# Patient Record
Sex: FEMALE | Race: WHITE | NOT HISPANIC OR LATINO | Employment: OTHER | URBAN - METROPOLITAN AREA
[De-identification: names, ages, dates, MRNs, and addresses within clinical notes are randomized per-mention and may not be internally consistent; named-entity substitution may affect disease eponyms.]

---

## 2017-01-03 ENCOUNTER — AMBULATORY - HEALTHEAST (OUTPATIENT)
Dept: FAMILY MEDICINE | Facility: CLINIC | Age: 57
End: 2017-01-03

## 2017-01-16 ENCOUNTER — OFFICE VISIT - HEALTHEAST (OUTPATIENT)
Dept: FAMILY MEDICINE | Facility: CLINIC | Age: 57
End: 2017-01-16

## 2017-01-16 DIAGNOSIS — J44.9 COPD (CHRONIC OBSTRUCTIVE PULMONARY DISEASE) (H): ICD-10-CM

## 2017-01-16 DIAGNOSIS — F17.200 TOBACCO USE DISORDER: ICD-10-CM

## 2017-01-16 DIAGNOSIS — E78.5 HYPERLIPIDEMIA: ICD-10-CM

## 2017-01-16 DIAGNOSIS — G89.4 CHRONIC PAIN SYNDROME: ICD-10-CM

## 2017-01-16 DIAGNOSIS — E03.9 HYPOTHYROIDISM: ICD-10-CM

## 2017-01-16 DIAGNOSIS — F31.89 OTHER BIPOLAR DISORDER (H): ICD-10-CM

## 2017-01-16 DIAGNOSIS — E55.9 VITAMIN D DEFICIENCY: ICD-10-CM

## 2017-01-16 DIAGNOSIS — G43.909 MIGRAINE: ICD-10-CM

## 2017-01-16 DIAGNOSIS — R13.10 DYSPHAGIA: ICD-10-CM

## 2017-01-16 DIAGNOSIS — K21.9 GERD (GASTROESOPHAGEAL REFLUX DISEASE): ICD-10-CM

## 2017-01-16 DIAGNOSIS — N18.30 CHRONIC KIDNEY DISEASE, STAGE III (MODERATE) (H): ICD-10-CM

## 2017-01-16 LAB
CHOLEST SERPL-MCNC: 248 MG/DL
FASTING STATUS PATIENT QL REPORTED: YES
HDLC SERPL-MCNC: 51 MG/DL
LDLC SERPL CALC-MCNC: 141 MG/DL
TRIGL SERPL-MCNC: 279 MG/DL

## 2017-01-17 ENCOUNTER — RECORDS - HEALTHEAST (OUTPATIENT)
Dept: ADMINISTRATIVE | Facility: OTHER | Age: 57
End: 2017-01-17

## 2017-02-07 ENCOUNTER — HOSPITAL ENCOUNTER (OUTPATIENT)
Dept: CT IMAGING | Facility: HOSPITAL | Age: 57
Discharge: HOME | End: 2017-02-07
Attending: INTERNAL MEDICINE

## 2017-02-07 DIAGNOSIS — R91.1 LUNG NODULE: ICD-10-CM

## 2017-02-08 ENCOUNTER — AMBULATORY - HEALTHEAST (OUTPATIENT)
Dept: INTENSIVE CARE | Facility: CLINIC | Age: 57
End: 2017-02-08

## 2017-02-08 ENCOUNTER — COMMUNICATION - HEALTHEAST (OUTPATIENT)
Dept: FAMILY MEDICINE | Facility: CLINIC | Age: 57
End: 2017-02-08

## 2017-02-08 ENCOUNTER — COMMUNICATION - HEALTHEAST (OUTPATIENT)
Dept: PULMONOLOGY | Facility: OTHER | Age: 57
End: 2017-02-08

## 2017-02-08 DIAGNOSIS — R91.1 LUNG NODULE: ICD-10-CM

## 2017-02-09 ENCOUNTER — RECORDS - HEALTHEAST (OUTPATIENT)
Dept: ADMINISTRATIVE | Facility: OTHER | Age: 57
End: 2017-02-09

## 2017-02-10 ENCOUNTER — COMMUNICATION - HEALTHEAST (OUTPATIENT)
Dept: FAMILY MEDICINE | Facility: CLINIC | Age: 57
End: 2017-02-10

## 2017-02-10 DIAGNOSIS — M54.2 NECK PAIN: ICD-10-CM

## 2017-02-12 ENCOUNTER — RECORDS - HEALTHEAST (OUTPATIENT)
Dept: ADMINISTRATIVE | Facility: OTHER | Age: 57
End: 2017-02-12

## 2017-02-13 ENCOUNTER — RECORDS - HEALTHEAST (OUTPATIENT)
Dept: ADMINISTRATIVE | Facility: OTHER | Age: 57
End: 2017-02-13

## 2017-02-13 ENCOUNTER — OFFICE VISIT - HEALTHEAST (OUTPATIENT)
Dept: FAMILY MEDICINE | Facility: CLINIC | Age: 57
End: 2017-02-13

## 2017-02-13 DIAGNOSIS — G43.909 MIGRAINE: ICD-10-CM

## 2017-02-13 DIAGNOSIS — Z00.00 PREVENTATIVE HEALTH CARE: ICD-10-CM

## 2017-02-17 ENCOUNTER — COMMUNICATION - HEALTHEAST (OUTPATIENT)
Dept: FAMILY MEDICINE | Facility: CLINIC | Age: 57
End: 2017-02-17

## 2017-02-17 DIAGNOSIS — G43.909 MIGRAINE: ICD-10-CM

## 2017-03-01 ENCOUNTER — OFFICE VISIT - HEALTHEAST (OUTPATIENT)
Dept: FAMILY MEDICINE | Facility: CLINIC | Age: 57
End: 2017-03-01

## 2017-03-01 DIAGNOSIS — F31.89 OTHER BIPOLAR DISORDER (H): ICD-10-CM

## 2017-03-01 DIAGNOSIS — G43.909 MIGRAINE: ICD-10-CM

## 2017-03-01 DIAGNOSIS — Z02.4 ENCOUNTER FOR COMMERCIAL DRIVER MEDICAL EXAMINATION (CDME): ICD-10-CM

## 2017-03-01 DIAGNOSIS — G89.4 CHRONIC PAIN SYNDROME: ICD-10-CM

## 2017-03-01 DIAGNOSIS — E78.5 HYPERLIPIDEMIA: ICD-10-CM

## 2017-03-01 DIAGNOSIS — E03.9 HYPOTHYROIDISM: ICD-10-CM

## 2017-03-01 DIAGNOSIS — F17.200 TOBACCO USE DISORDER: ICD-10-CM

## 2017-03-01 DIAGNOSIS — K21.9 GERD (GASTROESOPHAGEAL REFLUX DISEASE): ICD-10-CM

## 2017-03-01 DIAGNOSIS — J44.9 COPD (CHRONIC OBSTRUCTIVE PULMONARY DISEASE) (H): ICD-10-CM

## 2017-03-01 ASSESSMENT — MIFFLIN-ST. JEOR: SCORE: 1222.17

## 2017-03-07 ENCOUNTER — COMMUNICATION - HEALTHEAST (OUTPATIENT)
Dept: FAMILY MEDICINE | Facility: CLINIC | Age: 57
End: 2017-03-07

## 2017-03-07 DIAGNOSIS — E78.5 HYPERLIPIDEMIA: ICD-10-CM

## 2017-03-17 ENCOUNTER — RECORDS - HEALTHEAST (OUTPATIENT)
Dept: ADMINISTRATIVE | Facility: OTHER | Age: 57
End: 2017-03-17

## 2017-03-20 ENCOUNTER — RECORDS - HEALTHEAST (OUTPATIENT)
Dept: ADMINISTRATIVE | Facility: OTHER | Age: 57
End: 2017-03-20

## 2017-03-23 ENCOUNTER — RECORDS - HEALTHEAST (OUTPATIENT)
Dept: ADMINISTRATIVE | Facility: OTHER | Age: 57
End: 2017-03-23

## 2017-04-03 ENCOUNTER — HOSPITAL ENCOUNTER (OUTPATIENT)
Dept: RADIOLOGY | Facility: CLINIC | Age: 57
Discharge: HOME | End: 2017-04-03

## 2017-04-03 DIAGNOSIS — R13.10 DYSPHAGIA, UNSPECIFIED: ICD-10-CM

## 2017-04-05 ENCOUNTER — RECORDS - HEALTHEAST (OUTPATIENT)
Dept: ADMINISTRATIVE | Facility: OTHER | Age: 57
End: 2017-04-05

## 2017-04-10 ENCOUNTER — OFFICE VISIT - HEALTHEAST (OUTPATIENT)
Dept: FAMILY MEDICINE | Facility: CLINIC | Age: 57
End: 2017-04-10

## 2017-04-10 DIAGNOSIS — M25.519 SHOULDER PAIN: ICD-10-CM

## 2017-04-10 DIAGNOSIS — G43.909 MIGRAINE: ICD-10-CM

## 2017-04-26 ENCOUNTER — RECORDS - HEALTHEAST (OUTPATIENT)
Dept: ADMINISTRATIVE | Facility: OTHER | Age: 57
End: 2017-04-26

## 2017-05-01 ENCOUNTER — COMMUNICATION - HEALTHEAST (OUTPATIENT)
Dept: FAMILY MEDICINE | Facility: CLINIC | Age: 57
End: 2017-05-01

## 2017-05-10 ENCOUNTER — COMMUNICATION - HEALTHEAST (OUTPATIENT)
Dept: FAMILY MEDICINE | Facility: CLINIC | Age: 57
End: 2017-05-10

## 2017-05-10 DIAGNOSIS — J44.9 COPD (CHRONIC OBSTRUCTIVE PULMONARY DISEASE) (H): ICD-10-CM

## 2017-05-12 ENCOUNTER — COMMUNICATION - HEALTHEAST (OUTPATIENT)
Dept: FAMILY MEDICINE | Facility: CLINIC | Age: 57
End: 2017-05-12

## 2017-05-12 DIAGNOSIS — J44.9 COPD (CHRONIC OBSTRUCTIVE PULMONARY DISEASE) (H): ICD-10-CM

## 2017-05-19 ENCOUNTER — COMMUNICATION - HEALTHEAST (OUTPATIENT)
Dept: FAMILY MEDICINE | Facility: CLINIC | Age: 57
End: 2017-05-19

## 2017-06-30 ENCOUNTER — COMMUNICATION - HEALTHEAST (OUTPATIENT)
Dept: ADMINISTRATIVE | Facility: CLINIC | Age: 57
End: 2017-06-30

## 2017-07-05 ENCOUNTER — COMMUNICATION - HEALTHEAST (OUTPATIENT)
Dept: FAMILY MEDICINE | Facility: CLINIC | Age: 57
End: 2017-07-05

## 2017-07-07 ENCOUNTER — COMMUNICATION - HEALTHEAST (OUTPATIENT)
Dept: FAMILY MEDICINE | Facility: CLINIC | Age: 57
End: 2017-07-07

## 2017-07-10 ENCOUNTER — RECORDS - HEALTHEAST (OUTPATIENT)
Dept: ADMINISTRATIVE | Facility: OTHER | Age: 57
End: 2017-07-10

## 2017-07-11 ENCOUNTER — COMMUNICATION - HEALTHEAST (OUTPATIENT)
Dept: FAMILY MEDICINE | Facility: CLINIC | Age: 57
End: 2017-07-11

## 2017-07-24 ENCOUNTER — RECORDS - HEALTHEAST (OUTPATIENT)
Dept: GENERAL RADIOLOGY | Facility: CLINIC | Age: 57
End: 2017-07-24

## 2017-07-24 ENCOUNTER — OFFICE VISIT - HEALTHEAST (OUTPATIENT)
Dept: FAMILY MEDICINE | Facility: CLINIC | Age: 57
End: 2017-07-24

## 2017-07-24 DIAGNOSIS — S69.91XA UNSPECIFIED INJURY OF RIGHT WRIST, HAND AND FINGER(S), INITIAL ENCOUNTER: ICD-10-CM

## 2017-07-24 DIAGNOSIS — S69.91XA THUMB INJURY, RIGHT, INITIAL ENCOUNTER: ICD-10-CM

## 2017-07-24 ASSESSMENT — MIFFLIN-ST. JEOR: SCORE: 1230.4

## 2017-08-07 ENCOUNTER — RECORDS - HEALTHEAST (OUTPATIENT)
Dept: GENERAL RADIOLOGY | Facility: CLINIC | Age: 57
End: 2017-08-07

## 2017-08-07 ENCOUNTER — OFFICE VISIT - HEALTHEAST (OUTPATIENT)
Dept: FAMILY MEDICINE | Facility: CLINIC | Age: 57
End: 2017-08-07

## 2017-08-07 DIAGNOSIS — G43.909 MIGRAINE: ICD-10-CM

## 2017-08-07 DIAGNOSIS — S63.601D UNSPECIFIED SPRAIN OF RIGHT THUMB, SUBSEQUENT ENCOUNTER: ICD-10-CM

## 2017-08-07 DIAGNOSIS — S63.601D: ICD-10-CM

## 2017-08-10 ENCOUNTER — COMMUNICATION - HEALTHEAST (OUTPATIENT)
Dept: FAMILY MEDICINE | Facility: CLINIC | Age: 57
End: 2017-08-10

## 2017-08-10 DIAGNOSIS — G43.909 MIGRAINE: ICD-10-CM

## 2017-08-16 ENCOUNTER — COMMUNICATION - HEALTHEAST (OUTPATIENT)
Dept: FAMILY MEDICINE | Facility: CLINIC | Age: 57
End: 2017-08-16

## 2017-08-16 DIAGNOSIS — E78.5 HYPERLIPIDEMIA: ICD-10-CM

## 2017-08-22 ENCOUNTER — COMMUNICATION - HEALTHEAST (OUTPATIENT)
Dept: FAMILY MEDICINE | Facility: CLINIC | Age: 57
End: 2017-08-22

## 2017-08-22 ENCOUNTER — AMBULATORY - HEALTHEAST (OUTPATIENT)
Dept: FAMILY MEDICINE | Facility: CLINIC | Age: 57
End: 2017-08-22

## 2017-08-22 DIAGNOSIS — S69.90XA THUMB INJURY: ICD-10-CM

## 2017-10-01 ENCOUNTER — COMMUNICATION - HEALTHEAST (OUTPATIENT)
Dept: FAMILY MEDICINE | Facility: CLINIC | Age: 57
End: 2017-10-01

## 2017-10-01 DIAGNOSIS — E78.5 HYPERLIPIDEMIA: ICD-10-CM

## 2017-10-25 ENCOUNTER — COMMUNICATION - HEALTHEAST (OUTPATIENT)
Dept: FAMILY MEDICINE | Facility: CLINIC | Age: 57
End: 2017-10-25

## 2017-10-25 DIAGNOSIS — M26.609 TMJ (TEMPOROMANDIBULAR JOINT SYNDROME): ICD-10-CM

## 2017-11-12 ENCOUNTER — COMMUNICATION - HEALTHEAST (OUTPATIENT)
Dept: FAMILY MEDICINE | Facility: CLINIC | Age: 57
End: 2017-11-12

## 2017-11-21 ENCOUNTER — COMMUNICATION - HEALTHEAST (OUTPATIENT)
Dept: FAMILY MEDICINE | Facility: CLINIC | Age: 57
End: 2017-11-21

## 2017-12-19 ENCOUNTER — COMMUNICATION - HEALTHEAST (OUTPATIENT)
Dept: FAMILY MEDICINE | Facility: CLINIC | Age: 57
End: 2017-12-19

## 2018-01-19 ENCOUNTER — AMBULATORY - HEALTHEAST (OUTPATIENT)
Dept: FAMILY MEDICINE | Facility: CLINIC | Age: 58
End: 2018-01-19

## 2018-01-19 ENCOUNTER — COMMUNICATION - HEALTHEAST (OUTPATIENT)
Dept: FAMILY MEDICINE | Facility: CLINIC | Age: 58
End: 2018-01-19

## 2018-01-19 DIAGNOSIS — M26.609 TMJ (TEMPOROMANDIBULAR JOINT SYNDROME): ICD-10-CM

## 2018-02-06 ENCOUNTER — HOSPITAL ENCOUNTER (OUTPATIENT)
Dept: CT IMAGING | Facility: CLINIC | Age: 58
Discharge: HOME | End: 2018-02-06
Attending: INTERNAL MEDICINE

## 2018-02-06 DIAGNOSIS — R91.1 LUNG NODULE: ICD-10-CM

## 2018-02-17 ENCOUNTER — COMMUNICATION - HEALTHEAST (OUTPATIENT)
Dept: FAMILY MEDICINE | Facility: CLINIC | Age: 58
End: 2018-02-17

## 2018-02-20 ENCOUNTER — OFFICE VISIT - HEALTHEAST (OUTPATIENT)
Dept: FAMILY MEDICINE | Facility: CLINIC | Age: 58
End: 2018-02-20

## 2018-02-20 DIAGNOSIS — E03.9 HYPOTHYROIDISM, UNSPECIFIED TYPE: ICD-10-CM

## 2018-02-20 DIAGNOSIS — G43.909 MIGRAINE: ICD-10-CM

## 2018-02-20 DIAGNOSIS — R35.0 URINARY FREQUENCY: ICD-10-CM

## 2018-02-20 DIAGNOSIS — E78.5 HYPERLIPIDEMIA, UNSPECIFIED HYPERLIPIDEMIA TYPE: ICD-10-CM

## 2018-02-20 DIAGNOSIS — Z02.4 ENCOUNTER FOR DEPARTMENT OF TRANSPORTATION (DOT) EXAMINATION FOR DRIVING LICENSE RENEWAL: ICD-10-CM

## 2018-02-20 DIAGNOSIS — G89.4 CHRONIC PAIN SYNDROME: ICD-10-CM

## 2018-02-20 DIAGNOSIS — K21.9 GASTROESOPHAGEAL REFLUX DISEASE WITHOUT ESOPHAGITIS: ICD-10-CM

## 2018-02-20 DIAGNOSIS — J44.9 CHRONIC OBSTRUCTIVE PULMONARY DISEASE, UNSPECIFIED COPD TYPE (H): ICD-10-CM

## 2018-02-20 DIAGNOSIS — F31.89 OTHER BIPOLAR DISORDER (H): ICD-10-CM

## 2018-02-20 DIAGNOSIS — F17.200 TOBACCO USE DISORDER: ICD-10-CM

## 2018-02-20 DIAGNOSIS — Z12.31 VISIT FOR SCREENING MAMMOGRAM: ICD-10-CM

## 2018-02-20 LAB
ALBUMIN SERPL-MCNC: 4 G/DL (ref 3.5–5)
ALBUMIN UR-MCNC: ABNORMAL MG/DL
ALP SERPL-CCNC: 134 U/L (ref 45–120)
ALT SERPL W P-5'-P-CCNC: 21 U/L (ref 0–45)
ANION GAP SERPL CALCULATED.3IONS-SCNC: 10 MMOL/L (ref 5–18)
APPEARANCE UR: CLEAR
AST SERPL W P-5'-P-CCNC: 24 U/L (ref 0–40)
BILIRUB SERPL-MCNC: 0.6 MG/DL (ref 0–1)
BILIRUB UR QL STRIP: ABNORMAL
BUN SERPL-MCNC: 17 MG/DL (ref 8–22)
CALCIUM SERPL-MCNC: 9.5 MG/DL (ref 8.5–10.5)
CHLORIDE BLD-SCNC: 103 MMOL/L (ref 98–107)
CHOLEST SERPL-MCNC: 219 MG/DL
CO2 SERPL-SCNC: 27 MMOL/L (ref 22–31)
COLOR UR AUTO: YELLOW
CREAT SERPL-MCNC: 1.09 MG/DL (ref 0.6–1.1)
FASTING STATUS PATIENT QL REPORTED: YES
GFR SERPL CREATININE-BSD FRML MDRD: 52 ML/MIN/1.73M2
GLUCOSE BLD-MCNC: 107 MG/DL (ref 70–125)
GLUCOSE UR STRIP-MCNC: NEGATIVE MG/DL
HDLC SERPL-MCNC: 64 MG/DL
HGB BLD-MCNC: 15 G/DL (ref 12–16)
HGB UR QL STRIP: NEGATIVE
KETONES UR STRIP-MCNC: ABNORMAL MG/DL
LDLC SERPL CALC-MCNC: 119 MG/DL
LEUKOCYTE ESTERASE UR QL STRIP: NEGATIVE
NITRATE UR QL: NEGATIVE
PH UR STRIP: 6 [PH] (ref 5–8)
POTASSIUM BLD-SCNC: 4.4 MMOL/L (ref 3.5–5)
PROT SERPL-MCNC: 7.1 G/DL (ref 6–8)
SODIUM SERPL-SCNC: 140 MMOL/L (ref 136–145)
SP GR UR STRIP: 1.02 (ref 1–1.03)
TRIGL SERPL-MCNC: 181 MG/DL
TSH SERPL DL<=0.005 MIU/L-ACNC: 1.81 UIU/ML (ref 0.3–5)
UROBILINOGEN UR STRIP-ACNC: ABNORMAL

## 2018-02-20 ASSESSMENT — MIFFLIN-ST. JEOR: SCORE: 1233.23

## 2018-02-22 ENCOUNTER — RECORDS - HEALTHEAST (OUTPATIENT)
Dept: ADMINISTRATIVE | Facility: OTHER | Age: 58
End: 2018-02-22

## 2018-02-26 ENCOUNTER — OFFICE VISIT - HEALTHEAST (OUTPATIENT)
Dept: FAMILY MEDICINE | Facility: CLINIC | Age: 58
End: 2018-02-26

## 2018-02-26 ENCOUNTER — OFFICE VISIT - HEALTHEAST (OUTPATIENT)
Dept: PULMONOLOGY | Facility: OTHER | Age: 58
End: 2018-02-26

## 2018-02-26 ENCOUNTER — COMMUNICATION - HEALTHEAST (OUTPATIENT)
Dept: FAMILY MEDICINE | Facility: CLINIC | Age: 58
End: 2018-02-26

## 2018-02-26 DIAGNOSIS — J43.2 CENTRILOBULAR EMPHYSEMA (H): ICD-10-CM

## 2018-02-26 DIAGNOSIS — R11.0 NAUSEA: ICD-10-CM

## 2018-02-26 DIAGNOSIS — M79.7 FIBROMYALGIA: ICD-10-CM

## 2018-02-26 DIAGNOSIS — G43.909 MIGRAINE: ICD-10-CM

## 2018-02-26 DIAGNOSIS — E03.9 HYPOTHYROIDISM, UNSPECIFIED TYPE: ICD-10-CM

## 2018-02-26 DIAGNOSIS — E78.5 HYPERLIPIDEMIA, UNSPECIFIED HYPERLIPIDEMIA TYPE: ICD-10-CM

## 2018-02-26 DIAGNOSIS — J44.9 CHRONIC OBSTRUCTIVE PULMONARY DISEASE, UNSPECIFIED COPD TYPE (H): ICD-10-CM

## 2018-02-26 DIAGNOSIS — R91.1 LUNG NODULE: ICD-10-CM

## 2018-02-26 DIAGNOSIS — F31.89 OTHER BIPOLAR DISORDER (H): ICD-10-CM

## 2018-02-26 ASSESSMENT — MIFFLIN-ST. JEOR: SCORE: 1241.4

## 2018-03-12 ENCOUNTER — COMMUNICATION - HEALTHEAST (OUTPATIENT)
Dept: FAMILY MEDICINE | Facility: CLINIC | Age: 58
End: 2018-03-12

## 2018-03-12 DIAGNOSIS — E55.9 VITAMIN D DEFICIENCY: ICD-10-CM

## 2018-03-20 ENCOUNTER — OFFICE VISIT - HEALTHEAST (OUTPATIENT)
Dept: FAMILY MEDICINE | Facility: CLINIC | Age: 58
End: 2018-03-20

## 2018-03-20 ENCOUNTER — HOSPITAL ENCOUNTER (OUTPATIENT)
Dept: MAMMOGRAPHY | Facility: CLINIC | Age: 58
Discharge: HOME | End: 2018-03-20
Attending: FAMILY MEDICINE

## 2018-03-20 DIAGNOSIS — F41.0 PANIC ATTACK: ICD-10-CM

## 2018-03-20 DIAGNOSIS — I51.7 BIATRIAL ENLARGEMENT: ICD-10-CM

## 2018-03-20 DIAGNOSIS — Z12.31 VISIT FOR SCREENING MAMMOGRAM: ICD-10-CM

## 2018-03-20 DIAGNOSIS — G43.909 MIGRAINE: ICD-10-CM

## 2018-03-20 DIAGNOSIS — R11.0 NAUSEA: ICD-10-CM

## 2018-03-20 DIAGNOSIS — J43.2 CENTRILOBULAR EMPHYSEMA (H): ICD-10-CM

## 2018-03-20 LAB
ATRIAL RATE - MUSE: 77 BPM
DIASTOLIC BLOOD PRESSURE - MUSE: NORMAL MMHG
INTERPRETATION ECG - MUSE: NORMAL
P AXIS - MUSE: 64 DEGREES
PR INTERVAL - MUSE: 162 MS
QRS DURATION - MUSE: 80 MS
QT - MUSE: 396 MS
QTC - MUSE: 448 MS
R AXIS - MUSE: 74 DEGREES
SYSTOLIC BLOOD PRESSURE - MUSE: NORMAL MMHG
T AXIS - MUSE: 69 DEGREES
VENTRICULAR RATE- MUSE: 77 BPM

## 2018-03-21 ENCOUNTER — AMBULATORY - HEALTHEAST (OUTPATIENT)
Dept: NURSING | Facility: CLINIC | Age: 58
End: 2018-03-21

## 2018-03-21 ENCOUNTER — COMMUNICATION - HEALTHEAST (OUTPATIENT)
Dept: MAMMOGRAPHY | Facility: CLINIC | Age: 58
End: 2018-03-21

## 2018-03-29 ENCOUNTER — HOSPITAL ENCOUNTER (OUTPATIENT)
Dept: MAMMOGRAPHY | Facility: CLINIC | Age: 58
Discharge: HOME | End: 2018-03-29
Attending: FAMILY MEDICINE

## 2018-03-29 DIAGNOSIS — N64.89 BREAST ASYMMETRY: ICD-10-CM

## 2018-04-13 ENCOUNTER — COMMUNICATION - HEALTHEAST (OUTPATIENT)
Dept: FAMILY MEDICINE | Facility: CLINIC | Age: 58
End: 2018-04-13

## 2018-04-13 ENCOUNTER — OFFICE VISIT - HEALTHEAST (OUTPATIENT)
Dept: FAMILY MEDICINE | Facility: CLINIC | Age: 58
End: 2018-04-13

## 2018-04-13 DIAGNOSIS — J30.2 SEASONAL ALLERGIES: ICD-10-CM

## 2018-04-13 DIAGNOSIS — F41.9 ANXIETY: ICD-10-CM

## 2018-04-13 DIAGNOSIS — J44.9 COPD (CHRONIC OBSTRUCTIVE PULMONARY DISEASE) (H): ICD-10-CM

## 2018-04-13 DIAGNOSIS — G43.909 MIGRAINE: ICD-10-CM

## 2018-04-13 DIAGNOSIS — G89.4 CHRONIC PAIN SYNDROME: ICD-10-CM

## 2018-04-13 DIAGNOSIS — M79.7 FIBROMYALGIA: ICD-10-CM

## 2018-05-22 ENCOUNTER — COMMUNICATION - HEALTHEAST (OUTPATIENT)
Dept: FAMILY MEDICINE | Facility: CLINIC | Age: 58
End: 2018-05-22

## 2018-05-22 DIAGNOSIS — E03.9 HYPOTHYROIDISM, UNSPECIFIED TYPE: ICD-10-CM

## 2018-06-22 ENCOUNTER — COMMUNICATION - HEALTHEAST (OUTPATIENT)
Dept: FAMILY MEDICINE | Facility: CLINIC | Age: 58
End: 2018-06-22

## 2018-06-22 DIAGNOSIS — E78.5 HYPERLIPIDEMIA: ICD-10-CM

## 2018-07-20 ENCOUNTER — COMMUNICATION - HEALTHEAST (OUTPATIENT)
Dept: FAMILY MEDICINE | Facility: CLINIC | Age: 58
End: 2018-07-20

## 2018-07-20 DIAGNOSIS — F17.200 TOBACCO USE DISORDER: ICD-10-CM

## 2018-08-20 ENCOUNTER — COMMUNICATION - HEALTHEAST (OUTPATIENT)
Dept: FAMILY MEDICINE | Facility: CLINIC | Age: 58
End: 2018-08-20

## 2018-08-20 DIAGNOSIS — E03.9 HYPOTHYROIDISM, UNSPECIFIED TYPE: ICD-10-CM

## 2018-09-18 ENCOUNTER — OFFICE VISIT - HEALTHEAST (OUTPATIENT)
Dept: FAMILY MEDICINE | Facility: CLINIC | Age: 58
End: 2018-09-18

## 2018-09-18 DIAGNOSIS — F17.200 TOBACCO USE DISORDER: ICD-10-CM

## 2018-09-18 DIAGNOSIS — J43.2 CENTRILOBULAR EMPHYSEMA (H): ICD-10-CM

## 2018-09-18 DIAGNOSIS — G43.909 MIGRAINE: ICD-10-CM

## 2018-09-18 DIAGNOSIS — Z79.899 CONTROLLED SUBSTANCE AGREEMENT SIGNED: ICD-10-CM

## 2018-09-18 DIAGNOSIS — Z00.00 ANNUAL PHYSICAL EXAM: ICD-10-CM

## 2018-09-18 DIAGNOSIS — M79.7 FIBROMYALGIA: ICD-10-CM

## 2018-09-18 DIAGNOSIS — F31.89 OTHER BIPOLAR DISORDER (H): ICD-10-CM

## 2018-09-18 DIAGNOSIS — E03.9 HYPOTHYROIDISM, UNSPECIFIED TYPE: ICD-10-CM

## 2018-09-18 DIAGNOSIS — E78.5 HYPERLIPIDEMIA, UNSPECIFIED HYPERLIPIDEMIA TYPE: ICD-10-CM

## 2018-09-18 LAB
ANION GAP SERPL CALCULATED.3IONS-SCNC: 12 MMOL/L (ref 5–18)
BUN SERPL-MCNC: 18 MG/DL (ref 8–22)
CALCIUM SERPL-MCNC: 9.9 MG/DL (ref 8.5–10.5)
CHLORIDE BLD-SCNC: 105 MMOL/L (ref 98–107)
CHOLEST SERPL-MCNC: 180 MG/DL
CO2 SERPL-SCNC: 26 MMOL/L (ref 22–31)
CREAT SERPL-MCNC: 1.11 MG/DL (ref 0.6–1.1)
FASTING STATUS PATIENT QL REPORTED: YES
GFR SERPL CREATININE-BSD FRML MDRD: 50 ML/MIN/1.73M2
GLUCOSE BLD-MCNC: 104 MG/DL (ref 70–125)
HDLC SERPL-MCNC: 63 MG/DL
LDLC SERPL CALC-MCNC: 86 MG/DL
POTASSIUM BLD-SCNC: 4.2 MMOL/L (ref 3.5–5)
SODIUM SERPL-SCNC: 143 MMOL/L (ref 136–145)
TRIGL SERPL-MCNC: 155 MG/DL

## 2018-09-18 ASSESSMENT — MIFFLIN-ST. JEOR: SCORE: 1241.74

## 2018-09-19 LAB
25(OH)D3 SERPL-MCNC: 48.3 NG/ML (ref 30–80)
25(OH)D3 SERPL-MCNC: 48.3 NG/ML (ref 30–80)
HPV SOURCE: NORMAL
HUMAN PAPILLOMA VIRUS 16 DNA: NEGATIVE
HUMAN PAPILLOMA VIRUS 18 DNA: NEGATIVE
HUMAN PAPILLOMA VIRUS FINAL DIAGNOSIS: NORMAL
HUMAN PAPILLOMA VIRUS OTHER HR: NEGATIVE
SPECIMEN DESCRIPTION: NORMAL

## 2018-09-20 ENCOUNTER — COMMUNICATION - HEALTHEAST (OUTPATIENT)
Dept: FAMILY MEDICINE | Facility: CLINIC | Age: 58
End: 2018-09-20

## 2018-09-25 ENCOUNTER — COMMUNICATION - HEALTHEAST (OUTPATIENT)
Dept: FAMILY MEDICINE | Facility: CLINIC | Age: 58
End: 2018-09-25

## 2018-09-25 DIAGNOSIS — G43.909 MIGRAINE: ICD-10-CM

## 2018-09-27 LAB
BKR LAB AP ABNORMAL BLEEDING: NO
BKR LAB AP BIRTH CONTROL/HORMONES: NORMAL
BKR LAB AP CERVICAL APPEARANCE: NORMAL
BKR LAB AP GYN ADEQUACY: NORMAL
BKR LAB AP GYN INTERPRETATION: NORMAL
BKR LAB AP HPV REFLEX: NORMAL
BKR LAB AP LMP: NORMAL
BKR LAB AP PATIENT STATUS: NORMAL
BKR LAB AP PREVIOUS ABNORMAL: NO
BKR LAB AP PREVIOUS NORMAL: 2013
HIGH RISK?: NO
PATH REPORT.COMMENTS IMP SPEC: NORMAL
RESULT FLAG (HE HISTORICAL CONVERSION): NORMAL

## 2018-09-28 ENCOUNTER — COMMUNICATION - HEALTHEAST (OUTPATIENT)
Dept: FAMILY MEDICINE | Facility: CLINIC | Age: 58
End: 2018-09-28

## 2018-09-28 DIAGNOSIS — E78.5 HYPERLIPIDEMIA: ICD-10-CM

## 2018-10-15 ENCOUNTER — COMMUNICATION - HEALTHEAST (OUTPATIENT)
Dept: FAMILY MEDICINE | Facility: CLINIC | Age: 58
End: 2018-10-15

## 2018-10-15 DIAGNOSIS — F17.200 TOBACCO USE DISORDER: ICD-10-CM

## 2018-11-14 ENCOUNTER — COMMUNICATION - HEALTHEAST (OUTPATIENT)
Dept: FAMILY MEDICINE | Facility: CLINIC | Age: 58
End: 2018-11-14

## 2018-11-14 DIAGNOSIS — F17.200 TOBACCO USE DISORDER: ICD-10-CM

## 2018-12-02 ENCOUNTER — COMMUNICATION - HEALTHEAST (OUTPATIENT)
Dept: FAMILY MEDICINE | Facility: CLINIC | Age: 58
End: 2018-12-02

## 2018-12-02 DIAGNOSIS — M26.609 TMJ (TEMPOROMANDIBULAR JOINT SYNDROME): ICD-10-CM

## 2018-12-03 ENCOUNTER — COMMUNICATION - HEALTHEAST (OUTPATIENT)
Dept: FAMILY MEDICINE | Facility: CLINIC | Age: 58
End: 2018-12-03

## 2018-12-03 DIAGNOSIS — M26.609 TMJ (TEMPOROMANDIBULAR JOINT SYNDROME): ICD-10-CM

## 2018-12-21 ENCOUNTER — COMMUNICATION - HEALTHEAST (OUTPATIENT)
Dept: FAMILY MEDICINE | Facility: CLINIC | Age: 58
End: 2018-12-21

## 2018-12-21 DIAGNOSIS — E78.5 HYPERLIPIDEMIA: ICD-10-CM

## 2019-01-04 ENCOUNTER — COMMUNICATION - HEALTHEAST (OUTPATIENT)
Dept: FAMILY MEDICINE | Facility: CLINIC | Age: 59
End: 2019-01-04

## 2019-01-04 DIAGNOSIS — M26.609 TMJ (TEMPOROMANDIBULAR JOINT SYNDROME): ICD-10-CM

## 2019-02-04 ENCOUNTER — HOSPITAL ENCOUNTER (OUTPATIENT)
Dept: CT IMAGING | Facility: HOSPITAL | Age: 59
Discharge: HOME | End: 2019-02-04
Attending: INTERNAL MEDICINE

## 2019-02-04 DIAGNOSIS — J43.2 CENTRILOBULAR EMPHYSEMA (H): ICD-10-CM

## 2019-02-04 DIAGNOSIS — J44.9 CHRONIC OBSTRUCTIVE PULMONARY DISEASE, UNSPECIFIED COPD TYPE (H): ICD-10-CM

## 2019-02-05 ENCOUNTER — COMMUNICATION - HEALTHEAST (OUTPATIENT)
Dept: FAMILY MEDICINE | Facility: CLINIC | Age: 59
End: 2019-02-05

## 2019-02-05 DIAGNOSIS — E55.9 VITAMIN D DEFICIENCY: ICD-10-CM

## 2019-02-06 ENCOUNTER — OFFICE VISIT - HEALTHEAST (OUTPATIENT)
Dept: FAMILY MEDICINE | Facility: CLINIC | Age: 59
End: 2019-02-06

## 2019-02-06 DIAGNOSIS — G43.919 INTRACTABLE MIGRAINE WITHOUT STATUS MIGRAINOSUS, UNSPECIFIED MIGRAINE TYPE: ICD-10-CM

## 2019-02-06 ASSESSMENT — MIFFLIN-ST. JEOR: SCORE: 1264.42

## 2019-02-07 ENCOUNTER — COMMUNICATION - HEALTHEAST (OUTPATIENT)
Dept: FAMILY MEDICINE | Facility: CLINIC | Age: 59
End: 2019-02-07

## 2019-02-08 ENCOUNTER — COMMUNICATION - HEALTHEAST (OUTPATIENT)
Dept: FAMILY MEDICINE | Facility: CLINIC | Age: 59
End: 2019-02-08

## 2019-02-10 ENCOUNTER — COMMUNICATION - HEALTHEAST (OUTPATIENT)
Dept: FAMILY MEDICINE | Facility: CLINIC | Age: 59
End: 2019-02-10

## 2019-02-10 DIAGNOSIS — E03.9 HYPOTHYROIDISM, UNSPECIFIED TYPE: ICD-10-CM

## 2019-02-15 ENCOUNTER — COMMUNICATION - HEALTHEAST (OUTPATIENT)
Dept: FAMILY MEDICINE | Facility: CLINIC | Age: 59
End: 2019-02-15

## 2019-02-15 DIAGNOSIS — R11.0 NAUSEA: ICD-10-CM

## 2019-02-22 ENCOUNTER — COMMUNICATION - HEALTHEAST (OUTPATIENT)
Dept: FAMILY MEDICINE | Facility: CLINIC | Age: 59
End: 2019-02-22

## 2019-02-22 DIAGNOSIS — R11.0 NAUSEA: ICD-10-CM

## 2019-03-01 ENCOUNTER — OFFICE VISIT - HEALTHEAST (OUTPATIENT)
Dept: FAMILY MEDICINE | Facility: CLINIC | Age: 59
End: 2019-03-01

## 2019-03-01 DIAGNOSIS — G43.919 INTRACTABLE MIGRAINE WITHOUT STATUS MIGRAINOSUS, UNSPECIFIED MIGRAINE TYPE: ICD-10-CM

## 2019-03-01 ASSESSMENT — MIFFLIN-ST. JEOR: SCORE: 1259.88

## 2019-04-05 ENCOUNTER — COMMUNICATION - HEALTHEAST (OUTPATIENT)
Dept: FAMILY MEDICINE | Facility: CLINIC | Age: 59
End: 2019-04-05

## 2019-04-05 DIAGNOSIS — F17.200 TOBACCO USE DISORDER: ICD-10-CM

## 2019-04-12 ENCOUNTER — COMMUNICATION - HEALTHEAST (OUTPATIENT)
Dept: FAMILY MEDICINE | Facility: CLINIC | Age: 59
End: 2019-04-12

## 2019-04-12 DIAGNOSIS — M26.609 TMJ (TEMPOROMANDIBULAR JOINT SYNDROME): ICD-10-CM

## 2019-05-04 ENCOUNTER — COMMUNICATION - HEALTHEAST (OUTPATIENT)
Dept: FAMILY MEDICINE | Facility: CLINIC | Age: 59
End: 2019-05-04

## 2019-05-04 DIAGNOSIS — J30.2 SEASONAL ALLERGIES: ICD-10-CM

## 2019-05-06 ENCOUNTER — COMMUNICATION - HEALTHEAST (OUTPATIENT)
Dept: FAMILY MEDICINE | Facility: CLINIC | Age: 59
End: 2019-05-06

## 2019-05-06 DIAGNOSIS — E55.9 VITAMIN D DEFICIENCY: ICD-10-CM

## 2019-05-09 ENCOUNTER — COMMUNICATION - HEALTHEAST (OUTPATIENT)
Dept: FAMILY MEDICINE | Facility: CLINIC | Age: 59
End: 2019-05-09

## 2019-05-09 DIAGNOSIS — E03.9 HYPOTHYROIDISM, UNSPECIFIED TYPE: ICD-10-CM

## 2019-05-10 ENCOUNTER — COMMUNICATION - HEALTHEAST (OUTPATIENT)
Dept: FAMILY MEDICINE | Facility: CLINIC | Age: 59
End: 2019-05-10

## 2019-05-10 DIAGNOSIS — M26.609 TMJ (TEMPOROMANDIBULAR JOINT SYNDROME): ICD-10-CM

## 2019-05-13 ENCOUNTER — OFFICE VISIT - HEALTHEAST (OUTPATIENT)
Dept: FAMILY MEDICINE | Facility: CLINIC | Age: 59
End: 2019-05-13

## 2019-05-13 DIAGNOSIS — Z36.3 ENCOUNTER FOR ROUTINE SCREENING FOR MALFORMATION USING ULTRASONICS: ICD-10-CM

## 2019-05-13 DIAGNOSIS — Z98.82 HISTORY OF BILATERAL SALINE BREAST IMPLANTS: ICD-10-CM

## 2019-05-13 DIAGNOSIS — T85.43XA: ICD-10-CM

## 2019-05-15 ENCOUNTER — HOSPITAL ENCOUNTER (OUTPATIENT)
Dept: MAMMOGRAPHY | Facility: CLINIC | Age: 59
Discharge: HOME | End: 2019-05-15
Attending: FAMILY MEDICINE

## 2019-05-15 DIAGNOSIS — Z36.3 ENCOUNTER FOR ROUTINE SCREENING FOR MALFORMATION USING ULTRASONICS: ICD-10-CM

## 2019-05-15 DIAGNOSIS — T85.43XA: ICD-10-CM

## 2019-05-15 DIAGNOSIS — Z98.82 HISTORY OF BILATERAL SALINE BREAST IMPLANTS: ICD-10-CM

## 2019-05-20 ENCOUNTER — HOSPITAL ENCOUNTER (OUTPATIENT)
Dept: MAMMOGRAPHY | Facility: CLINIC | Age: 59
Discharge: HOME | End: 2019-05-20
Attending: FAMILY MEDICINE

## 2019-06-14 ENCOUNTER — OFFICE VISIT - HEALTHEAST (OUTPATIENT)
Dept: FAMILY MEDICINE | Facility: CLINIC | Age: 59
End: 2019-06-14

## 2019-06-14 DIAGNOSIS — E78.5 HYPERLIPIDEMIA, UNSPECIFIED HYPERLIPIDEMIA TYPE: ICD-10-CM

## 2019-06-14 DIAGNOSIS — J43.2 CENTRILOBULAR EMPHYSEMA (H): ICD-10-CM

## 2019-06-14 DIAGNOSIS — F31.89 OTHER BIPOLAR DISORDER (H): ICD-10-CM

## 2019-06-14 DIAGNOSIS — T85.43XS LEAKAGE OF BREAST IMPLANT, SEQUELA: ICD-10-CM

## 2019-06-14 DIAGNOSIS — F17.200 TOBACCO USE DISORDER: ICD-10-CM

## 2019-06-14 DIAGNOSIS — Z01.818 PREOP GENERAL PHYSICAL EXAM: ICD-10-CM

## 2019-06-14 DIAGNOSIS — R09.89 VASCULAR BRUIT: ICD-10-CM

## 2019-06-14 DIAGNOSIS — E03.9 HYPOTHYROIDISM, UNSPECIFIED TYPE: ICD-10-CM

## 2019-06-14 LAB
ALBUMIN SERPL-MCNC: 4 G/DL (ref 3.5–5)
ALP SERPL-CCNC: 100 U/L (ref 45–120)
ALT SERPL W P-5'-P-CCNC: 24 U/L (ref 0–45)
ANION GAP SERPL CALCULATED.3IONS-SCNC: 11 MMOL/L (ref 5–18)
AST SERPL W P-5'-P-CCNC: 26 U/L (ref 0–40)
ATRIAL RATE - MUSE: 74 BPM
BILIRUB SERPL-MCNC: 0.3 MG/DL (ref 0–1)
BUN SERPL-MCNC: 19 MG/DL (ref 8–22)
CALCIUM SERPL-MCNC: 9.6 MG/DL (ref 8.5–10.5)
CHLORIDE BLD-SCNC: 105 MMOL/L (ref 98–107)
CO2 SERPL-SCNC: 25 MMOL/L (ref 22–31)
CREAT SERPL-MCNC: 1.06 MG/DL (ref 0.6–1.1)
DIASTOLIC BLOOD PRESSURE - MUSE: NORMAL MMHG
ERYTHROCYTE [DISTWIDTH] IN BLOOD BY AUTOMATED COUNT: 13.1 % (ref 11–14.5)
GFR SERPL CREATININE-BSD FRML MDRD: 53 ML/MIN/1.73M2
GLUCOSE BLD-MCNC: 78 MG/DL (ref 70–125)
HCT VFR BLD AUTO: 41.5 % (ref 35–47)
HGB BLD-MCNC: 13.1 G/DL (ref 12–16)
INTERPRETATION ECG - MUSE: NORMAL
MCH RBC QN AUTO: 28.3 PG (ref 27–34)
MCHC RBC AUTO-ENTMCNC: 31.6 G/DL (ref 32–36)
MCV RBC AUTO: 90 FL (ref 80–100)
P AXIS - MUSE: 66 DEGREES
PLATELET # BLD AUTO: 296 THOU/UL (ref 140–440)
PMV BLD AUTO: 8.7 FL (ref 8.5–12.5)
POTASSIUM BLD-SCNC: 4 MMOL/L (ref 3.5–5)
PR INTERVAL - MUSE: 162 MS
PROT SERPL-MCNC: 6.9 G/DL (ref 6–8)
QRS DURATION - MUSE: 78 MS
QT - MUSE: 410 MS
QTC - MUSE: 455 MS
R AXIS - MUSE: 74 DEGREES
RBC # BLD AUTO: 4.63 MILL/UL (ref 3.8–5.4)
SODIUM SERPL-SCNC: 141 MMOL/L (ref 136–145)
SYSTOLIC BLOOD PRESSURE - MUSE: NORMAL MMHG
T AXIS - MUSE: 74 DEGREES
TSH SERPL DL<=0.005 MIU/L-ACNC: 1.61 UIU/ML (ref 0.3–5)
VENTRICULAR RATE- MUSE: 74 BPM
WBC: 6.5 THOU/UL (ref 4–11)

## 2019-06-14 ASSESSMENT — MIFFLIN-ST. JEOR: SCORE: 1262.61

## 2019-06-28 ENCOUNTER — HOSPITAL ENCOUNTER (OUTPATIENT)
Dept: ULTRASOUND IMAGING | Facility: CLINIC | Age: 59
Discharge: HOME | End: 2019-06-28
Attending: FAMILY MEDICINE

## 2019-06-28 DIAGNOSIS — R09.89 VASCULAR BRUIT: ICD-10-CM

## 2019-07-05 ENCOUNTER — COMMUNICATION - HEALTHEAST (OUTPATIENT)
Dept: FAMILY MEDICINE | Facility: CLINIC | Age: 59
End: 2019-07-05

## 2019-07-05 DIAGNOSIS — M26.609 TMJ (TEMPOROMANDIBULAR JOINT SYNDROME): ICD-10-CM

## 2019-08-01 ENCOUNTER — COMMUNICATION - HEALTHEAST (OUTPATIENT)
Dept: FAMILY MEDICINE | Facility: CLINIC | Age: 59
End: 2019-08-01

## 2019-08-01 DIAGNOSIS — M26.609 TMJ (TEMPOROMANDIBULAR JOINT SYNDROME): ICD-10-CM

## 2019-08-03 ENCOUNTER — COMMUNICATION - HEALTHEAST (OUTPATIENT)
Dept: FAMILY MEDICINE | Facility: CLINIC | Age: 59
End: 2019-08-03

## 2019-08-03 DIAGNOSIS — J30.2 SEASONAL ALLERGIES: ICD-10-CM

## 2019-08-11 ENCOUNTER — COMMUNICATION - HEALTHEAST (OUTPATIENT)
Dept: FAMILY MEDICINE | Facility: CLINIC | Age: 59
End: 2019-08-11

## 2019-08-11 DIAGNOSIS — F17.200 TOBACCO USE DISORDER: ICD-10-CM

## 2019-08-11 DIAGNOSIS — R11.0 NAUSEA: ICD-10-CM

## 2019-08-11 DIAGNOSIS — G43.909 MIGRAINE: ICD-10-CM

## 2019-08-12 ENCOUNTER — OFFICE VISIT - HEALTHEAST (OUTPATIENT)
Dept: PULMONOLOGY | Facility: OTHER | Age: 59
End: 2019-08-12

## 2019-08-12 DIAGNOSIS — J44.9 CHRONIC OBSTRUCTIVE PULMONARY DISEASE, UNSPECIFIED COPD TYPE (H): ICD-10-CM

## 2019-08-12 DIAGNOSIS — F17.200 TOBACCO USE DISORDER: ICD-10-CM

## 2019-08-12 DIAGNOSIS — R91.1 LUNG NODULE: ICD-10-CM

## 2019-09-03 ENCOUNTER — COMMUNICATION - HEALTHEAST (OUTPATIENT)
Dept: FAMILY MEDICINE | Facility: CLINIC | Age: 59
End: 2019-09-03

## 2019-09-03 DIAGNOSIS — M26.609 TMJ (TEMPOROMANDIBULAR JOINT SYNDROME): ICD-10-CM

## 2019-09-13 ENCOUNTER — RECORDS - HEALTHEAST (OUTPATIENT)
Dept: ADMINISTRATIVE | Facility: OTHER | Age: 59
End: 2019-09-13

## 2019-10-11 ENCOUNTER — COMMUNICATION - HEALTHEAST (OUTPATIENT)
Dept: FAMILY MEDICINE | Facility: CLINIC | Age: 59
End: 2019-10-11

## 2019-10-11 DIAGNOSIS — M26.609 TMJ (TEMPOROMANDIBULAR JOINT SYNDROME): ICD-10-CM

## 2019-11-04 ENCOUNTER — COMMUNICATION - HEALTHEAST (OUTPATIENT)
Dept: FAMILY MEDICINE | Facility: CLINIC | Age: 59
End: 2019-11-04

## 2019-11-04 DIAGNOSIS — E55.9 VITAMIN D DEFICIENCY: ICD-10-CM

## 2019-11-17 ENCOUNTER — COMMUNICATION - HEALTHEAST (OUTPATIENT)
Dept: FAMILY MEDICINE | Facility: CLINIC | Age: 59
End: 2019-11-17

## 2019-11-17 DIAGNOSIS — M26.609 TMJ (TEMPOROMANDIBULAR JOINT SYNDROME): ICD-10-CM

## 2019-11-25 ENCOUNTER — COMMUNICATION - HEALTHEAST (OUTPATIENT)
Dept: FAMILY MEDICINE | Facility: CLINIC | Age: 59
End: 2019-11-25

## 2019-11-25 DIAGNOSIS — G43.909 MIGRAINE: ICD-10-CM

## 2019-11-29 ENCOUNTER — RECORDS - HEALTHEAST (OUTPATIENT)
Dept: ADMINISTRATIVE | Facility: OTHER | Age: 59
End: 2019-11-29

## 2019-11-29 ENCOUNTER — AMBULATORY - HEALTHEAST (OUTPATIENT)
Dept: NURSING | Facility: CLINIC | Age: 59
End: 2019-11-29

## 2019-11-29 DIAGNOSIS — T88.7XXA MEDICATION SIDE EFFECTS: ICD-10-CM

## 2019-11-29 LAB
ATRIAL RATE - MUSE: 76 BPM
DIASTOLIC BLOOD PRESSURE - MUSE: NORMAL
INTERPRETATION ECG - MUSE: NORMAL
P AXIS - MUSE: 67 DEGREES
PR INTERVAL - MUSE: 150 MS
QRS DURATION - MUSE: 74 MS
QT - MUSE: 410 MS
QTC - MUSE: 461 MS
R AXIS - MUSE: 77 DEGREES
SYSTOLIC BLOOD PRESSURE - MUSE: NORMAL
T AXIS - MUSE: 77 DEGREES
VENTRICULAR RATE- MUSE: 76 BPM

## 2019-12-16 ENCOUNTER — RECORDS - HEALTHEAST (OUTPATIENT)
Dept: ADMINISTRATIVE | Facility: OTHER | Age: 59
End: 2019-12-16

## 2019-12-23 ENCOUNTER — COMMUNICATION - HEALTHEAST (OUTPATIENT)
Dept: FAMILY MEDICINE | Facility: CLINIC | Age: 59
End: 2019-12-23

## 2019-12-23 DIAGNOSIS — M26.609 TMJ (TEMPOROMANDIBULAR JOINT SYNDROME): ICD-10-CM

## 2019-12-26 ENCOUNTER — OFFICE VISIT - HEALTHEAST (OUTPATIENT)
Dept: FAMILY MEDICINE | Facility: CLINIC | Age: 59
End: 2019-12-26

## 2019-12-26 DIAGNOSIS — J44.9 CHRONIC OBSTRUCTIVE PULMONARY DISEASE, UNSPECIFIED COPD TYPE (H): ICD-10-CM

## 2019-12-26 DIAGNOSIS — B37.31 YEAST INFECTION OF THE VAGINA: ICD-10-CM

## 2019-12-26 DIAGNOSIS — R05.9 COUGH: ICD-10-CM

## 2019-12-26 ASSESSMENT — MIFFLIN-ST. JEOR: SCORE: 1255.35

## 2020-01-27 ENCOUNTER — COMMUNICATION - HEALTHEAST (OUTPATIENT)
Dept: SCHEDULING | Facility: CLINIC | Age: 60
End: 2020-01-27

## 2020-01-27 ENCOUNTER — OFFICE VISIT - HEALTHEAST (OUTPATIENT)
Dept: FAMILY MEDICINE | Facility: CLINIC | Age: 60
End: 2020-01-27

## 2020-01-27 DIAGNOSIS — R05.9 COUGH: ICD-10-CM

## 2020-01-27 DIAGNOSIS — E78.5 HYPERLIPIDEMIA, UNSPECIFIED HYPERLIPIDEMIA TYPE: ICD-10-CM

## 2020-01-27 DIAGNOSIS — G43.909 MIGRAINE: ICD-10-CM

## 2020-01-27 DIAGNOSIS — J44.9 CHRONIC OBSTRUCTIVE PULMONARY DISEASE, UNSPECIFIED COPD TYPE (H): ICD-10-CM

## 2020-01-27 DIAGNOSIS — F31.89 OTHER BIPOLAR DISORDER (H): ICD-10-CM

## 2020-01-27 DIAGNOSIS — G43.919 INTRACTABLE MIGRAINE WITHOUT STATUS MIGRAINOSUS, UNSPECIFIED MIGRAINE TYPE: ICD-10-CM

## 2020-01-27 LAB
ALBUMIN SERPL-MCNC: 4 G/DL (ref 3.5–5)
ALP SERPL-CCNC: 128 U/L (ref 45–120)
ALT SERPL W P-5'-P-CCNC: 26 U/L (ref 0–45)
ANION GAP SERPL CALCULATED.3IONS-SCNC: 10 MMOL/L (ref 5–18)
AST SERPL W P-5'-P-CCNC: 26 U/L (ref 0–40)
BILIRUB SERPL-MCNC: 0.3 MG/DL (ref 0–1)
BUN SERPL-MCNC: 20 MG/DL (ref 8–22)
CALCIUM SERPL-MCNC: 9.7 MG/DL (ref 8.5–10.5)
CHLORIDE BLD-SCNC: 103 MMOL/L (ref 98–107)
CHOLEST SERPL-MCNC: 373 MG/DL
CO2 SERPL-SCNC: 27 MMOL/L (ref 22–31)
CREAT SERPL-MCNC: 1.38 MG/DL (ref 0.6–1.1)
FASTING STATUS PATIENT QL REPORTED: NO
GFR SERPL CREATININE-BSD FRML MDRD: 39 ML/MIN/1.73M2
GLUCOSE BLD-MCNC: 90 MG/DL (ref 70–125)
HDLC SERPL-MCNC: 70 MG/DL
LDLC SERPL CALC-MCNC: 251 MG/DL
POTASSIUM BLD-SCNC: 4.2 MMOL/L (ref 3.5–5)
PROT SERPL-MCNC: 7.4 G/DL (ref 6–8)
SODIUM SERPL-SCNC: 140 MMOL/L (ref 136–145)
TRIGL SERPL-MCNC: 260 MG/DL

## 2020-01-27 ASSESSMENT — ANXIETY QUESTIONNAIRES
1. FEELING NERVOUS, ANXIOUS, OR ON EDGE: MORE THAN HALF THE DAYS
6. BECOMING EASILY ANNOYED OR IRRITABLE: MORE THAN HALF THE DAYS
2. NOT BEING ABLE TO STOP OR CONTROL WORRYING: SEVERAL DAYS
IF YOU CHECKED OFF ANY PROBLEMS ON THIS QUESTIONNAIRE, HOW DIFFICULT HAVE THESE PROBLEMS MADE IT FOR YOU TO DO YOUR WORK, TAKE CARE OF THINGS AT HOME, OR GET ALONG WITH OTHER PEOPLE: SOMEWHAT DIFFICULT
4. TROUBLE RELAXING: MORE THAN HALF THE DAYS
3. WORRYING TOO MUCH ABOUT DIFFERENT THINGS: MORE THAN HALF THE DAYS
7. FEELING AFRAID AS IF SOMETHING AWFUL MIGHT HAPPEN: SEVERAL DAYS
5. BEING SO RESTLESS THAT IT IS HARD TO SIT STILL: MORE THAN HALF THE DAYS
GAD7 TOTAL SCORE: 12

## 2020-01-27 ASSESSMENT — MIFFLIN-ST. JEOR: SCORE: 1250.81

## 2020-01-27 ASSESSMENT — PATIENT HEALTH QUESTIONNAIRE - PHQ9: SUM OF ALL RESPONSES TO PHQ QUESTIONS 1-9: 17

## 2020-01-28 ENCOUNTER — COMMUNICATION - HEALTHEAST (OUTPATIENT)
Dept: FAMILY MEDICINE | Facility: CLINIC | Age: 60
End: 2020-01-28

## 2020-01-28 DIAGNOSIS — E78.5 HYPERLIPIDEMIA: ICD-10-CM

## 2020-02-03 ENCOUNTER — COMMUNICATION - HEALTHEAST (OUTPATIENT)
Dept: FAMILY MEDICINE | Facility: CLINIC | Age: 60
End: 2020-02-03

## 2020-02-03 DIAGNOSIS — J30.89 ENVIRONMENTAL AND SEASONAL ALLERGIES: ICD-10-CM

## 2020-02-03 DIAGNOSIS — E55.9 VITAMIN D DEFICIENCY: ICD-10-CM

## 2020-03-04 ENCOUNTER — OFFICE VISIT - HEALTHEAST (OUTPATIENT)
Dept: PULMONOLOGY | Facility: OTHER | Age: 60
End: 2020-03-04

## 2020-03-04 DIAGNOSIS — J43.2 CENTRILOBULAR EMPHYSEMA (H): ICD-10-CM

## 2020-03-04 DIAGNOSIS — F17.210 CIGARETTE NICOTINE DEPENDENCE WITHOUT COMPLICATION: ICD-10-CM

## 2020-03-04 DIAGNOSIS — R91.1 LUNG NODULE: ICD-10-CM

## 2020-03-04 DIAGNOSIS — J44.9 CHRONIC OBSTRUCTIVE PULMONARY DISEASE, UNSPECIFIED COPD TYPE (H): ICD-10-CM

## 2020-03-09 ENCOUNTER — OFFICE VISIT - HEALTHEAST (OUTPATIENT)
Dept: FAMILY MEDICINE | Facility: CLINIC | Age: 60
End: 2020-03-09

## 2020-03-09 ENCOUNTER — HOSPITAL ENCOUNTER (OUTPATIENT)
Dept: CT IMAGING | Facility: HOSPITAL | Age: 60
Discharge: HOME | End: 2020-03-09
Attending: INTERNAL MEDICINE

## 2020-03-09 DIAGNOSIS — E78.5 HYPERLIPIDEMIA, UNSPECIFIED HYPERLIPIDEMIA TYPE: ICD-10-CM

## 2020-03-09 DIAGNOSIS — E03.9 HYPOTHYROIDISM, UNSPECIFIED TYPE: ICD-10-CM

## 2020-03-09 DIAGNOSIS — G43.919 INTRACTABLE MIGRAINE WITHOUT STATUS MIGRAINOSUS, UNSPECIFIED MIGRAINE TYPE: ICD-10-CM

## 2020-03-09 DIAGNOSIS — J43.2 CENTRILOBULAR EMPHYSEMA (H): ICD-10-CM

## 2020-03-09 DIAGNOSIS — Z12.11 SCREEN FOR COLON CANCER: ICD-10-CM

## 2020-03-09 DIAGNOSIS — F17.210 CIGARETTE NICOTINE DEPENDENCE WITHOUT COMPLICATION: ICD-10-CM

## 2020-03-09 DIAGNOSIS — R79.89 ELEVATED SERUM CREATININE: ICD-10-CM

## 2020-03-09 LAB
ANION GAP SERPL CALCULATED.3IONS-SCNC: 10 MMOL/L (ref 5–18)
BUN SERPL-MCNC: 14 MG/DL (ref 8–22)
CALCIUM SERPL-MCNC: 9.7 MG/DL (ref 8.5–10.5)
CHLORIDE BLD-SCNC: 104 MMOL/L (ref 98–107)
CHOLEST SERPL-MCNC: 216 MG/DL
CO2 SERPL-SCNC: 27 MMOL/L (ref 22–31)
CREAT SERPL-MCNC: 1.11 MG/DL (ref 0.6–1.1)
FASTING STATUS PATIENT QL REPORTED: YES
GFR SERPL CREATININE-BSD FRML MDRD: 50 ML/MIN/1.73M2
GLUCOSE BLD-MCNC: 97 MG/DL (ref 70–125)
HDLC SERPL-MCNC: 75 MG/DL
LDLC SERPL CALC-MCNC: 112 MG/DL
POTASSIUM BLD-SCNC: 4.1 MMOL/L (ref 3.5–5)
SODIUM SERPL-SCNC: 141 MMOL/L (ref 136–145)
TRIGL SERPL-MCNC: 147 MG/DL

## 2020-03-12 ENCOUNTER — COMMUNICATION - HEALTHEAST (OUTPATIENT)
Dept: SCHEDULING | Facility: CLINIC | Age: 60
End: 2020-03-12

## 2020-03-12 ENCOUNTER — COMMUNICATION - HEALTHEAST (OUTPATIENT)
Dept: FAMILY MEDICINE | Facility: CLINIC | Age: 60
End: 2020-03-12

## 2020-03-12 ENCOUNTER — OFFICE VISIT - HEALTHEAST (OUTPATIENT)
Dept: FAMILY MEDICINE | Facility: CLINIC | Age: 60
End: 2020-03-12

## 2020-03-12 DIAGNOSIS — G43.809 OTHER MIGRAINE WITHOUT STATUS MIGRAINOSUS, NOT INTRACTABLE: ICD-10-CM

## 2020-03-12 DIAGNOSIS — F17.210 CIGARETTE NICOTINE DEPENDENCE WITHOUT COMPLICATION: ICD-10-CM

## 2020-03-26 ENCOUNTER — RECORDS - HEALTHEAST (OUTPATIENT)
Dept: ADMINISTRATIVE | Facility: OTHER | Age: 60
End: 2020-03-26

## 2020-03-26 LAB — COLOGUARD-ABSTRACT: NEGATIVE

## 2020-03-30 ENCOUNTER — COMMUNICATION - HEALTHEAST (OUTPATIENT)
Dept: ADMINISTRATIVE | Facility: CLINIC | Age: 60
End: 2020-03-30

## 2020-04-01 ENCOUNTER — RECORDS - HEALTHEAST (OUTPATIENT)
Dept: HEALTH INFORMATION MANAGEMENT | Facility: CLINIC | Age: 60
End: 2020-04-01

## 2020-07-13 ENCOUNTER — AMBULATORY - HEALTHEAST (OUTPATIENT)
Dept: LAB | Facility: CLINIC | Age: 60
End: 2020-07-13

## 2020-07-13 DIAGNOSIS — F43.12 NIGHTMARES ASSOCIATED WITH CHRONIC POST-TRAUMATIC STRESS DISORDER: ICD-10-CM

## 2020-07-13 DIAGNOSIS — F31.89 OTHER BIPOLAR DISORDER (H): ICD-10-CM

## 2020-07-13 DIAGNOSIS — F51.5 NIGHTMARES ASSOCIATED WITH CHRONIC POST-TRAUMATIC STRESS DISORDER: ICD-10-CM

## 2020-07-13 DIAGNOSIS — Z79.899 ENCOUNTER FOR LONG-TERM (CURRENT) USE OF MEDICATIONS: ICD-10-CM

## 2020-07-21 ENCOUNTER — OFFICE VISIT - HEALTHEAST (OUTPATIENT)
Dept: FAMILY MEDICINE | Facility: CLINIC | Age: 60
End: 2020-07-21

## 2020-07-21 DIAGNOSIS — E03.9 HYPOTHYROIDISM, UNSPECIFIED TYPE: ICD-10-CM

## 2020-07-21 DIAGNOSIS — Z12.31 ENCOUNTER FOR SCREENING MAMMOGRAM FOR BREAST CANCER: ICD-10-CM

## 2020-07-21 DIAGNOSIS — E78.5 HYPERLIPIDEMIA, UNSPECIFIED HYPERLIPIDEMIA TYPE: ICD-10-CM

## 2020-07-21 DIAGNOSIS — M54.16 LEFT LUMBAR RADICULOPATHY: ICD-10-CM

## 2020-07-21 DIAGNOSIS — G43.909 MIGRAINE: ICD-10-CM

## 2020-07-21 DIAGNOSIS — F31.89 OTHER BIPOLAR DISORDER (H): ICD-10-CM

## 2020-07-21 DIAGNOSIS — Z78.0 POSTMENOPAUSAL STATUS: ICD-10-CM

## 2020-07-21 DIAGNOSIS — Z79.899 ENCOUNTER FOR LONG-TERM (CURRENT) USE OF MEDICATIONS: ICD-10-CM

## 2020-07-21 DIAGNOSIS — J43.2 CENTRILOBULAR EMPHYSEMA (H): ICD-10-CM

## 2020-07-21 DIAGNOSIS — R11.0 NAUSEA: ICD-10-CM

## 2020-07-21 DIAGNOSIS — F51.5 NIGHTMARES ASSOCIATED WITH CHRONIC POST-TRAUMATIC STRESS DISORDER: ICD-10-CM

## 2020-07-21 DIAGNOSIS — F17.210 CIGARETTE NICOTINE DEPENDENCE WITHOUT COMPLICATION: ICD-10-CM

## 2020-07-21 DIAGNOSIS — G89.4 CHRONIC PAIN SYNDROME: ICD-10-CM

## 2020-07-21 DIAGNOSIS — F43.12 NIGHTMARES ASSOCIATED WITH CHRONIC POST-TRAUMATIC STRESS DISORDER: ICD-10-CM

## 2020-07-21 LAB
ANION GAP SERPL CALCULATED.3IONS-SCNC: 12 MMOL/L (ref 5–18)
BUN SERPL-MCNC: 17 MG/DL (ref 8–22)
CALCIUM SERPL-MCNC: 9.7 MG/DL (ref 8.5–10.5)
CHLORIDE BLD-SCNC: 103 MMOL/L (ref 98–107)
CHOLEST SERPL-MCNC: 221 MG/DL
CO2 SERPL-SCNC: 25 MMOL/L (ref 22–31)
CREAT SERPL-MCNC: 1.2 MG/DL (ref 0.6–1.1)
FASTING STATUS PATIENT QL REPORTED: NO
GFR SERPL CREATININE-BSD FRML MDRD: 46 ML/MIN/1.73M2
GLUCOSE BLD-MCNC: 118 MG/DL (ref 70–125)
HDLC SERPL-MCNC: 56 MG/DL
LDLC SERPL CALC-MCNC: 116 MG/DL
POTASSIUM BLD-SCNC: 4 MMOL/L (ref 3.5–5)
SODIUM SERPL-SCNC: 140 MMOL/L (ref 136–145)
TRIGL SERPL-MCNC: 246 MG/DL
TSH SERPL DL<=0.005 MIU/L-ACNC: 2.62 UIU/ML (ref 0.3–5)

## 2020-07-21 ASSESSMENT — ANXIETY QUESTIONNAIRES
6. BECOMING EASILY ANNOYED OR IRRITABLE: MORE THAN HALF THE DAYS
3. WORRYING TOO MUCH ABOUT DIFFERENT THINGS: MORE THAN HALF THE DAYS
7. FEELING AFRAID AS IF SOMETHING AWFUL MIGHT HAPPEN: SEVERAL DAYS
IF YOU CHECKED OFF ANY PROBLEMS ON THIS QUESTIONNAIRE, HOW DIFFICULT HAVE THESE PROBLEMS MADE IT FOR YOU TO DO YOUR WORK, TAKE CARE OF THINGS AT HOME, OR GET ALONG WITH OTHER PEOPLE: SOMEWHAT DIFFICULT
4. TROUBLE RELAXING: MORE THAN HALF THE DAYS
5. BEING SO RESTLESS THAT IT IS HARD TO SIT STILL: MORE THAN HALF THE DAYS
2. NOT BEING ABLE TO STOP OR CONTROL WORRYING: SEVERAL DAYS
GAD7 TOTAL SCORE: 11
1. FEELING NERVOUS, ANXIOUS, OR ON EDGE: SEVERAL DAYS

## 2020-07-21 ASSESSMENT — PATIENT HEALTH QUESTIONNAIRE - PHQ9: SUM OF ALL RESPONSES TO PHQ QUESTIONS 1-9: 17

## 2020-07-21 ASSESSMENT — MIFFLIN-ST. JEOR: SCORE: 1291.64

## 2020-07-24 ENCOUNTER — COMMUNICATION - HEALTHEAST (OUTPATIENT)
Dept: FAMILY MEDICINE | Facility: CLINIC | Age: 60
End: 2020-07-24

## 2020-07-24 LAB
Lab: 33 NG/ML
Lab: 39 NG/ML
Lab: 72 NG/ML (ref 50–150)

## 2020-07-26 ENCOUNTER — COMMUNICATION - HEALTHEAST (OUTPATIENT)
Dept: FAMILY MEDICINE | Facility: CLINIC | Age: 60
End: 2020-07-26

## 2020-07-26 DIAGNOSIS — E78.5 HYPERLIPIDEMIA: ICD-10-CM

## 2020-07-28 ENCOUNTER — HOSPITAL ENCOUNTER (OUTPATIENT)
Dept: MRI IMAGING | Facility: CLINIC | Age: 60
Discharge: HOME | End: 2020-07-28
Attending: FAMILY MEDICINE

## 2020-07-28 DIAGNOSIS — M54.16 LEFT LUMBAR RADICULOPATHY: ICD-10-CM

## 2020-07-31 ENCOUNTER — HOSPITAL ENCOUNTER (OUTPATIENT)
Dept: MRI IMAGING | Facility: CLINIC | Age: 60
Discharge: HOME | End: 2020-07-31
Attending: FAMILY MEDICINE

## 2020-08-04 ENCOUNTER — COMMUNICATION - HEALTHEAST (OUTPATIENT)
Dept: FAMILY MEDICINE | Facility: CLINIC | Age: 60
End: 2020-08-04

## 2020-08-04 DIAGNOSIS — M54.16 LUMBAR RADICULOPATHY: ICD-10-CM

## 2020-08-26 ENCOUNTER — HOSPITAL ENCOUNTER (OUTPATIENT)
Dept: MAMMOGRAPHY | Facility: CLINIC | Age: 60
Discharge: HOME | End: 2020-08-26
Attending: FAMILY MEDICINE

## 2020-08-26 ENCOUNTER — RECORDS - HEALTHEAST (OUTPATIENT)
Dept: BONE DENSITY | Facility: CLINIC | Age: 60
End: 2020-08-26

## 2020-08-26 ENCOUNTER — RECORDS - HEALTHEAST (OUTPATIENT)
Dept: ADMINISTRATIVE | Facility: OTHER | Age: 60
End: 2020-08-26

## 2020-08-26 DIAGNOSIS — Z78.0 ASYMPTOMATIC MENOPAUSAL STATE: ICD-10-CM

## 2020-08-26 DIAGNOSIS — Z12.31 ENCOUNTER FOR SCREENING MAMMOGRAM FOR BREAST CANCER: ICD-10-CM

## 2020-09-09 ENCOUNTER — COMMUNICATION - HEALTHEAST (OUTPATIENT)
Dept: FAMILY MEDICINE | Facility: CLINIC | Age: 60
End: 2020-09-09

## 2020-09-09 ENCOUNTER — COMMUNICATION - HEALTHEAST (OUTPATIENT)
Dept: PULMONOLOGY | Facility: OTHER | Age: 60
End: 2020-09-09

## 2020-09-09 DIAGNOSIS — F17.200 TOBACCO USE DISORDER: ICD-10-CM

## 2020-09-09 DIAGNOSIS — J44.9 CHRONIC OBSTRUCTIVE PULMONARY DISEASE, UNSPECIFIED COPD TYPE (H): ICD-10-CM

## 2020-09-11 ENCOUNTER — COMMUNICATION - HEALTHEAST (OUTPATIENT)
Dept: FAMILY MEDICINE | Facility: CLINIC | Age: 60
End: 2020-09-11

## 2020-09-13 ENCOUNTER — COMMUNICATION - HEALTHEAST (OUTPATIENT)
Dept: FAMILY MEDICINE | Facility: CLINIC | Age: 60
End: 2020-09-13

## 2020-09-14 ENCOUNTER — COMMUNICATION - HEALTHEAST (OUTPATIENT)
Dept: FAMILY MEDICINE | Facility: CLINIC | Age: 60
End: 2020-09-14

## 2020-09-15 ENCOUNTER — RECORDS - HEALTHEAST (OUTPATIENT)
Dept: ADMINISTRATIVE | Facility: OTHER | Age: 60
End: 2020-09-15

## 2020-09-15 ENCOUNTER — COMMUNICATION - HEALTHEAST (OUTPATIENT)
Dept: FAMILY MEDICINE | Facility: CLINIC | Age: 60
End: 2020-09-15

## 2020-09-15 DIAGNOSIS — F41.9 ANXIETY: ICD-10-CM

## 2020-09-15 DIAGNOSIS — M54.2 NECK PAIN: ICD-10-CM

## 2020-09-18 ENCOUNTER — HOSPITAL ENCOUNTER (OUTPATIENT)
Dept: MRI IMAGING | Facility: CLINIC | Age: 60
Discharge: HOME | End: 2020-09-18
Attending: FAMILY MEDICINE

## 2020-09-18 DIAGNOSIS — M54.2 NECK PAIN: ICD-10-CM

## 2020-09-18 LAB
CREAT BLD-MCNC: 1.2 MG/DL (ref 0.6–1.1)
GFR SERPL CREATININE-BSD FRML MDRD: 46 ML/MIN/1.73M2

## 2020-09-22 ENCOUNTER — COMMUNICATION - HEALTHEAST (OUTPATIENT)
Dept: FAMILY MEDICINE | Facility: CLINIC | Age: 60
End: 2020-09-22

## 2020-09-22 DIAGNOSIS — M47.9 OSTEOARTHRITIS OF SPINE, UNSPECIFIED SPINAL OSTEOARTHRITIS COMPLICATION STATUS, UNSPECIFIED SPINAL REGION: ICD-10-CM

## 2020-09-25 ENCOUNTER — RECORDS - HEALTHEAST (OUTPATIENT)
Dept: ADMINISTRATIVE | Facility: OTHER | Age: 60
End: 2020-09-25

## 2020-10-09 ENCOUNTER — RECORDS - HEALTHEAST (OUTPATIENT)
Dept: ADMINISTRATIVE | Facility: OTHER | Age: 60
End: 2020-10-09

## 2020-10-16 ENCOUNTER — OFFICE VISIT - HEALTHEAST (OUTPATIENT)
Dept: FAMILY MEDICINE | Facility: CLINIC | Age: 60
End: 2020-10-16

## 2020-10-16 ENCOUNTER — RECORDS - HEALTHEAST (OUTPATIENT)
Dept: ADMINISTRATIVE | Facility: OTHER | Age: 60
End: 2020-10-16

## 2020-10-16 ENCOUNTER — COMMUNICATION - HEALTHEAST (OUTPATIENT)
Dept: FAMILY MEDICINE | Facility: CLINIC | Age: 60
End: 2020-10-16

## 2020-10-16 DIAGNOSIS — Z01.818 PREOPERATIVE EXAMINATION: ICD-10-CM

## 2020-10-16 DIAGNOSIS — S62.102D CLOSED FRACTURE OF LEFT WRIST WITH ROUTINE HEALING, SUBSEQUENT ENCOUNTER: ICD-10-CM

## 2020-10-16 LAB
ANION GAP SERPL CALCULATED.3IONS-SCNC: 11 MMOL/L (ref 5–18)
BUN SERPL-MCNC: 15 MG/DL (ref 8–22)
CALCIUM SERPL-MCNC: 9.4 MG/DL (ref 8.5–10.5)
CHLORIDE BLD-SCNC: 103 MMOL/L (ref 98–107)
CO2 SERPL-SCNC: 27 MMOL/L (ref 22–31)
CREAT SERPL-MCNC: 1.11 MG/DL (ref 0.6–1.1)
GFR SERPL CREATININE-BSD FRML MDRD: 50 ML/MIN/1.73M2
GLUCOSE BLD-MCNC: 117 MG/DL (ref 70–125)
HGB BLD-MCNC: 14.5 G/DL (ref 12–16)
POTASSIUM BLD-SCNC: 4.1 MMOL/L (ref 3.5–5)
SODIUM SERPL-SCNC: 141 MMOL/L (ref 136–145)

## 2020-10-16 ASSESSMENT — MIFFLIN-ST. JEOR: SCORE: 1262.15

## 2020-10-21 ENCOUNTER — RECORDS - HEALTHEAST (OUTPATIENT)
Dept: ADMINISTRATIVE | Facility: OTHER | Age: 60
End: 2020-10-21

## 2020-10-27 ENCOUNTER — RECORDS - HEALTHEAST (OUTPATIENT)
Dept: ADMINISTRATIVE | Facility: OTHER | Age: 60
End: 2020-10-27

## 2020-11-02 ENCOUNTER — COMMUNICATION - HEALTHEAST (OUTPATIENT)
Dept: FAMILY MEDICINE | Facility: CLINIC | Age: 60
End: 2020-11-02

## 2020-11-03 ENCOUNTER — RECORDS - HEALTHEAST (OUTPATIENT)
Dept: ADMINISTRATIVE | Facility: OTHER | Age: 60
End: 2020-11-03

## 2020-11-17 ENCOUNTER — COMMUNICATION - HEALTHEAST (OUTPATIENT)
Dept: FAMILY MEDICINE | Facility: CLINIC | Age: 60
End: 2020-11-17

## 2020-11-17 ENCOUNTER — RECORDS - HEALTHEAST (OUTPATIENT)
Dept: ADMINISTRATIVE | Facility: OTHER | Age: 60
End: 2020-11-17

## 2020-11-17 DIAGNOSIS — M26.609 TMJ (TEMPOROMANDIBULAR JOINT SYNDROME): ICD-10-CM

## 2020-11-17 DIAGNOSIS — E03.9 HYPOTHYROIDISM, UNSPECIFIED TYPE: ICD-10-CM

## 2020-12-02 ENCOUNTER — COMMUNICATION - HEALTHEAST (OUTPATIENT)
Dept: FAMILY MEDICINE | Facility: CLINIC | Age: 60
End: 2020-12-02

## 2020-12-10 ENCOUNTER — RECORDS - HEALTHEAST (OUTPATIENT)
Dept: ADMINISTRATIVE | Facility: OTHER | Age: 60
End: 2020-12-10

## 2020-12-15 ENCOUNTER — COMMUNICATION - HEALTHEAST (OUTPATIENT)
Dept: FAMILY MEDICINE | Facility: CLINIC | Age: 60
End: 2020-12-15

## 2020-12-15 DIAGNOSIS — M26.609 TMJ (TEMPOROMANDIBULAR JOINT SYNDROME): ICD-10-CM

## 2021-01-12 ENCOUNTER — RECORDS - HEALTHEAST (OUTPATIENT)
Dept: ADMINISTRATIVE | Facility: OTHER | Age: 61
End: 2021-01-12

## 2021-01-14 ENCOUNTER — OFFICE VISIT - HEALTHEAST (OUTPATIENT)
Dept: FAMILY MEDICINE | Facility: CLINIC | Age: 61
End: 2021-01-14

## 2021-01-14 ENCOUNTER — COMMUNICATION - HEALTHEAST (OUTPATIENT)
Dept: FAMILY MEDICINE | Facility: CLINIC | Age: 61
End: 2021-01-14

## 2021-01-14 DIAGNOSIS — E03.9 HYPOTHYROIDISM, UNSPECIFIED TYPE: ICD-10-CM

## 2021-01-14 DIAGNOSIS — M79.7 FIBROMYALGIA: ICD-10-CM

## 2021-01-14 DIAGNOSIS — G43.919 INTRACTABLE MIGRAINE WITHOUT STATUS MIGRAINOSUS, UNSPECIFIED MIGRAINE TYPE: ICD-10-CM

## 2021-01-14 DIAGNOSIS — M85.89 OSTEOPENIA OF MULTIPLE SITES: ICD-10-CM

## 2021-01-14 DIAGNOSIS — F17.200 TOBACCO USE DISORDER: ICD-10-CM

## 2021-01-14 DIAGNOSIS — J43.2 CENTRILOBULAR EMPHYSEMA (H): ICD-10-CM

## 2021-01-14 DIAGNOSIS — N18.31 STAGE 3A CHRONIC KIDNEY DISEASE (H): ICD-10-CM

## 2021-01-14 DIAGNOSIS — F31.89 OTHER BIPOLAR DISORDER (H): ICD-10-CM

## 2021-01-14 DIAGNOSIS — Z23 IMMUNIZATION DUE: ICD-10-CM

## 2021-01-14 DIAGNOSIS — E78.5 HYPERLIPIDEMIA, UNSPECIFIED HYPERLIPIDEMIA TYPE: ICD-10-CM

## 2021-01-14 LAB
ALBUMIN SERPL-MCNC: 4 G/DL (ref 3.5–5)
ALP SERPL-CCNC: 143 U/L (ref 45–120)
ALT SERPL W P-5'-P-CCNC: 17 U/L (ref 0–45)
ANION GAP SERPL CALCULATED.3IONS-SCNC: 13 MMOL/L (ref 5–18)
AST SERPL W P-5'-P-CCNC: 21 U/L (ref 0–40)
ATRIAL RATE - MUSE: 84 BPM
BILIRUB SERPL-MCNC: 0.5 MG/DL (ref 0–1)
BUN SERPL-MCNC: 18 MG/DL (ref 8–22)
CALCIUM SERPL-MCNC: 9.3 MG/DL (ref 8.5–10.5)
CHLORIDE BLD-SCNC: 102 MMOL/L (ref 98–107)
CHOLEST SERPL-MCNC: 209 MG/DL
CO2 SERPL-SCNC: 22 MMOL/L (ref 22–31)
CREAT SERPL-MCNC: 1.24 MG/DL (ref 0.6–1.1)
DIASTOLIC BLOOD PRESSURE - MUSE: NORMAL
FASTING STATUS PATIENT QL REPORTED: YES
GFR SERPL CREATININE-BSD FRML MDRD: 44 ML/MIN/1.73M2
GLUCOSE BLD-MCNC: 100 MG/DL (ref 70–125)
HDLC SERPL-MCNC: 65 MG/DL
INTERPRETATION ECG - MUSE: NORMAL
LDLC SERPL CALC-MCNC: 100 MG/DL
P AXIS - MUSE: 65 DEGREES
POTASSIUM BLD-SCNC: 4.3 MMOL/L (ref 3.5–5)
PR INTERVAL - MUSE: 156 MS
PROT SERPL-MCNC: 6.8 G/DL (ref 6–8)
QRS DURATION - MUSE: 74 MS
QT - MUSE: 384 MS
QTC - MUSE: 453 MS
R AXIS - MUSE: 75 DEGREES
SODIUM SERPL-SCNC: 137 MMOL/L (ref 136–145)
SYSTOLIC BLOOD PRESSURE - MUSE: NORMAL
T AXIS - MUSE: 70 DEGREES
TRIGL SERPL-MCNC: 222 MG/DL
VENTRICULAR RATE- MUSE: 84 BPM

## 2021-01-14 ASSESSMENT — ANXIETY QUESTIONNAIRES
3. WORRYING TOO MUCH ABOUT DIFFERENT THINGS: MORE THAN HALF THE DAYS
4. TROUBLE RELAXING: MORE THAN HALF THE DAYS
1. FEELING NERVOUS, ANXIOUS, OR ON EDGE: SEVERAL DAYS
2. NOT BEING ABLE TO STOP OR CONTROL WORRYING: MORE THAN HALF THE DAYS
IF YOU CHECKED OFF ANY PROBLEMS ON THIS QUESTIONNAIRE, HOW DIFFICULT HAVE THESE PROBLEMS MADE IT FOR YOU TO DO YOUR WORK, TAKE CARE OF THINGS AT HOME, OR GET ALONG WITH OTHER PEOPLE: SOMEWHAT DIFFICULT
5. BEING SO RESTLESS THAT IT IS HARD TO SIT STILL: MORE THAN HALF THE DAYS
GAD7 TOTAL SCORE: 12
6. BECOMING EASILY ANNOYED OR IRRITABLE: MORE THAN HALF THE DAYS
7. FEELING AFRAID AS IF SOMETHING AWFUL MIGHT HAPPEN: SEVERAL DAYS

## 2021-01-14 ASSESSMENT — PATIENT HEALTH QUESTIONNAIRE - PHQ9: SUM OF ALL RESPONSES TO PHQ QUESTIONS 1-9: 19

## 2021-01-15 LAB
25(OH)D3 SERPL-MCNC: 52.8 NG/ML (ref 30–80)
25(OH)D3 SERPL-MCNC: 52.8 NG/ML (ref 30–80)

## 2021-01-18 LAB
Lab: 38 NG/ML
Lab: 39 NG/ML
Lab: 77 NG/ML (ref 50–150)

## 2021-02-05 ENCOUNTER — RECORDS - HEALTHEAST (OUTPATIENT)
Dept: ADMINISTRATIVE | Facility: OTHER | Age: 61
End: 2021-02-05

## 2021-02-08 ENCOUNTER — RECORDS - HEALTHEAST (OUTPATIENT)
Dept: ADMINISTRATIVE | Facility: OTHER | Age: 61
End: 2021-02-08

## 2021-02-10 ENCOUNTER — OFFICE VISIT - HEALTHEAST (OUTPATIENT)
Dept: FAMILY MEDICINE | Facility: CLINIC | Age: 61
End: 2021-02-10

## 2021-02-10 ENCOUNTER — COMMUNICATION - HEALTHEAST (OUTPATIENT)
Dept: FAMILY MEDICINE | Facility: CLINIC | Age: 61
End: 2021-02-10

## 2021-02-10 DIAGNOSIS — J30.89 ENVIRONMENTAL AND SEASONAL ALLERGIES: ICD-10-CM

## 2021-02-10 DIAGNOSIS — B37.31 YEAST INFECTION OF THE VAGINA: ICD-10-CM

## 2021-02-10 DIAGNOSIS — L02.211 ABDOMINAL WALL ABSCESS: ICD-10-CM

## 2021-02-10 DIAGNOSIS — E78.5 HYPERLIPIDEMIA, UNSPECIFIED HYPERLIPIDEMIA TYPE: ICD-10-CM

## 2021-02-10 DIAGNOSIS — L02.224 BOIL OF GROIN: ICD-10-CM

## 2021-02-11 ENCOUNTER — COMMUNICATION - HEALTHEAST (OUTPATIENT)
Dept: FAMILY MEDICINE | Facility: CLINIC | Age: 61
End: 2021-02-11

## 2021-02-11 DIAGNOSIS — M26.609 TMJ (TEMPOROMANDIBULAR JOINT SYNDROME): ICD-10-CM

## 2021-02-13 DIAGNOSIS — G43.019 COMMON MIGRAINE WITH INTRACTABLE MIGRAINE: Primary | ICD-10-CM

## 2021-02-13 LAB — BACTERIA SPEC CULT: NO GROWTH

## 2021-02-15 ENCOUNTER — OFFICE VISIT - HEALTHEAST (OUTPATIENT)
Dept: FAMILY MEDICINE | Facility: CLINIC | Age: 61
End: 2021-02-15

## 2021-02-15 DIAGNOSIS — G43.911 INTRACTABLE MIGRAINE WITH STATUS MIGRAINOSUS, UNSPECIFIED MIGRAINE TYPE: ICD-10-CM

## 2021-02-15 PROBLEM — G43.019 COMMON MIGRAINE WITH INTRACTABLE MIGRAINE: Status: ACTIVE | Noted: 2021-02-15

## 2021-02-15 RX ORDER — ERENUMAB-AOOE 70 MG/ML
INJECTION SUBCUTANEOUS
Qty: 3 ML | Refills: 0 | Status: SHIPPED | OUTPATIENT
Start: 2021-02-15 | End: 2021-04-21

## 2021-02-15 ASSESSMENT — MIFFLIN-ST. JEOR: SCORE: 1305.81

## 2021-02-15 NOTE — TELEPHONE ENCOUNTER
Refill request for Aimovig.  Last seen 3/16/20.  Medication T'd for review and signature.  Mary White LPN on 2/15/2021 at 8:51 AM

## 2021-02-22 ENCOUNTER — OFFICE VISIT - HEALTHEAST (OUTPATIENT)
Dept: FAMILY MEDICINE | Facility: CLINIC | Age: 61
End: 2021-02-22

## 2021-02-22 DIAGNOSIS — E03.9 HYPOTHYROIDISM, UNSPECIFIED TYPE: ICD-10-CM

## 2021-02-22 DIAGNOSIS — J43.2 CENTRILOBULAR EMPHYSEMA (H): ICD-10-CM

## 2021-02-22 DIAGNOSIS — M54.16 LUMBAR RADICULOPATHY: ICD-10-CM

## 2021-02-22 DIAGNOSIS — F31.89 OTHER BIPOLAR DISORDER (H): ICD-10-CM

## 2021-02-22 DIAGNOSIS — E78.5 HYPERLIPIDEMIA, UNSPECIFIED HYPERLIPIDEMIA TYPE: ICD-10-CM

## 2021-02-22 DIAGNOSIS — M85.89 OSTEOPENIA OF MULTIPLE SITES: ICD-10-CM

## 2021-02-22 DIAGNOSIS — N18.31 STAGE 3A CHRONIC KIDNEY DISEASE (H): ICD-10-CM

## 2021-02-22 DIAGNOSIS — Z01.818 PREOP GENERAL PHYSICAL EXAM: ICD-10-CM

## 2021-02-22 ASSESSMENT — MIFFLIN-ST. JEOR: SCORE: 1316.87

## 2021-03-07 ENCOUNTER — COMMUNICATION - HEALTHEAST (OUTPATIENT)
Dept: FAMILY MEDICINE | Facility: CLINIC | Age: 61
End: 2021-03-07

## 2021-03-07 DIAGNOSIS — E55.9 VITAMIN D DEFICIENCY: ICD-10-CM

## 2021-03-11 ENCOUNTER — COMMUNICATION - HEALTHEAST (OUTPATIENT)
Dept: LAB | Facility: CLINIC | Age: 61
End: 2021-03-11

## 2021-03-11 DIAGNOSIS — Z01.818 PREOP GENERAL PHYSICAL EXAM: ICD-10-CM

## 2021-03-12 ENCOUNTER — AMBULATORY - HEALTHEAST (OUTPATIENT)
Dept: LAB | Facility: CLINIC | Age: 61
End: 2021-03-12

## 2021-03-12 DIAGNOSIS — Z01.818 PREOP GENERAL PHYSICAL EXAM: ICD-10-CM

## 2021-03-12 LAB
ANION GAP SERPL CALCULATED.3IONS-SCNC: 15 MMOL/L (ref 5–18)
BUN SERPL-MCNC: 19 MG/DL (ref 8–22)
CALCIUM SERPL-MCNC: 9 MG/DL (ref 8.5–10.5)
CHLORIDE BLD-SCNC: 105 MMOL/L (ref 98–107)
CO2 SERPL-SCNC: 19 MMOL/L (ref 22–31)
CREAT SERPL-MCNC: 1.15 MG/DL (ref 0.6–1.1)
GFR SERPL CREATININE-BSD FRML MDRD: 48 ML/MIN/1.73M2
GLUCOSE BLD-MCNC: 124 MG/DL (ref 70–125)
HGB BLD-MCNC: 14 G/DL (ref 12–16)
POTASSIUM BLD-SCNC: 4.8 MMOL/L (ref 3.5–5)
SODIUM SERPL-SCNC: 139 MMOL/L (ref 136–145)

## 2021-03-13 LAB
SARS-COV-2 PCR COMMENT: NORMAL
SARS-COV-2 RNA SPEC QL NAA+PROBE: NEGATIVE
SARS-COV-2 VIRUS SPECIMEN SOURCE: NORMAL

## 2021-03-14 ENCOUNTER — COMMUNICATION - HEALTHEAST (OUTPATIENT)
Dept: SCHEDULING | Facility: CLINIC | Age: 61
End: 2021-03-14

## 2021-03-21 ENCOUNTER — RECORDS - HEALTHEAST (OUTPATIENT)
Dept: ADMINISTRATIVE | Facility: OTHER | Age: 61
End: 2021-03-21

## 2021-03-22 ENCOUNTER — AMBULATORY - HEALTHEAST (OUTPATIENT)
Dept: GERIATRICS | Facility: CLINIC | Age: 61
End: 2021-03-22

## 2021-03-22 ENCOUNTER — RECORDS - HEALTHEAST (OUTPATIENT)
Dept: LAB | Facility: CLINIC | Age: 61
End: 2021-03-22

## 2021-03-22 ENCOUNTER — OFFICE VISIT - HEALTHEAST (OUTPATIENT)
Dept: GERIATRICS | Facility: CLINIC | Age: 61
End: 2021-03-22

## 2021-03-22 DIAGNOSIS — N18.31 STAGE 3A CHRONIC KIDNEY DISEASE (H): ICD-10-CM

## 2021-03-22 DIAGNOSIS — M51.379 DDD (DEGENERATIVE DISC DISEASE), LUMBOSACRAL: ICD-10-CM

## 2021-03-22 DIAGNOSIS — G89.4 CHRONIC PAIN SYNDROME: ICD-10-CM

## 2021-03-22 DIAGNOSIS — F31.81 BIPOLAR 2 DISORDER (H): ICD-10-CM

## 2021-03-22 DIAGNOSIS — J43.2 CENTRILOBULAR EMPHYSEMA (H): ICD-10-CM

## 2021-03-22 DIAGNOSIS — M48.062 SPINAL STENOSIS OF LUMBAR REGION WITH NEUROGENIC CLAUDICATION: ICD-10-CM

## 2021-03-22 ASSESSMENT — MIFFLIN-ST. JEOR: SCORE: 1333.02

## 2021-03-23 ENCOUNTER — COMMUNICATION - HEALTHEAST (OUTPATIENT)
Dept: GERIATRICS | Facility: CLINIC | Age: 61
End: 2021-03-23

## 2021-03-24 ENCOUNTER — OFFICE VISIT - HEALTHEAST (OUTPATIENT)
Dept: GERIATRICS | Facility: CLINIC | Age: 61
End: 2021-03-24

## 2021-03-24 DIAGNOSIS — G89.4 CHRONIC PAIN SYNDROME: ICD-10-CM

## 2021-03-24 DIAGNOSIS — N18.31 STAGE 3A CHRONIC KIDNEY DISEASE (H): ICD-10-CM

## 2021-03-24 DIAGNOSIS — F31.81 BIPOLAR 2 DISORDER (H): ICD-10-CM

## 2021-03-24 DIAGNOSIS — M51.379 DDD (DEGENERATIVE DISC DISEASE), LUMBOSACRAL: ICD-10-CM

## 2021-03-24 DIAGNOSIS — M48.062 SPINAL STENOSIS OF LUMBAR REGION WITH NEUROGENIC CLAUDICATION: ICD-10-CM

## 2021-03-24 ASSESSMENT — MIFFLIN-ST. JEOR: SCORE: 1368.29

## 2021-03-25 ENCOUNTER — RECORDS - HEALTHEAST (OUTPATIENT)
Dept: LAB | Facility: CLINIC | Age: 61
End: 2021-03-25

## 2021-03-25 ENCOUNTER — COMMUNICATION - HEALTHEAST (OUTPATIENT)
Dept: GERIATRICS | Facility: CLINIC | Age: 61
End: 2021-03-25

## 2021-03-29 ENCOUNTER — RECORDS - HEALTHEAST (OUTPATIENT)
Dept: LAB | Facility: CLINIC | Age: 61
End: 2021-03-29

## 2021-03-29 ENCOUNTER — OFFICE VISIT - HEALTHEAST (OUTPATIENT)
Dept: GERIATRICS | Facility: CLINIC | Age: 61
End: 2021-03-29

## 2021-03-29 DIAGNOSIS — N18.31 STAGE 3A CHRONIC KIDNEY DISEASE (H): ICD-10-CM

## 2021-03-29 DIAGNOSIS — F31.81 BIPOLAR 2 DISORDER (H): ICD-10-CM

## 2021-03-29 DIAGNOSIS — M48.062 SPINAL STENOSIS OF LUMBAR REGION WITH NEUROGENIC CLAUDICATION: ICD-10-CM

## 2021-03-29 DIAGNOSIS — G89.4 CHRONIC PAIN SYNDROME: ICD-10-CM

## 2021-03-29 DIAGNOSIS — J43.2 CENTRILOBULAR EMPHYSEMA (H): ICD-10-CM

## 2021-03-29 LAB
25(OH)D3 SERPL-MCNC: 50.4 NG/ML (ref 30–80)
ANION GAP SERPL CALCULATED.3IONS-SCNC: 10 MMOL/L (ref 5–18)
BUN SERPL-MCNC: 15 MG/DL (ref 8–22)
CALCIUM SERPL-MCNC: 8.4 MG/DL (ref 8.5–10.5)
CHLORIDE BLD-SCNC: 105 MMOL/L (ref 98–107)
CO2 SERPL-SCNC: 24 MMOL/L (ref 22–31)
CREAT SERPL-MCNC: 1.03 MG/DL (ref 0.6–1.1)
ERYTHROCYTE [DISTWIDTH] IN BLOOD BY AUTOMATED COUNT: 14.6 % (ref 11–14.5)
GFR SERPL CREATININE-BSD FRML MDRD: 54 ML/MIN/1.73M2
GLUCOSE BLD-MCNC: 92 MG/DL (ref 70–125)
HCT VFR BLD AUTO: 30 % (ref 35–47)
HGB BLD-MCNC: 9.3 G/DL (ref 12–16)
MAGNESIUM SERPL-MCNC: 2.1 MG/DL (ref 1.8–2.6)
MCH RBC QN AUTO: 28.2 PG (ref 27–34)
MCHC RBC AUTO-ENTMCNC: 31 G/DL (ref 32–36)
MCV RBC AUTO: 91 FL (ref 80–100)
PLATELET # BLD AUTO: 410 THOU/UL (ref 140–440)
PMV BLD AUTO: 8.4 FL (ref 8.5–12.5)
POTASSIUM BLD-SCNC: 3.8 MMOL/L (ref 3.5–5)
RBC # BLD AUTO: 3.3 MILL/UL (ref 3.8–5.4)
SODIUM SERPL-SCNC: 139 MMOL/L (ref 136–145)
WBC: 7.6 THOU/UL (ref 4–11)

## 2021-03-29 ASSESSMENT — MIFFLIN-ST. JEOR: SCORE: 1365.12

## 2021-03-30 ENCOUNTER — COMMUNICATION - HEALTHEAST (OUTPATIENT)
Dept: GERIATRICS | Facility: CLINIC | Age: 61
End: 2021-03-30

## 2021-03-30 DIAGNOSIS — G89.4 CHRONIC PAIN SYNDROME: ICD-10-CM

## 2021-03-31 ENCOUNTER — OFFICE VISIT - HEALTHEAST (OUTPATIENT)
Dept: GERIATRICS | Facility: CLINIC | Age: 61
End: 2021-03-31

## 2021-03-31 DIAGNOSIS — F31.81 BIPOLAR 2 DISORDER (H): ICD-10-CM

## 2021-03-31 DIAGNOSIS — G89.4 CHRONIC PAIN SYNDROME: ICD-10-CM

## 2021-03-31 DIAGNOSIS — M48.062 SPINAL STENOSIS OF LUMBAR REGION WITH NEUROGENIC CLAUDICATION: ICD-10-CM

## 2021-03-31 DIAGNOSIS — N18.31 STAGE 3A CHRONIC KIDNEY DISEASE (H): ICD-10-CM

## 2021-03-31 DIAGNOSIS — N30.00 ACUTE CYSTITIS WITHOUT HEMATURIA: ICD-10-CM

## 2021-03-31 LAB — BACTERIA SPEC CULT: ABNORMAL

## 2021-03-31 ASSESSMENT — MIFFLIN-ST. JEOR: SCORE: 1343.35

## 2021-04-05 ENCOUNTER — OFFICE VISIT - HEALTHEAST (OUTPATIENT)
Dept: GERIATRICS | Facility: CLINIC | Age: 61
End: 2021-04-05

## 2021-04-05 DIAGNOSIS — N18.31 STAGE 3A CHRONIC KIDNEY DISEASE (H): ICD-10-CM

## 2021-04-05 DIAGNOSIS — J43.2 CENTRILOBULAR EMPHYSEMA (H): ICD-10-CM

## 2021-04-05 DIAGNOSIS — G89.4 CHRONIC PAIN SYNDROME: ICD-10-CM

## 2021-04-05 DIAGNOSIS — F31.81 BIPOLAR 2 DISORDER (H): ICD-10-CM

## 2021-04-05 DIAGNOSIS — M48.062 SPINAL STENOSIS OF LUMBAR REGION WITH NEUROGENIC CLAUDICATION: ICD-10-CM

## 2021-04-06 ENCOUNTER — COMMUNICATION - HEALTHEAST (OUTPATIENT)
Dept: FAMILY MEDICINE | Facility: CLINIC | Age: 61
End: 2021-04-06

## 2021-04-06 DIAGNOSIS — G89.4 CHRONIC PAIN SYNDROME: ICD-10-CM

## 2021-04-07 ENCOUNTER — COMMUNICATION - HEALTHEAST (OUTPATIENT)
Dept: FAMILY MEDICINE | Facility: CLINIC | Age: 61
End: 2021-04-07

## 2021-04-07 ENCOUNTER — AMBULATORY - HEALTHEAST (OUTPATIENT)
Dept: GERIATRICS | Facility: CLINIC | Age: 61
End: 2021-04-07

## 2021-04-07 ENCOUNTER — COMMUNICATION - HEALTHEAST (OUTPATIENT)
Dept: GERIATRICS | Facility: CLINIC | Age: 61
End: 2021-04-07

## 2021-04-12 ENCOUNTER — COMMUNICATION - HEALTHEAST (OUTPATIENT)
Dept: FAMILY MEDICINE | Facility: CLINIC | Age: 61
End: 2021-04-12

## 2021-04-13 ENCOUNTER — OFFICE VISIT - HEALTHEAST (OUTPATIENT)
Dept: FAMILY MEDICINE | Facility: CLINIC | Age: 61
End: 2021-04-13

## 2021-04-13 ENCOUNTER — COMMUNICATION - HEALTHEAST (OUTPATIENT)
Dept: FAMILY MEDICINE | Facility: CLINIC | Age: 61
End: 2021-04-13

## 2021-04-13 DIAGNOSIS — Z92.89 HOSPITALIZATION WITHIN LAST 30 DAYS: ICD-10-CM

## 2021-04-13 DIAGNOSIS — K59.03 DRUG-INDUCED CONSTIPATION: ICD-10-CM

## 2021-04-13 DIAGNOSIS — R52 UNCONTROLLED PAIN: ICD-10-CM

## 2021-04-13 DIAGNOSIS — J43.2 CENTRILOBULAR EMPHYSEMA (H): ICD-10-CM

## 2021-04-13 DIAGNOSIS — D62 ANEMIA DUE TO BLOOD LOSS, ACUTE: ICD-10-CM

## 2021-04-13 DIAGNOSIS — N18.31 STAGE 3A CHRONIC KIDNEY DISEASE (H): ICD-10-CM

## 2021-04-13 DIAGNOSIS — M48.062 SPINAL STENOSIS, LUMBAR REGION, WITH NEUROGENIC CLAUDICATION: ICD-10-CM

## 2021-04-13 DIAGNOSIS — Z98.1 STATUS POST LUMBAR AND LUMBOSACRAL FUSION BY ANTERIOR TECHNIQUE: ICD-10-CM

## 2021-04-13 DIAGNOSIS — G89.4 CHRONIC PAIN SYNDROME: ICD-10-CM

## 2021-04-13 DIAGNOSIS — F31.81 BIPOLAR 2 DISORDER (H): ICD-10-CM

## 2021-04-13 DIAGNOSIS — R35.0 URINARY FREQUENCY: ICD-10-CM

## 2021-04-13 LAB
ALBUMIN UR-MCNC: ABNORMAL G/DL
ANION GAP SERPL CALCULATED.3IONS-SCNC: 14 MMOL/L (ref 5–18)
APPEARANCE UR: ABNORMAL
BILIRUB UR QL STRIP: ABNORMAL
BUN SERPL-MCNC: 19 MG/DL (ref 8–22)
CALCIUM SERPL-MCNC: 9.5 MG/DL (ref 8.5–10.5)
CHLORIDE BLD-SCNC: 105 MMOL/L (ref 98–107)
CO2 SERPL-SCNC: 20 MMOL/L (ref 22–31)
COLOR UR AUTO: ABNORMAL
CREAT SERPL-MCNC: 0.87 MG/DL (ref 0.6–1.1)
ERYTHROCYTE [DISTWIDTH] IN BLOOD BY AUTOMATED COUNT: 14 % (ref 11–14.5)
GFR SERPL CREATININE-BSD FRML MDRD: >60 ML/MIN/1.73M2
GLUCOSE BLD-MCNC: 109 MG/DL (ref 70–125)
GLUCOSE UR STRIP-MCNC: NEGATIVE MG/DL
HCT VFR BLD AUTO: 38.5 % (ref 35–47)
HGB BLD-MCNC: 12.3 G/DL (ref 12–16)
HGB UR QL STRIP: NEGATIVE
KETONES UR STRIP-MCNC: ABNORMAL MG/DL
LEUKOCYTE ESTERASE UR QL STRIP: NEGATIVE
MCH RBC QN AUTO: 28.1 PG (ref 27–34)
MCHC RBC AUTO-ENTMCNC: 31.9 G/DL (ref 32–36)
MCV RBC AUTO: 88 FL (ref 80–100)
NITRATE UR QL: NEGATIVE
PH UR STRIP: 5.5 [PH] (ref 5–8)
PLATELET # BLD AUTO: 438 THOU/UL (ref 140–440)
PMV BLD AUTO: 8 FL (ref 7–10)
POTASSIUM BLD-SCNC: 3.8 MMOL/L (ref 3.5–5)
RBC # BLD AUTO: 4.37 MILL/UL (ref 3.8–5.4)
SODIUM SERPL-SCNC: 139 MMOL/L (ref 136–145)
SP GR UR STRIP: >1.03 (ref 1–1.03)
UROBILINOGEN UR STRIP-ACNC: ABNORMAL
WBC: 6.2 THOU/UL (ref 4–11)

## 2021-04-14 ENCOUNTER — COMMUNICATION - HEALTHEAST (OUTPATIENT)
Dept: FAMILY MEDICINE | Facility: CLINIC | Age: 61
End: 2021-04-14

## 2021-04-16 ENCOUNTER — OFFICE VISIT - HEALTHEAST (OUTPATIENT)
Dept: PULMONOLOGY | Facility: OTHER | Age: 61
End: 2021-04-16

## 2021-04-16 DIAGNOSIS — F17.210 CIGARETTE NICOTINE DEPENDENCE WITHOUT COMPLICATION: ICD-10-CM

## 2021-04-16 DIAGNOSIS — J43.2 CENTRILOBULAR EMPHYSEMA (H): ICD-10-CM

## 2021-04-16 DIAGNOSIS — R91.1 LUNG NODULE: ICD-10-CM

## 2021-04-20 ENCOUNTER — AMBULATORY - HEALTHEAST (OUTPATIENT)
Dept: NURSING | Facility: CLINIC | Age: 61
End: 2021-04-20

## 2021-04-20 ENCOUNTER — COMMUNICATION - HEALTHEAST (OUTPATIENT)
Dept: FAMILY MEDICINE | Facility: CLINIC | Age: 61
End: 2021-04-20

## 2021-04-20 DIAGNOSIS — G43.019 COMMON MIGRAINE WITH INTRACTABLE MIGRAINE: ICD-10-CM

## 2021-04-20 DIAGNOSIS — M26.609 TMJ (TEMPOROMANDIBULAR JOINT SYNDROME): ICD-10-CM

## 2021-04-20 NOTE — LETTER
4/20/2021        RE: Dayana Samaniego  [address] [facility] [address]                Dear Dayana,      We recently provided you with medication refills.  Many medications require routine follow-up with your doctor.    Your prescription(s) have been refilled for 30 days so you may have time for the above noted follow-up. Please call to schedule soon so we can assure you have an appointment before your next refills are needed. If you have already made a follow up appointment, please disregard this letter.           Sincerely,        [facility] [place]     (Formerly known as [facility])  Dr. Schmitt Care Team

## 2021-04-21 ENCOUNTER — COMMUNICATION - HEALTHEAST (OUTPATIENT)
Dept: FAMILY MEDICINE | Facility: CLINIC | Age: 61
End: 2021-04-21

## 2021-04-21 RX ORDER — ERENUMAB-AOOE 70 MG/ML
INJECTION SUBCUTANEOUS
Qty: 1 ML | Refills: 1 | Status: SHIPPED | OUTPATIENT
Start: 2021-04-21 | End: 2021-07-13

## 2021-04-21 NOTE — TELEPHONE ENCOUNTER
Refill request for Aimovig.  Dr. Schmitt is out of the office until 4/26/21. Can you please refill in his absence?  Pt is due for follow up. No future appt scheduled.  Letter mailed to pt reminding to schedule an appt.  Medication T'd for review and signature      Mary White LPN on 4/21/2021 at 9:27 AM

## 2021-04-23 ENCOUNTER — COMMUNICATION - HEALTHEAST (OUTPATIENT)
Dept: FAMILY MEDICINE | Facility: CLINIC | Age: 61
End: 2021-04-23

## 2021-04-26 ENCOUNTER — RECORDS - HEALTHEAST (OUTPATIENT)
Dept: ADMINISTRATIVE | Facility: OTHER | Age: 61
End: 2021-04-26

## 2021-05-03 ENCOUNTER — RECORDS - HEALTHEAST (OUTPATIENT)
Dept: ADMINISTRATIVE | Facility: OTHER | Age: 61
End: 2021-05-03

## 2021-05-09 ENCOUNTER — COMMUNICATION - HEALTHEAST (OUTPATIENT)
Dept: FAMILY MEDICINE | Facility: CLINIC | Age: 61
End: 2021-05-09

## 2021-05-09 DIAGNOSIS — F17.200 TOBACCO USE DISORDER: ICD-10-CM

## 2021-05-11 ENCOUNTER — AMBULATORY - HEALTHEAST (OUTPATIENT)
Dept: NURSING | Facility: CLINIC | Age: 61
End: 2021-05-11

## 2021-05-18 ENCOUNTER — COMMUNICATION - HEALTHEAST (OUTPATIENT)
Dept: FAMILY MEDICINE | Facility: CLINIC | Age: 61
End: 2021-05-18

## 2021-05-18 ENCOUNTER — RECORDS - HEALTHEAST (OUTPATIENT)
Dept: ADMINISTRATIVE | Facility: OTHER | Age: 61
End: 2021-05-18

## 2021-05-18 DIAGNOSIS — E03.9 HYPOTHYROIDISM, UNSPECIFIED TYPE: ICD-10-CM

## 2021-05-19 ENCOUNTER — HOSPITAL ENCOUNTER (OUTPATIENT)
Dept: CT IMAGING | Facility: HOSPITAL | Age: 61
Discharge: HOME | End: 2021-05-19
Attending: INTERNAL MEDICINE
Payer: COMMERCIAL

## 2021-05-19 DIAGNOSIS — F17.210 CIGARETTE NICOTINE DEPENDENCE WITHOUT COMPLICATION: ICD-10-CM

## 2021-05-24 ENCOUNTER — RECORDS - HEALTHEAST (OUTPATIENT)
Dept: ADMINISTRATIVE | Facility: CLINIC | Age: 61
End: 2021-05-24

## 2021-05-25 ENCOUNTER — RECORDS - HEALTHEAST (OUTPATIENT)
Dept: ADMINISTRATIVE | Facility: CLINIC | Age: 61
End: 2021-05-25

## 2021-05-26 ENCOUNTER — RECORDS - HEALTHEAST (OUTPATIENT)
Dept: ADMINISTRATIVE | Facility: CLINIC | Age: 61
End: 2021-05-26

## 2021-05-26 ASSESSMENT — PATIENT HEALTH QUESTIONNAIRE - PHQ9: SUM OF ALL RESPONSES TO PHQ QUESTIONS 1-9: 17

## 2021-05-27 ENCOUNTER — RECORDS - HEALTHEAST (OUTPATIENT)
Dept: ADMINISTRATIVE | Facility: CLINIC | Age: 61
End: 2021-05-27

## 2021-05-27 ASSESSMENT — PATIENT HEALTH QUESTIONNAIRE - PHQ9
SUM OF ALL RESPONSES TO PHQ QUESTIONS 1-9: 17
SUM OF ALL RESPONSES TO PHQ QUESTIONS 1-9: 19

## 2021-05-27 NOTE — TELEPHONE ENCOUNTER
RN cannot approve Refill Request    RN can NOT refill this medication PCP messaged that patient is overdue for Labs.        Lauren Banuelos, [facility] Triage/Med Refill 4/5/2019    Requested Prescriptions   Pending Prescriptions Disp Refills     CHANTIX 1 mg tablet [Pharmacy Med Name: CHANTIX 1MG TABLETS] 60 tablet 0     Sig: TAKE 1 TABLET BY MOUTH TWICE DAILY AFTER EATING WITH A FULL GLASS OF WATER    Varenicline Refill Protocol Failed - 4/5/2019  3:23 AM       Failed - Normal Serum Creatinine in past 12 months     Creatinine   Date Value Ref Range Status   09/18/2018 1.11 (H) 0.60 - 1.10 mg/dL Final            Passed - PCP or prescribing provider visit in last 12 or next 3 months.    Last office visit with prescriber/PCP: 4/13/2018 Joanne Weiner MD OR same dept: 3/1/2019 William Hines CNP  Last physical: 9/18/2018       Next visit within 3 mo: Visit date not found  Next physical within 3 mo: Visit date not found  Prescriber OR PCP: Joanne(Reva) AVNI Weiner MD  Last diagnosis associated with med order: 1. Nicotine Dependence  - CHANTIX 1 mg tablet [Pharmacy Med Name: CHANTIX 1MG TABLETS]; TAKE 1 TABLET BY MOUTH TWICE DAILY AFTER EATING WITH A FULL GLASS OF WATER  Dispense: 60 tablet; Refill: 0     Requested Prescriptions     Pending Prescriptions Disp Refills     CHANTIX 1 mg tablet [Pharmacy Med Name: CHANTIX 1MG TABLETS] 60 tablet 0     Sig: TAKE 1 TABLET BY MOUTH TWICE DAILY AFTER EATING WITH A FULL GLASS OF WATER     May refill for 3 months if protocol passes.

## 2021-05-28 ENCOUNTER — RECORDS - HEALTHEAST (OUTPATIENT)
Dept: ADMINISTRATIVE | Facility: CLINIC | Age: 61
End: 2021-05-28

## 2021-05-28 ASSESSMENT — ANXIETY QUESTIONNAIRES
GAD7 TOTAL SCORE: 12
GAD7 TOTAL SCORE: 12
GAD7 TOTAL SCORE: 11

## 2021-05-28 NOTE — PATIENT INSTRUCTIONS - HE
Recommend mammogram for evaluation    Plastic surgery consultation. I will have Halima, our , help figure out who is in network

## 2021-05-28 NOTE — TELEPHONE ENCOUNTER
RN cannot approve Refill Request    RN can NOT refill this medication med is not covered by policy/route to provider. Last office visit: 4/13/2018 Joanne Weiner MD Last Physical: 9/18/2018 Last MTM visit: Visit date not found Last visit same specialty: 3/1/2019 William Hines CNP.  Next visit within 3 mo: Visit date not found  Next physical within 3 mo: Visit date not found      Danitza Archibald, [facility] Triage/Med Refill 5/6/2019    Requested Prescriptions   Pending Prescriptions Disp Refills     ergocalciferol (ERGOCALCIFEROL) 50,000 unit capsule [Pharmacy Med Name: VITAMIN D2 50,000IU (ERGO) CAP RX] 13 capsule 0     Sig: TAKE 1 CAPSULE BY MOUTH 1 TIME A WEEK       There is no refill protocol information for this order

## 2021-05-28 NOTE — TELEPHONE ENCOUNTER
RN cannot approve Refill Request    RN can NOT refill this medication med is not covered by policy/route to provider. Last office visit: 4/13/2018 Joanne Weiner MD Last Physical: 9/18/2018 Last MTM visit: Visit date not found Last visit same specialty: 3/1/2019 William Hines CNP.  Next visit within 3 mo: Visit date not found  Next physical within 3 mo: Visit date not found      Danitza Archibald, [facility] Triage/Med Refill 5/5/2019    Requested Prescriptions   Pending Prescriptions Disp Refills     montelukast (SINGULAIR) 10 mg tablet [Pharmacy Med Name: MONTELUKAST 10MG TABLETS] 90 tablet 0     Sig: TAKE 1 TABLET(10 MG) BY MOUTH DAILY       There is no refill protocol information for this order

## 2021-05-28 NOTE — TELEPHONE ENCOUNTER
RN cannot approve Refill Request    RN can NOT refill this medication med is not covered by policy/route to provider     . Last office visit: 4/13/2018 Joanne Weiner MD Last Physical: 9/18/2018 Last MTM visit: Visit date not found Last visit same specialty: 3/1/2019 William Hines CNP.  Next visit within 3 mo: Visit date not found  Next physical within 3 mo: Visit date not found      Lauren Banuelos, [facility] Triage/Med Refill 5/10/2019    Requested Prescriptions   Pending Prescriptions Disp Refills     tiZANidine (ZANAFLEX) 4 MG tablet [Pharmacy Med Name: TIZANIDINE 4MG TABLETS] 120 tablet 0     Sig: TAKE 1 TO 2 TABLETS BY MOUTH DURING THE DAY AND 2 TABLETS AT BEDTIME AS NEEDED       There is no refill protocol information for this order

## 2021-05-28 NOTE — PROGRESS NOTES
Assessment:          1. History of bilateral saline breast implants  Ambulatory referral to Plastic Surgery    Mammo Diagnostic Bilateral   2. Deflation of saline-filled breast implant, initial encounter  Ambulatory referral to Plastic Surgery    Mammo Diagnostic Bilateral   3. Encounter for routine screening for malformation using ultrasonics  Mammo Diagnostic Bilateral          Plan:          We reviewed the likely/potential etiology(ies) for her breast symptoms and we will proceed with a diagnostic mammo and u/s. I will also send a referral for Plastic Surgery. She will call or return to clinic with any ongoing or worsening symptoms.           Subjective:           Dayana Samaniego is an 58 y.o. female who presents for evaluation of a breast deformity. She has saline breast implants in place for the last 15 years and has noted a change in contour and decrease in size several days ago, and has been stable since first identified. Patient does not routinely do self breast exams. There is no tenderness.  Patient denies nipple discharge or skin changes. Some chronic pain, hx fibromyalgia    The following portions of the patient's history were reviewed and updated as appropriate: allergies, current medications, past family history, past medical history, past social history, past surgical history and problem list.    Review of Systems  A comprehensive review of systems was negative.      Objective:         Vitals:    05/13/19 1345   BP: 130/70   Patient Position: Sitting   Cuff Size: Adult Regular   Pulse: 88   SpO2: 96%   Weight: 153 lb 6 oz (69.6 kg)   GEN: Alert and oriented, NAD, well nourished  SKIN:  Normal skin turgor, no lesions/rashes   HEENT: NC/AT, moist mucous membranes.    NECK: Normal.    CV: Regular rate and rhythm.   LUNGS: Clear. Normal respirations.   BREASTS: there is a difference in contour and size of the right upper aspect of the breast with a scooped appearance on lying flat.   EXTREMITY: No  edema, cyanosis  NEURO: Grossly normal.

## 2021-05-29 ENCOUNTER — RECORDS - HEALTHEAST (OUTPATIENT)
Dept: ADMINISTRATIVE | Facility: CLINIC | Age: 61
End: 2021-05-29

## 2021-05-29 NOTE — PROGRESS NOTES
"    SUBJECTIVE: Dayana Samaniego is a 58 y.o. White or  female who presents today with a form for a preop physical exam to be done for clearance as she is having surgery done on 6/21/2019 with Dr. Anish Major.  She has bilateral breast implants and right breast implant started.  She needs them replaced.  They are many years old.  She has a history of emphysema and nicotine dependence.  She has been able to wean down her cigarette smoking to 2 or 3 cigarettes a day since her daughter and grandbaby have come to them.  She has hypothyroidism, hyperlipidemia, and bipolar disorder and is seen regularly by Dr. Zeng in the [facility]..  She has chronic pain of fibromyalgia.  She was given a PHQ 9 and scored 17.  She scored 13 on a DAWSON 7.    OBJECTIVE: /50   Pulse 83   Temp 98.1  F (36.7  C)   Resp 20   Ht 5' 5\" (1.651 m)   Wt 153 lb 9.6 oz (69.7 kg)   SpO2 97%   BMI 25.56 kg/m    General: Relatively healthy appearing middle-aged female normal weight in no acute distress  Please refer to the scanned in preop physical form for details of this visit and for the physical exam noting that I heard a right-sided bruit    EKG showed normal sinus rhythm with some changes that are no current since at least August 2016.    ASSESSMENT & PLAN:    1. Preop general physical exam  Electrocardiogram Perform - Clinic    HM2(CBC w/o Differential)   2. Leakage of breast implant, sequela     3. Centrilobular emphysema (H)     4. Nicotine Dependence     5. Hypothyroidism, unspecified type  Thyroid Cascade   6. Hyperlipidemia, unspecified hyperlipidemia type  Comprehensive Metabolic Panel   7. Bipolar II Disorder     8. Vascular bruit  US Carotid Bilateral     I will check the above-mentioned lab work and get back to her on those results by my chart and only call with grossly abnormal values.  I have ordered an ultrasound of the carotid arteries.  I will call her with these results and make a plan accordingly.  She " can have this done after the surgery.  This should not get in the way of her surgery and I am clearing her for this replacement breast implant surgery.  I will fax the preop form, EKG result, and medication list to the [facility].  She can see me back in 6 months time or sooner on an as-needed basis.    Patient Active Problem List   Diagnosis     Hypothyroidism     Chronic Pain Syndrome     Hyperlipidemia     Walk Is Wobbly Or Unsteady (Ataxia)     Menopause Has Occurred     Bipolar II Disorder     Migraine Headache     Nicotine Dependence     Fibromyalgia     Menopausal Disorder     COPD (chronic obstructive pulmonary disease) (H)     GERD (gastroesophageal reflux disease)     Hallux abductovalgus with bunions, unspecified laterality     Lung nodule     Hiatal hernia     Centrilobular emphysema (H)     Controlled substance agreement signed       Current Outpatient Medications on File Prior to Visit   Medication Sig Dispense Refill     albuterol (PROAIR HFA) 90 mcg/actuation inhaler Inhale 2 puffs every 4 (four) hours as needed for shortness of breath. every 4-6 hours as needed. 8.5 g 2     atorvastatin (LIPITOR) 40 MG tablet TAKE 1 TABLET(40 MG) BY MOUTH AT BEDTIME 90 tablet 3     CHANTIX 1 mg tablet TAKE 1 TABLET BY MOUTH TWICE DAILY AFTER EATING WITH A FULL GLASS OF WATER 60 tablet 0     diazePAM (VALIUM) 2 MG tablet Take 1 tablet (2 mg total) by mouth as needed for anxiety (PRIOR TO FLYING). 10 tablet 0     doxepin (SINEQUAN) 25 MG capsule Take 3 capsules (75 mg total) by mouth at bedtime. 270 capsule 1     eletriptan (RELPAX) 40 MG tablet Take 1 tab at earliest onset of migraine, may repeat in 2 hours if necessary 12 tablet 2     ergocalciferol (ERGOCALCIFEROL) 50,000 unit capsule TAKE 1 CAPSULE BY MOUTH 1 TIME A WEEK 13 capsule 1     esomeprazole (NEXIUM) 20 MG capsule Take 40 mg by mouth bedtime. Uses OTC strength       FLUoxetine (PROZAC) 20 MG capsule Take 3 capsules (60 mg total) by  mouth daily. (Patient taking differently: Take 80 mg by mouth daily.       ) 270 capsule 1     fluticasone-vilanterol (BREO ELLIPTA) 100-25 mcg/dose DsDv inhaler Inhale 1 puff daily. 1 each 11     folic acid (FOLVITE) 800 MCG tablet Take by mouth.       HYDROcodone-acetaminophen 5-325 mg per tablet Take 1 tablet by mouth every 6 (six) hours as needed for pain. 30 tablet 0     hydrOXYzine pamoate (VISTARIL) 25 MG capsule Take 1 capsule (25 mg total) by mouth 3 (three) times a day as needed for itching. Take 1-2 capsules every 6 hours as needed for migraine 90 capsule 1     lamoTRIgine (LAMICTAL) 200 MG tablet Take 200 mg by mouth bedtime.       levothyroxine (SYNTHROID, LEVOTHROID) 75 MCG tablet TAKE 1 TABLET(75 MCG) BY MOUTH DAILY 90 tablet 2     melatonin 5 mg Tab Take 5 mg by mouth bedtime.       montelukast (SINGULAIR) 10 mg tablet TAKE 1 TABLET(10 MG) BY MOUTH DAILY 90 tablet 0     prazosin (MINIPRESS) 1 MG capsule Take 4 mg by mouth at bedtime.              prochlorperazine (COMPAZINE) 10 MG tablet TAKE 1 TABLET(10 MG) BY MOUTH EVERY 8 HOURS AS NEEDED 30 tablet 0     senna-docusate (SENNOSIDES-DOCUSATE SODIUM) 8.6-50 mg tablet Take 3 tablets by mouth bedtime.       SOY ISOFLA/BLK COHOSH/MAG BARK (ESTROVEN ORAL) Take by mouth.       tiZANidine (ZANAFLEX) 4 MG tablet TAKE 1 TO 2 TABLETS BY MOUTH DURING THE DAY AND 2 TABLETS AT BEDTIME AS NEEDED 120 tablet 1     ARIPiprazole (ABILIFY) 2 MG tablet TK ONE T PO ONCE D  1     ergocalciferol (ERGOCALCIFEROL) 50,000 unit capsule TAKE 1 CAPSULE BY MOUTH 1 TIME A WEEK 13 capsule 0     No current facility-administered medications on file prior to visit.

## 2021-05-30 ENCOUNTER — RECORDS - HEALTHEAST (OUTPATIENT)
Dept: ADMINISTRATIVE | Facility: CLINIC | Age: 61
End: 2021-05-30

## 2021-05-30 VITALS — HEIGHT: 64 IN | WEIGHT: 148.19 LBS | BODY MASS INDEX: 25.3 KG/M2

## 2021-05-30 VITALS — BODY MASS INDEX: 24.81 KG/M2 | WEIGHT: 149.1 LBS

## 2021-05-30 VITALS — BODY MASS INDEX: 24.35 KG/M2 | WEIGHT: 146.3 LBS

## 2021-05-30 VITALS — BODY MASS INDEX: 26.13 KG/M2 | WEIGHT: 152.2 LBS

## 2021-05-30 NOTE — TELEPHONE ENCOUNTER
RN cannot approve Refill Request    RN can NOT refill this medication med is not covered by policy/route to provider. Last office visit: 4/13/2018 Joanne Weiner MD Last Physical: 6/14/2019 Last MTM visit: Visit date not found Last visit same specialty: 5/13/2019 Eva Escalante MD.  Next visit within 3 mo: Visit date not found  Next physical within 3 mo: Visit date not found      Lily Kimble, [facility] Triage/Med Refill 7/5/2019    Requested Prescriptions   Pending Prescriptions Disp Refills     tiZANidine (ZANAFLEX) 4 MG tablet [Pharmacy Med Name: TIZANIDINE 4MG TABLETS] 120 tablet 0     Sig: TAKE 1 TO 2 TABLETS BY MOUTH DURING THE DAY AND 2 TABLETS AT BEDTIME AS NEEDED       There is no refill protocol information for this order

## 2021-05-31 ENCOUNTER — RECORDS - HEALTHEAST (OUTPATIENT)
Dept: ADMINISTRATIVE | Facility: CLINIC | Age: 61
End: 2021-05-31

## 2021-05-31 VITALS — BODY MASS INDEX: 25.75 KG/M2 | HEIGHT: 64 IN

## 2021-05-31 VITALS — WEIGHT: 150 LBS | BODY MASS INDEX: 25.61 KG/M2 | HEIGHT: 64 IN

## 2021-05-31 NOTE — TELEPHONE ENCOUNTER
RN cannot approve Refill Request    RN can NOT refill this medication med is not covered by policy/route to provider. Last office visit: 4/13/2018 Joanne Weiner MD Last Physical: 6/14/2019 Last MTM visit: Visit date not found Last visit same specialty: 5/13/2019 Eva Escalante MD.  Next visit within 3 mo: Visit date not found  Next physical within 3 mo: Visit date not found      Katie Blanco, [facility] Triage/Med Refill 9/3/2019    Requested Prescriptions   Pending Prescriptions Disp Refills     tiZANidine (ZANAFLEX) 4 MG tablet [Pharmacy Med Name: TIZANIDINE 4MG TABLETS] 120 tablet 0     Sig: TAKE 1-2 TABLETS BY MOUTH DURING THE DAY AND 2 TABLETS EVERY NIGHT AT BEDTIME       There is no refill protocol information for this order           
Is This A New Presentation, Or A Follow-Up?: Skin Lesions
How Severe Is Your Skin Lesion?: moderate
Have Your Skin Lesions Been Treated?: not been treated

## 2021-05-31 NOTE — TELEPHONE ENCOUNTER
RN cannot approve Refill Request    RN can NOT refill this medication med is not covered by policy/route to provider. Last office visit: 4/13/2018 Joanne Weiner MD Last Physical: 6/14/2019 Last MTM visit: Visit date not found Last visit same specialty: 5/13/2019 Eva Escalante MD.  Next visit within 3 mo: Visit date not found  Next physical within 3 mo: Visit date not found      Beni Rodríguez, [facility] Triage/Med Refill 8/3/2019    Requested Prescriptions   Pending Prescriptions Disp Refills     montelukast (SINGULAIR) 10 mg tablet [Pharmacy Med Name: MONTELUKAST 10MG TABLETS] 90 tablet 0     Sig: TAKE 1 TABLET(10 MG) BY MOUTH DAILY       There is no refill protocol information for this order

## 2021-05-31 NOTE — TELEPHONE ENCOUNTER
RN cannot approve Refill Request    RN can NOT refill this medication med is not covered by policy/route to provider. Last office visit: 3/1/2019 William Hines CNP Last Physical: Visit date not found Last MTM visit: Visit date not found Last visit same specialty: 5/13/2019 Eva Escalante MD.  Next visit within 3 mo: Visit date not found  Next physical within 3 mo: Visit date not found      Emani Stewart, [facility] Triage/Med Refill 8/1/2019    Requested Prescriptions   Pending Prescriptions Disp Refills     tiZANidine (ZANAFLEX) 4 MG tablet [Pharmacy Med Name: TIZANIDINE 4MG TABLETS] 120 tablet 0     Sig: TAKE 1-2 TABLETS BY MOUTH DURING THE DAY, AND 2 TABLETS EVERY NIGHT AT BEDTIME AS NEEDED       There is no refill protocol information for this order

## 2021-05-31 NOTE — TELEPHONE ENCOUNTER
RN cannot approve Refill Request    Controlled Substance Refill Request  Medication:   Requested Prescriptions     Pending Prescriptions Disp Refills     diazePAM (VALIUM) 2 MG tablet 10 tablet 0     Sig: Take 1 tablet (2 mg total) by mouth as needed for anxiety (PRIOR TO FLYING).     varenicline (CHANTIX) 1 mg tablet 60 tablet 0     Date Last Fill: 7/7/17  Pharmacy: [place] [id number]   Submit electronically to pharmacy  Controlled Substance Agreement on File:   Encounter-Level CSA Scan Date:    There are no encounter-level csa scan date.       Last office visit: 5/13/2019 Eva Escalante MD      RN can NOT refill this medication PCP to review Sig for chantix. Last office visit: 5/13/2019 Eva Escalante MD Last Physical: Visit date not found Last MTM visit: Visit date not found Last visit same specialty: 5/13/2019 Eva Escalante MD.  Next visit within 3 mo: Visit date not found  Next physical within 3 mo: Visit date not found      Emani Stewart, [facility] Triage/Med Refill 8/11/2019    Requested Prescriptions   Pending Prescriptions Disp Refills     diazePAM (VALIUM) 2 MG tablet 10 tablet 0     Sig: Take 1 tablet (2 mg total) by mouth as needed for anxiety (PRIOR TO FLYING).       Controlled Substances Refill Protocol Failed - 8/11/2019 10:42 AM        Failed - Route all Controlled Substance Requests to Provider        Failed - Patient has controlled substance agreement in past 12 months     Encounter-Level CSA Scan Date:    There are no encounter-level csa scan date.               Passed - Visit with PCP or prescribing provider visit in past 12 months      Last office visit with prescriber/PCP: 5/13/2019 Eva Escalante MD OR same dept: 5/13/2019 Eva Escalante MD OR same specialty: 5/13/2019 Eva Escalante MD Last physical: Visit date not found Last MTM visit: Visit date not found    Next visit within 3 mo: Visit date not found  Next physical within 3 mo:  Visit date not found  Prescriber OR PCP: Eva Escalante MD  Last diagnosis associated with med order: 1. Nicotine Dependence  - varenicline (CHANTIX) 1 mg tablet  Dispense: 60 tablet; Refill: 0               varenicline (CHANTIX) 1 mg tablet 60 tablet 0       Varenicline Refill Protocol Passed - 8/11/2019 10:42 AM        Passed - Normal Serum Creatinine in past 12 months      Creatinine   Date Value Ref Range Status   06/14/2019 1.06 0.60 - 1.10 mg/dL Final             Passed - PCP or prescribing provider visit in last 12 or next 3 months.     Last office visit with prescriber/PCP: 5/13/2019 Eva Escalante MD OR same dept: 5/13/2019 Eva Escalante MD  Last physical: Visit date not found       Next visit within 3 mo: Visit date not found  Next physical within 3 mo: Visit date not found  Prescriber OR PCP: Eva Escalante MD  Last diagnosis associated with med order: 1. Nicotine Dependence  - varenicline (CHANTIX) 1 mg tablet  Dispense: 60 tablet; Refill: 0     Requested Prescriptions     Pending Prescriptions Disp Refills     diazePAM (VALIUM) 2 MG tablet 10 tablet 0     Sig: Take 1 tablet (2 mg total) by mouth as needed for anxiety (PRIOR TO FLYING).     varenicline (CHANTIX) 1 mg tablet 60 tablet 0     May refill for 3 months if protocol passes.

## 2021-05-31 NOTE — TELEPHONE ENCOUNTER
RN cannot approve Refill Request    Controlled Substance Refill Request  Medication:   Requested Prescriptions     Pending Prescriptions Disp Refills     HYDROcodone-acetaminophen 5-325 mg per tablet 30 tablet 0     Sig: Take 1 tablet by mouth every 6 (six) hours as needed for pain.     prochlorperazine (COMPAZINE) 10 MG tablet 30 tablet 0     Date Last Fill: 9/25/18  Pharmacy: [place] [id number]   Submit electronically to pharmacy  Controlled Substance Agreement on File:   Encounter-Level CSA Scan Date:    There are no encounter-level csa scan date.       Last office visit: 4/13/2018 Joanne Weiner MD      RN can NOT refill this medication PCP to review sig for Compazine. Last office visit: 4/13/2018 Joanne Weiner MD Last Physical: 6/14/2019 Last MTM visit: Visit date not found Last visit same specialty: 5/13/2019 Eva Escalante MD.  Next visit within 3 mo: Visit date not found  Next physical within 3 mo: Visit date not found      Emani Stewart, [facility] Triage/Med Refill 8/11/2019    Requested Prescriptions   Pending Prescriptions Disp Refills     HYDROcodone-acetaminophen 5-325 mg per tablet 30 tablet 0     Sig: Take 1 tablet by mouth every 6 (six) hours as needed for pain.       Controlled Substances Refill Protocol Failed - 8/11/2019 10:42 AM        Failed - Route all Controlled Substance Requests to Provider        Failed - Patient has controlled substance agreement in past 12 months     Encounter-Level CSA Scan Date:    There are no encounter-level csa scan date.               Passed - Visit with PCP or prescribing provider visit in past 12 months      Last office visit with prescriber/PCP: 4/13/2018 Joanne Weiner MD OR same dept: 5/13/2019 Eva Escalante MD OR same specialty: 5/13/2019 Eva Escalante MD Last physical: 6/14/2019 Last MTM visit: Visit date not found    Next visit within 3 mo: Visit date not found  Next physical within 3 mo: Visit date  not found  Prescriber OR PCP: Joanne Weiner MD ([place])  Last diagnosis associated with med order: 1. Migraine  - HYDROcodone-acetaminophen 5-325 mg per tablet; Take 1 tablet by mouth every 6 (six) hours as needed for pain.  Dispense: 30 tablet; Refill: 0    2. Nausea  - prochlorperazine (COMPAZINE) 10 MG tablet  Dispense: 30 tablet; Refill: 0               prochlorperazine (COMPAZINE) 10 MG tablet 30 tablet 0       There is no refill protocol information for this order

## 2021-05-31 NOTE — PROGRESS NOTES
"Pulmonary Clinic Follow Up    Cc: follow up nodule, dyspnea    HPI: 55yoF with tobacco abuse presented after nodule discovered on CT scan of the chest. She had complaints of ongoing shortness of breath with stairs or walking too quickly.    Current regimen: albuterol PRN. She has been prescribed both symbicort and breo but doesn't like \"taking medicines I don't have to take.\"    Cut back to 2 cigarettes per day, cough has improved as a result of this. Wheezing remains intermittent, she is short of breath with exertion. She did feel that the breo helped but didn't like how her mouth felt after she took it.     CAT: previously 25: 2+3+2+4+3+0+5+3=22    ROS: 12-point ROS reviewed with patient. Notable for snoring, cough, shortness of breath.    Current Outpatient Medications   Medication Sig Note     albuterol (PROAIR HFA) 90 mcg/actuation inhaler Inhale 2 puffs every 4 (four) hours as needed for shortness of breath. every 4-6 hours as needed. 3/1/2019: prn     ARIPiprazole (ABILIFY) 2 MG tablet TK ONE T PO ONCE D      atorvastatin (LIPITOR) 40 MG tablet TAKE 1 TABLET(40 MG) BY MOUTH AT BEDTIME      CHANTIX 1 mg tablet TAKE 1 TABLET BY MOUTH TWICE DAILY AFTER EATING WITH A FULL GLASS OF WATER      diazePAM (VALIUM) 2 MG tablet Take 1 tablet (2 mg total) by mouth as needed for anxiety (PRIOR TO FLYING).      doxepin (SINEQUAN) 25 MG capsule Take 3 capsules (75 mg total) by mouth at bedtime.      eletriptan (RELPAX) 40 MG tablet Take 1 tab at earliest onset of migraine, may repeat in 2 hours if necessary      ergocalciferol (ERGOCALCIFEROL) 50,000 unit capsule TAKE 1 CAPSULE BY MOUTH 1 TIME A WEEK      esomeprazole (NEXIUM) 20 MG capsule Take 40 mg by mouth bedtime. Uses OTC strength      FLUoxetine (PROZAC) 20 MG capsule Take 3 capsules (60 mg total) by mouth daily. (Patient taking differently: Take 80 mg by mouth daily.       )      folic acid (FOLVITE) 800 MCG tablet Take by mouth. 2/20/2018: Received from: " [facility] Received Sig: take 1 tablet by oral route  every day     HYDROcodone-acetaminophen 5-325 mg per tablet Take 1 tablet by mouth every 6 (six) hours as needed for pain. 3/1/2019: prn     hydrOXYzine pamoate (VISTARIL) 25 MG capsule Take 1 capsule (25 mg total) by mouth 3 (three) times a day as needed for itching. Take 1-2 capsules every 6 hours as needed for migraine      lamoTRIgine (LAMICTAL) 200 MG tablet Take 200 mg by mouth bedtime.      levothyroxine (SYNTHROID, LEVOTHROID) 75 MCG tablet TAKE 1 TABLET(75 MCG) BY MOUTH DAILY      melatonin 5 mg Tab Take 5 mg by mouth bedtime.      montelukast (SINGULAIR) 10 mg tablet TAKE 1 TABLET(10 MG) BY MOUTH DAILY      prazosin (MINIPRESS) 1 MG capsule Take 4 mg by mouth at bedtime.        10/2/2015: Received from: External Pharmacy     prochlorperazine (COMPAZINE) 10 MG tablet TAKE 1 TABLET(10 MG) BY MOUTH EVERY 8 HOURS AS NEEDED 3/1/2019: prn     senna-docusate (SENNOSIDES-DOCUSATE SODIUM) 8.6-50 mg tablet Take 3 tablets by mouth bedtime.      SOY ISOFLA/BLK COHOSH/MAG BARK (ESTROVEN ORAL) Take by mouth.      tiZANidine (ZANAFLEX) 4 MG tablet TAKE 1-2 TABLETS BY MOUTH DURING THE DAY, AND 2 TABLETS EVERY NIGHT AT BEDTIME AS NEEDED      Allergies   Allergen Reactions     Codeine Anaphylaxis     Cetirizine Nausea And Vomiting     Nefazodone Nausea And Vomiting     Oxycodone-Acetaminophen      hallucinations     Trazodone Nausea And Vomiting     Amoxicillin-Pot Clavulanate Rash     Cephalexin Rash     Cyclobenzaprine Rash     Eszopiclone Rash     Methocarbamol Rash     Penicillins Rash     Mild rash       Vitals:    08/12/19 1021   BP: 108/58   Pulse: 72   Resp: 16   SpO2: 99%   RA  Gen: NAD  HEENT: No oropharyngeal lesions  Neck: no  lymphadenopathy  C/V: RRR, S1 and S2.  Resp: clear bilaterally, no wheezing or rales.  Ext: no edema  Skin: no lesions  Neuro: Grossly normal    PFTs compared 8/12/19 to 2016  FEV1/FVC is 0.79 and is at the lower limit of  normal.  FEV1 is 66-->76% predicted   FVC is 72%-->75% predicted    ASSESSMENT/PLAN:    1)Tobacco abuse  -Down to 2 cigarettes per day. Wants more credit for cutting down so much, no where near ready to quit.  -Repeat screening CT scan due 2/2020    2) Asthma  -She has declined use of ICS  -Action plan in place  -Singulair stopped by patient  -Repeat spirometry showed improvement, potentially linked to decrease in smoking.     3) Emphysema  -encouraged smoking cessation      Angelia Bradford MD  Electronically signed on 8/12/2019 12:33 PM  >50% of this 30 minute visit spent in direct counseling

## 2021-06-01 ENCOUNTER — RECORDS - HEALTHEAST (OUTPATIENT)
Dept: ADMINISTRATIVE | Facility: CLINIC | Age: 61
End: 2021-06-01

## 2021-06-01 VITALS — WEIGHT: 148 LBS | HEIGHT: 65 IN | BODY MASS INDEX: 24.66 KG/M2

## 2021-06-01 VITALS — BODY MASS INDEX: 25.49 KG/M2 | WEIGHT: 152 LBS

## 2021-06-01 VITALS — BODY MASS INDEX: 24.96 KG/M2 | HEIGHT: 65 IN | WEIGHT: 149.8 LBS

## 2021-06-01 VITALS — WEIGHT: 151.4 LBS | BODY MASS INDEX: 25.39 KG/M2

## 2021-06-01 VITALS — WEIGHT: 150.5 LBS | BODY MASS INDEX: 25.24 KG/M2

## 2021-06-02 ENCOUNTER — RECORDS - HEALTHEAST (OUTPATIENT)
Dept: ADMINISTRATIVE | Facility: CLINIC | Age: 61
End: 2021-06-02

## 2021-06-02 VITALS — BODY MASS INDEX: 25.66 KG/M2 | WEIGHT: 154 LBS | HEIGHT: 65 IN

## 2021-06-02 VITALS — HEIGHT: 65 IN | BODY MASS INDEX: 25.49 KG/M2 | WEIGHT: 153 LBS

## 2021-06-02 VITALS — WEIGHT: 149 LBS | BODY MASS INDEX: 24.83 KG/M2 | HEIGHT: 65 IN

## 2021-06-02 NOTE — TELEPHONE ENCOUNTER
RN cannot approve Refill Request    RN can NOT refill this medication med is not covered by policy/route to provider. Last office visit: 4/13/2018 Joanne Weiner MD Last Physical: 6/14/2019 Last MTM visit: Visit date not found Last visit same specialty: 5/13/2019 Eva Escalante MD.  Next visit within 3 mo: Visit date not found  Next physical within 3 mo: Visit date not found      Suyapa Adler, [facility] Triage/Med Refill 10/11/2019    Requested Prescriptions   Pending Prescriptions Disp Refills     tiZANidine (ZANAFLEX) 4 MG tablet [Pharmacy Med Name: TIZANIDINE 4MG TABLETS] 120 tablet 0     Sig: TAKE 1-2 TABLETS BY MOUTH DURING THE DAY AND 2 TABLETS EVERY NIGHT AT BEDTIME       There is no refill protocol information for this order

## 2021-06-03 ENCOUNTER — RECORDS - HEALTHEAST (OUTPATIENT)
Dept: ADMINISTRATIVE | Facility: CLINIC | Age: 61
End: 2021-06-03

## 2021-06-03 ENCOUNTER — RECORDS - HEALTHEAST (OUTPATIENT)
Dept: ADMINISTRATIVE | Facility: OTHER | Age: 61
End: 2021-06-03

## 2021-06-03 VITALS — BODY MASS INDEX: 25.52 KG/M2 | WEIGHT: 153.38 LBS

## 2021-06-03 VITALS — WEIGHT: 153.6 LBS | BODY MASS INDEX: 25.59 KG/M2 | HEIGHT: 65 IN

## 2021-06-03 VITALS — WEIGHT: 153.3 LBS | BODY MASS INDEX: 25.51 KG/M2

## 2021-06-03 NOTE — TELEPHONE ENCOUNTER
Pastoral Care Progress Note    1/10/2020  Patient: Genia Cortez : 1944  Admission Date & Time: 2020 1657  MRN: [id number] CSN: [id number]                     Chaplaincy Interventions Utilized:               Relationship Building: Cultivated a relationship of care and support    Ritual: Provided prayer            Chaplaincy Outcomes Achieved:  Expressed gratitude          Spiritual Coping Strategies Utilized:   Spiritual comfort       01/10/20 1200   Clinical Encounter Type   Visited With Patient and family together   Continue Visiting No   Surgical Visit Pre-op   Yazidism Encounters   Yazidism Needs Prayer   Family Spiritual Encounters   Family Coping Accepting RN cannot approve Refill Request    RN can NOT refill this medication med is not covered by policy/route to provider. Last office visit: 4/13/2018 Joanne Weiner MD Last Physical: 6/14/2019 Last MTM visit: Visit date not found Last visit same specialty: 5/13/2019 Eva Escalante MD.  Next visit within 3 mo: Visit date not found  Next physical within 3 mo: Visit date not found      Emani Stewart, [facility] Triage/Med Refill 11/4/2019    Requested Prescriptions   Pending Prescriptions Disp Refills     ergocalciferol (ERGOCALCIFEROL) 50,000 unit capsule [Pharmacy Med Name: VITAMIN D2 50,000IU (ERGO) CAP RX] 13 capsule 0     Sig: TAKE 1 CAPSULE BY MOUTH 1 TIME A WEEK       There is no refill protocol information for this order

## 2021-06-03 NOTE — TELEPHONE ENCOUNTER
RN cannot approve Refill Request    RN can NOT refill this medication med is not covered by policy/route to provider     . Last office visit: 4/13/2018 Joanne Weiner MD Last Physical: 6/14/2019 Last MTM visit: Visit date not found Last visit same specialty: 5/13/2019 Eva Escalante MD.  Next visit within 3 mo: Visit date not found  Next physical within 3 mo: Visit date not found      Lauren Banuelos, [facility] Connection Triage/Med Refill 11/17/2019    Requested Prescriptions   Pending Prescriptions Disp Refills     tiZANidine (ZANAFLEX) 4 MG tablet [Pharmacy Med Name: TIZANIDINE 4MG TABLETS] 120 tablet 0     Sig: TAKE 1-2 TABLETS BY MOUTH DURING THE DAY AND 2 TABLETS EVERY NIGHT AT BEDTIME       There is no refill protocol information for this order

## 2021-06-03 NOTE — TELEPHONE ENCOUNTER
Orders being requested: EKG  Reason service is needed/diagnosis: Patient takes Doxepin and needs an annual EKG per her psychiatrist.  When are orders needed by: As provider can do  Where to send Orders: Epic  Okay to leave detailed message?  Yes

## 2021-06-03 NOTE — TELEPHONE ENCOUNTER
Left message to call back for: patient  Information to relay to patient:  She can bring her outside order in with her. She needs to schedule an appt

## 2021-06-03 NOTE — TELEPHONE ENCOUNTER
Patient has six tablets left.  She is leaving [facility] 12/3/19 and won't get her Botox injection (for her migraines) until she returns.  She is already starting to get migraines and wants to be sure she has enough medication with her while she is in [place].  She will be gone 12/3-13/19.           Controlled Substance Refill Request  Medication Name:   Requested Prescriptions     Pending Prescriptions Disp Refills     HYDROcodone-acetaminophen 5-325 mg per tablet 30 tablet 0     Sig: Take 1 tablet by mouth every 6 (six) hours as needed for pain.     Date Last Fill: 8/3/19  Pharmacy: [id number]      Submit electronically to pharmacy  Controlled Substance Agreement Date Scanned:   Encounter-Level CSA Scan Date:    There are no encounter-level csa scan date.       Last office visit with prescriber/PCP: 4/13/2018 Joanne Weiner MD OR same dept: 5/13/2019 Eva Escalante MD OR same specialty: 5/13/2019 Eva Escalante MD  Last physical: 6/14/2019 Last MTM visit: Visit date not found

## 2021-06-04 VITALS
DIASTOLIC BLOOD PRESSURE: 68 MMHG | WEIGHT: 160.3 LBS | RESPIRATION RATE: 24 BRPM | SYSTOLIC BLOOD PRESSURE: 128 MMHG | OXYGEN SATURATION: 96 % | BODY MASS INDEX: 26.68 KG/M2 | HEART RATE: 84 BPM

## 2021-06-04 VITALS
WEIGHT: 152 LBS | HEIGHT: 65 IN | DIASTOLIC BLOOD PRESSURE: 66 MMHG | BODY MASS INDEX: 25.33 KG/M2 | HEART RATE: 91 BPM | TEMPERATURE: 98.4 F | SYSTOLIC BLOOD PRESSURE: 130 MMHG | OXYGEN SATURATION: 94 %

## 2021-06-04 VITALS
BODY MASS INDEX: 26.48 KG/M2 | SYSTOLIC BLOOD PRESSURE: 110 MMHG | HEART RATE: 80 BPM | DIASTOLIC BLOOD PRESSURE: 68 MMHG | WEIGHT: 159.13 LBS

## 2021-06-04 VITALS
HEART RATE: 100 BPM | WEIGHT: 160 LBS | HEIGHT: 65 IN | DIASTOLIC BLOOD PRESSURE: 72 MMHG | BODY MASS INDEX: 26.66 KG/M2 | OXYGEN SATURATION: 95 % | SYSTOLIC BLOOD PRESSURE: 118 MMHG

## 2021-06-04 VITALS
DIASTOLIC BLOOD PRESSURE: 58 MMHG | WEIGHT: 157.56 LBS | SYSTOLIC BLOOD PRESSURE: 100 MMHG | HEART RATE: 97 BPM | OXYGEN SATURATION: 97 % | RESPIRATION RATE: 16 BRPM | BODY MASS INDEX: 26.22 KG/M2

## 2021-06-04 VITALS
OXYGEN SATURATION: 96 % | BODY MASS INDEX: 25.16 KG/M2 | HEIGHT: 65 IN | SYSTOLIC BLOOD PRESSURE: 120 MMHG | WEIGHT: 151 LBS | HEART RATE: 90 BPM | DIASTOLIC BLOOD PRESSURE: 70 MMHG

## 2021-06-04 NOTE — PATIENT INSTRUCTIONS - HE
Chest x-ray to make sure we're treating the right thing.    Either I or my nurse will call you this afternoon with your results and the plan.    I will make sure to send in the yeast infection medication as well.    Try to rest as much as possible and do not forget to hydrate!    Thank you for coming in today!    If you receive a survey from [facility] about your experience today, it would be very helpful if you could fill it out to let us know what went well and what we can improve!    General Information:    Today you had your appointment with William Hines NP    My hours are:    Monday : Out of clinic  Tuesday : 8:00AM - 5:00 PM  Wednesday: 8:00AM - 5:00 PM  Thursday: 8:00AM - 5:00 PM  Friday: 8:00AM - 5:00 PM    I am not in the office Mondays. Therefore non-urgent calls and medical messages received on Monday will be addressed when I am back in the office on Tuesday. Urgent matters will be reviewed and addressed by one of my partners in the office as needed.    If lab work was done today as part of your evaluation you will generally be contacted via [employer], mail, or phone with the results within 1-5 days. If there is an alarming result we will contact you by phone. Lab results come back at varying times, I generally wait until all lab results are available before making comments on the results.     If you need refills please contact your pharmacy. They will send a refill request to me to review. Please allow 3-5 business days for us to process all refill requests.     My Clinical Assistant is Annika. Please call us at [phone] or send a medical message with any questions or concerns.

## 2021-06-04 NOTE — TELEPHONE ENCOUNTER
RN cannot approve Refill Request    RN can NOT refill this medication med is not covered by policy/route to provider. Last office visit: 4/13/2018 Joanne Weiner MD Last Physical: 6/14/2019 Last MTM visit: Visit date not found Last visit same specialty: 5/13/2019 Eva Escalante MD.  Next visit within 3 mo: Visit date not found  Next physical within 3 mo: Visit date not found      Nichole Magana, [facility] Connection Triage/Med Refill 12/25/2019    Requested Prescriptions   Pending Prescriptions Disp Refills     tiZANidine (ZANAFLEX) 4 MG tablet [Pharmacy Med Name: TIZANIDINE 4MG TABLETS] 120 tablet 0     Sig: TAKE 1-2 TABLETS BY MOUTH DURING THE DAY AND 2 TABLETS EVERY NIGHT AT BEDTIME       There is no refill protocol information for this order

## 2021-06-04 NOTE — PROGRESS NOTES
Chief Complaint   Patient presents with     Cough     Productive cough. Green phlegm. Keeping her up at night. Symptoms started on 12/14/19. Not getting better. Also makes head hurt.      Shortness of Breath     Especially when laying down.      Generalized Body Aches     Test Results     Would like EKG results.       HPI: Patient with a past medical history significant for COPD and current tobacco use presents with 2 weeks of respiratory symptoms.  Everybody in the house is sick including children with similar symptoms.  The patient mostly complains about increased shortness of breath, mild chest pain along the right and left upper lobes with deep inspiration, and a productive cough getting up thick green mucus.  She does note some wheezing issues have started as well.  Sinuses have become plugged and when she blows her nose she is getting out thick green mucus.  She still continues to smoke about 1/4 pack/day.  There are some all over body aches above and beyond her usual fibromyalgia.  Afebrile without chills.    No chest palpitations, ear pain, respiratory distress, radiation of chest pain, nausea, diaphoresis.    ROS:Review of Systems - negative except for what's listed in the HPI    SH: The Patient's  reports that she has been smoking cigarettes. She has a 45.00 pack-year smoking history. She uses smokeless tobacco. She reports current alcohol use. She reports that she does not use drugs.      FH: The Patient's family history includes Alcohol abuse in her father; Asthma in her maternal aunt; Crohn's disease in her sister; Diabetes in her paternal grandmother and sister; Heart attack in her paternal grandmother and paternal uncle; Heart disease in her maternal grandfather and paternal grandfather; Hypertension in her sister; Lung cancer in her mother.     Meds:    Current Outpatient Medications on File Prior to Visit   Medication Sig Dispense Refill     albuterol (PROAIR HFA) 90 mcg/actuation inhaler every 4-6  hours as needed. 8.5 g 11     ARIPiprazole (ABILIFY) 2 MG tablet TK ONE T PO ONCE D  1     atorvastatin (LIPITOR) 40 MG tablet TAKE 1 TABLET(40 MG) BY MOUTH AT BEDTIME 90 tablet 3     diazePAM (VALIUM) 2 MG tablet Take 1 tablet (2 mg total) by mouth as needed for anxiety (PRIOR TO FLYING). 10 tablet 0     doxepin (SINEQUAN) 25 MG capsule Take 3 capsules (75 mg total) by mouth at bedtime. 270 capsule 1     ergocalciferol (ERGOCALCIFEROL) 50,000 unit capsule TAKE 1 CAPSULE BY MOUTH 1 TIME A WEEK 13 capsule 1     esomeprazole (NEXIUM) 20 MG capsule Take 40 mg by mouth bedtime. Uses OTC strength       FLUoxetine (PROZAC) 20 MG capsule Take 3 capsules (60 mg total) by mouth daily. (Patient taking differently: Take 80 mg by mouth daily.       ) 270 capsule 1     folic acid (FOLVITE) 800 MCG tablet Take by mouth.       HYDROcodone-acetaminophen 5-325 mg per tablet Take 1 tablet by mouth every 6 (six) hours as needed for pain. 30 tablet 0     hydrOXYzine pamoate (VISTARIL) 25 MG capsule Take 1 capsule (25 mg total) by mouth 3 (three) times a day as needed for itching. Take 1-2 capsules every 6 hours as needed for migraine 90 capsule 1     lamoTRIgine (LAMICTAL) 200 MG tablet Take 200 mg by mouth bedtime.       levothyroxine (SYNTHROID, LEVOTHROID) 75 MCG tablet TAKE 1 TABLET(75 MCG) BY MOUTH DAILY 90 tablet 2     melatonin 5 mg Tab Take 5 mg by mouth bedtime.       prazosin (MINIPRESS) 1 MG capsule Take 4 mg by mouth at bedtime.              senna-docusate (SENNOSIDES-DOCUSATE SODIUM) 8.6-50 mg tablet Take 3 tablets by mouth bedtime.       tiZANidine (ZANAFLEX) 4 MG tablet TAKE 1-2 TABLETS BY MOUTH DURING THE DAY AND 2 TABLETS EVERY NIGHT AT BEDTIME 120 tablet 0     varenicline (CHANTIX) 1 mg tablet TAKE 1 TABLET BY MOUTH TWICE DAILY AFTER EATING WITH A FULL GLASS OF WATER 60 tablet 3     eletriptan (RELPAX) 40 MG tablet Take 1 tab at earliest onset of migraine, may repeat in 2 hours if necessary 12 tablet 2      "ergocalciferol (ERGOCALCIFEROL) 50,000 unit capsule TAKE 1 CAPSULE BY MOUTH 1 TIME A WEEK 13 capsule 0     montelukast (SINGULAIR) 10 mg tablet   1     prochlorperazine (COMPAZINE) 5 MG tablet Take 1 tablet (5 mg total) by mouth every 8 (eight) hours as needed for nausea. 30 tablet 0     SOY ISOFLA/BLK COHOSH/MAG BARK (ESTROVEN ORAL) Take by mouth.       No current facility-administered medications on file prior to visit.        O:  /66   Pulse 91   Temp 98.4  F (36.9  C) (Oral)   Ht 5' 5\" (1.651 m)   Wt 152 lb (68.9 kg)   SpO2 94%   BMI 25.29 kg/m      Physical Examination:   General appearance - alert, well appearing, and in no distress  Mental status - alert, oriented to person, place, and time  Eyes -PERRLA, conjunctiva pink  Ears - bilateral TM's and external ear canals normal  Nose - Swollen nasal mucous. Scant green drainage.  Mouth - mucous membranes moist, pharynx normal  Neck - no significant adenopathy  Lymphatics - no palpable lymphadenopathy, no hepatosplenomegaly  Chest - No Crackles. Diminished breath sounds in lower lobes  Heart - normal rate and regular rhythm, S1 and S2 normal, no murmurs noted      A/P:     Problem List Items Addressed This Visit        ENT/CARD/PULM/ENDO Problems    COPD (chronic obstructive pulmonary disease) (H)    Relevant Medications    montelukast (SINGULAIR) 10 mg tablet      Other Visit Diagnoses     Cough    -  Primary    Relevant Orders    XR Chest 2 Views (Completed)        Given compromised respiratory status with COPD history and current smoking, chest x-ray to rule out pneumonia.  X-ray shows hyperinflation consistent with COPD changes, but no focal areas of consolidation.  Prescription for azithromycin sent to the pharmacy.  Utilize albuterol inhaler more frequently as needed.  Lungs sound clear enough where she should not need steroids for now, but should her respiratory status worsen, she was encouraged to seek reevaluation.    Patient notes yeast " infections often occurring with antibiotics.  Fluconazole sent to the pharmacy as well   If this happens.  Hold atorvastatin while taking fluconazole.    1. Cough  - XR Chest 2 Views    2. Chronic obstructive pulmonary disease, unspecified COPD type (H)        William Hines, CNP

## 2021-06-05 VITALS
WEIGHT: 161 LBS | BODY MASS INDEX: 26.79 KG/M2 | OXYGEN SATURATION: 94 % | TEMPERATURE: 98.7 F | SYSTOLIC BLOOD PRESSURE: 120 MMHG | HEART RATE: 90 BPM | DIASTOLIC BLOOD PRESSURE: 80 MMHG | RESPIRATION RATE: 24 BRPM

## 2021-06-05 VITALS
HEIGHT: 65 IN | SYSTOLIC BLOOD PRESSURE: 156 MMHG | TEMPERATURE: 97.7 F | HEART RATE: 83 BPM | WEIGHT: 176.2 LBS | DIASTOLIC BLOOD PRESSURE: 68 MMHG | RESPIRATION RATE: 16 BRPM | BODY MASS INDEX: 29.36 KG/M2 | OXYGEN SATURATION: 95 %

## 2021-06-05 VITALS
HEIGHT: 65 IN | HEART RATE: 86 BPM | SYSTOLIC BLOOD PRESSURE: 157 MMHG | DIASTOLIC BLOOD PRESSURE: 73 MMHG | TEMPERATURE: 98.4 F | RESPIRATION RATE: 16 BRPM | BODY MASS INDEX: 28.52 KG/M2 | OXYGEN SATURATION: 93 %

## 2021-06-05 VITALS
WEIGHT: 176.9 LBS | RESPIRATION RATE: 16 BRPM | BODY MASS INDEX: 29.47 KG/M2 | SYSTOLIC BLOOD PRESSURE: 116 MMHG | TEMPERATURE: 99.3 F | HEIGHT: 65 IN | DIASTOLIC BLOOD PRESSURE: 66 MMHG | OXYGEN SATURATION: 92 % | HEART RATE: 98 BPM

## 2021-06-05 VITALS
SYSTOLIC BLOOD PRESSURE: 130 MMHG | DIASTOLIC BLOOD PRESSURE: 70 MMHG | OXYGEN SATURATION: 97 % | BODY MASS INDEX: 27.5 KG/M2 | HEART RATE: 80 BPM | WEIGHT: 164 LBS

## 2021-06-05 VITALS
RESPIRATION RATE: 16 BRPM | DIASTOLIC BLOOD PRESSURE: 71 MMHG | TEMPERATURE: 98.6 F | SYSTOLIC BLOOD PRESSURE: 132 MMHG | HEART RATE: 87 BPM | OXYGEN SATURATION: 94 % | HEIGHT: 65 IN | WEIGHT: 170 LBS | BODY MASS INDEX: 28.32 KG/M2

## 2021-06-05 VITALS
SYSTOLIC BLOOD PRESSURE: 132 MMHG | HEIGHT: 65 IN | WEIGHT: 164 LBS | BODY MASS INDEX: 27.32 KG/M2 | HEART RATE: 76 BPM | RESPIRATION RATE: 18 BRPM | DIASTOLIC BLOOD PRESSURE: 70 MMHG | OXYGEN SATURATION: 96 %

## 2021-06-05 VITALS
RESPIRATION RATE: 17 BRPM | TEMPERATURE: 98.4 F | HEIGHT: 65 IN | WEIGHT: 171.4 LBS | DIASTOLIC BLOOD PRESSURE: 69 MMHG | SYSTOLIC BLOOD PRESSURE: 159 MMHG | OXYGEN SATURATION: 94 % | BODY MASS INDEX: 28.56 KG/M2 | HEART RATE: 87 BPM

## 2021-06-05 VITALS
HEIGHT: 65 IN | OXYGEN SATURATION: 97 % | SYSTOLIC BLOOD PRESSURE: 120 MMHG | BODY MASS INDEX: 27.73 KG/M2 | HEART RATE: 90 BPM | WEIGHT: 166.44 LBS | DIASTOLIC BLOOD PRESSURE: 72 MMHG

## 2021-06-05 VITALS
HEART RATE: 93 BPM | WEIGHT: 162.6 LBS | DIASTOLIC BLOOD PRESSURE: 62 MMHG | OXYGEN SATURATION: 96 % | SYSTOLIC BLOOD PRESSURE: 108 MMHG | BODY MASS INDEX: 27.27 KG/M2

## 2021-06-05 VITALS
BODY MASS INDEX: 25.72 KG/M2 | HEART RATE: 77 BPM | OXYGEN SATURATION: 97 % | DIASTOLIC BLOOD PRESSURE: 64 MMHG | WEIGHT: 154.38 LBS | HEIGHT: 65 IN | RESPIRATION RATE: 16 BRPM | SYSTOLIC BLOOD PRESSURE: 120 MMHG

## 2021-06-05 NOTE — TELEPHONE ENCOUNTER
Left message to call back for: Dayana  Information to relay to patient:  See message from Dr. Pizarro.  Flaquita ELLINGTON CMA/ANGELLAO

## 2021-06-05 NOTE — TELEPHONE ENCOUNTER
Patient Returning Call    Reason for call:    Left message to call back    Information relayed to patient:   patient informed of results per PCP and states she thought she was taking her statin, however in review of her medication she states she has not.    Patient has additional questions:  Yes  If YES, what are your questions/concerns:   1. Requesting script for statin be call in to pharmacy.  2. Question treatment for creatinine?    Okay to leave a detailed message?:   Yes     Please send script and advise patient on plan of care for creatinine.

## 2021-06-05 NOTE — TELEPHONE ENCOUNTER
Migraine x 2 weeks.  Meds only take it away for few hours.  Has botox often for migraines.    Asking for clinic appointment to get toradol shot like she has had done before.    No vomiting.  No dizziness or difficult breathing.    Has tried all of her prescribed medications and OTC meds without relief.    Transferred to scheduling for an appointment for today.    Suyapa Adler RN  Triage Nurse Advisor

## 2021-06-05 NOTE — PROGRESS NOTES
"  SUBJECTIVE: Dayana Samaniego is a 59 y.o. White or  female who presents today with a complaint of migraine headache that has been going on for couple weeks.  She is unsure why this cannot be treated successfully with her usual Relpax.  When she has this happen she comes in for Toradol IM and Benadryl IM.  It could be it started with some upper respiratory infection because she also talks about a cough at night that keeps her awake.  We discussed possible treatments.  She did like to try Tessalon Perles.  She also asks me for refill of her Vicodin.  She will use this at times and it will help her headaches but it did not do anything for this headache.  She gets 30 tablets and she last refilled it in November.  She is due for a med check and is willing to do some lab work today.  6 months ago she had her thyroid level checked and it was good.  She needs a recheck of her electrolytes and kidney function and liver function as well.  She also has longstanding depression and anxiety related to bipolar disorder.  She sees both a psychiatrist and a therapist on a regular basis.  She was given a PHQ 9 and scored 17.  She scored 12 on her DAWSON 7.  This is not uncommon for her.    OBJECTIVE: /70   Pulse 90   Ht 5' 5\" (1.651 m)   Wt 151 lb (68.5 kg)   SpO2 96%   Breastfeeding No   BMI 25.13 kg/m    General: Relatively healthy appearing middle-aged female looking bedraggled due to headache.  HEENT: Eyes squinting in the light, no rhinorrhea  Heart: Regular rate and rhythm without murmur  Lungs: Clear and somewhat decreased breath sounds, I do not weakness cough  Abdomen: Soft  Extremities: Warm, dry and without edema  Psych: Mood is flat, but patient is with headache    ASSESSMENT & PLAN:     1. Intractable migraine without status migrainosus, unspecified migraine type  She has done well on this regimen and so we will give her 30 mg of Toradol and 15 mg of Benadryl IM.  - ketorolac injection 30 mg " (TORADOL)  - diphenhydrAMINE injection 50 mg (BENADRYL)    2. Migraine  Refill of Vicodin was done and patient is to use it as sparingly as possible.  - HYDROcodone-acetaminophen 5-325 mg per tablet; Take 1 tablet by mouth every 6 (six) hours as needed for pain.  Dispense: 30 tablet; Refill: 0    3. Hyperlipidemia, unspecified hyperlipidemia type  Usually pretty well controlled, will monitor with lab.  - Lipid [place] RANDOM  - Comprehensive Metabolic Panel    4. Cough  I will give her a new prescription for benzonatate to see if that is helpful with the cough she is experiencing at bedtime which is keeping her awake.  - benzonatate (TESSALON PERLES) 100 MG capsule; Take 1 capsule (100 mg total) by mouth every 6 (six) hours as needed for cough.  Dispense: 30 capsule; Refill: 0    5. Chronic obstructive pulmonary disease, unspecified COPD type (H)  Cough might be related to COPD as she has been a longtime smoker.      6. Other bipolar disorder (H)  Depression and anxiety is not well controlled as usual.  Sees psychiatrist and therapist for this.    I will get back to her on her labs by my chart and only call with grossly abnormal values.  She can see me back in 6 months time or sooner on an as-needed basis.    Patient Active Problem List   Diagnosis     Hypothyroidism     Chronic Pain Syndrome     Hyperlipidemia     Walk Is Wobbly Or Unsteady (Ataxia)     Menopause Has Occurred     Bipolar II Disorder     Migraine Headache     Nicotine Dependence     Fibromyalgia     Menopausal Disorder     COPD (chronic obstructive pulmonary disease) (H)     GERD (gastroesophageal reflux disease)     Hallux abductovalgus with bunions, unspecified laterality     Lung nodule     Hiatal hernia     Centrilobular emphysema (H)     Controlled substance agreement signed       Current Outpatient Medications on File Prior to Visit   Medication Sig Dispense Refill     albuterol (PROAIR HFA) 90 mcg/actuation inhaler every 4-6 hours as needed.  8.5 g 11     ARIPiprazole (ABILIFY) 2 MG tablet TK ONE T PO ONCE D  1     atorvastatin (LIPITOR) 40 MG tablet TAKE 1 TABLET(40 MG) BY MOUTH AT BEDTIME 90 tablet 3     doxepin (SINEQUAN) 25 MG capsule Take 3 capsules (75 mg total) by mouth at bedtime. 270 capsule 1     eletriptan (RELPAX) 40 MG tablet Take 1 tab at earliest onset of migraine, may repeat in 2 hours if necessary 12 tablet 2     ergocalciferol (ERGOCALCIFEROL) 50,000 unit capsule TAKE 1 CAPSULE BY MOUTH 1 TIME A WEEK 13 capsule 1     esomeprazole (NEXIUM) 20 MG capsule Take 40 mg by mouth bedtime. Uses OTC strength       FLUoxetine (PROZAC) 20 MG capsule Take 3 capsules (60 mg total) by mouth daily. (Patient taking differently: Take 80 mg by mouth daily.       ) 270 capsule 1     folic acid (FOLVITE) 800 MCG tablet Take by mouth.       hydrOXYzine pamoate (VISTARIL) 25 MG capsule Take 1 capsule (25 mg total) by mouth 3 (three) times a day as needed for itching. Take 1-2 capsules every 6 hours as needed for migraine 90 capsule 1     lamoTRIgine (LAMICTAL) 200 MG tablet Take 200 mg by mouth bedtime.       levothyroxine (SYNTHROID, LEVOTHROID) 75 MCG tablet TAKE 1 TABLET(75 MCG) BY MOUTH DAILY 90 tablet 2     melatonin 5 mg Tab Take 5 mg by mouth bedtime.       prazosin (MINIPRESS) 1 MG capsule Take 4 mg by mouth at bedtime.              prochlorperazine (COMPAZINE) 5 MG tablet Take 1 tablet (5 mg total) by mouth every 8 (eight) hours as needed for nausea. 30 tablet 0     varenicline (CHANTIX) 1 mg tablet TAKE 1 TABLET BY MOUTH TWICE DAILY AFTER EATING WITH A FULL GLASS OF WATER 60 tablet 3     diazePAM (VALIUM) 2 MG tablet Take 1 tablet (2 mg total) by mouth as needed for anxiety (PRIOR TO FLYING). 10 tablet 0     montelukast (SINGULAIR) 10 mg tablet   1     senna-docusate (SENNOSIDES-DOCUSATE SODIUM) 8.6-50 mg tablet Take 3 tablets by mouth bedtime.       tiZANidine (ZANAFLEX) 4 MG tablet TAKE 1-2 TABLETS BY MOUTH DURING THE DAY AND 2 TABLETS EVERY NIGHT  AT BEDTIME 120 tablet 0     [DISCONTINUED] HYDROcodone-acetaminophen 5-325 mg per tablet Take 1 tablet by mouth every 6 (six) hours as needed for pain. 30 tablet 0     No current facility-administered medications on file prior to visit.

## 2021-06-05 NOTE — TELEPHONE ENCOUNTER
I have reordered the statin.  I would like her to recheck that in 6 months.  We can recheck her kidney function at that time as well.  The only thing to do for the kidney function is to stay well-hydrated, stay away from NSAIDs, and refrain from herbal/over-the-counter treatments as these can have effect on the kidneys.  Most likely her kidney function will correct.  If she would like to be seen sooner than 6 months, she can certainly come in at 3 months.

## 2021-06-05 NOTE — TELEPHONE ENCOUNTER
----- Message from Joanne Weiner MD sent at 1/28/2020  6:46 AM CST -----  Patient's LDL went from 86 to 251.  She is obviously not taking her medication.  Why?  Also it is noted that her kidney function is down quite a bit as well.  Her creatinine went from a normal 1.0 up to 1.4 which sounds like a small amount but is quite significant.  Common causes for this are overuse of NSAIDs such as ibuprofen/Motrin/Aleve/naproxen other causes are herbal medicines/alternative treatments etc. such as vaping and other over-the-counter treatments.  I would like to see her back in 6 weeks.  If she is willing to restart her cholesterol med, let me know.

## 2021-06-05 NOTE — TELEPHONE ENCOUNTER
RN cannot approve Refill Request    RN can NOT refill this medication med is not covered by policy/route to provider     . Last office visit: 1/27/2020 Joanne Weiner MD Last Physical: 6/14/2019 Last MTM visit: Visit date not found Last visit same specialty: 1/27/2020 Joanne Weiner MD.  Next visit within 3 mo: Visit date not found  Next physical within 3 mo: Visit date not found      Lauren Banuelos, [facility] Triage/Med Refill 2/3/2020    Requested Prescriptions   Pending Prescriptions Disp Refills     montelukast (SINGULAIR) 10 mg tablet [Pharmacy Med Name: MONTELUKAST 10MG TABLETS] 90 tablet 3     Sig: TAKE 1 TABLET(10 MG) BY MOUTH DAILY       There is no refill protocol information for this order        ergocalciferol (ERGOCALCIFEROL) 1,250 mcg (50,000 unit) capsule [Pharmacy Med Name: VITAMIN D2 50,000IU (ERGO) CAP RX] 13 capsule 3     Sig: TAKE 1 CAPSULE BY MOUTH 1 TIME A WEEK       There is no refill protocol information for this order

## 2021-06-05 NOTE — TELEPHONE ENCOUNTER
Reason for Disposition    SEVERE headache, states 'worst headache' of life    Protocols used: HEADACHE-A-[place]

## 2021-06-06 NOTE — PROGRESS NOTES
SUBJECTIVE: Dayana Samaniego is a 59 y.o. White or  female who presents today for follow-up of her visit from 1/27 at which time she had lab work done.  She was found to have cholesterol numbers that were untreated.  She had previously been put on a atorvastatin 40 mg.  She stopped taking this medication.  Her total cholesterol was 373, triglycerides 260, HDL 70 and .  She has since restarted the medication and is here to recheck these labs.  Also at that time she was found to have a creatinine elevated at 1.38.  This is quite high for her.  She struggles with pain issues including migraine and she had been using NSAIDs.  She is seeing Dr. Wang for injection of Botox.  She also saw Dr. Bradford, pulmonologist, on 3/4.  At that time she had had a CT done to screen for lung cancer as she is a longtime smoker.  Dr. Bradford showed her the emphysema that was found.  She suggested that she continue to screen yearly.  In February she saw her psychiatrist Dr. Zeng who manages her psych meds.    OBJECTIVE: /68 (Patient Site: Left Arm, Patient Position: Sitting, Cuff Size: Adult Regular)   Pulse 80   Wt 159 lb 2 oz (72.2 kg)   Breastfeeding No   BMI 26.48 kg/m    General: Well-appearing older female in no acute distress, normal weight  Heart: Regular rate and rhythm without murmur  Lungs: Clear bilaterally with mildly decreased breath sounds bilaterally  Abdomen: Soft, nontender  Extremities: Warm, dry and without edema      ASSESSMENT & PLAN:     1. Hyperlipidemia, unspecified hyperlipidemia type  Previously uncontrolled due to noncompliance of medication use.  I will recheck her lab work now as she says she has been faithfully taking atorvastatin 40 mg nightly.  - Lipid [place]    2. Elevated serum creatinine  Patient had creatinine of 1.38.  I will need to recheck this to make sure this is not a trend.  It is likely due to the use of NSAIDs, particularly Toradol, for migraine.  - Basic Metabolic  Panel    3. Intractable migraine without status migrainosus, unspecified migraine type  Sees neurologist Dr. Wang for Botox injections and sometimes uses Vicodin, and if cannot control it will come in for Toradol injections.    4. Centrilobular emphysema (H)  Sees Dr. Bradford.  Patient continues to smoke.  Patient is trying to cut down currently.    5. Hypothyroidism, unspecified type  Controlled per labs done in late January.    6. Screen for colon cancer  Willing to try doing Cologuard.  - Cologuard    I will get back to her on her new lab results by my chart and only call with grossly abnormal values.  She continues to decline the flu vaccine today.  I will refill meds as needed.  She should see me back in no longer than 6 months time for her next med check.    Patient Active Problem List   Diagnosis     Hypothyroidism     Chronic Pain Syndrome     Hyperlipidemia     Walk Is Wobbly Or Unsteady (Ataxia)     Menopause Has Occurred     Bipolar II Disorder     Migraine Headache     Nicotine Dependence     Fibromyalgia     Menopausal Disorder     GERD (gastroesophageal reflux disease)     Hallux abductovalgus with bunions, unspecified laterality     Lung nodule     Hiatal hernia     Centrilobular emphysema (H)     Controlled substance agreement signed     Cigarette nicotine dependence without complication       Current Outpatient Medications on File Prior to Visit   Medication Sig Dispense Refill     albuterol (PROAIR HFA) 90 mcg/actuation inhaler every 4-6 hours as needed. 8.5 g 11     ARIPiprazole (ABILIFY) 2 MG tablet TK ONE T PO ONCE D  1     atorvastatin (LIPITOR) 40 MG tablet TAKE 1 TABLET(40 MG) BY MOUTH AT BEDTIME 90 tablet 1     diazePAM (VALIUM) 2 MG tablet Take 1 tablet (2 mg total) by mouth as needed for anxiety (PRIOR TO FLYING). 10 tablet 0     doxepin (SINEQUAN) 25 MG capsule Take 3 capsules (75 mg total) by mouth at bedtime. 270 capsule 1     eletriptan (RELPAX) 40 MG tablet Take 1 tab at earliest onset  of migraine, may repeat in 2 hours if necessary 12 tablet 2     ergocalciferol (ERGOCALCIFEROL) 1,250 mcg (50,000 unit) capsule TAKE 1 CAPSULE BY MOUTH 1 TIME A WEEK 13 capsule 3     esomeprazole (NEXIUM) 20 MG capsule Take 40 mg by mouth bedtime. Uses OTC strength       FLUoxetine (PROZAC) 20 MG capsule Take 3 capsules (60 mg total) by mouth daily. (Patient taking differently: Take 80 mg by mouth daily.       ) 270 capsule 1     folic acid (FOLVITE) 800 MCG tablet Take by mouth.       hydrOXYzine pamoate (VISTARIL) 25 MG capsule Take 1 capsule (25 mg total) by mouth 3 (three) times a day as needed for itching. Take 1-2 capsules every 6 hours as needed for migraine 90 capsule 1     lamoTRIgine (LAMICTAL) 200 MG tablet Take 200 mg by mouth bedtime.       levothyroxine (SYNTHROID, LEVOTHROID) 75 MCG tablet TAKE 1 TABLET(75 MCG) BY MOUTH DAILY 90 tablet 2     melatonin 5 mg Tab Take 5 mg by mouth bedtime.       montelukast (SINGULAIR) 10 mg tablet TAKE 1 TABLET(10 MG) BY MOUTH DAILY 90 tablet 3     prazosin (MINIPRESS) 1 MG capsule Take 4 mg by mouth at bedtime.              prochlorperazine (COMPAZINE) 5 MG tablet Take 1 tablet (5 mg total) by mouth every 8 (eight) hours as needed for nausea. 30 tablet 0     senna-docusate (SENNOSIDES-DOCUSATE SODIUM) 8.6-50 mg tablet Take 3 tablets by mouth bedtime.       tiZANidine (ZANAFLEX) 4 MG tablet TAKE 1-2 TABLETS BY MOUTH DURING THE DAY AND 2 TABLETS EVERY NIGHT AT BEDTIME 120 tablet 0     varenicline (CHANTIX) 1 mg tablet TAKE 1 TABLET BY MOUTH TWICE DAILY AFTER EATING WITH A FULL GLASS OF WATER 60 tablet 3     [DISCONTINUED] benzonatate (TESSALON PERLES) 100 MG capsule Take 1 capsule (100 mg total) by mouth every 6 (six) hours as needed for cough. 30 capsule 0     No current facility-administered medications on file prior to visit.

## 2021-06-06 NOTE — PROGRESS NOTES
Chief Complaint   Patient presents with     Migraine     Pt c/o migraine since this morning. Pt took vicodin and hydroxizine but got no relief from the migraine       HPI: Complex 59-year-old female with a past medical history of bipolar disorder, chronic pain syndrome, fibromyalgia and smoking, presents today with new onset migraine headache that started this morning.  It is manifested by a headache in the frontal parietal portion of the left cranium.  In addition, she has photophobia.  She is been taking Vicodin, ice and Compazine with no help.    ROS: No syncopal episodes.  No scotoma.  No vomiting.    SH: Reviewed-see Snapshot for review.     FH: Reviewed-see Snapshot for review.    Meds:  Dayana has a current medication list which includes the following prescription(s): albuterol, aripiprazole, atorvastatin, benzonatate, diazepam, doxepin, eletriptan, ergocalciferol, esomeprazole, fluoxetine, folic acid, hydroxyzine pamoate, lamotrigine, levothyroxine, melatonin, montelukast, prazosin, prochlorperazine, senna-docusate, tizanidine, and varenicline, and the following Facility-Administered Medications: ketorolac.    O:  /58   Pulse 97   Resp 16   Wt 157 lb 9 oz (71.5 kg)   SpO2 97%   BMI 26.22 kg/m    Constitutional:    --Vitals as above  --No acute distress and wearing sunglasses  Eyes-  --Sclera noninjected  --Lids and conjunctiva normal  Neuro-  -- She moves all 4 extremities pupils equal and her  strength is normal in the upper extremities bilaterally he is able to ambulate without difficulty.  Skin-  --Pink and dry    A/P:   1. Other migraine without status migrainosus, not intractable  The patient has a migraine which she has had in the past.  Will treat with ketorolac, rest at home and no driving.  - ketorolac injection 30 mg (TORADOL)    2. Cigarette nicotine dependence without complication  Strongly encourage smoke cessation which would certainly help her migraines.

## 2021-06-06 NOTE — TELEPHONE ENCOUNTER
Has migraine and wants to come to clinic for shots.    Can pateint come in for migraine toradol shot.    Transferred to scheduling for an appointment.    Suyapa Adler RN [facility] Triage Nurse Advisor      Reason for Disposition    New headache and weak immune system (e.g., HIV positive, cancer chemotherapy, chronic steroid treatment)    Protocols used: HEADACHE-A-[place]

## 2021-06-06 NOTE — PROGRESS NOTES
"Pulmonary Clinic Follow Up    Cc: follow up nodule, dyspnea    HPI: 55yoF with tobacco abuse presented after nodule discovered on CT scan of the chest. She had complaints of ongoing shortness of breath with stairs or walking too quickly.    Current regimen: albuterol PRN. She has been prescribed both symbicort and breo but doesn't like \"taking medicines I don't have to take.\"    ROS: 12-point ROS reviewed with patient. Notable for snoring, cough, shortness of breath.    Current Outpatient Medications   Medication Sig Note     albuterol (PROAIR HFA) 90 mcg/actuation inhaler every 4-6 hours as needed.      ARIPiprazole (ABILIFY) 2 MG tablet TK ONE T PO ONCE D      atorvastatin (LIPITOR) 40 MG tablet TAKE 1 TABLET(40 MG) BY MOUTH AT BEDTIME      benzonatate (TESSALON PERLES) 100 MG capsule Take 1 capsule (100 mg total) by mouth every 6 (six) hours as needed for cough.      diazePAM (VALIUM) 2 MG tablet Take 1 tablet (2 mg total) by mouth as needed for anxiety (PRIOR TO FLYING). 12/26/2019: Prn flying.      doxepin (SINEQUAN) 25 MG capsule Take 3 capsules (75 mg total) by mouth at bedtime.      eletriptan (RELPAX) 40 MG tablet Take 1 tab at earliest onset of migraine, may repeat in 2 hours if necessary 12/26/2019: Prn migraines.      ergocalciferol (ERGOCALCIFEROL) 1,250 mcg (50,000 unit) capsule TAKE 1 CAPSULE BY MOUTH 1 TIME A WEEK      esomeprazole (NEXIUM) 20 MG capsule Take 40 mg by mouth bedtime. Uses OTC strength      FLUoxetine (PROZAC) 20 MG capsule Take 3 capsules (60 mg total) by mouth daily. (Patient taking differently: Take 80 mg by mouth daily.       )      folic acid (FOLVITE) 800 MCG tablet Take by mouth. 2/20/2018: Received from: [facility] Received Sig: take 1 tablet by oral route  every day     hydrOXYzine pamoate (VISTARIL) 25 MG capsule Take 1 capsule (25 mg total) by mouth 3 (three) times a day as needed for itching. Take 1-2 capsules every 6 hours as needed for migraine      lamoTRIgine " (LAMICTAL) 200 MG tablet Take 200 mg by mouth bedtime.      levothyroxine (SYNTHROID, LEVOTHROID) 75 MCG tablet TAKE 1 TABLET(75 MCG) BY MOUTH DAILY      melatonin 5 mg Tab Take 5 mg by mouth bedtime.      montelukast (SINGULAIR) 10 mg tablet TAKE 1 TABLET(10 MG) BY MOUTH DAILY      prazosin (MINIPRESS) 1 MG capsule Take 4 mg by mouth at bedtime.        10/2/2015: Received from: External Pharmacy     prochlorperazine (COMPAZINE) 5 MG tablet Take 1 tablet (5 mg total) by mouth every 8 (eight) hours as needed for nausea. 12/26/2019: Prn     senna-docusate (SENNOSIDES-DOCUSATE SODIUM) 8.6-50 mg tablet Take 3 tablets by mouth bedtime.      tiZANidine (ZANAFLEX) 4 MG tablet TAKE 1-2 TABLETS BY MOUTH DURING THE DAY AND 2 TABLETS EVERY NIGHT AT BEDTIME      varenicline (CHANTIX) 1 mg tablet TAKE 1 TABLET BY MOUTH TWICE DAILY AFTER EATING WITH A FULL GLASS OF WATER      Allergies   Allergen Reactions     Codeine Anaphylaxis     Cetirizine Nausea And Vomiting     Nefazodone Nausea And Vomiting     Oxycodone-Acetaminophen      hallucinations     Trazodone Nausea And Vomiting     Amoxicillin-Pot Clavulanate Rash     Cephalexin Rash     Cyclobenzaprine Rash     Eszopiclone Rash     Methocarbamol Rash     Penicillins Rash     Mild rash       Vitals:    03/04/20 1404   BP: 128/68   Pulse: 84   Resp: 24   SpO2: 96%   RA  Gen: NAD  HEENT: No oropharyngeal lesions  Neck: no  lymphadenopathy  C/V: RRR, S1 and S2.  Resp: clear bilaterally, no wheezing or rales.  Ext: no edema  Skin: no lesions  Neuro: Grossly normal    PFTs compared 8/12/19 to 2016  FEV1/FVC is 0.79  FEV1 is 66-->76% predicted   FVC is 72%-->75% predicted    ASSESSMENT/PLAN:    1)Tobacco abuse  -Smoking Cessation  -Repeat screening CT scan due now, will order and hopefully follow up in 1 year    2) Asthma  -She has declined use of ICS  -Action plan in place  -Singulair stopped by patient    3) Emphysema  -encouraged smoking cessation, showed her emphysema areas on CT  scan, explained smoking will continue to create this      Angelia Bradford MD  Electronically signed on 3/4/2020

## 2021-06-08 ENCOUNTER — COMMUNICATION - HEALTHEAST (OUTPATIENT)
Dept: FAMILY MEDICINE | Facility: CLINIC | Age: 61
End: 2021-06-08

## 2021-06-08 DIAGNOSIS — R11.0 NAUSEA: ICD-10-CM

## 2021-06-08 NOTE — PROGRESS NOTES
SUBJECTIVE: Dayana Samaniego is a 56 y.o. caucasion female who presents today with complaints of a headache. Feels similar to previous migraines she has had. There is associated photophobia.  She has taken relpax 40mg and compazine with only very brief relief in symptoms.  Symptoms have been present for the past week and have not abated.  She was trying to avoid a visit to the ED.    OBJECTIVE:   Visit Vitals     /70     Pulse 96     Wt 146 lb 4.8 oz (66.4 kg)     BMI 24.35 kg/m2     General: Adult female wearing sunglasses.  Speaking softly  HEENT: wearing sunglasses to block out light.  Normocephalic. CHERYL  Heart: S1, S2. No murmur  Lungs: clear to auscultation bilaterally  Abdomen: soft. Non-tender  Extremities: no lower extremity edema.    ASSESSMENT & PLAN:    1. Migraine  ketorolac injection 60 mg (TORADOL)    diphenhydrAMINE tablet 50 mg (BENADRYL)   2. Preventative health care  Influenza, Seasonal Quad, Preservative Free 36+ Months     Toradol 60mg IM and diphenhydramine 50mg oral given in clinic without any significant adverse effects. She will go home and rest. She has family who can help her.  Flu shot given.  Follow up if symptoms fail to improve or worsen.     Patient Active Problem List   Diagnosis     Hypothyroidism     Chronic Pain Syndrome     Hyperlipidemia     Walk Is Wobbly Or Unsteady (Ataxia)     Menopause Has Occurred     Bipolar II Disorder     Migraine Headache     Nicotine Dependence     Fibromyalgia     Menopausal Disorder     COPD (chronic obstructive pulmonary disease)     GERD (gastroesophageal reflux disease)     Hallux abductovalgus with bunions, unspecified laterality     Lung nodule       Current Outpatient Prescriptions on File Prior to Visit   Medication Sig Dispense Refill     albuterol (PROAIR HFA) 90 mcg/actuation inhaler Inhale 2 puffs every 4 (four) hours as needed for shortness of breath. every 4-6 hours as needed. 8.5 g 2     atorvastatin (LIPITOR) 40 MG tablet Take  1 tablet (40 mg total) by mouth bedtime. 90 tablet 1     budesonide-formoterol (SYMBICORT) 160-4.5 mcg/actuation inhaler Inhale 2 puffs 2 (two) times a day. 3 Inhaler 3     diazePAM (VALIUM) 2 MG tablet Take 1 tablet (2 mg total) by mouth as needed for anxiety. prior to flying. 10 tablet 0     doxepin (SINEQUAN) 25 MG capsule Take 75 mg by mouth bedtime.        ergocalciferol (ERGOCALCIFEROL) 50,000 unit capsule Take 1 capsule (50,000 Units total) by mouth once a week. 13 capsule 3     esomeprazole (NEXIUM) 20 MG capsule Take 40 mg by mouth bedtime. Uses OTC strength       FLUoxetine (PROZAC) 20 MG capsule Take 60 mg by mouth daily.        HYDROcodone-acetaminophen 5-325 mg per tablet Take 1 tablet by mouth every 6 (six) hours as needed for pain. 30 tablet 0     hydrOXYzine (VISTARIL) 25 MG capsule Take 25 mg by mouth. Take 1-2 capsules every 6 hours as needed for migraine       ibuprofen (ADVIL,MOTRIN) 800 MG tablet Take 1 tablet (800 mg total) by mouth every 6 (six) hours as needed for pain. 50 tablet 0     ipratropium-albuterol (DUO-NEB) 0.5-2.5 mg/3 mL nebulizer Take 3 mL by nebulization every 4 (four) hours. 25 vial 2     lamoTRIgine (LAMICTAL) 200 MG tablet Take 200 mg by mouth bedtime.       levothyroxine (SYNTHROID, LEVOTHROID) 75 MCG tablet TK 1 T PO ONCE D  3     melatonin 5 mg Tab Take 5 mg by mouth bedtime.       montelukast (SINGULAIR) 10 mg tablet TAKE 1 TABLET BY MOUTH EVERY DAY 90 tablet 1     nicotine (NICODERM CQ) 14 mg/24 hr Place 1 patch on the skin daily. 30 patch 0     prazosin (MINIPRESS) 1 MG capsule Take 2 mg by mouth bedtime.        predniSONE (DELTASONE) 20 MG tablet Take 2 tabs by mouth daily for 5 days 10 tablet 0     prochlorperazine (COMPAZINE) 10 MG tablet Take 1 tablet (10 mg total) by mouth every 8 (eight) hours as needed. 30 tablet 1     senna-docusate (SENNOSIDES-DOCUSATE SODIUM) 8.6-50 mg tablet Take 3 tablets by mouth bedtime.       tiZANidine (ZANAFLEX) 4 MG tablet TAKE 2  TABLETS BY MOUTH AT BEDTIME AS NEEDED 60 tablet 0     tiZANidine (ZANAFLEX) 4 MG tablet TAKE 2 TABLETS BY MOUTH AT BEDTIME AS NEEDED 180 tablet 0     triamcinolone (KENALOG) 0.1 % cream APPLY TOPICALLY BID TO HANDS AND FEET FOR 2 TO 3 WEEKS  3     [DISCONTINUED] azithromycin (ZITHROMAX Z-GABBY) 250 MG tablet Take 2 tablets on first day then 1 tablet qd x 4 days 6 tablet 0     [DISCONTINUED] azithromycin (ZITHROMAX) 250 MG tablet Take 2 tablets by mouth on day 1, then take 1 tablet by mouth days 2-5. 6 tablet 0     [DISCONTINUED] predniSONE (DELTASONE) 10 MG tablet Take 4 tabs qd x3 days, then 3 tabs qd x3d, then 2 tabs qd x3d, then 1 tab qd x3d 30 tablet 0     No current facility-administered medications on file prior to visit.          William Hines, Nurse Practitioner Student
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
## 2021-06-09 NOTE — TELEPHONE ENCOUNTER
OV note not done yet so I dont have documentation to support. Will have to wait for PCP.    William Hines, CNP

## 2021-06-09 NOTE — PROGRESS NOTES
ASSESSMENT/PLAN:  1. Encounter for  medical examination (CDME)  Cleared for medical examination certificate for 's license for 1 year due to her current intake of opioids for chronic pain and fibromyalgia  - Vision Screening  - Color Vision Examination  - Hearing Screening  - Urinalysis-UC if Indicated  - Culture, Urine    2. Hypothyroidism  Stable on levothyroxine 75  g  Lab Results   Component Value Date    TSH 0.32 01/16/2017     Management per PCP    3. Chronic pain syndrome, fibromyalgia, constipation  Takes Vicodin one half tablet once or twice weekly.  Review side effects of medication.  Review safety profile.  Management is per her PCP.    4. Hyperlipidemia  Lipitor 40 mg.  Management is per her PCP.    5. Bipolar II Disorder, pression, anxiety, insomnia  Stable  Doxepin 25 mg, fluoxetine 20 mg, Lamictal 200 mg, prazosin 1 mg, melatonin and Benadryl  He also takes diazepam for fear of flying.  Review side effects of this medication as well.    6. Migraine  As needed hydroxyzine and as needed Relpax    7. Nicotine Dependence  Cessation counseled.  Currently one pack per day    8. COPD (chronic obstructive pulmonary disease)  Emphasize smoking cessation.  She does Symbicort 2 puffs twice daily.  She also does albuterol as needed.    9. GERD (gastroesophageal reflux disease)  Nexium 40 mg.  She complains of choking and still continued dysphagia despite normal endoscopy per patient report.  She has an upcoming appointment on March 20, 2017 sounds like pH monitoring test    10.  Chronic kidney disease GFR 55    Orders Placed This Encounter   Procedures     Vision Screening     Color Vision Examination     Hearing Screening     Culture, Urine     Urinalysis-UC if Indicated           CHIEF COMPLAINT:  Chief Complaint   Patient presents with     's License Exam     DOT       HISTORY OF PRESENT ILLNESS:  Dayana is a 56 y.o. female presenting to the clinic today  for her 's license examination. This is a renewal. 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 She has had her certificate renewed for one year the past two renewals because of as needed Vicodin use for severe migraines. She does have COPD, and does 2 puff twice a day of Symbicort. She is still a smoker. She reports mild kidney disease diagnosed at [facility] in the fall of 2016. Her creatinine 1.23 with a GFR of 45 checked 1/17/2017. She does wear glasses for reading. Her hearing and vision are stable, and she does not use hearing aides. Blood pressure is stable today at 130/62.    Chronic: She has daily chronic neck, back and shoulder pain. She takes half a tablet of Vicodin about 1-2 times per week. She does have constipation with this, and she takes a stool softener. She does not get too drowsy on the Vicodin.     Anxiety/Depression: She takes Prozac, doxepin, Lamictal, and hydroxyzine to manage her mood disorder. She does have associated insomnia, and can take a significant amount of medications for sleep, and will still only sleep for a couple of hours.     Reflux: She has to take Nexium every day. She has tried decreasing her dose, but has an increase in acid reflux. She has had an endoscopy that was normal. She is going to have a pH study in March because she chokes a lot. This happens with eating and drinking, and there is no specific triggers. She says that it is worse with liquids.     REVIEW OF SYSTEMS:   Denies problems with hearing. Bladder function is stable.   All other systems are negative.    PFSH:  Still a smoker, and smokes 1 pack/day.   Surgical history includes hysterectomy.     TOBACCO USE:  History   Smoking Status     Current Every Day Smoker     Packs/day: 1.00     Years: 45.00     Types: Cigarettes   Smokeless Tobacco     Current User     Comment: 0.5 packs to a full pack per day.        VITALS:  Vitals:     "03/01/17 1423   BP: 130/62   Patient Site: Left Arm   Patient Position: Sitting   Pulse: 96   Weight: 148 lb 3 oz (67.2 kg)   Height: 5' 4\" (1.626 m)     Wt Readings from Last 3 Encounters:   03/01/17 148 lb 3 oz (67.2 kg)   02/13/17 146 lb 4.8 oz (66.4 kg)   01/16/17 149 lb 1.6 oz (67.6 kg)       PHYSICAL EXAM:  Constitutional: Patient is oriented to person, place, and time. Patient appears well-developed and well-nourished. No distress.   Head: Normocephalic and atraumatic.   Right Ear: External ear normal.   Left Ear: External ear normal.   Nose: Nose normal.   Mouth/Throat: Oropharynx is clear and moist. No oropharyngeal exudate.   Eyes: Conjunctivae and EOM are normal. Pupils are equal, round, and reactive to light. Right eye exhibits no discharge. Left eye exhibits no discharge. No scleral icterus.   Neck: Neck supple. No JVD present. No tracheal deviation present. No thyromegaly present.   Cardiovascular: Normal rate, regular rhythm, normal heart sounds and intact distal pulses. No murmur heard.   Pulmonary/Chest: Effort normal and breath sounds normal. No stridor. No respiratory distress. Patient has no wheezes, no rales, exhibits no tenderness.   Abdominal: Soft. Bowel sounds are normal. Patient exhibits no distension and no mass. There is no tenderness. There is no rebound and no guarding.   Lymphadenopathy:  Patient has no cervical adenopathy.   Neurological: Patient is alert and oriented to person, place, and time. Patient has normal reflexes. No cranial nerve deficit. Coordination normal.   Skin: Skin is warm and dry. No rash noted. Patient is not diaphoretic. No erythema. No pallor.    Results for orders placed or performed in visit on 03/01/17   Urinalysis-UC if Indicated   Result Value Ref Range    Color, UA Yellow Colorless, Yellow, Straw, Light Yellow    Clarity, UA Clear Clear    Glucose, UA Negative Negative    Bilirubin, UA Negative Negative    Ketones, UA Negative Negative    Specific Gravity, " UA >=1.030 1.005 - 1.030    Blood, UA Trace (!) Negative    pH, UA 5.5 5.0 - 8.0    Protein, UA Negative Negative mg/dL    Urobilinogen, UA 0.2 E.U./dL 0.2 E.U./dL, 1.0 E.U./dL    Nitrite, UA Negative Negative    Leukocytes, UA Negative Negative    Bacteria, UA Moderate (!) None Seen hpf    RBC, UA 0-2 None Seen, 0-2 hpf    WBC, UA 0-5 None Seen, 0-5 hpf    Squam Epithel, UA 10-25 (!) None Seen, 0-5 lpf    Mucus, UA Many (!) None Seen lpf       QUALITY MEASURES:  I have counseled the patient for tobacco cessation and the follow up will occur  at the next visit.    ADDITIONAL HISTORY SUMMARIZED (2): None.  DECISION TO OBTAIN EXTRA INFORMATION (1): None.   RADIOLOGY TESTS (1): None.  LABS (1): Ordered and reviewed labs today.  MEDICINE TESTS (1): None.  INDEPENDENT REVIEW (2 each): None.     The visit lasted a total of 25 minutes face to face with the patient. Over 50% of the time was spent counseling and educating the patient about her 's exam renewal.    I, Joanne De Leon, am scribing for and in the presence of, Dr. Cabral.    I, Dr. Cabral, personally performed the services described in this documentation, as scribed by Joanne De Leon in my presence, and it is both accurate and complete.    MEDICATIONS:  Current Outpatient Prescriptions   Medication Sig Dispense Refill     albuterol (PROAIR HFA) 90 mcg/actuation inhaler Inhale 2 puffs every 4 (four) hours as needed for shortness of breath. every 4-6 hours as needed. 8.5 g 2     atorvastatin (LIPITOR) 40 MG tablet Take 1 tablet (40 mg total) by mouth bedtime. 90 tablet 1     budesonide-formoterol (SYMBICORT) 160-4.5 mcg/actuation inhaler Inhale 2 puffs 2 (two) times a day. 3 Inhaler 3     diazePAM (VALIUM) 2 MG tablet Take 1 tablet (2 mg total) by mouth as needed for anxiety. prior to flying. 10 tablet 0     doxepin (SINEQUAN) 25 MG capsule Take 75 mg by mouth bedtime.        eletriptan (RELPAX) 40 MG tablet Take 1 tab at earliest  onset of migraine, may repeat in 2 hours if necessary 12 tablet 0     ergocalciferol (ERGOCALCIFEROL) 50,000 unit capsule Take 1 capsule (50,000 Units total) by mouth once a week. 13 capsule 3     esomeprazole (NEXIUM) 20 MG capsule Take 40 mg by mouth bedtime. Uses OTC strength       FLUoxetine (PROZAC) 20 MG capsule Take 60 mg by mouth daily.        HYDROcodone-acetaminophen 5-325 mg per tablet Take 1 tablet by mouth every 6 (six) hours as needed for pain. 30 tablet 0     hydrOXYzine (VISTARIL) 25 MG capsule Take 25 mg by mouth. Take 1-2 capsules every 6 hours as needed for migraine       ibuprofen (ADVIL,MOTRIN) 800 MG tablet Take 1 tablet (800 mg total) by mouth every 6 (six) hours as needed for pain. 50 tablet 0     ipratropium-albuterol (DUO-NEB) 0.5-2.5 mg/3 mL nebulizer Take 3 mL by nebulization every 4 (four) hours. 25 vial 2     lamoTRIgine (LAMICTAL) 200 MG tablet Take 200 mg by mouth bedtime.       levothyroxine (SYNTHROID, LEVOTHROID) 75 MCG tablet TK 1 T PO ONCE D  3     melatonin 5 mg Tab Take 5 mg by mouth bedtime.       montelukast (SINGULAIR) 10 mg tablet TAKE 1 TABLET BY MOUTH EVERY DAY 90 tablet 1     nicotine (NICODERM CQ) 14 mg/24 hr Place 1 patch on the skin daily. 30 patch 0     prazosin (MINIPRESS) 1 MG capsule Take 2 mg by mouth bedtime.        predniSONE (DELTASONE) 20 MG tablet Take 2 tabs by mouth daily for 5 days 10 tablet 0     prochlorperazine (COMPAZINE) 10 MG tablet Take 1 tablet (10 mg total) by mouth every 8 (eight) hours as needed. 30 tablet 1     senna-docusate (SENNOSIDES-DOCUSATE SODIUM) 8.6-50 mg tablet Take 3 tablets by mouth bedtime.       tiZANidine (ZANAFLEX) 4 MG tablet TAKE 2 TABLETS BY MOUTH AT BEDTIME AS NEEDED 60 tablet 0     tiZANidine (ZANAFLEX) 4 MG tablet TAKE 2 TABLETS BY MOUTH AT BEDTIME AS NEEDED 180 tablet 0     triamcinolone (KENALOG) 0.1 % cream APPLY TOPICALLY BID TO HANDS AND FEET FOR 2 TO 3 WEEKS  3     No current facility-administered medications for  this visit.        Total data points: 1

## 2021-06-09 NOTE — PROGRESS NOTES
Speech Language/Pathology  Videofluoroscopic Swallow Study       Problem:  Patient Active Problem List   Diagnosis     Hypothyroidism     Chronic Pain Syndrome     Hyperlipidemia     Walk Is Wobbly Or Unsteady (Ataxia)     Menopause Has Occurred     Bipolar II Disorder     Migraine Headache     Nicotine Dependence     Fibromyalgia     Menopausal Disorder     COPD (chronic obstructive pulmonary disease)     GERD (gastroesophageal reflux disease)     Hallux abductovalgus with bunions, unspecified laterality     Lung nodule     Hiatal hernia       Onset date: March 2017  Reason for evaluation: rule out oral pharyngeal dysphagia. The esophagram was performed with the radiologist following this swallow study.   Pertinent History: Pt complains of choking and describes more of a gag/regurgitation event and not food or liquid entering her airway. She reports multiple cervical surgeries, hardware is visible and intact on the fluoroscopic image.  Current Diet: regular  Baseline Diet: regular    Patient presents as alert and cooperative during this evaluation.   An  was not applicable    Patient was given puree, honey, nectar and thin.    Oral Phase:    Dentition/Oral hygiene: WFL    Bolus prep and oral control was not impaired. Mastication was not impaired and the patient used rotary chewing.    Anterior-Posterior transit was not impaired.    Premature spillage did not occur with any texture.    Tongue base retraction was not impaired.    Oral stasis did not occur with any texture.    Pharyngeal Phase:    Aspiration did not occur with any texture.     Laryngeal penetration did not occur with any texture.     Swallow response was timely with any texture. Pourover did not occur with any texture.     Epiglottic movement was complete and occasionally in a posterior-inferior patternconsistently across texture trials.    Pharyngeal stasis did not occur with any texture.     Pharyngeal constriction was not  impaired.    Hyolaryngeal elevation was not impaired. Hyolaryngeal excursion was not impaired.    Cricopharyngeal function was minimally prominent.    Assessment:    Patient demonstrated no oral and no pharyngeal dysphagia.    Patient is at minimal aspiration risk with all intake.      Recommendations:    Plan: Regular    See radiologists esophagram report.    Speech therapy is not recommended at this time    Referrals: N/A    25 dysphagia minutes    Ghulam Monzon MA, CCC-SLP

## 2021-06-09 NOTE — TELEPHONE ENCOUNTER
Medication Request  Medication name: Hydrocodone and compazine  Requested Pharmacy: [facility]  Reason for request: The patient states she had an appointment with Joanne Weiner MD which she was going to prescribe the patient 2 medications, Compazine and Hydrocodone. The patient states the pharmacy does not have the prescriptions.   When did you use medication last?:  N/A  Patient offered appointment:  N/A - electronic request  Okay to leave a detailed message: yes

## 2021-06-10 NOTE — TELEPHONE ENCOUNTER
No answer. Voicemail full.   Please read MRI results if patient is requesting. Dr Weiner wanted to know if she is ready to speak with a neurosurgeon. If yes, does she have a preference?

## 2021-06-10 NOTE — TELEPHONE ENCOUNTER
Refill Approved    Rx renewed per Medication Renewal Policy. Medication was last renewed on 1/28/20.    Lauren Banuelos, [facility] Triage/Med Refill 7/28/2020     Requested Prescriptions   Pending Prescriptions Disp Refills     atorvastatin (LIPITOR) 40 MG tablet [Pharmacy Med Name: ATORVASTATIN 40MG TABLETS] 90 tablet 1     Sig: TAKE 1 TABLET(40 MG) BY MOUTH AT BEDTIME       Statins Refill Protocol (Hmg CoA Reductase Inhibitors) Passed - 7/26/2020  3:04 PM        Passed - PCP or prescribing provider visit in past 12 months      Last office visit with prescriber/PCP: 7/21/2020 Joanne Weiner MD OR same dept: 7/21/2020 Joanne Weiner MD OR same specialty: 7/21/2020 Joanne Weiner MD  Last physical: 6/14/2019 Last MTM visit: Visit date not found   Next visit within 3 mo: Visit date not found  Next physical within 3 mo: Visit date not found  Prescriber OR PCP: Elroy) AVNI Weiner MD  Last diagnosis associated with med order: 1. Hyperlipidemia  - atorvastatin (LIPITOR) 40 MG tablet [Pharmacy Med Name: ATORVASTATIN 40MG TABLETS]; TAKE 1 TABLET(40 MG) BY MOUTH AT BEDTIME  Dispense: 90 tablet; Refill: 1    If protocol passes may refill for 12 months if within 3 months of last provider visit (or a total of 15 months).

## 2021-06-10 NOTE — TELEPHONE ENCOUNTER
Test Results  Who is calling?:  Patient   Who ordered the test:  Joanne Weiner MD  Type of test: Imaging  Date of test:  07/31/20  Where was the test performed:  [facility]   What are your questions/concerns?:  Patient is requesting foe results .  Okay to leave a detailed message?:  No

## 2021-06-10 NOTE — PROGRESS NOTES
SUBJECTIVE: Dayana Samaniego is a 56 y.o.  female who presents today  With a severe migraine headache. She has tried many medications and remedies for this. Recently she has been being seen at mail for Botox injections. She is trying to get a place to have it done closer to home. When she gets an uncontrolled migraine she comes in for the combo of Toradol IM, Benadryl and Compazine oral. She actually has the last two medications at home. Lately she's been having a lot of problems with her shoulder and is planning to see Dr. Barakat from [facility].    OBJECTIVE: /50  Pulse 72  Wt 152 lb 3.2 oz (69 kg)  BMI 26.13 kg/m2  General:  Normal weight middle-aged female wearing sunglasses, talking low  Heart:  Regular rate and rhythm without murmur  Lungs:  Clear bilaterally  Abdomen:  soft   neuro: no focal deficits noted    ASSESSMENT & PLAN:    1. Migraine  ketorolac injection 30 mg (TORADOL)   2. Shoulder pain        She was given 30 mg of Toradol IM today. She is to go home, take her Benadryl and Compazine, and go to bed. She will call if she does not improve.    Patient Active Problem List   Diagnosis     Hypothyroidism     Chronic Pain Syndrome     Hyperlipidemia     Walk Is Wobbly Or Unsteady (Ataxia)     Menopause Has Occurred     Bipolar II Disorder     Migraine Headache     Nicotine Dependence     Fibromyalgia     Menopausal Disorder     COPD (chronic obstructive pulmonary disease)     GERD (gastroesophageal reflux disease)     Hallux abductovalgus with bunions, unspecified laterality     Lung nodule     Hiatal hernia       Current Outpatient Prescriptions on File Prior to Visit   Medication Sig Dispense Refill     atorvastatin (LIPITOR) 20 MG tablet TAKE 1 TABLET BY MOUTH EVERY NIGHT AT BEDTIME 90 tablet 0     atorvastatin (LIPITOR) 40 MG tablet Take 1 tablet (40 mg total) by mouth bedtime. 90 tablet 1     budesonide-formoterol (SYMBICORT) 160-4.5 mcg/actuation inhaler Inhale 2 puffs 2  (two) times a day. 3 Inhaler 3     diazePAM (VALIUM) 2 MG tablet Take 1 tablet (2 mg total) by mouth as needed for anxiety. prior to flying. 10 tablet 0     doxepin (SINEQUAN) 25 MG capsule Take 75 mg by mouth bedtime.        eletriptan (RELPAX) 40 MG tablet Take 1 tab at earliest onset of migraine, may repeat in 2 hours if necessary 12 tablet 0     ergocalciferol (ERGOCALCIFEROL) 50,000 unit capsule Take 1 capsule (50,000 Units total) by mouth once a week. 13 capsule 3     esomeprazole (NEXIUM) 20 MG capsule Take 40 mg by mouth bedtime. Uses OTC strength       FLUoxetine (PROZAC) 20 MG capsule Take 60 mg by mouth daily.        HYDROcodone-acetaminophen 5-325 mg per tablet Take 1 tablet by mouth every 6 (six) hours as needed for pain. 30 tablet 0     hydrOXYzine (VISTARIL) 25 MG capsule Take 25 mg by mouth. Take 1-2 capsules every 6 hours as needed for migraine       ibuprofen (ADVIL,MOTRIN) 800 MG tablet Take 1 tablet (800 mg total) by mouth every 6 (six) hours as needed for pain. 50 tablet 0     ipratropium-albuterol (DUO-NEB) 0.5-2.5 mg/3 mL nebulizer Take 3 mL by nebulization every 4 (four) hours. 25 vial 2     lamoTRIgine (LAMICTAL) 200 MG tablet Take 200 mg by mouth bedtime.       levothyroxine (SYNTHROID, LEVOTHROID) 75 MCG tablet TK 1 T PO ONCE D  3     melatonin 5 mg Tab Take 5 mg by mouth bedtime.       montelukast (SINGULAIR) 10 mg tablet TAKE 1 TABLET BY MOUTH EVERY DAY 90 tablet 1     nicotine (NICODERM CQ) 14 mg/24 hr Place 1 patch on the skin daily. 30 patch 0     prazosin (MINIPRESS) 1 MG capsule Take 2 mg by mouth bedtime.        predniSONE (DELTASONE) 20 MG tablet Take 2 tabs by mouth daily for 5 days 10 tablet 0     prochlorperazine (COMPAZINE) 10 MG tablet Take 1 tablet (10 mg total) by mouth every 8 (eight) hours as needed. 30 tablet 1     senna-docusate (SENNOSIDES-DOCUSATE SODIUM) 8.6-50 mg tablet Take 3 tablets by mouth bedtime.       tiZANidine (ZANAFLEX) 4 MG tablet TAKE 2 TABLETS BY MOUTH  AT BEDTIME AS NEEDED 60 tablet 0     tiZANidine (ZANAFLEX) 4 MG tablet TAKE 2 TABLETS BY MOUTH AT BEDTIME AS NEEDED 180 tablet 0     triamcinolone (KENALOG) 0.1 % cream APPLY TOPICALLY BID TO HANDS AND FEET FOR 2 TO 3 WEEKS  3     albuterol (PROAIR HFA) 90 mcg/actuation inhaler Inhale 2 puffs every 4 (four) hours as needed for shortness of breath. every 4-6 hours as needed. 8.5 g 2     No current facility-administered medications on file prior to visit.

## 2021-06-10 NOTE — TELEPHONE ENCOUNTER
I have resulted this on [employer].  Asking if she wants referral to the neurosurgeon to discuss the findings. If pt can't see the result then print for P/U or mail.

## 2021-06-10 NOTE — PROGRESS NOTES
SUBJECTIVE: Dayana Samaniego is a 59 y.o. White or  female who presents today with complaint of severe back pain with sciatica on the left side from the buttock down into the thigh all the way to the heel.  She has had this before but this is the first time she has had it lately.  She is unsure what brought this on.  We discussed it might be the fact that she is picking up her granddaughter on a regular basis and her granddaughter is starting to get big.  She has been seen for this before but has not had anything done recently including an MRI.  She would like me to order 1.  She describes this pain as shooting.  This also has brought on migraine headache for her.  She has a history of chronic pain disorder and at one time we had a controlled substance agreement, but it has not been renewed since September 2018 because she had not been refilling it very often.  It is for only 30 Vicodin at 5 mg per 30 days.  In fact her last refill was January 27 of this year.  I am somewhat weary of her wanting to use this for headache and so I have told her she really needs to limit the use of this medication.  I do not want her to use it as much as would require a controlled substance agreement.  She has a history of bipolar disorder and has been in the care of Dr. Zeng through [facility] and a therapist as well whom she sees regularly.  In fact there is lab work that needs to be done for them because she is on doxepin.  Her PHQ 9 score today is 17.  Her DAWSON 7 score is 11.  She is also on Abilify 60 mg of fluoxetine Vistaril Lamictal and diazepam but only for flying.  She has been a longtime smoker and sees Dr. Bradford for her emphysema.  She had her last low-dose CT lung cancer screening in March of this year.  She has hyperlipidemia which she had not been taking her statin for and recently had an LDL as high as 250.  Her last LDL was done more recently and was 112.  She has also had a bump in her creatinine when I last  "saw her.  This needs to be followed up on.  She needs a thyroid check so that meds can be refilled.  She is also in need of a mammogram and a DEXA scan.    OBJECTIVE: /72   Pulse 100   Ht 5' 5\" (1.651 m)   Wt 160 lb (72.6 kg)   SpO2 95%   Breastfeeding No   BMI 26.63 kg/m    General: Relatively healthy-appearing normal weight older female in no acute distress  Heart: Regular rate and rhythm without murmur  Lungs: Clear with decreased breath sounds bilaterally  Abdomen: Soft, nontender  Extremities: Warm, dry and without edema  Psych: Mood seems completely normal to me    ASSESSMENT & PLAN:     1. Left lumbar radiculopathy  Patient is having sciatica type symptoms and would like to have an MRI of the lumbar spine.  It has not been done for quite some time.  - MR Lumbar Spine Without Contrast; Future    2. Migraine  Patient would like a refill of her 30 tabs of low-dose Vicodin.  - HYDROcodone-acetaminophen 5-325 mg per tablet; Take 1 tablet by mouth every 6 (six) hours as needed for pain.  Dispense: 30 tablet; Refill: 0    3. Chronic pain syndrome  I am trying to stay away from having the need to have a controlled substance agreement again.    4. Nausea  Patient requests a refill of her Compazine which she needs when she uses a morphine derivative.  - prochlorperazine (COMPAZINE) 5 MG tablet; Take 1 tablet (5 mg total) by mouth every 8 (eight) hours as needed for nausea.  Dispense: 30 tablet; Refill: 0    5. Hypothyroidism, unspecified type  Needs monitoring for refill.  - Thyroid Cascade    6. Hyperlipidemia, unspecified hyperlipidemia type  Needs monitoring as last LDL was mildly elevated at 112.  - Lipid Cascade RANDOM    7. Bipolar II Disorder  Sees outside specialist and needs labs done.  - Basic Metabolic Panel  - Doxepin(Sinequan )and Nordoxepin    8. Nightmares associated with chronic post-traumatic stress disorder  - Doxepin(Sinequan )and Nordoxepin    9. Encounter for long-term (current) use of " medications  - Doxepin(Sinequan )and Nordoxepin    10. Centrilobular emphysema (H)  Sees Dr. Bradford and had recent negative screening low-dose CT for lung cancer.    11. Cigarette nicotine dependence without complication  Continues to smoke and is pre-contemplative at this point.    12. Encounter for screening mammogram for breast cancer  - Mammo Screening Bilateral; Future    13. Postmenopausal status  - DXA Bone Density Scan; Future    Patient can have her mammogram and DEXA scan scheduled together.  I will do get back to her on her lab results by my chart and only call with grossly abnormal values.  Her Vicodin and Compazine were refilled.  I will get back to her on her MRI result when done.  She can follow-up in 6 months time.    40 minutes spent together with the patient today, more than 50% spent in counseling, discussing the above topics.        Patient Active Problem List   Diagnosis     Hypothyroidism     Chronic Pain Syndrome     Hyperlipidemia     Walk Is Wobbly Or Unsteady (Ataxia)     Menopause Has Occurred     Bipolar II Disorder     Migraine Headache     Nicotine Dependence     Fibromyalgia     Menopausal Disorder     GERD (gastroesophageal reflux disease)     Hallux abductovalgus with bunions, unspecified laterality     Lung nodule     Hiatal hernia     Centrilobular emphysema (H)     Cigarette nicotine dependence without complication       Current Outpatient Medications on File Prior to Visit   Medication Sig Dispense Refill     ARIPiprazole (ABILIFY) 2 MG tablet TK ONE T PO ONCE D  1     atorvastatin (LIPITOR) 40 MG tablet TAKE 1 TABLET(40 MG) BY MOUTH AT BEDTIME 90 tablet 1     doxepin (SINEQUAN) 25 MG capsule Take 3 capsules (75 mg total) by mouth at bedtime. 270 capsule 1     ergocalciferol (ERGOCALCIFEROL) 1,250 mcg (50,000 unit) capsule TAKE 1 CAPSULE BY MOUTH 1 TIME A WEEK 13 capsule 3     esomeprazole (NEXIUM) 20 MG capsule Take 40 mg by mouth bedtime. Uses OTC strength       FLUoxetine  (PROZAC) 20 MG capsule Take 3 capsules (60 mg total) by mouth daily. (Patient taking differently: Take 80 mg by mouth daily.       ) 270 capsule 1     folic acid (FOLVITE) 800 MCG tablet Take by mouth.       hydrOXYzine pamoate (VISTARIL) 25 MG capsule Take 1 capsule (25 mg total) by mouth 3 (three) times a day as needed for itching. Take 1-2 capsules every 6 hours as needed for migraine 90 capsule 1     lamoTRIgine (LAMICTAL) 200 MG tablet Take 200 mg by mouth bedtime.       levothyroxine (SYNTHROID, LEVOTHROID) 75 MCG tablet TAKE 1 TABLET(75 MCG) BY MOUTH DAILY 90 tablet 2     prazosin (MINIPRESS) 1 MG capsule Take 4 mg by mouth at bedtime.              senna-docusate (SENNOSIDES-DOCUSATE SODIUM) 8.6-50 mg tablet Take 3 tablets by mouth bedtime.       tiZANidine (ZANAFLEX) 4 MG tablet TAKE 1-2 TABLETS BY MOUTH DURING THE DAY AND 2 TABLETS EVERY NIGHT AT BEDTIME 120 tablet 0     varenicline (CHANTIX) 1 mg tablet TAKE 1 TABLET BY MOUTH TWICE DAILY AFTER EATING WITH A FULL GLASS OF WATER 60 tablet 3     AIMOVIG AUTOINJECTOR 70 mg/mL atIn INJECT ONE DOSE ONCE A MONTH       albuterol (PROAIR HFA) 90 mcg/actuation inhaler every 4-6 hours as needed. 8.5 g 11     diazePAM (VALIUM) 2 MG tablet Take 1 tablet (2 mg total) by mouth as needed for anxiety (PRIOR TO FLYING). 10 tablet 0     eletriptan (RELPAX) 40 MG tablet Take 1 tab at earliest onset of migraine, may repeat in 2 hours if necessary 12 tablet 2     melatonin 5 mg Tab Take 5 mg by mouth bedtime.       montelukast (SINGULAIR) 10 mg tablet TAKE 1 TABLET(10 MG) BY MOUTH DAILY 90 tablet 3     No current facility-administered medications on file prior to visit.

## 2021-06-11 NOTE — TELEPHONE ENCOUNTER
RN cannot approve Refill Request    RN can NOT refill this medication med is not covered by policy/route to provider. Last office visit: 7/21/2020 Joanne Weiner MD Last Physical: 6/14/2019 Last MTM visit: Visit date not found Last visit same specialty: 7/21/2020 Joanne Weiner MD.  Next visit within 3 mo: Visit date not found  Next physical within 3 mo: Visit date not found      Kendy Shah, [facility] Triage/Med Refill 9/10/2020    Requested Prescriptions   Pending Prescriptions Disp Refills     varenicline (CHANTIX) 1 mg tablet [Pharmacy Med Name: CHANTIX 1MG TABLETS] 60 tablet 3     Sig: TAKE 1 TABLET BY MOUTH TWICE DAILY AFTER EATING WITH A FULL GLASS OF WATER       Varenicline Refill Protocol Failed - 9/9/2020  3:25 AM        Failed - Normal Serum Creatinine in past 12 months      Creatinine   Date Value Ref Range Status   07/21/2020 1.20 (H) 0.60 - 1.10 mg/dL Final             Passed - PCP or prescribing provider visit in last 12 or next 3 months.     Last office visit with prescriber/PCP: 7/21/2020 Joanne Weiner MD OR same dept: 7/21/2020 Joanne Weiner MD  Last physical: 6/14/2019       Next visit within 3 mo: Visit date not found  Next physical within 3 mo: Visit date not found  Prescriber OR PCP: Joanne Weiner MD ([facility])  Last diagnosis associated with med order: 1. Nicotine Dependence  - CHANTIX 1 mg tablet [Pharmacy Med Name: CHANTIX 1MG TABLETS]; TAKE 1 TABLET BY MOUTH TWICE DAILY AFTER EATING WITH A FULL GLASS OF WATER  Dispense: 60 tablet; Refill: 3     Requested Prescriptions     Pending Prescriptions Disp Refills     varenicline (CHANTIX) 1 mg tablet [Pharmacy Med Name: CHANTIX 1MG TABLETS] 60 tablet 3     Sig: TAKE 1 TABLET BY MOUTH TWICE DAILY AFTER EATING WITH A FULL GLASS OF WATER     May refill for 3 months if protocol passes.

## 2021-06-11 NOTE — TELEPHONE ENCOUNTER
Orders being requested: MRI  Reason service is needed/diagnosis: Pt states provider knows what she is referring to. Pt states Spinal provider wants primary to order. Please advise.  When are orders needed by: ASAP  Where to send Orders: Epic  Okay to leave detailed message?  No

## 2021-06-11 NOTE — TELEPHONE ENCOUNTER
Patient Returning Call  Reason for call:  Returning phone call.  Information relayed to patient:  Below message relayed to patient. Patient stated he is being treated by Dr. Sanon at [facility].  Patient has additional questions:  No  If YES, what are your questions/concerns:  No additional questions at this time.  Okay to leave a detailed message?: No call back needed

## 2021-06-11 NOTE — TELEPHONE ENCOUNTER
Who is calling:  Patient  Reason for Call:    Patient was in ED on 9/10/2020.  Patient wanted Provider to know and review ED notes.  Patient was prescribed 20 tablets of Vicodin.  If Provider has and questions, please call patient at: [phone]  Date of last appointment with primary care: 7/21/2020  Okay to leave a detailed message: Yes

## 2021-06-12 NOTE — PROGRESS NOTES
[facility]  [address]  [place] [facility] [address]  Dept: [phone]  Dept Fax: [phone]  Primary Provider: Joanne Weiner MD  Pre-op Performing Provider: FOUZIA HUTSON    PREOPERATIVE EVALUATION:  Today's date: 10/16/2020    Dayana Samaniego is a 59 y.o. female who presents for a preoperative evaluation.    Surgical Information:  Surgery/Procedure: Tendon Repair L wrist  [facility] [phone]  Surgeon: Dr. Zazueta  Surgery Date: 10/21/2020  Time of Surgery: 945 am  Where patient plans to recover: At home with family  Fax number for surgical facility: [phone]    Type of Anesthesia Anticipated: to be determined    Subjective     HPI related to upcoming procedure: Presents today for preop physical examination.  On September 10, she was at her sister's place standing in the stable where the horses are housed.  One of the horses charged her, ran into her and she flew about 12 feet ultimately resulting in a left distal radial fracture of the risk and tendon rupture.  She has been wearing a cast for immobilization since September 15.  She continues to have pain in the left wrist and she is had significant decrease in range of motion with her wrist and left thumb.  She describes it as a constant dull ache that is aggravated anytime she tries to grab something or do any sort of activity.  Remaining immobile has been the best for pain relief along with her as needed hydrocodone that she gets from her PCP for migraine and back issues.  She rating her pain today a 4 out of 10.  She uses medication sparingly.  Review of [place]  completed. Declined flu vaccine.   Preop Questions 10/16/2020   Have you ever had a heart attack or stroke? No   Have you ever had surgery on your heart or blood vessels, such as a stent placement, a coronary artery bypass, or surgery on an artery in your head, neck,  heart, or legs? No   Do you have chest pain with activity? No   Do you have a history of  heart failure? No   Do you currently have a cold, bronchitis or symptoms of other infection? No   Do you have a cough, shortness of breath, or wheezing? No   Do you or anyone in your family have previous history of blood clots? No   Do you or does anyone in your family have a serious bleeding problem such as prolonged bleeding following surgeries or cuts? No   Have you ever had problems with anemia or been told to take iron pills? No   Have you had any abnormal blood loss such as black, tarry or bloody stools, or abnormal vaginal bleeding? No   Have you ever had a blood transfusion? No   Are you willing to have a blood transfusion if it is medically needed before, during, or after your surgery? Yes   Have you or any of your relatives ever had problems with anesthesia? YES -    Do you have sleep apnea, excessive snoring or daytime drowsiness? No   Do you have any artifical heart valves or other implanted medical devices like a pacemaker, defibrillator, or continuous glucose monitor? No   Do you have artificial joints? No   Are you allergic to latex? No   Is there any chance that you may be pregnant? No     Health Care Directive:  Patient does not have a Health Care Directive or Living Will: Discussed advance care planning with patient; information given to patient to review.    Preoperative Review of :    reviewed - controlled substances reflected in medication list.    HYPERLIPIDEMIA - Patient has a long history of significant Hyperlipidemia requiring medication for treatment with recent good control. Patient reports no problems or side effects with the medication.     HYPOTHYROIDISM - Patient has a longstanding history of chronic Hypothyroidism. Patient has been doing well, noting no tremor, insomnia, hair loss or changes in skin texture. Continues to take medications as directed, without adverse reactions or side  effects. Last TSH   Lab Results   Component Value Date    TSH 2.62 07/21/2020   .        Review of Systems  CONSTITUTIONAL: NEGATIVE for fever, chills, change in weight  INTEGUMENTARY/SKIN: NEGATIVE for worrisome rashes, moles or lesions  EYES: NEGATIVE for vision changes or irritation  ENT/MOUTH: NEGATIVE for ear, mouth and throat problems  RESP: NEGATIVE for significant cough or SOB  BREAST: NEGATIVE for masses, tenderness or discharge  CV: NEGATIVE for chest pain, palpitations or peripheral edema  GI: NEGATIVE for nausea, abdominal pain, heartburn, or change in bowel habits  : NEGATIVE for frequency, dysuria, or hematuria  MUSCULOSKELETAL: NEGATIVE for significant arthralgias or myalgia  NEURO: NEGATIVE for weakness, dizziness or paresthesias  ENDOCRINE: NEGATIVE for temperature intolerance, skin/hair changes  HEME: NEGATIVE for bleeding problems  PSYCHIATRIC: NEGATIVE for changes in mood or affect    Patient Active Problem List    Diagnosis Date Noted     Osteopenia of multiple sites 09/01/2020     Cigarette nicotine dependence without complication 03/04/2020     Centrilobular emphysema (H) 02/26/2018     Hiatal hernia 02/16/2017     Lung nodule 08/04/2016     Hallux abductovalgus with bunions, unspecified laterality 12/14/2015     GERD (gastroesophageal reflux disease) 02/16/2015     Menopausal Disorder      Hypothyroidism      Chronic Pain Syndrome      Hyperlipidemia      Walk Is Wobbly Or Unsteady (Ataxia)      Menopause Has Occurred      Bipolar II Disorder      Migraine Headache      Nicotine Dependence      Fibromyalgia      Past Medical History:   Diagnosis Date     Anxiety      Asthma      Bipolar 2 disorder (H)      Cervical spinal stenosis     s/p cervical fusion     Chronic kidney disease      Chronic pain syndrome      COPD (chronic obstructive pulmonary disease) (H)      Depression      Fibrocystic breast      Fibromyalgia      GERD (gastroesophageal reflux disease)      Herpes Zoster (Shingles)      Created by Conversion  Replacement Utility updated for latest IMO load     Hiatal hernia      History of electroconvulsive therapy      Hypothyroidism     hypothyroidism     IBS (irritable bowel syndrome)      Migraines      Osteoarthritis      PONV (postoperative nausea and vomiting)      PTSD (post-traumatic stress disorder)      Shingles 2014    on back     Past Surgical History:   Procedure Laterality Date     AUGMENTATION MAMMAPLASTY Bilateral      approx 2006?     BREAST BIOPSY Left     Approx 5 bx     BUNIONECTOMY Right 12/15/2015    Procedure: MODIFIED LAI BUNIONECTOMY RIGHT FOOT;  Surgeon: Jerome Calvin DPM;  Location: [facility];  Service:      CERVICAL FUSION       CERVICAL FUSION Bilateral 2/16/2015    Procedure: ANTERIOR CERVICAL DECOMPRESSION/FUSION C3-5 BILATERAL, ANTERIOR HARDWARE REMOVAL C5-7 BILATERAL ;  Surgeon: Sawyer Roland MD;  Location: [facility];  Service:      HYSTERECTOMY       LUMBAR DISCECTOMY      X2     PELVIC LAPAROSCOPY      multiple     [place] NIPPLE EXPLORATION      Description: Breast Nipple Explor W/ Excision Solitary Lactiferous Duct;  Recorded: 04/10/2008;     AZ TOTAL ABDOM HYSTERECTOMY      Description: Total Abdominal Hysterectomy;  Recorded: 06/10/2013;     SHOULDER OPEN ROTATOR CUFF REPAIR Left     X2     Current Outpatient Medications   Medication Sig Dispense Refill     AIMOVIG AUTOINJECTOR 70 mg/mL atIn INJECT ONE DOSE ONCE A MONTH       albuterol (PROAIR HFA) 90 mcg/actuation inhaler INHALE 1 PUFF BY MOUTH EVERY 4-6 HOURS AS NEEDED. 8.5 g 11     ARIPiprazole (ABILIFY) 2 MG tablet TK ONE T PO ONCE D  1     atorvastatin (LIPITOR) 40 MG tablet TAKE 1 TABLET(40 MG) BY MOUTH AT BEDTIME 90 tablet 3     diazePAM (VALIUM) 2 MG tablet Take 1 tablet (2 mg total) by mouth as needed for anxiety (PRIOR TO FLYING). 10 tablet 0     diazePAM (VALIUM) 5 MG tablet Take 1-2 tablets (5-10 mg total) by mouth once in imaging for anxiety. Fill at preferred  pharmacy and bring to MRI appointment. Do not take prior to arriving. You will need a  to bring you home after your appointment. 2 tablet 0     doxepin (SINEQUAN) 25 MG capsule Take 3 capsules (75 mg total) by mouth at bedtime. 270 capsule 1     eletriptan (RELPAX) 40 MG tablet Take 1 tab at earliest onset of migraine, may repeat in 2 hours if necessary 12 tablet 2     ergocalciferol (ERGOCALCIFEROL) 1,250 mcg (50,000 unit) capsule TAKE 1 CAPSULE BY MOUTH 1 TIME A WEEK 13 capsule 3     esomeprazole (NEXIUM) 20 MG capsule Take 40 mg by mouth bedtime. Uses OTC strength       FLUoxetine (PROZAC) 20 MG capsule Take 3 capsules (60 mg total) by mouth daily. (Patient taking differently: Take 80 mg by mouth daily.       ) 270 capsule 1     folic acid (FOLVITE) 800 MCG tablet Take by mouth.       HYDROcodone-acetaminophen 5-325 mg per tablet Take 1 tablet by mouth every 4 (four) hours as needed for pain. 20 tablet 0     hydrOXYzine pamoate (VISTARIL) 25 MG capsule Take 1 capsule (25 mg total) by mouth 3 (three) times a day as needed for itching. Take 1-2 capsules every 6 hours as needed for migraine 90 capsule 1     lamoTRIgine (LAMICTAL) 200 MG tablet Take 200 mg by mouth bedtime.       levothyroxine (SYNTHROID, LEVOTHROID) 75 MCG tablet TAKE 1 TABLET(75 MCG) BY MOUTH DAILY 90 tablet 2     melatonin 5 mg Tab Take 5 mg by mouth bedtime.       montelukast (SINGULAIR) 10 mg tablet TAKE 1 TABLET(10 MG) BY MOUTH DAILY 90 tablet 3     prazosin (MINIPRESS) 1 MG capsule Take 4 mg by mouth at bedtime.              prochlorperazine (COMPAZINE) 5 MG tablet Take 1 tablet (5 mg total) by mouth every 8 (eight) hours as needed for nausea. 30 tablet 0     senna-docusate (SENNOSIDES-DOCUSATE SODIUM) 8.6-50 mg tablet Take 3 tablets by mouth bedtime.       tiZANidine (ZANAFLEX) 4 MG tablet TAKE 1-2 TABLETS BY MOUTH DURING THE DAY AND 2 TABLETS EVERY NIGHT AT BEDTIME 120 tablet 0     varenicline (CHANTIX) 1 mg tablet TAKE 1 TABLET BY  "MOUTH TWICE DAILY AFTER EATING WITH A FULL GLASS OF WATER 60 tablet 3     No current facility-administered medications for this visit.        Allergies   Allergen Reactions     Codeine Anaphylaxis     Cetirizine Nausea And Vomiting     Nefazodone Nausea And Vomiting     Oxycodone-Acetaminophen      hallucinations     Trazodone Nausea And Vomiting     Amoxicillin-Pot Clavulanate Rash     Cephalexin Rash     Cyclobenzaprine Rash     Eszopiclone Rash     Methocarbamol Rash     Penicillins Rash     Mild rash       Social History     Tobacco Use     Smoking status: Light Tobacco Smoker     Packs/day: 1.00     Years: 45.00     Pack years: 45.00     Types: Cigarettes     Smokeless tobacco: Former User     Tobacco comment: 4 CIG DAILY    Substance Use Topics     Alcohol use: Yes     Comment: rare--1-2 times in year      Family History   Problem Relation Age of Onset     Lung cancer Mother          at age 52     Alcohol abuse Father      Heart disease Maternal Grandfather      Diabetes Paternal Grandmother      Heart attack Paternal Grandmother      Heart disease Paternal Grandfather      Diabetes Sister      Hypertension Sister      Crohn's disease Sister      Heart attack Paternal Uncle      Asthma Maternal Aunt      Social History     Substance and Sexual Activity   Drug Use No        Objective     /64   Pulse 77   Resp 16   Ht 5' 4.75\" (1.645 m)   Wt 154 lb 6 oz (70 kg)   SpO2 97%   BMI 25.89 kg/m    Physical Exam    GENERAL APPEARANCE: healthy, alert and no distress     EYES: EOMI, PERRL     HENT: ear canals and TM's normal and nose and mouth without ulcers or lesions     NECK: no adenopathy, no asymmetry, masses, or scars and thyroid normal to palpation     RESP: lungs clear to auscultation - no rales, rhonchi or wheezes     CV: regular rates and rhythm, normal S1 S2, no S3 or S4 and no murmur, click or rub, no LE swelling     ABDOMEN:  soft, nontender, no HSM or masses and bowel sounds normal     MS: " Left wrist cast in place, limited ROM noted with left thumb     SKIN: no suspicious lesions or rashes, warm and dry     NEURO: Normal strength and tone, sensory exam grossly normal, mentation intact and speech normal     PSYCH: mentation appears normal. and affect normal/bright     LYMPHATICS: No cervical adenopathy    Recent Labs   Lab Test 07/21/20  1412 03/09/20  1025 06/14/19  1511 06/14/19  1511   HGB  --   --   --  13.1   PLT  --   --   --  296    141   < > 141   K 4.0 4.1   < > 4.0   CREATININE 1.20* 1.11*   < > 1.06    < > = values in this interval not displayed.        PRE-OP Diagnostics:   Recent Results (from the past 168 hour(s))   Basic Metabolic Panel    Collection Time: 10/16/20  1:25 PM   Result Value Ref Range    Sodium 141 136 - 145 mmol/L    Potassium 4.1 3.5 - 5.0 mmol/L    Chloride 103 98 - 107 mmol/L    CO2 27 22 - 31 mmol/L    Anion Gap, Calculation 11 5 - 18 mmol/L    Glucose 117 70 - 125 mg/dL    Calcium 9.4 8.5 - 10.5 mg/dL    BUN 15 8 - 22 mg/dL    Creatinine 1.11 (H) 0.60 - 1.10 mg/dL    GFR MDRD Af Amer >60 >60 mL/min/1.73m2    GFR MDRD Non Af Amer 50 (L) >60 mL/min/1.73m2   Hemoglobin    Collection Time: 10/16/20  1:25 PM   Result Value Ref Range    Hemoglobin 14.5 12.0 - 16.0 g/dL     No EKG required, no history of coronary heart disease, significant arrhythmia, peripheral arterial disease or other structural heart disease.    REVISED CARDIAC RISK INDEX (RCRI)   The patient has the following serious cardiovascular risks for perioperative complications:   - No serious cardiac risks = 0 points    RCRI INTERPRETATION: 0 points: Class I (very low risk - 0.4% complication rate)         Assessment & Plan      The proposed surgical procedure is considered INTERMEDIATE risk.    1. Preoperative examination    - Basic Metabolic Panel  - Hemoglobin    2. Closed fracture of left wrist with routine healing, subsequent encounter    - Basic Metabolic Panel  - Hemoglobin  -  HYDROcodone-acetaminophen 5-325 mg per tablet; Take 1 tablet by mouth every 4 (four) hours as needed for pain.  Dispense: 20 tablet; Refill: 0  [place]  reviewed, last fill from PCP was in July, she was given 20 tablets from there ER on 9/10/2020 which was the day of injury.  Patient instructed to rest, ice the affected area several times per day for 10 minutes at a time, may use compression wrap to the area if pain and swelling occur and elevated the affected area whenever patient is able. May take Tylenol 1000 mg four times per day or Ibuprofen 800 mg three times daily for pain and inflammation.          Risks and Recommendations:  The patient has the following additional risks and recommendations for perioperative complications:   - No identified additional risk factors other than previously addressed        RECOMMENDATION:  APPROVAL GIVEN to proceed with proposed procedure, without further diagnostic evaluation.    Signed Electronically by: Marah Rodriguez NP    Copy of this evaluation report is provided to requesting physician.    [facility] Pre[facility] Guidelines    Revised Cardiac Risk Index

## 2021-06-12 NOTE — PROGRESS NOTES
Assessment:   1. Thumb injury, right, initial encounter  Likely secondary to thumb sprain.  I applied Ace wrap to affected thumb and instruction was given to patient how to care for sprains.  - XR Finger Right 2 or More VWS; Future     Plan:   Natural history and expected course discussed. Questions answered.  Rest, ice, compression, and elevation (RICE) therapy.  Reduction in offending activity discussed.  Dorsal finger splinting.  Work restriction.  Plain film x-rays per orders.  NSAIDs per medication orders.  Follow up in 2 weeks.     Subjective:   Dayana Samaniego is a 56 y.o. female who presents for evaluation of right thumb injury and pain. Onset was yesterday, she reported that she was carrying a plastic container into the garage, suddenly she tripped and she fell landing on her right thumb.. The pain is moderate, worsens with movement, and is relieved by nothing. There is associated redness and swelling in thumb. Evaluation to date: none. Treatment to date: nothing specific.    The following portions of the patient's history were reviewed and updated as appropriate:   She  has a past medical history of Anxiety; Asthma; Bipolar 2 disorder; Cervical spinal stenosis; Chronic kidney disease; Chronic pain syndrome; COPD (chronic obstructive pulmonary disease); Depression; Fibrocystic breast; Fibromyalgia; GERD (gastroesophageal reflux disease); Herpes Zoster (Shingles); Hiatal hernia; History of electroconvulsive therapy; Hypothyroidism; IBS (irritable bowel syndrome); Migraines; Osteoarthritis; PONV (postoperative nausea and vomiting); PTSD (post-traumatic stress disorder); and Shingles (2014).  She  does not have any pertinent problems on file.  She  has a past surgical history that includes nipple exploration; total abdom hysterectomy; Lumbar discectomy; Cervical fusion; Shoulder open rotator cuff repair (Left); Augmentation mammaplasty; Breast biopsy (Left); Hysterectomy; Pelvic laparoscopy; Cervical fusion  (Bilateral, 2/16/2015); and Bunionectomy (Right, 12/15/2015).  Her family history includes Alcohol abuse in her father; Asthma in her maternal aunt; Crohn's disease in her sister; Diabetes in her paternal grandmother and sister; Heart attack in her paternal grandmother and paternal uncle; Heart disease in her maternal grandfather and paternal grandfather; Hypertension in her sister; Lung cancer in her mother.  She  reports that she has been smoking Cigarettes.  She has a 45.00 pack-year smoking history. She uses smokeless tobacco. She reports that she drinks alcohol. She reports that she does not use illicit drugs.  Current Outpatient Prescriptions   Medication Sig Dispense Refill     albuterol (PROAIR HFA) 90 mcg/actuation inhaler Inhale 2 puffs every 4 (four) hours as needed for shortness of breath. every 4-6 hours as needed. 8.5 g 2     atorvastatin (LIPITOR) 40 MG tablet Take 1 tablet (40 mg total) by mouth bedtime. 90 tablet 1     budesonide-formoterol (SYMBICORT) 160-4.5 mcg/actuation inhaler Inhale 2 puffs 2 (two) times a day. 3 Inhaler 3     doxepin (SINEQUAN) 25 MG capsule Take 75 mg by mouth bedtime.        eletriptan (RELPAX) 40 MG tablet Take 1 tab at earliest onset of migraine, may repeat in 2 hours if necessary 12 tablet 0     ergocalciferol (ERGOCALCIFEROL) 50,000 unit capsule Take 1 capsule (50,000 Units total) by mouth once a week. 13 capsule 3     esomeprazole (NEXIUM) 20 MG capsule Take 40 mg by mouth bedtime. Uses OTC strength       FLUoxetine (PROZAC) 20 MG capsule Take 60 mg by mouth daily.        HYDROcodone-acetaminophen 5-325 mg per tablet Take 1 tablet by mouth every 6 (six) hours as needed for pain. 30 tablet 0     hydrOXYzine (VISTARIL) 25 MG capsule Take 25 mg by mouth. Take 1-2 capsules every 6 hours as needed for migraine       ipratropium-albuterol (DUO-NEB) 0.5-2.5 mg/3 mL nebulizer Take 3 mL by nebulization every 4 (four) hours. 25 vial 2     lamoTRIgine (LAMICTAL) 200 MG tablet  Take 200 mg by mouth bedtime.       levothyroxine (SYNTHROID, LEVOTHROID) 75 MCG tablet TK 1 T PO ONCE D  3     melatonin 5 mg Tab Take 5 mg by mouth bedtime.       montelukast (SINGULAIR) 10 mg tablet TAKE 1 TABLET BY MOUTH EVERY DAY 90 tablet 3     prazosin (MINIPRESS) 1 MG capsule Take 2 mg by mouth bedtime.        prochlorperazine (COMPAZINE) 10 MG tablet Take 1 tablet (10 mg total) by mouth every 8 (eight) hours as needed. 30 tablet 1     senna-docusate (SENNOSIDES-DOCUSATE SODIUM) 8.6-50 mg tablet Take 3 tablets by mouth bedtime.       triamcinolone (KENALOG) 0.1 % cream APPLY TOPICALLY BID TO HANDS AND FEET FOR 2 TO 3 WEEKS  3     atorvastatin (LIPITOR) 20 MG tablet TAKE 1 TABLET BY MOUTH EVERY NIGHT AT BEDTIME 90 tablet 0     diazePAM (VALIUM) 2 MG tablet Take 1 tablet (2 mg total) by mouth as needed for anxiety (PRIOR TO FLYING). 10 tablet 0     ibuprofen (ADVIL,MOTRIN) 800 MG tablet Take 1 tablet (800 mg total) by mouth every 6 (six) hours as needed for pain. 50 tablet 0     nicotine (NICODERM CQ) 14 mg/24 hr Place 1 patch on the skin daily. 30 patch 0     predniSONE (DELTASONE) 20 MG tablet Take 2 tabs by mouth daily for 5 days 10 tablet 0     tiZANidine (ZANAFLEX) 4 MG tablet TAKE 2 TABLETS BY MOUTH AT BEDTIME AS NEEDED 60 tablet 0     tiZANidine (ZANAFLEX) 4 MG tablet TAKE 2 TABLETS BY MOUTH AT BEDTIME AS NEEDED 180 tablet 0     tiZANidine (ZANAFLEX) 4 MG tablet TAKE 2 TABLETS BY MOUTH AT BEDTIME AS NEEDED 180 tablet 0     No current facility-administered medications for this visit.      Current Outpatient Prescriptions on File Prior to Visit   Medication Sig     albuterol (PROAIR HFA) 90 mcg/actuation inhaler Inhale 2 puffs every 4 (four) hours as needed for shortness of breath. every 4-6 hours as needed.     atorvastatin (LIPITOR) 40 MG tablet Take 1 tablet (40 mg total) by mouth bedtime.     budesonide-formoterol (SYMBICORT) 160-4.5 mcg/actuation inhaler Inhale 2 puffs 2 (two) times a day.      doxepin (SINEQUAN) 25 MG capsule Take 75 mg by mouth bedtime.      eletriptan (RELPAX) 40 MG tablet Take 1 tab at earliest onset of migraine, may repeat in 2 hours if necessary     ergocalciferol (ERGOCALCIFEROL) 50,000 unit capsule Take 1 capsule (50,000 Units total) by mouth once a week.     esomeprazole (NEXIUM) 20 MG capsule Take 40 mg by mouth bedtime. Uses OTC strength     FLUoxetine (PROZAC) 20 MG capsule Take 60 mg by mouth daily.      HYDROcodone-acetaminophen 5-325 mg per tablet Take 1 tablet by mouth every 6 (six) hours as needed for pain.     hydrOXYzine (VISTARIL) 25 MG capsule Take 25 mg by mouth. Take 1-2 capsules every 6 hours as needed for migraine     ipratropium-albuterol (DUO-NEB) 0.5-2.5 mg/3 mL nebulizer Take 3 mL by nebulization every 4 (four) hours.     lamoTRIgine (LAMICTAL) 200 MG tablet Take 200 mg by mouth bedtime.     levothyroxine (SYNTHROID, LEVOTHROID) 75 MCG tablet TK 1 T PO ONCE D     melatonin 5 mg Tab Take 5 mg by mouth bedtime.     montelukast (SINGULAIR) 10 mg tablet TAKE 1 TABLET BY MOUTH EVERY DAY     prazosin (MINIPRESS) 1 MG capsule Take 2 mg by mouth bedtime.      prochlorperazine (COMPAZINE) 10 MG tablet Take 1 tablet (10 mg total) by mouth every 8 (eight) hours as needed.     senna-docusate (SENNOSIDES-DOCUSATE SODIUM) 8.6-50 mg tablet Take 3 tablets by mouth bedtime.     triamcinolone (KENALOG) 0.1 % cream APPLY TOPICALLY BID TO HANDS AND FEET FOR 2 TO 3 WEEKS     atorvastatin (LIPITOR) 20 MG tablet TAKE 1 TABLET BY MOUTH EVERY NIGHT AT BEDTIME     diazePAM (VALIUM) 2 MG tablet Take 1 tablet (2 mg total) by mouth as needed for anxiety (PRIOR TO FLYING).     ibuprofen (ADVIL,MOTRIN) 800 MG tablet Take 1 tablet (800 mg total) by mouth every 6 (six) hours as needed for pain.     nicotine (NICODERM CQ) 14 mg/24 hr Place 1 patch on the skin daily.     predniSONE (DELTASONE) 20 MG tablet Take 2 tabs by mouth daily for 5 days     tiZANidine (ZANAFLEX) 4 MG tablet TAKE 2 TABLETS  "BY MOUTH AT BEDTIME AS NEEDED     tiZANidine (ZANAFLEX) 4 MG tablet TAKE 2 TABLETS BY MOUTH AT BEDTIME AS NEEDED     tiZANidine (ZANAFLEX) 4 MG tablet TAKE 2 TABLETS BY MOUTH AT BEDTIME AS NEEDED     No current facility-administered medications on file prior to visit.      She is allergic to codeine; cetirizine; nefazodone; oxycodone-acetaminophen; trazodone; amoxicillin-pot clavulanate; cephalexin; cyclobenzaprine; eszopiclone; methocarbamol; and penicillins..    Review of Systems  A 12 point comprehensive review of systems was negative except as noted.      Objective:      /64  Pulse 70  Ht 5' 4\" (1.626 m)  Wt 150 lb (68 kg)  SpO2 97%  BMI 25.75 kg/m2  Right hand:  soft tissue tenderness and swelling at the right thumb   Left hand:  normal exam, no swelling, tenderness, instability; ligaments intact, full ROM both hands, wrists, and finger joints     Imaging:  X-ray right hand: no fracture, dislocation, swelling or degenerative changes noted      "

## 2021-06-12 NOTE — TELEPHONE ENCOUNTER
LETTER FROM [facility] from pt psychiatrist Dr. Lauren Zeng needing yearly EKG done and sent to her. Pt had EKG on 10/16/20 at a pre-op. Will fax EKG done on 10/16.

## 2021-06-12 NOTE — TELEPHONE ENCOUNTER
New Appointment Needed  What is the reason for the visit:    Pre-Op Appt Request  When is the surgery? :  10/21/20  Where is the surgery?:   [facility]  Who is the surgeon? :  Dr. Thompson  What type of surgery is being done?: wrist  Provider Preference: PCP only  How soon do you need to be seen?: today  Waitlist offered?: No  Okay to leave a detailed message:  Yes

## 2021-06-12 NOTE — PROGRESS NOTES
Assessment:   1. Thumb sprain, right, subsequent encounter  Plan to get another x-ray to re-evaluate right thumb.  X-ray was negative for fracture and dislocation when compared to previous x-rays there was no changes.  - XR Finger Right 2 or More VWS; Future  XR FINGER RIGHT 2 OR MORE VWS  8/7/2017 1:15 PM     INDICATION: Re-evaluate, painful to move.  COMPARISON: 07/24/2017     FINDINGS: Negative fingers. No fracture or dislocation.     This report was electronically interpreted by: Dr. L. John Fahrner, MD ON 08/07/2017 at 13:24       Plan:   Rest, ice, compression, and elevation (RICE) therapy.  Dorsal finger splinting.  Plain film x-rays per orders.  NSAIDs per medication orders.  Follow up in 2 weeks.     Subjective:   Dayana Samaniego is a 56 y.o. female who presents for follow up of her right thumb injury.  She reported that the pain and swelling has improved but she has not been able to do much movement with it.  She feels that there might have been some fractured or dislocated.  She denied any associated numbness or tingling.  She reported that she has continued to use her splint.     The following portions of the patient's history were reviewed and updated as appropriate:   She  has a past medical history of Anxiety; Asthma; Bipolar 2 disorder; Cervical spinal stenosis; Chronic kidney disease; Chronic pain syndrome; COPD (chronic obstructive pulmonary disease); Depression; Fibrocystic breast; Fibromyalgia; GERD (gastroesophageal reflux disease); Herpes Zoster (Shingles); Hiatal hernia; History of electroconvulsive therapy; Hypothyroidism; IBS (irritable bowel syndrome); Migraines; Osteoarthritis; PONV (postoperative nausea and vomiting); PTSD (post-traumatic stress disorder); and Shingles (2014).  She  does not have any pertinent problems on file.  She  has a past surgical history that includes nipple exploration; total abdom hysterectomy; Lumbar discectomy; Cervical fusion; Shoulder open rotator cuff  repair (Left); Augmentation mammaplasty; Breast biopsy (Left); Hysterectomy; Pelvic laparoscopy; Cervical fusion (Bilateral, 2/16/2015); and Bunionectomy (Right, 12/15/2015).  Her family history includes Alcohol abuse in her father; Asthma in her maternal aunt; Crohn's disease in her sister; Diabetes in her paternal grandmother and sister; Heart attack in her paternal grandmother and paternal uncle; Heart disease in her maternal grandfather and paternal grandfather; Hypertension in her sister; Lung cancer in her mother.  She  reports that she has been smoking Cigarettes.  She has a 45.00 pack-year smoking history. She uses smokeless tobacco. She reports that she drinks alcohol. She reports that she does not use illicit drugs.  Current Outpatient Prescriptions   Medication Sig Dispense Refill     albuterol (PROAIR HFA) 90 mcg/actuation inhaler Inhale 2 puffs every 4 (four) hours as needed for shortness of breath. every 4-6 hours as needed. 8.5 g 2     atorvastatin (LIPITOR) 20 MG tablet TAKE 1 TABLET BY MOUTH EVERY NIGHT AT BEDTIME 90 tablet 0     atorvastatin (LIPITOR) 40 MG tablet Take 1 tablet (40 mg total) by mouth bedtime. 90 tablet 1     budesonide-formoterol (SYMBICORT) 160-4.5 mcg/actuation inhaler Inhale 2 puffs 2 (two) times a day. 3 Inhaler 3     diazePAM (VALIUM) 2 MG tablet Take 1 tablet (2 mg total) by mouth as needed for anxiety (PRIOR TO FLYING). 10 tablet 0     doxepin (SINEQUAN) 25 MG capsule Take 75 mg by mouth bedtime.        eletriptan (RELPAX) 40 MG tablet Take 1 tab at earliest onset of migraine, may repeat in 2 hours if necessary 12 tablet 0     ergocalciferol (ERGOCALCIFEROL) 50,000 unit capsule Take 1 capsule (50,000 Units total) by mouth once a week. 13 capsule 3     esomeprazole (NEXIUM) 20 MG capsule Take 40 mg by mouth bedtime. Uses OTC strength       FLUoxetine (PROZAC) 20 MG capsule Take 60 mg by mouth daily.        HYDROcodone-acetaminophen 5-325 mg per tablet Take 1 tablet by mouth  every 6 (six) hours as needed for pain. 30 tablet 0     hydrOXYzine (VISTARIL) 25 MG capsule Take 25 mg by mouth. Take 1-2 capsules every 6 hours as needed for migraine       ibuprofen (ADVIL,MOTRIN) 800 MG tablet Take 1 tablet (800 mg total) by mouth every 6 (six) hours as needed for pain. 50 tablet 0     ipratropium-albuterol (DUO-NEB) 0.5-2.5 mg/3 mL nebulizer Take 3 mL by nebulization every 4 (four) hours. 25 vial 2     lamoTRIgine (LAMICTAL) 200 MG tablet Take 200 mg by mouth bedtime.       levothyroxine (SYNTHROID, LEVOTHROID) 75 MCG tablet TK 1 T PO ONCE D  3     melatonin 5 mg Tab Take 5 mg by mouth bedtime.       montelukast (SINGULAIR) 10 mg tablet TAKE 1 TABLET BY MOUTH EVERY DAY 90 tablet 3     prazosin (MINIPRESS) 1 MG capsule Take 2 mg by mouth bedtime.        predniSONE (DELTASONE) 20 MG tablet Take 2 tabs by mouth daily for 5 days 10 tablet 0     prochlorperazine (COMPAZINE) 10 MG tablet Take 1 tablet (10 mg total) by mouth every 8 (eight) hours as needed. 30 tablet 1     senna-docusate (SENNOSIDES-DOCUSATE SODIUM) 8.6-50 mg tablet Take 3 tablets by mouth bedtime.       tiZANidine (ZANAFLEX) 4 MG tablet TAKE 2 TABLETS BY MOUTH AT BEDTIME AS NEEDED 60 tablet 0     tiZANidine (ZANAFLEX) 4 MG tablet TAKE 2 TABLETS BY MOUTH AT BEDTIME AS NEEDED 180 tablet 0     tiZANidine (ZANAFLEX) 4 MG tablet TAKE 2 TABLETS BY MOUTH AT BEDTIME AS NEEDED 180 tablet 0     triamcinolone (KENALOG) 0.1 % cream APPLY TOPICALLY BID TO HANDS AND FEET FOR 2 TO 3 WEEKS  3     No current facility-administered medications for this visit.      Current Outpatient Prescriptions on File Prior to Visit   Medication Sig     albuterol (PROAIR HFA) 90 mcg/actuation inhaler Inhale 2 puffs every 4 (four) hours as needed for shortness of breath. every 4-6 hours as needed.     atorvastatin (LIPITOR) 20 MG tablet TAKE 1 TABLET BY MOUTH EVERY NIGHT AT BEDTIME     atorvastatin (LIPITOR) 40 MG tablet Take 1 tablet (40 mg total) by mouth bedtime.      budesonide-formoterol (SYMBICORT) 160-4.5 mcg/actuation inhaler Inhale 2 puffs 2 (two) times a day.     diazePAM (VALIUM) 2 MG tablet Take 1 tablet (2 mg total) by mouth as needed for anxiety (PRIOR TO FLYING).     doxepin (SINEQUAN) 25 MG capsule Take 75 mg by mouth bedtime.      eletriptan (RELPAX) 40 MG tablet Take 1 tab at earliest onset of migraine, may repeat in 2 hours if necessary     ergocalciferol (ERGOCALCIFEROL) 50,000 unit capsule Take 1 capsule (50,000 Units total) by mouth once a week.     esomeprazole (NEXIUM) 20 MG capsule Take 40 mg by mouth bedtime. Uses OTC strength     FLUoxetine (PROZAC) 20 MG capsule Take 60 mg by mouth daily.      HYDROcodone-acetaminophen 5-325 mg per tablet Take 1 tablet by mouth every 6 (six) hours as needed for pain.     hydrOXYzine (VISTARIL) 25 MG capsule Take 25 mg by mouth. Take 1-2 capsules every 6 hours as needed for migraine     ibuprofen (ADVIL,MOTRIN) 800 MG tablet Take 1 tablet (800 mg total) by mouth every 6 (six) hours as needed for pain.     ipratropium-albuterol (DUO-NEB) 0.5-2.5 mg/3 mL nebulizer Take 3 mL by nebulization every 4 (four) hours.     lamoTRIgine (LAMICTAL) 200 MG tablet Take 200 mg by mouth bedtime.     levothyroxine (SYNTHROID, LEVOTHROID) 75 MCG tablet TK 1 T PO ONCE D     melatonin 5 mg Tab Take 5 mg by mouth bedtime.     montelukast (SINGULAIR) 10 mg tablet TAKE 1 TABLET BY MOUTH EVERY DAY     prazosin (MINIPRESS) 1 MG capsule Take 2 mg by mouth bedtime.      predniSONE (DELTASONE) 20 MG tablet Take 2 tabs by mouth daily for 5 days     prochlorperazine (COMPAZINE) 10 MG tablet Take 1 tablet (10 mg total) by mouth every 8 (eight) hours as needed.     senna-docusate (SENNOSIDES-DOCUSATE SODIUM) 8.6-50 mg tablet Take 3 tablets by mouth bedtime.     tiZANidine (ZANAFLEX) 4 MG tablet TAKE 2 TABLETS BY MOUTH AT BEDTIME AS NEEDED     tiZANidine (ZANAFLEX) 4 MG tablet TAKE 2 TABLETS BY MOUTH AT BEDTIME AS NEEDED     tiZANidine (ZANAFLEX) 4 MG  "tablet TAKE 2 TABLETS BY MOUTH AT BEDTIME AS NEEDED     triamcinolone (KENALOG) 0.1 % cream APPLY TOPICALLY BID TO HANDS AND FEET FOR 2 TO 3 WEEKS     No current facility-administered medications on file prior to visit.      She is allergic to codeine; cetirizine; nefazodone; oxycodone-acetaminophen; trazodone; amoxicillin-pot clavulanate; cephalexin; cyclobenzaprine; eszopiclone; methocarbamol; and penicillins..    Review of Systems  A 12 point comprehensive review of systems was negative except as noted.      Objective:      /78  Ht 5' 4\" (1.626 m)  SpO2 98%  Right hand:  soft tissue tenderness and swelling at the Metatarsal joint of the right thumb   Left hand:  normal exam, no swelling, tenderness, instability; ligaments intact, full ROM both hands, wrists, and finger joints     Imaging:  X-ray right hand: no fracture, dislocation, swelling or degenerative changes noted      "

## 2021-06-13 NOTE — TELEPHONE ENCOUNTER
RN cannot approve Refill Request    RN can NOT refill this medication med is not covered by policy/route to provider. Last office visit: 7/21/2020 Joanne Weiner MD Last Physical: 6/14/2019 Last MTM visit: Visit date not found Last visit same specialty: 7/21/2020 Joanne Weiner MD.  Next visit within 3 mo: Visit date not found  Next physical within 3 mo: Visit date not found      Lauren Banuelos, [facility] Triage/Med Refill 11/19/2020    Requested Prescriptions   Pending Prescriptions Disp Refills     tiZANidine (ZANAFLEX) 4 MG tablet 120 tablet 0     Sig: TAKE 1-2 TABLETS BY MOUTH DURING THE DAY AND 2 TABLETS EVERY NIGHT AT BEDTIME       There is no refill protocol information for this order      Signed Prescriptions Disp Refills    levothyroxine (SYNTHROID, LEVOTHROID) 75 MCG tablet 90 tablet 1     Sig: TAKE 1 TABLET(75 MCG) BY MOUTH DAILY       Thyroid Hormones Protocol Passed - 11/17/2020  1:05 PM        Passed - Provider visit in past 12 months or next 3 months     Last office visit with prescriber/PCP: 7/21/2020 Joanne Weiner MD OR same dept: 7/21/2020 Joanne Weiner MD OR same specialty: 7/21/2020 Joanne Weiner MD  Last physical: 6/14/2019 Last MTM visit: Visit date not found   Next visit within 3 mo: Visit date not found  Next physical within 3 mo: Visit date not found  Prescriber OR PCP: Joanne Weiner MD ([facility])  Last diagnosis associated with med order: 1. TMJ (temporomandibular joint syndrome)  - tiZANidine (ZANAFLEX) 4 MG tablet  Dispense: 120 tablet; Refill: 0    2. Hypothyroidism, unspecified type  - levothyroxine (SYNTHROID, LEVOTHROID) 75 MCG tablet; TAKE 1 TABLET(75 MCG) BY MOUTH DAILY  Dispense: 90 tablet; Refill: 1    If protocol passes may refill for 12 months if within 3 months of last provider visit (or a total of 15 months).             Passed - TSH on file in past 12 months for patient age 12 & older     TSH   Date Value Ref Range Status    07/21/2020 2.62 0.30 - 5.00 uIU/mL Final

## 2021-06-13 NOTE — TELEPHONE ENCOUNTER
RN cannot approve Refill Request    RN can NOT refill this medication med is not covered by policy/route to provider. Last office visit: 7/21/2020 Joanne Weiner MD Last Physical: 6/14/2019 Last MTM visit: Visit date not found Last visit same specialty: 7/21/2020 Joanne Weiner MD.  Next visit within 3 mo: Visit date not found  Next physical within 3 mo: Visit date not found      Lauren Banuelos, [facility] Triage/Med Refill 12/16/2020    Requested Prescriptions   Pending Prescriptions Disp Refills     tiZANidine (ZANAFLEX) 4 MG tablet [Pharmacy Med Name: TIZANIDINE 4MG TABLETS] 120 tablet 0     Sig: TAKE 1-2 TABLETS BY MOUTH DURING THE DAY AND 2 TABLETS EVERY EVENING AT BEDTIME       There is no refill protocol information for this order

## 2021-06-13 NOTE — TELEPHONE ENCOUNTER
Orders being requested: EKG and some labs  Reason service is needed/diagnosis: Patient is currently taking tricyclic antidepressant doxepin and is needing to have an EKG done annually per letter sent to Dr. Weiner 10/1/2020 from Dr. Zeng. Dayana also stated that she needed to have some labs done unsure what those are for  When are orders needed by: ASAP  Where to send Orders: In her chart to schedule off of  Okay to leave detailed message?  Yes, please call Dayana to find out what labs and to get her scheduled.

## 2021-06-13 NOTE — TELEPHONE ENCOUNTER
Refill Approved    Rx renewed per Medication Renewal Policy. Medication was last renewed on 2/12/19.    Lauren Banuelos, [facility] Triage/Med Refill 11/19/2020     Requested Prescriptions   Pending Prescriptions Disp Refills     tiZANidine (ZANAFLEX) 4 MG tablet 120 tablet 0       There is no refill protocol information for this order        levothyroxine (SYNTHROID, LEVOTHROID) 75 MCG tablet 90 tablet 2       Thyroid Hormones Protocol Passed - 11/17/2020  1:05 PM        Passed - Provider visit in past 12 months or next 3 months     Last office visit with prescriber/PCP: 7/21/2020 Joanne Weiner MD OR same dept: 7/21/2020 Joanne Weiner MD OR same specialty: 7/21/2020 Joanne Weiner MD  Last physical: 6/14/2019 Last MTM visit: Visit date not found   Next visit within 3 mo: Visit date not found  Next physical within 3 mo: Visit date not found  Prescriber OR PCP: Elroy) AVNI Weiner MD  Last diagnosis associated with med order: 1. TMJ (temporomandibular joint syndrome)  - tiZANidine (ZANAFLEX) 4 MG tablet  Dispense: 120 tablet; Refill: 0    2. Hypothyroidism, unspecified type  - levothyroxine (SYNTHROID, LEVOTHROID) 75 MCG tablet  Dispense: 90 tablet; Refill: 2    If protocol passes may refill for 12 months if within 3 months of last provider visit (or a total of 15 months).             Passed - TSH on file in past 12 months for patient age 12 & older     TSH   Date Value Ref Range Status   07/21/2020 2.62 0.30 - 5.00 uIU/mL Final

## 2021-06-13 NOTE — TELEPHONE ENCOUNTER
Patient's medications are on auto refill and she does not need this prescription refilled at this time.

## 2021-06-14 ENCOUNTER — COMMUNICATION - HEALTHEAST (OUTPATIENT)
Dept: FAMILY MEDICINE | Facility: CLINIC | Age: 61
End: 2021-06-14

## 2021-06-14 DIAGNOSIS — M26.609 TMJ (TEMPOROMANDIBULAR JOINT SYNDROME): ICD-10-CM

## 2021-06-14 NOTE — TELEPHONE ENCOUNTER
RN cannot approve Refill Request    RN can NOT refill this medication Protocol failed and NO refill given. Last office visit: 7/21/2020 Joanne Weiner MD Last Physical: 6/14/2019 Last MTM visit: Visit date not found Last visit same specialty: 7/21/2020 Joanne Weiner MD.  Next visit within 3 mo: 1/14/2021 Joanne Weiner MD  Next physical within 3 mo: Visit date not found      Lauren Banuelos, [facility] Triage/Med Refill 1/14/2021    Requested Prescriptions   Pending Prescriptions Disp Refills     varenicline (CHANTIX) 1 mg tablet [Pharmacy Med Name: CHANTIX 1MG TABLETS] 60 tablet 3     Sig: TAKE 1 TABLET BY MOUTH TWICE DAILY AFTER EATING WITH A FULL GLASS OF WATER       Varenicline Refill Protocol Failed - 1/14/2021 11:47 AM        Failed - Normal Serum Creatinine in past 12 months      Creatinine   Date Value Ref Range Status   10/16/2020 1.11 (H) 0.60 - 1.10 mg/dL Final             Passed - PCP or prescribing provider visit in last 12 or next 3 months.     Last office visit with prescriber/PCP: 7/21/2020 Joanne Weiner MD OR same dept: 7/21/2020 Joanne Weiner MD  Last physical: 6/14/2019       Next visit within 3 mo: 1/14/2021 Joanne Weiner MD  Next physical within 3 mo: Visit date not found  Prescriber OR PCP: Joanne Weiner MD ([place])  Last diagnosis associated with med order: 1. Nicotine Dependence  - CHANTIX 1 mg tablet [Pharmacy Med Name: CHANTIX 1MG TABLETS]; TAKE 1 TABLET BY MOUTH TWICE DAILY AFTER EATING WITH A FULL GLASS OF WATER  Dispense: 60 tablet; Refill: 3     Requested Prescriptions     Pending Prescriptions Disp Refills     varenicline (CHANTIX) 1 mg tablet [Pharmacy Med Name: CHANTIX 1MG TABLETS] 60 tablet 3     Sig: TAKE 1 TABLET BY MOUTH TWICE DAILY AFTER EATING WITH A FULL GLASS OF WATER     May refill for 3 months if protocol passes.

## 2021-06-14 NOTE — PROGRESS NOTES
1. Bipolar II Disorder  Sees Dr. Lauren Zeng who requests that the patient have an EKG done.  Patient thinks it is due to her doxepin use.  - Electrocardiogram Perform and Read    2. Intractable migraine without status migrainosus, unspecified migraine type  Patient has had a migraine that she cannot get rid of and has had for the last couple of days.  She asks that I give her a Toradol injection which has worked well for her in the past.  - ketorolac injection 30 mg (TORADOL)    3. Fibromyalgia  Symptoms have improved since she has used doxepin and recommends I use it with other people if possible.  - Doxepin(Sinequan )and Nordoxepin    4. Centrilobular emphysema (H)  Patient with history of lung nodule sees Dr. Bradford for this and usually gets a screening CT of the lung ordered from her.  She has not had problems with her emphysema recently.    5. Hyperlipidemia, unspecified hyperlipidemia type  Relatively well controlled, will monitor lab work  - Lipid [place] FASTING    6. Stage 3a chronic kidney disease  Mild chronic kidney disease that I would like to monitor.  Possibly medication related?  Will do lower dose of Toradol.  - Comprehensive Metabolic Panel    7. Osteopenia of multiple sites  Recent DEXA scan showed she is stable.  Will monitor lab.  - Vitamin D, Total (25-Hydroxy)    8. Hypothyroidism, unspecified type  Lab work 6 months ago was good.    9. Immunization due  Needs her influenza vaccine and is willing to do that today.  - Influenza, Recombinant, Inj, Quadrivalent, PF, 18+YRS    Patient is to follow-up for her preop as she expects to have back surgery in the next month or 2.  If I do not see her at that time then I will need to see her back in 6 months time.    40 minutes spent together with the patient today, more than 50% spent in counseling, discussing the above topics.        Subjective     Dayana Samaniego is 60 y.o. and presents to clinic today for the following health issues   HPI    Patient is a medically and mental health wise complicated patient.  She sees a psychiatrist for her bipolar disorder and also has chronic pain with fibromyalgia.  She is on a number of psych meds.  Her psychiatrist has asked that we do a yearly EKG.   The patient asks me to also do a doxepin level as she knows that that needs to be done but she is uncertain how often.  She has chronic pain which includes migraine, spinal issues mostly and her fibromyalgia issues.  She had just seen her back surgeon Dr. Sanon from [facility] last Tuesday and she tells me that he is doing prior authorization to get some surgery done.  He will then set up surgery for her which is usually a week to 10 days out from approval.  As for her migraine headache, she has been having migraine for the last few days and thinks it is due to the weather.  She asks that I give her a Toradol injection as these have helped her in the past.  She does have some mild chronic kidney disease and so I will give her a lower dose of 30 mg although she asks for the 60 mg.  Patient has a history of a lung nodule and COPD/emphysema.  She sees Dr. Angelia Bradford.  Dr. Bradford usually orders the screening of the lung.  Her breathing has not been problematic.  Patient has had a DEXA scan recently which shows her osteopia is stable.  She has hypothyroidism which was stable when she had labs 6 months ago.  She is up-to-date on her preventative measures but has not had her flu shot yet.        Review of Systems  Positive for migraine headache, fibromyalgia, poor sleep, chronic pain, weight gain due to medication      Objective    /62   Pulse 93   Wt 162 lb 9.6 oz (73.8 kg)   SpO2 96%   BMI 27.27 kg/m    Body mass index is 27.27 kg/m .  Physical Exam  General: Healthy appearing overweight older female in no acute distress  Heart: Regular rate and rhythm without murmur  Lungs: Clear to auscultation bilaterally  Abdomen: Soft, nontender  Extremities: Warm, dry and without  edema  Neuro: No focal deficits, cranial nerves appear intact  Psych: Patient's mood seems stable and relatively good considering her migraine

## 2021-06-15 NOTE — TELEPHONE ENCOUNTER
RN cannot approve Refill Request    RN can NOT refill this medication med is not covered by policy/route to provider. Last office visit: 1/14/2021 Joanne Weiner MD Last Physical: 6/14/2019 Last MTM visit: Visit date not found Last visit same specialty: 2/10/2021 William Hines CNP.  Next visit within 3 mo: Visit date not found  Next physical within 3 mo: Visit date not found      Katie Blanco, [facility] Triage/Med Refill 2/10/2021    Requested Prescriptions   Pending Prescriptions Disp Refills     montelukast (SINGULAIR) 10 mg tablet [Pharmacy Med Name: MONTELUKAST 10MG TABLETS] 90 tablet 3     Sig: TAKE 1 TABLET(10 MG) BY MOUTH DAILY       There is no refill protocol information for this order

## 2021-06-15 NOTE — PROGRESS NOTES
[facility]  [address]  [place] [facility] [address]  Dept: [phone]  Dept Fax: [phone]  Primary Provider: Joanne Weiner MD  Pre-op Performing Provider: JOANNE WEINER    PREOPERATIVE EVALUATION:  Today's date: 2/22/2021    Dayana Samaniego is a 60 y.o. female who presents for a preoperative evaluation.    Surgical Information:  Surgery/Procedure: Spine fusion of L4-S1  Surgery Location: [facility]   Surgeon: Dr. Patrice Sanon   Surgery Date: 3/17/2021  Time of Surgery: 10 am   Where patient plans to recover: At home with family  Fax number for surgical facility:  [phone]    Type of Anesthesia Anticipated: General    1. Preop general physical exam  Patient is cleared for surgery without further need for evaluation.  Patient has had recent labs and a recent EKG(1/24/2021) which can be seen in Epic.  Patient has written orders from Dr. Sanon and will schedule a future Covid test the Friday before her surgery at our clinic.    2. Lumbar radiculopathy  Patient has been evaluated with MRI.  Surgery as needed.    3. Bipolar II Disorder  Stable on current medications.    4. Centrilobular emphysema (H)  Mild but seen on CT.  Continues to smoke a small amount.    5. Stage 3a chronic kidney disease  Last creatinine was 1.24 on January 14, 2021.    6. Hyperlipidemia, unspecified hyperlipidemia type  Last LDL was 100 on 1/14/21.    7. Hypothyroidism, unspecified type  TSH done last July was normal.    8. Osteopenia of multiple sites  Last DEXA scan was done 8 of 2020.  Vitamin D was done January 24 of 2021.    40+ minutes spent together with the patient today, more than 50% spent in counseling, discussing the above topics.        Subjective     HPI related to upcoming procedure: Patient has a long history of osteoarthritic changes in her spine causing problems.  She has a history of fusion surgery in her neck.  Now she needs to  have surgery in her lumbar spine.  She has chronic pain particularly in the neck and the low back.  She also has fibromyalgia.  She uses Vicodin from time to time.  She is given 20 or 30 pills at a time which last her at least a month or more.    Preop Questions 2/22/2021   Have you ever had a heart attack or stroke? No   Have you ever had surgery on your heart or blood vessels, such as a stent placement, a coronary artery bypass, or surgery on an artery in your head, neck, heart, or legs? No   Do you have chest pain with activity? No   Do you have a history of  heart failure? No   Do you currently have a cold, bronchitis or symptoms of other infection? No   Do you have a cough, shortness of breath, or wheezing? No   Do you or anyone in your family have previous history of blood clots? No   Do you or does anyone in your family have a serious bleeding problem such as prolonged bleeding following surgeries or cuts? No   Have you ever had problems with anemia or been told to take iron pills? No   Have you had any abnormal blood loss such as black, tarry or bloody stools, or abnormal vaginal bleeding? No   Have you ever had a blood transfusion? No   Are you willing to have a blood transfusion if it is medically needed before, during, or after your surgery? Yes   Have you or any of your relatives ever had problems with anesthesia? No   Do you have sleep apnea, excessive snoring or daytime drowsiness? No   Do you have any artifical heart valves or other implanted medical devices like a pacemaker, defibrillator, or continuous glucose monitor? No   Do you have artificial joints? No   Are you allergic to latex? No   Is there any chance that you may be pregnant? -     Health Care Directive:  Patient does not have a Health Care Directive or Living Will: Discussed advance care planning with patient; however, patient declined at this time.    Preoperative Review of :    reviewed - controlled substances reflected in  medication list.    ASTHMA - Patient has a longstanding history of moderate Asthma . Patient has been doing well overall noting NO SYMPTOMS and continues on medication regimen consisting of singulair without adverse reactions or side effects.     COPD - Patient has a longstanding history of moderate-severe COPD . Patient has been doing well overall noting BARRON and continues on medication regimen consisting of albuterol without adverse reactions or side effects.    HYPERLIPIDEMIA - Patient has a long history of significant Hyperlipidemia requiring medication for treatment with recent good control. Patient reports no problems or side effects with the medication.     HYPOTHYROIDISM - Patient has a longstanding history of chronic Hypothyroidism. Patient has been doing well, noting no tremor, insomnia, hair loss or changes in skin texture. Continues to take medications as directed, without adverse reactions or side effects. Last TSH   Lab Results   Component Value Date    TSH 2.62 07/21/2020   .      RENAL INSUFFICIENCY - Patient has a longstanding history of moderate-severe chronic renal insufficiency. Last Cr No components found for: CRCL       Review of Systems  CONSTITUTIONAL: NEGATIVE for fever, chills, change in weight  INTEGUMENTARY/SKIN: NEGATIVE for worrisome rashes, moles or lesions  EYES: NEGATIVE for vision changes or irritation  ENT/MOUTH: NEGATIVE for ear, mouth and throat problems  RESP: NEGATIVE for significant cough or SOB  BREAST: NEGATIVE for masses, tenderness or discharge  CV: NEGATIVE for chest pain, palpitations or peripheral edema  GI: NEGATIVE for nausea, abdominal pain, heartburn, or change in bowel habits  : NEGATIVE for frequency, dysuria, or hematuria  MUSCULOSKELETAL: back pain, muscle spasm, neck pain and radicular pain   NEURO: NEGATIVE for weakness, dizziness or paresthesias  ENDOCRINE: NEGATIVE for temperature intolerance, skin/hair changes  HEME: NEGATIVE for bleeding  problems  PSYCHIATRIC: NEGATIVE for changes in mood or affect    Patient Active Problem List    Diagnosis Date Noted     Chronic kidney disease, stage 3 01/14/2021     Osteopenia of multiple sites 09/01/2020     Cigarette nicotine dependence without complication 03/04/2020     Centrilobular emphysema (H) 02/26/2018     Hiatal hernia 02/16/2017     Lung nodule 08/04/2016     Hallux abductovalgus with bunions, unspecified laterality 12/14/2015     GERD (gastroesophageal reflux disease) 02/16/2015     Menopausal Disorder      Hypothyroidism      Chronic Pain Syndrome      Hyperlipidemia      Walk Is Wobbly Or Unsteady (Ataxia)      Menopause Has Occurred      Bipolar II Disorder      Migraine Headache      Nicotine Dependence      Fibromyalgia      Past Medical History:   Diagnosis Date     Anxiety      Asthma      Bipolar 2 disorder (H)      Cervical spinal stenosis     s/p cervical fusion     Chronic kidney disease      Chronic pain syndrome      COPD (chronic obstructive pulmonary disease) (H)      Depression      Fibrocystic breast      Fibromyalgia      GERD (gastroesophageal reflux disease)      Herpes Zoster (Shingles)     Created by Conversion  Replacement Utility updated for latest IMO load     Hiatal hernia      History of electroconvulsive therapy      Hypothyroidism     hypothyroidism     IBS (irritable bowel syndrome)      Migraines      Osteoarthritis      PONV (postoperative nausea and vomiting)      PTSD (post-traumatic stress disorder)      Shingles 2014    on back     Past Surgical History:   Procedure Laterality Date     AUGMENTATION MAMMAPLASTY Bilateral      approx 2006?     BREAST BIOPSY Left     Approx 5 bx     BUNIONECTOMY Right 12/15/2015    Procedure: MODIFIED LAI BUNIONECTOMY RIGHT FOOT;  Surgeon: Jerome Calvin DPM;  Location: [facility];  Service:      CERVICAL FUSION       CERVICAL FUSION Bilateral 2/16/2015    Procedure: ANTERIOR CERVICAL DECOMPRESSION/FUSION C3-5 BILATERAL,  ANTERIOR HARDWARE REMOVAL C5-7 BILATERAL ;  Surgeon: Sawyer Roland MD;  Location: [facility] OR;  Service:      HYSTERECTOMY       LUMBAR DISCECTOMY      X2     PELVIC LAPAROSCOPY      multiple     FL NIPPLE EXPLORATION      Description: Breast Nipple Explor W/ Excision Solitary Lactiferous Duct;  Recorded: 04/10/2008;     FL TOTAL ABDOM HYSTERECTOMY      Description: Total Abdominal Hysterectomy;  Recorded: 06/10/2013;     SHOULDER OPEN ROTATOR CUFF REPAIR Left     X2     Current Outpatient Medications   Medication Sig Dispense Refill     AIMOVIG AUTOINJECTOR 70 mg/mL atIn INJECT ONE DOSE ONCE A MONTH       albuterol (PROAIR HFA) 90 mcg/actuation inhaler INHALE 1 PUFF BY MOUTH EVERY 4-6 HOURS AS NEEDED. 8.5 g 11     ARIPiprazole (ABILIFY) 2 MG tablet TK ONE T PO ONCE D  1     atorvastatin (LIPITOR) 40 MG tablet TAKE 1 TABLET(40 MG) BY MOUTH AT BEDTIME 90 tablet 3     diazePAM (VALIUM) 2 MG tablet Take 1 tablet (2 mg total) by mouth as needed for anxiety (PRIOR TO FLYING). 10 tablet 0     doxepin (SINEQUAN) 25 MG capsule Take 3 capsules (75 mg total) by mouth at bedtime. 270 capsule 1     ergocalciferol (ERGOCALCIFEROL) 1,250 mcg (50,000 unit) capsule TAKE 1 CAPSULE BY MOUTH 1 TIME A WEEK 13 capsule 3     esomeprazole (NEXIUM) 20 MG capsule Take 40 mg by mouth bedtime. Uses OTC strength       FLUoxetine (PROZAC) 20 MG capsule Take 3 capsules (60 mg total) by mouth daily. (Patient taking differently: Take 80 mg by mouth daily.       ) 270 capsule 1     folic acid (FOLVITE) 800 MCG tablet Take by mouth.       HYDROcodone-acetaminophen 5-325 mg per tablet Take 1 tablet by mouth every 4 (four) hours as needed for pain. 20 tablet 0     hydrOXYzine pamoate (VISTARIL) 25 MG capsule Take 1 capsule (25 mg total) by mouth 3 (three) times a day as needed for itching. Take 1-2 capsules every 6 hours as needed for migraine 90 capsule 1     lamoTRIgine (LAMICTAL) 200 MG tablet Take 200 mg by mouth bedtime.        levothyroxine (SYNTHROID, LEVOTHROID) 75 MCG tablet TAKE 1 TABLET(75 MCG) BY MOUTH DAILY 90 tablet 1     montelukast (SINGULAIR) 10 mg tablet TAKE 1 TABLET(10 MG) BY MOUTH DAILY 90 tablet 1     prazosin (MINIPRESS) 1 MG capsule Take 4 mg by mouth at bedtime.              prochlorperazine (COMPAZINE) 5 MG tablet Take 1 tablet (5 mg total) by mouth every 8 (eight) hours as needed for nausea. 30 tablet 0     senna-docusate (SENNOSIDES-DOCUSATE SODIUM) 8.6-50 mg tablet Take 3 tablets by mouth bedtime.       tiZANidine (ZANAFLEX) 4 MG tablet TAKE 1-2 TABLETS BY MOUTH DURING THE DAY AND 2 TABLETS EVERY NIGHT AT BEDTIME 120 tablet 1     varenicline (CHANTIX) 1 mg tablet TAKE 1 TABLET BY MOUTH TWICE DAILY AFTER EATING WITH A FULL GLASS OF WATER 60 tablet 3     eletriptan (RELPAX) 40 MG tablet Take 1 tab at earliest onset of migraine, may repeat in 2 hours if necessary 12 tablet 2     No current facility-administered medications for this visit.        Allergies   Allergen Reactions     Codeine Anaphylaxis     Cetirizine Nausea And Vomiting     Nefazodone Nausea And Vomiting     Oxycodone-Acetaminophen      hallucinations     Trazodone Nausea And Vomiting     Amoxicillin-Pot Clavulanate Rash     Cephalexin Rash     Cyclobenzaprine Rash     Eszopiclone Rash     Methocarbamol Rash     Penicillins Rash     Mild rash       Social History     Tobacco Use     Smoking status: Light Tobacco Smoker     Packs/day: 1.00     Years: 45.00     Pack years: 45.00     Types: Cigarettes     Smokeless tobacco: Former User     Tobacco comment: 4 CIG DAILY    Substance Use Topics     Alcohol use: Yes     Comment: rare--1-2 times in year      Family History   Problem Relation Age of Onset     Lung cancer Mother          at age 52     Alcohol abuse Father      Heart disease Maternal Grandfather      Diabetes Paternal Grandmother      Heart attack Paternal Grandmother      Heart disease Paternal Grandfather      Diabetes Sister      Hypertension  "Sister      Crohn's disease Sister      Heart attack Paternal Uncle      Asthma Maternal Aunt      Social History     Substance and Sexual Activity   Drug Use No        Objective     /72 (Patient Position: Sitting, Cuff Size: Adult Large)   Pulse 90   Ht 5' 4.75\" (1.645 m)   Wt 166 lb 7 oz (75.5 kg)   SpO2 97%   BMI 27.91 kg/m    Physical Exam  General appearance - alert, well appearing, and in no distress and overweight  Mental status - normal mood, behavior, speech, dress, motor activity, and thought processes  Eyes - pupils equal and reactive, extraocular eye movements intact  Ears - bilateral TM's and external ear canals normal  Nose - not examined and covered by mask  Mouth - not examined and Covered by mask  Neck - supple, no significant adenopathy, carotids upstroke normal bilaterally, no bruits, thyroid exam: thyroid is normal in size without nodules or tenderness  Lymphatics - no palpable lymphadenopathy, no hepatosplenomegaly  Chest - clear to auscultation, no wheezes, rales or rhonchi, symmetric air entry  Heart - normal rate and regular rhythm, S1 and S2 normal, no murmurs noted  Abdomen - soft, nontender, nondistended, no masses or organomegaly  Breasts - not examined  Pelvic - examination not indicated  Back exam - limited range of motion, pain with motion noted during exam  Neurological - alert, oriented, normal speech, no focal findings or movement disorder noted, cranial nerves II through XII intact, DTR's normal and symmetric  Musculoskeletal - no joint tenderness, deformity or swelling  Extremities - peripheral pulses normal, no pedal edema, no clubbing or cyanosis  Skin - normal coloration and turgor, no rashes, no suspicious skin lesions noted        Recent Labs   Lab Test 01/14/21  1048 10/16/20  1325 06/14/19  1511 06/14/19  1511   HGB  --  14.5  --  13.1   PLT  --   --   --  296    141   < > 141   K 4.3 4.1   < > 4.0   CREATININE 1.24* 1.11*   < > 1.06    < > = values in this " interval not displayed.        PRE-OP Diagnostics:   No labs were ordered during this visit.  No EKG this visit, completed in the last 90 days.    REVISED CARDIAC RISK INDEX (RCRI)   The patient has the following serious cardiovascular risks for perioperative complications:   - High risk surgery (>5% cardiac complication risk) = 1 point    RCRI INTERPRETATION: 1 point: Class II (low risk - 0.9% complication rate)         Signed Electronically by: Joanne Weiner MD (Betsy)    Copy of this evaluation report is provided to requesting physician.

## 2021-06-15 NOTE — TELEPHONE ENCOUNTER
RN cannot approve Refill Request    RN can NOT refill this medication med is not covered by policy/route to provider. Last office visit: 1/14/2021 Joanne Weiner MD Last Physical: 2/22/2021 Last MTM visit: Visit date not found Last visit same specialty: 2/15/2021 Eva Escalante MD.  Next visit within 3 mo: Visit date not found  Next physical within 3 mo: Visit date not found      Marei Murillo, [facility] Triage/Med Refill 3/7/2021    Requested Prescriptions   Pending Prescriptions Disp Refills     ergocalciferol (ERGOCALCIFEROL) 1,250 mcg (50,000 unit) capsule [Pharmacy Med Name: VITAMIN D2 50,000IU (ERGO) CAP RX] 13 capsule 3     Sig: TAKE 1 CAPSULE BY MOUTH 1 TIME A WEEK       There is no refill protocol information for this order

## 2021-06-15 NOTE — PATIENT INSTRUCTIONS - HE
Culture will take a few days to get back    Keep hot packing and warm soaks! This is the best treatment.    Let's start the bactrim while we await culture and see how this goes.  If the lump persists or gets bigger we can always do a formal incision and drainage with lidocaine.

## 2021-06-15 NOTE — PROGRESS NOTES
Chief Complaint   Patient presents with     Wound Infection     Boil/bumps that started 2 weeks ago, 4 total, puss and blood coming out of them, painful.       HPI: Patient presents today with complaints of boils that started about 2 weeks ago.  She noticed warm along her incision site from her hysterectomy and another 1 year there.  She has noticed a couple more in her groin area.  This has happened before sometime ago.  Has been able to drain them successfully and is getting out bloody/pustulant drainage.  Afebrile without chills.  No spreading drainage.  There is a discomfort along them.      ROS:Review of Systems - negative except for what's listed in the HPI    SH: The Patient's  reports that she has been smoking cigarettes. She has a 45.00 pack-year smoking history. She has quit using smokeless tobacco. She reports current alcohol use. She reports that she does not use drugs.      FH: The Patient's family history includes Alcohol abuse in her father; Asthma in her maternal aunt; Crohn's disease in her sister; Diabetes in her paternal grandmother and sister; Heart attack in her paternal grandmother and paternal uncle; Heart disease in her maternal grandfather and paternal grandfather; Hypertension in her sister; Lung cancer in her mother.     Meds:    Current Outpatient Medications on File Prior to Visit   Medication Sig Dispense Refill     AIMOVIG AUTOINJECTOR 70 mg/mL atIn INJECT ONE DOSE ONCE A MONTH       ARIPiprazole (ABILIFY) 2 MG tablet TK ONE T PO ONCE D  1     atorvastatin (LIPITOR) 40 MG tablet TAKE 1 TABLET(40 MG) BY MOUTH AT BEDTIME 90 tablet 3     doxepin (SINEQUAN) 25 MG capsule Take 3 capsules (75 mg total) by mouth at bedtime. 270 capsule 1     ergocalciferol (ERGOCALCIFEROL) 1,250 mcg (50,000 unit) capsule TAKE 1 CAPSULE BY MOUTH 1 TIME A WEEK 13 capsule 3     esomeprazole (NEXIUM) 20 MG capsule Take 40 mg by mouth bedtime. Uses OTC strength       FLUoxetine (PROZAC) 20 MG capsule Take 3  capsules (60 mg total) by mouth daily. (Patient taking differently: Take 80 mg by mouth daily.       ) 270 capsule 1     folic acid (FOLVITE) 800 MCG tablet Take by mouth.       HYDROcodone-acetaminophen 5-325 mg per tablet Take 1 tablet by mouth every 4 (four) hours as needed for pain. 20 tablet 0     hydrOXYzine pamoate (VISTARIL) 25 MG capsule Take 1 capsule (25 mg total) by mouth 3 (three) times a day as needed for itching. Take 1-2 capsules every 6 hours as needed for migraine 90 capsule 1     lamoTRIgine (LAMICTAL) 200 MG tablet Take 200 mg by mouth bedtime.       levothyroxine (SYNTHROID, LEVOTHROID) 75 MCG tablet TAKE 1 TABLET(75 MCG) BY MOUTH DAILY 90 tablet 1     prazosin (MINIPRESS) 1 MG capsule Take 4 mg by mouth at bedtime.              prochlorperazine (COMPAZINE) 5 MG tablet Take 1 tablet (5 mg total) by mouth every 8 (eight) hours as needed for nausea. 30 tablet 0     senna-docusate (SENNOSIDES-DOCUSATE SODIUM) 8.6-50 mg tablet Take 3 tablets by mouth bedtime.       varenicline (CHANTIX) 1 mg tablet TAKE 1 TABLET BY MOUTH TWICE DAILY AFTER EATING WITH A FULL GLASS OF WATER 60 tablet 3     albuterol (PROAIR HFA) 90 mcg/actuation inhaler INHALE 1 PUFF BY MOUTH EVERY 4-6 HOURS AS NEEDED. 8.5 g 11     diazePAM (VALIUM) 2 MG tablet Take 1 tablet (2 mg total) by mouth as needed for anxiety (PRIOR TO FLYING). 10 tablet 0     eletriptan (RELPAX) 40 MG tablet Take 1 tab at earliest onset of migraine, may repeat in 2 hours if necessary 12 tablet 2     No current facility-administered medications on file prior to visit.        O:  /70   Pulse 80   Wt 164 lb (74.4 kg)   SpO2 97%   BMI 27.50 kg/m      Physical Examination:   General appearance - alert, well appearing, and in no distress  Mental status - alert, oriented to person, place, and time  Lymphatics - no palpable lymphadenopathy  Chest - clear to auscultation, no wheezes, rales or rhonchi, symmetric air entry  Heart - normal rate and regular  rhythm, S1 and S2 normal, no murmurs noted  Abdomen - soft, nontender, nondistended, no masses or organomegaly  Skin -along the pubic area there is a slightly raised area.  No significant fluctuance.  With pressure there is a small amount of serosanguineous fluid that is expressed.  Along the left groin there are 2 palpable lumps.  Firm.  Fluctuance felt.      A/P:     Problem List Items Addressed This Visit        [facility] Cardiac Problems    Hyperlipidemia (Chronic)      Other Visit Diagnoses     Abdominal wall abscess    -  Primary    Relevant Medications    sulfamethoxazole-trimethoprim (BACTRIM DS) 800-160 mg per tablet    Boil of groin        Relevant Orders    Culture, Wound (Completed)    Yeast infection of the vagina            After the area was cleansed, a 25-gauge needle was used to rupture the largest lump in the left groin.  Culture taken.  Start Bactrim.   encouraged warm moist heat.  Patient notes that she will often get yeast infections with antibiotic therapy so fluconazole sent to hold onto.  Should she notice the symptoms, go ahead and start.  Hold statin therapy while on fluconazole.  Should this heal over and persist, formal incision and drainage can be attempted as well.    1. Abdominal wall abscess  - sulfamethoxazole-trimethoprim (BACTRIM DS) 800-160 mg per tablet; Take 1 tablet by mouth 2 (two) times a day for 10 days.  Dispense: 20 tablet; Refill: 0    2. Boil of groin  - Culture, Wound    3. Yeast infection of the vagina  - fluconazole (DIFLUCAN) 150 MG tablet; Take 1 tablet (150 mg total) by mouth once for 1 dose. Repeat in 3 days if needed  Dispense: 2 tablet; Refill: 0    4. Hyperlipidemia, unspecified hyperlipidemia type    Total time spent was at least 30 minutes including reviewing records prior to arrival, consultation, placing orders, education, and reviewing the plan of care on the date of service.      William Hines, CNP      This note has been dictated using voice  recognition software. Any grammatical or context distortions are unintentional and inherent to the software.

## 2021-06-15 NOTE — PROGRESS NOTES
"  Assessment:     1. Intractable migraine with status migrainosus, unspecified migraine type  ketorolac injection 30 mg (TORADOL)          Plan:           We reviewed the treatment options for her migraine symptoms and we will give an injection or toradol 30 mg IM today. She will go home to rest and we reviewed indications for urgent evaluation. We reviewed use of OTC analgesics as well as increased fluids and rest, and she will call or return to clinic with any ongoing or worsening symptoms. She will otherwise f/u in  3-4 mos for routine med check/evaluation with Dr Weiner.            Subjective:         Headaches      Dayana Samaniego is a 60 y.o. female who presents for evaluation of a persistent migraine headache. It began 3-4 days ago and has been waxing and waning since. It seemed better on Saturday, but then got bad again yesterday afternoon. She usually gets relief with excedrin migraine or hydrocodone 1/2 tab prn, but those have not changed things this time around. She has done well with toradol IM in this situation in the past, and is able to get some rest and break the cycle. She does use aimovig and has an injection due later this week. She has seen a pattern where headaches get worse as the shot wears off.         She denies recent chest pain, dyspnea, lower extremity edema or palpitations.       The following portions of the patient's history were reviewed and updated as appropriate: allergies, current medications, past family history, past medical history, past social history, past surgical history and problem list.    Review of Systems  A 12 point comprehensive review of systems was negative except as noted.      Objective:        /70   Pulse 76   Resp 18   Ht 5' 4.75\" (1.645 m)   Wt 164 lb (74.4 kg)   SpO2 96%   BMI 27.50 kg/m    Physical Exam:  GEN: Alert and oriented, NAD,  well nourished  SKIN:  Normal skin turgor, no lesions/rashes   HEENT: moist mucous membranes, no rhinorrhea.  "   NECK: Normal.  No adenopathy or thyromegaly.  CV: Regular rate and rhythm, no murmurs.   LUNGS: Clear to auscultation bilaterally.    ABDOMEN: Soft, non-tender, non-distended, no masses   EXTREMITY: No edema, cyanosis  NEURO: Grossly normal.

## 2021-06-15 NOTE — TELEPHONE ENCOUNTER
RN cannot approve Refill Request    RN can NOT refill this medication med is not covered by policy/route to provider. Last office visit: 1/14/2021 Joanne Weiner MD Last Physical: 6/14/2019 Last MTM visit: Visit date not found Last visit same specialty: 2/10/2021 William Hines CNP.  Next visit within 3 mo: Visit date not found  Next physical within 3 mo: Visit date not found      Tammie F Severson, [facility] Triage/Med Refill 2/11/2021    Requested Prescriptions   Pending Prescriptions Disp Refills     tiZANidine (ZANAFLEX) 4 MG tablet 120 tablet 0     Sig: TAKE 1-2 TABLETS BY MOUTH DURING THE DAY AND 2 TABLETS EVERY NIGHT AT BEDTIME       There is no refill protocol information for this order

## 2021-06-16 ENCOUNTER — RECORDS - HEALTHEAST (OUTPATIENT)
Dept: ADMINISTRATIVE | Facility: OTHER | Age: 61
End: 2021-06-16

## 2021-06-16 NOTE — TELEPHONE ENCOUNTER
She is on very high dosing of hydrocodone right now.  Refill sent to the pharmacy, but I would encourage her to start cut down dosing to every 6 hours as needed and then every 8 hours as needed.  Keep planned follow-up with Dr. Weiner.    William Hines, CNP

## 2021-06-16 NOTE — TELEPHONE ENCOUNTER
Reason for Call: Request for an order or referral:    Order or referral being requested: asking for home p.t.2 x week for 3 weeks and then 1 x week for 3 weeks    Date needed: as soon as possible    Has the patient been seen by the PCP for this problem? YES    Additional comments:     Phone number [facility] can be reached at:  Other phone number:  [phone]    Best Time:      Can we leave a detailed message on this number?  Yes    Call taken on 4/12/2021 at 3:56 PM by Halima Torres

## 2021-06-16 NOTE — PROGRESS NOTES
1. Encounter for Department of Transportation (DOT) examination for driving license renewal  Urinalysis Macroscopic   2. Chronic obstructive pulmonary disease, unspecified COPD type     3. Nicotine Dependence     4. Chronic pain syndrome  Comprehensive Metabolic Panel   5. Bipolar II Disorder     6. Hypothyroidism, unspecified type  Thyroid Stimulating Hormone (TSH)   7. Hyperlipidemia, unspecified hyperlipidemia type  Lipid [place] FASTING   8. Migraine     9. Gastroesophageal reflux disease without esophagitis  Hemoglobin   10. Visit for screening mammogram  Mammo Screening Bilateral   11. Urinary frequency       Urinalysis negative for white blood cells or nitrates today. No infection detected, no need for antibiotic therapy.    The 10-year ASCVD risk score (Madonnadeena MUELLER Jr, et al., 2013) is: 6.3%    Values used to calculate the score:      Age: 57 years      Sex: Female      Is Non- : No      Diabetic: No      Tobacco smoker: Yes      Systolic Blood Pressure: 100 mmHg      Is BP treated: Yes      HDL Cholesterol: 51 mg/dL      Total Cholesterol: 248 mg/dL        Plan: Patient cannot be certified for DOT license due to use of narcotic medication for her chronic back pain (Vicodin), use of Lamictal medication for bipolar and depression, no recent pulmonary consult with PFT testing on file for her COPD, last FEV 66%, last completed 2016 and due to her chronic, severe migraines with vision changes requiring Botox injections, Toradol IV and oral Benadryl therapy.     Vision  Right:20/25  Left: 20/40  Both: 20/32  Horizontal FOV: Not completed, physical deferred   Whisper test: Not completed, physical deferred    Subjective  Dayana Samaniego is a 57 y.o. female who presents for DOT physical.  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  She does have significant comorbidities and  multiple medications which restrict her from obtaining DOT licensure today.  We reviewed her diagnoses and medication list today.  Reviewed [facility] guidelines with patient regarding her multiple diagnoses that limit her from being licensed today.  In regards to her COPD, she does not have any current PFTs on file, these are required yearly per the federal guidelines based on smoking history and diagnosis.  Last PFTs performed in 2016.  Patient also suffers from chronic pain syndrome, she does use Vicodin on a regular basis along with muscle relaxant therapy.  Diagnosed with severe migraines, these do require Botox injections, IV Toradol and Benadryl.  She does admit to vision changes with her migraine therapy which also restricts her from driving.  Patient taking Lamictal for her bipolar and depression symptoms, use of any seizure medication is not allowed.  Explained and showed patient all of the federal guidelines/regularion and she is understanding of her restrictions based on her diagnoses.  She does have an appointment set up to see her pulmonologist in the next week or 2, I encouraged her to keep that appointment and asked for updated pulmonary function testing at that time.  She was borderline on her FEV1 2 years ago, federal guidelines states she must be above 65, 2016 level was 66.  She is requesting a prescription of Chantix today, she currently smokes 10 cigarettes daily.  She would like to quit smoking, she feels that this last bit of information was the push that she needed in order to stop smoking.  She does report ongoing urinary frequency that started 3 weeks ago.  She states that her urine smells rather foul.  Her symptoms have been constant and she describes her discomfort as a throbbing sensation towards the end of her stream.  She cannot identify any triggers today, she has not any new soaps, take any baths, she continues to wipe front to back and she has not engaged in any recent sexual  "intercourse.  Relieving factors: Increasing her water intake.  She currently denies pain during the visit today.  Urinalysis performed along with fasting lab work since she is overdue. Vital signs are stable.    Review of Medical History:     Diabetes: No  Neuropathy: No  COPD: yes, continues to smoke 10 cigarettes daily, Symbicort inhaler, Combivent, Singulair  Asthma: No  Hernia: No  ADHD: No  Bipolar: yes, Doxepin, Lamictal  Depression: Yes, ECT therapy, Fluoxetine, Relpax  Anxiety: with flying, uses Valium for this  Insomnia: No  Allergies: No  Sleep apnea: No  ETOH: minimal  Drug Abuse: no  Smoker: yes, current everyday, last PFT's on file 2016  Coumadin: No  Heart Disease: No  Atrial Fib: No  Hypertension: No  Pacemaker: No  ICD: No  Hearing aides: No  Seizures: No  Migraines: Botox injections ,Toradol IV and Benadryl, Vistaril  Vertigo/Menieres: No  Narcolepsy: No        Objective   /70  Pulse 78  Resp 18  Ht 5' 4.75\" (1.645 m)  Wt 148 lb (67.1 kg)  BMI 24.82 kg/m2  Current Outpatient Prescriptions   Medication Sig Dispense Refill     albuterol (PROAIR HFA) 90 mcg/actuation inhaler Inhale 2 puffs every 4 (four) hours as needed for shortness of breath. every 4-6 hours as needed. 8.5 g 2     atorvastatin (LIPITOR) 40 MG tablet TAKE 1 TABLET(40 MG) BY MOUTH AT BEDTIME 90 tablet 3     budesonide-formoterol (SYMBICORT) 160-4.5 mcg/actuation inhaler Inhale 2 puffs 2 (two) times a day. 3 Inhaler 3     diphenhydrAMINE (BENADRYL) 25 mg capsule Take by mouth.       doxepin (SINEQUAN) 25 MG capsule Take 75 mg by mouth bedtime.        eletriptan (RELPAX) 40 MG tablet Take 1 tab at earliest onset of migraine, may repeat in 2 hours if necessary 12 tablet 0     esomeprazole (NEXIUM) 20 MG capsule Take 40 mg by mouth bedtime. Uses OTC strength       FLUoxetine (PROZAC) 20 MG capsule Take 60 mg by mouth daily.        folic acid (FOLVITE) 800 MCG tablet Take by mouth.       HYDROcodone-acetaminophen 5-325 mg per " tablet Take 1 tablet by mouth every 6 (six) hours as needed for pain. 30 tablet 0     hydrOXYzine (VISTARIL) 25 MG capsule Take 25 mg by mouth. Take 1-2 capsules every 6 hours as needed for migraine       ipratropium-albuterol (COMBIVENT RESPIMAT)  mcg/actuation Mist inhaler Inhale.       lamoTRIgine (LAMICTAL) 200 MG tablet Take 200 mg by mouth bedtime.       levothyroxine (SYNTHROID, LEVOTHROID) 75 MCG tablet TAKE 1 TABLET BY MOUTH ONCE DAILY 90 tablet 0     melatonin 5 mg Tab Take 5 mg by mouth bedtime.       prazosin (MINIPRESS) 1 MG capsule Take 2 mg by mouth bedtime.        prochlorperazine (COMPAZINE) 10 MG tablet Take 1 tablet (10 mg total) by mouth every 8 (eight) hours as needed. 30 tablet 1     SOY ISOFLA/BLK COHOSH/MAG BARK (ESTROVEN ORAL) Take by mouth.       tiZANidine (ZANAFLEX) 4 MG tablet TAKE 1-2 TABLETS BY MOUTH DURING THE DAY AND 2 TABLETS AT BEDTIME AS NEEDED 360 tablet 0     diazePAM (VALIUM) 2 MG tablet Take 1 tablet (2 mg total) by mouth as needed for anxiety (PRIOR TO FLYING). 10 tablet 0     ergocalciferol (ERGOCALCIFEROL) 50,000 unit capsule Take 1 capsule (50,000 Units total) by mouth once a week. 13 capsule 3     ibuprofen (ADVIL,MOTRIN) 800 MG tablet Take 1 tablet (800 mg total) by mouth every 6 (six) hours as needed for pain. 50 tablet 0     montelukast (SINGULAIR) 10 mg tablet TAKE 1 TABLET BY MOUTH EVERY DAY 90 tablet 3     predniSONE (DELTASONE) 20 MG tablet Take 2 tabs by mouth daily for 5 days 10 tablet 0     senna-docusate (SENNOSIDES-DOCUSATE SODIUM) 8.6-50 mg tablet Take 3 tablets by mouth bedtime.       triamcinolone (KENALOG) 0.1 % cream APPLY TOPICALLY BID TO HANDS AND FEET FOR 2 TO 3 WEEKS  3     No current facility-administered medications for this visit.        Gen: alert, no acute distress   Neck: supple, no lymphadenopathy, thyroid normal  CV: RRR, no murmur, no carotid bruits  Resp: Faint inspiratory and expiratory wheezing, no increased work of breathing  Abd:  normal bowel sounds, soft, tender with palpation in right upper quadrant, non-distended, no masses, no CVA tenderness  Ext: warm, dry, no edema  Neuro:  Reflexes normal and symmetric in bilateral upper and lower extremities, equal  strength  Skin: Warm and dry, no rashes or lesions

## 2021-06-16 NOTE — TELEPHONE ENCOUNTER
[facility] Nurse Triage Telephone Note      Provider: Ana Victor MD  Facility: [facility] Type: TCU    Caller:  Fax from [email or web address] re: refilling Aimovig    Allergies: Codeine, Cetirizine, Gabapentin, Nefazodone, Oxycodone-acetaminophen, Trazodone, Amoxicillin-pot clavulanate, Cephalexin, Cyclobenzaprine, Eszopiclone, Methocarbamol, and Penicillins    Reason for call:  refilling Aimovig 70mg/ml subcutaneous monthly.    Verbal Order/Direction given by Provider: Hold Aimovig subcutaneous monthly while in TCU. (Unless patient indicates she needs it, then ok to bring in med from home )    Provider giving order: Ana Victor MD    Verbal order given to: Tatyana Sweeney RN

## 2021-06-16 NOTE — PROGRESS NOTES
[facility] note      Patient: Dayana Samaniego  MRN: [id number]      [facility] [[id number]]  Reason for Visit     Chief Complaint   Patient presents with     Discharge Summary   Follow-up on back pain/right lower extremity weakness    Code Status     Full code    Assessment     Acute fracture involving the anterior superior endplate S1  Pain management with patient reporting 10/10 pain  New onset UTI  Status post anterior spine fusion L4-S1, posterior spine fusion with instrumentation  L4-S1 decompression  L4-L5 bilateral bone marrow aspiration fusion anterior spine level 01 on 3/ 17/21  Underlying history of severe spinal stenosis with neurogenic claudication  New onset right lower extremity weakness postoperatively  Pain management  COPD  Bipolar disorder  Chronic kidney disease stage III  Nicotine addiction  Anemia secondary to blood loss discharge hemoglobin 9.9  Generalized weakness      History     Patient is a very pleasant 60 y.o. female who is admitted to TCU  Patient has a known history of neurogenic claudication with underlying history of lumbar spinal stenosis.  She was electively admitted to the hospital and underwent above-mentioned procedure.  Postoperatively she did well.  Postoperatively she developed new onset right lower extremity weakness due to nerve irritation  Spine surgery recommended steroids and pain management.  She has been discharged to the TCU  She had a scheduled follow-up with orthopedics due to persistent right lower extremity weakness.  She has been given a knee brace by them  She was evaluated and had an imaging which shows that she has a superior endplate fracture of the S1.  Spine clinic was contacted.  She continues to have pain concern  To her clinic for evaluation of her incisions which are both healing well.  No secondary concern for any infection or dehiscence noted.  Unfortunately pain remains her primary concern and she is still using  narcotics every 4 hours.  Patient was requesting them to be scheduled in the TCU.  At discharge she is quite anxious about her pain medications and is does not want to taper  We will be discharging her with her leftover pain pills with advised to follow-up with the spine clinic or her primary care physician closely to discuss her pain management.  She is reporting improvement in pain but does not want to make any changes in her medications.  She has an underlying history of bipolar disorder and will continue on her Lamictal Vistaril and Prozac.  She reports her mood is stable however she does have situational stressors as per her related to her pain  Overall is pleasant and cooperative with cares  She has chronic kidney disease stage III which was stable.  She also has COPD with no wheezing and exacerbation and felt to be stable  She also has a UTI with cultures growing E. coli.  She is reporting dysuria with frequency  However since she was treated with antibiotics this has resolved.  We were also checking PVRs due to incontinence concern but now she is reporting improvement  No retention issues were noted.  She continues to have gait instability and will be ambulating using a walker    Past Medical History     Active Ambulatory (Non-Hospital) Problems    Diagnosis     DDD (degenerative disc disease), lumbosacral     Spinal stenosis of lumbar region with neurogenic claudication     Anemia     Hypotension     Hypothyroidism     Hyperlipidemia     Fibromyalgia     Common migraine with intractable migraine     Chronic kidney disease, stage 3     Osteopenia of multiple sites     Cigarette nicotine dependence without complication     Centrilobular emphysema (H)     Hiatal hernia     Lung nodule     Hallux abductovalgus with bunions, unspecified laterality     Bipolar 2 disorder (H)     Menopausal Disorder     Chronic Pain Syndrome     Walk Is Wobbly Or Unsteady (Ataxia)     Menopause Has Occurred     Migraine Headache      Nicotine Dependence     GERD (gastroesophageal reflux disease)     Past Medical History:   Diagnosis Date     Anemia 3/18/2021     Anxiety      Asthma      Bipolar 2 disorder (H)      Centrilobular emphysema (H) 2/26/2018     Cervical spinal stenosis      Chronic kidney disease      Chronic kidney disease, stage 3 1/14/2021     Chronic pain syndrome      Cigarette nicotine dependence without complication 3/4/2020     Common migraine with intractable migraine 2/15/2021     COPD (chronic obstructive pulmonary disease) (H)      DDD (degenerative disc disease), lumbosacral 3/22/2021     Depression      Fibrocystic breast      Fibromyalgia      GERD (gastroesophageal reflux disease)      Hallux abductovalgus with bunions, unspecified laterality 12/14/2015     Herpes Zoster (Shingles)      Hiatal hernia      History of electroconvulsive therapy      Hyperlipidemia 3/17/2021     Hypotension 3/18/2021     Hypothyroidism      IBS (irritable bowel syndrome)      Lung nodule 8/4/2016     Menopausal Disorder      Menopause Has Occurred      Migraine Headache      Migraines      Nicotine Dependence      Osteoarthritis      Osteopenia of multiple sites 9/1/2020     PONV (postoperative nausea and vomiting)      PTSD (post-traumatic stress disorder)      Shingles 2014     Spinal stenosis of lumbar region with neurogenic claudication 3/22/2021     Walk Is Wobbly Or Unsteady (Ataxia)        Past Social History     Reviewed, and she  reports that she has been smoking cigarettes. She has a 45.00 pack-year smoking history. She has quit using smokeless tobacco. She reports current alcohol use. She reports that she does not use drugs.    Family History     Reviewed, and family history includes Alcohol abuse in her father; Asthma in her maternal aunt; Crohn's disease in her sister; Diabetes in her paternal grandmother and sister; Heart attack in her paternal grandmother and paternal uncle; Heart disease in her maternal grandfather and  paternal grandfather; Hypertension in her sister; Lung cancer in her mother.    Medication List   Post Discharge Medication Reconciliation Status: discharge medications reconciled and changed, per note/orders   Albuterol MDI as needed every 4 hours  Lipitor 40 mg daily  Colace twice daily  Doxepin 75 mg at bedtime  Relpax 40 mg 2 times as needed for migraine  Aimovig autoinjector 70 mg/ML once a month  Vitamin D 50,000 units once a week  Nexium 44 mg daily  Prozac 80 mg daily  Folic acid 400 MCG's daily  Norco 10/325 1-2 tabs every 4-6 hours as needed-changed to 1-2 tabs as per scale every 4 hours as needed  Lamictal 200 twice daily  Synthroid 75 MCG's daily  Singulair 10 mg 1 tablet daily  Prazosin 2 capsules at bedtime 2 mg strength  Compazine 5 mg every 8 hours as needed  Senna 2 tabs 2 times daily  Zanaflex 4 mg at bedtime  Chantix 1 mg 1 tablet twice daily    Allergies     Allergies   Allergen Reactions     Codeine Anaphylaxis     Cetirizine Nausea And Vomiting     Gabapentin      Nefazodone Nausea And Vomiting     Oxycodone-Acetaminophen      hallucinations     Trazodone Nausea And Vomiting     Amoxicillin-Pot Clavulanate Rash     Cephalexin Rash     Cyclobenzaprine Rash     Eszopiclone Rash     Methocarbamol Rash     Penicillins Rash     Mild rash       Review of Systems   A comprehensive review of 14 systems was done. Pertinent findings noted here and in history of present illness. All the rest negative.  Constitutional: Negative.  Negative for fever, chills, she has profound activity change, appetite change and fatigue.   HENT: Negative for congestion and facial swelling.    Eyes: Negative for photophobia, redness and visual disturbance.   Respiratory: Negative for cough and chest tightness.    Cardiovascular: Negative for chest pain, palpitations and leg swelling.   Gastrointestinal: Negative for nausea, diarrhea, constipation, blood in stool and abdominal distention.   Genitourinary: Reporting that she  "has urinary frequency and has had several accidents with patient voiding on herself with no warning  Musculoskeletal: Reporting severe pain 10 out of 10 in her back.  No improvement in her leg strength  Had an acute fall in the TCU  Skin: Negative.    Neurological: Negative for dizziness, tremors, syncope, weakness, light-headedness and headaches.   Hematological: Does not bruise/bleed easily.   Psychiatric/Behavioral: Negative.  Very anxious due to pain      Physical Exam     Recent Vitals 4/4/2021   Height 5' 5\"   Weight -   BSA (m2) 1.89 m2   /73   Pulse 86   Temp 98.4   Temp src -   SpO2 93   Some recent data might be hidden       Constitutional: Oriented to person, place, and time and appears well-developed.   HEENT:  Normocephalic and atraumatic.  Eyes: Conjunctivae and EOM are normal. Pupils are equal, round, and reactive to light. No discharge.  No scleral icterus. Nose normal. Mouth/Throat: Oropharynx is clear and moist. No oropharyngeal exudate.    NECK: Normal range of motion. Neck supple. No JVD present. No tracheal deviation present. No thyromegaly present.   CARDIOVASCULAR: Normal rate, regular rhythm and intact distal pulses.  Exam reveals no gallop and no friction rub.  Systolic murmur present.  PULMONARY: Effort normal and breath sounds normal. No respiratory distress.No Wheezing or rales.  ABDOMEN: Soft. Bowel sounds are normal. No distension and no mass.  There is no tenderness. There is no rebound and no guarding. No HSM.  Midline anterior abdominal wall incision is intact no dehiscence or drainage noted  MUSCULOSKELETAL: Normal range of motion. No edema and no tenderness. Mild kyphosis, no tenderness.  Her midline surgical incision is intact with staples noted  LYMPH NODES: Has no cervical, supraclavicular, axillary and groin adenopathy.   NEUROLOGICAL: Alert and oriented to person, place, and time. No cranial nerve deficit.  Normal muscle tone. Coordination normal.   Right lower extremity " weakness; ankle strength is minimal  GENITOURINARY: Deferred exam.  SKIN: Skin is warm and dry. No rash noted. No erythema. No pallor.   EXTREMITIES: No cyanosis, no clubbing, no edema. No Deformity.  PSYCHIATRIC: Normal mood, affect and behavior.      Lab Results     Recent Results (from the past 240 hour(s))   Basic Metabolic Panel    Collection Time: 03/29/21  7:48 AM   Result Value Ref Range    Sodium 139 136 - 145 mmol/L    Potassium 3.8 3.5 - 5.0 mmol/L    Chloride 105 98 - 107 mmol/L    CO2 24 22 - 31 mmol/L    Anion Gap, Calculation 10 5 - 18 mmol/L    Glucose 92 70 - 125 mg/dL    Calcium 8.4 (L) 8.5 - 10.5 mg/dL    BUN 15 8 - 22 mg/dL    Creatinine 1.03 0.60 - 1.10 mg/dL    GFR MDRD Af Amer >60 >60 mL/min/1.73m2    GFR MDRD Non Af Amer 54 (L) >60 mL/min/1.73m2   Magnesium    Collection Time: 03/29/21  7:48 AM   Result Value Ref Range    Magnesium 2.1 1.8 - 2.6 mg/dL   Vitamin D, Total (25-Hydroxy)    Collection Time: 03/29/21  7:48 AM   Result Value Ref Range    Vitamin D, Total (25-Hydroxy) 50.4 30.0 - 80.0 ng/mL   HM2(CBC w/o Differential)    Collection Time: 03/29/21  7:48 AM   Result Value Ref Range    WBC 7.6 4.0 - 11.0 thou/uL    RBC 3.30 (L) 3.80 - 5.40 mill/uL    Hemoglobin 9.3 (L) 12.0 - 16.0 g/dL    Hematocrit 30.0 (L) 35.0 - 47.0 %    MCV 91 80 - 100 fL    MCH 28.2 27.0 - 34.0 pg    MCHC 31.0 (L) 32.0 - 36.0 g/dL    RDW 14.6 (H) 11.0 - 14.5 %    Platelets 410 140 - 440 thou/uL    MPV 8.4 (L) 8.5 - 12.5 fL   Urinalysis    Collection Time: 03/29/21  1:20 PM   Result Value Ref Range    Color, UA Light Yellow Light Yellow, Yellow    Clarity, UA Clear Clear    Glucose, UA Negative Negative    Bilirubin, UA Negative Negative    Ketones, UA Negative Negative    Specific Gravity, UA 1.024 1.001 - 1.030    Blood, UA Negative Negative    pH, UA 6.0 5.0 - 8.0    Protein, UA Negative Negative    Urobilinogen, UA <2.0 mg/dL <2.0 mg/dL    Nitrite, UA Negative Negative    Leukocytes, UA 25 Freda/uL (!) Negative     Bacteria, UA Many (!) None Seen    Amorphous, UA Few (!) None Seen    Mucus, UA Present (!) None Seen lpf    RBC, UA 7 (H) <=2 hpf    WBC UA 15 (H) <=5 hpf    Squam Epithel, UA 1 (H) <=5 /HPF   Culture, Urine    Collection Time: 03/29/21  1:20 PM    Specimen: Urine   Result Value Ref Range    Culture >100,000 col/ml Escherichia coli (!)        Susceptibility    Escherichia coli - JOANNE     Ampicillin <=4 Sensitive      Cefazolin 2 Sensitive      Cefepime <=1 Sensitive      Ceftriaxone <=1 Sensitive      Ciprofloxacin <=0.25 Sensitive      Gentamicin <=2 Sensitive      Levofloxacin <=0.5 Sensitive      Meropenem <=0.5 Sensitive      Nitrofurantoin <=16 Sensitive      Tetracycline <=2 Sensitive      Tobramycin <=2 Sensitive      Trimethoprim + Sulfamethoxazole <=0.5 Sensitive         MEDICAL EQUIPMENT NEEDS:  walker     DISCHARGE PLAN/FACE TO FACE:  I certify that services are/were furnished while this patient was under the care of a physician and that a physician or an allowed non-physician practitioner (NPP), had a face-to-face encounter that meets the physician face-to-face encounter requirements. The encounter was in whole, or in part, related to the primary reason for home health. The patient is confined to his/her home and needs intermittent skilled nursing, physical therapy, speech-language pathology, or the continued need for occupational therapy. A plan of care has been established by a physician and is periodically reviewed by a physician.  Date of Face-to-Face Encounter:/ 4/5/21     I certify that, based on my findings, the following services are medically necessary home health services: [place] [facility]/RN and PT/OT to evaluate and treat    My clinical findings support the need for the above skilled services because: patient will be discharging to home. Patient will need assistance with medication management and performing IADLs and ADLs effectively and safely.     Patient to re-establish plan of care with their  PCP within 7 days after leaving TCU.   Total time spent in this visit is 35 minutes spent talking to this patient reviewing discharge plan also with staff.  Patient remains a falls risk because of right lower extremity weakness and has been given a brace.  She also remains on high doses of narcotics.  Advise close follow-up both with the spine clinic as well as with her primary care physician.      Electronically signed by  TUTU Cornejo

## 2021-06-16 NOTE — TELEPHONE ENCOUNTER
Spoke with pharmacist to make sure there weren't anymore prescriptions of pain medication there for her.   Pharmacist reports there are not. She did however just  84 tablet of the 5-325mg on 4/7/21

## 2021-06-16 NOTE — TELEPHONE ENCOUNTER
Telephone Encounter by Shefali Syed at 2/13/2019  3:16 PM     Author: Shefali Syed Service: -- Author Type: --    Filed: 2/13/2019  3:17 PM Encounter Date: 2/8/2019 Status: Signed    : Shefali Syed       PA INITIATION

## 2021-06-16 NOTE — TELEPHONE ENCOUNTER
Pt states she is taking 2 tablets every four hours. Pt got a RX from the surgeon and does not need this rx at this time. Pt has been in hospital since 3/17/2021. She believes the RX coming from Dr. Victor at [facility], but she has not picked up any of these prescriptions that she is aware of.

## 2021-06-16 NOTE — TELEPHONE ENCOUNTER
Reason for Call:  Other DELAY ORDER FOR HOME CARE-verbal ok    Detailed comments: [facility], states they received order 4/5/21, and they will not be able to get out there until 4/9/21, and due to staffing will not be able to get there until then  Needs delay order to approve this.  Please call [facility] [phone]    Phone Number Patient can be reached at: Home number on file [phone] (home)    Best Time: anytime    Can we leave a detailed message on this number?: Yes    Call taken on 4/7/2021 at 9:01 AM by Thang Ayers

## 2021-06-16 NOTE — PROGRESS NOTES
Pulmonary Clinic Follow Up    Cc: follow up nodule, dyspnea    HPI: 55yoF with tobacco abuse presented after nodule discovered on CT scan of the chest. She had complaints of ongoing shortness of breath with stairs or walking too quickly.    Current regimen: Singulair and albuterol.    Didn't refill her Symbicort and she doesn't know why. She is still short of breath at times and wheezes but not daily. Has not required prednisone since I saw her last.     CAT: 3_4_3_4_3_0_5_3-25    ROS: 12-point ROS reviewed with patient. Notable for sinus congestion and dental problem as well as, pain in joints, anxiety, sleep disturbance.     Current Outpatient Prescriptions   Medication Sig Note     albuterol (PROAIR HFA) 90 mcg/actuation inhaler Inhale 2 puffs every 4 (four) hours as needed for shortness of breath. every 4-6 hours as needed.      atorvastatin (LIPITOR) 40 MG tablet TAKE 1 TABLET(40 MG) BY MOUTH AT BEDTIME      budesonide-formoterol (SYMBICORT) 160-4.5 mcg/actuation inhaler Inhale 2 puffs 2 (two) times a day.      diazePAM (VALIUM) 2 MG tablet Take 1 tablet (2 mg total) by mouth as needed for anxiety (PRIOR TO FLYING).      diphenhydrAMINE (BENADRYL) 25 mg capsule Take by mouth. 2/20/2018: Received from: [facility] Received Sig: take 2 capsule by oral route  every bedtime as needed     doxepin (SINEQUAN) 25 MG capsule Take 75 mg by mouth bedtime.       eletriptan (RELPAX) 40 MG tablet Take 1 tab at earliest onset of migraine, may repeat in 2 hours if necessary      ergocalciferol (ERGOCALCIFEROL) 50,000 unit capsule Take 1 capsule (50,000 Units total) by mouth once a week.      esomeprazole (NEXIUM) 20 MG capsule Take 40 mg by mouth bedtime. Uses OTC strength      FLUoxetine (PROZAC) 20 MG capsule Take 60 mg by mouth daily.  11/9/2015: Received from: External Pharmacy Received Sig:      folic acid (FOLVITE) 800 MCG tablet Take by mouth. 2/20/2018: Received from: [facility] Received Sig: take 1 tablet  by oral route  every day     HYDROcodone-acetaminophen 5-325 mg per tablet Take 1 tablet by mouth every 6 (six) hours as needed for pain.      hydrOXYzine (VISTARIL) 25 MG capsule Take 25 mg by mouth. Take 1-2 capsules every 6 hours as needed for migraine      ipratropium-albuterol (COMBIVENT RESPIMAT)  mcg/actuation Mist inhaler Inhale. 2/20/2018: Received from: [facility] Received Sig: inhale 1 puff by inhalation route 4 times every day ; may take additional puffs as needed not to exceed 6 puffs in 24hrs     lamoTRIgine (LAMICTAL) 200 MG tablet Take 200 mg by mouth bedtime.      levothyroxine (SYNTHROID, LEVOTHROID) 75 MCG tablet TAKE 1 TABLET BY MOUTH ONCE DAILY      melatonin 5 mg Tab Take 5 mg by mouth bedtime.      montelukast (SINGULAIR) 10 mg tablet TAKE 1 TABLET BY MOUTH EVERY DAY      prazosin (MINIPRESS) 1 MG capsule Take 2 mg by mouth bedtime.  10/2/2015: Received from: External Pharmacy     prochlorperazine (COMPAZINE) 10 MG tablet Take 1 tablet (10 mg total) by mouth every 8 (eight) hours as needed.      senna-docusate (SENNOSIDES-DOCUSATE SODIUM) 8.6-50 mg tablet Take 3 tablets by mouth bedtime.      SOY ISOFLA/BLK COHOSH/MAG BARK (ESTROVEN ORAL) Take by mouth.      tiZANidine (ZANAFLEX) 4 MG tablet TAKE 1-2 TABLETS BY MOUTH DURING THE DAY AND 2 TABLETS AT BEDTIME AS NEEDED      triamcinolone (KENALOG) 0.1 % cream APPLY TOPICALLY BID TO HANDS AND FEET FOR 2 TO 3 WEEKS 10/4/2016: Received from: External Pharmacy     varenicline (CHANTIX STARTING MONTH BOX) 0.5 mg (11)- 1 mg (42) tablet 1 wk before you stop smoking take 0.5mg daily on days 1-3, 0.5mg 2 times each day on days 4-7, then 1mg 2 times daily.      varenicline (CHANTIX) 1 mg tablet Take 1 tab by mouth two times a day. Take after eating with a full glass of water. NOTE: Dispense as maintenance for refills only.      Allergies   Allergen Reactions     Codeine Anaphylaxis     Cetirizine Nausea And Vomiting     Nefazodone Nausea And  Vomiting     Oxycodone-Acetaminophen      hallucinations     Trazodone Nausea And Vomiting     Amoxicillin-Pot Clavulanate Rash     Cephalexin Rash     Cyclobenzaprine Rash     Eszopiclone Rash     Methocarbamol Rash     Penicillins Rash     Mild rash       Vitals:    02/26/18 0752   BP: 108/62   Pulse: 80   Resp: 20   SpO2: 96%   RA  Gen: NAD  HEENT: No oropharyngeal lesions  Neck: no  lymphadenopathy  C/V: RRR, S1 and S2.  Resp: clear bilaterally, no wheezing or rales.  Ext: no edema  Skin: no lesions  Neuro: Grossly normal    PFTs   FEV1/FVC is 0.70 and is at the lower limit of normal.  FEV1 is 66% predicted and is reduced.  FVC is 72% predicted and reduced.  There was no improvement in spirometry after a single inhaled dose of bronchodilator.  TLC is 102% predicted and is normal.  RV is 141% predicted and is increased.  DLCO is 71% predicted and is reduced when it is corrected for hemoglobin.  Impression: mild-moderate obstruction without reversibility and airtrapping. Mild diffusion capacity defect secondary to early emphysema.    ASSESSMENT/PLAN:  1) Lung nodule-stable for 18 months, likely benign  -Start annual low-dose lung cancer screening 2/2019 (see counseling below)    2)Tobacco abuse  -Discussed for 8 minutes. Her granddaughter will be born in July so I encouraged her to set a quit date prior to that. She has started Chantix, today is day 2. Her  has already quit and is encouraging her. She seems motivated but hasn't set a day yet.     3) COPD with early emphysema  -Continue Symbicort--Evidence that ICS/LABA can prevent progression in the setting of tobacco abuse  -Smoking Cessation  -Reviewed PFTs at length, the obstuction may be reversible with smoking cessation, remains unclear  -COPD action plan completed      Angelia Bradford MD  Electronically signed on 2/26/2018 12:33 PM  >50% of this 30 minute visit spent in direct counseling    Lung Cancer Screening pre-scan counseling Visit    The patient  fits the risk profile of patients who benefit from this screening:  -The patient is >55 years old and <80 years old  -The patient has 35 pack year history (over 30)  -The patient has smoked within the past 15 years  -The patient has no medical comorbidity severe enough that it would cause mortality prior to mortality due to the lung cancer attempting to be detected.    Discussion with patient regarding the harms associated with LDCT screening include false-negative and false-positive results, incidental findings, overdiagnosis, and radiation exposure were reviewed at length.   The patient understands that pursuing this screening test may result in a biopsy that was not necessary. It may also produced added stress over a nodule that is likely not cancer.    Of 100 patients who get screening, 25 will have a positive scan. Of those 25, only 1 will have cancer.  Overdiagnosis is estimated at 10% of patients-- they would not have been detected in the patient's lifetime without screening. Less than 1% of patients likely had death related to radiation exposure increase.   Average low-dose CT associated with 0.61 to 1.5 mSv. Annual background radiation exposure in the [place] averages 2.4 mS; mammogram is 0.7mSv.    The benefits are reduction in risk of death from lung cancer. The number needed to treat is 320 (for every 320 patients who undergo screening, 1 patient will have a benefit in mortality from early detection from the screening).    Undergoing this screening implies willingness to pursue further potentially invasive testing to discover potential cancer.    All questions were answered.    The patient was counseled regarding smoking cessation and its risk for lung cancer.    The patient s results will be followed in our pulmonary registry and will be recalled based on the findings of the CT scan.    Angelia Bradford MD  Electronically signed on 3/1/2018 9:00 AM

## 2021-06-16 NOTE — PROGRESS NOTES
SUBJECTIVE: Dayana Samaniego is a 57 y.o. White or  female who presents today for a med check.  Labs were done in late February.  She has a history of hypothyroidism with TSH being 1.8, hyperlipidemia with , bipolar, migraine, and reflux.  She is a longtime smoker and is attempting to quit.  She is using Chantix.  For a while she is been down to 8-10 cigarettes a day.  Today she has had only 3 so far.  She is quitting because her daughter Deja is having her first grandbaby and is due in July.  She is very excited for this.  She told her daughter says she cannot be around the baby if she is smoking.  She has some mild COPD which is flaring with her quitting.  She is coughing quite a bit more and we discuss this is because her body is getting rid of the buildup of toxins in her lungs.    She has some chronic pain issues as well.  She has fibromyalgia along with her migraines.  She sees Dr. Lopez for Botox treatment.  This has helped her quite a bit.  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    OBJECTIVE: /64 (Patient Site: Left Arm, Patient Position: Sitting, Cuff Size: Adult Regular)  Pulse 82  Wt 150 lb 8 oz (68.3 kg)  SpO2 97%  BMI 25.24 kg/m2  General: Normal weight middle-aged female in no acute distress, intermittent productive cough  Heart: Regular rate and rhythm without murmur  Lungs: Clear with mildly decreased breath sounds  Abdomen: Soft, nontender  Extremities: Warm, dry and without edema    ASSESSMENT & PLAN:    1. Hypothyroidism, unspecified type  levothyroxine (SYNTHROID, LEVOTHROID) 75 MCG tablet   2. Hyperlipidemia, unspecified hyperlipidemia type     3. Bipolar II Disorder     4. Migraine     5. Centrilobular emphysema     6. Fibromyalgia     7. Nausea  prochlorperazine (COMPAZINE)  10 MG tablet     I will refill her levothyroxine for a year.  I am refilling her Compazine which she sometimes uses if she gets migraines.  Other meds will be refilled as needed.  I congratulated her on her efforts to quit nicotine.  She declines the flu shot.  She should see me in 6 months time for her annual physical as she is due for her Pap.    Patient Active Problem List   Diagnosis     Hypothyroidism     Chronic Pain Syndrome     Hyperlipidemia     Walk Is Wobbly Or Unsteady (Ataxia)     Menopause Has Occurred     Bipolar II Disorder     Migraine Headache     Nicotine Dependence     Fibromyalgia     Menopausal Disorder     COPD (chronic obstructive pulmonary disease)     GERD (gastroesophageal reflux disease)     Hallux abductovalgus with bunions, unspecified laterality     Lung nodule     Hiatal hernia     Urinary frequency     Visit for screening mammogram     Centrilobular emphysema       Current Outpatient Prescriptions on File Prior to Visit   Medication Sig Dispense Refill     albuterol (PROAIR HFA) 90 mcg/actuation inhaler Inhale 2 puffs every 4 (four) hours as needed for shortness of breath. every 4-6 hours as needed. 8.5 g 2     atorvastatin (LIPITOR) 40 MG tablet TAKE 1 TABLET(40 MG) BY MOUTH AT BEDTIME 90 tablet 3     budesonide-formoterol (SYMBICORT) 160-4.5 mcg/actuation inhaler Inhale 2 puffs 2 (two) times a day. 3 Inhaler 3     diazePAM (VALIUM) 2 MG tablet Take 1 tablet (2 mg total) by mouth as needed for anxiety (PRIOR TO FLYING). 10 tablet 0     diphenhydrAMINE (BENADRYL) 25 mg capsule Take by mouth.       doxepin (SINEQUAN) 25 MG capsule Take 75 mg by mouth bedtime.        eletriptan (RELPAX) 40 MG tablet Take 1 tab at earliest onset of migraine, may repeat in 2 hours if necessary 12 tablet 0     ergocalciferol (ERGOCALCIFEROL) 50,000 unit capsule Take 1 capsule (50,000 Units total) by mouth once a week. 13 capsule 3     esomeprazole (NEXIUM) 20 MG capsule Take 40 mg by mouth bedtime. Uses  OTC strength       FLUoxetine (PROZAC) 20 MG capsule Take 60 mg by mouth daily.        fluticasone-vilanterol (BREO ELLIPTA) 100-25 mcg/dose DsDv inhaler Inhale 1 puff daily. 1 each 11     folic acid (FOLVITE) 800 MCG tablet Take by mouth.       HYDROcodone-acetaminophen 5-325 mg per tablet Take 1 tablet by mouth every 6 (six) hours as needed for pain. 30 tablet 0     hydrOXYzine (VISTARIL) 25 MG capsule Take 25 mg by mouth. Take 1-2 capsules every 6 hours as needed for migraine       lamoTRIgine (LAMICTAL) 200 MG tablet Take 200 mg by mouth bedtime.       melatonin 5 mg Tab Take 5 mg by mouth bedtime.       montelukast (SINGULAIR) 10 mg tablet TAKE 1 TABLET BY MOUTH EVERY DAY 90 tablet 3     prazosin (MINIPRESS) 1 MG capsule Take 2 mg by mouth bedtime.        senna-docusate (SENNOSIDES-DOCUSATE SODIUM) 8.6-50 mg tablet Take 3 tablets by mouth bedtime.       SOY ISOFLA/BLK COHOSH/MAG BARK (ESTROVEN ORAL) Take by mouth.       tiZANidine (ZANAFLEX) 4 MG tablet TAKE 1-2 TABLETS BY MOUTH DURING THE DAY AND 2 TABLETS AT BEDTIME AS NEEDED 360 tablet 0     triamcinolone (KENALOG) 0.1 % cream APPLY TOPICALLY BID TO HANDS AND FEET FOR 2 TO 3 WEEKS  3     varenicline (CHANTIX STARTING MONTH BOX) 0.5 mg (11)- 1 mg (42) tablet 1 wk before you stop smoking take 0.5mg daily on days 1-3, 0.5mg 2 times each day on days 4-7, then 1mg 2 times daily. 53 tablet 0     varenicline (CHANTIX) 1 mg tablet Take 1 tab by mouth two times a day. Take after eating with a full glass of water. NOTE: Dispense as maintenance for refills only. 60 tablet 2     No current facility-administered medications on file prior to visit.

## 2021-06-16 NOTE — TELEPHONE ENCOUNTER
Reason for Call:  Medication or medication refill:    Do you use a [facility]?  Name of the pharmacy and phone number for the current request:     [facility]    Name of the medication requested: hydrocodone    Other request: just had surg on 3.17 but had issues with her surg. Just home today from [facility]- still cant move leg, in a lot of pain. She had fusion on her lumbar spine- she called them for the pain, they told her to call here for her refill  [facility] is the surgeons office    Can we leave a detailed message on this number? Yes    Phone number patient can be reached at:   Cell number on file:    Telephone Information:   Mobile [phone]       Best Time: asap    Call taken on 4/6/2021 at 3:45 PM by Halima Torres

## 2021-06-16 NOTE — PROGRESS NOTES
[facility] note      Patient: Dayana Samaniego  MRN: [id number]      [facility] [[id number]]  Reason for Visit     Chief Complaint   Patient presents with     Review Of Multiple Medical Conditions   Follow-up on back pain/right lower extremity weakness    Code Status     Full code    Assessment     Acute fracture involving the anterior superior endplate S1  Pain management with patient reporting 10/10 pain  New onset UTI  Status post anterior spine fusion L4-S1, posterior spine fusion with instrumentation  L4-S1 decompression  L4-L5 bilateral bone marrow aspiration fusion anterior spine level 01 on 3/ 17/21  Underlying history of severe spinal stenosis with neurogenic claudication  New onset right lower extremity weakness postoperatively  Pain management  COPD  Bipolar disorder  Chronic kidney disease stage III  Nicotine addiction  Anemia secondary to blood loss discharge hemoglobin 9.9  Generalized weakness    Plan     Patient has been admitted to the TCU for strengthening and rehab  She underwent above-mentioned procedure and tolerated it well.  Continue with her incisional cares  She had a scheduled follow-up with orthopedics done  At the recommendation she has been given a knee brace for her right lower extremity.  Pain management was reviewed with them and they are recommending patient continue with her high doses of narcotics.  Patient again requesting scheduling at the request of Ortho.  She does not want to taper.  Advised to discuss this with either orthopedics or the pain clinic.  She also on scheduled Zanaflex for increased pain.  In addition UA UC does show an infection with cultures growing E. coli.  Cipro 250 twice daily for 5 days based on cultures and sensitivities.  Continue with her PT OT and rehab          History     Patient is a very pleasant 60 y.o. female who is admitted to TCU  Patient has a known history of neurogenic claudication with underlying history of  lumbar spinal stenosis.  She was electively admitted to the hospital and underwent above-mentioned procedure.  Postoperatively she did well.  Postoperatively she developed new onset right lower extremity weakness due to nerve irritation  Spine surgery recommended steroids and pain management.  She has been discharged to the TCU  She had a scheduled follow-up with orthopedics due to persistent right lower extremity weakness.  She has been given a knee brace by them  She was evaluated and had an imaging which shows that she has a superior endplate fracture of the S1.  Spine clinic was contacted.  She continues to have pain concern  Pain management reviewed and she had a follow-up with orthopedics to discuss her intractable pain.  At the recommendation they have recommended continuing with her high doses of narcotics in a scheduled fashion.  She also has Zanaflex available now.  Continue to monitor pain closely not ready for any taper as yet  Ambulation is still difficult in spite of a walker  This is primarily due to her persistent right lower extremity weakness with numbness.  She told me that she can barely feel her legs and she feels clumsy when walking because of this  She has an underlying history of bipolar disorder and will continue on her Lamictal Vistaril and Prozac.  She reports her mood is stable however she does have situational stressors as per her related to her pain  She has chronic kidney disease stage III which was stable.  She also has COPD with no wheezing and exacerbation and felt to be stable  She also has a UTI with cultures growing E. coli.  She is reporting dysuria with frequency    Past Medical History     Active Ambulatory (Non-Hospital) Problems    Diagnosis     DDD (degenerative disc disease), lumbosacral     Spinal stenosis of lumbar region with neurogenic claudication     Anemia     Hypotension     Hypothyroidism     Hyperlipidemia     Fibromyalgia     Common migraine with intractable  migraine     Chronic kidney disease, stage 3     Osteopenia of multiple sites     Cigarette nicotine dependence without complication     Centrilobular emphysema (H)     Hiatal hernia     Lung nodule     Hallux abductovalgus with bunions, unspecified laterality     Bipolar 2 disorder (H)     Menopausal Disorder     Chronic Pain Syndrome     Walk Is Wobbly Or Unsteady (Ataxia)     Menopause Has Occurred     Migraine Headache     Nicotine Dependence     GERD (gastroesophageal reflux disease)     Past Medical History:   Diagnosis Date     Anemia 3/18/2021     Anxiety      Asthma      Bipolar 2 disorder (H)      Centrilobular emphysema (H) 2/26/2018     Cervical spinal stenosis      Chronic kidney disease      Chronic kidney disease, stage 3 1/14/2021     Chronic pain syndrome      Cigarette nicotine dependence without complication 3/4/2020     Common migraine with intractable migraine 2/15/2021     COPD (chronic obstructive pulmonary disease) (H)      DDD (degenerative disc disease), lumbosacral 3/22/2021     Depression      Fibrocystic breast      Fibromyalgia      GERD (gastroesophageal reflux disease)      Hallux abductovalgus with bunions, unspecified laterality 12/14/2015     Herpes Zoster (Shingles)      Hiatal hernia      History of electroconvulsive therapy      Hyperlipidemia 3/17/2021     Hypotension 3/18/2021     Hypothyroidism      IBS (irritable bowel syndrome)      Lung nodule 8/4/2016     Menopausal Disorder      Menopause Has Occurred      Migraine Headache      Migraines      Nicotine Dependence      Osteoarthritis      Osteopenia of multiple sites 9/1/2020     PONV (postoperative nausea and vomiting)      PTSD (post-traumatic stress disorder)      Shingles 2014     Spinal stenosis of lumbar region with neurogenic claudication 3/22/2021     Walk Is Wobbly Or Unsteady (Ataxia)        Past Social History     Reviewed, and she  reports that she has been smoking cigarettes. She has a 45.00 pack-year smoking  history. She has quit using smokeless tobacco. She reports current alcohol use. She reports that she does not use drugs.    Family History     Reviewed, and family history includes Alcohol abuse in her father; Asthma in her maternal aunt; Crohn's disease in her sister; Diabetes in her paternal grandmother and sister; Heart attack in her paternal grandmother and paternal uncle; Heart disease in her maternal grandfather and paternal grandfather; Hypertension in her sister; Lung cancer in her mother.    Medication List   Post Discharge Medication Reconciliation Status: discharge medications reconciled and changed, per note/orders   Albuterol MDI as needed every 4 hours  Lipitor 40 mg daily  Colace twice daily  Doxepin 75 mg at bedtime  Relpax 40 mg 2 times as needed for migraine  Aimovig autoinjector 70 mg/ML once a month  Vitamin D 50,000 units once a week  Nexium 44 mg daily  Prozac 80 mg daily  Folic acid 400 MCG's daily  Norco 10/325 1-2 tabs every 4-6 hours as needed-changed to 1-2 tabs as per scale every 4 hours as needed  Lamictal 200 twice daily  Synthroid 75 MCG's daily  Singulair 10 mg 1 tablet daily  Prazosin 2 capsules at bedtime 2 mg strength  Compazine 5 mg every 8 hours as needed  Senna 2 tabs 2 times daily  Zanaflex 4 mg at bedtime  Chantix 1 mg 1 tablet twice daily    Allergies     Allergies   Allergen Reactions     Codeine Anaphylaxis     Cetirizine Nausea And Vomiting     Gabapentin      Nefazodone Nausea And Vomiting     Oxycodone-Acetaminophen      hallucinations     Trazodone Nausea And Vomiting     Amoxicillin-Pot Clavulanate Rash     Cephalexin Rash     Cyclobenzaprine Rash     Eszopiclone Rash     Methocarbamol Rash     Penicillins Rash     Mild rash       Review of Systems   A comprehensive review of 14 systems was done. Pertinent findings noted here and in history of present illness. All the rest negative.  Constitutional: Negative.  Negative for fever, chills, she has profound activity  "change, appetite change and fatigue.   HENT: Negative for congestion and facial swelling.    Eyes: Negative for photophobia, redness and visual disturbance.   Respiratory: Negative for cough and chest tightness.    Cardiovascular: Negative for chest pain, palpitations and leg swelling.   Gastrointestinal: Negative for nausea, diarrhea, constipation, blood in stool and abdominal distention.   Genitourinary: Reporting that she has urinary frequency and has had several accidents with patient voiding on herself with no warning  Musculoskeletal: Reporting severe pain 10 out of 10 in her back.  No improvement in her leg strength  Had an acute fall in the TCU  Skin: Negative.    Neurological: Negative for dizziness, tremors, syncope, weakness, light-headedness and headaches.   Hematological: Does not bruise/bleed easily.   Psychiatric/Behavioral: Negative.  Very anxious due to pain      Physical Exam     Recent Vitals 3/31/2021   Height 5' 5\"   Weight 171 lbs 6 oz   BSA (m2) 1.89 m2   /69   Pulse 87   Temp 98.4   Temp src -   SpO2 94   Some recent data might be hidden       Constitutional: Oriented to person, place, and time and appears well-developed.   HEENT:  Normocephalic and atraumatic.  Eyes: Conjunctivae and EOM are normal. Pupils are equal, round, and reactive to light. No discharge.  No scleral icterus. Nose normal. Mouth/Throat: Oropharynx is clear and moist. No oropharyngeal exudate.    NECK: Normal range of motion. Neck supple. No JVD present. No tracheal deviation present. No thyromegaly present.   CARDIOVASCULAR: Normal rate, regular rhythm and intact distal pulses.  Exam reveals no gallop and no friction rub.  Systolic murmur present.  PULMONARY: Effort normal and breath sounds normal. No respiratory distress.No Wheezing or rales.  ABDOMEN: Soft. Bowel sounds are normal. No distension and no mass.  There is no tenderness. There is no rebound and no guarding. No HSM.  MUSCULOSKELETAL: Normal range of " motion. No edema and no tenderness. Mild kyphosis, no tenderness.  Her midline surgical incision is intact with staples noted  LYMPH NODES: Has no cervical, supraclavicular, axillary and groin adenopathy.   NEUROLOGICAL: Alert and oriented to person, place, and time. No cranial nerve deficit.  Normal muscle tone. Coordination normal.   Right lower extremity weakness; ankle strength is minimal  GENITOURINARY: Deferred exam.  SKIN: Skin is warm and dry. No rash noted. No erythema. No pallor.   EXTREMITIES: No cyanosis, no clubbing, no edema. No Deformity.  PSYCHIATRIC: Normal mood, affect and behavior.      Lab Results     Recent Results (from the past 240 hour(s))   COVID-19 Virus PCR MRF    Collection Time: 03/21/21  6:45 PM    Specimen: Respiratory   Result Value Ref Range    COVID-19 VIRUS SPECIMEN SOURCE Nasopharyngeal     2019-nCOV       Test received-See reflex to IDDL test SARS CoV2 (COVID-19) Virus RT-PCR   SARS-CoV-2 (COVID-19)-PCR    Collection Time: 03/21/21  6:45 PM    Specimen: Respiratory   Result Value Ref Range    SARS-CoV-2 Virus Specimen Source Nasopharyngeal     SARS-CoV-2 PCR Result NEGATIVE     SARS-COV-2 PCR COMMENT (Note)    HM2 (CBC W/O DIFF)    Collection Time: 03/22/21  3:47 PM   Result Value Ref Range    WBC 7.3 4.0 - 11.0 thou/uL    RBC 3.36 (L) 3.80 - 5.40 mill/uL    Hemoglobin 9.7 (L) 12.0 - 16.0 g/dL    Hematocrit 30.0 (L) 35.0 - 47.0 %    MCV 89 80 - 100 fL    MCH 28.9 27.0 - 34.0 pg    MCHC 32.3 32.0 - 36.0 g/dL    RDW 14.5 11.0 - 14.5 %    Platelets 295 140 - 440 thou/uL    MPV 8.5 8.5 - 12.5 fL   Basic Metabolic Panel    Collection Time: 03/22/21  3:48 PM   Result Value Ref Range    Sodium 139 136 - 145 mmol/L    Potassium 4.1 3.5 - 5.0 mmol/L    Chloride 105 98 - 107 mmol/L    CO2 25 22 - 31 mmol/L    Anion Gap, Calculation 9 5 - 18 mmol/L    Glucose 101 70 - 125 mg/dL    Calcium 8.4 (L) 8.5 - 10.5 mg/dL    BUN 19 8 - 22 mg/dL    Creatinine 0.96 0.60 - 1.10 mg/dL    GFR MDRD Af  Amer >60 >60 mL/min/1.73m2    GFR MDRD Non Af Amer 59 (L) >60 mL/min/1.73m2   Basic Metabolic Panel    Collection Time: 03/29/21  7:48 AM   Result Value Ref Range    Sodium 139 136 - 145 mmol/L    Potassium 3.8 3.5 - 5.0 mmol/L    Chloride 105 98 - 107 mmol/L    CO2 24 22 - 31 mmol/L    Anion Gap, Calculation 10 5 - 18 mmol/L    Glucose 92 70 - 125 mg/dL    Calcium 8.4 (L) 8.5 - 10.5 mg/dL    BUN 15 8 - 22 mg/dL    Creatinine 1.03 0.60 - 1.10 mg/dL    GFR MDRD Af Amer >60 >60 mL/min/1.73m2    GFR MDRD Non Af Amer 54 (L) >60 mL/min/1.73m2   Magnesium    Collection Time: 03/29/21  7:48 AM   Result Value Ref Range    Magnesium 2.1 1.8 - 2.6 mg/dL   Vitamin D, Total (25-Hydroxy)    Collection Time: 03/29/21  7:48 AM   Result Value Ref Range    Vitamin D, Total (25-Hydroxy) 50.4 30.0 - 80.0 ng/mL   HM2(CBC w/o Differential)    Collection Time: 03/29/21  7:48 AM   Result Value Ref Range    WBC 7.6 4.0 - 11.0 thou/uL    RBC 3.30 (L) 3.80 - 5.40 mill/uL    Hemoglobin 9.3 (L) 12.0 - 16.0 g/dL    Hematocrit 30.0 (L) 35.0 - 47.0 %    MCV 91 80 - 100 fL    MCH 28.2 27.0 - 34.0 pg    MCHC 31.0 (L) 32.0 - 36.0 g/dL    RDW 14.6 (H) 11.0 - 14.5 %    Platelets 410 140 - 440 thou/uL    MPV 8.4 (L) 8.5 - 12.5 fL   Urinalysis    Collection Time: 03/29/21  1:20 PM   Result Value Ref Range    Color, UA Light Yellow Light Yellow, Yellow    Clarity, UA Clear Clear    Glucose, UA Negative Negative    Bilirubin, UA Negative Negative    Ketones, UA Negative Negative    Specific Gravity, UA 1.024 1.001 - 1.030    Blood, UA Negative Negative    pH, UA 6.0 5.0 - 8.0    Protein, UA Negative Negative    Urobilinogen, UA <2.0 mg/dL <2.0 mg/dL    Nitrite, UA Negative Negative    Leukocytes, UA 25 Freda/uL (!) Negative    Bacteria, UA Many (!) None Seen    Amorphous, UA Few (!) None Seen    Mucus, UA Present (!) None Seen lpf    RBC, UA 7 (H) <=2 hpf    WBC UA 15 (H) <=5 hpf    Squam Epithel, UA 1 (H) <=5 /HPF   Culture, Urine    Collection Time:  03/29/21  1:20 PM    Specimen: Urine   Result Value Ref Range    Culture >100,000 col/ml Escherichia coli (!)        Susceptibility    Escherichia coli - JOANNE     Ampicillin <=4 Sensitive      Cefazolin 2 Sensitive      Cefepime <=1 Sensitive      Ceftriaxone <=1 Sensitive      Ciprofloxacin <=0.25 Sensitive      Gentamicin <=2 Sensitive      Levofloxacin <=0.5 Sensitive      Meropenem <=0.5 Sensitive      Nitrofurantoin <=16 Sensitive      Tetracycline <=2 Sensitive      Tobramycin <=2 Sensitive      Trimethoprim + Sulfamethoxazole <=0.5 Sensitive                 Electronically signed by  TUTU Cornejo

## 2021-06-16 NOTE — PROGRESS NOTES
[facility] Patient Outreach:     Discharge Date::  4/6/2021      Reason for TCU stay (discharge diagnosis)::  S/p anterior spinal fusion L4-S1; Acute Fx involving anterior superior endplate S1      Are you feeling better, the same or worse since your discharge?:  Patient is feeling worse      Why are you feeling worse?:  'hurts more' hard to come home and settle in. Moving around more so pain isn't as managed - farther to go to the bathroom. Discharged from facility with Norco 10-325mg Take 1-2 tabs q4hrs PRN. Unable to see PCP d/t provider out all week, soonest able to schedule is on 4/13/21. Does not have enough NORCO to last until the appointment and PCP office refusing to fill in provider's absence.       As part of your discharge plan, did they discuss home care with you?: Yes        Have your seen them yet, or are they scheduled to visit?: No (waiting for call from home care to call and schedule.)        Did you receive any new medications, or was there a change to your medications?: No            Do you have any follow up visits scheduled with your PCP or Specialist?:  Yes, with PCP      (RN) Is it scheduled soon enough (3-5 days)?: No        (RN) Is the patient okay with moving appointment up (if RN feels appropriate)?: No

## 2021-06-16 NOTE — TELEPHONE ENCOUNTER
In  it says 30 tablets of norco written by seda Victor on 3/28, 3/30, and 4/2.  Did patient not get these or did she go through them all already?    William Hines, CNP

## 2021-06-16 NOTE — TELEPHONE ENCOUNTER
Forms Request  Name of form/paperwork: Other:  [facility] [employer]  Have you been seen for this request: Yes:  IN DR REDDY INBOX  Do we have the form: Yes- WHEN DONE  When is form needed by: WHEN DONE  How would you like the form returned: Fax:  N/A  Patient Notified form requests are processed in 3-5 business days: No    Okay to leave a detailed message? No

## 2021-06-16 NOTE — TELEPHONE ENCOUNTER
Reason for Call:  Other      Detailed comments: rachel from [facility] calling amarjit you know that he will only be going out 1 x this week- just an fyi  Phone Number Patient can be reached at: Other phone number:      Best Time:     Can we leave a detailed message on this number?: No call back needed    Call taken on 4/13/2021 at 1:46 PM by Halima Torres

## 2021-06-16 NOTE — PROGRESS NOTES
[facility] note      Patient: Dayana Samaniego  MRN: [id number]      [facility] [[id number]]  Reason for Visit     Chief Complaint   Patient presents with     Review Of Multiple Medical Conditions   Follow-up on back pain/right lower extremity weakness    Code Status     Full code    Assessment     Acute fracture involving the anterior superior endplate S1  Pain management with patient reporting 10/10 pain  New onset urinary frequency with incontinence  Status post anterior spine fusion L4-S1, posterior spine fusion with instrumentation  L4-S1 decompression  L4-L5 bilateral bone marrow aspiration fusion anterior spine level 01 on 3/ 17/21  Underlying history of severe spinal stenosis with neurogenic claudication  New onset right lower extremity weakness postoperatively  Pain management  COPD  Bipolar disorder  Chronic kidney disease stage III  Nicotine addiction  Anemia secondary to blood loss discharge hemoglobin 9.9  Generalized weakness    Plan     Patient has been admitted to the TCU for strengthening and rehab  She underwent above-mentioned procedure and tolerated it well.  Continue with her incisional cares  Incision is healing well with no drainage noted time or concern for secondary infection.  Patient seen today at request because of pain concerns.  She is on high doses of narcotics.  She is requesting scheduling because she was taking narcotics every 2 hours at home.  Unfortunately she is almost a week out and with no improvement in pain instead worsening noted.  I have discussed this with her and patient and her  have been quite agitated and requesting that her narcotics be scheduled.  Limitations in the TCU especially on high doses narcotics were explained to her.  We will continue with her as needed.  Will however put her on Zanaflex 2 mg 4 times daily in addition to her other pain pills.  No taper at this point.  I have again asked her to see her surgeon  because of her ongoing concerns.  She has not made an appointment and apparently as per her she was told she does not need to be seen for 6 weeks.  She continues to have right lower extremity weakness with numbness which has also not improved.  This puts her at a high fall risk and she is already fallen once.  In addition she is now reporting new onset of urinary incontinence.  She reports that she is going bathroom frequently and will end up urinating on herself with no idea.  At this point I am planning to check a UA UC urgently on her.  She will be put on time voiding.  Nursing will check a PVR to ensure that she is not retaining urine either.  If no improvement noted she may need urgent imaging again.  She has had 2 imagings done which have not been diagnostic.  Total time spent is 35 minutes with more than 25-minute spent face-to-face reviewing pain management right lower extremity weakness follow-up with orthopedics and her urinary symptoms.  Patient again encouraged to make a stat appointment with orthopedics to discuss this issue with pain management  Also advised to minimize caffeine products to minimize bladder irritation        History     Patient is a very pleasant 60 y.o. female who is admitted to TCU  Patient has a known history of neurogenic claudication with underlying history of lumbar spinal stenosis.  She was electively admitted to the hospital and underwent above-mentioned procedure.  Postoperatively she did well.  Postoperatively she developed new onset right lower extremity weakness due to nerve irritation  Spine surgery recommended steroids and pain management.  She has been discharged to the TCU  Unfortunately that is something that is bothering her as well as her   She told me that she was taking a pain pill every 2 hours at home.  She is on a very high dose of Norco which she does not want any taper done.  Family has requested no taper either.  At this point she reports that she  would prefer to have her pain pills given to her around-the-clock almost every 2 hours.  She was counseled that this would not be a very safe option in the TCU  She told me that she had contacted her spine surgeon was was told that there is no need to be seen that quickly  She was evaluated and had an imaging which shows that she has a superior endplate fracture of the S1.  Spine clinic was contacted.  She continues to have pain concern  Ambulation is still difficult in spite of a walker  This is primarily due to her persistent right lower extremity weakness with numbness.  She told me that she can barely feel her legs and she feels clumsy when walking because of this  She has an underlying history of bipolar disorder and will continue on her Lamictal Vistaril and Prozac.  She reports her mood is stable however she does have situational stressors as per her related to her pain  She has chronic kidney disease stage III which was stable.  She also has COPD with no wheezing and exacerbation and felt to be stable  She started quite weak and has difficulty ambulating  Overall now reporting more urinary symptoms she is reporting that she is emptying her bladder on herself without even noticing that she needs to go urinate    Past Medical History     Active Ambulatory (Non-Hospital) Problems    Diagnosis     DDD (degenerative disc disease), lumbosacral     Spinal stenosis of lumbar region with neurogenic claudication     Anemia     Hypotension     Hypothyroidism     Hyperlipidemia     Fibromyalgia     Common migraine with intractable migraine     Chronic kidney disease, stage 3     Osteopenia of multiple sites     Cigarette nicotine dependence without complication     Centrilobular emphysema (H)     Hiatal hernia     Lung nodule     Hallux abductovalgus with bunions, unspecified laterality     Bipolar 2 disorder (H)     Menopausal Disorder     Chronic Pain Syndrome     Walk Is Wobbly Or Unsteady (Ataxia)     Menopause Has  Occurred     Migraine Headache     Nicotine Dependence     GERD (gastroesophageal reflux disease)     Past Medical History:   Diagnosis Date     Anemia 3/18/2021     Anxiety      Asthma      Bipolar 2 disorder (H)      Centrilobular emphysema (H) 2/26/2018     Cervical spinal stenosis      Chronic kidney disease      Chronic kidney disease, stage 3 1/14/2021     Chronic pain syndrome      Cigarette nicotine dependence without complication 3/4/2020     Common migraine with intractable migraine 2/15/2021     COPD (chronic obstructive pulmonary disease) (H)      DDD (degenerative disc disease), lumbosacral 3/22/2021     Depression      Fibrocystic breast      Fibromyalgia      GERD (gastroesophageal reflux disease)      Hallux abductovalgus with bunions, unspecified laterality 12/14/2015     Herpes Zoster (Shingles)      Hiatal hernia      History of electroconvulsive therapy      Hyperlipidemia 3/17/2021     Hypotension 3/18/2021     Hypothyroidism      IBS (irritable bowel syndrome)      Lung nodule 8/4/2016     Menopausal Disorder      Menopause Has Occurred      Migraine Headache      Migraines      Nicotine Dependence      Osteoarthritis      Osteopenia of multiple sites 9/1/2020     PONV (postoperative nausea and vomiting)      PTSD (post-traumatic stress disorder)      Shingles 2014     Spinal stenosis of lumbar region with neurogenic claudication 3/22/2021     Walk Is Wobbly Or Unsteady (Ataxia)        Past Social History     Reviewed, and she  reports that she has been smoking cigarettes. She has a 45.00 pack-year smoking history. She has quit using smokeless tobacco. She reports current alcohol use. She reports that she does not use drugs.    Family History     Reviewed, and family history includes Alcohol abuse in her father; Asthma in her maternal aunt; Crohn's disease in her sister; Diabetes in her paternal grandmother and sister; Heart attack in her paternal grandmother and paternal uncle; Heart disease  in her maternal grandfather and paternal grandfather; Hypertension in her sister; Lung cancer in her mother.    Medication List   Post Discharge Medication Reconciliation Status: discharge medications reconciled and changed, per note/orders   Albuterol MDI as needed every 4 hours  Lipitor 40 mg daily  Colace twice daily  Doxepin 75 mg at bedtime  Relpax 40 mg 2 times as needed for migraine  Aimovig autoinjector 70 mg/ML once a month  Vitamin D 50,000 units once a week  Nexium 44 mg daily  Prozac 80 mg daily  Folic acid 400 MCG's daily  Norco 10/325 1-2 tabs every 4-6 hours as needed-changed to 1-2 tabs as per scale every 4 hours as needed  Lamictal 200 twice daily  Synthroid 75 MCG's daily  Singulair 10 mg 1 tablet daily  Prazosin 2 capsules at bedtime 2 mg strength  Compazine 5 mg every 8 hours as needed  Senna 2 tabs 2 times daily  Zanaflex 4 mg at bedtime  Chantix 1 mg 1 tablet twice daily    Allergies     Allergies   Allergen Reactions     Codeine Anaphylaxis     Cetirizine Nausea And Vomiting     Gabapentin      Nefazodone Nausea And Vomiting     Oxycodone-Acetaminophen      hallucinations     Trazodone Nausea And Vomiting     Amoxicillin-Pot Clavulanate Rash     Cephalexin Rash     Cyclobenzaprine Rash     Eszopiclone Rash     Methocarbamol Rash     Penicillins Rash     Mild rash       Review of Systems   A comprehensive review of 14 systems was done. Pertinent findings noted here and in history of present illness. All the rest negative.  Constitutional: Negative.  Negative for fever, chills, she has profound activity change, appetite change and fatigue.   HENT: Negative for congestion and facial swelling.    Eyes: Negative for photophobia, redness and visual disturbance.   Respiratory: Negative for cough and chest tightness.    Cardiovascular: Negative for chest pain, palpitations and leg swelling.   Gastrointestinal: Negative for nausea, diarrhea, constipation, blood in stool and abdominal distention.  "  Genitourinary: Reporting that she has urinary frequency and has had several accidents with patient voiding on herself with no warning  Musculoskeletal: Reporting severe pain 10 out of 10 in her back.  No improvement in her leg strength  Had an acute fall in the TCU  Skin: Negative.    Neurological: Negative for dizziness, tremors, syncope, weakness, light-headedness and headaches.   Hematological: Does not bruise/bleed easily.   Psychiatric/Behavioral: Negative.  Very anxious due to pain      Physical Exam     Recent Vitals 3/29/2021   Height 5' 5\"   Weight 176 lbs 3 oz   BSA (m2) 1.91 m2   /68   Pulse 83   Temp 97.7   Temp src -   SpO2 95   Some recent data might be hidden       Constitutional: Oriented to person, place, and time and appears well-developed.   HEENT:  Normocephalic and atraumatic.  Eyes: Conjunctivae and EOM are normal. Pupils are equal, round, and reactive to light. No discharge.  No scleral icterus. Nose normal. Mouth/Throat: Oropharynx is clear and moist. No oropharyngeal exudate.    NECK: Normal range of motion. Neck supple. No JVD present. No tracheal deviation present. No thyromegaly present.   CARDIOVASCULAR: Normal rate, regular rhythm and intact distal pulses.  Exam reveals no gallop and no friction rub.  Systolic murmur present.  PULMONARY: Effort normal and breath sounds normal. No respiratory distress.No Wheezing or rales.  ABDOMEN: Soft. Bowel sounds are normal. No distension and no mass.  There is no tenderness. There is no rebound and no guarding. No HSM.  MUSCULOSKELETAL: Normal range of motion. No edema and no tenderness. Mild kyphosis, no tenderness.  Her midline surgical incision is intact with staples noted  LYMPH NODES: Has no cervical, supraclavicular, axillary and groin adenopathy.   NEUROLOGICAL: Alert and oriented to person, place, and time. No cranial nerve deficit.  Normal muscle tone. Coordination normal.   Right lower extremity weakness; ankle strength is " minimal  GENITOURINARY: Deferred exam.  SKIN: Skin is warm and dry. No rash noted. No erythema. No pallor.   EXTREMITIES: No cyanosis, no clubbing, no edema. No Deformity.  PSYCHIATRIC: Normal mood, affect and behavior.      Lab Results     Recent Results (from the past 240 hour(s))   COVID-19 Virus PCR MRF    Collection Time: 03/21/21  6:45 PM    Specimen: Respiratory   Result Value Ref Range    COVID-19 VIRUS SPECIMEN SOURCE Nasopharyngeal     2019-nCOV       Test received-See reflex to IDDL test SARS CoV2 (COVID-19) Virus RT-PCR   SARS-CoV-2 (COVID-19)-PCR    Collection Time: 03/21/21  6:45 PM    Specimen: Respiratory   Result Value Ref Range    SARS-CoV-2 Virus Specimen Source Nasopharyngeal     SARS-CoV-2 PCR Result NEGATIVE     SARS-COV-2 PCR COMMENT (Note)    HM2 (CBC W/O DIFF)    Collection Time: 03/22/21  3:47 PM   Result Value Ref Range    WBC 7.3 4.0 - 11.0 thou/uL    RBC 3.36 (L) 3.80 - 5.40 mill/uL    Hemoglobin 9.7 (L) 12.0 - 16.0 g/dL    Hematocrit 30.0 (L) 35.0 - 47.0 %    MCV 89 80 - 100 fL    MCH 28.9 27.0 - 34.0 pg    MCHC 32.3 32.0 - 36.0 g/dL    RDW 14.5 11.0 - 14.5 %    Platelets 295 140 - 440 thou/uL    MPV 8.5 8.5 - 12.5 fL   Basic Metabolic Panel    Collection Time: 03/22/21  3:48 PM   Result Value Ref Range    Sodium 139 136 - 145 mmol/L    Potassium 4.1 3.5 - 5.0 mmol/L    Chloride 105 98 - 107 mmol/L    CO2 25 22 - 31 mmol/L    Anion Gap, Calculation 9 5 - 18 mmol/L    Glucose 101 70 - 125 mg/dL    Calcium 8.4 (L) 8.5 - 10.5 mg/dL    BUN 19 8 - 22 mg/dL    Creatinine 0.96 0.60 - 1.10 mg/dL    GFR MDRD Af Amer >60 >60 mL/min/1.73m2    GFR MDRD Non Af Amer 59 (L) >60 mL/min/1.73m2                Electronically signed by  TUTU Cornejo

## 2021-06-16 NOTE — TELEPHONE ENCOUNTER
RN cannot approve Refill Request    RN can NOT refill this medication med is not covered by policy/route to provider. Last office visit: 4/13/2021 Joanne Weiner MD Last Physical: 2/22/2021 Last MTM visit: Visit date not found Last visit same specialty: 4/13/2021 Joanne Weiner MD.  Next visit within 3 mo: Visit date not found  Next physical within 3 mo: Visit date not found      Amelie Aguirre, [facility] Triage/Med Refill 4/20/2021    Requested Prescriptions   Pending Prescriptions Disp Refills     tiZANidine (ZANAFLEX) 4 MG tablet [Pharmacy Med Name: TIZANIDINE 4MG TABLETS] 120 tablet 1     Sig: TAKE 1 TO 2 TABLETS BY MOUTH DURING THE DAY AND 2 TABLETS EVERY NIGHT AT BEDTIME       There is no refill protocol information for this order

## 2021-06-16 NOTE — PROGRESS NOTES
[facility] Care note      Patient: Dayana Samaniego  MRN: [id number]      [facility] [[id number]]  Reason for Visit     Chief Complaint   Patient presents with     H & P       Code Status     Full code    Assessment     Acute fall in the TCU while patient was attempting self transfer  Acute pain intractable not responding to narcotics  Status post anterior spine fusion L4-S1, posterior spine fusion with instrumentation  L4-S1 decompression  L4-L5 bilateral bone marrow aspiration fusion anterior spine level 01 on 3/ 17/21  Underlying history of severe spinal stenosis with neurogenic claudication  New onset right lower extremity weakness postoperatively  Pain management  COPD  Bipolar disorder  Chronic kidney disease stage III  Nicotine addiction  Anemia secondary to blood loss discharge hemoglobin 9.9  Generalized weakness    Plan     Patient has been admitted to the TCU for strengthening and rehab  She underwent above-mentioned procedure and tolerated it well.  Continue with her incisional cares  Postoperative course complicated by new onset right lower extremity weakness  CT imaging was negative for any new issues.  Spine surgery recommended steroid burst.  Pain management optimized and she has been discharged to the TCU.  She also developed anemia secondary to blood loss with a drop in hemoglobin.  Admission hemoglobin was 14 and dropped down to 9.9 she will require monitoring.  She has underlying history of bipolar disorder mood and behaviors remain stable  Monitor blood pressures due to concern about hypotension.  Continue with the PT and OT  Patient was evaluated by therapy and made independent in her home.  She attempted to self transfer to the bathroom and ended up on the floor.  She denies hitting her head.  Pain management reviewed and she is reporting 10 out of 10 pain.  She is already on high doses of Norco 10/325 1-2 tabs every 4 hours from 4 to 6 hours that was a change  made in the TCU.  Unfortunately she is not reporting any improvement in the pain either.  At this point discussion done with staff and patient and in light of her intractable pain and acute fall we are requesting urgent imaging and evaluation by her orthopedic surgeon.  Staff advised that if x-rays cannot be done immediately then she may need to go to the emergency room to be evaluated there including extensive imaging    History     Patient is a very pleasant 60 y.o. female who is admitted to TCU  Patient has a known history of neurogenic claudication with underlying history of lumbar spinal stenosis.  She was electively admitted to the hospital and underwent above-mentioned procedure.  Postoperatively she did well.  Postoperatively she developed new onset right lower extremity weakness due to nerve irritation  Spine surgery recommended steroids and pain management.  She has been discharged to the TCU  CT imaging was negative  She has an underlying history of bipolar disorder and will continue on her Lamictal Vistaril and Prozac.  She has chronic kidney disease stage III which was stable.  She also has COPD with no wheezing and exacerbation and felt to be stable    Past Medical History     Active Ambulatory (Non-Hospital) Problems    Diagnosis     DDD (degenerative disc disease), lumbosacral     Spinal stenosis of lumbar region with neurogenic claudication     Anemia     Hypotension     Hypothyroidism     Hyperlipidemia     Fibromyalgia     Common migraine with intractable migraine     Chronic kidney disease, stage 3     Osteopenia of multiple sites     Cigarette nicotine dependence without complication     Centrilobular emphysema (H)     Hiatal hernia     Lung nodule     Hallux abductovalgus with bunions, unspecified laterality     Bipolar 2 disorder (H)     Menopausal Disorder     Chronic Pain Syndrome     Walk Is Wobbly Or Unsteady (Ataxia)     Menopause Has Occurred     Migraine Headache     Nicotine Dependence      GERD (gastroesophageal reflux disease)     Past Medical History:   Diagnosis Date     Anemia 3/18/2021     Anxiety      Asthma      Bipolar 2 disorder (H)      Centrilobular emphysema (H) 2/26/2018     Cervical spinal stenosis      Chronic kidney disease      Chronic kidney disease, stage 3 1/14/2021     Chronic pain syndrome      Cigarette nicotine dependence without complication 3/4/2020     Common migraine with intractable migraine 2/15/2021     COPD (chronic obstructive pulmonary disease) (H)      DDD (degenerative disc disease), lumbosacral 3/22/2021     Depression      Fibrocystic breast      Fibromyalgia      GERD (gastroesophageal reflux disease)      Hallux abductovalgus with bunions, unspecified laterality 12/14/2015     Herpes Zoster (Shingles)      Hiatal hernia      History of electroconvulsive therapy      Hyperlipidemia 3/17/2021     Hypotension 3/18/2021     Hypothyroidism      IBS (irritable bowel syndrome)      Lung nodule 8/4/2016     Menopausal Disorder      Menopause Has Occurred      Migraine Headache      Migraines      Nicotine Dependence      Osteoarthritis      Osteopenia of multiple sites 9/1/2020     PONV (postoperative nausea and vomiting)      PTSD (post-traumatic stress disorder)      Shingles 2014     Spinal stenosis of lumbar region with neurogenic claudication 3/22/2021     Walk Is Wobbly Or Unsteady (Ataxia)        Past Social History     Reviewed, and she  reports that she has been smoking cigarettes. She has a 45.00 pack-year smoking history. She has quit using smokeless tobacco. She reports current alcohol use. She reports that she does not use drugs.    Family History     Reviewed, and family history includes Alcohol abuse in her father; Asthma in her maternal aunt; Crohn's disease in her sister; Diabetes in her paternal grandmother and sister; Heart attack in her paternal grandmother and paternal uncle; Heart disease in her maternal grandfather and paternal grandfather;  Hypertension in her sister; Lung cancer in her mother.    Medication List   Post Discharge Medication Reconciliation Status: discharge medications reconciled and changed, per note/orders   Albuterol MDI as needed every 4 hours  Lipitor 40 mg daily  Colace twice daily  Doxepin 75 mg at bedtime  Relpax 40 mg 2 times as needed for migraine  Aimovig autoinjector 70 mg/ML once a month  Vitamin D 50,000 units once a week  Nexium 44 mg daily  Prozac 80 mg daily  Folic acid 400 MCG's daily  Norco 10/325 1-2 tabs every 4-6 hours as needed-changed to 1-2 tabs as per scale every 4 hours as needed  Lamictal 200 twice daily  Synthroid 75 MCG's daily  Singulair 10 mg 1 tablet daily  Prazosin 2 capsules at bedtime 2 mg strength  Compazine 5 mg every 8 hours as needed  Senna 2 tabs 2 times daily  Zanaflex 4 mg at bedtime  Chantix 1 mg 1 tablet twice daily    Allergies     Allergies   Allergen Reactions     Codeine Anaphylaxis     Cetirizine Nausea And Vomiting     Gabapentin      Nefazodone Nausea And Vomiting     Oxycodone-Acetaminophen      hallucinations     Trazodone Nausea And Vomiting     Amoxicillin-Pot Clavulanate Rash     Cephalexin Rash     Cyclobenzaprine Rash     Eszopiclone Rash     Methocarbamol Rash     Penicillins Rash     Mild rash       Review of Systems   A comprehensive review of 14 systems was done. Pertinent findings noted here and in history of present illness. All the rest negative.  Constitutional: Negative.  Negative for fever, chills, she has activity change, appetite change and fatigue.   HENT: Negative for congestion and facial swelling.    Eyes: Negative for photophobia, redness and visual disturbance.   Respiratory: Negative for cough and chest tightness.    Cardiovascular: Negative for chest pain, palpitations and leg swelling.   Gastrointestinal: Negative for nausea, diarrhea, constipation, blood in stool and abdominal distention.   Genitourinary: Negative.    Musculoskeletal: Reporting severe pain  "in her back 10 out of 10.  She is currently bedbound.  Reporting numbness and weakness of her right leg which is not improving either  Skin: Negative.    Neurological: Negative for dizziness, tremors, syncope, weakness, light-headedness and headaches.   Hematological: Does not bruise/bleed easily.   Psychiatric/Behavioral: Negative.        Physical Exam     Recent Vitals 3/22/2021   Height 5' 4.75\"   Weight 170 lbs   BSA (m2) 1.88 m2   /71   Pulse 87   Temp 98.6   SpO2 94   Some recent data might be hidden       Constitutional: Oriented to person, place, and time and appears well-developed.   HEENT:  Normocephalic and atraumatic.  Eyes: Conjunctivae and EOM are normal. Pupils are equal, round, and reactive to light. No discharge.  No scleral icterus. Nose normal. Mouth/Throat: Oropharynx is clear and moist. No oropharyngeal exudate.    NECK: Normal range of motion. Neck supple. No JVD present. No tracheal deviation present. No thyromegaly present.   CARDIOVASCULAR: Normal rate, regular rhythm and intact distal pulses.  Exam reveals no gallop and no friction rub.  Systolic murmur present.  PULMONARY: Effort normal and breath sounds normal. No respiratory distress.No Wheezing or rales.  ABDOMEN: Soft. Bowel sounds are normal. No distension and no mass.  There is no tenderness. There is no rebound and no guarding. No HSM.  Her midline low back incision is intact with staples noted no drainage  MUSCULOSKELETAL: Normal range of motion. No edema and no tenderness. Mild kyphosis, no tenderness.  LYMPH NODES: Has no cervical, supraclavicular, axillary and groin adenopathy.   NEUROLOGICAL: Alert and oriented to person, place, and time. No cranial nerve deficit.  Normal muscle tone. Coordination normal.   Right lower extremity is weak with limited strength  GENITOURINARY: Deferred exam.  SKIN: Skin is warm and dry. No rash noted. No erythema. No pallor.   EXTREMITIES: No cyanosis, no clubbing, no edema. No " Deformity.  PSYCHIATRIC: Normal mood, affect and behavior.      Lab Results     Recent Results (from the past 240 hour(s))   COVID-19 Virus PCR MRF    Collection Time: 03/12/21  9:59 AM    Specimen: Respiratory   Result Value Ref Range    COVID-19 VIRUS SPECIMEN SOURCE Nasopharyngeal     2019-nCOV       Test received-See reflex to IDDL test SARS CoV2 (COVID-19) Virus RT-PCR   SARS-CoV-2 (COVID-19)-PCR    Collection Time: 03/12/21  9:59 AM    Specimen: Respiratory   Result Value Ref Range    SARS-CoV-2 Virus Specimen Source Nasopharyngeal     SARS-CoV-2 PCR Result NEGATIVE     SARS-COV-2 PCR COMMENT       Testing was performed using the Aptima SARS-CoV-2 Assay on the Scaled Agile   Basic Metabolic Panel    Collection Time: 03/12/21 10:05 AM   Result Value Ref Range    Sodium 139 136 - 145 mmol/L    Potassium 4.8 3.5 - 5.0 mmol/L    Chloride 105 98 - 107 mmol/L    CO2 19 (L) 22 - 31 mmol/L    Anion Gap, Calculation 15 5 - 18 mmol/L    Glucose 124 70 - 125 mg/dL    Calcium 9.0 8.5 - 10.5 mg/dL    BUN 19 8 - 22 mg/dL    Creatinine 1.15 (H) 0.60 - 1.10 mg/dL    GFR MDRD Af Amer 58 (L) >60 mL/min/1.73m2    GFR MDRD Non Af Amer 48 (L) >60 mL/min/1.73m2   Hemoglobin    Collection Time: 03/12/21 10:05 AM   Result Value Ref Range    Hemoglobin 14.0 12.0 - 16.0 g/dL                Electronically signed by  TUTU Cornejo

## 2021-06-16 NOTE — PROGRESS NOTES
SUBJECTIVE: Dayana Samaniego is a 60 y.o. White or  female who presents today with her  and is acutely in pain and appearing shocky when I walk in the room.  She is sweating and tachypneic and anxious.  She is in a wheelchair.  She is here to see me for follow-up after having spinal surgery with [facility] spine, Dr. Snaon, on 3/17/2021.  She had an anterior spine fusion of L4-5 to S1 and decompression.  She was in [facility] for that 4 days and then was discontinued to [facility] where she stayed until 4/5.  She left AMA because she could not stand the care she was getting.  They were not treating her sufficiently for her pain and was not answering her calls.  In fact she got up and out of bed to transfer herself to the wheelchair to go the bathroom and had a fall on 3/22.  She went to the emergency room for back pain and was found to have a closed fracture of the sacrum.  They tell me she has had a horrible time after surgery.  She is narcotic tolerant as she has chronic pain.  She goes to a pain clinic.  She is currently getting maximum Vicodin every 4 hours and taking excessive Tylenol because of it.  They told her not to use ibuprofen because of healing but also because she had had a bump in her creatinine.  I told her that her creatinine had corrected in the TCU and that I thought she could handle some ibuprofen which would likely be quite helpful for her.  I asked her to not overdo it.  She is very feeling very fatigued and we discussed that her hemoglobin dropped down to 9.3 from presurgery 14.  We assess rechecking this.  She complains that her pain is not any better even though it has been quite a while since her surgery.  She also complains of hot flashes.  She has new right leg weakness.  Looking at her records she was on a course of steroids after her surgery but she says this did not help any.  She is not happy.  She is severely constipated because of all the narcotics she is  taking.  We discussed her bowel regimen.  I make suggestions for her.  She complains of urinary symptoms.  She was started on Cipro 250 mg 1 tab twice a day on 3/31/2021 for 5 days.  She tells me she feels very old.    OBJECTIVE: /80   Pulse 90   Temp 98.7  F (37.1  C) (Oral)   Resp 24   Wt 161 lb (73 kg)   SpO2 94%   BMI 26.79 kg/m    General: Acutely ill-appearing, sweating and shocky, older female in a wheelchair.  During our 40-minute visit her baseline improved as her anxiety and pain level improved.  HEENT: Eyes clear, nose without rhinorrhea, mucous members are moist  Heart: Initially tacky but now regular rate and rhythm  Lungs: Clear bilaterally  Abdomen: Soft  Extremities: Lower extremities warm, dry with trace edema, decreased range of motion, not wanting to get up and out of wheelchair.  Skin: Anterior abdominal incision is clean dry and intact and staples are still placed    ASSESSMENT & PLAN:     1. Hospitalization within last 30 days  Status is slowly improving although she is having lots of complications and feels as though everything is wrong.  She is very anxious with her acute on chronic pain..    2. Status post lumbar and lumbosacral fusion by anterior technique  Incision is healing well, patient complaining of new right leg weakness and lots of pain.  Has follow-up appointment with surgeon scheduled.    3. Spinal stenosis, lumbar region, with neurogenic claudication  Hopefully improved although currently patient has lots of pain and believes her pain is worse than before surgery.    4. Uncontrolled pain  Seen at a pain clinic and opioid tolerant.  Discussed backing off on the Vicodin by using it less often and augmenting with ibuprofen.  Discussed that she needs to keep moving.    5. Drug-induced constipation  Discussed need to back off on narcotics and we talked about Colace, fiber and MiraLAX.    6. Chronic pain syndrome  Definitely not helping the situation.    7. Anemia due to  blood loss, acute  Hemoglobin was 9.3.  Will monitor blood counts.  - HM2(CBC w/o Differential)    8. Urinary frequency  Complaining of dysuria.  Treated for UTI in TCU.  Could possibly be secondary to catheter trauma?  Will monitor.  - Urinalysis-UC if Indicated    9. Stage 3a chronic kidney disease  Last lab at the TCU looks as though her creatinine has corrected.  Therefore I think she can use some ibuprofen.  - Basic Metabolic Panel    10. Centrilobular emphysema (H)  Her emphysema has stayed stable during her hospitalization and TCU stay.    11. Bipolar 2 disorder (H)  Mood is not good and anxiety is high.    I will get back to her on her lab results by my chart and only call with grossly abnormal values.  She is to follow-up with her surgeon and see me back in 6 months time or sooner if needed.    40 minutes spent together with the patient and  today, on day of service, more than 50% spent in counseling, discussing the above topics.        Patient Active Problem List   Diagnosis     Hypothyroidism     Chronic Pain Syndrome     Hyperlipidemia     Walk Is Wobbly Or Unsteady (Ataxia)     Menopause Has Occurred     Bipolar 2 disorder (H)     Migraine Headache     Nicotine Dependence     Fibromyalgia     Menopausal Disorder     GERD (gastroesophageal reflux disease)     Hallux abductovalgus with bunions, unspecified laterality     Lung nodule     Hiatal hernia     Centrilobular emphysema (H)     Cigarette nicotine dependence without complication     Osteopenia of multiple sites     Chronic kidney disease, stage 3     Anemia     Common migraine with intractable migraine     DDD (degenerative disc disease), lumbosacral     Hypotension     Spinal stenosis of lumbar region with neurogenic claudication       Current Outpatient Medications on File Prior to Visit   Medication Sig Dispense Refill     AIMOVIG AUTOINJECTOR 70 mg/mL atIn INJECT ONE DOSE ONCE A MONTH       albuterol (PROAIR HFA) 90 mcg/actuation inhaler  INHALE 1 PUFF BY MOUTH EVERY 4-6 HOURS AS NEEDED. (Patient taking differently: INHALE 2 PUFF BY MOUTH EVERY 4-6 HOURS AS NEEDED.) 8.5 g 11     atorvastatin (LIPITOR) 40 MG tablet TAKE 1 TABLET(40 MG) BY MOUTH AT BEDTIME 90 tablet 3     docusate sodium (COLACE) 100 MG capsule Take 200 mg by mouth 2 (two) times a day.       doxepin (SINEQUAN) 25 MG capsule Take 3 capsules (75 mg total) by mouth at bedtime. 270 capsule 1     eletriptan (RELPAX) 40 MG tablet Take 1 tab at earliest onset of migraine, may repeat in 2 hours if necessary 12 tablet 2     ergocalciferol (ERGOCALCIFEROL) 1,250 mcg (50,000 unit) capsule TAKE 1 CAPSULE BY MOUTH 1 TIME A WEEK 13 capsule 3     esomeprazole (NEXIUM) 20 MG capsule Take 44.6 mg by mouth daily before breakfast. Uses OTC strength        FLUoxetine (PROZAC) 20 MG capsule Take 80 mg by mouth 2 (two) times a day.       folic acid (FOLVITE) 800 MCG tablet Take 400 mcg by mouth every other day.        HYDROcodone-acetaminophen (NORCO )  mg per tablet Take 1-2 tablets by mouth every 6 (six) hours as needed for pain. 60 tablet 0     lamoTRIgine (LAMICTAL) 200 MG tablet Take 200 mg by mouth 2 (two) times a day.        levothyroxine (SYNTHROID, LEVOTHROID) 75 MCG tablet TAKE 1 TABLET(75 MCG) BY MOUTH DAILY 90 tablet 1     prazosin (MINIPRESS) 1 MG capsule Take 4 mg by mouth at bedtime.              prochlorperazine (COMPAZINE) 5 MG tablet Take 1 tablet (5 mg total) by mouth every 8 (eight) hours as needed for nausea. 30 tablet 0     senna-docusate (SENNOSIDES-DOCUSATE SODIUM) 8.6-50 mg tablet Take 1-2 tablets by mouth 2 (two) times a day.        varenicline (CHANTIX) 1 mg tablet TAKE 1 TABLET BY MOUTH TWICE DAILY AFTER EATING WITH A FULL GLASS OF WATER 60 tablet 3     No current facility-administered medications on file prior to visit.

## 2021-06-16 NOTE — TELEPHONE ENCOUNTER
Prior Authorization Request  Who s requesting:  Pharmacy  Pharmacy Name and Location: [facility]  Medication Name: Hydrocodone acetmainophen 10-325mg  Insurance Plan: Ent[employer]  Insurance Member ID Number:  [id number]  CoverMyMeds Key:   Informed patient that prior authorizations can take up to 10 business days for response:     Okay to leave a detailed message:

## 2021-06-16 NOTE — TELEPHONE ENCOUNTER
Medication: Hydrocodone-acetaminophen 10-325mg  Last Date Filled 3/30/2021  Last appointment addressing medication use: Just out of TCU. Spine fusion 3/17/21      Taken as prescribe from physician notes?     CSA in last year: NO    Random Utox in last year: NO  (if any of the above answer NO - schedule with PCP)     Opioids + benzodiazepines? NO  (if the above answer YES - schedule with PCP every 6 months)       All responses suggest: Scheduling with PCP for further intervention     Future visit 4/13/21.

## 2021-06-16 NOTE — PROGRESS NOTES
"Dayana Samaniego is a 60 y.o. female who is being evaluated via a billable video visit.      How would you like to obtain your AVS? MyChart.  If dropped from the video visit, the video invitation should be resent by: Text to cell phone: [phone]  Will anyone else be joining your video visit? No      Video Start Time: 10:35        Subjective   HPI: 60 y.o.F with tobacco abuse presented after nodule discovered on CT scan of the chest. She had complaints of ongoing shortness of breath with stairs or walking too quickly.     Current regimen: albuterol PRN. She has been prescribed both symbicort and breo but doesn't like \"taking medicines I don't have to take.\"    Fell off horse, broke wrist, then had back surgery 3/17, unable to move leg, fell at TCU then fractured L1. Was smoking this summer more, down to 3 cigarettes per day.     \"losing her air easily.\" Short of breath even showering. Short of breath with any activity. Can walk to bathroom and back. Doesn't think there is pain contributing to all of this.   Weight up as high as 183, now down to 160. No significant wheezing and per exams, no wheezing prior to now. No significant coughing.         Objective       Vitals:  No vitals were obtained today due to virtual visit.    Physical Exam  Additional provider notes: GENERAL: Healthy, alert and no distress  EYES: Eyes grossly normal to inspection. No discharge or erythema, or obvious scleral/conjunctival abnormalities.  RESP: No audible wheeze, cough, or visible cyanosis.  No visible retractions or increased work of breathing.    NEURO: Cranial nerves grossly intact. Mentation and speech appropriate for age.  PSYCH: Mentation appears normal, affect normal/bright, judgement and insight intact, normal speech and appearance well-groomed    ASSESSMENT/PLAN:     1)Tobacco abuse  -Smoking Cessation, patient remains precontemplative.   -CT screening discussed--benefits and risks     2) Asthma  -She has declined use of " ICS and Singulair  -Action plan in place     3) Emphysema  -no evidence of hypoxia        Video-Visit Details    Type of service:  Video Visit    Video End Time (time video stopped): 10:49  Originating Location (pt. Location): Home    Distant Location (provider location):  [facility]     Platform used for Video Visit: [email or web address]

## 2021-06-16 NOTE — TELEPHONE ENCOUNTER
Reason for Call: Request for an order or referral:    Order or referral being requested: req. O.t. for 1 x week for 4 weeks will address adaptive equipment and adl training    Date needed: as soon as possible    Has the patient been seen by the PCP for this problem? YES    Additional comments:     Phone number  homecare can be reached at:  Other phone number:  [phone]    Best Time    Can we leave a detailed message on this number?  Yes    Call taken on 4/23/2021 at 4:01 PM by Halima Torres

## 2021-06-16 NOTE — TELEPHONE ENCOUNTER
Pt notified. She reports that she's barely making the every 4 hour regimen so she's discuss this with Dr Weiner 4/13/21.

## 2021-06-16 NOTE — TELEPHONE ENCOUNTER
Reason for Call: Request for an order or referral:    Order or referral being requested: SKILLED NURSING ORDERS FOR 1 X WEEK FOR 1 WEEK AND 2 X WEEK FOR 1 WEEK AND 1 X WEEK FOR 2WEEKS  AND CLARIFICATION FOR, SHE IS STILL C/O OF URINARY URGENCY, DID FINISH THE ABX.    DO YOU WANT A F/U ON HER HGB?    Date needed: as soon as possible    Has the patient been seen by the PCP for this problem? YES    Additional comments: [facility] [place] [facility]    Phone number HOME CARE can be reached at:  Other phone number:  [phone]    Best Time:     Can we leave a detailed message on this number?  Yes    Call taken on 4/12/2021 at 8:31 AM by Halima Torres

## 2021-06-16 NOTE — TELEPHONE ENCOUNTER
Telephone Encounter by Shefali Syed at 2/14/2019  9:11 AM     Author: Shefali Syed Service: -- Author Type: --    Filed: 2/14/2019  9:14 AM Encounter Date: 2/8/2019 Status: Signed    : Shefali Syed APPROVED:    Approval start date: 1/13/19  Approval end date: 2/13/24    Pharmacy has been notified of approval and will contact patient when medication is ready for pickup.

## 2021-06-17 NOTE — TELEPHONE ENCOUNTER
RN cannot approve Refill Request    RN can NOT refill this medication provider input requested for long term dosing. Last office visit: 4/13/2021 Joanne Weiner MD Last Physical: 2/22/2021 Last MTM visit: Visit date not found Last visit same specialty: 4/13/2021 Joanne Weiner MD.  Next visit within 3 mo: Visit date not found  Next physical within 3 mo: Visit date not found      Robert Pavon, [facility] Triage/Med Refill 5/10/2021    Requested Prescriptions   Pending Prescriptions Disp Refills     varenicline (CHANTIX) 1 mg tablet [Pharmacy Med Name: CHANTIX 1MG TABLETS] 60 tablet 3     Sig: TAKE 1 TABLET BY MOUTH TWICE DAILY AFTER EATING WITH A FULL GLASS OF WATER       Varenicline Refill Protocol Passed - 5/9/2021  3:24 AM        Passed - Normal Serum Creatinine in past 12 months      Creatinine   Date Value Ref Range Status   04/13/2021 0.87 0.60 - 1.10 mg/dL Final             Passed - PCP or prescribing provider visit in last 12 or next 3 months.     Last office visit with prescriber/PCP: 4/13/2021 Joanne Weiner MD OR same dept: 4/13/2021 Joanne Weiner MD  Last physical: 2/22/2021       Next visit within 3 mo: Visit date not found  Next physical within 3 mo: Visit date not found  Prescriber OR PCP: Joanne Weiner MD ([facility])  Last diagnosis associated with med order: 1. Nicotine Dependence  - CHANTIX 1 mg tablet [Pharmacy Med Name: CHANTIX 1MG TABLETS]; TAKE 1 TABLET BY MOUTH TWICE DAILY AFTER EATING WITH A FULL GLASS OF WATER  Dispense: 60 tablet; Refill: 3     Requested Prescriptions     Pending Prescriptions Disp Refills     varenicline (CHANTIX) 1 mg tablet [Pharmacy Med Name: CHANTIX 1MG TABLETS] 60 tablet 3     Sig: TAKE 1 TABLET BY MOUTH TWICE DAILY AFTER EATING WITH A FULL GLASS OF WATER     May refill for 3 months if protocol passes.

## 2021-06-17 NOTE — PROGRESS NOTES
SUBJECTIVE: Dayana Samaniego is a 57 y.o. White or  female who presents today with an ongoing migraine that has been on and off for about 3 or 4 days.  Her oral medication does not seem to be doing the trick.  She thinks that the weather is the important factor as this is been her experience in the past as well.  She comes in hoping for a Toradol injection as this is more effective for her.  She was seen relatively recently for a med check and lab work.  She does need some refills today.  She does continue to smoke 1 cigarette in the morning and when that night and is trying desperately to quit smoking before her first grandchild comes.  This baby is due later this year.  She is using Chantix.  She already has a diagnosis of COPD and is still quite young.  Careful medication reconciliation was done today as she has polypharmacy and I was refilling medications.    OBJECTIVE: /64 (Patient Site: Left Arm, Patient Position: Sitting, Cuff Size: Adult Regular)  Pulse 88  Temp (!) 89.9  F (32.2  C) (Oral)   Resp 20  Wt 151 lb 6.4 oz (68.7 kg)  Breastfeeding? No  BMI 25.39 kg/m2  General: Normal weight middle-aged female wearing dark sunglasses.  Heart: Regular rate and rhythm without murmur  Lungs: Decreased breath sounds, otherwise clear  Abdomen: Soft  Extremities: Warm, dry without edema  Neuro: No focal deficits, cranial nerves II through XII appear intact    ASSESSMENT & PLAN:    1. Migraine  eletriptan (RELPAX) 40 MG tablet    ketorolac injection 30 mg (TORADOL)   2. Fibromyalgia  doxepin (SINEQUAN) 25 MG capsule   3. Chronic pain syndrome     4. COPD (chronic obstructive pulmonary disease)  montelukast (SINGULAIR) 10 mg tablet   5. Seasonal allergies     6. Anxiety  hydrOXYzine pamoate (VISTARIL) 25 MG capsule    FLUoxetine (PROZAC) 20 MG capsule     Patient was given IM injection of 30 mg Toradol and was watched for approximately 10 minutes or side effects.  I did refill the  above-mentioned medication.  She should see me back at her usual interval or sooner on an as-needed basis.25 minutes spent together with the patient today, more than 50% spent in counseling, discussing the above topics.        Patient Active Problem List   Diagnosis     Hypothyroidism     Chronic Pain Syndrome     Hyperlipidemia     Walk Is Wobbly Or Unsteady (Ataxia)     Menopause Has Occurred     Bipolar II Disorder     Migraine Headache     Nicotine Dependence     Fibromyalgia     Menopausal Disorder     COPD (chronic obstructive pulmonary disease)     GERD (gastroesophageal reflux disease)     Hallux abductovalgus with bunions, unspecified laterality     Lung nodule     Hiatal hernia     Urinary frequency     Visit for screening mammogram     Centrilobular emphysema       Current Outpatient Prescriptions on File Prior to Visit   Medication Sig Dispense Refill     albuterol (PROAIR HFA) 90 mcg/actuation inhaler Inhale 2 puffs every 4 (four) hours as needed for shortness of breath. every 4-6 hours as needed. 8.5 g 2     atorvastatin (LIPITOR) 40 MG tablet TAKE 1 TABLET(40 MG) BY MOUTH AT BEDTIME 90 tablet 3     budesonide-formoterol (SYMBICORT) 160-4.5 mcg/actuation inhaler Inhale 2 puffs 2 (two) times a day. 3 Inhaler 3     diazePAM (VALIUM) 2 MG tablet Take 1 tablet (2 mg total) by mouth as needed for anxiety (PRIOR TO FLYING). 10 tablet 0     ergocalciferol (ERGOCALCIFEROL) 50,000 unit capsule TAKE 1 CAPSULE BY MOUTH 1 TIME A WEEK 13 capsule 0     esomeprazole (NEXIUM) 20 MG capsule Take 40 mg by mouth bedtime. Uses OTC strength       fluticasone-vilanterol (BREO ELLIPTA) 100-25 mcg/dose DsDv inhaler Inhale 1 puff daily. 1 each 11     folic acid (FOLVITE) 800 MCG tablet Take by mouth.       HYDROcodone-acetaminophen 5-325 mg per tablet Take 1 tablet by mouth every 6 (six) hours as needed for pain. 30 tablet 0     lamoTRIgine (LAMICTAL) 200 MG tablet Take 200 mg by mouth bedtime.       levothyroxine (SYNTHROID,  LEVOTHROID) 75 MCG tablet Take 1 tablet (75 mcg total) by mouth daily. 90 tablet 2     melatonin 5 mg Tab Take 5 mg by mouth bedtime.       prazosin (MINIPRESS) 1 MG capsule Take 2 mg by mouth bedtime.        prochlorperazine (COMPAZINE) 10 MG tablet Take 1 tablet (10 mg total) by mouth every 8 (eight) hours as needed. 30 tablet 1     senna-docusate (SENNOSIDES-DOCUSATE SODIUM) 8.6-50 mg tablet Take 3 tablets by mouth bedtime.       SOY ISOFLA/BLK COHOSH/MAG BARK (ESTROVEN ORAL) Take by mouth.       tiZANidine (ZANAFLEX) 4 MG tablet TAKE 1-2 TABLETS BY MOUTH DURING THE DAY AND 2 TABLETS AT BEDTIME AS NEEDED 360 tablet 0     triamcinolone (KENALOG) 0.1 % cream APPLY TOPICALLY BID TO HANDS AND FEET FOR 2 TO 3 WEEKS  3     varenicline (CHANTIX STARTING MONTH BOX) 0.5 mg (11)- 1 mg (42) tablet 1 wk before you stop smoking take 0.5mg daily on days 1-3, 0.5mg 2 times each day on days 4-7, then 1mg 2 times daily. 53 tablet 0     varenicline (CHANTIX) 1 mg tablet Take 1 tab by mouth two times a day. Take after eating with a full glass of water. NOTE: Dispense as maintenance for refills only. 60 tablet 2     diphenhydrAMINE (BENADRYL) 25 mg capsule Take by mouth.       No current facility-administered medications on file prior to visit.

## 2021-06-17 NOTE — TELEPHONE ENCOUNTER
Refill Approved    Rx renewed per Medication Renewal Policy. Medication was last renewed on 11/19/20.    Robert Pavon, [facility] Triage/Med Refill 5/20/2021     Requested Prescriptions   Pending Prescriptions Disp Refills     levothyroxine (SYNTHROID, LEVOTHROID) 75 MCG tablet [Pharmacy Med Name: LEVOTHYROXINE 0.075MG (75MCG) TABS] 90 tablet 1     Sig: TAKE 1 TABLET(75 MCG) BY MOUTH DAILY       Thyroid Hormones Protocol Passed - 5/18/2021  7:30 PM        Passed - Provider visit in past 12 months or next 3 months     Last office visit with prescriber/PCP: 4/13/2021 Joanne Weiner MD OR same dept: 4/13/2021 Joanne Weiner MD OR same specialty: 4/13/2021 Joanne Weiner MD  Last physical: 2/22/2021 Last MTM visit: Visit date not found   Next visit within 3 mo: Visit date not found  Next physical within 3 mo: Visit date not found  Prescriber OR PCP: El[place]) AVNI Weiner MD  Last diagnosis associated with med order: 1. Hypothyroidism, unspecified type  - levothyroxine (SYNTHROID, LEVOTHROID) 75 MCG tablet [Pharmacy Med Name: LEVOTHYROXINE 0.075MG (75MCG) TABS]; TAKE 1 TABLET(75 MCG) BY MOUTH DAILY  Dispense: 90 tablet; Refill: 1    If protocol passes may refill for 12 months if within 3 months of last provider visit (or a total of 15 months).             Passed - TSH on file in past 12 months for patient age 12 & older     TSH   Date Value Ref Range Status   07/21/2020 2.62 0.30 - 5.00 uIU/mL Final

## 2021-06-17 NOTE — TELEPHONE ENCOUNTER
[facility] PA team  [phone]  Pool: [facility] PA MED ([id number])          PA has been initiated.       PA form completed and faxed insurance via Cover My Meds     Key:  [id number]     Medication:  HYDROCODONE-acetaminophen 10-325mg    Insurance:  [facility]        Response will be received via fax and may take up to 5-10 business days depending on plan

## 2021-06-17 NOTE — PROGRESS NOTES
SUBJECTIVE: Dayana Samaniego is a 57 y.o. White or  female who presents today with her  for follow-up of an ER visit she had while she was in [place] working.  She brings some info from that visit.  Apparently she came in with vertigo and this caused a panic attack and she thought she was having a stroke.  At the hospital they diagnosed her with benign positional vertigo and dehydration.  They gave her a prescription for meclizine which she never filled.  They did do an EKG which showed biatrial enlargement but was normal sinus rhythm.  They suggested that she have that followed up on when she got home.  She has been attempting to quit smoking using Chantix.  She has weaned down to 2 cigarettes a day.  It actually works pretty well for her.  I ask if she thinks the vertigo is related to the use of Chantix and she says she does not because she was using it for over a week before this happened without any problems.  She thinks it was because she was working and got dehydrated and anxious.  We went through her med list carefully and she is currently using all of her medications including prazosin at bedtime for nightmares.  She told me it works great and it was given to her by the sleep doctor.  It appears she is not using the Breo Ellipta for her COPD.  She says she does not think she needs it because she has cut down so much on smoking.  She is on a multitude of medications many of them psych meds for her bipolar.  She sees a psychiatrist through the [facility].  She complains that she has been having a migraine that has not resolved for her and we discuss treating her with Toradol which she has had in the past and has worked.  She asks me for a small amount of Vicodin and a refill of the Compazine which helps with the nausea as well.    OBJECTIVE: /60 (Patient Site: Left Arm, Patient Position: Sitting, Cuff Size: Adult Regular)  Pulse 80  Temp 98.5  F (36.9  C) (Oral)   Resp 20  Wt  152 lb (68.9 kg)  Breastfeeding? No  BMI 25.49 kg/m2  General: Normal weight middle-aged female no acute distress  HEENT: Eyes clear, nose without rhinorrhea, extraocular movements intact  Heart: Regular rate and rhythm without murmur  Lungs: Clear bilaterally  Abdomen: Soft  Extremities: Warm, dry and without edema  Psych: Mood is currently stable    ASSESSMENT & PLAN:    1. Migraine  HYDROcodone-acetaminophen 5-325 mg per tablet    ketorolac injection 30 mg (TORADOL)    DISCONTINUED: ketorolac injection 30 mg (TORADOL)   2. Nausea  prochlorperazine (COMPAZINE) 10 MG tablet   3. Panic attack     4. Centrilobular emphysema     5. Biatrial enlargement  Electrocardiogram Perform and Read     I have refilled the above-mentioned lab work and she received a 30 mg IM Toradol injection for her migraine.  An EKG was done for follow-up of her ER visit.  It showed normal sinus rhythm but right atrial enlargement with no change since 2016.  It did not call biatrial enlargement.  This was reassuring to her.  She is to follow-up 6 months from her last set of lab work. 40 minutes spent together with the patient and her  today, more than 50% spent in counseling, discussing the above topics.        Patient Active Problem List   Diagnosis     Hypothyroidism     Chronic Pain Syndrome     Hyperlipidemia     Walk Is Wobbly Or Unsteady (Ataxia)     Menopause Has Occurred     Bipolar II Disorder     Migraine Headache     Nicotine Dependence     Fibromyalgia     Menopausal Disorder     COPD (chronic obstructive pulmonary disease)     GERD (gastroesophageal reflux disease)     Hallux abductovalgus with bunions, unspecified laterality     Lung nodule     Hiatal hernia     Urinary frequency     Visit for screening mammogram     Centrilobular emphysema       Current Outpatient Prescriptions on File Prior to Visit   Medication Sig Dispense Refill     albuterol (PROAIR HFA) 90 mcg/actuation inhaler Inhale 2 puffs every 4 (four) hours  as needed for shortness of breath. every 4-6 hours as needed. 8.5 g 2     atorvastatin (LIPITOR) 40 MG tablet TAKE 1 TABLET(40 MG) BY MOUTH AT BEDTIME 90 tablet 3     budesonide-formoterol (SYMBICORT) 160-4.5 mcg/actuation inhaler Inhale 2 puffs 2 (two) times a day. 3 Inhaler 3     diphenhydrAMINE (BENADRYL) 25 mg capsule Take by mouth.       doxepin (SINEQUAN) 25 MG capsule Take 75 mg by mouth bedtime.        eletriptan (RELPAX) 40 MG tablet Take 1 tab at earliest onset of migraine, may repeat in 2 hours if necessary 12 tablet 0     ergocalciferol (ERGOCALCIFEROL) 50,000 unit capsule TAKE 1 CAPSULE BY MOUTH 1 TIME A WEEK 13 capsule 0     esomeprazole (NEXIUM) 20 MG capsule Take 40 mg by mouth bedtime. Uses OTC strength       FLUoxetine (PROZAC) 20 MG capsule Take 60 mg by mouth daily.        fluticasone-vilanterol (BREO ELLIPTA) 100-25 mcg/dose DsDv inhaler Inhale 1 puff daily. 1 each 11     folic acid (FOLVITE) 800 MCG tablet Take by mouth.       hydrOXYzine (VISTARIL) 25 MG capsule Take 25 mg by mouth. Take 1-2 capsules every 6 hours as needed for migraine       lamoTRIgine (LAMICTAL) 200 MG tablet Take 200 mg by mouth bedtime.       levothyroxine (SYNTHROID, LEVOTHROID) 75 MCG tablet Take 1 tablet (75 mcg total) by mouth daily. 90 tablet 2     melatonin 5 mg Tab Take 5 mg by mouth bedtime.       montelukast (SINGULAIR) 10 mg tablet TAKE 1 TABLET BY MOUTH EVERY DAY 90 tablet 3     prazosin (MINIPRESS) 1 MG capsule Take 2 mg by mouth bedtime.        senna-docusate (SENNOSIDES-DOCUSATE SODIUM) 8.6-50 mg tablet Take 3 tablets by mouth bedtime.       SOY ISOFLA/BLK COHOSH/MAG BARK (ESTROVEN ORAL) Take by mouth.       tiZANidine (ZANAFLEX) 4 MG tablet TAKE 1-2 TABLETS BY MOUTH DURING THE DAY AND 2 TABLETS AT BEDTIME AS NEEDED 360 tablet 0     triamcinolone (KENALOG) 0.1 % cream APPLY TOPICALLY BID TO HANDS AND FEET FOR 2 TO 3 WEEKS  3     varenicline (CHANTIX STARTING MONTH BOX) 0.5 mg (11)- 1 mg (42) tablet 1 wk  before you stop smoking take 0.5mg daily on days 1-3, 0.5mg 2 times each day on days 4-7, then 1mg 2 times daily. 53 tablet 0     varenicline (CHANTIX) 1 mg tablet Take 1 tab by mouth two times a day. Take after eating with a full glass of water. NOTE: Dispense as maintenance for refills only. 60 tablet 2     diazePAM (VALIUM) 2 MG tablet Take 1 tablet (2 mg total) by mouth as needed for anxiety (PRIOR TO FLYING). 10 tablet 0     No current facility-administered medications on file prior to visit.

## 2021-06-17 NOTE — TELEPHONE ENCOUNTER
Telephone Encounter by Janie Kramer at 4/26/2021 11:55 AM     Author: Janie Kramer Service: -- Author Type: --    Filed: 4/26/2021 11:57 AM Encounter Date: 4/21/2021 Status: Addendum    : Janie Kramer    Related Notes: Original Note by Janie Kramer filed at 4/26/2021 11:55 AM       PA APPROVED:    Approval start date: 3/27/2021  Approval end date:  4/26/2022    Pharmacy has been notified of approval and will contact patient when medication is ready for pickup.

## 2021-06-20 NOTE — PROGRESS NOTES
Assessment:      Healthy female exam.    1. Annual physical exam  Vitamin D, Total (25-Hydroxy)    Gynecologic Cytology (PAP Smear)   2. Centrilobular emphysema (H)     3. Nicotine Dependence     4. Bipolar II Disorder     5. Hypothyroidism, unspecified type     6. Hyperlipidemia, unspecified hyperlipidemia type  Lipid Cascade RANDOM    Basic Metabolic Panel   7. Fibromyalgia     8. Migraine     9. Controlled substance agreement signed       I have counseled the patient for tobacco cessation and the follow up will occur  at the next visit.  Patient adamantly refused the flu shot.     Plan:     We filled out a controlled substance agreement plan today together.  She is allowed a maximum of 30 tablets of 5/325 Vicodin in 30 days although currently she does not use even close to this amount.   Await pap smear results.  Blood tests: See above mentioned lab work.  I will get back to her on the results by my chart and only call with grossly abnormal lab values.  Contraception: status post hysterectomy.  Discussed healthy lifestyle modifications.     Subjective:      Dayana Samaniego is a 57 y.o. female who presents for an annual exam. The patient is sexually active. The patient participates in regular exercise: yes. The patient reports that there is not domestic violence in her life.  Patient is rather complicated for her age.  She has hyperlipidemia and hypothyroidism which are controlled.  She has chronic pain issues including neck pain, fibromyalgia, and migraine headache for which she gets Botox injections.  She has emphysema that has been documented but continues to smoke 2 cigarettes per day.  She is pre-contemplative for reducing that amount.  She has bipolar 2 disorder and depression.  She has been fairly well controlled as of recent.  She tells me that her daughter just had a baby girl 9 weeks ago name Karen.  Apparently the father of the baby, the daughter's , has not made himself available very often  to them and is staying in [place] and working in [place].  During the last month of her pregnancy Dayana stayed with the daughter.  And then she also stayed 2 weeks after the birth.  Then her daughter moved in with her parents and of course bodies loving to have the baby around.  She is not happy with the other situation.  Despite having a new baby in the house she refuses to have her flu shot.  Her last Pap was in 2013 and was normal.  She does have a history of hysterectomy but has a cervix still.  We will check a Pap and HPV today.  She had her mammogram in March 2018.  She is due for a colonoscopy in 2021.  She is not actively going to the gym or doing any regimen of exercise but she is leading an active lifestyle [place] and taking care of horses and yards etc.  She tries to eat well.    Healthy Habits:   Regular Exercise: Yes ,active lifestyle,horse, yard  Sunscreen Use: No  Healthy Diet: Yes  Dental Visits Regularly: Yes  Seat Belt: Yes  Sexually active: Yes  Self Breast Exam Monthly:No  Hemoccults: No  Flex Sig: No  Colonoscopy: Yes  Lipid Profile: Yes  Glucose Screen: Yes  Prevention of Osteoporosis: Yes, not taking Vit D  Last Dexa: No  Guns at Home:  No      Immunization History   Administered Date(s) Administered     DT (pediatric) 02/21/1992, 12/20/2002     Influenza, inj, historic,unspecified 10/16/2008, 09/10/2012     Influenza, seasonal,quad inj 36+ mos 11/09/2015     Influenza,seasonal quad, PF, 36+MOS 02/13/2017     Pneumo Polysac 23-V 01/01/2012     Td,adult,historic,unspecified 12/20/2002, 09/17/2012     Tdap 09/10/2012     Immunization status: up to date and documented, Refuses Immunization.    No exam data present    Gynecologic History  No LMP recorded. Patient has had a hysterectomy.  Contraception: status post hysterectomy  Last Pap: 2013. Results were: normal  Last mammogram: 3/29/18. Results were: diagnostic/normal 3/2018    OB History     Para Term  AB Living   2 2 2      SAB TAB Ectopic Multiple Live Births             # Outcome Date GA Lbr Jose/2nd Weight Sex Delivery Anes PTL Lv   2 Term            1 Term                   Current Outpatient Prescriptions   Medication Sig Dispense Refill     albuterol (PROAIR HFA) 90 mcg/actuation inhaler Inhale 2 puffs every 4 (four) hours as needed for shortness of breath. every 4-6 hours as needed. 8.5 g 2     atorvastatin (LIPITOR) 40 MG tablet TAKE 1 TABLET(40 MG) BY MOUTH AT BEDTIME 90 tablet 3     diazePAM (VALIUM) 2 MG tablet Take 1 tablet (2 mg total) by mouth as needed for anxiety (PRIOR TO FLYING). 10 tablet 0     doxepin (SINEQUAN) 25 MG capsule Take 3 capsules (75 mg total) by mouth at bedtime. 270 capsule 1     eletriptan (RELPAX) 40 MG tablet Take 1 tab at earliest onset of migraine, may repeat in 2 hours if necessary 12 tablet 2     esomeprazole (NEXIUM) 20 MG capsule Take 40 mg by mouth bedtime. Uses OTC strength       FLUoxetine (PROZAC) 20 MG capsule Take 3 capsules (60 mg total) by mouth daily. 270 capsule 1     fluticasone-vilanterol (BREO ELLIPTA) 100-25 mcg/dose DsDv inhaler Inhale 1 puff daily. 1 each 11     folic acid (FOLVITE) 800 MCG tablet Take by mouth.       HYDROcodone-acetaminophen 5-325 mg per tablet Take 1 tablet by mouth every 6 (six) hours as needed for pain. 30 tablet 0     hydrOXYzine pamoate (VISTARIL) 25 MG capsule Take 1 capsule (25 mg total) by mouth 3 (three) times a day as needed for itching. Take 1-2 capsules every 6 hours as needed for migraine 90 capsule 1     lamoTRIgine (LAMICTAL) 200 MG tablet Take 200 mg by mouth bedtime.       levothyroxine (SYNTHROID, LEVOTHROID) 75 MCG tablet TAKE 1 TABLET(75 MCG) BY MOUTH DAILY 90 tablet 1     melatonin 5 mg Tab Take 5 mg by mouth bedtime.       prazosin (MINIPRESS) 1 MG capsule Take 2 mg by mouth bedtime.        prochlorperazine (COMPAZINE) 10 MG tablet Take 1 tablet (10 mg total) by mouth every 8 (eight)  hours as needed. 30 tablet 1     senna-docusate (SENNOSIDES-DOCUSATE SODIUM) 8.6-50 mg tablet Take 3 tablets by mouth bedtime.       SOY ISOFLA/BLK COHOSH/MAG BARK (ESTROVEN ORAL) Take by mouth.       tiZANidine (ZANAFLEX) 4 MG tablet TAKE 1-2 TABLETS BY MOUTH DURING THE DAY AND 2 TABLETS AT BEDTIME AS NEEDED 360 tablet 0     triamcinolone (KENALOG) 0.1 % cream APPLY TOPICALLY BID TO HANDS AND FEET FOR 2 TO 3 WEEKS  3     varenicline (CHANTIX) 1 mg tablet Take 1 tab by mouth two times a day. Take after eating with a full glass of water. NOTE: Dispense as maintenance for refills only. 60 tablet 2     No current facility-administered medications for this visit.      Past Medical History:   Diagnosis Date     Anxiety      Asthma      Bipolar 2 disorder (H)      Cervical spinal stenosis     s/p cervical fusion     Chronic kidney disease      Chronic pain syndrome      COPD (chronic obstructive pulmonary disease) (H)      Depression      Fibrocystic breast      Fibromyalgia      GERD (gastroesophageal reflux disease)      Herpes Zoster (Shingles)     Created by Conversion  Replacement Utility updated for latest IMO load     Hiatal hernia      History of electroconvulsive therapy      Hypothyroidism     hypothyroidism     IBS (irritable bowel syndrome)      Migraines      Osteoarthritis      PONV (postoperative nausea and vomiting)      PTSD (post-traumatic stress disorder)      Shingles 2014    on back     Past Surgical History:   Procedure Laterality Date     AUGMENTATION MAMMAPLASTY       BREAST BIOPSY Left     Approx 5 bx     BUNIONECTOMY Right 12/15/2015    Procedure: MODIFIED LAI BUNIONECTOMY RIGHT FOOT;  Surgeon: Jerome Calvin DPM;  Location: [facility];  Service:      CERVICAL FUSION       CERVICAL FUSION Bilateral 2/16/2015    Procedure: ANTERIOR CERVICAL DECOMPRESSION/FUSION C3-5 BILATERAL, ANTERIOR HARDWARE REMOVAL C5-7 BILATERAL ;  Surgeon: Sawyer Roland MD;  Location: [facility];   Service:      HYSTERECTOMY       LUMBAR DISCECTOMY      X2     PELVIC LAPAROSCOPY      multiple     WI NIPPLE EXPLORATION      Description: Breast Nipple Explor W/ Excision Solitary Lactiferous Duct;  Recorded: 04/10/2008;     WI TOTAL ABDOM HYSTERECTOMY      Description: Total Abdominal Hysterectomy;  Recorded: 06/10/2013;     SHOULDER OPEN ROTATOR CUFF REPAIR Left     X2     Codeine; Cetirizine; Nefazodone; Oxycodone-acetaminophen; Trazodone; Amoxicillin-pot clavulanate; Cephalexin; Cyclobenzaprine; Eszopiclone; Methocarbamol; and Penicillins  Family History   Problem Relation Age of Onset     Lung cancer Mother       at age 52     Alcohol abuse Father      Heart disease Maternal Grandfather      Diabetes Paternal Grandmother      Heart attack Paternal Grandmother      Heart disease Paternal Grandfather      Diabetes Sister      Hypertension Sister      Crohn's disease Sister      Heart attack Paternal Uncle      Asthma Maternal Aunt      Social History     Social History     Marital status:      Spouse name: Sudheer     Number of children: 2     Years of education: 12     Occupational History     disabled      Social History Main Topics     Smoking status: Light Tobacco Smoker     Packs/day: 1.00     Years: 45.00     Types: Cigarettes     Smokeless tobacco: Current User      Comment: 2 CIG DAILY      Alcohol use Yes      Comment: rare--1-2 times in year     Drug use: No     Sexual activity: Yes     Partners: Male     Other Topics Concern     Not on file     Social History Narrative       Review of Systems  General:  fatigued, not good sleeper  Eyes: Denies problem  Ears/Nose/Throat: Denies problem  Cardiovascular: Denies problem  Respiratory:  shortness of breath with exertion  Gastrointestinal:  constipation   Genitourinary: Denies problem  Musculoskeletal:  neck OA, bursitis, fibromyalgia  Skin: Denies problem  Neurologic: Denies problem  Psychiatric: Denies problem  Endocrine: Denies  "problem  Heme/Lymphatic: Denies problem   Allergic/Immunologic: Denies problem        Objective:         Vitals:    09/18/18 1315   BP: 114/62   Pulse: 86   Temp: 97.7  F (36.5  C)   SpO2: 96%   Weight: 149 lb (67.6 kg)   Height: 5' 5\" (1.651 m)     Body mass index is 24.79 kg/(m^2).    Physical Exam:  General Appearance: Alert, cooperative, no distress, appears stated age, normal weight  Head: Normocephalic, without obvious abnormality, atraumatic  Eyes: PERRL, conjunctiva/corneas clear, EOM's intact  Ears: Normal TM's and external ear canals, both ears  Nose: Nares normal, septum midline,mucosa normal, no drainage  Throat: Lips, mucosa, and tongue normal; teeth and gums normal  Neck: Supple, symmetrical, trachea midline, no adenopathy;  thyroid: not enlarged, symmetric, no tenderness/mass/nodules; no carotid bruit or JVD  Back: Symmetric, no curvature, ROM normal, no CVA tenderness  Lungs: Clear to auscultation with mildly decreased breath sounds bilaterally, respirations unlabored  Breasts: No breast masses, tenderness, asymmetry, or nipple discharge.  Heart: Regular rate and rhythm, S1 and S2 normal, no murmur   Abdomen: Soft, non-tender, bowel sounds active normal,  no masses, no organomegaly  Pelvic:Normally developed genitalia with no external lesions or eruptions. Vagina and cervix show no lesions, inflammation, discharge or tenderness.  No adnexal mass or tenderness.  Extremities: Extremities normal, atraumatic, no cyanosis or edema  Skin: Skin color, texture, turgor normal, no rashes or lesions  Lymph nodes: Cervical, supraclavicular, and axillary nodes normal  Neurologic: Cranial nerves II through XII intact, no focal deficits, reflexes symmetrical       "

## 2021-06-20 NOTE — LETTER
Letter by Joanne Weiner MD at      Author: Joanne Weiner MD Service: -- Author Type: --    Filed:  Encounter Date: 3/12/2020 Status: (Other)         Dayana Samaniego  [id number]  [place] [address]             March 12, 2020         Dear Ms. Samaniego,    Below are the results from your recent visit:    Resulted Orders   Lipid Cascade   Result Value Ref Range    Cholesterol 216 (H) <=199 mg/dL    Triglycerides 147 <=149 mg/dL    HDL Cholesterol 75 >=50 mg/dL    LDL Calculated 112 <=129 mg/dL    Patient Fasting > 8hrs? Yes    Basic Metabolic Panel   Result Value Ref Range    Sodium 141 136 - 145 mmol/L    Potassium 4.1 3.5 - 5.0 mmol/L    Chloride 104 98 - 107 mmol/L    CO2 27 22 - 31 mmol/L    Anion Gap, Calculation 10 5 - 18 mmol/L    Glucose 97 70 - 125 mg/dL    Calcium 9.7 8.5 - 10.5 mg/dL    BUN 14 8 - 22 mg/dL    Creatinine 1.11 (H) 0.60 - 1.10 mg/dL    GFR MDRD Af Amer >60 >60 mL/min/1.73m2    GFR MDRD Non Af Amer 50 (L) >60 mL/min/1.73m2    Narrative    Fasting Glucose reference range is 70-99 mg/dL per  American Diabetes Association (ADA) guidelines.               Hi Dayana,  Your kidney function is nearly back to your baseline.  Your cholesterol looks markedly improved although it is not as good as it was in 2018.  I am not sure if it just has not been long enough or if there is some other factor for that.  We should recheck these labs in 6 months time.  See you then or sooner if you need me.    Please call with questions or contact us using [employer].    Sincerely,        Electronically signed by Joanne Weiner MD ([place]etsy)

## 2021-06-21 NOTE — LETTER
Letter by Ana Victor MBBS at      Author: Ana Victor MBBS Service: -- Author Type: --    Filed:  Encounter Date: 3/24/2021 Status: (Other)         [facility]  [address] [facility] [address]                                  March 24, 2021    Patient: Dayana Samaniego   MR Number: [id number]   YOB: 1960   Date of Visit: 3/24/2021     Dear Dr. Lin:    Thank you for referring Dayana Samaniego to me for evaluation. Below are the relevant portions of my assessment and plan of care.    If you have questions, please do not hesitate to call me. I look forward to following Dayana along with you.    Sincerely,        TUTU Cornejo          CC  No Recipients  Ana Victor MBBS  3/24/2021  4:23 PM  [facility] note      Patient: Dayana Samaniego  MRN: [id number]      [facility] [[id number]]  Reason for Visit     Chief Complaint   Patient presents with   ? Review Of Multiple Medical Conditions       Code Status     Full code    Assessment     Acute fracture involving the anterior superior endplate S1  History of a mechanical fall in the TCU  Persistent right lower extremity weakness  Pain management with patient reporting severe pain with no improvement  Status post anterior spine fusion L4-S1, posterior spine fusion with instrumentation  L4-S1 decompression  L4-L5 bilateral bone marrow aspiration fusion anterior spine level 01 on 3/ 17/21  Underlying history of severe spinal stenosis with neurogenic claudication  New onset right lower extremity weakness postoperatively  Pain management  COPD  Bipolar disorder  Chronic kidney disease stage III  Nicotine addiction  Anemia secondary to blood loss discharge hemoglobin 9.9  Generalized weakness    Plan     Patient has been admitted to the TCU for strengthening and rehab  She underwent above-mentioned procedure and tolerated it well.  Continue with her incisional  cares  Unfortunately patient is not doing very well with acute pain reported in the back.  She had a fall in the TCU.  She was sent for urgent imaging in the ER where imaging did reveal that she had a new onset fracture of the S1 superior endplate.  Spine team was consulted in the ER and they have recommended continued with her current care plan with back brace and no other changes.  They recommended gentle therapy only.  These were reviewed with the patient.  She continues to be bothered and still has not contacted the spine team.  At my recommendation she is going to contact them either this week or early next week if she sees no improvement.  She is bothered by the fact that her right leg is still very weak and numb.  She is not really noticing an improvement.  She has evidence of nerve damage with the foot drop noted on the right side also and weakness.  She remains a high fall risk.  We will encouraging her to see orthopedics.  Continue with her pain management with now for now with no change at the request of the patient.  Advised patient fall risk mitigation measures and not self transfer without help.  Total time spent is 35 minutes with 20 minutes spent face-to-face talking to this patient reviewing care plan including ER visit and imaging results.  Also reviewed with her need to follow-up with orthopedics/spine clinic for discussion of the right lower extremity weakness and pain.  She will call to make an appointment        History     Patient is a very pleasant 60 y.o. female who is admitted to TCU  Patient has a known history of neurogenic claudication with underlying history of lumbar spinal stenosis.  She was electively admitted to the hospital and underwent above-mentioned procedure.  Postoperatively she did well.  Postoperatively she developed new onset right lower extremity weakness due to nerve irritation  Spine surgery recommended steroids and pain management.  She has been discharged to the  TCU  Unfortunately she is not doing well and continues to have right lower extremity weakness.  No improvement noted she actually had a fall in the TCU.  She denied hitting her head.  However her pain was 10 out of 10 and she was sent to the emergency room.  She was evaluated and had an imaging which shows that she has a superior endplate fracture of the S1.  Spine clinic was contacted.  They recommended conservative treatment.  Patient continues to be quite upset about her pain and reports that she is not noticing any improvement.  Ambulation is still difficult in spite of a walker  She has an underlying history of bipolar disorder and will continue on her Lamictal Vistaril and Prozac.  She reports her mood is stable however she does have situational stressors as per her related to her pain  She has chronic kidney disease stage III which was stable.  She also has COPD with no wheezing and exacerbation and felt to be stable  She started quite weak and has difficulty ambulating    Past Medical History     Active Ambulatory (Non-Hospital) Problems    Diagnosis   ? DDD (degenerative disc disease), lumbosacral   ? Spinal stenosis of lumbar region with neurogenic claudication   ? Anemia   ? Hypotension   ? Hypothyroidism   ? Hyperlipidemia   ? Fibromyalgia   ? Common migraine with intractable migraine   ? Chronic kidney disease, stage 3   ? Osteopenia of multiple sites   ? Cigarette nicotine dependence without complication   ? Centrilobular emphysema (H)   ? Hiatal hernia   ? Lung nodule   ? Hallux abductovalgus with bunions, unspecified laterality   ? Bipolar 2 disorder (H)   ? Menopausal Disorder   ? Chronic Pain Syndrome   ? Walk Is Wobbly Or Unsteady (Ataxia)   ? Menopause Has Occurred   ? Migraine Headache   ? Nicotine Dependence   ? GERD (gastroesophageal reflux disease)     Past Medical History:   Diagnosis Date   ? Anemia 3/18/2021   ? Anxiety    ? Asthma    ? Bipolar 2 disorder (H)    ? Centrilobular emphysema  (H) 2/26/2018   ? Cervical spinal stenosis    ? Chronic kidney disease    ? Chronic kidney disease, stage 3 1/14/2021   ? Chronic pain syndrome    ? Cigarette nicotine dependence without complication 3/4/2020   ? Common migraine with intractable migraine 2/15/2021   ? COPD (chronic obstructive pulmonary disease) (H)    ? DDD (degenerative disc disease), lumbosacral 3/22/2021   ? Depression    ? Fibrocystic breast    ? Fibromyalgia    ? GERD (gastroesophageal reflux disease)    ? Hallux abductovalgus with bunions, unspecified laterality 12/14/2015   ? Herpes Zoster (Shingles)    ? Hiatal hernia    ? History of electroconvulsive therapy    ? Hyperlipidemia 3/17/2021   ? Hypotension 3/18/2021   ? Hypothyroidism    ? IBS (irritable bowel syndrome)    ? Lung nodule 8/4/2016   ? Menopausal Disorder    ? Menopause Has Occurred    ? Migraine Headache    ? Migraines    ? Nicotine Dependence    ? Osteoarthritis    ? Osteopenia of multiple sites 9/1/2020   ? PONV (postoperative nausea and vomiting)    ? PTSD (post-traumatic stress disorder)    ? Shingles 2014   ? Spinal stenosis of lumbar region with neurogenic claudication 3/22/2021   ? Walk Is Wobbly Or Unsteady (Ataxia)        Past Social History     Reviewed, and she  reports that she has been smoking cigarettes. She has a 45.00 pack-year smoking history. She has quit using smokeless tobacco. She reports current alcohol use. She reports that she does not use drugs.    Family History     Reviewed, and family history includes Alcohol abuse in her father; Asthma in her maternal aunt; Crohn's disease in her sister; Diabetes in her paternal grandmother and sister; Heart attack in her paternal grandmother and paternal uncle; Heart disease in her maternal grandfather and paternal grandfather; Hypertension in her sister; Lung cancer in her mother.    Medication List   Post Discharge Medication Reconciliation Status: discharge medications reconciled and changed, per note/orders    Albuterol MDI as needed every 4 hours  Lipitor 40 mg daily  Colace twice daily  Doxepin 75 mg at bedtime  Relpax 40 mg 2 times as needed for migraine  Aimovig autoinjector 70 mg/ML once a month  Vitamin D 50,000 units once a week  Nexium 44 mg daily  Prozac 80 mg daily  Folic acid 400 MCG's daily  Norco 10/325 1-2 tabs every 4-6 hours as needed-changed to 1-2 tabs as per scale every 4 hours as needed  Lamictal 200 twice daily  Synthroid 75 MCG's daily  Singulair 10 mg 1 tablet daily  Prazosin 2 capsules at bedtime 2 mg strength  Compazine 5 mg every 8 hours as needed  Senna 2 tabs 2 times daily  Zanaflex 4 mg at bedtime  Chantix 1 mg 1 tablet twice daily    Allergies     Allergies   Allergen Reactions   ? Codeine Anaphylaxis   ? Cetirizine Nausea And Vomiting   ? Gabapentin    ? Nefazodone Nausea And Vomiting   ? Oxycodone-Acetaminophen      hallucinations   ? Trazodone Nausea And Vomiting   ? Amoxicillin-Pot Clavulanate Rash   ? Cephalexin Rash   ? Cyclobenzaprine Rash   ? Eszopiclone Rash   ? Methocarbamol Rash   ? Penicillins Rash     Mild rash       Review of Systems   A comprehensive review of 14 systems was done. Pertinent findings noted here and in history of present illness. All the rest negative.  Constitutional: Negative.  Negative for fever, chills, activity change, appetite change and fatigue.   HENT: Negative for congestion and facial swelling.    Eyes: Negative for photophobia, redness and visual disturbance.   Respiratory: Negative for cough and chest tightness.    Cardiovascular: Negative for chest pain, palpitations and leg swelling.   Gastrointestinal: Negative for nausea, diarrhea, constipation, blood in stool and abdominal distention.   Genitourinary: Negative.    Musculoskeletal: Negative.    Skin: Negative.    Neurological: Negative for dizziness, tremors, syncope, weakness, light-headedness and headaches.   Hematological: Does not bruise/bleed easily.   Psychiatric/Behavioral: Negative.   "      Physical Exam     Recent Vitals 3/24/2021   Height 5' 5\"   Weight 176 lbs 14 oz   BSA (m2) 1.92 m2   /66   Pulse 98   Temp 99.3   Temp src -   SpO2 92   Some recent data might be hidden       Constitutional: Oriented to person, place, and time and appears well-developed.   HEENT:  Normocephalic and atraumatic.  Eyes: Conjunctivae and EOM are normal. Pupils are equal, round, and reactive to light. No discharge.  No scleral icterus. Nose normal. Mouth/Throat: Oropharynx is clear and moist. No oropharyngeal exudate.    NECK: Normal range of motion. Neck supple. No JVD present. No tracheal deviation present. No thyromegaly present.   CARDIOVASCULAR: Normal rate, regular rhythm and intact distal pulses.  Exam reveals no gallop and no friction rub.  Systolic murmur present.  PULMONARY: Effort normal and breath sounds normal. No respiratory distress.No Wheezing or rales.  ABDOMEN: Soft. Bowel sounds are normal. No distension and no mass.  There is no tenderness. There is no rebound and no guarding. No HSM.  MUSCULOSKELETAL: Normal range of motion. No edema and no tenderness. Mild kyphosis, no tenderness.  LYMPH NODES: Has no cervical, supraclavicular, axillary and groin adenopathy.   NEUROLOGICAL: Alert and oriented to person, place, and time. No cranial nerve deficit.  Normal muscle tone. Coordination normal.   GENITOURINARY: Deferred exam.  SKIN: Skin is warm and dry. No rash noted. No erythema. No pallor.   EXTREMITIES: No cyanosis, no clubbing, no edema. No Deformity.  PSYCHIATRIC: Normal mood, affect and behavior.      Lab Results     Recent Results (from the past 240 hour(s))   COVID-19 Virus PCR MRF    Collection Time: 03/21/21  6:45 PM    Specimen: Respiratory   Result Value Ref Range    COVID-19 VIRUS SPECIMEN SOURCE Nasopharyngeal     2019-nCOV       Test received-See reflex to IDDL test SARS CoV2 (COVID-19) Virus RT-PCR   SARS-CoV-2 (COVID-19)-PCR    Collection Time: 03/21/21  6:45 PM    Specimen: " Respiratory   Result Value Ref Range    SARS-CoV-2 Virus Specimen Source Nasopharyngeal     SARS-CoV-2 PCR Result NEGATIVE     SARS-COV-2 PCR COMMENT (Note)    HM2 (CBC W/O DIFF)    Collection Time: 03/22/21  3:47 PM   Result Value Ref Range    WBC 7.3 4.0 - 11.0 thou/uL    RBC 3.36 (L) 3.80 - 5.40 mill/uL    Hemoglobin 9.7 (L) 12.0 - 16.0 g/dL    Hematocrit 30.0 (L) 35.0 - 47.0 %    MCV 89 80 - 100 fL    MCH 28.9 27.0 - 34.0 pg    MCHC 32.3 32.0 - 36.0 g/dL    RDW 14.5 11.0 - 14.5 %    Platelets 295 140 - 440 thou/uL    MPV 8.5 8.5 - 12.5 fL   Basic Metabolic Panel    Collection Time: 03/22/21  3:48 PM   Result Value Ref Range    Sodium 139 136 - 145 mmol/L    Potassium 4.1 3.5 - 5.0 mmol/L    Chloride 105 98 - 107 mmol/L    CO2 25 22 - 31 mmol/L    Anion Gap, Calculation 9 5 - 18 mmol/L    Glucose 101 70 - 125 mg/dL    Calcium 8.4 (L) 8.5 - 10.5 mg/dL    BUN 19 8 - 22 mg/dL    Creatinine 0.96 0.60 - 1.10 mg/dL    GFR MDRD Af Amer >60 >60 mL/min/1.73m2    GFR MDRD Non Af Amer 59 (L) >60 mL/min/1.73m2                Electronically signed by  TUTU Cornejo Mona, MBBS  3/24/2021  8:48 AM  Sign when Signing Visit    [facility] note      Patient: Dayana Samaniego  MRN: [id number]      [facility] [[id number]]  Reason for Visit     Chief Complaint   Patient presents with   ? Review Of Multiple Medical Conditions       Code Status     Full code    Assessment     Acute fall in the TCU while patient was attempting self transfer  Acute pain intractable not responding to narcotics  Status post anterior spine fusion L4-S1, posterior spine fusion with instrumentation  L4-S1 decompression  L4-L5 bilateral bone marrow aspiration fusion anterior spine level 01 on 3/ 17/21  Underlying history of severe spinal stenosis with neurogenic claudication  New onset right lower extremity weakness postoperatively  Pain management  COPD  Bipolar disorder  Chronic kidney disease stage  III  Nicotine addiction  Anemia secondary to blood loss discharge hemoglobin 9.9  Generalized weakness    Plan     Patient has been admitted to the TCU for strengthening and rehab  She underwent above-mentioned procedure and tolerated it well.  Continue with her incisional cares  Postoperative course complicated by new onset right lower extremity weakness  CT imaging was negative for any new issues.  Spine surgery recommended steroid burst.        History     Patient is a very pleasant 60 y.o. female who is admitted to TCU  Patient has a known history of neurogenic claudication with underlying history of lumbar spinal stenosis.  She was electively admitted to the hospital and underwent above-mentioned procedure.  Postoperatively she did well.  Postoperatively she developed new onset right lower extremity weakness due to nerve irritation  Spine surgery recommended steroids and pain management.  She has been discharged to the TCU  CT imaging was negative  She has an underlying history of bipolar disorder and will continue on her Lamictal Vistaril and Prozac.  She has chronic kidney disease stage III which was stable.  She also has COPD with no wheezing and exacerbation and felt to be stable    Past Medical History     Active Ambulatory (Non-Hospital) Problems    Diagnosis   ? DDD (degenerative disc disease), lumbosacral   ? Spinal stenosis of lumbar region with neurogenic claudication   ? Anemia   ? Hypotension   ? Hypothyroidism   ? Hyperlipidemia   ? Fibromyalgia   ? Common migraine with intractable migraine   ? Chronic kidney disease, stage 3   ? Osteopenia of multiple sites   ? Cigarette nicotine dependence without complication   ? Centrilobular emphysema (H)   ? Hiatal hernia   ? Lung nodule   ? Hallux abductovalgus with bunions, unspecified laterality   ? Bipolar 2 disorder (H)   ? Menopausal Disorder   ? Chronic Pain Syndrome   ? Walk Is Wobbly Or Unsteady (Ataxia)   ? Menopause Has Occurred   ? Migraine  Headache   ? Nicotine Dependence   ? GERD (gastroesophageal reflux disease)     Past Medical History:   Diagnosis Date   ? Anemia 3/18/2021   ? Anxiety    ? Asthma    ? Bipolar 2 disorder (H)    ? Centrilobular emphysema (H) 2/26/2018   ? Cervical spinal stenosis    ? Chronic kidney disease    ? Chronic kidney disease, stage 3 1/14/2021   ? Chronic pain syndrome    ? Cigarette nicotine dependence without complication 3/4/2020   ? Common migraine with intractable migraine 2/15/2021   ? COPD (chronic obstructive pulmonary disease) (H)    ? DDD (degenerative disc disease), lumbosacral 3/22/2021   ? Depression    ? Fibrocystic breast    ? Fibromyalgia    ? GERD (gastroesophageal reflux disease)    ? Hallux abductovalgus with bunions, unspecified laterality 12/14/2015   ? Herpes Zoster (Shingles)    ? Hiatal hernia    ? History of electroconvulsive therapy    ? Hyperlipidemia 3/17/2021   ? Hypotension 3/18/2021   ? Hypothyroidism    ? IBS (irritable bowel syndrome)    ? Lung nodule 8/4/2016   ? Menopausal Disorder    ? Menopause Has Occurred    ? Migraine Headache    ? Migraines    ? Nicotine Dependence    ? Osteoarthritis    ? Osteopenia of multiple sites 9/1/2020   ? PONV (postoperative nausea and vomiting)    ? PTSD (post-traumatic stress disorder)    ? Shingles 2014   ? Spinal stenosis of lumbar region with neurogenic claudication 3/22/2021   ? Walk Is Wobbly Or Unsteady (Ataxia)        Past Social History     Reviewed, and she  reports that she has been smoking cigarettes. She has a 45.00 pack-year smoking history. She has quit using smokeless tobacco. She reports current alcohol use. She reports that she does not use drugs.    Family History     Reviewed, and family history includes Alcohol abuse in her father; Asthma in her maternal aunt; Crohn's disease in her sister; Diabetes in her paternal grandmother and sister; Heart attack in her paternal grandmother and paternal uncle; Heart disease in her maternal  grandfather and paternal grandfather; Hypertension in her sister; Lung cancer in her mother.    Medication List   Post Discharge Medication Reconciliation Status: discharge medications reconciled and changed, per note/orders   Albuterol MDI as needed every 4 hours  Lipitor 40 mg daily  Colace twice daily  Doxepin 75 mg at bedtime  Relpax 40 mg 2 times as needed for migraine  Aimovig autoinjector 70 mg/ML once a month  Vitamin D 50,000 units once a week  Nexium 44 mg daily  Prozac 80 mg daily  Folic acid 400 MCG's daily  Norco 10/325 1-2 tabs every 4-6 hours as needed-changed to 1-2 tabs as per scale every 4 hours as needed  Lamictal 200 twice daily  Synthroid 75 MCG's daily  Singulair 10 mg 1 tablet daily  Prazosin 2 capsules at bedtime 2 mg strength  Compazine 5 mg every 8 hours as needed  Senna 2 tabs 2 times daily  Zanaflex 4 mg at bedtime  Chantix 1 mg 1 tablet twice daily    Allergies     Allergies   Allergen Reactions   ? Codeine Anaphylaxis   ? Cetirizine Nausea And Vomiting   ? Gabapentin    ? Nefazodone Nausea And Vomiting   ? Oxycodone-Acetaminophen      hallucinations   ? Trazodone Nausea And Vomiting   ? Amoxicillin-Pot Clavulanate Rash   ? Cephalexin Rash   ? Cyclobenzaprine Rash   ? Eszopiclone Rash   ? Methocarbamol Rash   ? Penicillins Rash     Mild rash       Review of Systems   A comprehensive review of 14 systems was done. Pertinent findings noted here and in history of present illness. All the rest negative.  Constitutional: Negative.  Negative for fever, chills, activity change, appetite change and fatigue.   HENT: Negative for congestion and facial swelling.    Eyes: Negative for photophobia, redness and visual disturbance.   Respiratory: Negative for cough and chest tightness.    Cardiovascular: Negative for chest pain, palpitations and leg swelling.   Gastrointestinal: Negative for nausea, diarrhea, constipation, blood in stool and abdominal distention.   Genitourinary: Negative.   "  Musculoskeletal: Negative.    Skin: Negative.    Neurological: Negative for dizziness, tremors, syncope, weakness, light-headedness and headaches.   Hematological: Does not bruise/bleed easily.   Psychiatric/Behavioral: Negative.        Physical Exam     Recent Vitals 3/24/2021   Height 5' 5\"   Weight 176 lbs 14 oz   BSA (m2) 1.92 m2   /66   Pulse 98   Temp 99.3   Temp src -   SpO2 92   Some recent data might be hidden       Constitutional: Oriented to person, place, and time and appears well-developed.   HEENT:  Normocephalic and atraumatic.  Eyes: Conjunctivae and EOM are normal. Pupils are equal, round, and reactive to light. No discharge.  No scleral icterus. Nose normal. Mouth/Throat: Oropharynx is clear and moist. No oropharyngeal exudate.    NECK: Normal range of motion. Neck supple. No JVD present. No tracheal deviation present. No thyromegaly present.   CARDIOVASCULAR: Normal rate, regular rhythm and intact distal pulses.  Exam reveals no gallop and no friction rub.  Systolic murmur present.  PULMONARY: Effort normal and breath sounds normal. No respiratory distress.No Wheezing or rales.  ABDOMEN: Soft. Bowel sounds are normal. No distension and no mass.  There is no tenderness. There is no rebound and no guarding. No HSM.  MUSCULOSKELETAL: Normal range of motion. No edema and no tenderness. Mild kyphosis, no tenderness.  LYMPH NODES: Has no cervical, supraclavicular, axillary and groin adenopathy.   NEUROLOGICAL: Alert and oriented to person, place, and time. No cranial nerve deficit.  Normal muscle tone. Coordination normal.   GENITOURINARY: Deferred exam.  SKIN: Skin is warm and dry. No rash noted. No erythema. No pallor.   EXTREMITIES: No cyanosis, no clubbing, no edema. No Deformity.  PSYCHIATRIC: Normal mood, affect and behavior.      Lab Results     Recent Results (from the past 240 hour(s))   COVID-19 Virus PCR MRF    Collection Time: 03/21/21  6:45 PM    Specimen: Respiratory   Result Value " Ref Range    COVID-19 VIRUS SPECIMEN SOURCE Nasopharyngeal     2019-nCOV       Test received-See reflex to IDDL test SARS CoV2 (COVID-19) Virus RT-PCR   SARS-CoV-2 (COVID-19)-PCR    Collection Time: 03/21/21  6:45 PM    Specimen: Respiratory   Result Value Ref Range    SARS-CoV-2 Virus Specimen Source Nasopharyngeal     SARS-CoV-2 PCR Result NEGATIVE     SARS-COV-2 PCR COMMENT (Note)    HM2 (CBC W/O DIFF)    Collection Time: 03/22/21  3:47 PM   Result Value Ref Range    WBC 7.3 4.0 - 11.0 thou/uL    RBC 3.36 (L) 3.80 - 5.40 mill/uL    Hemoglobin 9.7 (L) 12.0 - 16.0 g/dL    Hematocrit 30.0 (L) 35.0 - 47.0 %    MCV 89 80 - 100 fL    MCH 28.9 27.0 - 34.0 pg    MCHC 32.3 32.0 - 36.0 g/dL    RDW 14.5 11.0 - 14.5 %    Platelets 295 140 - 440 thou/uL    MPV 8.5 8.5 - 12.5 fL   Basic Metabolic Panel    Collection Time: 03/22/21  3:48 PM   Result Value Ref Range    Sodium 139 136 - 145 mmol/L    Potassium 4.1 3.5 - 5.0 mmol/L    Chloride 105 98 - 107 mmol/L    CO2 25 22 - 31 mmol/L    Anion Gap, Calculation 9 5 - 18 mmol/L    Glucose 101 70 - 125 mg/dL    Calcium 8.4 (L) 8.5 - 10.5 mg/dL    BUN 19 8 - 22 mg/dL    Creatinine 0.96 0.60 - 1.10 mg/dL    GFR MDRD Af Amer >60 >60 mL/min/1.73m2    GFR MDRD Non Af Amer 59 (L) >60 mL/min/1.73m2                Electronically signed by  TUTU Cornejo

## 2021-06-21 NOTE — LETTER
Letter by Marah Rodriguez NP at      Author: Marah Rodriguez NP Service: -- Author Type: --    Filed:  Encounter Date: 10/16/2020 Status: (Other)                    My Depression Action Plan  Name: Dayana Samaniego   Date of Birth 1960  Date: 10/16/2020    My Doctor: Joanne Weiner MD   My Clinic: [address], [address] [address]  [phone]          GREEN    ZONE   Good Control    What it looks like:     Things are going generally well. You have normal ups and downs. You may even feel depressed from time to time, but bad moods usually last less than a day.   What you need to do:  1. Continue to care for yourself (see self care plan)  2. Check your depression survival kit and update it as needed  3. Follow your physicians recommendations including any medication.  4. Do not stop taking medication unless you consult with your physician first.           YELLOW         ZONE Getting Worse    What it looks like:     Depression is starting to interfere with your life.     It may be hard to get out of bed; you may be starting to isolate yourself from others.    Symptoms of depression are starting to last most all day and this has happened for several days.     You may have suicidal thoughts but they are not constant.   What you need to do:     1. Call your care team. Your response to treatment will improve if you keep your care team informed of your progress. Yellow periods are signs an adjustment may need to be made.     2. Continue your self-care.  Just get dressed and ready for the day.  Don't give yourself time to talk yourself out of it.    3. Talk to someone in your support network.    4. Open up your depression Depression Self-Care Plan / Wellness kit.           RED    ZONE Medical Alert - Get Help    What it looks like:     Depression is seriously interfering with your life.     You may experience these  or other symptoms: You cant get out of bed most days, cant work or engage in other necessary activities, you have trouble taking care of basic hygiene, or basic responsibilities, thoughts of suicide or death that will not go away, self-injurious behavior.     What you need to do:  1. Call your care team and request a same-day appointment. If they are not available (weekends or after hours) call your local crisis line, emergency room or 911.            Self-Care Plan / Wellness Kit    Self-Care for Depression  Heres the deal. Your body and mind are really not as separate as most people think.  What you do and think affects how you feel and how you feel influences what you do and think. This means if you do things that people who feel good do, it will help you feel better.  Sometimes this is all it takes.  There is also a place for medication and therapy depending on how severe your depression is, so be sure to consult with your medical provider and/ or Behavioral Health Consultant if your symptoms are worsening or not improving.     In order to better manage my stress, I will:    Exercise  Get some form of exercise, every day. This will help reduce pain and release endorphins, the feel good chemicals in your brain. This is almost as good as taking antidepressants!  This is not the same as joining a gym and then never going! (they count on that by the way?) It can be as simple as just going for a walk or doing some gardening, anything that will get you moving.      Hygiene   Maintain good hygiene (get out of bed in the morning, make your bed, brush your teeth, take a shower, and get dressed like you were going to work, even if you are unemployed).  If your clothes don't fit try to get ones that do.    Diet  Strive to eat foods that are good for me, drink plenty of water, and avoid excessive sugar, caffeine, alcohol, and other mood-altering substances.  Some foods that are helpful in depression are: complex  carbohydrates, B vitamins, flaxseed, fish or fish oil, fresh fruits and vegetables.    Psychotherapy  Agree to participate in Individual Therapy (if recommended).    Medication  If prescribed medications, I agree to take them.  Missing doses can result in serious side effects.  I understand that drinking alcohol, or other illicit drug use, may cause potential side effects.  I will not stop my medication abruptly without first discussing it with my provider.    Staying Connected With Others  Stay in touch with my friends, family members, and my primary care provider/team.    Use your imagination  Be creative.  We all have a creative side; it doesnt matter if its oil painting, sand castles, or mud pies! This will also kick up the endorphins.    Witness Beauty  (AKA stop and smell the roses) Take a look outside, even in mid-winter. Notice colors, textures. Watch the squirrels and birds.     Service to others  Be of service to others.  There is always someone else in need.  By helping others we can get out of ourselves and remember the really important things.  This also provides opportunities for practicing all the other parts of the program.    Humor  Laugh and be silly!  Adjust your TV habits for less news and crime-drama and more comedy.    Control your stress  Try breathing deep, massage therapy, biofeedback, and meditation. Find time to relax each day.     Crisis Text Line  http://www.crisistextline.org    The Crisis Text Line serves anyone, in any type of crisis, providing access to free, 24/7 support and information via the medium people already use and trust:    Here's how it works:  1.  Text 709-412 from anywhere in the [place], anytime, about any type of crisis.  2.  A live, trained Crisis Counselor receives the text and responds quickly.  3.  The volunteer Crisis Counselor will help you move from a 'hot moment to a cool moment'.  My support system    Clinic Contact:  Phone number:    Contact 1:  Phone number:     Contact 2:  Phone number:    [employer]:  Phone number:    Therapist:  Phone number:    [facility]:    Phone number:    Other community support:  Phone number:

## 2021-06-21 NOTE — LETTER
Letter by Ana Victor MBBS at      Author: Ana Victor MBBS Service: -- Author Type: --    Filed:  Encounter Date: 3/31/2021 Status: (Other)         [facility]  [id number] [facility] [id number]                                  March 31, 2021    Patient: Dayana Samaniego   MR Number: [id number]   YOB: 1960   Date of Visit: 3/31/2021     Dear Dr. Lin:    Thank you for referring Dayana Samaniego to me for evaluation. Below are the relevant portions of my assessment and plan of care.    If you have questions, please do not hesitate to call me. I look forward to following Dayana along with you.    Sincerely,        TUTU Cornejo          CC  No Recipients  Ana Victor MBBS  3/31/2021  1:42 PM  [facility] note      Patient: Dayana Samaniego  MRN: [id number]      [facility] [[id number]]  Reason for Visit     Chief Complaint   Patient presents with   ? Review Of Multiple Medical Conditions   Follow-up on back pain/right lower extremity weakness    Code Status     Full code    Assessment     Acute fracture involving the anterior superior endplate S1  Pain management with patient reporting 10/10 pain  New onset UTI  Status post anterior spine fusion L4-S1, posterior spine fusion with instrumentation  L4-S1 decompression  L4-L5 bilateral bone marrow aspiration fusion anterior spine level 01 on 3/ 17/21  Underlying history of severe spinal stenosis with neurogenic claudication  New onset right lower extremity weakness postoperatively  Pain management  COPD  Bipolar disorder  Chronic kidney disease stage III  Nicotine addiction  Anemia secondary to blood loss discharge hemoglobin 9.9  Generalized weakness    Plan     Patient has been admitted to the TCU for strengthening and rehab  She underwent above-mentioned procedure and tolerated it well.  Continue with her incisional cares  She had a scheduled follow-up with  orthopedics done  At the recommendation she has been given a knee brace for her right lower extremity.  Pain management was reviewed with them and they are recommending patient continue with her high doses of narcotics.  Patient again requesting scheduling at the request of Ortho.  She does not want to taper.  Advised to discuss this with either orthopedics or the pain clinic.  She also on scheduled Zanaflex for increased pain.  In addition UA UC does show an infection with cultures growing E. coli.  Cipro 250 twice daily for 5 days based on cultures and sensitivities.  Continue with her PT OT and rehab          History     Patient is a very pleasant 60 y.o. female who is admitted to TCU  Patient has a known history of neurogenic claudication with underlying history of lumbar spinal stenosis.  She was electively admitted to the hospital and underwent above-mentioned procedure.  Postoperatively she did well.  Postoperatively she developed new onset right lower extremity weakness due to nerve irritation  Spine surgery recommended steroids and pain management.  She has been discharged to the TCU  She had a scheduled follow-up with orthopedics due to persistent right lower extremity weakness.  She has been given a knee brace by them  She was evaluated and had an imaging which shows that she has a superior endplate fracture of the S1.  Spine clinic was contacted.  She continues to have pain concern  Pain management reviewed and she had a follow-up with orthopedics to discuss her intractable pain.  At the recommendation they have recommended continuing with her high doses of narcotics in a scheduled fashion.  She also has Zanaflex available now.  Continue to monitor pain closely not ready for any taper as yet  Ambulation is still difficult in spite of a walker  This is primarily due to her persistent right lower extremity weakness with numbness.  She told me that she can barely feel her legs and she feels clumsy when  walking because of this  She has an underlying history of bipolar disorder and will continue on her Lamictal Vistaril and Prozac.  She reports her mood is stable however she does have situational stressors as per her related to her pain  She has chronic kidney disease stage III which was stable.  She also has COPD with no wheezing and exacerbation and felt to be stable  She also has a UTI with cultures growing E. coli.  She is reporting dysuria with frequency    Past Medical History     Active Ambulatory (Non-Hospital) Problems    Diagnosis   ? DDD (degenerative disc disease), lumbosacral   ? Spinal stenosis of lumbar region with neurogenic claudication   ? Anemia   ? Hypotension   ? Hypothyroidism   ? Hyperlipidemia   ? Fibromyalgia   ? Common migraine with intractable migraine   ? Chronic kidney disease, stage 3   ? Osteopenia of multiple sites   ? Cigarette nicotine dependence without complication   ? Centrilobular emphysema (H)   ? Hiatal hernia   ? Lung nodule   ? Hallux abductovalgus with bunions, unspecified laterality   ? Bipolar 2 disorder (H)   ? Menopausal Disorder   ? Chronic Pain Syndrome   ? Walk Is Wobbly Or Unsteady (Ataxia)   ? Menopause Has Occurred   ? Migraine Headache   ? Nicotine Dependence   ? GERD (gastroesophageal reflux disease)     Past Medical History:   Diagnosis Date   ? Anemia 3/18/2021   ? Anxiety    ? Asthma    ? Bipolar 2 disorder (H)    ? Centrilobular emphysema (H) 2/26/2018   ? Cervical spinal stenosis    ? Chronic kidney disease    ? Chronic kidney disease, stage 3 1/14/2021   ? Chronic pain syndrome    ? Cigarette nicotine dependence without complication 3/4/2020   ? Common migraine with intractable migraine 2/15/2021   ? COPD (chronic obstructive pulmonary disease) (H)    ? DDD (degenerative disc disease), lumbosacral 3/22/2021   ? Depression    ? Fibrocystic breast    ? Fibromyalgia    ? GERD (gastroesophageal reflux disease)    ? Hallux abductovalgus with bunions,  unspecified laterality 12/14/2015   ? Herpes Zoster (Shingles)    ? Hiatal hernia    ? History of electroconvulsive therapy    ? Hyperlipidemia 3/17/2021   ? Hypotension 3/18/2021   ? Hypothyroidism    ? IBS (irritable bowel syndrome)    ? Lung nodule 8/4/2016   ? Menopausal Disorder    ? Menopause Has Occurred    ? Migraine Headache    ? Migraines    ? Nicotine Dependence    ? Osteoarthritis    ? Osteopenia of multiple sites 9/1/2020   ? PONV (postoperative nausea and vomiting)    ? PTSD (post-traumatic stress disorder)    ? Shingles 2014   ? Spinal stenosis of lumbar region with neurogenic claudication 3/22/2021   ? Walk Is Wobbly Or Unsteady (Ataxia)        Past Social History     Reviewed, and she  reports that she has been smoking cigarettes. She has a 45.00 pack-year smoking history. She has quit using smokeless tobacco. She reports current alcohol use. She reports that she does not use drugs.    Family History     Reviewed, and family history includes Alcohol abuse in her father; Asthma in her maternal aunt; Crohn's disease in her sister; Diabetes in her paternal grandmother and sister; Heart attack in her paternal grandmother and paternal uncle; Heart disease in her maternal grandfather and paternal grandfather; Hypertension in her sister; Lung cancer in her mother.    Medication List   Post Discharge Medication Reconciliation Status: discharge medications reconciled and changed, per note/orders   Albuterol MDI as needed every 4 hours  Lipitor 40 mg daily  Colace twice daily  Doxepin 75 mg at bedtime  Relpax 40 mg 2 times as needed for migraine  Aimovig autoinjector 70 mg/ML once a month  Vitamin D 50,000 units once a week  Nexium 44 mg daily  Prozac 80 mg daily  Folic acid 400 MCG's daily  Norco 10/325 1-2 tabs every 4-6 hours as needed-changed to 1-2 tabs as per scale every 4 hours as needed  Lamictal 200 twice daily  Synthroid 75 MCG's daily  Singulair 10 mg 1 tablet daily  Prazosin 2 capsules at bedtime  "2 mg strength  Compazine 5 mg every 8 hours as needed  Senna 2 tabs 2 times daily  Zanaflex 4 mg at bedtime  Chantix 1 mg 1 tablet twice daily    Allergies     Allergies   Allergen Reactions   ? Codeine Anaphylaxis   ? Cetirizine Nausea And Vomiting   ? Gabapentin    ? Nefazodone Nausea And Vomiting   ? Oxycodone-Acetaminophen      hallucinations   ? Trazodone Nausea And Vomiting   ? Amoxicillin-Pot Clavulanate Rash   ? Cephalexin Rash   ? Cyclobenzaprine Rash   ? Eszopiclone Rash   ? Methocarbamol Rash   ? Penicillins Rash     Mild rash       Review of Systems   A comprehensive review of 14 systems was done. Pertinent findings noted here and in history of present illness. All the rest negative.  Constitutional: Negative.  Negative for fever, chills, she has profound activity change, appetite change and fatigue.   HENT: Negative for congestion and facial swelling.    Eyes: Negative for photophobia, redness and visual disturbance.   Respiratory: Negative for cough and chest tightness.    Cardiovascular: Negative for chest pain, palpitations and leg swelling.   Gastrointestinal: Negative for nausea, diarrhea, constipation, blood in stool and abdominal distention.   Genitourinary: Reporting that she has urinary frequency and has had several accidents with patient voiding on herself with no warning  Musculoskeletal: Reporting severe pain 10 out of 10 in her back.  No improvement in her leg strength  Had an acute fall in the TCU  Skin: Negative.    Neurological: Negative for dizziness, tremors, syncope, weakness, light-headedness and headaches.   Hematological: Does not bruise/bleed easily.   Psychiatric/Behavioral: Negative.  Very anxious due to pain      Physical Exam     Recent Vitals 3/31/2021   Height 5' 5\"   Weight 171 lbs 6 oz   BSA (m2) 1.89 m2   /69   Pulse 87   Temp 98.4   Temp src -   SpO2 94   Some recent data might be hidden       Constitutional: Oriented to person, place, and time and appears " well-developed.   HEENT:  Normocephalic and atraumatic.  Eyes: Conjunctivae and EOM are normal. Pupils are equal, round, and reactive to light. No discharge.  No scleral icterus. Nose normal. Mouth/Throat: Oropharynx is clear and moist. No oropharyngeal exudate.    NECK: Normal range of motion. Neck supple. No JVD present. No tracheal deviation present. No thyromegaly present.   CARDIOVASCULAR: Normal rate, regular rhythm and intact distal pulses.  Exam reveals no gallop and no friction rub.  Systolic murmur present.  PULMONARY: Effort normal and breath sounds normal. No respiratory distress.No Wheezing or rales.  ABDOMEN: Soft. Bowel sounds are normal. No distension and no mass.  There is no tenderness. There is no rebound and no guarding. No HSM.  MUSCULOSKELETAL: Normal range of motion. No edema and no tenderness. Mild kyphosis, no tenderness.  Her midline surgical incision is intact with staples noted  LYMPH NODES: Has no cervical, supraclavicular, axillary and groin adenopathy.   NEUROLOGICAL: Alert and oriented to person, place, and time. No cranial nerve deficit.  Normal muscle tone. Coordination normal.   Right lower extremity weakness; ankle strength is minimal  GENITOURINARY: Deferred exam.  SKIN: Skin is warm and dry. No rash noted. No erythema. No pallor.   EXTREMITIES: No cyanosis, no clubbing, no edema. No Deformity.  PSYCHIATRIC: Normal mood, affect and behavior.      Lab Results     Recent Results (from the past 240 hour(s))   COVID-19 Virus PCR MRF    Collection Time: 03/21/21  6:45 PM    Specimen: Respiratory   Result Value Ref Range    COVID-19 VIRUS SPECIMEN SOURCE Nasopharyngeal     2019-nCOV       Test received-See reflex to IDDL test SARS CoV2 (COVID-19) Virus RT-PCR   SARS-CoV-2 (COVID-19)-PCR    Collection Time: 03/21/21  6:45 PM    Specimen: Respiratory   Result Value Ref Range    SARS-CoV-2 Virus Specimen Source Nasopharyngeal     SARS-CoV-2 PCR Result NEGATIVE     SARS-COV-2 PCR COMMENT  (Note)    HM2 (CBC W/O DIFF)    Collection Time: 03/22/21  3:47 PM   Result Value Ref Range    WBC 7.3 4.0 - 11.0 thou/uL    RBC 3.36 (L) 3.80 - 5.40 mill/uL    Hemoglobin 9.7 (L) 12.0 - 16.0 g/dL    Hematocrit 30.0 (L) 35.0 - 47.0 %    MCV 89 80 - 100 fL    MCH 28.9 27.0 - 34.0 pg    MCHC 32.3 32.0 - 36.0 g/dL    RDW 14.5 11.0 - 14.5 %    Platelets 295 140 - 440 thou/uL    MPV 8.5 8.5 - 12.5 fL   Basic Metabolic Panel    Collection Time: 03/22/21  3:48 PM   Result Value Ref Range    Sodium 139 136 - 145 mmol/L    Potassium 4.1 3.5 - 5.0 mmol/L    Chloride 105 98 - 107 mmol/L    CO2 25 22 - 31 mmol/L    Anion Gap, Calculation 9 5 - 18 mmol/L    Glucose 101 70 - 125 mg/dL    Calcium 8.4 (L) 8.5 - 10.5 mg/dL    BUN 19 8 - 22 mg/dL    Creatinine 0.96 0.60 - 1.10 mg/dL    GFR MDRD Af Amer >60 >60 mL/min/1.73m2    GFR MDRD Non Af Amer 59 (L) >60 mL/min/1.73m2   Basic Metabolic Panel    Collection Time: 03/29/21  7:48 AM   Result Value Ref Range    Sodium 139 136 - 145 mmol/L    Potassium 3.8 3.5 - 5.0 mmol/L    Chloride 105 98 - 107 mmol/L    CO2 24 22 - 31 mmol/L    Anion Gap, Calculation 10 5 - 18 mmol/L    Glucose 92 70 - 125 mg/dL    Calcium 8.4 (L) 8.5 - 10.5 mg/dL    BUN 15 8 - 22 mg/dL    Creatinine 1.03 0.60 - 1.10 mg/dL    GFR MDRD Af Amer >60 >60 mL/min/1.73m2    GFR MDRD Non Af Amer 54 (L) >60 mL/min/1.73m2   Magnesium    Collection Time: 03/29/21  7:48 AM   Result Value Ref Range    Magnesium 2.1 1.8 - 2.6 mg/dL   Vitamin D, Total (25-Hydroxy)    Collection Time: 03/29/21  7:48 AM   Result Value Ref Range    Vitamin D, Total (25-Hydroxy) 50.4 30.0 - 80.0 ng/mL   HM2(CBC w/o Differential)    Collection Time: 03/29/21  7:48 AM   Result Value Ref Range    WBC 7.6 4.0 - 11.0 thou/uL    RBC 3.30 (L) 3.80 - 5.40 mill/uL    Hemoglobin 9.3 (L) 12.0 - 16.0 g/dL    Hematocrit 30.0 (L) 35.0 - 47.0 %    MCV 91 80 - 100 fL    MCH 28.2 27.0 - 34.0 pg    MCHC 31.0 (L) 32.0 - 36.0 g/dL    RDW 14.6 (H) 11.0 - 14.5 %     Platelets 410 140 - 440 thou/uL    MPV 8.4 (L) 8.5 - 12.5 fL   Urinalysis    Collection Time: 03/29/21  1:20 PM   Result Value Ref Range    Color, UA Light Yellow Light Yellow, Yellow    Clarity, UA Clear Clear    Glucose, UA Negative Negative    Bilirubin, UA Negative Negative    Ketones, UA Negative Negative    Specific Gravity, UA 1.024 1.001 - 1.030    Blood, UA Negative Negative    pH, UA 6.0 5.0 - 8.0    Protein, UA Negative Negative    Urobilinogen, UA <2.0 mg/dL <2.0 mg/dL    Nitrite, UA Negative Negative    Leukocytes, UA 25 Freda/uL (!) Negative    Bacteria, UA Many (!) None Seen    Amorphous, UA Few (!) None Seen    Mucus, UA Present (!) None Seen lpf    RBC, UA 7 (H) <=2 hpf    WBC UA 15 (H) <=5 hpf    Squam Epithel, UA 1 (H) <=5 /HPF   Culture, Urine    Collection Time: 03/29/21  1:20 PM    Specimen: Urine   Result Value Ref Range    Culture >100,000 col/ml Escherichia coli (!)        Susceptibility    Escherichia coli - JOANNE     Ampicillin <=4 Sensitive      Cefazolin 2 Sensitive      Cefepime <=1 Sensitive      Ceftriaxone <=1 Sensitive      Ciprofloxacin <=0.25 Sensitive      Gentamicin <=2 Sensitive      Levofloxacin <=0.5 Sensitive      Meropenem <=0.5 Sensitive      Nitrofurantoin <=16 Sensitive      Tetracycline <=2 Sensitive      Tobramycin <=2 Sensitive      Trimethoprim + Sulfamethoxazole <=0.5 Sensitive                 Electronically signed by  TUTU Cornejo

## 2021-06-21 NOTE — LETTER
Letter by Ana Victor MBBS at      Author: Ana Victor MBBS Service: -- Author Type: --    Filed:  Encounter Date: 3/22/2021 Status: (Other)         [facility]  [address] [address]                                  March 22, 2021    Patient: Dayana Samaniego   MR Number: [id number]   YOB: 1960   Date of Visit: 3/22/2021     Dear Dr. Lin:    Thank you for referring Dayana Samaniego to me for evaluation. Below are the relevant portions of my assessment and plan of care.    If you have questions, please do not hesitate to call me. I look forward to following Dayana along with you.    Sincerely,        TUTU Cornejo          CC  No Recipients  Ana Victor MBBS  3/22/2021  3:54 PM  Sign when Signing Visit    [facility] note      Patient: Dayana Samaniego  MRN: [id number]      [facility] [[id number]]  Reason for Visit     Chief Complaint   Patient presents with   ? H & P       Code Status     Full code    Assessment     Acute fall in the TCU while patient was attempting self transfer  Acute pain intractable not responding to narcotics  Status post anterior spine fusion L4-S1, posterior spine fusion with instrumentation  L4-S1 decompression  L4-L5 bilateral bone marrow aspiration fusion anterior spine level 01 on 3/ 17/21  Underlying history of severe spinal stenosis with neurogenic claudication  New onset right lower extremity weakness postoperatively  Pain management  COPD  Bipolar disorder  Chronic kidney disease stage III  Nicotine addiction  Anemia secondary to blood loss discharge hemoglobin 9.9  Generalized weakness    Plan     Patient has been admitted to the TCU for strengthening and rehab  She underwent above-mentioned procedure and tolerated it well.  Continue with her incisional cares  Postoperative course complicated by new onset right lower extremity weakness  CT imaging was negative for any new  issues.  Spine surgery recommended steroid burst.  Pain management optimized and she has been discharged to the TCU.  She also developed anemia secondary to blood loss with a drop in hemoglobin.  Admission hemoglobin was 14 and dropped down to 9.9 she will require monitoring.  She has underlying history of bipolar disorder mood and behaviors remain stable  Monitor blood pressures due to concern about hypotension.  Continue with the PT and OT  Patient was evaluated by therapy and made independent in her home.  She attempted to self transfer to the bathroom and ended up on the floor.  She denies hitting her head.  Pain management reviewed and she is reporting 10 out of 10 pain.  She is already on high doses of Norco 10/325 1-2 tabs every 4 hours from 4 to 6 hours that was a change made in the TCU.  Unfortunately she is not reporting any improvement in the pain either.  At this point discussion done with staff and patient and in light of her intractable pain and acute fall we are requesting urgent imaging and evaluation by her orthopedic surgeon.  Staff advised that if x-rays cannot be done immediately then she may need to go to the emergency room to be evaluated there including extensive imaging    History     Patient is a very pleasant 60 y.o. female who is admitted to TCU  Patient has a known history of neurogenic claudication with underlying history of lumbar spinal stenosis.  She was electively admitted to the hospital and underwent above-mentioned procedure.  Postoperatively she did well.  Postoperatively she developed new onset right lower extremity weakness due to nerve irritation  Spine surgery recommended steroids and pain management.  She has been discharged to the TCU  CT imaging was negative  She has an underlying history of bipolar disorder and will continue on her Lamictal Vistaril and Prozac.  She has chronic kidney disease stage III which was stable.  She also has COPD with no wheezing and exacerbation  and felt to be stable    Past Medical History     Active Ambulatory (Non-Hospital) Problems    Diagnosis   ? DDD (degenerative disc disease), lumbosacral   ? Spinal stenosis of lumbar region with neurogenic claudication   ? Anemia   ? Hypotension   ? Hypothyroidism   ? Hyperlipidemia   ? Fibromyalgia   ? Common migraine with intractable migraine   ? Chronic kidney disease, stage 3   ? Osteopenia of multiple sites   ? Cigarette nicotine dependence without complication   ? Centrilobular emphysema (H)   ? Hiatal hernia   ? Lung nodule   ? Hallux abductovalgus with bunions, unspecified laterality   ? Bipolar 2 disorder (H)   ? Menopausal Disorder   ? Chronic Pain Syndrome   ? Walk Is Wobbly Or Unsteady (Ataxia)   ? Menopause Has Occurred   ? Migraine Headache   ? Nicotine Dependence   ? GERD (gastroesophageal reflux disease)     Past Medical History:   Diagnosis Date   ? Anemia 3/18/2021   ? Anxiety    ? Asthma    ? Bipolar 2 disorder (H)    ? Centrilobular emphysema (H) 2/26/2018   ? Cervical spinal stenosis    ? Chronic kidney disease    ? Chronic kidney disease, stage 3 1/14/2021   ? Chronic pain syndrome    ? Cigarette nicotine dependence without complication 3/4/2020   ? Common migraine with intractable migraine 2/15/2021   ? COPD (chronic obstructive pulmonary disease) (H)    ? DDD (degenerative disc disease), lumbosacral 3/22/2021   ? Depression    ? Fibrocystic breast    ? Fibromyalgia    ? GERD (gastroesophageal reflux disease)    ? Hallux abductovalgus with bunions, unspecified laterality 12/14/2015   ? Herpes Zoster (Shingles)    ? Hiatal hernia    ? History of electroconvulsive therapy    ? Hyperlipidemia 3/17/2021   ? Hypotension 3/18/2021   ? Hypothyroidism    ? IBS (irritable bowel syndrome)    ? Lung nodule 8/4/2016   ? Menopausal Disorder    ? Menopause Has Occurred    ? Migraine Headache    ? Migraines    ? Nicotine Dependence    ? Osteoarthritis    ? Osteopenia of multiple sites 9/1/2020   ? PONV  (postoperative nausea and vomiting)    ? PTSD (post-traumatic stress disorder)    ? Shingles 2014   ? Spinal stenosis of lumbar region with neurogenic claudication 3/22/2021   ? Walk Is Wobbly Or Unsteady (Ataxia)        Past Social History     Reviewed, and she  reports that she has been smoking cigarettes. She has a 45.00 pack-year smoking history. She has quit using smokeless tobacco. She reports current alcohol use. She reports that she does not use drugs.    Family History     Reviewed, and family history includes Alcohol abuse in her father; Asthma in her maternal aunt; Crohn's disease in her sister; Diabetes in her paternal grandmother and sister; Heart attack in her paternal grandmother and paternal uncle; Heart disease in her maternal grandfather and paternal grandfather; Hypertension in her sister; Lung cancer in her mother.    Medication List   Post Discharge Medication Reconciliation Status: discharge medications reconciled and changed, per note/orders   Albuterol MDI as needed every 4 hours  Lipitor 40 mg daily  Colace twice daily  Doxepin 75 mg at bedtime  Relpax 40 mg 2 times as needed for migraine  Aimovig autoinjector 70 mg/ML once a month  Vitamin D 50,000 units once a week  Nexium 44 mg daily  Prozac 80 mg daily  Folic acid 400 MCG's daily  Norco 10/325 1-2 tabs every 4-6 hours as needed-changed to 1-2 tabs as per scale every 4 hours as needed  Lamictal 200 twice daily  Synthroid 75 MCG's daily  Singulair 10 mg 1 tablet daily  Prazosin 2 capsules at bedtime 2 mg strength  Compazine 5 mg every 8 hours as needed  Senna 2 tabs 2 times daily  Zanaflex 4 mg at bedtime  Chantix 1 mg 1 tablet twice daily    Allergies     Allergies   Allergen Reactions   ? Codeine Anaphylaxis   ? Cetirizine Nausea And Vomiting   ? Gabapentin    ? Nefazodone Nausea And Vomiting   ? Oxycodone-Acetaminophen      hallucinations   ? Trazodone Nausea And Vomiting   ? Amoxicillin-Pot Clavulanate Rash   ? Cephalexin Rash   ?  "Cyclobenzaprine Rash   ? Eszopiclone Rash   ? Methocarbamol Rash   ? Penicillins Rash     Mild rash       Review of Systems   A comprehensive review of 14 systems was done. Pertinent findings noted here and in history of present illness. All the rest negative.  Constitutional: Negative.  Negative for fever, chills, activity change, appetite change and fatigue.   HENT: Negative for congestion and facial swelling.    Eyes: Negative for photophobia, redness and visual disturbance.   Respiratory: Negative for cough and chest tightness.    Cardiovascular: Negative for chest pain, palpitations and leg swelling.   Gastrointestinal: Negative for nausea, diarrhea, constipation, blood in stool and abdominal distention.   Genitourinary: Negative.    Musculoskeletal: Negative.    Skin: Negative.    Neurological: Negative for dizziness, tremors, syncope, weakness, light-headedness and headaches.   Hematological: Does not bruise/bleed easily.   Psychiatric/Behavioral: Negative.        Physical Exam     Recent Vitals 3/22/2021   Height 5' 4.75\"   Weight 170 lbs   BSA (m2) 1.88 m2   /71   Pulse 87   Temp 98.6   SpO2 94   Some recent data might be hidden       Constitutional: Oriented to person, place, and time and appears well-developed.   HEENT:  Normocephalic and atraumatic.  Eyes: Conjunctivae and EOM are normal. Pupils are equal, round, and reactive to light. No discharge.  No scleral icterus. Nose normal. Mouth/Throat: Oropharynx is clear and moist. No oropharyngeal exudate.    NECK: Normal range of motion. Neck supple. No JVD present. No tracheal deviation present. No thyromegaly present.   CARDIOVASCULAR: Normal rate, regular rhythm and intact distal pulses.  Exam reveals no gallop and no friction rub.  Systolic murmur present.  PULMONARY: Effort normal and breath sounds normal. No respiratory distress.No Wheezing or rales.  ABDOMEN: Soft. Bowel sounds are normal. No distension and no mass.  There is no tenderness. " There is no rebound and no guarding. No HSM.  MUSCULOSKELETAL: Normal range of motion. No edema and no tenderness. Mild kyphosis, no tenderness.  LYMPH NODES: Has no cervical, supraclavicular, axillary and groin adenopathy.   NEUROLOGICAL: Alert and oriented to person, place, and time. No cranial nerve deficit.  Normal muscle tone. Coordination normal.   GENITOURINARY: Deferred exam.  SKIN: Skin is warm and dry. No rash noted. No erythema. No pallor.   EXTREMITIES: No cyanosis, no clubbing, no edema. No Deformity.  PSYCHIATRIC: Normal mood, affect and behavior.      Lab Results     Recent Results (from the past 240 hour(s))   COVID-19 Virus PCR MRF    Collection Time: 03/12/21  9:59 AM    Specimen: Respiratory   Result Value Ref Range    COVID-19 VIRUS SPECIMEN SOURCE Nasopharyngeal     2019-nCOV       Test received-See reflex to IDDL test SARS CoV2 (COVID-19) Virus RT-PCR   SARS-CoV-2 (COVID-19)-PCR    Collection Time: 03/12/21  9:59 AM    Specimen: Respiratory   Result Value Ref Range    SARS-CoV-2 Virus Specimen Source Nasopharyngeal     SARS-CoV-2 PCR Result NEGATIVE     SARS-COV-2 PCR COMMENT       Testing was performed using the Aptima SARS-CoV-2 Assay on the jellyfish   Basic Metabolic Panel    Collection Time: 03/12/21 10:05 AM   Result Value Ref Range    Sodium 139 136 - 145 mmol/L    Potassium 4.8 3.5 - 5.0 mmol/L    Chloride 105 98 - 107 mmol/L    CO2 19 (L) 22 - 31 mmol/L    Anion Gap, Calculation 15 5 - 18 mmol/L    Glucose 124 70 - 125 mg/dL    Calcium 9.0 8.5 - 10.5 mg/dL    BUN 19 8 - 22 mg/dL    Creatinine 1.15 (H) 0.60 - 1.10 mg/dL    GFR MDRD Af Amer 58 (L) >60 mL/min/1.73m2    GFR MDRD Non Af Amer 48 (L) >60 mL/min/1.73m2   Hemoglobin    Collection Time: 03/12/21 10:05 AM   Result Value Ref Range    Hemoglobin 14.0 12.0 - 16.0 g/dL                Electronically signed by  TUTU Cornejo

## 2021-06-21 NOTE — LETTER
Letter by Ana Victor MBBS at      Author: Ana Victor MBBS Service: -- Author Type: --    Filed:  Encounter Date: 3/29/2021 Status: (Other)         [facility]  [address] [address]                                  March 29, 2021    Patient: Dayana Samaniego   MR Number: [id number]   YOB: 1960   Date of Visit: 3/29/2021     Dear Dr. Lin:    Thank you for referring Dayana Samaniego to me for evaluation. Below are the relevant portions of my assessment and plan of care.    If you have questions, please do not hesitate to call me. I look forward to following Dayana along with you.    Sincerely,        TUTU Cornejo          CC  No Recipients  Ana Victor MBBS  3/29/2021  3:17 PM  [facility] note      Patient: Dayana Samaniego  MRN: [id number]      [facility] [[id number]]  Reason for Visit     Chief Complaint   Patient presents with   ? Review Of Multiple Medical Conditions   Follow-up on back pain/right lower extremity weakness    Code Status     Full code    Assessment     Acute fracture involving the anterior superior endplate S1  Pain management with patient reporting 10/10 pain  New onset urinary frequency with incontinence  Status post anterior spine fusion L4-S1, posterior spine fusion with instrumentation  L4-S1 decompression  L4-L5 bilateral bone marrow aspiration fusion anterior spine level 01 on 3/ 17/21  Underlying history of severe spinal stenosis with neurogenic claudication  New onset right lower extremity weakness postoperatively  Pain management  COPD  Bipolar disorder  Chronic kidney disease stage III  Nicotine addiction  Anemia secondary to blood loss discharge hemoglobin 9.9  Generalized weakness    Plan     Patient has been admitted to the TCU for strengthening and rehab  She underwent above-mentioned procedure and tolerated it well.  Continue with her incisional cares  Incision is  healing well with no drainage noted time or concern for secondary infection.  Patient seen today at request because of pain concerns.  She is on high doses of narcotics.  She is requesting scheduling because she was taking narcotics every 2 hours at home.  Unfortunately she is almost a week out and with no improvement in pain instead worsening noted.  I have discussed this with her and patient and her  have been quite agitated and requesting that her narcotics be scheduled.  Limitations in the TCU especially on high doses narcotics were explained to her.  We will continue with her as needed.  Will however put her on Zanaflex 2 mg 4 times daily in addition to her other pain pills.  No taper at this point.  I have again asked her to see her surgeon because of her ongoing concerns.  She has not made an appointment and apparently as per her she was told she does not need to be seen for 6 weeks.  She continues to have right lower extremity weakness with numbness which has also not improved.  This puts her at a high fall risk and she is already fallen once.  In addition she is now reporting new onset of urinary incontinence.  She reports that she is going bathroom frequently and will end up urinating on herself with no idea.  At this point I am planning to check a UA UC urgently on her.  She will be put on time voiding.  Nursing will check a PVR to ensure that she is not retaining urine either.  If no improvement noted she may need urgent imaging again.  She has had 2 imagings done which have not been diagnostic.  Total time spent is 35 minutes with more than 25-minute spent face-to-face reviewing pain management right lower extremity weakness follow-up with orthopedics and her urinary symptoms.  Patient again encouraged to make a stat appointment with orthopedics to discuss this issue with pain management  Also advised to minimize caffeine products to minimize bladder irritation        History     Patient is a  very pleasant 60 y.o. female who is admitted to TCU  Patient has a known history of neurogenic claudication with underlying history of lumbar spinal stenosis.  She was electively admitted to the hospital and underwent above-mentioned procedure.  Postoperatively she did well.  Postoperatively she developed new onset right lower extremity weakness due to nerve irritation  Spine surgery recommended steroids and pain management.  She has been discharged to the TCU  Unfortunately that is something that is bothering her as well as her   She told me that she was taking a pain pill every 2 hours at home.  She is on a very high dose of Norco which she does not want any taper done.  Family has requested no taper either.  At this point she reports that she would prefer to have her pain pills given to her around-the-clock almost every 2 hours.  She was counseled that this would not be a very safe option in the TCU  She told me that she had contacted her spine surgeon was was told that there is no need to be seen that quickly  She was evaluated and had an imaging which shows that she has a superior endplate fracture of the S1.  Spine clinic was contacted.  She continues to have pain concern  Ambulation is still difficult in spite of a walker  This is primarily due to her persistent right lower extremity weakness with numbness.  She told me that she can barely feel her legs and she feels clumsy when walking because of this  She has an underlying history of bipolar disorder and will continue on her Lamictal Vistaril and Prozac.  She reports her mood is stable however she does have situational stressors as per her related to her pain  She has chronic kidney disease stage III which was stable.  She also has COPD with no wheezing and exacerbation and felt to be stable  She started quite weak and has difficulty ambulating  Overall now reporting more urinary symptoms she is reporting that she is emptying her bladder on herself  without even noticing that she needs to go urinate    Past Medical History     Active Ambulatory (Non-Hospital) Problems    Diagnosis   ? DDD (degenerative disc disease), lumbosacral   ? Spinal stenosis of lumbar region with neurogenic claudication   ? Anemia   ? Hypotension   ? Hypothyroidism   ? Hyperlipidemia   ? Fibromyalgia   ? Common migraine with intractable migraine   ? Chronic kidney disease, stage 3   ? Osteopenia of multiple sites   ? Cigarette nicotine dependence without complication   ? Centrilobular emphysema (H)   ? Hiatal hernia   ? Lung nodule   ? Hallux abductovalgus with bunions, unspecified laterality   ? Bipolar 2 disorder (H)   ? Menopausal Disorder   ? Chronic Pain Syndrome   ? Walk Is Wobbly Or Unsteady (Ataxia)   ? Menopause Has Occurred   ? Migraine Headache   ? Nicotine Dependence   ? GERD (gastroesophageal reflux disease)     Past Medical History:   Diagnosis Date   ? Anemia 3/18/2021   ? Anxiety    ? Asthma    ? Bipolar 2 disorder (H)    ? Centrilobular emphysema (H) 2/26/2018   ? Cervical spinal stenosis    ? Chronic kidney disease    ? Chronic kidney disease, stage 3 1/14/2021   ? Chronic pain syndrome    ? Cigarette nicotine dependence without complication 3/4/2020   ? Common migraine with intractable migraine 2/15/2021   ? COPD (chronic obstructive pulmonary disease) (H)    ? DDD (degenerative disc disease), lumbosacral 3/22/2021   ? Depression    ? Fibrocystic breast    ? Fibromyalgia    ? GERD (gastroesophageal reflux disease)    ? Hallux abductovalgus with bunions, unspecified laterality 12/14/2015   ? Herpes Zoster (Shingles)    ? Hiatal hernia    ? History of electroconvulsive therapy    ? Hyperlipidemia 3/17/2021   ? Hypotension 3/18/2021   ? Hypothyroidism    ? IBS (irritable bowel syndrome)    ? Lung nodule 8/4/2016   ? Menopausal Disorder    ? Menopause Has Occurred    ? Migraine Headache    ? Migraines    ? Nicotine Dependence    ? Osteoarthritis    ? Osteopenia of multiple  sites 9/1/2020   ? PONV (postoperative nausea and vomiting)    ? PTSD (post-traumatic stress disorder)    ? Shingles 2014   ? Spinal stenosis of lumbar region with neurogenic claudication 3/22/2021   ? Walk Is Wobbly Or Unsteady (Ataxia)        Past Social History     Reviewed, and she  reports that she has been smoking cigarettes. She has a 45.00 pack-year smoking history. She has quit using smokeless tobacco. She reports current alcohol use. She reports that she does not use drugs.    Family History     Reviewed, and family history includes Alcohol abuse in her father; Asthma in her maternal aunt; Crohn's disease in her sister; Diabetes in her paternal grandmother and sister; Heart attack in her paternal grandmother and paternal uncle; Heart disease in her maternal grandfather and paternal grandfather; Hypertension in her sister; Lung cancer in her mother.    Medication List   Post Discharge Medication Reconciliation Status: discharge medications reconciled and changed, per note/orders   Albuterol MDI as needed every 4 hours  Lipitor 40 mg daily  Colace twice daily  Doxepin 75 mg at bedtime  Relpax 40 mg 2 times as needed for migraine  Aimovig autoinjector 70 mg/ML once a month  Vitamin D 50,000 units once a week  Nexium 44 mg daily  Prozac 80 mg daily  Folic acid 400 MCG's daily  Norco 10/325 1-2 tabs every 4-6 hours as needed-changed to 1-2 tabs as per scale every 4 hours as needed  Lamictal 200 twice daily  Synthroid 75 MCG's daily  Singulair 10 mg 1 tablet daily  Prazosin 2 capsules at bedtime 2 mg strength  Compazine 5 mg every 8 hours as needed  Senna 2 tabs 2 times daily  Zanaflex 4 mg at bedtime  Chantix 1 mg 1 tablet twice daily    Allergies     Allergies   Allergen Reactions   ? Codeine Anaphylaxis   ? Cetirizine Nausea And Vomiting   ? Gabapentin    ? Nefazodone Nausea And Vomiting   ? Oxycodone-Acetaminophen      hallucinations   ? Trazodone Nausea And Vomiting   ? Amoxicillin-Pot Clavulanate Rash   ?  "Cephalexin Rash   ? Cyclobenzaprine Rash   ? Eszopiclone Rash   ? Methocarbamol Rash   ? Penicillins Rash     Mild rash       Review of Systems   A comprehensive review of 14 systems was done. Pertinent findings noted here and in history of present illness. All the rest negative.  Constitutional: Negative.  Negative for fever, chills, she has profound activity change, appetite change and fatigue.   HENT: Negative for congestion and facial swelling.    Eyes: Negative for photophobia, redness and visual disturbance.   Respiratory: Negative for cough and chest tightness.    Cardiovascular: Negative for chest pain, palpitations and leg swelling.   Gastrointestinal: Negative for nausea, diarrhea, constipation, blood in stool and abdominal distention.   Genitourinary: Reporting that she has urinary frequency and has had several accidents with patient voiding on herself with no warning  Musculoskeletal: Reporting severe pain 10 out of 10 in her back.  No improvement in her leg strength  Had an acute fall in the TCU  Skin: Negative.    Neurological: Negative for dizziness, tremors, syncope, weakness, light-headedness and headaches.   Hematological: Does not bruise/bleed easily.   Psychiatric/Behavioral: Negative.  Very anxious due to pain      Physical Exam     Recent Vitals 3/29/2021   Height 5' 5\"   Weight 176 lbs 3 oz   BSA (m2) 1.91 m2   /68   Pulse 83   Temp 97.7   Temp src -   SpO2 95   Some recent data might be hidden       Constitutional: Oriented to person, place, and time and appears well-developed.   HEENT:  Normocephalic and atraumatic.  Eyes: Conjunctivae and EOM are normal. Pupils are equal, round, and reactive to light. No discharge.  No scleral icterus. Nose normal. Mouth/Throat: Oropharynx is clear and moist. No oropharyngeal exudate.    NECK: Normal range of motion. Neck supple. No JVD present. No tracheal deviation present. No thyromegaly present.   CARDIOVASCULAR: Normal rate, regular rhythm and " intact distal pulses.  Exam reveals no gallop and no friction rub.  Systolic murmur present.  PULMONARY: Effort normal and breath sounds normal. No respiratory distress.No Wheezing or rales.  ABDOMEN: Soft. Bowel sounds are normal. No distension and no mass.  There is no tenderness. There is no rebound and no guarding. No HSM.  MUSCULOSKELETAL: Normal range of motion. No edema and no tenderness. Mild kyphosis, no tenderness.  Her midline surgical incision is intact with staples noted  LYMPH NODES: Has no cervical, supraclavicular, axillary and groin adenopathy.   NEUROLOGICAL: Alert and oriented to person, place, and time. No cranial nerve deficit.  Normal muscle tone. Coordination normal.   Right lower extremity weakness; ankle strength is minimal  GENITOURINARY: Deferred exam.  SKIN: Skin is warm and dry. No rash noted. No erythema. No pallor.   EXTREMITIES: No cyanosis, no clubbing, no edema. No Deformity.  PSYCHIATRIC: Normal mood, affect and behavior.      Lab Results     Recent Results (from the past 240 hour(s))   COVID-19 Virus PCR MRF    Collection Time: 03/21/21  6:45 PM    Specimen: Respiratory   Result Value Ref Range    COVID-19 VIRUS SPECIMEN SOURCE Nasopharyngeal     2019-nCOV       Test received-See reflex to IDDL test SARS CoV2 (COVID-19) Virus RT-PCR   SARS-CoV-2 (COVID-19)-PCR    Collection Time: 03/21/21  6:45 PM    Specimen: Respiratory   Result Value Ref Range    SARS-CoV-2 Virus Specimen Source Nasopharyngeal     SARS-CoV-2 PCR Result NEGATIVE     SARS-COV-2 PCR COMMENT (Note)    HM2 (CBC W/O DIFF)    Collection Time: 03/22/21  3:47 PM   Result Value Ref Range    WBC 7.3 4.0 - 11.0 thou/uL    RBC 3.36 (L) 3.80 - 5.40 mill/uL    Hemoglobin 9.7 (L) 12.0 - 16.0 g/dL    Hematocrit 30.0 (L) 35.0 - 47.0 %    MCV 89 80 - 100 fL    MCH 28.9 27.0 - 34.0 pg    MCHC 32.3 32.0 - 36.0 g/dL    RDW 14.5 11.0 - 14.5 %    Platelets 295 140 - 440 thou/uL    MPV 8.5 8.5 - 12.5 fL   Basic Metabolic Panel     Collection Time: 03/22/21  3:48 PM   Result Value Ref Range    Sodium 139 136 - 145 mmol/L    Potassium 4.1 3.5 - 5.0 mmol/L    Chloride 105 98 - 107 mmol/L    CO2 25 22 - 31 mmol/L    Anion Gap, Calculation 9 5 - 18 mmol/L    Glucose 101 70 - 125 mg/dL    Calcium 8.4 (L) 8.5 - 10.5 mg/dL    BUN 19 8 - 22 mg/dL    Creatinine 0.96 0.60 - 1.10 mg/dL    GFR MDRD Af Amer >60 >60 mL/min/1.73m2    GFR MDRD Non Af Amer 59 (L) >60 mL/min/1.73m2                Electronically signed by  TUTU Cornejo Mona, MBBS  3/29/2021  8:43 AM  Sign when Signing Visit    [facility] note      Patient: Dayana Samaniego  MRN: [id number]      [facility] [[id number]]  Reason for Visit     Chief Complaint   Patient presents with   ? Review Of Multiple Medical Conditions   Follow-up on back pain/right lower extremity weakness    Code Status     Full code    Assessment     Acute fracture involving the anterior superior endplate S1  History of a mechanical fall in the TCU  Persistent right lower extremity weakness  Pain management with patient reporting severe pain with no improvement  Status post anterior spine fusion L4-S1, posterior spine fusion with instrumentation  L4-S1 decompression  L4-L5 bilateral bone marrow aspiration fusion anterior spine level 01 on 3/ 17/21  Underlying history of severe spinal stenosis with neurogenic claudication  New onset right lower extremity weakness postoperatively  Pain management  COPD  Bipolar disorder  Chronic kidney disease stage III  Nicotine addiction  Anemia secondary to blood loss discharge hemoglobin 9.9  Generalized weakness    Plan     Patient has been admitted to the TCU for strengthening and rehab  She underwent above-mentioned procedure and tolerated it well.  Continue with her incisional cares  Unfortunately patient is not doing very well with acute pain reported in the back.  She had a fall in the TCU.          History     Patient is a very  pleasant 60 y.o. female who is admitted to TCU  Patient has a known history of neurogenic claudication with underlying history of lumbar spinal stenosis.  She was electively admitted to the hospital and underwent above-mentioned procedure.  Postoperatively she did well.  Postoperatively she developed new onset right lower extremity weakness due to nerve irritation  Spine surgery recommended steroids and pain management.  She has been discharged to the TCU  Unfortunately she is not doing well and continues to have right lower extremity weakness.  No improvement noted she actually had a fall in the TCU.  She denied hitting her head.  However her pain was 10 out of 10 and she was sent to the emergency room.  She was evaluated and had an imaging which shows that she has a superior endplate fracture of the S1.  Spine clinic was contacted.  They recommended conservative treatment.  Patient continues to be quite upset about her pain and reports that she is not noticing any improvement.  Ambulation is still difficult in spite of a walker  She has an underlying history of bipolar disorder and will continue on her Lamictal Vistaril and Prozac.  She reports her mood is stable however she does have situational stressors as per her related to her pain  She has chronic kidney disease stage III which was stable.  She also has COPD with no wheezing and exacerbation and felt to be stable  She started quite weak and has difficulty ambulating    Past Medical History     Active Ambulatory (Non-Hospital) Problems    Diagnosis   ? DDD (degenerative disc disease), lumbosacral   ? Spinal stenosis of lumbar region with neurogenic claudication   ? Anemia   ? Hypotension   ? Hypothyroidism   ? Hyperlipidemia   ? Fibromyalgia   ? Common migraine with intractable migraine   ? Chronic kidney disease, stage 3   ? Osteopenia of multiple sites   ? Cigarette nicotine dependence without complication   ? Centrilobular emphysema (H)   ? Hiatal hernia    ? Lung nodule   ? Hallux abductovalgus with bunions, unspecified laterality   ? Bipolar 2 disorder (H)   ? Menopausal Disorder   ? Chronic Pain Syndrome   ? Walk Is Wobbly Or Unsteady (Ataxia)   ? Menopause Has Occurred   ? Migraine Headache   ? Nicotine Dependence   ? GERD (gastroesophageal reflux disease)     Past Medical History:   Diagnosis Date   ? Anemia 3/18/2021   ? Anxiety    ? Asthma    ? Bipolar 2 disorder (H)    ? Centrilobular emphysema (H) 2/26/2018   ? Cervical spinal stenosis    ? Chronic kidney disease    ? Chronic kidney disease, stage 3 1/14/2021   ? Chronic pain syndrome    ? Cigarette nicotine dependence without complication 3/4/2020   ? Common migraine with intractable migraine 2/15/2021   ? COPD (chronic obstructive pulmonary disease) (H)    ? DDD (degenerative disc disease), lumbosacral 3/22/2021   ? Depression    ? Fibrocystic breast    ? Fibromyalgia    ? GERD (gastroesophageal reflux disease)    ? Hallux abductovalgus with bunions, unspecified laterality 12/14/2015   ? Herpes Zoster (Shingles)    ? Hiatal hernia    ? History of electroconvulsive therapy    ? Hyperlipidemia 3/17/2021   ? Hypotension 3/18/2021   ? Hypothyroidism    ? IBS (irritable bowel syndrome)    ? Lung nodule 8/4/2016   ? Menopausal Disorder    ? Menopause Has Occurred    ? Migraine Headache    ? Migraines    ? Nicotine Dependence    ? Osteoarthritis    ? Osteopenia of multiple sites 9/1/2020   ? PONV (postoperative nausea and vomiting)    ? PTSD (post-traumatic stress disorder)    ? Shingles 2014   ? Spinal stenosis of lumbar region with neurogenic claudication 3/22/2021   ? Walk Is Wobbly Or Unsteady (Ataxia)        Past Social History     Reviewed, and she  reports that she has been smoking cigarettes. She has a 45.00 pack-year smoking history. She has quit using smokeless tobacco. She reports current alcohol use. She reports that she does not use drugs.    Family History     Reviewed, and family history includes  Alcohol abuse in her father; Asthma in her maternal aunt; Crohn's disease in her sister; Diabetes in her paternal grandmother and sister; Heart attack in her paternal grandmother and paternal uncle; Heart disease in her maternal grandfather and paternal grandfather; Hypertension in her sister; Lung cancer in her mother.    Medication List   Post Discharge Medication Reconciliation Status: discharge medications reconciled and changed, per note/orders   Albuterol MDI as needed every 4 hours  Lipitor 40 mg daily  Colace twice daily  Doxepin 75 mg at bedtime  Relpax 40 mg 2 times as needed for migraine  Aimovig autoinjector 70 mg/ML once a month  Vitamin D 50,000 units once a week  Nexium 44 mg daily  Prozac 80 mg daily  Folic acid 400 MCG's daily  Norco 10/325 1-2 tabs every 4-6 hours as needed-changed to 1-2 tabs as per scale every 4 hours as needed  Lamictal 200 twice daily  Synthroid 75 MCG's daily  Singulair 10 mg 1 tablet daily  Prazosin 2 capsules at bedtime 2 mg strength  Compazine 5 mg every 8 hours as needed  Senna 2 tabs 2 times daily  Zanaflex 4 mg at bedtime  Chantix 1 mg 1 tablet twice daily    Allergies     Allergies   Allergen Reactions   ? Codeine Anaphylaxis   ? Cetirizine Nausea And Vomiting   ? Gabapentin    ? Nefazodone Nausea And Vomiting   ? Oxycodone-Acetaminophen      hallucinations   ? Trazodone Nausea And Vomiting   ? Amoxicillin-Pot Clavulanate Rash   ? Cephalexin Rash   ? Cyclobenzaprine Rash   ? Eszopiclone Rash   ? Methocarbamol Rash   ? Penicillins Rash     Mild rash       Review of Systems   A comprehensive review of 14 systems was done. Pertinent findings noted here and in history of present illness. All the rest negative.  Constitutional: Negative.  Negative for fever, chills, she has profound activity change, appetite change and fatigue.   HENT: Negative for congestion and facial swelling.    Eyes: Negative for photophobia, redness and visual disturbance.   Respiratory: Negative for  "cough and chest tightness.    Cardiovascular: Negative for chest pain, palpitations and leg swelling.   Gastrointestinal: Negative for nausea, diarrhea, constipation, blood in stool and abdominal distention.   Genitourinary: Negative.    Musculoskeletal: Reporting severe pain 10 out of 10 in her back.  No improvement in her leg strength  Had an acute fall in the TCU  Skin: Negative.    Neurological: Negative for dizziness, tremors, syncope, weakness, light-headedness and headaches.   Hematological: Does not bruise/bleed easily.   Psychiatric/Behavioral: Negative.        Physical Exam     Recent Vitals 3/29/2021   Height 5' 5\"   Weight 176 lbs 3 oz   BSA (m2) 1.91 m2   /68   Pulse 83   Temp 97.7   Temp src -   SpO2 95   Some recent data might be hidden       Constitutional: Oriented to person, place, and time and appears well-developed.   HEENT:  Normocephalic and atraumatic.  Eyes: Conjunctivae and EOM are normal. Pupils are equal, round, and reactive to light. No discharge.  No scleral icterus. Nose normal. Mouth/Throat: Oropharynx is clear and moist. No oropharyngeal exudate.    NECK: Normal range of motion. Neck supple. No JVD present. No tracheal deviation present. No thyromegaly present.   CARDIOVASCULAR: Normal rate, regular rhythm and intact distal pulses.  Exam reveals no gallop and no friction rub.  Systolic murmur present.  PULMONARY: Effort normal and breath sounds normal. No respiratory distress.No Wheezing or rales.  ABDOMEN: Soft. Bowel sounds are normal. No distension and no mass.  There is no tenderness. There is no rebound and no guarding. No HSM.  MUSCULOSKELETAL: Normal range of motion. No edema and no tenderness. Mild kyphosis, no tenderness.  Her midline surgical incision is intact with staples noted  LYMPH NODES: Has no cervical, supraclavicular, axillary and groin adenopathy.   NEUROLOGICAL: Alert and oriented to person, place, and time. No cranial nerve deficit.  Normal muscle tone. " Coordination normal.   Right lower extremity weakness; ankle strength is minimal  GENITOURINARY: Deferred exam.  SKIN: Skin is warm and dry. No rash noted. No erythema. No pallor.   EXTREMITIES: No cyanosis, no clubbing, no edema. No Deformity.  PSYCHIATRIC: Normal mood, affect and behavior.      Lab Results     Recent Results (from the past 240 hour(s))   COVID-19 Virus PCR MRF    Collection Time: 03/21/21  6:45 PM    Specimen: Respiratory   Result Value Ref Range    COVID-19 VIRUS SPECIMEN SOURCE Nasopharyngeal     2019-nCOV       Test received-See reflex to IDDL test SARS CoV2 (COVID-19) Virus RT-PCR   SARS-CoV-2 (COVID-19)-PCR    Collection Time: 03/21/21  6:45 PM    Specimen: Respiratory   Result Value Ref Range    SARS-CoV-2 Virus Specimen Source Nasopharyngeal     SARS-CoV-2 PCR Result NEGATIVE     SARS-COV-2 PCR COMMENT (Note)    HM2 (CBC W/O DIFF)    Collection Time: 03/22/21  3:47 PM   Result Value Ref Range    WBC 7.3 4.0 - 11.0 thou/uL    RBC 3.36 (L) 3.80 - 5.40 mill/uL    Hemoglobin 9.7 (L) 12.0 - 16.0 g/dL    Hematocrit 30.0 (L) 35.0 - 47.0 %    MCV 89 80 - 100 fL    MCH 28.9 27.0 - 34.0 pg    MCHC 32.3 32.0 - 36.0 g/dL    RDW 14.5 11.0 - 14.5 %    Platelets 295 140 - 440 thou/uL    MPV 8.5 8.5 - 12.5 fL   Basic Metabolic Panel    Collection Time: 03/22/21  3:48 PM   Result Value Ref Range    Sodium 139 136 - 145 mmol/L    Potassium 4.1 3.5 - 5.0 mmol/L    Chloride 105 98 - 107 mmol/L    CO2 25 22 - 31 mmol/L    Anion Gap, Calculation 9 5 - 18 mmol/L    Glucose 101 70 - 125 mg/dL    Calcium 8.4 (L) 8.5 - 10.5 mg/dL    BUN 19 8 - 22 mg/dL    Creatinine 0.96 0.60 - 1.10 mg/dL    GFR MDRD Af Amer >60 >60 mL/min/1.73m2    GFR MDRD Non Af Amer 59 (L) >60 mL/min/1.73m2                Electronically signed by  TUTU Cornejo

## 2021-06-21 NOTE — LETTER
Letter by Ana Victor MBBS at      Author: Ana Victor MBBS Service: -- Author Type: --    Filed:  Encounter Date: 4/5/2021 Status: (Other)         [facility]  [address] [address]                                  April 5, 2021    Patient: Dayana Samaniego   MR Number: [id number]   YOB: 1960   Date of Visit: 4/5/2021     Dear Dr. Lin:    Thank you for referring Dayana Samaniego to me for evaluation. Below are the relevant portions of my assessment and plan of care.    If you have questions, please do not hesitate to call me. I look forward to following Dayana along with you.    Sincerely,        TUTU Cornejo          CC  No Recipients  Ana Victor MBBS  4/5/2021  1:51 PM  [facility] note      Patient: Dayana Samaniego  MRN: [id number]      [facility] [[id number]]  Reason for Visit     Chief Complaint   Patient presents with   ? Discharge Summary   Follow-up on back pain/right lower extremity weakness    Code Status     Full code    Assessment     Acute fracture involving the anterior superior endplate S1  Pain management with patient reporting 10/10 pain  New onset UTI  Status post anterior spine fusion L4-S1, posterior spine fusion with instrumentation  L4-S1 decompression  L4-L5 bilateral bone marrow aspiration fusion anterior spine level 01 on 3/ 17/21  Underlying history of severe spinal stenosis with neurogenic claudication  New onset right lower extremity weakness postoperatively  Pain management  COPD  Bipolar disorder  Chronic kidney disease stage III  Nicotine addiction  Anemia secondary to blood loss discharge hemoglobin 9.9  Generalized weakness      History     Patient is a very pleasant 60 y.o. female who is admitted to TCU  Patient has a known history of neurogenic claudication with underlying history of lumbar spinal stenosis.  She was electively admitted to the hospital and  underwent above-mentioned procedure.  Postoperatively she did well.  Postoperatively she developed new onset right lower extremity weakness due to nerve irritation  Spine surgery recommended steroids and pain management.  She has been discharged to the TCU  She had a scheduled follow-up with orthopedics due to persistent right lower extremity weakness.  She has been given a knee brace by them  She was evaluated and had an imaging which shows that she has a superior endplate fracture of the S1.  Spine clinic was contacted.  She continues to have pain concern  To her clinic for evaluation of her incisions which are both healing well.  No secondary concern for any infection or dehiscence noted.  Unfortunately pain remains her primary concern and she is still using narcotics every 4 hours.  Patient was requesting them to be scheduled in the TCU.  At discharge she is quite anxious about her pain medications and is does not want to taper  We will be discharging her with her leftover pain pills with advised to follow-up with the spine clinic or her primary care physician closely to discuss her pain management.  She has an underlying history of bipolar disorder and will continue on her Lamictal Vistaril and Prozac.  She reports her mood is stable however she does have situational stressors as per her related to her pain  Overall is pleasant and cooperative with cares  She has chronic kidney disease stage III which was stable.  She also has COPD with no wheezing and exacerbation and felt to be stable  She also has a UTI with cultures growing E. coli.  She is reporting dysuria with frequency  However since she was treated with antibiotics this has resolved.  We were also checking PVRs due to incontinence concern but now she is reporting improvement  No retention issues were noted.  She continues to have gait instability and will be ambulating using a walker    Past Medical History     Active Ambulatory (Non-Hospital) Problems     Diagnosis   ? DDD (degenerative disc disease), lumbosacral   ? Spinal stenosis of lumbar region with neurogenic claudication   ? Anemia   ? Hypotension   ? Hypothyroidism   ? Hyperlipidemia   ? Fibromyalgia   ? Common migraine with intractable migraine   ? Chronic kidney disease, stage 3   ? Osteopenia of multiple sites   ? Cigarette nicotine dependence without complication   ? Centrilobular emphysema (H)   ? Hiatal hernia   ? Lung nodule   ? Hallux abductovalgus with bunions, unspecified laterality   ? Bipolar 2 disorder (H)   ? Menopausal Disorder   ? Chronic Pain Syndrome   ? Walk Is Wobbly Or Unsteady (Ataxia)   ? Menopause Has Occurred   ? Migraine Headache   ? Nicotine Dependence   ? GERD (gastroesophageal reflux disease)     Past Medical History:   Diagnosis Date   ? Anemia 3/18/2021   ? Anxiety    ? Asthma    ? Bipolar 2 disorder (H)    ? Centrilobular emphysema (H) 2/26/2018   ? Cervical spinal stenosis    ? Chronic kidney disease    ? Chronic kidney disease, stage 3 1/14/2021   ? Chronic pain syndrome    ? Cigarette nicotine dependence without complication 3/4/2020   ? Common migraine with intractable migraine 2/15/2021   ? COPD (chronic obstructive pulmonary disease) (H)    ? DDD (degenerative disc disease), lumbosacral 3/22/2021   ? Depression    ? Fibrocystic breast    ? Fibromyalgia    ? GERD (gastroesophageal reflux disease)    ? Hallux abductovalgus with bunions, unspecified laterality 12/14/2015   ? Herpes Zoster (Shingles)    ? Hiatal hernia    ? History of electroconvulsive therapy    ? Hyperlipidemia 3/17/2021   ? Hypotension 3/18/2021   ? Hypothyroidism    ? IBS (irritable bowel syndrome)    ? Lung nodule 8/4/2016   ? Menopausal Disorder    ? Menopause Has Occurred    ? Migraine Headache    ? Migraines    ? Nicotine Dependence    ? Osteoarthritis    ? Osteopenia of multiple sites 9/1/2020   ? PONV (postoperative nausea and vomiting)    ? PTSD (post-traumatic stress disorder)    ? Shingles  2014   ? Spinal stenosis of lumbar region with neurogenic claudication 3/22/2021   ? Walk Is Wobbly Or Unsteady (Ataxia)        Past Social History     Reviewed, and she  reports that she has been smoking cigarettes. She has a 45.00 pack-year smoking history. She has quit using smokeless tobacco. She reports current alcohol use. She reports that she does not use drugs.    Family History     Reviewed, and family history includes Alcohol abuse in her father; Asthma in her maternal aunt; Crohn's disease in her sister; Diabetes in her paternal grandmother and sister; Heart attack in her paternal grandmother and paternal uncle; Heart disease in her maternal grandfather and paternal grandfather; Hypertension in her sister; Lung cancer in her mother.    Medication List   Post Discharge Medication Reconciliation Status: discharge medications reconciled and changed, per note/orders   Albuterol MDI as needed every 4 hours  Lipitor 40 mg daily  Colace twice daily  Doxepin 75 mg at bedtime  Relpax 40 mg 2 times as needed for migraine  Aimovig autoinjector 70 mg/ML once a month  Vitamin D 50,000 units once a week  Nexium 44 mg daily  Prozac 80 mg daily  Folic acid 400 MCG's daily  Norco 10/325 1-2 tabs every 4-6 hours as needed-changed to 1-2 tabs as per scale every 4 hours as needed  Lamictal 200 twice daily  Synthroid 75 MCG's daily  Singulair 10 mg 1 tablet daily  Prazosin 2 capsules at bedtime 2 mg strength  Compazine 5 mg every 8 hours as needed  Senna 2 tabs 2 times daily  Zanaflex 4 mg at bedtime  Chantix 1 mg 1 tablet twice daily    Allergies     Allergies   Allergen Reactions   ? Codeine Anaphylaxis   ? Cetirizine Nausea And Vomiting   ? Gabapentin    ? Nefazodone Nausea And Vomiting   ? Oxycodone-Acetaminophen      hallucinations   ? Trazodone Nausea And Vomiting   ? Amoxicillin-Pot Clavulanate Rash   ? Cephalexin Rash   ? Cyclobenzaprine Rash   ? Eszopiclone Rash   ? Methocarbamol Rash   ? Penicillins Rash     Mild  "rash       Review of Systems   A comprehensive review of 14 systems was done. Pertinent findings noted here and in history of present illness. All the rest negative.  Constitutional: Negative.  Negative for fever, chills, she has profound activity change, appetite change and fatigue.   HENT: Negative for congestion and facial swelling.    Eyes: Negative for photophobia, redness and visual disturbance.   Respiratory: Negative for cough and chest tightness.    Cardiovascular: Negative for chest pain, palpitations and leg swelling.   Gastrointestinal: Negative for nausea, diarrhea, constipation, blood in stool and abdominal distention.   Genitourinary: Reporting that she has urinary frequency and has had several accidents with patient voiding on herself with no warning  Musculoskeletal: Reporting severe pain 10 out of 10 in her back.  No improvement in her leg strength  Had an acute fall in the TCU  Skin: Negative.    Neurological: Negative for dizziness, tremors, syncope, weakness, light-headedness and headaches.   Hematological: Does not bruise/bleed easily.   Psychiatric/Behavioral: Negative.  Very anxious due to pain      Physical Exam     Recent Vitals 4/4/2021   Height 5' 5\"   Weight -   BSA (m2) 1.89 m2   /73   Pulse 86   Temp 98.4   Temp src -   SpO2 93   Some recent data might be hidden       Constitutional: Oriented to person, place, and time and appears well-developed.   HEENT:  Normocephalic and atraumatic.  Eyes: Conjunctivae and EOM are normal. Pupils are equal, round, and reactive to light. No discharge.  No scleral icterus. Nose normal. Mouth/Throat: Oropharynx is clear and moist. No oropharyngeal exudate.    NECK: Normal range of motion. Neck supple. No JVD present. No tracheal deviation present. No thyromegaly present.   CARDIOVASCULAR: Normal rate, regular rhythm and intact distal pulses.  Exam reveals no gallop and no friction rub.  Systolic murmur present.  PULMONARY: Effort normal and breath " sounds normal. No respiratory distress.No Wheezing or rales.  ABDOMEN: Soft. Bowel sounds are normal. No distension and no mass.  There is no tenderness. There is no rebound and no guarding. No HSM.  Midline anterior abdominal wall incision is intact no dehiscence or drainage noted  MUSCULOSKELETAL: Normal range of motion. No edema and no tenderness. Mild kyphosis, no tenderness.  Her midline surgical incision is intact with staples noted  LYMPH NODES: Has no cervical, supraclavicular, axillary and groin adenopathy.   NEUROLOGICAL: Alert and oriented to person, place, and time. No cranial nerve deficit.  Normal muscle tone. Coordination normal.   Right lower extremity weakness; ankle strength is minimal  GENITOURINARY: Deferred exam.  SKIN: Skin is warm and dry. No rash noted. No erythema. No pallor.   EXTREMITIES: No cyanosis, no clubbing, no edema. No Deformity.  PSYCHIATRIC: Normal mood, affect and behavior.      Lab Results     Recent Results (from the past 240 hour(s))   Basic Metabolic Panel    Collection Time: 03/29/21  7:48 AM   Result Value Ref Range    Sodium 139 136 - 145 mmol/L    Potassium 3.8 3.5 - 5.0 mmol/L    Chloride 105 98 - 107 mmol/L    CO2 24 22 - 31 mmol/L    Anion Gap, Calculation 10 5 - 18 mmol/L    Glucose 92 70 - 125 mg/dL    Calcium 8.4 (L) 8.5 - 10.5 mg/dL    BUN 15 8 - 22 mg/dL    Creatinine 1.03 0.60 - 1.10 mg/dL    GFR MDRD Af Amer >60 >60 mL/min/1.73m2    GFR MDRD Non Af Amer 54 (L) >60 mL/min/1.73m2   Magnesium    Collection Time: 03/29/21  7:48 AM   Result Value Ref Range    Magnesium 2.1 1.8 - 2.6 mg/dL   Vitamin D, Total (25-Hydroxy)    Collection Time: 03/29/21  7:48 AM   Result Value Ref Range    Vitamin D, Total (25-Hydroxy) 50.4 30.0 - 80.0 ng/mL   HM2(CBC w/o Differential)    Collection Time: 03/29/21  7:48 AM   Result Value Ref Range    WBC 7.6 4.0 - 11.0 thou/uL    RBC 3.30 (L) 3.80 - 5.40 mill/uL    Hemoglobin 9.3 (L) 12.0 - 16.0 g/dL    Hematocrit 30.0 (L) 35.0 - 47.0 %     MCV 91 80 - 100 fL    MCH 28.2 27.0 - 34.0 pg    MCHC 31.0 (L) 32.0 - 36.0 g/dL    RDW 14.6 (H) 11.0 - 14.5 %    Platelets 410 140 - 440 thou/uL    MPV 8.4 (L) 8.5 - 12.5 fL   Urinalysis    Collection Time: 03/29/21  1:20 PM   Result Value Ref Range    Color, UA Light Yellow Light Yellow, Yellow    Clarity, UA Clear Clear    Glucose, UA Negative Negative    Bilirubin, UA Negative Negative    Ketones, UA Negative Negative    Specific Gravity, UA 1.024 1.001 - 1.030    Blood, UA Negative Negative    pH, UA 6.0 5.0 - 8.0    Protein, UA Negative Negative    Urobilinogen, UA <2.0 mg/dL <2.0 mg/dL    Nitrite, UA Negative Negative    Leukocytes, UA 25 Freda/uL (!) Negative    Bacteria, UA Many (!) None Seen    Amorphous, UA Few (!) None Seen    Mucus, UA Present (!) None Seen lpf    RBC, UA 7 (H) <=2 hpf    WBC UA 15 (H) <=5 hpf    Squam Epithel, UA 1 (H) <=5 /HPF   Culture, Urine    Collection Time: 03/29/21  1:20 PM    Specimen: Urine   Result Value Ref Range    Culture >100,000 col/ml Escherichia coli (!)        Susceptibility    Escherichia coli - JOANNE     Ampicillin <=4 Sensitive      Cefazolin 2 Sensitive      Cefepime <=1 Sensitive      Ceftriaxone <=1 Sensitive      Ciprofloxacin <=0.25 Sensitive      Gentamicin <=2 Sensitive      Levofloxacin <=0.5 Sensitive      Meropenem <=0.5 Sensitive      Nitrofurantoin <=16 Sensitive      Tetracycline <=2 Sensitive      Tobramycin <=2 Sensitive      Trimethoprim + Sulfamethoxazole <=0.5 Sensitive         MEDICAL EQUIPMENT NEEDS:  walker     DISCHARGE PLAN/FACE TO FACE:  I certify that services are/were furnished while this patient was under the care of a physician and that a physician or an allowed non-physician practitioner (NPP), had a face-to-face encounter that meets the physician face-to-face encounter requirements. The encounter was in whole, or in part, related to the primary reason for home health. The patient is confined to his/her home and needs intermittent  skilled nursing, physical therapy, speech-language pathology, or the continued need for occupational therapy. A plan of care has been established by a physician and is periodically reviewed by a physician.  Date of Face-to-Face Encounter:/ 4/5/21     I certify that, based on my findings, the following services are medically necessary home health services: [facility] HHA/RN and PT/OT to evaluate and treat    My clinical findings support the need for the above skilled services because: patient will be discharging to home. Patient will need assistance with medication management and performing IADLs and ADLs effectively and safely.     Patient to re-establish plan of care with their PCP within 7 days after leaving TCU.   Total time spent in this visit is 35 minutes spent talking to this patient reviewing discharge plan also with staff.  Patient remains a falls risk because of right lower extremity weakness and has been given a brace.  She also remains on high doses of narcotics.  Advise close follow-up both with the spine clinic as well as with her primary care physician.      Electronically signed by  TUTU Cornejo

## 2021-06-22 NOTE — TELEPHONE ENCOUNTER
RN cannot approve Refill Request    RN can NOT refill this medication med is not covered by policy/route to provider. Last office visit: 4/13/2018 Joanne Weiner MD Last Physical: 9/18/2018 Last MTM visit: Visit date not found Last visit same specialty: 4/13/2018 Joanne Weiner MD.  Next visit within 3 mo: Visit date not found  Next physical within 3 mo: Visit date not found      Marie Murillo, [facility] Triage/Med Refill 1/5/2019    Requested Prescriptions   Pending Prescriptions Disp Refills     tiZANidine (ZANAFLEX) 4 MG tablet [Pharmacy Med Name: TIZANIDINE 4MG TABLETS] 120 tablet 0     Sig: TAKE 1 TO 2 TABLETS BY MOUTH DURING THE DAY AND 2 TABLETS AT BEDTIME AS NEEDED    There is no refill protocol information for this order

## 2021-06-23 NOTE — PROGRESS NOTES
"1. Intractable migraine without status migrainosus, unspecified migraine type  eletriptan (RELPAX) 40 MG tablet    ketorolac injection 30 mg (TORADOL)    DISCONTINUED: ketorolac injection 30 mg (TORADOL)         ASSESSMENT/PLAN:     The following high BMI interventions were performed this visit: encouragement to exercise    1. Intractable migraine without status migrainosus, unspecified migraine type    - eletriptan (RELPAX) 40 MG tablet; Take 1 tab at earliest onset of migraine, may repeat in 2 hours if necessary  Dispense: 12 tablet; Refill: 2  - ketorolac injection 30 mg (TORADOL); Infuse 1 mL (30 mg total) into a venous catheter once.  -make sure to follow up and get Botox injections in March          There are no Patient Instructions on file for this visit.  Medications Discontinued During This Encounter   Medication Reason     eletriptan (RELPAX) 40 MG tablet Reorder     ketorolac injection 30 mg (TORADOL)      Return if symptoms worsen or fail to improve, for migraine.        Marah Rodriguez NP          SUBJECTIVE:  Dayana Samaniego is a 58 y.o. female who presents for evaluation of her migraine headache.  Onset: 3 weeks ago.  This is a chronic problem for the patient.  She describes pain in the frontal portion of her head.  She reports photophobia, nausea and feels \"foggy \".  Pain has been on and off for the last 3 weeks, over the last 3 days however her pain is gotten so severe that she was encouraged to come into the clinic today from her .  She describes her pain as a shooting, sharp and throbbing sensation that is aggravated by light, noises and smells.  She states she can be near kitchen when anyone's cooking when she has her migraine headache. Rating her head pain an 8/10 today.  She typically receives Botox injections every 3 months for her migraines, however, due to insurance changes at the beginning of the month in January, she was not able to get in for her scheduled Botox injection due " to coverage issues.  Her last appointment for her Botox was roughly 5 months ago.  Her next appointment is scheduled mid March.  We reviewed her medication list today, she has not been taking the Relpax that she was prescribed in April, she thinks it might be the medication that made her nauseated.  She is willing to give it another try being that her next Botox injection is not scheduled til mid March.  She was given a Toradol injection last time she was in clinic in April for her last migraine flareup along with oral Vistaril.  She just requested a refill of her oral Vistaril, she would like to do the Toradol injection again today.  Her vitals are stable today, all of her concerns were addressed and she agrees with plan of care.  Chief Complaint   Patient presents with     Migraine     off and on x 3 weeks         Patient Active Problem List   Diagnosis     Hypothyroidism     Chronic Pain Syndrome     Hyperlipidemia     Walk Is Wobbly Or Unsteady (Ataxia)     Menopause Has Occurred     Bipolar II Disorder     Migraine Headache     Nicotine Dependence     Fibromyalgia     Menopausal Disorder     COPD (chronic obstructive pulmonary disease) (H)     GERD (gastroesophageal reflux disease)     Hallux abductovalgus with bunions, unspecified laterality     Lung nodule     Hiatal hernia     Urinary frequency     Visit for screening mammogram     Centrilobular emphysema (H)     Controlled substance agreement signed       Current Outpatient Medications   Medication Sig Dispense Refill     albuterol (PROAIR HFA) 90 mcg/actuation inhaler Inhale 2 puffs every 4 (four) hours as needed for shortness of breath. every 4-6 hours as needed. 8.5 g 2     atorvastatin (LIPITOR) 40 MG tablet TAKE 1 TABLET(40 MG) BY MOUTH AT BEDTIME 90 tablet 3     CHANTIX 1 mg tablet TAKE 1 TABLET BY MOUTH TWICE DAILY AFTER EATING WITH A FULL GLASS OF WATER 60 tablet 3     diazePAM (VALIUM) 2 MG tablet Take 1 tablet (2 mg total) by mouth as needed for  anxiety (PRIOR TO FLYING). 10 tablet 0     doxepin (SINEQUAN) 25 MG capsule Take 3 capsules (75 mg total) by mouth at bedtime. 270 capsule 1     eletriptan (RELPAX) 40 MG tablet Take 1 tab at earliest onset of migraine, may repeat in 2 hours if necessary 12 tablet 2     esomeprazole (NEXIUM) 20 MG capsule Take 40 mg by mouth bedtime. Uses OTC strength       FLUoxetine (PROZAC) 20 MG capsule Take 3 capsules (60 mg total) by mouth daily. 270 capsule 1     fluticasone-vilanterol (BREO ELLIPTA) 100-25 mcg/dose DsDv inhaler Inhale 1 puff daily. 1 each 11     folic acid (FOLVITE) 800 MCG tablet Take by mouth.       HYDROcodone-acetaminophen 5-325 mg per tablet Take 1 tablet by mouth every 6 (six) hours as needed for pain. 30 tablet 0     hydrOXYzine pamoate (VISTARIL) 25 MG capsule Take 1 capsule (25 mg total) by mouth 3 (three) times a day as needed for itching. Take 1-2 capsules every 6 hours as needed for migraine 90 capsule 1     lamoTRIgine (LAMICTAL) 200 MG tablet Take 200 mg by mouth bedtime.       levothyroxine (SYNTHROID, LEVOTHROID) 75 MCG tablet TAKE 1 TABLET(75 MCG) BY MOUTH DAILY 90 tablet 1     melatonin 5 mg Tab Take 5 mg by mouth bedtime.       prazosin (MINIPRESS) 1 MG capsule Take 2 mg by mouth bedtime.        prochlorperazine (COMPAZINE) 10 MG tablet Take 1 tablet (10 mg total) by mouth every 8 (eight) hours as needed. 30 tablet 1     senna-docusate (SENNOSIDES-DOCUSATE SODIUM) 8.6-50 mg tablet Take 3 tablets by mouth bedtime.       SOY ISOFLA/BLK COHOSH/MAG BARK (ESTROVEN ORAL) Take by mouth.       tiZANidine (ZANAFLEX) 4 MG tablet TAKE 1 TO 2 TABLETS BY MOUTH DURING THE DAY AND 2 TABLETS AT BEDTIME AS NEEDED 120 tablet 1     triamcinolone (KENALOG) 0.1 % cream APPLY TOPICALLY BID TO HANDS AND FEET FOR 2 TO 3 WEEKS  3     Current Facility-Administered Medications   Medication Dose Route Frequency Provider Last Rate Last Dose     ketorolac injection 30 mg (TORADOL)  30 mg Intramuscular Once Jennifer,  Marah ARCHER NP           Social History     Tobacco Use   Smoking Status Light Tobacco Smoker     Packs/day: 1.00     Years: 45.00     Pack years: 45.00     Types: Cigarettes   Smokeless Tobacco Current User   Tobacco Comment    2 CIG DAILY        REVIEW OF SYSTEMS: Denies speech disturbance, confusion, weakness, numbness, paresthesia, gait disturbance, shaking episodes, loss of bowel/bladder control, dizziness.      TOBACCO USE:  Social History     Tobacco Use   Smoking Status Light Tobacco Smoker     Packs/day: 1.00     Years: 45.00     Pack years: 45.00     Types: Cigarettes   Smokeless Tobacco Current User   Tobacco Comment    2 CIG DAILY      Social History     Socioeconomic History     Marital status:      Spouse name: Sudheer     Number of children: 2     Years of education: 12     Highest education level: Not on file   Social Needs     Financial resource strain: Not on file     Food insecurity - worry: Not on file     Food insecurity - inability: Not on file     Transportation needs - medical: Not on file     Transportation needs - non-medical: Not on file   Occupational History     Occupation: disabled   Tobacco Use     Smoking status: Light Tobacco Smoker     Packs/day: 1.00     Years: 45.00     Pack years: 45.00     Types: Cigarettes     Smokeless tobacco: Current User     Tobacco comment: 2 CIG DAILY    Substance and Sexual Activity     Alcohol use: Yes     Comment: rare--1-2 times in year     Drug use: No     Sexual activity: Yes     Partners: Male   Other Topics Concern     Not on file   Social History Narrative     Not on file         OBJECTIVE:            Vitals:    02/06/19 1250   BP: 124/58   Pulse: 84   Resp: 16   Temp: 98.1  F (36.7  C)   SpO2: 97%     Weight: 154 lb (69.9 kg)    Wt Readings from Last 3 Encounters:   02/06/19 154 lb (69.9 kg)   09/18/18 149 lb (67.6 kg)   04/13/18 151 lb 6.4 oz (68.7 kg)     Body mass index is 25.63 kg/m .        Physical Exam:  GENERAL APPEARANCE: A&A, NAD,  well hydrated, well nourished  HEAD: atraumatic, no deformity  EYES: PERRL, EOM's intact, no redness or swelling  NECK: Supple, without lymphadenopathy, no thyroid mass, no JVD, no bruit  CV: RRR, no M/G/R   LUNGS: CTAB, normal respiratory effort  ABDOMEN: S&NT, no masses, no organomegaly, BS present x4   SKIN:  Normal skin turgor, no lesions/rashes, warm and dry   NEURO: no gross deficits, DTR's intact, face symmetrical, no drift, equal  strength, negative Romberg

## 2021-06-23 NOTE — TELEPHONE ENCOUNTER
RN cannot approve Refill Request    RN can NOT refill this medication med is not covered by policy/route to provider.    Issac Ramírez, [facility] Triage/Med Refill 2/7/2019    Requested Prescriptions   Pending Prescriptions Disp Refills     ergocalciferol (ERGOCALCIFEROL) 50,000 unit capsule [Pharmacy Med Name: VITAMIN D2 50,000IU (ERGO) CAP RX] 13 capsule 0     Sig: TAKE 1 CAPSULE BY MOUTH 1 TIME A WEEK    There is no refill protocol information for this order

## 2021-06-23 NOTE — TELEPHONE ENCOUNTER
Fax received from [facility], they have started the Prior Authorization Process via Cover My Meds    CoverMyMeds Key: [id number]    Medication Name: Eletriptan     Insurance Plan: [employer]  PBM:   Patient ID: [id number]    Please complete the PA process

## 2021-06-23 NOTE — TELEPHONE ENCOUNTER
RN cannot approve Refill Request    RN can NOT refill this medication med is not covered by policy/route to provider.    Issac Ramírez, [facility] Triage/Med Refill 2/7/2019    Requested Prescriptions   Pending Prescriptions Disp Refills     triamcinolone (KENALOG) 0.1 % cream [Pharmacy Med Name: TRIAMCINOLONE 0.1% CREAM   30GM] 60 g 0     Sig: APPLY TOPICALLY TWICE DAILY TO HANDS AND FEET FOR 2 TO 3 WEEKS    There is no refill protocol information for this order

## 2021-06-24 NOTE — TELEPHONE ENCOUNTER
RN cannot approve Refill Request    RN can NOT refill this medication med is not covered by policy/route to provider. Last office visit: 4/13/2018 Joanne Weiner MD Last Physical: 9/18/2018 Last MTM visit: Visit date not found Last visit same specialty: 2/6/2019 Marah Rodriguez NP.  Next visit within 3 mo: Visit date not found  Next physical within 3 mo: Visit date not found      Radha Disla, [facility] Triage/Med Refill 2/15/2019    Requested Prescriptions   Pending Prescriptions Disp Refills     prochlorperazine (COMPAZINE) 10 MG tablet [Pharmacy Med Name: PROCHLORPERAZINE 10MG TABLETS] 30 tablet 0     Sig: TAKE 1 TABLET(10 MG) BY MOUTH EVERY 8 HOURS AS NEEDED    There is no refill protocol information for this order

## 2021-06-24 NOTE — TELEPHONE ENCOUNTER
RN cannot approve Refill Request    RN can NOT refill this medication med is not covered by policy/route to provider. Last office visit: 4/13/2018 Joanne Weiner MD Last Physical: 9/18/2018 Last MTM visit: Visit date not found Last visit same specialty: 2/6/2019 Marah Rodriguez NP.  Next visit within 3 mo: Visit date not found  Next physical within 3 mo: Visit date not found      Lily Kimble, [facility] Triage/Med Refill 2/23/2019    Requested Prescriptions   Pending Prescriptions Disp Refills     prochlorperazine (COMPAZINE) 10 MG tablet [Pharmacy Med Name: PROCHLORPERAZINE 10MG TABLETS] 30 tablet 0     Sig: TAKE 1 TABLET(10 MG) BY MOUTH EVERY 8 HOURS AS NEEDED    There is no refill protocol information for this order

## 2021-06-24 NOTE — PROGRESS NOTES
Chief Complaint   Patient presents with     Migraine     Started 1.5 weeks ago. Pretty constant. Medicine seems to help but not making it go away.      HPI: Patient presents today with complaints of a migraine that started about 10 days ago.  The pain is been fairly constant along the frontal aspect of the head.  She has some associated nausea with it.  She has taken Vicodin and Plavix along with Excedrin Migraine and Compazine, all of which have been ineffective for her and providing long-term meaningful relief.  A quick review of her neurology notes mentions that this is been a long-standing issue for the patient for many years.  She has been getting Botox injections and actually has an appointment coming up later this month for repeat injections.  When asked if she is ever had brain imaging she says that she most likely has, but cannot recall any specific imaging done recently.  CT of the head in 2015 showed no abnormalities.  She has responded well to Toradol injections in the past.  There is some photophobia without new or worsening neck stiffness.  Afebrile.    ROS:Review of Systems - History obtained from the patient  General ROS: negative  Ophthalmic ROS: positive for - photophobia  Hematological and Lymphatic ROS: negative  Respiratory ROS: negative  Cardiovascular ROS: negative  Gastrointestinal ROS: positive for - nausea  Neurological ROS: positive for - headaches    SH: The Patient's  reports that she has been smoking cigarettes.  She has a 45.00 pack-year smoking history. She uses smokeless tobacco. She reports that she drinks alcohol. She reports that she does not use drugs.      FH: The Patient's family history includes Alcohol abuse in her father; Asthma in her maternal aunt; Crohn's disease in her sister; Diabetes in her paternal grandmother and sister; Heart attack in her paternal grandmother and paternal uncle; Heart disease in her maternal grandfather and paternal grandfather; Hypertension in her  sister; Lung cancer in her mother.     Meds:    Current Outpatient Medications on File Prior to Visit   Medication Sig Dispense Refill     atorvastatin (LIPITOR) 40 MG tablet TAKE 1 TABLET(40 MG) BY MOUTH AT BEDTIME 90 tablet 3     CHANTIX 1 mg tablet TAKE 1 TABLET BY MOUTH TWICE DAILY AFTER EATING WITH A FULL GLASS OF WATER 60 tablet 3     doxepin (SINEQUAN) 25 MG capsule Take 3 capsules (75 mg total) by mouth at bedtime. 270 capsule 1     esomeprazole (NEXIUM) 20 MG capsule Take 40 mg by mouth bedtime. Uses OTC strength       FLUoxetine (PROZAC) 20 MG capsule Take 3 capsules (60 mg total) by mouth daily. 270 capsule 1     folic acid (FOLVITE) 800 MCG tablet Take by mouth.       HYDROcodone-acetaminophen 5-325 mg per tablet Take 1 tablet by mouth every 6 (six) hours as needed for pain. 30 tablet 0     hydrOXYzine pamoate (VISTARIL) 25 MG capsule Take 1 capsule (25 mg total) by mouth 3 (three) times a day as needed for itching. Take 1-2 capsules every 6 hours as needed for migraine 90 capsule 1     lamoTRIgine (LAMICTAL) 200 MG tablet Take 200 mg by mouth bedtime.       levothyroxine (SYNTHROID, LEVOTHROID) 75 MCG tablet TAKE 1 TABLET(75 MCG) BY MOUTH DAILY 90 tablet 2     melatonin 5 mg Tab Take 5 mg by mouth bedtime.       prazosin (MINIPRESS) 1 MG capsule Take 2 mg by mouth bedtime.        senna-docusate (SENNOSIDES-DOCUSATE SODIUM) 8.6-50 mg tablet Take 3 tablets by mouth bedtime.       SOY ISOFLA/BLK COHOSH/MAG BARK (ESTROVEN ORAL) Take by mouth.       tiZANidine (ZANAFLEX) 4 MG tablet TAKE 1 TO 2 TABLETS BY MOUTH DURING THE DAY AND 2 TABLETS AT BEDTIME AS NEEDED 120 tablet 1     triamcinolone (KENALOG) 0.1 % cream APPLY TOPICALLY TWICE DAILY TO HANDS AND FEET FOR 2 TO 3 WEEKS 30 g 0     albuterol (PROAIR HFA) 90 mcg/actuation inhaler Inhale 2 puffs every 4 (four) hours as needed for shortness of breath. every 4-6 hours as needed. 8.5 g 2     diazePAM (VALIUM) 2 MG tablet Take 1 tablet (2 mg total) by mouth as  "needed for anxiety (PRIOR TO FLYING). 10 tablet 0     eletriptan (RELPAX) 40 MG tablet Take 1 tab at earliest onset of migraine, may repeat in 2 hours if necessary 12 tablet 2     ergocalciferol (ERGOCALCIFEROL) 50,000 unit capsule TAKE 1 CAPSULE BY MOUTH 1 TIME A WEEK 13 capsule 0     fluticasone-vilanterol (BREO ELLIPTA) 100-25 mcg/dose DsDv inhaler Inhale 1 puff daily. 1 each 11     prochlorperazine (COMPAZINE) 10 MG tablet TAKE 1 TABLET(10 MG) BY MOUTH EVERY 8 HOURS AS NEEDED 30 tablet 0     No current facility-administered medications on file prior to visit.        O:  /70   Pulse 88   Temp 98.4  F (36.9  C) (Oral)   Ht 5' 5\" (1.651 m)   Wt 153 lb (69.4 kg)   SpO2 98%   Breastfeeding? No   BMI 25.46 kg/m      Physical Examination:   General appearance - Wearing sunglasses. Alert, well appearing, and in no distress  Mental status - alert, oriented to person, place, and time  Eyes - pupils equal and reactive, extraocular eye movements intact  Ears - bilateral TM's and external ear canals normal  Nose - normal and patent, no erythema, discharge or polyps  Mouth - mucous membranes moist, pharynx normal without lesions  Neck - supple, no significant adenopathy  Lymphatics - no palpable lymphadenopathy, no hepatosplenomegaly  Chest - clear to auscultation, no wheezes, rales or rhonchi, symmetric air entry  Heart - normal rate and regular rhythm, S1 and S2 normal, no murmurs noted  Neurological - alert, oriented, normal speech, no focal findings or movement disorder noted, neck supple without rigidity, cranial nerves II through XII intact, motor and sensory grossly normal bilaterally, normal muscle tone, no tremors, strength 5/5    A/P:     Problem List Items Addressed This Visit        ENT/CARD/PULM/ENDO Problems    Migraine Headache - Primary (Chronic)    Relevant Medications    ketorolac injection 30 mg (TORADOL) (Completed)            1. Intractable migraine without status migrainosus, unspecified " migraine type  Most likely migraine.  Normal neurological exam today.  This appears to be a long-standing issue for the patient and she does have neurologists monitoring.  Plan Botox injection coming up later this month.  Given slightly decreased renal function in the fall 2018, will only give 30 mg of ketorolac for now.    Differential does include meningitis, intracranial mass, other neurological disorder.  The thought to be less likely as vital signs are stable, neuro exam is normal, and typical presentation of migraine for the patient.    - ketorolac injection 30 mg (TORADOL)        William Hines, CNP

## 2021-06-24 NOTE — TELEPHONE ENCOUNTER
Refill Approved    Rx renewed per Medication Renewal Policy. Medication was last renewed on 9.18.18.    Suyapa Adler, [facility] Triage/Med Refill 2/12/2019     Requested Prescriptions   Pending Prescriptions Disp Refills     levothyroxine (SYNTHROID, LEVOTHROID) 75 MCG tablet [Pharmacy Med Name: LEVOTHYROXINE 0.075MG (75MCG) TABS] 90 tablet 0     Sig: TAKE 1 TABLET(75 MCG) BY MOUTH DAILY    Thyroid Hormones Protocol Passed - 2/10/2019  3:24 AM       Passed - Provider visit in past 12 months or next 3 months    Last office visit with prescriber/PCP: 4/13/2018 Joanne Weiner MD OR same dept: 2/6/2019 Marah Rodriguez NP OR same specialty: 2/6/2019 Marah Rodriguez NP  Last physical: 9/18/2018 Last MTM visit: Visit date not found   Next visit within 3 mo: Visit date not found  Next physical within 3 mo: Visit date not found  Prescriber OR PCP: Elroy) AVNI Weiner MD  Last diagnosis associated with med order: 1. Hypothyroidism, unspecified type  - levothyroxine (SYNTHROID, LEVOTHROID) 75 MCG tablet [Pharmacy Med Name: LEVOTHYROXINE 0.075MG (75MCG) TABS]; TAKE 1 TABLET(75 MCG) BY MOUTH DAILY  Dispense: 90 tablet; Refill: 0    If protocol passes may refill for 12 months if within 3 months of last provider visit (or a total of 15 months).            Passed - TSH on file in past 12 months for patient age 12 & older    TSH   Date Value Ref Range Status   02/20/2018 1.81 0.30 - 5.00 uIU/mL Final

## 2021-06-25 NOTE — TELEPHONE ENCOUNTER
RN cannot approve Refill Request    RN can NOT refill this medication med is not covered by policy/route to provider. Last office visit: 4/13/2021 Joanne Weiner MD Last Physical: 2/22/2021 Last MTM visit: Visit date not found Last visit same specialty: 4/13/2021 Joanne Weiner MD.  Next visit within 3 mo: Visit date not found  Next physical within 3 mo: Visit date not found      Yuly Berrios, [facility] Triage/Med Refill 6/14/2021    Requested Prescriptions   Pending Prescriptions Disp Refills     tiZANidine (ZANAFLEX) 4 MG tablet [Pharmacy Med Name: TIZANIDINE 4MG TABLETS] 120 tablet 1     Sig: TAKE 1 TO 2 TABLETS BY MOUTH DURING THE DAY AND 2 TABLETS EVERY NIGHT AT BEDTIME       There is no refill protocol information for this order

## 2021-06-25 NOTE — TELEPHONE ENCOUNTER
RN cannot approve Refill Request    RN can NOT refill this medication med is not covered by policy/route to provider. Last office visit: 4/13/2021 Joanne Weiner MD Last Physical: 2/22/2021 Last MTM visit: Visit date not found Last visit same specialty: 4/13/2021 Joanne Weiner MD.  Next visit within 3 mo: Visit date not found  Next physical within 3 mo: Visit date not found      Isa Segal, [facility] Triage/Med Refill 6/8/2021    Requested Prescriptions   Pending Prescriptions Disp Refills     prochlorperazine (COMPAZINE) 5 MG tablet [Pharmacy Med Name: PROCHLORPERAZINE 5MG TABLETS] 30 tablet 0     Sig: TAKE 1 TABLET(5 MG) BY MOUTH EVERY 8 HOURS AS NEEDED FOR NAUSEA       There is no refill protocol information for this order

## 2021-06-30 ENCOUNTER — RECORDS - HEALTHEAST (OUTPATIENT)
Dept: ADMINISTRATIVE | Facility: OTHER | Age: 61
End: 2021-06-30

## 2021-07-01 ENCOUNTER — COMMUNICATION - HEALTHEAST (OUTPATIENT)
Dept: SCHEDULING | Facility: CLINIC | Age: 61
End: 2021-07-01

## 2021-07-01 ENCOUNTER — RECORDS - HEALTHEAST (OUTPATIENT)
Dept: LAB | Facility: CLINIC | Age: 61
End: 2021-07-01

## 2021-07-01 DIAGNOSIS — Z51.81 ENCOUNTER FOR THERAPEUTIC DRUG MONITORING: ICD-10-CM

## 2021-07-01 DIAGNOSIS — Z79.891 ENCOUNTER FOR LONG-TERM OPIATE ANALGESIC USE: ICD-10-CM

## 2021-07-02 ENCOUNTER — RECORDS - HEALTHEAST (OUTPATIENT)
Dept: ADMINISTRATIVE | Facility: OTHER | Age: 61
End: 2021-07-02

## 2021-07-03 NOTE — ADDENDUM NOTE
Addendum Note by Joanne Weiner MD at 4/14/2018  2:58 PM     Author: Joanne Weiner MD Service: -- Author Type: Physician    Filed: 4/14/2018  2:58 PM Encounter Date: 4/13/2018 Status: Signed    : Joanne Weiner MD (Physician)    Addended by: JOANNE WEINER on: 4/14/2018 02:58 PM        Modules accepted: Orders

## 2021-07-03 NOTE — ADDENDUM NOTE
Addendum Note by Marah Rodriguez NP at 2/6/2019  1:00 PM     Author: Marah Rodriguez NP Service: -- Author Type: Nurse Practitioner    Filed: 2/6/2019  1:18 PM Encounter Date: 2/6/2019 Status: Signed    : Marah Rodriguez NP (Nurse Practitioner)    Addended by: MARAH RODRIGUEZ on: 2/6/2019 01:18 PM        Modules accepted: Orders

## 2021-07-04 NOTE — TELEPHONE ENCOUNTER
Telephone Encounter by Emani Gutierrez RN at 7/1/2021  9:08 AM     Author: Emani Gutierrez RN Service: -- Author Type: Registered Nurse    Filed: 7/1/2021  9:18 AM Encounter Date: 7/1/2021 Status: Signed    : Emani Gutierrez RN (Registered Nurse)       Triage call:   Cough and shortness of breath  - reports started over one week ago  - green mucous  Sinus congestion and intermittent headaches- low grade  Reports she is fully vaccinated for COVID  Headaches in the Front of her head   Currently rating her headache pain 4/10   Reports she is breathing out her mouth  Denies fever  Indicates slight sore throat   Reports the shortness of breath and cough are worse when she lays down- having trouble sleeping well  States she has taken Waltussin and tessalon pearls help    Advised to be seen in the office today or tomorrow. Reviewed additional care advice with patient and she verbalizes understanding. Assisted in transferring to scheduling.     Emani Gutierrez RN BSBA Care Connection Triage/Med Refill 7/1/2021 9:17 AM    COVID 19 Nurse Triage Plan/Patient Instructions    Please be aware that novel coronavirus (COVID-19) may be circulating in the community. If you develop symptoms such as fever, cough, or SOB or if you have concerns about the presence of another infection including coronavirus (COVID-19), please contact your health care provider or visit  [email or web address].    Disposition/Instructions    In-Person Visit with provider recommended. Reference Visit Selection Guide.    Thank you for taking steps to prevent the spread of this virus.  o Limit your contact with others.  o Wear a simple mask to cover your cough.  o Wash your hands well and often.    Resources    [facility]: About COVID-19: [email or web address]    CDC: What to Do If You're Sick: www.cdc.gov/coronavirus/2019-ncov/about/steps-when-sick.html    CDC: Ending Home Isolation:  www.cdc.gov/coronavirus/2019-ncov/hcp/disposition-in-home-patients.html     CDC: Caring for Someone: www.cdc.gov/coronavirus/2019-ncov/if-you-are-sick/care-for-someone.html     [facility]: Interim Guidance for Hospital Discharge to Home: www.health.[facility].[place]/diseases/coronavirus/hcp/hospdischarge.pdf    [facility] clinical trials (COVID-19 research studies): [email or web address]     Below are the COVID-19 hotlines at the [place] Department of Health ([facility]). Interpreters are available.   o For health questions: Call 826-112-2216 or 1-114.772.6567 (7 a.m. to 7 p.m.)  o For questions about schools and childcare: Call 686-406-8277 or 1-691.332.6680 (7 a.m. to 7 p.m.)       Reason for Disposition  ? Sinus congestion (pressure, fullness) present > 10 days    Additional Information  ? Negative: Sounds like a life-threatening emergency to the triager  ? Negative: Difficulty breathing, and not from stuffy nose (e.g., not relieved by cleaning out the nose)  ? Negative: SEVERE headache and has fever  ? Negative: Patient sounds very sick or weak to the triager  ? Negative: SEVERE sinus pain  ? Negative: Severe headache  ? Negative: Redness or swelling on the cheek, forehead, or around the eye  ? Negative: Fever > 103 F (39.4 C)  ? Negative: Fever > 101 F (38.3 C) and over 60 years of age  ? Negative: Fever > 100.0 F (37.8 C) and has diabetes mellitus or a weak immune system (e.g., HIV positive, cancer chemotherapy, organ transplant, splenectomy, chronic steroids)  ? Negative: Fever > 100.0 F (37.8 C) and bedridden (e.g., nursing home patient, stroke, chronic illness, recovering from surgery)  ? Negative: Fever present > 3 days (72 hours)  ? Negative: Fever returns after gone for over 24 hours and symptoms worse or not improved  ? Negative: Sinus pain (not just congestion) and fever  ? Negative: Earache    Protocols used: SINUS PAIN AND CONGESTION-A-OH

## 2021-07-11 ENCOUNTER — HEALTH MAINTENANCE LETTER (OUTPATIENT)
Age: 61
End: 2021-07-11

## 2021-07-12 ENCOUNTER — TELEPHONE (OUTPATIENT)
Dept: FAMILY MEDICINE | Facility: CLINIC | Age: 61
End: 2021-07-12

## 2021-07-12 DIAGNOSIS — F17.200 NICOTINE USE DISORDER: Primary | ICD-10-CM

## 2021-07-12 NOTE — TELEPHONE ENCOUNTER
Medication notification from [facility] that chantix is on back orders. Would you like to change to something else?

## 2021-07-13 ENCOUNTER — RECORDS - HEALTHEAST (OUTPATIENT)
Dept: ADMINISTRATIVE | Facility: CLINIC | Age: 61
End: 2021-07-13

## 2021-07-13 DIAGNOSIS — G43.019 COMMON MIGRAINE WITH INTRACTABLE MIGRAINE: ICD-10-CM

## 2021-07-13 RX ORDER — ERENUMAB-AOOE 70 MG/ML
INJECTION SUBCUTANEOUS
Qty: 1 ML | Refills: 0 | Status: SHIPPED | OUTPATIENT
Start: 2021-07-13 | End: 2021-08-31

## 2021-07-13 NOTE — TELEPHONE ENCOUNTER
Refill request for Aimovig.  Pt due for follow up. Letter already mailed to pt reminding her to schedule. No future appt scheduled.  Medication T'd for review and signature      Mary White LPN on 7/13/2021 at 2:34 PM

## 2021-07-14 NOTE — TELEPHONE ENCOUNTER
Pt returned call, she is willing to try any other thing thtat Dr. Weiner suggests to replace CHantix

## 2021-07-15 NOTE — TELEPHONE ENCOUNTER
Spoke with her, she will try the lozenge, will call back and let us know if its not working    Leola Monsivais CMA ([facility])

## 2021-07-21 ENCOUNTER — RECORDS - HEALTHEAST (OUTPATIENT)
Dept: ADMINISTRATIVE | Facility: CLINIC | Age: 61
End: 2021-07-21

## 2021-07-28 ENCOUNTER — TRANSFERRED RECORDS (OUTPATIENT)
Dept: HEALTH INFORMATION MANAGEMENT | Facility: CLINIC | Age: 61
End: 2021-07-28

## 2021-07-29 DIAGNOSIS — J30.89 ENVIRONMENTAL AND SEASONAL ALLERGIES: Primary | ICD-10-CM

## 2021-07-29 RX ORDER — MONTELUKAST SODIUM 10 MG/1
1 TABLET ORAL EVERY 24 HOURS
COMMUNITY
Start: 2021-02-10 | End: 2021-07-29

## 2021-08-03 RX ORDER — MONTELUKAST SODIUM 10 MG/1
1 TABLET ORAL EVERY 24 HOURS
Qty: 90 TABLET | Refills: 1 | Status: SHIPPED | OUTPATIENT
Start: 2021-08-03 | End: 2022-09-20

## 2021-08-03 NOTE — TELEPHONE ENCOUNTER
"Routing refill request to provider for review/approval because:  ACT score  No historical ambulatory entries.    Last Written Prescription Date:  ???  Last Fill Quantity: ???,  # refills: ???   Last office visit provider:  7/1/21     Requested Prescriptions   Pending Prescriptions Disp Refills     montelukast (SINGULAIR) 10 MG tablet       Sig: Take 1 tablet (10 mg) by mouth every 24 hours       Leukotriene Inhibitors Protocol Failed - 7/29/2021  4:37 PM        Failed - Asthma control assessment score within normal limits in last 6 months     Please review ACT score.           Failed - Recent (6 mo) or future (30 days) visit within the authorizing provider's specialty     Patient had office visit in the last 6 months or has a visit in the next 30 days with authorizing provider or within the authorizing provider's specialty.  See \"Patient Info\" tab in inbasket, or \"Choose Columns\" in Meds & Orders section of the refill encounter.            Passed - Patient is age 12 or older     If patient is under 16, ok to refill using age based dosing.           Passed - Medication is active on med list         Signed Prescriptions Disp Refills    montelukast (SINGULAIR) 10 MG tablet       Sig: Take 1 tablet by mouth every 24 hours       There is no refill protocol information for this order          Rboert Pavon RN 08/03/21 7:06 AM  "

## 2021-08-16 DIAGNOSIS — M79.7 FIBROMYALGIA: Primary | ICD-10-CM

## 2021-08-16 DIAGNOSIS — E03.9 ACQUIRED HYPOTHYROIDISM: ICD-10-CM

## 2021-08-16 RX ORDER — LEVOTHYROXINE SODIUM 75 UG/1
75 TABLET ORAL DAILY
Qty: 90 TABLET | Refills: 0 | Status: SHIPPED | OUTPATIENT
Start: 2021-08-16 | End: 2022-01-03

## 2021-08-18 ENCOUNTER — TRANSFERRED RECORDS (OUTPATIENT)
Dept: HEALTH INFORMATION MANAGEMENT | Facility: CLINIC | Age: 61
End: 2021-08-18

## 2021-08-18 LAB — PHQ9 SCORE: 17

## 2021-08-31 ENCOUNTER — TRANSFERRED RECORDS (OUTPATIENT)
Dept: HEALTH INFORMATION MANAGEMENT | Facility: CLINIC | Age: 61
End: 2021-08-31

## 2021-08-31 ENCOUNTER — OFFICE VISIT (OUTPATIENT)
Dept: FAMILY MEDICINE | Facility: CLINIC | Age: 61
End: 2021-08-31
Payer: COMMERCIAL

## 2021-08-31 VITALS
OXYGEN SATURATION: 98 % | HEIGHT: 65 IN | HEART RATE: 86 BPM | SYSTOLIC BLOOD PRESSURE: 124 MMHG | WEIGHT: 164.9 LBS | BODY MASS INDEX: 27.47 KG/M2 | DIASTOLIC BLOOD PRESSURE: 52 MMHG

## 2021-08-31 DIAGNOSIS — Z79.899 ENCOUNTER FOR LONG-TERM (CURRENT) USE OF MEDICATIONS: ICD-10-CM

## 2021-08-31 DIAGNOSIS — E03.9 ACQUIRED HYPOTHYROIDISM: Chronic | ICD-10-CM

## 2021-08-31 DIAGNOSIS — F17.210 CIGARETTE NICOTINE DEPENDENCE WITHOUT COMPLICATION: Chronic | ICD-10-CM

## 2021-08-31 DIAGNOSIS — N18.31 STAGE 3A CHRONIC KIDNEY DISEASE (H): Chronic | ICD-10-CM

## 2021-08-31 DIAGNOSIS — J43.2 CENTRILOBULAR EMPHYSEMA (H): Chronic | ICD-10-CM

## 2021-08-31 DIAGNOSIS — G43.019 COMMON MIGRAINE WITH INTRACTABLE MIGRAINE: ICD-10-CM

## 2021-08-31 DIAGNOSIS — N39.42 URINARY INCONTINENCE WITHOUT SENSORY AWARENESS: ICD-10-CM

## 2021-08-31 DIAGNOSIS — F51.5 NIGHTMARES ASSOCIATED WITH CHRONIC POST-TRAUMATIC STRESS DISORDER: ICD-10-CM

## 2021-08-31 DIAGNOSIS — Z98.1 STATUS POST LUMBAR SPINAL FUSION: Primary | ICD-10-CM

## 2021-08-31 DIAGNOSIS — E78.2 MIXED HYPERLIPIDEMIA: Chronic | ICD-10-CM

## 2021-08-31 DIAGNOSIS — F31.81 BIPOLAR 2 DISORDER (H): ICD-10-CM

## 2021-08-31 DIAGNOSIS — M85.89 OSTEOPENIA OF MULTIPLE SITES: Chronic | ICD-10-CM

## 2021-08-31 DIAGNOSIS — F43.12 NIGHTMARES ASSOCIATED WITH CHRONIC POST-TRAUMATIC STRESS DISORDER: ICD-10-CM

## 2021-08-31 DIAGNOSIS — G89.4 CHRONIC PAIN SYNDROME: ICD-10-CM

## 2021-08-31 DIAGNOSIS — G43.909 MIGRAINE WITHOUT STATUS MIGRAINOSUS, NOT INTRACTABLE, UNSPECIFIED MIGRAINE TYPE: ICD-10-CM

## 2021-08-31 PROBLEM — J44.9 CHRONIC OBSTRUCTIVE PULMONARY DISEASE, UNSPECIFIED (H): Status: ACTIVE | Noted: 2021-03-21

## 2021-08-31 PROBLEM — M48.062 SPINAL STENOSIS OF LUMBAR REGION WITH NEUROGENIC CLAUDICATION: Status: ACTIVE | Noted: 2021-03-22

## 2021-08-31 PROBLEM — N18.30 CHRONIC KIDNEY DISEASE, STAGE 3 (H): Chronic | Status: ACTIVE | Noted: 2021-01-14

## 2021-08-31 PROBLEM — E78.5 HYPERLIPIDEMIA: Status: ACTIVE | Noted: 2021-03-17

## 2021-08-31 PROBLEM — N18.30 CHRONIC KIDNEY DISEASE, STAGE 3 (H): Status: ACTIVE | Noted: 2021-01-14

## 2021-08-31 PROBLEM — D64.9 ANEMIA: Status: ACTIVE | Noted: 2021-03-18

## 2021-08-31 PROBLEM — K44.9 HIATAL HERNIA: Status: ACTIVE | Noted: 2017-02-16

## 2021-08-31 PROBLEM — N95.9 MENOPAUSAL DISORDER: Status: ACTIVE | Noted: 2021-08-31

## 2021-08-31 PROBLEM — Z47.89 ENCOUNTER FOR OTHER ORTHOPEDIC AFTERCARE: Status: ACTIVE | Noted: 2021-03-21

## 2021-08-31 PROBLEM — M51.379 DDD (DEGENERATIVE DISC DISEASE), LUMBOSACRAL: Status: ACTIVE | Noted: 2021-03-22

## 2021-08-31 PROBLEM — I95.9 HYPOTENSION: Status: ACTIVE | Noted: 2021-03-18

## 2021-08-31 PROBLEM — M79.604 PAIN IN RIGHT LEG: Status: ACTIVE | Noted: 2021-03-21

## 2021-08-31 PROBLEM — M79.7 FIBROMYALGIA: Status: ACTIVE | Noted: 2021-03-17

## 2021-08-31 PROBLEM — Z78.0 ASYMPTOMATIC POSTMENOPAUSAL STATUS: Status: ACTIVE | Noted: 2021-08-31

## 2021-08-31 PROBLEM — E78.5 HYPERLIPIDEMIA: Chronic | Status: ACTIVE | Noted: 2021-03-17

## 2021-08-31 PROBLEM — J45.40 MODERATE PERSISTENT ASTHMA, UNCOMPLICATED: Status: ACTIVE | Noted: 2021-03-21

## 2021-08-31 PROBLEM — R26.89 OTHER ABNORMALITIES OF GAIT AND MOBILITY: Status: ACTIVE | Noted: 2021-03-21

## 2021-08-31 PROBLEM — J44.9 CHRONIC OBSTRUCTIVE PULMONARY DISEASE, UNSPECIFIED (H): Chronic | Status: ACTIVE | Noted: 2021-03-21

## 2021-08-31 PROBLEM — M54.50 LOW BACK PAIN: Status: ACTIVE | Noted: 2021-03-21

## 2021-08-31 LAB
ALBUMIN SERPL-MCNC: 4 G/DL (ref 3.5–5)
ALBUMIN UR-MCNC: NEGATIVE MG/DL
ALP SERPL-CCNC: 142 U/L (ref 45–120)
ALT SERPL W P-5'-P-CCNC: 13 U/L (ref 0–45)
ANION GAP SERPL CALCULATED.3IONS-SCNC: 12 MMOL/L (ref 5–18)
APPEARANCE UR: CLEAR
AST SERPL W P-5'-P-CCNC: 15 U/L (ref 0–40)
BILIRUB SERPL-MCNC: 0.4 MG/DL (ref 0–1)
BILIRUB UR QL STRIP: NEGATIVE
BUN SERPL-MCNC: 15 MG/DL (ref 8–22)
CALCIUM SERPL-MCNC: 9.9 MG/DL (ref 8.5–10.5)
CHLORIDE BLD-SCNC: 101 MMOL/L (ref 98–107)
CHOLEST SERPL-MCNC: 207 MG/DL
CO2 SERPL-SCNC: 28 MMOL/L (ref 22–31)
COLOR UR AUTO: YELLOW
CREAT SERPL-MCNC: 1.1 MG/DL (ref 0.6–1.1)
FASTING STATUS PATIENT QL REPORTED: ABNORMAL
GFR SERPL CREATININE-BSD FRML MDRD: 54 ML/MIN/1.73M2
GLUCOSE BLD-MCNC: 90 MG/DL (ref 70–125)
GLUCOSE UR STRIP-MCNC: NEGATIVE MG/DL
HDLC SERPL-MCNC: 67 MG/DL
HGB UR QL STRIP: NEGATIVE
KETONES UR STRIP-MCNC: NEGATIVE MG/DL
LDLC SERPL CALC-MCNC: 95 MG/DL
LEUKOCYTE ESTERASE UR QL STRIP: NEGATIVE
NITRATE UR QL: NEGATIVE
PH UR STRIP: 5.5 [PH] (ref 5–8)
PHQ9 SCORE: 17
POTASSIUM BLD-SCNC: 4.6 MMOL/L (ref 3.5–5)
PROT SERPL-MCNC: 7.1 G/DL (ref 6–8)
SODIUM SERPL-SCNC: 141 MMOL/L (ref 136–145)
SP GR UR STRIP: >=1.03 (ref 1–1.03)
TRIGL SERPL-MCNC: 227 MG/DL
TSH SERPL DL<=0.005 MIU/L-ACNC: 1.1 UIU/ML (ref 0.3–5)
UROBILINOGEN UR STRIP-ACNC: 0.2 E.U./DL

## 2021-08-31 PROCEDURE — 80053 COMPREHEN METABOLIC PANEL: CPT | Performed by: FAMILY MEDICINE

## 2021-08-31 PROCEDURE — 80061 LIPID PANEL: CPT | Performed by: FAMILY MEDICINE

## 2021-08-31 PROCEDURE — 84443 ASSAY THYROID STIM HORMONE: CPT | Performed by: FAMILY MEDICINE

## 2021-08-31 PROCEDURE — 80335 ANTIDEPRESSANT TRICYCLIC 1/2: CPT | Performed by: FAMILY MEDICINE

## 2021-08-31 PROCEDURE — 36415 COLL VENOUS BLD VENIPUNCTURE: CPT | Performed by: FAMILY MEDICINE

## 2021-08-31 PROCEDURE — 99215 OFFICE O/P EST HI 40 MIN: CPT | Performed by: FAMILY MEDICINE

## 2021-08-31 PROCEDURE — 81003 URINALYSIS AUTO W/O SCOPE: CPT | Performed by: FAMILY MEDICINE

## 2021-08-31 RX ORDER — LANOLIN ALCOHOL/MO/W.PET/CERES
CREAM (GRAM) TOPICAL
COMMUNITY
End: 2022-02-28

## 2021-08-31 RX ORDER — ELETRIPTAN HYDROBROMIDE 40 MG/1
40 TABLET, FILM COATED ORAL
Qty: 12 TABLET | Refills: 3 | Status: SHIPPED | OUTPATIENT
Start: 2021-08-31

## 2021-08-31 RX ORDER — HYDROXYZINE HYDROCHLORIDE 25 MG/1
TABLET, FILM COATED ORAL
COMMUNITY
End: 2021-10-06

## 2021-08-31 RX ORDER — FLUOXETINE 40 MG/1
80 CAPSULE ORAL DAILY
COMMUNITY
Start: 2021-05-24

## 2021-08-31 RX ORDER — ATORVASTATIN CALCIUM 40 MG/1
TABLET, FILM COATED ORAL
COMMUNITY
Start: 2020-07-28 | End: 2021-09-24

## 2021-08-31 RX ORDER — PROCHLORPERAZINE MALEATE 10 MG
10 TABLET ORAL EVERY 6 HOURS PRN
Status: ON HOLD | COMMUNITY
End: 2022-12-30

## 2021-08-31 RX ORDER — AMOXICILLIN 250 MG
2 CAPSULE ORAL AT BEDTIME
COMMUNITY
Start: 2021-03-21

## 2021-08-31 RX ORDER — VARENICLINE TARTRATE 1 MG/1
1 TABLET, FILM COATED ORAL EVERY 12 HOURS
COMMUNITY
Start: 2021-01-14 | End: 2021-10-06

## 2021-08-31 RX ORDER — PREDNISONE 5 MG/1
TABLET ORAL
COMMUNITY
Start: 2021-06-25 | End: 2021-08-31

## 2021-08-31 RX ORDER — ALBUTEROL SULFATE 90 UG/1
2 AEROSOL, METERED RESPIRATORY (INHALATION) EVERY 4 HOURS
Qty: 18 G | Refills: 1 | Status: SHIPPED | OUTPATIENT
Start: 2021-08-31 | End: 2022-02-28

## 2021-08-31 RX ORDER — GABAPENTIN 300 MG/1
600 CAPSULE ORAL 2 TIMES DAILY
COMMUNITY
Start: 2021-06-03

## 2021-08-31 RX ORDER — LAMOTRIGINE 200 MG/1
1 TABLET ORAL DAILY
COMMUNITY
Start: 2021-05-24

## 2021-08-31 RX ORDER — DOCUSATE SODIUM 100 MG/1
200 CAPSULE, LIQUID FILLED ORAL
COMMUNITY
End: 2021-08-31

## 2021-08-31 RX ORDER — ARIPIPRAZOLE 2 MG/1
TABLET ORAL
COMMUNITY
Start: 2020-11-27 | End: 2021-10-06

## 2021-08-31 RX ORDER — ELETRIPTAN HYDROBROMIDE 40 MG/1
1 TABLET, FILM COATED ORAL
COMMUNITY
End: 2021-08-31

## 2021-08-31 RX ORDER — ERENUMAB-AOOE 70 MG/ML
INJECTION SUBCUTANEOUS
Qty: 1 ML | Refills: 3 | Status: SHIPPED | OUTPATIENT
Start: 2021-08-31 | End: 2021-11-30

## 2021-08-31 RX ORDER — HYDROCODONE BITARTRATE AND ACETAMINOPHEN 5; 325 MG/1; MG/1
TABLET ORAL
COMMUNITY
End: 2021-10-06

## 2021-08-31 RX ORDER — ALBUTEROL SULFATE 90 UG/1
2 AEROSOL, METERED RESPIRATORY (INHALATION) EVERY 4 HOURS
COMMUNITY
Start: 2020-09-09 | End: 2021-08-31

## 2021-08-31 RX ORDER — DOXEPIN HYDROCHLORIDE 100 MG/1
75 CAPSULE ORAL AT BEDTIME
Status: ON HOLD | COMMUNITY
Start: 2021-08-02 | End: 2022-12-30

## 2021-08-31 RX ORDER — ERGOCALCIFEROL 1.25 MG/1
1 CAPSULE ORAL
COMMUNITY
Start: 2020-12-08 | End: 2022-06-05

## 2021-08-31 RX ORDER — PRAZOSIN HYDROCHLORIDE 2 MG/1
2 CAPSULE ORAL AT BEDTIME
COMMUNITY
Start: 2021-05-24

## 2021-08-31 ASSESSMENT — MIFFLIN-ST. JEOR: SCORE: 1306.98

## 2021-08-31 NOTE — TELEPHONE ENCOUNTER
Refill for aimovig.  Pt has upcoming appt on 11/30/21.  Medication T'd for review and signature    Mary White LPN on 8/31/2021 at 12:37 PM

## 2021-09-01 LAB
Lab: 7609
PERFORMING LABORATORY: NORMAL
SPECIMEN STATUS: NORMAL
TEST NAME: NORMAL

## 2021-09-05 ENCOUNTER — HEALTH MAINTENANCE LETTER (OUTPATIENT)
Age: 61
End: 2021-09-05

## 2021-09-07 LAB — LABCORP INTERFACED MISCELLANEOUS TEST RESULT: NORMAL

## 2021-09-11 NOTE — PROGRESS NOTES
"SUBJECTIVE: Dayana Samaniego is a 60 year old White female who presents today with a complaint of back issues and UTI issues.  I last saw this patient in mid January and so is due for a med check.  She did have spinal surgery with Dr. Sanon on March 17.  He did a C4-5 and S1 fusion.  She had follow-up with him on 8/11.  Since her surgery she has been using a cane as she feels she may fall, using more pain pills and also having urinary incontinence without sensory awareness.  Because of these issues she was referred to neurology whom she will see tomorrow.  She has been to the [facility] pain clinic who referred her.  She has a controlled substance agreement with them.  She is a complicated patient and that she has bipolar disorder, chronic pain, COPD with nicotine dependence, hypothyroidism, osteopenia, mild chronic kidney disease and hyperlipidemia.  She has had multiple surgeries in the past.  She is seen regularly by Dr. Zeng and is treated with multiple medications.  Dr. Zeng has written orders for lab work.  She asks me to fill out a disability parking form for her as she feels it is unsafe to be walking distances at this point.  She does talk about going to the state fair with her  because he shows animals.  She says she is borrowing a scooter from a friend.    OBJECTIVE: /52   Pulse 86   Ht 1.64 m (5' 4.57\")   Wt 74.8 kg (164 lb 14.4 oz)   SpO2 98%   Breastfeeding No   BMI 27.81 kg/m    General: Well-appearing overweight female using a cane  HEENT: Pupil contraction  Heart: Regular rate and rhythm  Lungs: Decreased breath sounds bilaterally  Abdomen: Soft, nontender  Extremities: Warm, dry without edema  Psych: Mood seems good at times and patient is anxious/tearful at others    ASSESSMENT & PLAN:     Status post lumbar spinal fusion  Patient is concerned as she feels her situation is worse since her surgery.    Urinary incontinence without sensory awareness  Is having urinary " incontinence that seems as though it is stress incontinence as she describes having it with cough, laugh and sneeze.  However, she also is having nighttime incontinence which she has never had before and which scares her.  She has some incontinence that she is unaware of when it is happening.  We discussed checking a UA since she was catheterized for her surgery and could be having some low-level infection causing symptoms.  - UA Macro with Reflex to Micro and Culture - lab collect  - UA Macro with Reflex to Micro and Culture - lab collect    Chronic pain syndrome  Is seen at the [facility] pain clinic who has referred her to neurology.  Is using narcotics in greater numbers since surgery.    Mixed hyperlipidemia  Using Lipitor 40 mg, will monitor labs.  - Lipid panel reflex to direct LDL Fasting  - Comprehensive metabolic panel (BMP + Alb, Alk Phos, ALT, AST, Total. Bili, TP)  - Lipid panel reflex to direct LDL Fasting  - Comprehensive metabolic panel (BMP + Alb, Alk Phos, ALT, AST, Total. Bili, TP)    Acquired hypothyroidism  On 75 mcg, will monitor labs.  - TSH with free T4 reflex  - TSH with free T4 reflex    Centrilobular emphysema (H)  Would like refill of albuterol.  - albuterol (PROAIR HFA) 108 (90 Base) MCG/ACT inhaler  Dispense: 18 g; Refill: 1    Cigarette nicotine dependence without complication  Unfortunately is smoking some although she has cut down at one time when her granddaughter was young and living in her house.  We are working on this.    Osteopenia of multiple sites  Last DEXA scan a year ago    Stage 3a chronic kidney disease  Mild, but should stay away from NSAIDs as much as possible.  Will monitor labs.  - Comprehensive metabolic panel (BMP + Alb, Alk Phos, ALT, AST, Total. Bili, TP)  - Comprehensive metabolic panel (BMP + Alb, Alk Phos, ALT, AST, Total. Bili, TP)    Bipolar 2 disorder (H)  Seen by Dr. Zeng and treated with antipsychotic and doxepin set her up.  Needs labs for monitoring  purposes.  - [facility]; [id number]; doxepin (Laboratory Miscellaneous Order)  - [facility]; [id number]; doxepin (Laboratory Miscellaneous Order)  - [facility] Miscellaneous Testing    Migraine without status migrainosus, not intractable, unspecified migraine type  Part of her chronic pain, needs refill of her medication.  - eletriptan (RELPAX) 40 MG tablet  Dispense: 12 tablet; Refill: 3    Nightmares associated with chronic post-traumatic stress disorder  - [facility]; [id number]; doxepin (Laboratory Miscellaneous Order)  - [facility]; [id number]; doxepin (Laboratory Miscellaneous Order)  - [facility] Miscellaneous Testing    Encounter for long-term (current) use of medications  - [facility]; [id number]; doxepin (Laboratory Miscellaneous Order)  - [facility]; [id number]; doxepin (Laboratory Miscellaneous Order)  - [facility] Miscellaneous Testing      I filled out for disability parking request form today.  I will get back to her on her lab results by my chart and only call with grossly abnormal values.  I will refill meds accordingly.  She should see me back in no longer than 6 months time.    Greater than 40 minutes spent together with the patient today, more than 50% spent in counseling, discussing the above topics.        Patient Active Problem List   Diagnosis     Common migraine with intractable migraine     Chronic kidney disease, stage 3     Anemia     Asymptomatic postmenopausal status     Bipolar 2 disorder (H)     Borderline personality disorder (H)     Centrilobular emphysema (H)     Fibromyalgia     Cigarette nicotine dependence without complication     DDD (degenerative disc disease), lumbosacral     GERD (gastroesophageal reflux disease)     Hiatal hernia     Hyperlipidemia     Hypotension     Hypothyroidism     Lung nodule     Menopausal disorder     Migraine headache     Osteopenia of multiple sites     PTSD (post-traumatic stress disorder)     Spinal stenosis of lumbar region with neurogenic claudication      Pain in right leg     Other specified disorders of bone density and structure, multiple sites     Other abnormalities of gait and mobility     Moderate persistent asthma, uncomplicated     Low back pain     Encounter for other orthopedic aftercare     Chronic obstructive pulmonary disease, unspecified (H)     Chronic pain syndrome       Current Outpatient Medications   Medication     albuterol (PROAIR HFA) 108 (90 Base) MCG/ACT inhaler     ARIPiprazole (ABILIFY) 2 MG tablet     atorvastatin (LIPITOR) 40 MG tablet     benzonatate (TESSALON) 100 MG capsule     doxepin (SINEQUAN) 100 MG capsule     eletriptan (RELPAX) 40 MG tablet     ergocalciferol (ERGOCALCIFEROL) 1.25 MG (35015 UT) capsule     esomeprazole (NEXIUM) 20 MG DR capsule     FLUoxetine (PROZAC) 40 MG capsule     folic acid (FOLVITE) 400 MCG tablet     gabapentin (NEURONTIN) 300 MG capsule     HYDROcodone-acetaminophen (LORCET) 5-325 MG tablet     hydrOXYzine (ATARAX) 25 MG tablet     lamoTRIgine (LAMICTAL) 200 MG tablet     levothyroxine (SYNTHROID/LEVOTHROID) 75 MCG tablet     montelukast (SINGULAIR) 10 MG tablet     prazosin (MINIPRESS) 2 MG capsule     prochlorperazine (COMPAZINE) 10 MG tablet     senna-docusate (SENOKOT-S/PERICOLACE) 8.6-50 MG tablet     tiZANidine (ZANAFLEX) 4 MG tablet     varenicline (CHANTIX) 1 MG tablet     erenumab-aooe (AIMOVIG) 70 MG/ML injection     No current facility-administered medications for this visit.

## 2021-09-13 DIAGNOSIS — R05.9 COUGH: ICD-10-CM

## 2021-09-13 DIAGNOSIS — M79.7 FIBROMYALGIA: ICD-10-CM

## 2021-09-13 DIAGNOSIS — R11.0 NAUSEA: Primary | ICD-10-CM

## 2021-09-14 RX ORDER — BENZONATATE 100 MG/1
CAPSULE ORAL
Qty: 30 CAPSULE | Refills: 0 | Status: SHIPPED | OUTPATIENT
Start: 2021-09-14 | End: 2022-04-04

## 2021-09-14 RX ORDER — PROCHLORPERAZINE MALEATE 5 MG
TABLET ORAL
Qty: 30 TABLET | Refills: 0 | Status: SHIPPED | OUTPATIENT
Start: 2021-09-14 | End: 2021-10-06

## 2021-09-14 NOTE — TELEPHONE ENCOUNTER
Disp Refills Start End ARGELIA    prochlorperazine (COMPAZINE) 5 MG tablet 30 tablet 0 6/8/2021  No   Sig: TAKE 1 TABLET(5 MG) BY MOUTH EVERY 8 HOURS AS NEEDED FOR NAUSEA   Sent to pharmacy as: prochlorperazine maleate 5 mg tablet (COMPAZINE)   E-Prescribing Status: Receipt confirmed by pharmacy (6/8/2021  2:08 PM CDT)   prochlorperazine (COMPAZINE) 5 MG tablet [[id number]]    Electronically signed by: Joanne Weiner MD on 06/08/21 1408 Status: Active   Ordering user: Joanne Weiner MD 06/08/21 1408 Authorized by: Joanne Weiner MD   Frequency:  06/08/21 - Until Discontinued Released by: Joanne Weiner MD 06/08/21 1408   Diagnoses  Nausea [R11.0]       Routing refill request to provider for review/approval because:  Drug not on the [facility] [place] [place] refill protocol   Provider fill for acute dosing - compazine    Last Written Prescription Date:  8/16/21  Last Fill Quantity: 120,  # refills: 0   Last office visit provider:  8/31/21     Last Written Prescription Date:  8/17/21  Last Fill Quantity: 30,  # refills: 0   Last office visit provider:  8/31/21     Last Written Prescription Date:  6/8/21  Last Fill Quantity: 30,  # refills: 0   Last office visit provider:  8/31/21     Requested Prescriptions   Pending Prescriptions Disp Refills     tiZANidine (ZANAFLEX) 4 MG tablet [Pharmacy Med Name: TIZANIDINE 4MG TABLETS] 120 tablet 0     Sig: TAKE 1-2 TABLETS BY MOUTH DURTHING THE DAY AND 2 TABLETS BY MOUTH AT BEDTIME       There is no refill protocol information for this order        benzonatate (TESSALON) 100 MG capsule [Pharmacy Med Name: BENZONATATE 100MG CAPSULES] 30 capsule 0     Sig: TAKE 1 CAPSULE(100 MG) BY MOUTH THREE TIMES DAILY AS NEEDED FOR COUGH       There is no refill protocol information for this order        prochlorperazine (COMPAZINE) 5 MG tablet [Pharmacy Med Name: PROCHLORPERAZINE 5MG TABLETS] 30 tablet      Sig: TAKE 1 TABLET(5 MG) BY MOUTH EVERY 8 HOURS AS NEEDED FOR NAUSEA         "Antivertigo/Antiemetic Agents Passed - 9/13/2021  9:35 PM        Passed - Recent (12 mo) or future (30 days) visit within the authorizing provider's specialty     Patient has had an office visit with the authorizing provider or a provider within the authorizing providers department within the previous 12 mos or has a future within next 30 days. See \"Patient Info\" tab in inbasket, or \"Choose Columns\" in Meds & Orders section of the refill encounter.              Passed - Medication is active on med list        Passed - Patient is 18 years of age or older             Robert Pavon RN 09/14/21 2:08 PM  "

## 2021-09-16 ENCOUNTER — TRANSFERRED RECORDS (OUTPATIENT)
Dept: HEALTH INFORMATION MANAGEMENT | Facility: CLINIC | Age: 61
End: 2021-09-16

## 2021-09-20 ENCOUNTER — TRANSFERRED RECORDS (OUTPATIENT)
Dept: HEALTH INFORMATION MANAGEMENT | Facility: CLINIC | Age: 61
End: 2021-09-20

## 2021-09-23 ENCOUNTER — TRANSFERRED RECORDS (OUTPATIENT)
Dept: HEALTH INFORMATION MANAGEMENT | Facility: CLINIC | Age: 61
End: 2021-09-23

## 2021-09-23 ENCOUNTER — TELEPHONE (OUTPATIENT)
Dept: PULMONOLOGY | Facility: OTHER | Age: 61
End: 2021-09-23

## 2021-09-23 DIAGNOSIS — J44.1 COPD EXACERBATION (H): Primary | ICD-10-CM

## 2021-09-23 RX ORDER — PREDNISONE 20 MG/1
TABLET ORAL
Qty: 10 TABLET | Refills: 0 | Status: SHIPPED | OUTPATIENT
Start: 2021-09-23 | End: 2021-10-06

## 2021-09-23 RX ORDER — AZITHROMYCIN 250 MG/1
TABLET, FILM COATED ORAL
Qty: 6 TABLET | Refills: 0 | Status: SHIPPED | OUTPATIENT
Start: 2021-09-23 | End: 2021-09-28

## 2021-09-23 NOTE — TELEPHONE ENCOUNTER
Phone call from patient.  States she has had cough for the past 3 months.  Has to prop self up with pillows at night so that she can sleep.  Asking to see Dr. Bradford.    No available appointments with Dr. Bradford. Will start her action plan and get in with next available provider.  Transferred to scheduling to set up appointment with available provider.

## 2021-09-24 DIAGNOSIS — E78.5 HYPERLIPEMIA: Primary | ICD-10-CM

## 2021-09-24 RX ORDER — ATORVASTATIN CALCIUM 40 MG/1
TABLET, FILM COATED ORAL
Qty: 90 TABLET | Refills: 3 | Status: SHIPPED | OUTPATIENT
Start: 2021-09-24 | End: 2022-08-19

## 2021-09-25 NOTE — TELEPHONE ENCOUNTER
"Last Written Prescription Date:  7/28/2020  Last Fill Quantity: 90,  # refills: 3   Last office visit provider:  8/31/2021         Requested Prescriptions   Pending Prescriptions Disp Refills     atorvastatin (LIPITOR) 40 MG tablet [Pharmacy Med Name: ATORVASTATIN 40MG TABLETS] 90 tablet      Sig: TAKE 1 TABLET(40 MG) BY MOUTH AT BEDTIME       Statins Protocol Passed - 9/24/2021 11:50 AM        Passed - LDL on file in past 12 months     Recent Labs   Lab Test 08/31/21  1304   LDL 95             Passed - No abnormal creatine kinase in past 12 months     No lab results found.             Passed - Recent (12 mo) or future (30 days) visit within the authorizing provider's specialty     Patient has had an office visit with the authorizing provider or a provider within the authorizing providers department within the previous 12 mos or has a future within next 30 days. See \"Patient Info\" tab in inbasket, or \"Choose Columns\" in Meds & Orders section of the refill encounter.              Passed - Medication is active on med list        Passed - Patient is age 18 or older        Passed - No active pregnancy on record        Passed - No positive pregnancy test in past 12 months             Mary Alice Ferrer RN 09/24/21 11:12 PM  "

## 2021-09-28 ENCOUNTER — TRANSFERRED RECORDS (OUTPATIENT)
Dept: HEALTH INFORMATION MANAGEMENT | Facility: CLINIC | Age: 61
End: 2021-09-28

## 2021-09-30 ENCOUNTER — TRANSFERRED RECORDS (OUTPATIENT)
Dept: HEALTH INFORMATION MANAGEMENT | Facility: CLINIC | Age: 61
End: 2021-09-30

## 2021-10-01 ENCOUNTER — TRANSFERRED RECORDS (OUTPATIENT)
Dept: HEALTH INFORMATION MANAGEMENT | Facility: CLINIC | Age: 61
End: 2021-10-01

## 2021-10-04 RX ORDER — DOXEPIN HYDROCHLORIDE 25 MG/1
CAPSULE ORAL
COMMUNITY
Start: 2021-09-13 | End: 2021-10-06

## 2021-10-04 RX ORDER — HYDROXYZINE PAMOATE 25 MG/1
25 CAPSULE ORAL 2 TIMES DAILY PRN
COMMUNITY
Start: 2021-09-13 | End: 2023-05-23

## 2021-10-04 RX ORDER — HYDROCODONE BITARTRATE AND ACETAMINOPHEN 10; 325 MG/1; MG/1
1 TABLET ORAL EVERY 4 HOURS PRN
COMMUNITY
Start: 2021-09-14

## 2021-10-06 ENCOUNTER — OFFICE VISIT (OUTPATIENT)
Dept: PULMONOLOGY | Facility: OTHER | Age: 61
End: 2021-10-06
Payer: COMMERCIAL

## 2021-10-06 VITALS
WEIGHT: 168.5 LBS | HEART RATE: 88 BPM | DIASTOLIC BLOOD PRESSURE: 70 MMHG | SYSTOLIC BLOOD PRESSURE: 140 MMHG | OXYGEN SATURATION: 98 % | BODY MASS INDEX: 28.42 KG/M2

## 2021-10-06 DIAGNOSIS — J44.9 CHRONIC OBSTRUCTIVE PULMONARY DISEASE, UNSPECIFIED COPD TYPE (H): Primary | ICD-10-CM

## 2021-10-06 DIAGNOSIS — R09.82 PND (POST-NASAL DRIP): ICD-10-CM

## 2021-10-06 DIAGNOSIS — Z71.6 ENCOUNTER FOR TOBACCO USE CESSATION COUNSELING: ICD-10-CM

## 2021-10-06 PROCEDURE — 99214 OFFICE O/P EST MOD 30 MIN: CPT | Performed by: INTERNAL MEDICINE

## 2021-10-06 RX ORDER — NICOTINE 21 MG/24HR
1 PATCH, TRANSDERMAL 24 HOURS TRANSDERMAL EVERY 24 HOURS
Qty: 7 PATCH | Refills: 0 | Status: SHIPPED | OUTPATIENT
Start: 2021-10-06 | End: 2021-11-16

## 2021-10-06 RX ORDER — FLUTICASONE PROPIONATE 50 MCG
1 SPRAY, SUSPENSION (ML) NASAL DAILY
Qty: 16 G | Refills: 3 | Status: SHIPPED | OUTPATIENT
Start: 2021-10-06 | End: 2022-06-17

## 2021-10-06 RX ORDER — NICOTINE 21 MG/24HR
1 PATCH, TRANSDERMAL 24 HOURS TRANSDERMAL EVERY 24 HOURS
Qty: 7 PATCH | Refills: 0 | Status: SHIPPED | OUTPATIENT
Start: 2021-10-13 | End: 2021-11-20

## 2021-10-06 NOTE — PROGRESS NOTES
Pulmonary Clinic Follow Up    Cc: follow up nodule, dyspnea    HPI: 55yoF with tobacco abuse presented after nodule discovered on CT scan of the chest. She had complaints of ongoing shortness of breath with stairs or walking too quickly.  She smokes about a half pack a day. She was given a course of abx and steroids as she had called for cough.     Current regimen: albuterol PRN.     ROS: 12-point ROS reviewed with patient. Notable for snoring, cough, shortness of breath.    BP (!) 140/70   Pulse 88   Wt 76.4 kg (168 lb 8 oz)   SpO2 98%   BMI 28.42 kg/m      RA  Gen: NAD  HEENT: No oropharyngeal lesions  Neck: no  lymphadenopathy  C/V: RRR, S1 and S2.  Resp: clear bilaterally, no wheezing or rales.  Ext: no edema  Skin: no lesions  Neuro: Grossly normal    PFTs compared 8/12/19 to 2016  FEV1/FVC is 0.79  FEV1 is 66-->76% predicted   FVC is 72%-->75% predicted    ASSESSMENT/PLAN:    1) Tobacco abuse  -Smoking Cessation - smoking 1/2 pack a day. She is thinking about quitting as she will be having back surgery. Explained to her that for healing of spine after surgery, needs to be completely off nicotine products. Seems encouraged.  -CT chest on 5/2021 shows 2 mm nodules.     2) Obstructive lung disease - likely a combination of Asthma and emphysema  -Smoking cessation  -Only on albuterol inhaler as needed. Refused ICS in the past  -Stopped taking Singulair  -Action plan in place  -Singulair stopped by patient  -hopefully next visit, can start her on an inhaled steroids for her asthma. For now, she is starting floase and LAMA.     3) PND - start flonase.    RTC  In 4 months with Dr ROBERT James MD

## 2021-11-04 ENCOUNTER — TRANSFERRED RECORDS (OUTPATIENT)
Dept: HEALTH INFORMATION MANAGEMENT | Facility: CLINIC | Age: 61
End: 2021-11-04
Payer: COMMERCIAL

## 2021-11-16 ENCOUNTER — TELEPHONE (OUTPATIENT)
Dept: FAMILY MEDICINE | Facility: CLINIC | Age: 61
End: 2021-11-16

## 2021-11-16 ENCOUNTER — OFFICE VISIT (OUTPATIENT)
Dept: FAMILY MEDICINE | Facility: CLINIC | Age: 61
End: 2021-11-16
Payer: COMMERCIAL

## 2021-11-16 VITALS
DIASTOLIC BLOOD PRESSURE: 80 MMHG | HEART RATE: 84 BPM | WEIGHT: 169 LBS | RESPIRATION RATE: 18 BRPM | SYSTOLIC BLOOD PRESSURE: 136 MMHG | OXYGEN SATURATION: 94 % | BODY MASS INDEX: 28.5 KG/M2

## 2021-11-16 DIAGNOSIS — F31.81 BIPOLAR 2 DISORDER (H): Chronic | ICD-10-CM

## 2021-11-16 DIAGNOSIS — L98.8 DRAINING CUTANEOUS SINUS TRACT: Primary | ICD-10-CM

## 2021-11-16 DIAGNOSIS — M25.461 SWELLING OF BOTH KNEES: ICD-10-CM

## 2021-11-16 DIAGNOSIS — F41.9 ANXIETY: ICD-10-CM

## 2021-11-16 DIAGNOSIS — M25.462 SWELLING OF BOTH KNEES: ICD-10-CM

## 2021-11-16 DIAGNOSIS — Z98.1 STATUS POST LUMBAR SPINAL FUSION: ICD-10-CM

## 2021-11-16 DIAGNOSIS — Z23 HIGH PRIORITY FOR 2019-NCOV VACCINE: ICD-10-CM

## 2021-11-16 DIAGNOSIS — Z71.6 ENCOUNTER FOR SMOKING CESSATION COUNSELING: ICD-10-CM

## 2021-11-16 PROCEDURE — 96127 BRIEF EMOTIONAL/BEHAV ASSMT: CPT | Performed by: FAMILY MEDICINE

## 2021-11-16 PROCEDURE — 91300 COVID-19,PF,PFIZER (12+ YRS): CPT | Performed by: FAMILY MEDICINE

## 2021-11-16 PROCEDURE — 99214 OFFICE O/P EST MOD 30 MIN: CPT | Mod: 25 | Performed by: FAMILY MEDICINE

## 2021-11-16 PROCEDURE — 0004A COVID-19,PF,PFIZER (12+ YRS): CPT | Performed by: FAMILY MEDICINE

## 2021-11-16 RX ORDER — SULFAMETHOXAZOLE/TRIMETHOPRIM 800-160 MG
1 TABLET ORAL 2 TIMES DAILY
Qty: 20 TABLET | Refills: 0 | Status: SHIPPED | OUTPATIENT
Start: 2021-11-16 | End: 2021-12-01

## 2021-11-16 ASSESSMENT — ANXIETY QUESTIONNAIRES
1. FEELING NERVOUS, ANXIOUS, OR ON EDGE: SEVERAL DAYS
5. BEING SO RESTLESS THAT IT IS HARD TO SIT STILL: MORE THAN HALF THE DAYS
GAD7 TOTAL SCORE: 12
7. FEELING AFRAID AS IF SOMETHING AWFUL MIGHT HAPPEN: SEVERAL DAYS
IF YOU CHECKED OFF ANY PROBLEMS ON THIS QUESTIONNAIRE, HOW DIFFICULT HAVE THESE PROBLEMS MADE IT FOR YOU TO DO YOUR WORK, TAKE CARE OF THINGS AT HOME, OR GET ALONG WITH OTHER PEOPLE: SOMEWHAT DIFFICULT
6. BECOMING EASILY ANNOYED OR IRRITABLE: MORE THAN HALF THE DAYS
2. NOT BEING ABLE TO STOP OR CONTROL WORRYING: MORE THAN HALF THE DAYS
3. WORRYING TOO MUCH ABOUT DIFFERENT THINGS: MORE THAN HALF THE DAYS

## 2021-11-16 ASSESSMENT — ASTHMA QUESTIONNAIRES
ACT_TOTALSCORE: 13
QUESTION_1 LAST FOUR WEEKS HOW MUCH OF THE TIME DID YOUR ASTHMA KEEP YOU FROM GETTING AS MUCH DONE AT WORK, SCHOOL OR AT HOME: SOME OF THE TIME
QUESTION_2 LAST FOUR WEEKS HOW OFTEN HAVE YOU HAD SHORTNESS OF BREATH: MORE THAN ONCE A DAY
QUESTION_3 LAST FOUR WEEKS HOW OFTEN DID YOUR ASTHMA SYMPTOMS (WHEEZING, COUGHING, SHORTNESS OF BREATH, CHEST TIGHTNESS OR PAIN) WAKE YOU UP AT NIGHT OR EARLIER THAN USUAL IN THE MORNING: TWO OR THREE NIGHTS A WEEK
QUESTION_4 LAST FOUR WEEKS HOW OFTEN HAVE YOU USED YOUR RESCUE INHALER OR NEBULIZER MEDICATION (SUCH AS ALBUTEROL): TWO OR THREE TIMES PER WEEK
QUESTION_5 LAST FOUR WEEKS HOW WOULD YOU RATE YOUR ASTHMA CONTROL: WELL CONTROLLED

## 2021-11-16 ASSESSMENT — PATIENT HEALTH QUESTIONNAIRE - PHQ9: 5. POOR APPETITE OR OVEREATING: MORE THAN HALF THE DAYS

## 2021-11-16 NOTE — TELEPHONE ENCOUNTER
Please abstract the following data from this visit with this patient into the appropriate field in Epic:    Tests that can be patient reported without a hard copy:    Urine drug screen    Other Tests found in the patient's chart through Chart Review/Care Everywhere:    Urine drug screen done by this group [facility] pain clinic on this date: 5/21/21    Note to Abstraction: If this section is blank, no results were found via Chart Review/Care Everywhere.

## 2021-11-17 ASSESSMENT — ANXIETY QUESTIONNAIRES: GAD7 TOTAL SCORE: 12

## 2021-11-17 ASSESSMENT — ASTHMA QUESTIONNAIRES: ACT_TOTALSCORE: 13

## 2021-11-17 ASSESSMENT — PATIENT HEALTH QUESTIONNAIRE - PHQ9: SUM OF ALL RESPONSES TO PHQ QUESTIONS 1-9: 16

## 2021-11-21 NOTE — PROGRESS NOTES
SUBJECTIVE: Dayana Samaniego is a 60 year old White female who presents today with complaint of smelly discharge coming from the corner of an incision from a previous abdominal surgery.  She tells me she has had several years.  We discussed that it is a sinus tract and is difficult to get rid of without invasive measures such as surgery.  She also tells me that her recent surgery partially failed and that they would like to go back in and repair it.  However the insurance company is requiring that she quit smoking for 6 weeks prior to the surgery in order for them to cover the cost.  She tells me that they will require that I do a lab for nicotine prior to surgery.  At this time it is planned for January.  Part of the problem from this surgery is that she has had swelling in the back of her legs, particularly behind her knees.  We discussed this is likely not from the joint as this is not bothering her, but rather stems from her back issues.  It worsens when she is on her feet for long periods of time.  All of this along with her chronic pain is affecting her mood.  She has had a flare of her depression and anxiety.  We also discussed the importance of getting her third Covid vaccine.    OBJECTIVE: /80   Pulse 84   Resp 18   Wt 76.7 kg (169 lb)   SpO2 94%   BMI 28.50 kg/m    General: Well-appearing normal weight older female in no acute distress  Heart: Regular rate and rhythm  Lungs: Clear bilaterally  Abdomen: Left lower quadrant sinus tract with smelly drainage at edge of  incision  Extremities: Lower extremities show bilateral fluid collection behind the knees, no warmth or redness or tenderness to touch  Skin: Draining abdominal sinus tract with purulent discharge, area around is not appearing infected    ASSESSMENT & PLAN:     Draining cutaneous sinus tract  Discussed that this likely would not resolve without some kind of surgical intervention.  I will treat her with Bactrim and refer her  to general surgery to discuss a plan.  - sulfamethoxazole-trimethoprim (BACTRIM DS) 800-160 MG tablet  Dispense: 20 tablet; Refill: 0  - Adult General Surg Referral    Status post lumbar spinal fusion  Apparently failed and needs to be partially redone.  Insurance is only allowing it if she is smoke-free for 6 weeks prior to surgery.    Swelling of both knees  Has had this since surgery.  Swelling is only in the back of both knees, no redness or warmth.    Encounter for smoking cessation counseling  Discussed need to quit smoking in order to have her surgery in January.    Bipolar 2 disorder (H)  Depression and anxiety are not controlled and have not been due to chronic pain syndrome.  PHQ-9 is never been under 5.  Current worsening symptoms are partially situational.    Depression Screening Follow Up    PHQ 11/16/2021   PHQ-9 Total Score 16   Q9: Thoughts of better off dead/self-harm past 2 weeks Several days     Last PHQ-9 11/16/2021   1.  Little interest or pleasure in doing things 1   2.  Feeling down, depressed, or hopeless 1   3.  Trouble falling or staying asleep, or sleeping too much 3   4.  Feeling tired or having little energy 2   5.  Poor appetite or overeating 3   6.  Feeling bad about yourself 1   7.  Trouble concentrating 2   8.  Moving slowly or restless 2   Q9: Thoughts of better off dead/self-harm past 2 weeks 1   PHQ-9 Total Score 16   Difficulty at work, home, or with people Somewhat difficult         Follow Up      Follow Up Actions Taken  Referred patient back to mental health provider  Regularly seeing therapist and psychiatrist    Discussed the following ways the patient can remain in a safe environment:  Lives with  and family.  Patient is not actively suicidal and denies ever acting up on it as she has family, especially her granddaughter, that she adores.    Anxiety  DAWSON-7 score is 12.  Already on many medications from psychiatrist.    High priority for 2019-nCoV vaccine  Will have  in preparation for upcoming surgery.  - COVID-19,AJAY,PFIZER (12+ Yrs PURPLE LABEL)      She should follow-up with me in January for her preop.        Patient Active Problem List   Diagnosis     Common migraine with intractable migraine     Chronic kidney disease, stage 3 (H)     Anemia     Asymptomatic postmenopausal status     Bipolar 2 disorder (H)     Borderline personality disorder (H)     Centrilobular emphysema (H)     Fibromyalgia     Cigarette nicotine dependence without complication     DDD (degenerative disc disease), lumbosacral     GERD (gastroesophageal reflux disease)     Hiatal hernia     Hyperlipidemia     Hypotension     Hypothyroidism     Lung nodule     Menopausal disorder     Migraine headache     Osteopenia of multiple sites     PTSD (post-traumatic stress disorder)     Spinal stenosis of lumbar region with neurogenic claudication     Pain in right leg     Other specified disorders of bone density and structure, multiple sites     Other abnormalities of gait and mobility     Moderate persistent asthma, uncomplicated     Low back pain     Encounter for other orthopedic aftercare     Chronic obstructive pulmonary disease, unspecified (H)     Chronic pain syndrome       Current Outpatient Medications   Medication     albuterol (PROAIR HFA) 108 (90 Base) MCG/ACT inhaler     atorvastatin (LIPITOR) 40 MG tablet     benzonatate (TESSALON) 100 MG capsule     doxepin (SINEQUAN) 100 MG capsule     eletriptan (RELPAX) 40 MG tablet     erenumab-aooe (AIMOVIG) 70 MG/ML injection     ergocalciferol (ERGOCALCIFEROL) 1.25 MG (63464 UT) capsule     esomeprazole (NEXIUM) 20 MG DR capsule     FLUoxetine (PROZAC) 40 MG capsule     fluticasone (FLONASE) 50 MCG/ACT nasal spray     folic acid (FOLVITE) 400 MCG tablet     gabapentin (NEURONTIN) 300 MG capsule     HYDROcodone-acetaminophen (NORCO)  MG per tablet     hydrOXYzine (VISTARIL) 25 MG capsule     lamoTRIgine (LAMICTAL) 200 MG tablet     levothyroxine  (SYNTHROID/LEVOTHROID) 75 MCG tablet     montelukast (SINGULAIR) 10 MG tablet     prazosin (MINIPRESS) 2 MG capsule     prochlorperazine (COMPAZINE) 10 MG tablet     senna-docusate (SENOKOT-S/PERICOLACE) 8.6-50 MG tablet     sulfamethoxazole-trimethoprim (BACTRIM DS) 800-160 MG tablet     tiotropium (SPIRIVA RESPIMAT) 2.5 MCG/ACT inhaler     tiZANidine (ZANAFLEX) 4 MG tablet     nicotine (NICODERM CQ) 14 MG/24HR 24 hr patch     nicotine (NICODERM CQ) 7 MG/24HR 24 hr patch     No current facility-administered medications for this visit.                 Answers for HPI/ROS submitted by the patient on 11/16/2021  How many servings of fruits and vegetables do you eat daily?: 0-1  On average, how many sweetened beverages do you drink each day (Examples: soda, juice, sweet tea, etc.  Do NOT count diet or artificially sweetened beverages)?: 3  How many minutes a day do you exercise enough to make your heart beat faster?: 9 or less  How many days a week do you exercise enough to make your heart beat faster?: 3 or less  How many days per week do you miss taking your medication?: 1

## 2021-11-30 ENCOUNTER — OFFICE VISIT (OUTPATIENT)
Dept: NEUROLOGY | Facility: CLINIC | Age: 61
End: 2021-11-30
Payer: COMMERCIAL

## 2021-11-30 VITALS
WEIGHT: 169 LBS | SYSTOLIC BLOOD PRESSURE: 150 MMHG | BODY MASS INDEX: 28.85 KG/M2 | DIASTOLIC BLOOD PRESSURE: 73 MMHG | HEART RATE: 73 BPM | HEIGHT: 64 IN

## 2021-11-30 DIAGNOSIS — G43.019 COMMON MIGRAINE WITH INTRACTABLE MIGRAINE: ICD-10-CM

## 2021-11-30 PROCEDURE — 99215 OFFICE O/P EST HI 40 MIN: CPT | Performed by: PSYCHIATRY & NEUROLOGY

## 2021-11-30 RX ORDER — ERENUMAB-AOOE 70 MG/ML
INJECTION SUBCUTANEOUS
Qty: 1 ML | Refills: 11 | Status: ON HOLD | OUTPATIENT
Start: 2021-11-30 | End: 2022-12-30

## 2021-11-30 RX ORDER — VARENICLINE TARTRATE 1 MG/1
TABLET, FILM COATED ORAL
COMMUNITY
End: 2022-02-10

## 2021-11-30 ASSESSMENT — MIFFLIN-ST. JEOR: SCORE: 1316.58

## 2021-11-30 NOTE — PROGRESS NOTES
In person evaluation    [facility]  3/16/2020, in person evaluation  11/30/2021, in person evaluation    60-year-old followed neurologically for:  Intractable migraine headaches  History of 3 cervical fusions  Longstanding generalized hyperreflexia  History of carotid bruits  Fibromyalgia      Neurologic summary:    A.  Patient with intractable migraine headaches       Onset of headaches as far back as 817       Associated with photophobia/phonophobia and visual changes       History of carsickness       Family history of migraines in her daughter    Previous frequency of headaches are 2-3/week  Had used Botox injections up until March 2020  Switch to Aimovig end of March 2020      Patient uses the once a month injectable Aimovig  She gets at least 2 weeks of good response  Then the headaches seem to increase in frequency  During the good phase she has 1-2 headaches per week  Duration of the headache during the good phases all day  Will use an Excedrin Migraine maybe 4/week during that time warned her about medication rebound headache  Uses the Relpax to break the headache      B.  Also has sharp shooting pain above the left eye lasting 10 minutes at a time jabbing in nature more neuralgic-like    C.  Patient does have bipolar disorder is on Lamictal    D.  Past history of cervical surgeries       Chronic hyperreflexia        Has been treated with Zanaflex as a muscle relaxant    E.  Has  COPD and is on inhalers       Not a good candidate for beta-blockers     F.  Since last seen had lumbar canal surgery 3/17/2021 by Dr. Sanon        Complex surgery see official reports        Anterior discectomy fusion L4-5 and L5-S1        PEEK cage L4-5, L5-S1        Posterior fusion L4-5, L5-S1        Insertion pedicle screws maria e fixation L4, 5, S1        Bilateral hemilaminectomies L4 see official report    Patient fell when she was in rehab and had a sacral fracture 3/22/2021  This slow down healing    She has been left with a lot of  low back pain  Weakness more in the right leg than the left  She is always had very brisk reflexes with clonus before  As decreased reflexes at the L4 on the right leg    Is now ambulating without a cane or walker but has them at home    Patient states that there is a concern that the grafts that they put in may not be healing as well and causing some of the pain but there is no new impingement  Did undergone an EMG 2021 at [facility]  [place] [facility] [place] [facility] be [facility]  I can access some of the record but not all of it  She says that she has some residual nerve damage but no new impingement    I feel she should follow closely with her spine specialist and that has access to all her scans and data      Past medical history  Intractable migraine headaches  Chronic daily headaches  Status post cervical fusion x3  Longstanding hyperreflexia and difficulty with imbalance and falls  Carotid artery bruits  Fibromyalgia/chronic pain  COPD.  Dyspnea on exertion with pulmonary deconditioning with past history of pulmonary nodules.   Right atrial enlargement.  Fatigue with possible obstructive sleep apnea.  Bipolar disorder.  Hypothyroidism.   GERD.   Hyperlipidemia.  Chronic kidney disease.      Habits  History of smoking  Alcohol 1-2/month    Family history  Mother with bone tumor,  at age 52  Father with liver disease and alcohol use,  at age 62  Sister with diabetes high blood pressure and hypercholesterolemia  Maternal aunt diabetes  Nephew with depression and suicidal thoughts  Uncle with heart disease  Daughters with migraine        Past work-up review  EEG 2011 background rhythm 7-9 Hz slightly slow nonspecific consistent with toxic encephalopathy  MRI scan brain 2016  A.  Small T2 hyperintensity changes nonspecific  B.  No hydrocephalus mass or acute changes  TSH 0.32.   Vitamin D level in the past was low at 26.2.        Migraine treatment review    Some medications that have  been used in the past as preventatives have included:  1. Topamax.  2. Verapamil.  3. Lyrica.  4. Amitriptyline.  5. Propranolol.    Medications tried in the past more as abortive agents:  a. Tizanidine.  b. Zomig.  c. Vicodin.  d. Relpax, which works sometimes, but not always.  e. Toradol.    Currently, her medications are filled through her primary includin. Valium for anxiety.  2. Doxepin 25 mg three at bedtime.  3. Prozac 60 mg at bedtime.  4. Vistaril 25 mg two tablets p.o. q.6h. p.r.n. for migraine.  5. Avoids ibuprofen and aspirin due to her kidney disease, but recently had some Toradol.  6. Lamictal 200 mg at bedtime for bipolar.  7. Melatonin 5 mg for sleep.  8. Has had prednisone burst during headache flurries in the past.  9. Tizanidine 4 mg tablet, one during the day and two at night prescribed by primary.    Patient is a nonsmoker, does not drink alcohol.    Patient s past Botox injection schedules have included:  a. 2016, at the [facility], 25-30 units, did not tolerate, did not complete.  b. Second injection at the [facility] on 2017, 150 units given.    Through our clinic a 155 units using the headache template. Patient knows the risk and benefits of the medication:  a. 2017.  b. 2017.  c. 2018.  d. 2018, 200 units, given as she had a lot of neck spasm.  e. 2018, total of 200 units given as she has a lot of neck spasm.  f. 2019, 200 units given as she had a lot of neck spasm.  g. Halima 10, 2019 200 units headache template and some for neck spasm  h. 2019 155 units  i Dec 16th, 2019 155 units        Review of Systems     Significant headaches as above   did not complain of the sharp shooting pain in the left eye    No visual changes   No dysarthria dysphasia or diplopia  No actual visual field cut    Has spasms in the neck trapezius chronically    Does get photophobia when the headaches are  worse    No chest pain no shortness of breath no nausea vomiting no diarrhea no fever chills no gait changes otherwise review systems are negative     Does have the pain in the back and weakness of the right leg greater than the left but can ambulate    General exam  Vital signs documented chart  Blood pressure 150/73 pulse 73 temperature 98.7  Lungs clear  Heart rate regular  Abdomen soft  Symmetrical pulses  No edema the feet    Neurologic exam      Neurologic exam   Alert oriented x3  No aphasia  No neglect  Normal memory recall    Cranials 2 through 12 normal  Pupils are symmetrical and reactive  Visual fields are intact  No ophthalmoplegia  No nystagmus  Face is symmetrical tongue twisters are good    Upper extremities  No drift no tremor normal finger-nose    Brisk reflexes in the lower extremities greater on the left than the right except decreased right knee reflex    Lower extremities  Pain versus weakness of the right iliopsoas but can bear weight okay and walk independently    Gait is more of a cautious antalgic gait        Assessment/Plan     1.  Chronic migraine without aura, intractable, without status migrainosus (G43.719)     2.  Started Aimovig March/April 2020       Aimovig 70mg/ml  Subcutaneous, Qmonth       Does seem to be helping the migraines    3.  Status post complex surgery on her lumbar spine March 17, 2021 with residual back pain and residual weakness of the right leg    4.  Sacral fracture 3/21/2021 while she was in rehab for her lumbar surgery    Previously worked up by the spine specialist who did her surgery had other neurologist and EMGs performed June 2021 which I do not have full access to    No new recommendations in regards to her back or spine difficulty  Treatment of pain for her spine specialist in regards to the back    Discussed gait safety    Prolonged visit reviewing previous surgery admit 3/17/2021, ER visit 3/22/2021, other notes.    Total care time today 42  minutes

## 2021-11-30 NOTE — LETTER
11/30/2021         RE: Dayana Samaniego  [id number]  [place] [address]        Dear Colleague,    Thank you for referring your patient, Dayana Samaniego, to the [facility] [place]. Please see a copy of my visit note below.    In person evaluation    HPI  3/16/2020, in person evaluation  11/30/2021, in person evaluation    60-year-old followed neurologically for:  Intractable migraine headaches  History of 3 cervical fusions  Longstanding generalized hyperreflexia  History of carotid bruits  Fibromyalgia      Neurologic summary:    A.  Patient with intractable migraine headaches       Onset of headaches as far back as 817       Associated with photophobia/phonophobia and visual changes       History of carsickness       Family history of migraines in her daughter    Previous frequency of headaches are 2-3/week  Had used Botox injections up until March 2020  Switch to Aimovig end of March 2020      Patient uses the once a month injectable Aimovig  She gets at least 2 weeks of good response  Then the headaches seem to increase in frequency  During the good phase she has 1-2 headaches per week  Duration of the headache during the good phases all day  Will use an Excedrin Migraine maybe 4/week during that time warned her about medication rebound headache  Uses the Relpax to break the headache      B.  Also has sharp shooting pain above the left eye lasting 10 minutes at a time jabbing in nature more neuralgic-like    C.  Patient does have bipolar disorder is on Lamictal    D.  Past history of cervical surgeries       Chronic hyperreflexia        Has been treated with Zanaflex as a muscle relaxant    E.  Has  COPD and is on inhalers       Not a good candidate for beta-blockers     F.  Since last seen had lumbar canal surgery 3/17/2021 by Dr. Sanon        Complex surgery see official reports        Anterior discectomy fusion L4-5 and L5-S1        PEEK cage L4-5, L5-S1         Posterior fusion L4-5, L5-S1        Insertion pedicle screws maria e fixation L4, 5, S1        Bilateral hemilaminectomies L4 see official report    Patient fell when she was in rehab and had a sacral fracture 3/22/2021  This slow down healing    She has been left with a lot of low back pain  Weakness more in the right leg than the left  She is always had very brisk reflexes with clonus before  As decreased reflexes at the L4 on the right leg    Is now ambulating without a cane or walker but has them at home    Patient states that there is a concern that the grafts that they put in may not be healing as well and causing some of the pain but there is no new impingement  Did undergone an EMG 2021 at [facility]  [facility] [place] [facility] be at [facility]  I can access some of the record but not all of it  She says that she has some residual nerve damage but no new impingement    I feel she should follow closely with her spine specialist and that has access to all her scans and data      Past medical history  Intractable migraine headaches  Chronic daily headaches  Status post cervical fusion x3  Longstanding hyperreflexia and difficulty with imbalance and falls  Carotid artery bruits  Fibromyalgia/chronic pain  COPD.  Dyspnea on exertion with pulmonary deconditioning with past history of pulmonary nodules.   Right atrial enlargement.  Fatigue with possible obstructive sleep apnea.  Bipolar disorder.  Hypothyroidism.   GERD.   Hyperlipidemia.  Chronic kidney disease.      Habits  History of smoking  Alcohol 1-2/month    Family history  Mother with bone tumor,  at age 52  Father with liver disease and alcohol use,  at age 62  Sister with diabetes high blood pressure and hypercholesterolemia  Maternal aunt diabetes  Nephew with depression and suicidal thoughts  Uncle with heart disease  Daughters with migraine        Past work-up review  EEG 2011 background rhythm 7-9 Hz slightly slow nonspecific  consistent with toxic encephalopathy  MRI scan brain 2016  A.  Small T2 hyperintensity changes nonspecific  B.  No hydrocephalus mass or acute changes  TSH 0.32.   Vitamin D level in the past was low at 26.2.        Migraine treatment review    Some medications that have been used in the past as preventatives have included:  1. Topamax.  2. Verapamil.  3. Lyrica.  4. Amitriptyline.  5. Propranolol.    Medications tried in the past more as abortive agents:  a. Tizanidine.  b. Zomig.  c. Vicodin.  d. Relpax, which works sometimes, but not always.  e. Toradol.    Currently, her medications are filled through her primary includin. Valium for anxiety.  2. Doxepin 25 mg three at bedtime.  3. Prozac 60 mg at bedtime.  4. Vistaril 25 mg two tablets p.o. q.6h. p.r.n. for migraine.  5. Avoids ibuprofen and aspirin due to her kidney disease, but recently had some Toradol.  6. Lamictal 200 mg at bedtime for bipolar.  7. Melatonin 5 mg for sleep.  8. Has had prednisone burst during headache flurries in the past.  9. Tizanidine 4 mg tablet, one during the day and two at night prescribed by primary.    Patient is a nonsmoker, does not drink alcohol.    Patient s past Botox injection schedules have included:  a. 2016, at the [facility], 25-30 units, did not tolerate, did not complete.  b. Second injection at the [facility] on 2017, 150 units given.    Through our clinic a 155 units using the headache template. Patient knows the risk and benefits of the medication:  a. 2017.  b. 2017.  c. 2018.  d. 2018, 200 units, given as she had a lot of neck spasm.  e. 2018, total of 200 units given as she has a lot of neck spasm.  f. 2019, 200 units given as she had a lot of neck spasm.  g. Halima 10, 2019 200 units headache template and some for neck spasm  h. 2019 155 units  i Dec 16th, 2019 155 units        Review of Systems      Significant headaches as above   did not complain of the sharp shooting pain in the left eye    No visual changes   No dysarthria dysphasia or diplopia  No actual visual field cut    Has spasms in the neck trapezius chronically    Does get photophobia when the headaches are worse    No chest pain no shortness of breath no nausea vomiting no diarrhea no fever chills no gait changes otherwise review systems are negative     Does have the pain in the back and weakness of the right leg greater than the left but can ambulate    General exam  Vital signs documented chart  Blood pressure 150/73 pulse 73 temperature 98.7  Lungs clear  Heart rate regular  Abdomen soft  Symmetrical pulses  No edema the feet    Neurologic exam      Neurologic exam   Alert oriented x3  No aphasia  No neglect  Normal memory recall    Cranials 2 through 12 normal  Pupils are symmetrical and reactive  Visual fields are intact  No ophthalmoplegia  No nystagmus  Face is symmetrical tongue twisters are good    Upper extremities  No drift no tremor normal finger-nose    Brisk reflexes in the lower extremities greater on the left than the right except decreased right knee reflex    Lower extremities  Pain versus weakness of the right iliopsoas but can bear weight okay and walk independently    Gait is more of a cautious antalgic gait        Assessment/Plan     1.  Chronic migraine without aura, intractable, without status migrainosus (G43.719)     2.  Started Aimovig March/April 2020       Aimovig 70mg/ml  Subcutaneous, Qmonth       Does seem to be helping the migraines    3.  Status post complex surgery on her lumbar spine March 17, 2021 with residual back pain and residual weakness of the right leg    4.  Sacral fracture 3/21/2021 while she was in rehab for her lumbar surgery    Previously worked up by the spine specialist who did her surgery had other neurologist and EMGs performed June 2021 which I do not have full access to    No new  recommendations in regards to her back or spine difficulty  Treatment of pain for her spine specialist in regards to the back    Discussed gait safety    Prolonged visit reviewing previous surgery admit 3/17/2021, ER visit 3/22/2021, other notes.    Total care time today 42 minutes              Again, thank you for allowing me to participate in the care of your patient.        Sincerely,        Jaylon Schmitt MD

## 2021-11-30 NOTE — NURSING NOTE
Chief Complaint   Patient presents with     Back Pain     Pt had surgery in March. Pt in care center for a month after. Having difficulty walking ever since.      Mary White LPN on 11/30/2021 at 10:46 AM

## 2021-12-01 ENCOUNTER — OFFICE VISIT (OUTPATIENT)
Dept: SURGERY | Facility: CLINIC | Age: 61
End: 2021-12-01
Attending: FAMILY MEDICINE
Payer: COMMERCIAL

## 2021-12-01 VITALS — BODY MASS INDEX: 28.67 KG/M2 | WEIGHT: 167 LBS | SYSTOLIC BLOOD PRESSURE: 128 MMHG | DIASTOLIC BLOOD PRESSURE: 66 MMHG

## 2021-12-01 DIAGNOSIS — L98.8 DRAINING CUTANEOUS SINUS TRACT: ICD-10-CM

## 2021-12-01 PROCEDURE — 99204 OFFICE O/P NEW MOD 45 MIN: CPT | Performed by: SURGERY

## 2021-12-01 NOTE — PROGRESS NOTES
GENERAL SURGICAL CONSULTATION    I was requested by Joanne Weiner to consult on this pt to evaluate them for a chronically draining wound.    HPI:  This is a 60 year old female here today with a chronically draining wound on the scar of her  from .    The would started to drain about a year ago.     Allergies:Codeine, Cetirizine, Gabapentin, Nefazodone, Oxycodone-acetaminophen, Trazodone, Amoxicillin-pot clavulanate [augmentin], Cephalexin, Clavulanic acid, Cyclobenzaprine, Eszopiclone, Methocarbamol, and Penicillins    Past Medical History:   Diagnosis Date     Abnormality of gait     Created by Conversion      Anemia 3/18/2021     Anxiety      Asthma      Asymptomatic postmenopausal status     Created by Conversion  Replacement Utility updated for latest IMO load     Bipolar 2 disorder (H)      Centrilobular emphysema (H) 2018    Mild, seen in 2018 CT scan, DLCO 60% predicted  Formatting of this note might be different from the original. Formatting of this note might be different from the original. Mild, seen in 2018 CT scan, DLCO 60% predicted     Cervical spinal stenosis     s/p cervical fusion     Chronic kidney disease      Chronic kidney disease, stage 3 (H) 2021     Chronic pain syndrome      Cigarette nicotine dependence without complication 3/4/2020     Common migraine with intractable migraine 2/15/2021     COPD (chronic obstructive pulmonary disease) (H)      DDD (degenerative disc disease), lumbosacral 3/22/2021     Depression      Fibrocystic breast      Fibromyalgia      GERD (gastroesophageal reflux disease)      Hallux abductovalgus with bunions, unspecified laterality 2015     Herpes zoster     Created by Conversion  Replacement Utility updated for latest IMO load     Hiatal hernia      History of electroconvulsive therapy      Hyperlipidemia 3/17/2021    Created by Conversion   Formatting of this note might be different from the original. Formatting of this note  might be different from the original. Created by Conversion     Hypotension 3/18/2021     Hypothyroidism     hypothyroidism     IBS (irritable bowel syndrome)      Lung nodule 8/4/2016 8/4/2016:  Left upper lobe nodule of 6.5 mm, likely benign given slow growth per radiologist.  See CT 6/27/2011:  measured  approximately 5.5 mm in greatest dimension      Menopausal and postmenopausal disorder     Created by Conversion  Replacement Utility updated for latest IMO load     Migraine     Created by Conversion  Replacement Utility updated for latest IMO load     Migraines      Osteoarthritis      Osteopenia of multiple sites 9/1/2020     PONV (postoperative nausea and vomiting)      PTSD (post-traumatic stress disorder)      Shingles 2014    on back     Spinal stenosis of lumbar region with neurogenic claudication 3/22/2021     Tobacco use disorder     Created by Conversion        Past Surgical History:   Procedure Laterality Date     BIOPSY BREAST Left     Approx 5 bx     BUNIONECTOMY Right 12/15/2015    Procedure: MODIFIED LAI BUNIONECTOMY RIGHT FOOT;  Surgeon: Jerome Calvin DPM;  Location: [facility];  Service:       TOTAL ABDOM HYSTERECTOMY      Description: Total Abdominal Hysterectomy;  Recorded: 06/10/2013;     CERVICAL FUSION       CERVICAL FUSION Bilateral 2/16/2015    Procedure: ANTERIOR CERVICAL DECOMPRESSION/FUSION C3-5 BILATERAL, ANTERIOR HARDWARE REMOVAL C5-7 BILATERAL ;  Surgeon: Sawyer Roland MD;  Location: [facility];  Service:       NIPPLE EXPLORATION      Description: Breast Nipple Explor W/ Excision Solitary Lactiferous Duct;  Recorded: 04/10/2008;     HYSTERECTOMY       IR CERVICAL EPIDURAL STEROID INJECTION  9/17/2003     IR CERVICAL EPIDURAL STEROID INJECTION  12/11/2003     IR CERVICAL EPIDURAL STEROID INJECTION  1/16/2004     LUMBAR DISCECTOMY      X2     MAMMOPLASTY AUGMENTATION Bilateral      approx 2006?     PELVIC LAPAROSCOPY      multiple     SHOULDER OPEN  ROTATOR CUFF REPAIR Left     X2       CURRENT MEDS:  Current Outpatient Medications   Medication Sig Dispense Refill     albuterol (PROAIR HFA) 108 (90 Base) MCG/ACT inhaler Inhale 2 puffs into the lungs every 4 hours 18 g 1     atorvastatin (LIPITOR) 40 MG tablet TAKE 1 TABLET(40 MG) BY MOUTH AT BEDTIME 90 tablet 3     benzonatate (TESSALON) 100 MG capsule TAKE 1 CAPSULE(100 MG) BY MOUTH THREE TIMES DAILY AS NEEDED FOR COUGH 30 capsule 0     doxepin (SINEQUAN) 100 MG capsule Take 75 mg by mouth At Bedtime        eletriptan (RELPAX) 40 MG tablet Take 1 tablet (40 mg) by mouth at onset of headache for migraine 12 tablet 3     erenumab-aooe (AIMOVIG) 70 MG/ML injection ADMINISTER 1 ML(70 MG) UNDER THE SKIN EVERY MONTH 1 mL 11     ergocalciferol (ERGOCALCIFEROL) 1.25 MG (44399 UT) capsule Take 1 capsule by mouth       esomeprazole (NEXIUM) 20 MG DR capsule Take 2 capsules by mouth every 24 hours       FLUoxetine (PROZAC) 40 MG capsule take 2 capsules by oral route at bedtime       fluticasone (FLONASE) 50 MCG/ACT nasal spray Spray 1 spray into both nostrils daily 16 g 3     folic acid (FOLVITE) 400 MCG tablet take 1 tablet by oral route  every day at HS       gabapentin (NEURONTIN) 300 MG capsule take 2 capsule by oral route 2 times every day. Follow titration instructions given in clinic.       HYDROcodone-acetaminophen (NORCO)  MG per tablet TAKE 1 TABLET BY MOUTH EVERY 4 TO 6 HOURS AS NEEDED FOR CHRONIC PAIN. MAX 4 PER DAY. START 9/7       hydrOXYzine (VISTARIL) 25 MG capsule take two every night and take one during the day as needed       lamoTRIgine (LAMICTAL) 200 MG tablet Take 1 tablet by mouth 2 times daily        levothyroxine (SYNTHROID/LEVOTHROID) 75 MCG tablet Take 1 tablet (75 mcg) by mouth daily 90 tablet 0     montelukast (SINGULAIR) 10 MG tablet Take 1 tablet (10 mg) by mouth every 24 hours 90 tablet 1     prazosin (MINIPRESS) 2 MG capsule Take 2 capsules by mouth every 24 hours        prochlorperazine (COMPAZINE) 10 MG tablet every 6 hours as needed        senna-docusate (SENOKOT-S/PERICOLACE) 8.6-50 MG tablet Take 2 tablets by mouth       sulfamethoxazole-trimethoprim (BACTRIM DS) 800-160 MG tablet Take 1 tablet by mouth 2 times daily (Patient not taking: Reported on 2021) 20 tablet 0     tiotropium (SPIRIVA RESPIMAT) 2.5 MCG/ACT inhaler Inhale 2 puffs into the lungs daily (Patient not taking: Reported on 2021) 4 g 6     tiZANidine (ZANAFLEX) 4 MG tablet TAKE 1-2 TABLETS BY MOUTH DURING THE DAY AND 2 TABLETS BY MOUTH AT BEDTIME 120 tablet 1     varenicline (CHANTIX) 1 MG tablet take 1 tablet by oral route 2 times every day with glass of water after meals         Family History   Problem Relation Age of Onset     Lung Cancer Mother          at age 52     Alcoholism Father      Heart Disease Maternal Grandfather      Diabetes Paternal Grandmother      Coronary Artery Disease Paternal Grandmother      Heart Disease Paternal Grandfather      Diabetes Sister      Hypertension Sister      Crohn's Disease Sister      Coronary Artery Disease Paternal Uncle      Asthma Maternal Aunt      Family history is not pertinent to this patients Chief Complaint.     reports that she has quit smoking. Her smoking use included cigarettes. She quit after 45.00 years of use. She has quit using smokeless tobacco. She reports previous alcohol use. She reports that she does not use drugs.    Review of Systems -   10 point Review of systems is negative except for; as mentioned above in HPI and PMHx    /66 (BP Location: Right arm, Patient Position: Sitting, Cuff Size: Adult Small)   Wt 75.8 kg (167 lb)   BMI 28.67 kg/m      EXAM:  GENERAL: Well developed female  HEENT: EOMI, Anicteric Sclera, Moist Mucous Membranes,  In Mouth the pt does not have redness or bleeding gums  CARDIOVASCULAR: RRR w/out murmur   CHEST/LUNG: Clear to Auscultation  ABDOMEN:  Non tender to palpation, +BS  MUSCULOSKELETAL:   No deformities with good range of motion in all extremities  NEURO: She is ambulatory with good strength in both legs.  HEME/LYMPH: No Cervical Adenopathy or tenderness.     IMAGES:  I evaluated these images myself and her CT scan shows Sub Cutaneous fatty tissue in the LLQ with inflammation and fluid collection.    Assessment/Plan:  Chronic abdominal wall wound.      Surgical Exploration.once her test clears proving that she is not smoking.      Crispin Bunch MD  [facility]  [phone]

## 2021-12-01 NOTE — LETTER
2021         RE: Dayana Samaniego  [id number]  [facility] [id number]        Dear Colleague,    Thank you for referring your patient, Dayana Samaniego, to the [facility] [place]. Please see a copy of my visit note below.    GENERAL SURGICAL CONSULTATION    I was requested by Joanne Weiner to consult on this pt to evaluate them for a chronically draining wound.    HPI:  This is a 60 year old female here today with a chronically draining wound on the scar of her  from .    The would started to drain about a year ago.     Allergies:Codeine, Cetirizine, Gabapentin, Nefazodone, Oxycodone-acetaminophen, Trazodone, Amoxicillin-pot clavulanate [augmentin], Cephalexin, Clavulanic acid, Cyclobenzaprine, Eszopiclone, Methocarbamol, and Penicillins    Past Medical History:   Diagnosis Date     Abnormality of gait     Created by Conversion      Anemia 3/18/2021     Anxiety      Asthma      Asymptomatic postmenopausal status     Created by Conversion  Replacement Utility updated for latest IMO load     Bipolar 2 disorder (H)      Centrilobular emphysema (H) 2018    Mild, seen in 2018 CT scan, DLCO 60% predicted  Formatting of this note might be different from the original. Formatting of this note might be different from the original. Mild, seen in 2018 CT scan, DLCO 60% predicted     Cervical spinal stenosis     s/p cervical fusion     Chronic kidney disease      Chronic kidney disease, stage 3 (H) 2021     Chronic pain syndrome      Cigarette nicotine dependence without complication 3/4/2020     Common migraine with intractable migraine 2/15/2021     COPD (chronic obstructive pulmonary disease) (H)      DDD (degenerative disc disease), lumbosacral 3/22/2021     Depression      Fibrocystic breast      Fibromyalgia      GERD (gastroesophageal reflux disease)      Hallux abductovalgus with bunions, unspecified laterality 2015     Herpes  zoster     Created by Conversion  Replacement Utility updated for latest IMO load     Hiatal hernia      History of electroconvulsive therapy      Hyperlipidemia 3/17/2021    Created by Conversion   Formatting of this note might be different from the original. Formatting of this note might be different from the original. Created by Conversion     Hypotension 3/18/2021     Hypothyroidism     hypothyroidism     IBS (irritable bowel syndrome)      Lung nodule 8/4/2016 8/4/2016:  Left upper lobe nodule of 6.5 mm, likely benign given slow growth per radiologist.  See CT 6/27/2011:  measured  approximately 5.5 mm in greatest dimension      Menopausal and postmenopausal disorder     Created by Conversion  Replacement Utility updated for latest IMO load     Migraine     Created by Conversion  Replacement Utility updated for latest IMO load     Migraines      Osteoarthritis      Osteopenia of multiple sites 9/1/2020     PONV (postoperative nausea and vomiting)      PTSD (post-traumatic stress disorder)      Shingles 2014    on back     Spinal stenosis of lumbar region with neurogenic claudication 3/22/2021     Tobacco use disorder     Created by Conversion        Past Surgical History:   Procedure Laterality Date     BIOPSY BREAST Left     Approx 5 bx     BUNIONECTOMY Right 12/15/2015    Procedure: MODIFIED LAI BUNIONECTOMY RIGHT FOOT;  Surgeon: Jerome Calvin DPM;  Location: [facility];  Service:      C TOTAL ABDOM HYSTERECTOMY      Description: Total Abdominal Hysterectomy;  Recorded: 06/10/2013;     CERVICAL FUSION       CERVICAL FUSION Bilateral 2/16/2015    Procedure: ANTERIOR CERVICAL DECOMPRESSION/FUSION C3-5 BILATERAL, ANTERIOR HARDWARE REMOVAL C5-7 BILATERAL ;  Surgeon: Sawyer Roland MD;  Location: [facility];  Service:       NIPPLE EXPLORATION      Description: Breast Nipple Explor W/ Excision Solitary Lactiferous Duct;  Recorded: 04/10/2008;     HYSTERECTOMY       IR CERVICAL  EPIDURAL STEROID INJECTION  9/17/2003     IR CERVICAL EPIDURAL STEROID INJECTION  12/11/2003     IR CERVICAL EPIDURAL STEROID INJECTION  1/16/2004     LUMBAR DISCECTOMY      X2     MAMMOPLASTY AUGMENTATION Bilateral      approx 2006?     PELVIC LAPAROSCOPY      multiple     SHOULDER OPEN ROTATOR CUFF REPAIR Left     X2       CURRENT MEDS:  Current Outpatient Medications   Medication Sig Dispense Refill     albuterol (PROAIR HFA) 108 (90 Base) MCG/ACT inhaler Inhale 2 puffs into the lungs every 4 hours 18 g 1     atorvastatin (LIPITOR) 40 MG tablet TAKE 1 TABLET(40 MG) BY MOUTH AT BEDTIME 90 tablet 3     benzonatate (TESSALON) 100 MG capsule TAKE 1 CAPSULE(100 MG) BY MOUTH THREE TIMES DAILY AS NEEDED FOR COUGH 30 capsule 0     doxepin (SINEQUAN) 100 MG capsule Take 75 mg by mouth At Bedtime        eletriptan (RELPAX) 40 MG tablet Take 1 tablet (40 mg) by mouth at onset of headache for migraine 12 tablet 3     erenumab-aooe (AIMOVIG) 70 MG/ML injection ADMINISTER 1 ML(70 MG) UNDER THE SKIN EVERY MONTH 1 mL 11     ergocalciferol (ERGOCALCIFEROL) 1.25 MG (25793 UT) capsule Take 1 capsule by mouth       esomeprazole (NEXIUM) 20 MG DR capsule Take 2 capsules by mouth every 24 hours       FLUoxetine (PROZAC) 40 MG capsule take 2 capsules by oral route at bedtime       fluticasone (FLONASE) 50 MCG/ACT nasal spray Spray 1 spray into both nostrils daily 16 g 3     folic acid (FOLVITE) 400 MCG tablet take 1 tablet by oral route  every day at HS       gabapentin (NEURONTIN) 300 MG capsule take 2 capsule by oral route 2 times every day. Follow titration instructions given in clinic.       HYDROcodone-acetaminophen (NORCO)  MG per tablet TAKE 1 TABLET BY MOUTH EVERY 4 TO 6 HOURS AS NEEDED FOR CHRONIC PAIN. MAX 4 PER DAY. START 9/7       hydrOXYzine (VISTARIL) 25 MG capsule take two every night and take one during the day as needed       lamoTRIgine (LAMICTAL) 200 MG tablet Take 1 tablet by mouth 2 times daily         levothyroxine (SYNTHROID/LEVOTHROID) 75 MCG tablet Take 1 tablet (75 mcg) by mouth daily 90 tablet 0     montelukast (SINGULAIR) 10 MG tablet Take 1 tablet (10 mg) by mouth every 24 hours 90 tablet 1     prazosin (MINIPRESS) 2 MG capsule Take 2 capsules by mouth every 24 hours       prochlorperazine (COMPAZINE) 10 MG tablet every 6 hours as needed        senna-docusate (SENOKOT-S/PERICOLACE) 8.6-50 MG tablet Take 2 tablets by mouth       sulfamethoxazole-trimethoprim (BACTRIM DS) 800-160 MG tablet Take 1 tablet by mouth 2 times daily (Patient not taking: Reported on 2021) 20 tablet 0     tiotropium (SPIRIVA RESPIMAT) 2.5 MCG/ACT inhaler Inhale 2 puffs into the lungs daily (Patient not taking: Reported on 2021) 4 g 6     tiZANidine (ZANAFLEX) 4 MG tablet TAKE 1-2 TABLETS BY MOUTH DURING THE DAY AND 2 TABLETS BY MOUTH AT BEDTIME 120 tablet 1     varenicline (CHANTIX) 1 MG tablet take 1 tablet by oral route 2 times every day with glass of water after meals         Family History   Problem Relation Age of Onset     Lung Cancer Mother          at age 52     Alcoholism Father      Heart Disease Maternal Grandfather      Diabetes Paternal Grandmother      Coronary Artery Disease Paternal Grandmother      Heart Disease Paternal Grandfather      Diabetes Sister      Hypertension Sister      Crohn's Disease Sister      Coronary Artery Disease Paternal Uncle      Asthma Maternal Aunt      Family history is not pertinent to this patients Chief Complaint.     reports that she has quit smoking. Her smoking use included cigarettes. She quit after 45.00 years of use. She has quit using smokeless tobacco. She reports previous alcohol use. She reports that she does not use drugs.    Review of Systems -   10 point Review of systems is negative except for; as mentioned above in HPI and PMHx    /66 (BP Location: Right arm, Patient Position: Sitting, Cuff Size: Adult Small)   Wt 75.8 kg (167 lb)   BMI 28.67 kg/m       EXAM:  GENERAL: Well developed female  HEENT: EOMI, Anicteric Sclera, Moist Mucous Membranes,  In Mouth the pt does not have redness or bleeding gums  CARDIOVASCULAR: RRR w/out murmur   CHEST/LUNG: Clear to Auscultation  ABDOMEN:  Non tender to palpation, +BS  MUSCULOSKELETAL:  No deformities with good range of motion in all extremities  NEURO: She is ambulatory with good strength in both legs.  HEME/LYMPH: No Cervical Adenopathy or tenderness.     IMAGES:  I evaluated these images myself and her CT scan shows Sub Cutaneous fatty tissue in the LLQ with inflammation and fluid collection.    Assessment/Plan:  Chronic abdominal wall wound.      Surgical Exploration.once her test clears proving that she is not smoking.      Crispin Bunch MD  [facility]  [phone]      Again, thank you for allowing me to participate in the care of your patient.        Sincerely,        Crispin Bunch MD

## 2021-12-01 NOTE — H&P (VIEW-ONLY)
GENERAL SURGICAL CONSULTATION    I was requested by Joanne Weiner to consult on this pt to evaluate them for a chronically draining wound.    HPI:  This is a 60 year old female here today with a chronically draining wound on the scar of her  from .    The would started to drain about a year ago.     Allergies:Codeine, Cetirizine, Gabapentin, Nefazodone, Oxycodone-acetaminophen, Trazodone, Amoxicillin-pot clavulanate [augmentin], Cephalexin, Clavulanic acid, Cyclobenzaprine, Eszopiclone, Methocarbamol, and Penicillins    Past Medical History:   Diagnosis Date     Abnormality of gait     Created by Conversion      Anemia 3/18/2021     Anxiety      Asthma      Asymptomatic postmenopausal status     Created by Conversion  Replacement Utility updated for latest IMO load     Bipolar 2 disorder (H)      Centrilobular emphysema (H) 2018    Mild, seen in 2018 CT scan, DLCO 60% predicted  Formatting of this note might be different from the original. Formatting of this note might be different from the original. Mild, seen in 2018 CT scan, DLCO 60% predicted     Cervical spinal stenosis     s/p cervical fusion     Chronic kidney disease      Chronic kidney disease, stage 3 (H) 2021     Chronic pain syndrome      Cigarette nicotine dependence without complication 3/4/2020     Common migraine with intractable migraine 2/15/2021     COPD (chronic obstructive pulmonary disease) (H)      DDD (degenerative disc disease), lumbosacral 3/22/2021     Depression      Fibrocystic breast      Fibromyalgia      GERD (gastroesophageal reflux disease)      Hallux abductovalgus with bunions, unspecified laterality 2015     Herpes zoster     Created by Conversion  Replacement Utility updated for latest IMO load     Hiatal hernia      History of electroconvulsive therapy      Hyperlipidemia 3/17/2021    Created by Conversion   Formatting of this note might be different from the original. Formatting of this note  might be different from the original. Created by Conversion     Hypotension 3/18/2021     Hypothyroidism     hypothyroidism     IBS (irritable bowel syndrome)      Lung nodule 8/4/2016 8/4/2016:  Left upper lobe nodule of 6.5 mm, likely benign given slow growth per radiologist.  See CT 6/27/2011:  measured  approximately 5.5 mm in greatest dimension      Menopausal and postmenopausal disorder     Created by Conversion  Replacement Utility updated for latest IMO load     Migraine     Created by Conversion  Replacement Utility updated for latest IMO load     Migraines      Osteoarthritis      Osteopenia of multiple sites 9/1/2020     PONV (postoperative nausea and vomiting)      PTSD (post-traumatic stress disorder)      Shingles 2014    on back     Spinal stenosis of lumbar region with neurogenic claudication 3/22/2021     Tobacco use disorder     Created by Conversion        Past Surgical History:   Procedure Laterality Date     BIOPSY BREAST Left     Approx 5 bx     BUNIONECTOMY Right 12/15/2015    Procedure: MODIFIED LAI BUNIONECTOMY RIGHT FOOT;  Surgeon: Jerome Calvin DPM;  Location: [facility];  Service:       TOTAL ABDOM HYSTERECTOMY      Description: Total Abdominal Hysterectomy;  Recorded: 06/10/2013;     CERVICAL FUSION       CERVICAL FUSION Bilateral 2/16/2015    Procedure: ANTERIOR CERVICAL DECOMPRESSION/FUSION C3-5 BILATERAL, ANTERIOR HARDWARE REMOVAL C5-7 BILATERAL ;  Surgeon: Sawyer Roland MD;  Location: [facility];  Service:       NIPPLE EXPLORATION      Description: Breast Nipple Explor W/ Excision Solitary Lactiferous Duct;  Recorded: 04/10/2008;     HYSTERECTOMY       IR CERVICAL EPIDURAL STEROID INJECTION  9/17/2003     IR CERVICAL EPIDURAL STEROID INJECTION  12/11/2003     IR CERVICAL EPIDURAL STEROID INJECTION  1/16/2004     LUMBAR DISCECTOMY      X2     MAMMOPLASTY AUGMENTATION Bilateral      approx 2006?     PELVIC LAPAROSCOPY      multiple     SHOULDER OPEN  ROTATOR CUFF REPAIR Left     X2       CURRENT MEDS:  Current Outpatient Medications   Medication Sig Dispense Refill     albuterol (PROAIR HFA) 108 (90 Base) MCG/ACT inhaler Inhale 2 puffs into the lungs every 4 hours 18 g 1     atorvastatin (LIPITOR) 40 MG tablet TAKE 1 TABLET(40 MG) BY MOUTH AT BEDTIME 90 tablet 3     benzonatate (TESSALON) 100 MG capsule TAKE 1 CAPSULE(100 MG) BY MOUTH THREE TIMES DAILY AS NEEDED FOR COUGH 30 capsule 0     doxepin (SINEQUAN) 100 MG capsule Take 75 mg by mouth At Bedtime        eletriptan (RELPAX) 40 MG tablet Take 1 tablet (40 mg) by mouth at onset of headache for migraine 12 tablet 3     erenumab-aooe (AIMOVIG) 70 MG/ML injection ADMINISTER 1 ML(70 MG) UNDER THE SKIN EVERY MONTH 1 mL 11     ergocalciferol (ERGOCALCIFEROL) 1.25 MG (48307 UT) capsule Take 1 capsule by mouth       esomeprazole (NEXIUM) 20 MG DR capsule Take 2 capsules by mouth every 24 hours       FLUoxetine (PROZAC) 40 MG capsule take 2 capsules by oral route at bedtime       fluticasone (FLONASE) 50 MCG/ACT nasal spray Spray 1 spray into both nostrils daily 16 g 3     folic acid (FOLVITE) 400 MCG tablet take 1 tablet by oral route  every day at HS       gabapentin (NEURONTIN) 300 MG capsule take 2 capsule by oral route 2 times every day. Follow titration instructions given in clinic.       HYDROcodone-acetaminophen (NORCO)  MG per tablet TAKE 1 TABLET BY MOUTH EVERY 4 TO 6 HOURS AS NEEDED FOR CHRONIC PAIN. MAX 4 PER DAY. START 9/7       hydrOXYzine (VISTARIL) 25 MG capsule take two every night and take one during the day as needed       lamoTRIgine (LAMICTAL) 200 MG tablet Take 1 tablet by mouth 2 times daily        levothyroxine (SYNTHROID/LEVOTHROID) 75 MCG tablet Take 1 tablet (75 mcg) by mouth daily 90 tablet 0     montelukast (SINGULAIR) 10 MG tablet Take 1 tablet (10 mg) by mouth every 24 hours 90 tablet 1     prazosin (MINIPRESS) 2 MG capsule Take 2 capsules by mouth every 24 hours        prochlorperazine (COMPAZINE) 10 MG tablet every 6 hours as needed        senna-docusate (SENOKOT-S/PERICOLACE) 8.6-50 MG tablet Take 2 tablets by mouth       sulfamethoxazole-trimethoprim (BACTRIM DS) 800-160 MG tablet Take 1 tablet by mouth 2 times daily (Patient not taking: Reported on 2021) 20 tablet 0     tiotropium (SPIRIVA RESPIMAT) 2.5 MCG/ACT inhaler Inhale 2 puffs into the lungs daily (Patient not taking: Reported on 2021) 4 g 6     tiZANidine (ZANAFLEX) 4 MG tablet TAKE 1-2 TABLETS BY MOUTH DURING THE DAY AND 2 TABLETS BY MOUTH AT BEDTIME 120 tablet 1     varenicline (CHANTIX) 1 MG tablet take 1 tablet by oral route 2 times every day with glass of water after meals         Family History   Problem Relation Age of Onset     Lung Cancer Mother          at age 52     Alcoholism Father      Heart Disease Maternal Grandfather      Diabetes Paternal Grandmother      Coronary Artery Disease Paternal Grandmother      Heart Disease Paternal Grandfather      Diabetes Sister      Hypertension Sister      Crohn's Disease Sister      Coronary Artery Disease Paternal Uncle      Asthma Maternal Aunt      Family history is not pertinent to this patients Chief Complaint.     reports that she has quit smoking. Her smoking use included cigarettes. She quit after 45.00 years of use. She has quit using smokeless tobacco. She reports previous alcohol use. She reports that she does not use drugs.    Review of Systems -   10 point Review of systems is negative except for; as mentioned above in HPI and PMHx    /66 (BP Location: Right arm, Patient Position: Sitting, Cuff Size: Adult Small)   Wt 75.8 kg (167 lb)   BMI 28.67 kg/m      EXAM:  GENERAL: Well developed female  HEENT: EOMI, Anicteric Sclera, Moist Mucous Membranes,  In Mouth the pt does not have redness or bleeding gums  CARDIOVASCULAR: RRR w/out murmur   CHEST/LUNG: Clear to Auscultation  ABDOMEN:  Non tender to palpation, +BS  MUSCULOSKELETAL:   No deformities with good range of motion in all extremities  NEURO: She is ambulatory with good strength in both legs.  HEME/LYMPH: No Cervical Adenopathy or tenderness.     IMAGES:  I evaluated these images myself and her CT scan shows Sub Cutaneous fatty tissue in the LLQ with inflammation and fluid collection.    Assessment/Plan:  Chronic abdominal wall wound.      Surgical Exploration.once her test clears proving that she is not smoking.      Crispin Bunch MD  [facility]  [phone]

## 2021-12-06 DIAGNOSIS — Z11.59 ENCOUNTER FOR SCREENING FOR OTHER VIRAL DISEASES: ICD-10-CM

## 2021-12-07 ENCOUNTER — TELEPHONE (OUTPATIENT)
Dept: SURGERY | Facility: CLINIC | Age: 61
End: 2021-12-07
Payer: COMMERCIAL

## 2021-12-07 NOTE — TELEPHONE ENCOUNTER
Left a message for Dyaana to confirm surgery date of 12/13 with dR. Bunch at [facility].    Asked her to call back to schedule a covid test.

## 2021-12-08 NOTE — TELEPHONE ENCOUNTER
Spoke with Dayana who has gotten her covid test and pre op both scheduled with her PCP in [place].  All set for Monday surgery with Dr. Bunch

## 2021-12-09 ENCOUNTER — OFFICE VISIT (OUTPATIENT)
Dept: FAMILY MEDICINE | Facility: CLINIC | Age: 61
End: 2021-12-09
Payer: COMMERCIAL

## 2021-12-09 VITALS
BODY MASS INDEX: 29.06 KG/M2 | OXYGEN SATURATION: 96 % | WEIGHT: 169.3 LBS | DIASTOLIC BLOOD PRESSURE: 60 MMHG | HEART RATE: 102 BPM | SYSTOLIC BLOOD PRESSURE: 130 MMHG

## 2021-12-09 DIAGNOSIS — L98.8 DRAINING CUTANEOUS SINUS TRACT: ICD-10-CM

## 2021-12-09 DIAGNOSIS — Z11.4 SCREENING FOR HIV (HUMAN IMMUNODEFICIENCY VIRUS): ICD-10-CM

## 2021-12-09 DIAGNOSIS — Z01.818 PREOP GENERAL PHYSICAL EXAM: Primary | ICD-10-CM

## 2021-12-09 DIAGNOSIS — N18.31 STAGE 3A CHRONIC KIDNEY DISEASE (H): ICD-10-CM

## 2021-12-09 DIAGNOSIS — Z11.59 NEED FOR HEPATITIS C SCREENING TEST: ICD-10-CM

## 2021-12-09 DIAGNOSIS — Z01.89 ENCOUNTER FOR SCREENING FOR TOBACCO USE: ICD-10-CM

## 2021-12-09 LAB — HIV 1+2 AB+HIV1 P24 AG SERPL QL IA: NEGATIVE

## 2021-12-09 PROCEDURE — 99214 OFFICE O/P EST MOD 30 MIN: CPT | Performed by: FAMILY MEDICINE

## 2021-12-09 PROCEDURE — 80323 ALKALOIDS NOS: CPT | Mod: 90 | Performed by: FAMILY MEDICINE

## 2021-12-09 PROCEDURE — 87389 HIV-1 AG W/HIV-1&-2 AB AG IA: CPT | Performed by: FAMILY MEDICINE

## 2021-12-09 PROCEDURE — 86803 HEPATITIS C AB TEST: CPT | Performed by: FAMILY MEDICINE

## 2021-12-09 PROCEDURE — 99000 SPECIMEN HANDLING OFFICE-LAB: CPT | Performed by: FAMILY MEDICINE

## 2021-12-09 PROCEDURE — 36415 COLL VENOUS BLD VENIPUNCTURE: CPT | Performed by: FAMILY MEDICINE

## 2021-12-09 NOTE — PROGRESS NOTES
[facility]  [address]  [place] [facility] [address]  Phone: [phone]  Fax: [phone]  Primary Provider: Joanne Weiner  Pre-op Performing Provider: LASHAWN DANG    PREOPERATIVE EVALUATION:  Today's date: 12/9/2021    Dayana Samaniego is a 61 year old female who presents for a preoperative evaluation.    Surgical Information:  Surgery/Procedure: Wound exploration and debridement of Left Lower Abdomin Chronic wound.  Surgery Location: [facility]  Surgeon: Dr. Bunch  Surgery Date: 12/13/21  Time of Surgery: 6:45 am  Where patient plans to recover: At home with family  Fax number for surgical facility: [phone]    Type of Anesthesia Anticipated: General    Assessment & Plan     The proposed surgical procedure is considered INTERMEDIATE risk.    Dayana was seen today for pre-op exam.    Diagnoses and all orders for this visit:    Preop general physical exam    Screening for HIV (human immunodeficiency virus)  -     HIV Antigen Antibody Combo; Future  -     HIV Antigen Antibody Combo    Encounter for screening for tobacco use  -     Nicotine and Cotinine Blood; Future  -     Nicotine and Cotinine Blood    Need for hepatitis C screening test  -     Hepatitis C antibody; Future  -     Hepatitis C antibody    Stage 3a chronic kidney disease (H)    Draining cutaneous sinus tract      Risks and Recommendations:  The patient has the following additional risks and recommendations for perioperative complications:   - No identified additional risk factors other than previously addressed    Medication Instructions:  Patient is to take all scheduled medications on the day of surgery EXCEPT for modifications listed below:  Hold use of over-the-counter aspirin, NSAIDs, vitamins and supplements until after surgery.      RECOMMENDATION:  APPROVAL GIVEN to proceed with proposed procedure pending review of diagnostic  evaluation.    Subjective     HPI related to upcoming procedure:  Patient with persistent (several years) cutaneous discharge at scar from  performed in .  Patient notes that insurance company is requiring that she be smoke free with negative cotinine test prior to approving coverage for surgery.   She notes that she had quit smoking 6 weeks ago.  She requests cotinine testing today.      Preop Questions 2021   1. Have you ever had a heart attack or stroke? No   2. Have you ever had surgery on your heart or blood vessels, such as a stent placement, a coronary artery bypass, or surgery on an artery in your head, neck, heart, or legs? No   3. Do you have chest pain with activity? No   4. Do you have a history of  heart failure? No   5. Do you currently have a cold, bronchitis or symptoms of other infection? No   6. Do you have a cough, shortness of breath, or wheezing? No   7. Do you or anyone in your family have previous history of blood clots? No   8. Do you or does anyone in your family have a serious bleeding problem such as prolonged bleeding following surgeries or cuts? No   9. Have you ever had problems with anemia or been told to take iron pills? No   10. Have you had any abnormal blood loss such as black, tarry or bloody stools, or abnormal vaginal bleeding? No   11. Have you ever had a blood transfusion? No   12. Are you willing to have a blood transfusion if it is medically needed before, during, or after your surgery? Yes   13. Have you or any of your relatives ever had problems with anesthesia? YES - nausea/vomiting with general anesthesia.  Previously has responded well to pre-treatment with scopolamine/anti-emetics.    14. Do you have sleep apnea, excessive snoring or daytime drowsiness? No   15. Do you have any artifical heart valves or other implanted medical devices like a pacemaker, defibrillator, or continuous glucose monitor? No   16. Do you have artificial joints? No   17. Are  you allergic to latex? No       Health Care Directive:  Patient does not have a Health Care Directive or Living Will: Discussed advance care planning with patient; information given to patient to review.    Preoperative Review of :   reviewed - controlled substances reflected in medication list.    Status of Chronic Conditions:  See problem list for active medical problems.  Problems all longstanding and stable, except as noted/documented.  See ROS for pertinent symptoms related to these conditions.    Review of Systems  CONSTITUTIONAL: NEGATIVE for fever, chills, change in weight  INTEGUMENTARY/SKIN: see HPI  EYES: NEGATIVE for vision changes or irritation  ENT/MOUTH: NEGATIVE for ear, mouth and throat problems  RESP:NEGATIVE for significant cough or SOB,    and Hx COPD  CV: NEGATIVE for chest pain, palpitations or peripheral edema  GI: NEGATIVE for nausea, abdominal pain, heartburn, or change in bowel habits  : NEGATIVE for frequency, dysuria, or hematuria  NEURO: history of spinal stenosis of lumbar region with right lower extremity neurogenic claudication.   ENDOCRINE: Hx thyroid disease  HEME: NEGATIVE for bleeding problems  PSYCHIATRIC: hx of bipolar 2 disorder.  mood stable recently     Patient Active Problem List    Diagnosis Date Noted     Draining cutaneous sinus tract 12/01/2021     Priority: Medium     Added automatically from request for surgery [id number]       Abnormal reflex 09/08/2021     Priority: Medium     Asymptomatic postmenopausal status 08/31/2021     Priority: Medium     Menopausal disorder 08/31/2021     Migraine headache 08/31/2021     Chronic pain syndrome 08/31/2021     Priority: Medium     DDD (degenerative disc disease), lumbosacral 03/22/2021     Priority: Medium     Spinal stenosis of lumbar region with neurogenic claudication 03/22/2021     Priority: Medium     Pain in right leg 03/21/2021     Priority: Medium     Other specified disorders of bone density and structure,  multiple sites 03/21/2021     Priority: Medium     Other abnormalities of gait and mobility 03/21/2021     Priority: Medium     Moderate persistent asthma, uncomplicated 03/21/2021     Priority: Medium     Low back pain 03/21/2021     Priority: Medium     Encounter for other orthopedic aftercare 03/21/2021     Priority: Medium     Chronic obstructive pulmonary disease, unspecified (H) 03/21/2021     Priority: Medium     Anemia 03/18/2021     Priority: Medium     Hypotension 03/18/2021     Priority: Medium     Fibromyalgia 03/17/2021     Priority: Medium     Hyperlipidemia 03/17/2021     Priority: Medium     Hypothyroidism 03/17/2021     Priority: Medium     Common migraine with intractable migraine 02/15/2021     Priority: Medium     Chronic kidney disease, stage 3 (H) 01/14/2021     Priority: Medium     Created by Conversion         Osteopenia of multiple sites 09/01/2020     Priority: Medium     Centrilobular emphysema (H) 02/26/2018     Priority: Medium     Hiatal hernia 02/16/2017     Priority: Medium     Lung nodule 08/04/2016     Priority: Medium     Formatting of this note might be different from the original.  8/4/2016:  Left upper lobe nodule of 6.5 mm, likely benign given slow growth per radiologist.  See CT  6/27/2011:  measured  approximately 5.5 mm in greatest dimension       Bipolar 2 disorder (H) 06/01/2015     Priority: Medium       MED TRIALS:  Doxepin has been helpful for sleep & fibromyalgia.  Topamax- caused cognitive slowing & poor concentration. Depakote caused wt gain. Seroquel was sedating. Trazodone caused insomnia. Has tried mellaril, effexor, depakote, remeron, buspar, risperdal,zyprexa, celexa,luvox - unsure what happened with these.  Formatting of this note might be different from the original.  MED TRIALS:  Doxepin has been helpful for sleep & fibromyalgia.  Topamax- caused cognitive slowing & poor concentration. Depakote caused wt gain. Seroquel was sedating. Trazodone caused insomnia.  Has tried mellaril, effexor, depakote, remeron, buspar, risperdal,zyprexa, celexa,luvox - unsure what happened with these.       Borderline personality disorder (H) 06/01/2015     Priority: Medium     PTSD (post-traumatic stress disorder) 06/01/2015     Priority: Medium     GERD (gastroesophageal reflux disease) 10/16/2012     Priority: Medium      Past Medical History:   Diagnosis Date     Abnormality of gait     Created by Conversion      Anemia 3/18/2021     Anxiety      Asthma      Asymptomatic postmenopausal status     Created by Conversion  Replacement Utility updated for latest IMO load     Bipolar 2 disorder (H)      Centrilobular emphysema (H) 2/26/2018    Mild, seen in 2018 CT scan, DLCO 60% predicted  Formatting of this note might be different from the original. Formatting of this note might be different from the original. Mild, seen in 2018 CT scan, DLCO 60% predicted     Cervical spinal stenosis     s/p cervical fusion     Chronic kidney disease      Chronic kidney disease, stage 3 (H) 1/14/2021     Chronic pain syndrome      Cigarette nicotine dependence without complication 3/4/2020     Common migraine with intractable migraine 2/15/2021     COPD (chronic obstructive pulmonary disease) (H)      DDD (degenerative disc disease), lumbosacral 3/22/2021     Depression      Fibrocystic breast      Fibromyalgia      GERD (gastroesophageal reflux disease)      Hallux abductovalgus with bunions, unspecified laterality 12/14/2015     Herpes zoster     Created by Conversion  Replacement Utility updated for latest IMO load     Hiatal hernia      History of electroconvulsive therapy      Hyperlipidemia 3/17/2021    Created by Conversion   Formatting of this note might be different from the original. Formatting of this note might be different from the original. Created by Conversion     Hypotension 3/18/2021     Hypothyroidism     hypothyroidism     IBS (irritable bowel syndrome)      Lung nodule 8/4/2016     8/4/2016:  Left upper lobe nodule of 6.5 mm, likely benign given slow growth per radiologist.  See CT 6/27/2011:  measured  approximately 5.5 mm in greatest dimension      Menopausal and postmenopausal disorder     Created by Conversion  Replacement Utility updated for latest IMO load     Migraine     Created by Conversion  Replacement Utility updated for latest IMO load     Migraines      Osteoarthritis      Osteopenia of multiple sites 9/1/2020     PONV (postoperative nausea and vomiting)      PTSD (post-traumatic stress disorder)      Shingles 2014    on back     Spinal stenosis of lumbar region with neurogenic claudication 3/22/2021     Tobacco use disorder     Created by Conversion      Past Surgical History:   Procedure Laterality Date     BIOPSY BREAST Left     Approx 5 bx     BUNIONECTOMY Right 12/15/2015    Procedure: MODIFIED LAI BUNIONECTOMY RIGHT FOOT;  Surgeon: Jerome Calvin DPM;  Location: [facility];  Service:      C TOTAL ABDOM HYSTERECTOMY      Description: Total Abdominal Hysterectomy;  Recorded: 06/10/2013;     CERVICAL FUSION       CERVICAL FUSION Bilateral 2/16/2015    Procedure: ANTERIOR CERVICAL DECOMPRESSION/FUSION C3-5 BILATERAL, ANTERIOR HARDWARE REMOVAL C5-7 BILATERAL ;  Surgeon: Sawyer Roland MD;  [place] [facility] [place];  Service:       NIPPLE EXPLORATION      Description: Breast Nipple Explor W/ Excision Solitary Lactiferous Duct;  Recorded: 04/10/2008;     HYSTERECTOMY       IR CERVICAL EPIDURAL STEROID INJECTION  9/17/2003     IR CERVICAL EPIDURAL STEROID INJECTION  12/11/2003     IR CERVICAL EPIDURAL STEROID INJECTION  1/16/2004     LUMBAR DISCECTOMY      X2     MAMMOPLASTY AUGMENTATION Bilateral      approx 2006?     PELVIC LAPAROSCOPY      multiple     SHOULDER OPEN ROTATOR CUFF REPAIR Left     X2     Current Outpatient Medications   Medication Sig Dispense Refill     albuterol (PROAIR HFA) 108 (90 Base) MCG/ACT inhaler Inhale 2 puffs into the lungs  every 4 hours 18 g 1     atorvastatin (LIPITOR) 40 MG tablet TAKE 1 TABLET(40 MG) BY MOUTH AT BEDTIME 90 tablet 3     benzonatate (TESSALON) 100 MG capsule TAKE 1 CAPSULE(100 MG) BY MOUTH THREE TIMES DAILY AS NEEDED FOR COUGH 30 capsule 0     doxepin (SINEQUAN) 100 MG capsule Take 75 mg by mouth At Bedtime        eletriptan (RELPAX) 40 MG tablet Take 1 tablet (40 mg) by mouth at onset of headache for migraine 12 tablet 3     erenumab-aooe (AIMOVIG) 70 MG/ML injection ADMINISTER 1 ML(70 MG) UNDER THE SKIN EVERY MONTH 1 mL 11     ergocalciferol (ERGOCALCIFEROL) 1.25 MG (38142 UT) capsule Take 1 capsule by mouth       esomeprazole (NEXIUM) 20 MG DR capsule Take 2 capsules by mouth every 24 hours       FLUoxetine (PROZAC) 40 MG capsule take 2 capsules by oral route at bedtime       fluticasone (FLONASE) 50 MCG/ACT nasal spray Spray 1 spray into both nostrils daily 16 g 3     folic acid (FOLVITE) 400 MCG tablet take 1 tablet by oral route  every day at HS       gabapentin (NEURONTIN) 300 MG capsule take 2 capsule by oral route 2 times every day. Follow titration instructions given in clinic.       HYDROcodone-acetaminophen (NORCO)  MG per tablet TAKE 1 TABLET BY MOUTH EVERY 4 TO 6 HOURS AS NEEDED FOR CHRONIC PAIN. MAX 4 PER DAY. START 9/7       hydrOXYzine (VISTARIL) 25 MG capsule take two every night and take one during the day as needed       lamoTRIgine (LAMICTAL) 200 MG tablet Take 1 tablet by mouth 2 times daily        levothyroxine (SYNTHROID/LEVOTHROID) 75 MCG tablet Take 1 tablet (75 mcg) by mouth daily 90 tablet 0     montelukast (SINGULAIR) 10 MG tablet Take 1 tablet (10 mg) by mouth every 24 hours 90 tablet 1     prazosin (MINIPRESS) 2 MG capsule Take 2 capsules by mouth every 24 hours       prochlorperazine (COMPAZINE) 10 MG tablet every 6 hours as needed        senna-docusate (SENOKOT-S/PERICOLACE) 8.6-50 MG tablet Take 2 tablets by mouth       tiotropium (SPIRIVA RESPIMAT) 2.5 MCG/ACT inhaler Inhale 2  puffs into the lungs daily 4 g 6     tiZANidine (ZANAFLEX) 4 MG tablet TAKE 1-2 TABLETS BY MOUTH DURING THE DAY AND 2 TABLETS BY MOUTH AT BEDTIME 120 tablet 1     varenicline (CHANTIX) 1 MG tablet take 1 tablet by oral route 2 times every day with glass of water after meals         Allergies   Allergen Reactions     Codeine Anaphylaxis     Cetirizine Nausea and Vomiting     Nefazodone Nausea and Vomiting     Oxycodone-Acetaminophen Unknown     hallucinations     Trazodone Nausea and Vomiting     Amoxicillin-Pot Clavulanate [Augmentin] Rash     Cephalexin Rash     Clavulanic Acid Rash     Cyclobenzaprine Rash     Eszopiclone Rash     Methocarbamol Rash     Penicillins Rash     Mild rash        Social History     Tobacco Use     Smoking status: Former Smoker     Years: 45.00     Types: Cigarettes     Smokeless tobacco: Former User   Substance Use Topics     Alcohol use: Not Currently     Comment: Alcoholic Drinks/day: rare--1-2 times in year     History   Drug Use No         Objective     /60 (BP Location: Left arm, Patient Position: Sitting, Cuff Size: Adult Regular)   Pulse 102   Wt 76.8 kg (169 lb 4.8 oz)   SpO2 96%   Breastfeeding No   BMI 29.06 kg/m      Physical Exam    GENERAL APPEARANCE: healthy, alert and no distress     EYES: EOMI, PERRL     HENT: ear canals and TM's normal and nose and mouth without ulcers or lesions     NECK: no adenopathy, no asymmetry, masses, or scars and thyroid normal to palpation     RESP: lungs clear to auscultation - no rales, rhonchi or wheezes     CV: regular rates and rhythm, normal S1 S2, no S3 or S4 and no murmur, click or rub     ABDOMEN:  soft, nontender, no HSM or masses and bowel sounds normal     NEURO:  mentation intact and speech normal     PSYCH:  affect normal/bright     LYMPHATICS: No cervical adenopathy    Recent Labs   Lab Test 08/31/21  1304 04/13/21  1048 03/29/21  0748   HGB  --  12.3 9.3*   PLT  --  438 410    139 139   POTASSIUM 4.6 3.8 3.8    CR 1.10 0.87 1.03        Diagnostics:  COVID-19 PCR: pending   COTININE/NICOTINE LEVEL: pending     No EKG required, no history of coronary heart disease, significant arrhythmia, peripheral arterial disease or other structural heart disease.    Revised Cardiac Risk Index (RCRI):  The patient has the following serious cardiovascular risks for perioperative complications:   - No serious cardiac risks = 0 points     RCRI Interpretation: 0 points: Class I (very low risk - 0.4% complication rate)    Signed Electronically by: Chema Cope MD  Copy of this evaluation report is provided to requesting physician.

## 2021-12-09 NOTE — PATIENT INSTRUCTIONS
Patient Education     Presurgery Checklist  You are scheduled to have surgery. The healthcare staff will try to make your stay comfortable. Use the guidelines below to remind yourself what to do before surgery. Be sure to follow any specific pre-op instructions from your surgeon or nurse.  Preparing for surgery    If you are having abdominal surgery, ask what you need to do to clear your bowel.    Tell your surgeon if you have allergies to any medicines, latex, or foods.    Ask your surgeon if you might need a blood transfusion during surgery and if so, how to prepare for it. In some cases, you can donate blood before surgery. If needed, this blood can be given back (transfused) to you during or after surgery.    Arrange for an adult family member or friend to drive you home after surgery. If possible, have someone ready to help you at home as you recover.    Call the surgeon if you get a cold, fever, sore throat, diarrhea, or other health problem just before surgery. Your surgeon can decide whether or not to postpone the surgery.  Medicines    Tell your surgeon about all medicines you take, including prescription and over-the-counter products such as herbal remedies and vitamins. Ask if you should continue taking them.    If you take ibuprofen, naproxen, or blood thinners (anticoagulants) such as aspirin, clopidogrel, or warfarin, ask your surgeon whether you should stop taking them and how long before surgery you should stop.    You may be told to take antibiotics just before surgery to prevent infection. If so, follow instructions carefully on how to take them.    If you are told to take blood thinners to help prevent blood clots after surgery, be sure to follow the instructions on how to take them.  Stop smoking  If you smoke, healing may take longer. So at least 2 week(s) before surgery, stop smoking.  Bathing or showering before surgery    If instructed, wash with antibacterial soap. Afterward, do not use  lotions, oils, or powders.    If you are having surgery on the head, you may be asked to shampoo with antibacterial soap. Follow instructions for doing so.  Do not remove hair from the surgery site  Do not shave hair from the incision site, unless you are given specific instructions to do so. Usually, if hair needs to be removed, it will be done at the hospital right before surgery.  Don t eat or drink    Follow any directions you are given for not eating or drinking before surgery. If you don't follow instructions about when to stop eating and drinking, your procedure may be postponed or rescheduled for another day. This is a safety issue.    You can brush your teeth and rinse your mouth, but don t swallow any water.  Day of surgery    Don't wear makeup. Don't use perfume, deodorant, or hairspray. Remove nail polish and artificial nails.    Leave jewelry (including rings), watches, and other valuables at home.    Be sure to bring health insurance cards or forms and a photo ID.    Bring a list of your medicines (include the name, dose, how often you take them, and the time last dose was taken).    Arrive on time at the hospital or surgery facility.  Date Last Reviewed: 12/1/2016 2000-2018 [employer]. [address] [place] [address], [place] [address]. All rights reserved. This information is not intended as a substitute for professional medical care. Always follow your healthcare professional's instructions.

## 2021-12-10 ENCOUNTER — ANESTHESIA EVENT (OUTPATIENT)
Dept: SURGERY | Facility: AMBULATORY SURGERY CENTER | Age: 61
End: 2021-12-10
Payer: COMMERCIAL

## 2021-12-10 ENCOUNTER — LAB (OUTPATIENT)
Dept: LAB | Facility: CLINIC | Age: 61
End: 2021-12-10
Attending: SURGERY
Payer: COMMERCIAL

## 2021-12-10 ENCOUNTER — TELEPHONE (OUTPATIENT)
Dept: FAMILY MEDICINE | Facility: CLINIC | Age: 61
End: 2021-12-10

## 2021-12-10 DIAGNOSIS — Z11.59 ENCOUNTER FOR SCREENING FOR OTHER VIRAL DISEASES: ICD-10-CM

## 2021-12-10 PROBLEM — R29.2 ABNORMAL REFLEX: Status: ACTIVE | Noted: 2021-09-08

## 2021-12-10 PROBLEM — F17.210 CIGARETTE NICOTINE DEPENDENCE WITHOUT COMPLICATION: Chronic | Status: RESOLVED | Noted: 2020-03-04 | Resolved: 2021-12-10

## 2021-12-10 LAB — HCV AB SERPL QL IA: NEGATIVE

## 2021-12-10 PROCEDURE — U0005 INFEC AGEN DETEC AMPLI PROBE: HCPCS

## 2021-12-10 PROCEDURE — U0003 INFECTIOUS AGENT DETECTION BY NUCLEIC ACID (DNA OR RNA); SEVERE ACUTE RESPIRATORY SYNDROME CORONAVIRUS 2 (SARS-COV-2) (CORONAVIRUS DISEASE [COVID-19]), AMPLIFIED PROBE TECHNIQUE, MAKING USE OF HIGH THROUGHPUT TECHNOLOGIES AS DESCRIBED BY CMS-2020-01-R: HCPCS

## 2021-12-10 NOTE — TELEPHONE ENCOUNTER
Reason for Call:  Other PRE OP    Detailed comments: Carolyne called stating needs pre op completed for surgery on Monday.     Phone Number Patient can be reached at: Home number on file [phone] (home)    Best Time: asap    Can we leave a detailed message on this number? YES    Call taken on 12/10/2021 at 2:00 PM by Thang Ayers

## 2021-12-11 LAB — SARS-COV-2 RNA RESP QL NAA+PROBE: NEGATIVE

## 2021-12-13 ENCOUNTER — ANESTHESIA (OUTPATIENT)
Dept: SURGERY | Facility: AMBULATORY SURGERY CENTER | Age: 61
End: 2021-12-13
Payer: COMMERCIAL

## 2021-12-13 ENCOUNTER — HOSPITAL ENCOUNTER (OUTPATIENT)
Facility: AMBULATORY SURGERY CENTER | Age: 61
End: 2021-12-13
Attending: SURGERY
Payer: COMMERCIAL

## 2021-12-13 VITALS
HEART RATE: 80 BPM | OXYGEN SATURATION: 97 % | DIASTOLIC BLOOD PRESSURE: 63 MMHG | TEMPERATURE: 96.8 F | RESPIRATION RATE: 16 BRPM | SYSTOLIC BLOOD PRESSURE: 133 MMHG

## 2021-12-13 DIAGNOSIS — L98.8 DRAINING CUTANEOUS SINUS TRACT: ICD-10-CM

## 2021-12-13 PROCEDURE — 11403 EXC TR-EXT B9+MARG 2.1-3CM: CPT | Mod: 51 | Performed by: SURGERY

## 2021-12-13 PROCEDURE — 12032 INTMD RPR S/A/T/EXT 2.6-7.5: CPT | Performed by: SURGERY

## 2021-12-13 RX ORDER — LIDOCAINE HYDROCHLORIDE 20 MG/ML
INJECTION, SOLUTION INFILTRATION; PERINEURAL PRN
Status: DISCONTINUED | OUTPATIENT
Start: 2021-12-13 | End: 2021-12-13

## 2021-12-13 RX ORDER — SODIUM CHLORIDE, SODIUM LACTATE, POTASSIUM CHLORIDE, CALCIUM CHLORIDE 600; 310; 30; 20 MG/100ML; MG/100ML; MG/100ML; MG/100ML
INJECTION, SOLUTION INTRAVENOUS CONTINUOUS
Status: DISCONTINUED | OUTPATIENT
Start: 2021-12-13 | End: 2021-12-14 | Stop reason: HOSPADM

## 2021-12-13 RX ORDER — KETOROLAC TROMETHAMINE 30 MG/ML
INJECTION, SOLUTION INTRAMUSCULAR; INTRAVENOUS PRN
Status: DISCONTINUED | OUTPATIENT
Start: 2021-12-13 | End: 2021-12-13

## 2021-12-13 RX ORDER — PROPOFOL 10 MG/ML
INJECTION, EMULSION INTRAVENOUS CONTINUOUS PRN
Status: DISCONTINUED | OUTPATIENT
Start: 2021-12-13 | End: 2021-12-13

## 2021-12-13 RX ORDER — ONDANSETRON 4 MG/1
4 TABLET, ORALLY DISINTEGRATING ORAL EVERY 30 MIN PRN
Status: DISCONTINUED | OUTPATIENT
Start: 2021-12-13 | End: 2021-12-14 | Stop reason: HOSPADM

## 2021-12-13 RX ORDER — CLINDAMYCIN PHOSPHATE 900 MG/50ML
900 INJECTION, SOLUTION INTRAVENOUS SEE ADMIN INSTRUCTIONS
Status: DISCONTINUED | OUTPATIENT
Start: 2021-12-13 | End: 2021-12-14 | Stop reason: HOSPADM

## 2021-12-13 RX ORDER — FENTANYL CITRATE 50 UG/ML
INJECTION, SOLUTION INTRAMUSCULAR; INTRAVENOUS PRN
Status: DISCONTINUED | OUTPATIENT
Start: 2021-12-13 | End: 2021-12-13

## 2021-12-13 RX ORDER — FENTANYL CITRATE 50 UG/ML
25 INJECTION, SOLUTION INTRAMUSCULAR; INTRAVENOUS
Status: DISCONTINUED | OUTPATIENT
Start: 2021-12-13 | End: 2021-12-14 | Stop reason: HOSPADM

## 2021-12-13 RX ORDER — SCOLOPAMINE TRANSDERMAL SYSTEM 1 MG/1
1 PATCH, EXTENDED RELEASE TRANSDERMAL ONCE
Status: DISCONTINUED | OUTPATIENT
Start: 2021-12-13 | End: 2021-12-14 | Stop reason: HOSPADM

## 2021-12-13 RX ORDER — MAGNESIUM HYDROXIDE 1200 MG/15ML
LIQUID ORAL PRN
Status: DISCONTINUED | OUTPATIENT
Start: 2021-12-13 | End: 2021-12-13 | Stop reason: HOSPADM

## 2021-12-13 RX ORDER — FENTANYL CITRATE 50 UG/ML
25 INJECTION, SOLUTION INTRAMUSCULAR; INTRAVENOUS EVERY 5 MIN PRN
Status: DISCONTINUED | OUTPATIENT
Start: 2021-12-13 | End: 2021-12-14 | Stop reason: HOSPADM

## 2021-12-13 RX ORDER — ONDANSETRON 2 MG/ML
4 INJECTION INTRAMUSCULAR; INTRAVENOUS EVERY 30 MIN PRN
Status: DISCONTINUED | OUTPATIENT
Start: 2021-12-13 | End: 2021-12-14 | Stop reason: HOSPADM

## 2021-12-13 RX ORDER — MEPERIDINE HYDROCHLORIDE 25 MG/ML
12.5 INJECTION INTRAMUSCULAR; INTRAVENOUS; SUBCUTANEOUS
Status: DISCONTINUED | OUTPATIENT
Start: 2021-12-13 | End: 2021-12-14 | Stop reason: HOSPADM

## 2021-12-13 RX ORDER — LIDOCAINE 40 MG/G
CREAM TOPICAL
Status: DISCONTINUED | OUTPATIENT
Start: 2021-12-13 | End: 2021-12-14 | Stop reason: HOSPADM

## 2021-12-13 RX ORDER — BUPIVACAINE HYDROCHLORIDE AND EPINEPHRINE 2.5; 5 MG/ML; UG/ML
INJECTION, SOLUTION INFILTRATION; PERINEURAL PRN
Status: DISCONTINUED | OUTPATIENT
Start: 2021-12-13 | End: 2021-12-13 | Stop reason: HOSPADM

## 2021-12-13 RX ORDER — ONDANSETRON 2 MG/ML
INJECTION INTRAMUSCULAR; INTRAVENOUS PRN
Status: DISCONTINUED | OUTPATIENT
Start: 2021-12-13 | End: 2021-12-13

## 2021-12-13 RX ORDER — DEXAMETHASONE SODIUM PHOSPHATE 4 MG/ML
INJECTION, SOLUTION INTRA-ARTICULAR; INTRALESIONAL; INTRAMUSCULAR; INTRAVENOUS; SOFT TISSUE PRN
Status: DISCONTINUED | OUTPATIENT
Start: 2021-12-13 | End: 2021-12-13

## 2021-12-13 RX ORDER — MAGNESIUM SULFATE HEPTAHYDRATE 40 MG/ML
4 INJECTION, SOLUTION INTRAVENOUS ONCE
Status: COMPLETED | OUTPATIENT
Start: 2021-12-13 | End: 2021-12-13

## 2021-12-13 RX ORDER — PROPOFOL 10 MG/ML
INJECTION, EMULSION INTRAVENOUS PRN
Status: DISCONTINUED | OUTPATIENT
Start: 2021-12-13 | End: 2021-12-13

## 2021-12-13 RX ORDER — CLINDAMYCIN PHOSPHATE 900 MG/50ML
900 INJECTION, SOLUTION INTRAVENOUS
Status: DISCONTINUED | OUTPATIENT
Start: 2021-12-13 | End: 2021-12-14 | Stop reason: HOSPADM

## 2021-12-13 RX ADMIN — PROPOFOL 180 MCG/KG/MIN: 10 INJECTION, EMULSION INTRAVENOUS at 08:05

## 2021-12-13 RX ADMIN — KETOROLAC TROMETHAMINE 15 MG: 30 INJECTION, SOLUTION INTRAMUSCULAR; INTRAVENOUS at 08:30

## 2021-12-13 RX ADMIN — Medication 100 MCG: at 08:30

## 2021-12-13 RX ADMIN — LIDOCAINE HYDROCHLORIDE 60 MG: 20 INJECTION, SOLUTION INFILTRATION; PERINEURAL at 08:05

## 2021-12-13 RX ADMIN — FENTANYL CITRATE 50 MCG: 50 INJECTION, SOLUTION INTRAMUSCULAR; INTRAVENOUS at 08:01

## 2021-12-13 RX ADMIN — CLINDAMYCIN PHOSPHATE 900 MG: 900 INJECTION, SOLUTION INTRAVENOUS at 07:51

## 2021-12-13 RX ADMIN — Medication 30 MG: at 08:05

## 2021-12-13 RX ADMIN — PROPOFOL 200 MG: 10 INJECTION, EMULSION INTRAVENOUS at 08:05

## 2021-12-13 RX ADMIN — SCOLOPAMINE TRANSDERMAL SYSTEM 1 PATCH: 1 PATCH, EXTENDED RELEASE TRANSDERMAL at 07:31

## 2021-12-13 RX ADMIN — ONDANSETRON 4 MG: 2 INJECTION INTRAMUSCULAR; INTRAVENOUS at 08:05

## 2021-12-13 RX ADMIN — SODIUM CHLORIDE, SODIUM LACTATE, POTASSIUM CHLORIDE, CALCIUM CHLORIDE: 600; 310; 30; 20 INJECTION, SOLUTION INTRAVENOUS at 07:46

## 2021-12-13 RX ADMIN — MAGNESIUM SULFATE HEPTAHYDRATE 4 G: 40 INJECTION, SOLUTION INTRAVENOUS at 07:47

## 2021-12-13 RX ADMIN — DEXAMETHASONE SODIUM PHOSPHATE 10 MG: 4 INJECTION, SOLUTION INTRA-ARTICULAR; INTRALESIONAL; INTRAMUSCULAR; INTRAVENOUS; SOFT TISSUE at 08:05

## 2021-12-13 ASSESSMENT — COPD QUESTIONNAIRES
COPD: 1
CAT_SEVERITY: MILD

## 2021-12-13 ASSESSMENT — LIFESTYLE VARIABLES: TOBACCO_USE: 1

## 2021-12-13 NOTE — DISCHARGE INSTRUCTIONS
If you have any questions or concerns regarding your procedure, please contact Dr. Bunch, his office number is [phone].    You received a dose of IV Toradol at 8:30AM. Please do not take any additional Ibuprofen products (Aleve/Advil/Motrin/Naproxen/Celebrex) until after 2:30PM.    Discharge Instructions: After Your Surgery  You ve just had surgery. During surgery, you were given medicine called anesthesia to keep you relaxed and free of pain. After surgery, you may have some pain or nausea. This is common. Here are some tips for feeling better and getting well after surgery.  Going home  Your healthcare provider will show you how to take care of yourself when you go home. He or she will also answer your questions. Have an adult family member or friend drive you home. For the first 24 hours after your surgery:    Don't drive or use heavy equipment.    Don't make important decisions or sign legal papers.    Don't drink alcohol.    Have someone stay with you. He or she can watch for problems and help keep you safe.  Be sure to go to all follow-up visits with your healthcare provider. And rest after your surgery for as long as your healthcare provider tells you to.  Coping with pain  If you have pain after surgery, pain medicine will help you feel better. Take it as told, before pain becomes severe. Also, ask your healthcare provider or pharmacist about other ways to control pain. This might be with heat, ice, or relaxation. And follow any other instructions your surgeon or nurse gives you.  Tips for taking pain medicine  To get the best relief possible, remember these points:    Pain medicines can upset your stomach. Taking them with a little food may help.    Most pain relievers taken by mouth need at least 20 to 30 minutes to start to work.    Don't wait till your pain becomes severe before you take your medicine. Try to time your medicine so that you can take it before starting an activity. This might be  before you get dressed, go for a walk, or sit down for dinner.    Constipation is a common side effect of pain medicines. Call your healthcare provider before taking any medicines such as laxatives or stool softeners to help ease constipation. Also ask if you should skip any foods. Drinking lots of fluids and eating foods such as fruits and vegetables that are high in fiber can also help. Remember, don't take laxatives unless your surgeon has prescribed them.    Drinking alcohol and taking pain medicine can cause dizziness and slow your breathing. It can even be deadly. Don't drink alcohol while taking pain medicine.    Pain medicine can make you react more slowly to things. Don't drive or run machinery while taking pain medicine.  Your healthcare provider may tell you to take acetaminophen to help ease your pain. Ask him or her how much you are supposed to take each day. Acetaminophen or other pain relievers may interact with your prescription medicines or other over-the-counter (OTC) medicines. Some prescription medicines have acetaminophen and other ingredients. Using both prescription and OTC acetaminophen for pain can cause you to overdose. Read the labels on your OTC medicines with care. This will help you to clearly know the list of ingredients, how much to take, and any warnings. It may also help you not take too much acetaminophen. If you have questions or don't understand the information, ask your pharmacist or healthcare provider to explain it to you before you take the OTC medicine.  Managing nausea  Some people have an upset stomach after surgery. This is often because of anesthesia, pain, or pain medicine, or the stress of surgery. These tips will help you handle nausea and eat healthy foods as you get better. If you were on a special food plan before surgery, ask your healthcare provider if you should follow it while you get better. These tips may help:    Don't push yourself to eat. Your body will  tell you when to eat and how much.    Start off with clear liquids and soup. They are easier to digest.    Next try semi-solid foods, such as mashed potatoes, applesauce, and gelatin, as you feel ready.    Slowly move to solid foods. Don t eat fatty, rich, or spicy foods at first.    Don't force yourself to have 3 large meals a day. Instead eat smaller amounts more often.    Take pain medicines with a small amount of solid food, such as crackers or toast, to prevent nausea.  When to call your healthcare provider  Call your healthcare provider if:    You still have intolerable pain an hour after taking medicine. The medicine may not be strong enough.    You feel too sleepy, dizzy, or groggy. The medicine may be too strong.    You have side effects such as nausea or vomiting, or skin changes such as rash, itching, or hives. Your healthcare provider may suggest other medicines to control side effects.  Rash, itching, or hives may mean you have an allergic reaction. Report this right away. If you have trouble breathing or facial swelling, call 911 right away.  If you have obstructive sleep apnea  You were given anesthesia medicine during surgery to keep you comfortable and free of pain. After surgery, you may have more apnea spells because of this medicine and other medicines you were given. The spells may last longer than usual.   At home:    Keep using the continuous positive airway pressure (CPAP) device when you sleep. Unless your healthcare provider tells you not to, use it when you sleep, day or night. CPAP is a common device used to treat obstructive sleep apnea.    Talk with your provider before taking any pain medicine, muscle relaxants, or sedatives. Your provider will tell you about the possible dangers of taking these medicines.  [employer] last reviewed this educational content on 3/1/2019    5149-2961 [employer]. All rights reserved. This information is not intended as a substitute for  professional medical care. Always follow your healthcare professional's instructions.    When to call your healthcare provider  Call if you have any of the following:    You can t keep food or fluids down    You have not urinated within the time your healthcare team noted    You have not had a bowel movement within the time your healthcare team noted    Your bandage soaks through (some bleeding and leakage is normal)    Your pain gets worse and is not eased by pain medicine  Call if you have any of these signs of infection:    Fever of 100.4 F (38 C) or higher, or as directed    Bleeding or swelling that increases    Bad smell, warmth, or green or yellow discharge from the cut (incision)    A red, hard, hot, or painful area around the cut or on your legs    Shortness of breath    Chest pain

## 2021-12-13 NOTE — ANESTHESIA POSTPROCEDURE EVALUATION
Patient: Dayana Samaniego    Procedure: Procedure(s):  Wound exploration and debridement of Left Lower Abdomin Chronic wound.       Diagnosis:Draining cutaneous sinus tract [L98.8]  Diagnosis Additional Information: No value filed.    Anesthesia Type:  General    Note:  Disposition: Outpatient   Postop Pain Control: Uneventful            Sign Out: Well controlled pain   PONV: No   Neuro/Psych: Uneventful            Sign Out: Acceptable/Baseline neuro status   Airway/Respiratory: Uneventful            Sign Out: Acceptable/Baseline resp. status   CV/Hemodynamics: Uneventful            Sign Out: Acceptable CV status; No obvious hypovolemia; No obvious fluid overload   Other NRE: NONE   DID A NON-ROUTINE EVENT OCCUR? No           Last vitals:  Vitals Value Taken Time   /58 12/13/21 0901   Temp 96.9  F (36.1  C) 12/13/21 0843   Pulse 90 12/13/21 0903   Resp 16 12/13/21 0901   SpO2 90 % 12/13/21 0903   Vitals shown include unvalidated device data.    Electronically Signed By: Isac Dickson MD  December 13, 2021  9:20 AM

## 2021-12-13 NOTE — ANESTHESIA PROCEDURE NOTES
Airway       Patient location during procedure: OR       Procedure Start/Stop Times: 12/13/2021 8:11 AM  Staff -        CRNA: Kendy Dsouza APRN CRNA       Performed By: CRNA  Consent for Airway        Urgency: elective  Indications and Patient Condition       Indications for airway management: marli-procedural       Induction type:intravenous       Mask difficulty assessment: 1 - vent by mask    Final Airway Details       Final airway type: endotracheal airway       Successful airway: ETT - single  Endotracheal Airway Details        ETT size (mm): 7.0       Cuffed: yes       Successful intubation technique: direct laryngoscopy       DL Blade Type: Hinton 2       Grade View of Cords: 1       Adjucts: stylet and tooth guard       Position: Right       Measured from: lips       Secured at (cm): 22       Bite block used: Soft    Post intubation assessment        Placement verified by: capnometry, equal breath sounds and chest rise        Number of attempts at approach: 1       Number of other approaches attempted: 0       Secured with: cloth tape       Ease of procedure: easy       Dentition: Intact and Unchanged

## 2021-12-13 NOTE — ANESTHESIA CARE TRANSFER NOTE
Patient: Dayana Samaniego    Procedure: Procedure(s):  Wound exploration and debridement of Left Lower Abdomin Chronic wound.       Diagnosis: Draining cutaneous sinus tract [L98.8]  Diagnosis Additional Information: No value filed.    Anesthesia Type:   General     Note:    Oropharynx: oropharynx clear of all foreign objects  Level of Consciousness: drowsy  Oxygen Supplementation: face mask  Level of Supplemental Oxygen (L/min / FiO2): 8  Independent Airway: airway patency satisfactory and stable  Dentition: dentition unchanged  Vital Signs Stable: post-procedure vital signs reviewed and stable    Patient transferred to: PACU    Handoff Report: Identifed the Patient, Identified the Reponsible Provider, Reviewed the pertinent medical history, Discussed the surgical course, Reviewed Intra-OP anesthesia mangement and issues during anesthesia, Set expectations for post-procedure period and Allowed opportunity for questions and acknowledgement of understanding      Vitals:  Vitals Value Taken Time   /60 12/13/21 0843   Temp 96.9  F (36.1  C) 12/13/21 0843   Pulse 81 12/13/21 0843   Resp 20 12/13/21 0843   SpO2 97 % 12/13/21 0843       Electronically Signed By: KARLOS Briones CRNA  December 13, 2021  8:45 AM

## 2021-12-13 NOTE — OP NOTE
Operative Note    Name:  Dayana Samaniego  Location: [facility]  Procedure Date:  12/13/2021  PCP:  Joanne Weiner    Surgery Performed:  Procedure/Surgery Information   Procedure: Procedure(s):  Wound exploration and debridement of Left Lower Abdomin Chronic wound.   Surgeon(s): Surgeon(s) and Role:     * Crispin Bunch MD - Primary   Specimens: ID Type Source Tests Collected by Time Destination   1 : LEFT INGUINAL WOUND TISSUE Tissue Abdomen SURGICAL PATHOLOGY EXAM Crispin Bunch MD 12/13/2021  8:30 AM                Pre-Procedure Diagnosis:  Draining cutaneous sinus tract [L98.8]    Post-Procedure Diagnosis:    Draining cutaneous sinus tract [L98.8]    Anesthesia:  General       Findings:  Left inguinal sinus that tracks down into the subcutaneous tissues with a clean external oblique fascia.    Operative Report:    Patient is brought to the operating room given general endotracheal anesthesia sterilely prepped and draped in the usual surgical fashion timeout process is undertaken.    The operative field is infused with quarter percent Marcaine with epinephrine as a field block an elliptical incision is made around the draining sinus the subcutaneous tissues are dissected right down to the external oblique fascia circumferentially around the sinus tract removing a segment of tissue approximately 3 cm long and 2 cm wide.  This subcutaneous fat was peeled right up off of the external oblique fascia after going down through camper's fascia.  There did appear to be some connection with the sinus tract to the campers fascia area this was excised with electrocautery.  The wound was evaluated for hemostasis.  The wound was injected at all levels with local anesthesia.  The campers fascia areas drawn together with interrupted 3-0 Vicryl sutures.  The skin was drawn together and closed with a running 4-0 subcuticular Monocryl stitch.  The wound was dressed with Telfa and  Tegaderm    Estimated Blood Loss:   1 cc       Drains:        Implants:  * No implants in log *    Complications:    None    Crispin Bunch MD     Date: 12/13/2021  Time: 8:43 AM

## 2021-12-13 NOTE — ANESTHESIA PREPROCEDURE EVALUATION
Anesthesia Pre-Procedure Evaluation    Patient: Dayana Samaniego   MRN: [id number] : 1960        Preoperative Diagnosis: Draining cutaneous sinus tract [L98.8]    Procedure : Procedure(s):  Wound exploration and debridement of Left Lower Abdomin Chronic wound.          Past Medical History:   Diagnosis Date     Abnormality of gait     Created by Conversion      Anemia 3/18/2021     Anxiety      Asthma      Asymptomatic postmenopausal status     Created by Conversion  Replacement Utility updated for latest IMO load     Bipolar 2 disorder (H)      Centrilobular emphysema (H) 2018    Mild, seen in 2018 CT scan, DLCO 60% predicted  Formatting of this note might be different from the original. Formatting of this note might be different from the original. Mild, seen in 2018 CT scan, DLCO 60% predicted     Cervical spinal stenosis     s/p cervical fusion     Chronic kidney disease      Chronic kidney disease, stage 3 (H) 2021     Chronic pain syndrome      Cigarette nicotine dependence without complication 3/4/2020     Common migraine with intractable migraine 2/15/2021     COPD (chronic obstructive pulmonary disease) (H)      DDD (degenerative disc disease), lumbosacral 3/22/2021     Depression      Fibrocystic breast      Fibromyalgia      GERD (gastroesophageal reflux disease)      Hallux abductovalgus with bunions, unspecified laterality 2015     Herpes zoster     Created by Conversion  Replacement Utility updated for latest IMO load     Hiatal hernia      History of electroconvulsive therapy      Hyperlipidemia 3/17/2021    Created by Conversion   Formatting of this note might be different from the original. Formatting of this note might be different from the original. Created by Conversion     Hypotension 3/18/2021     Hypothyroidism     hypothyroidism     IBS (irritable bowel syndrome)      Lung nodule 2016:  Left upper lobe nodule of 6.5 mm, likely benign given slow growth  per radiologist.  See CT 6/27/2011:  measured  approximately 5.5 mm in greatest dimension      Menopausal and postmenopausal disorder     Created by Conversion  Replacement Utility updated for latest IMO load     Migraine     Created by Conversion  Replacement Utility updated for latest IMO load     Migraines      Osteoarthritis      Osteopenia of multiple sites 9/1/2020     Other chronic pain      PONV (postoperative nausea and vomiting)      PTSD (post-traumatic stress disorder)      Shingles 2014    on back     Spinal stenosis of lumbar region with neurogenic claudication 3/22/2021     Tobacco use disorder     Created by Conversion       Past Surgical History:   Procedure Laterality Date     BIOPSY BREAST Left     Approx 5 bx     BUNIONECTOMY Right 12/15/2015    Procedure: MODIFIED LAI BUNIONECTOMY RIGHT FOOT;  Surgeon: Jerome Calvin DPM;  Location: [facility];  Service:      C TOTAL ABDOM HYSTERECTOMY      Description: Total Abdominal Hysterectomy;  Recorded: 06/10/2013;     CERVICAL FUSION       CERVICAL FUSION Bilateral 2/16/2015    Procedure: ANTERIOR CERVICAL DECOMPRESSION/FUSION C3-5 BILATERAL, ANTERIOR HARDWARE REMOVAL C5-7 BILATERAL ;  Surgeon: Sawyer Roland MD;  Location: [facility];  Service:      HC NIPPLE EXPLORATION      Description: Breast Nipple Explor W/ Excision Solitary Lactiferous Duct;  Recorded: 04/10/2008;     HYSTERECTOMY       IR CERVICAL EPIDURAL STEROID INJECTION  9/17/2003     IR CERVICAL EPIDURAL STEROID INJECTION  12/11/2003     IR CERVICAL EPIDURAL STEROID INJECTION  1/16/2004     LUMBAR DISCECTOMY      X2     MAMMOPLASTY AUGMENTATION Bilateral      approx 2006?     PELVIC LAPAROSCOPY      multiple     SHOULDER OPEN ROTATOR CUFF REPAIR Left     X2      Allergies   Allergen Reactions     Codeine Anaphylaxis     Nefazodone Nausea and Vomiting     Oxycodone-Acetaminophen Unknown     hallucinations     Cetirizine Nausea and Vomiting     Trazodone Nausea and  Vomiting     Amoxicillin-Pot Clavulanate [Augmentin] Rash     Cephalexin Rash     Clavulanic Acid Rash     Cyclobenzaprine Rash     Eszopiclone Rash     Methocarbamol Rash     Penicillins Rash     Mild rash      Social History     Tobacco Use     Smoking status: Former Smoker     Years: 45.00     Types: Cigarettes     Quit date: 10/29/2021     Years since quittin.1     Smokeless tobacco: Former User   Substance Use Topics     Alcohol use: Not Currently     Comment: Alcoholic Drinks/day: rare--1-2 times in year      Wt Readings from Last 1 Encounters:   21 76.8 kg (169 lb 4.8 oz)        Anesthesia Evaluation   Pt has had prior anesthetic. Type: General.    History of anesthetic complications  - PONV.      ROS/MED HX  ENT/Pulmonary:     (+) tobacco use, Past use, asthma mild,  COPD,     Neurologic:  - neg neurologic ROS   (+) migraines,     Cardiovascular:  - neg cardiovascular ROS     METS/Exercise Tolerance:     Hematologic:  - neg hematologic  ROS   (+) anemia,     Musculoskeletal: Comment: S/p cervical fusion   - neg musculoskeletal ROS (+) arthritis,     GI/Hepatic:  - neg GI/hepatic ROS   (+) GERD, Asymptomatic on medication, hiatal hernia,     Renal/Genitourinary:  - neg Renal ROS   (+) renal disease, type: CRI,     Endo:  - neg endo ROS   (+) thyroid problem, hypothyroidism,     Psychiatric/Substance Use: Comment: PTSD - neg psychiatric ROS   (+) psychiatric history bipolar and anxiety     Infectious Disease:  - neg infectious disease ROS     Malignancy:  - neg malignancy ROS     Other:  - neg other ROS    (+) , H/O Chronic Pain,        Physical Exam    Airway        Mallampati: II   TM distance: > 3 FB   Neck ROM: full   Mouth opening: > 3 cm    Respiratory Devices and Support         Dental     Comment: Palatal torus    (+) caps and implants      Cardiovascular   cardiovascular exam normal       Rhythm and rate: regular and normal     Pulmonary   pulmonary exam normal        breath sounds clear to  auscultation           OUTSIDE LABS:  CBC:   Lab Results   Component Value Date    WBC 6.2 04/13/2021    WBC 7.6 03/29/2021    HGB 12.3 04/13/2021    HGB 9.3 (L) 03/29/2021    HCT 38.5 04/13/2021    HCT 30.0 (L) 03/29/2021     04/13/2021     03/29/2021     BMP:   Lab Results   Component Value Date     08/31/2021     04/13/2021    POTASSIUM 4.6 08/31/2021    POTASSIUM 3.8 04/13/2021    CHLORIDE 101 08/31/2021    CHLORIDE 105 04/13/2021    CO2 28 08/31/2021    CO2 20 (L) 04/13/2021    BUN 15 08/31/2021    BUN 19 04/13/2021    CR 1.10 08/31/2021    CR 0.87 04/13/2021    GLC 90 08/31/2021     04/13/2021     COAGS: No results found for: PTT, INR, FIBR  POC: No results found for: BGM, HCG, HCGS  HEPATIC:   Lab Results   Component Value Date    ALBUMIN 4.0 08/31/2021    PROTTOTAL 7.1 08/31/2021    ALT 13 08/31/2021    AST 15 08/31/2021    ALKPHOS 142 (H) 08/31/2021    BILITOTAL 0.4 08/31/2021     OTHER:   Lab Results   Component Value Date    A1C 5.7 05/06/2015    CAITLIN 9.9 08/31/2021    MAG 2.1 03/29/2021    TSH 1.10 08/31/2021       Anesthesia Plan    ASA Status:  3   NPO Status:  NPO Appropriate    Anesthesia Type: General.     - Airway: ETT   Induction: Propofol, Intravenous.   Maintenance: TIVA.        Consents    Anesthesia Plan(s) and associated risks, benefits, and realistic alternatives discussed. Questions answered and patient/representative(s) expressed understanding.    - Discussed:     - Discussed with:  Patient         Postoperative Care    Pain management: Multi-modal analgesia.   PONV prophylaxis: Ondansetron (or other 5HT-3), Dexamethasone or Solumedrol     Comments:    Other Comments: Reviewed anesthetic options and risks, including risk of dental trauma. Patient agrees to proceed.     Recent Results (from the past 120 hour(s))  -HIV Antigen Antibody Combo:   Collection Time: 12/09/21 11:44 AM       Result                                            Value                          Ref Range                       HIV Antigen Antibody Combo                        Negative                      Negative                   -Hepatitis C antibody:   Collection Time: 12/09/21 11:44 AM       Result                                            Value                         Ref Range                       Hepatitis C Antibody                              Negative                      Negative                   -Asymptomatic COVID-19 Virus (Coronavirus) by PCR Nose:   Collection Time: 12/10/21 10:30 AM  Specimen: Nose; Swab       Result                                            Value                         Ref Range                       SARS CoV2 PCR                                     Negative                      Negative                             Isac Dickson MD

## 2021-12-15 ENCOUNTER — TRANSFERRED RECORDS (OUTPATIENT)
Dept: HEALTH INFORMATION MANAGEMENT | Facility: CLINIC | Age: 61
End: 2021-12-15
Payer: COMMERCIAL

## 2021-12-16 LAB
COTININE SERPL-MCNC: <1 NG/ML
NICOTINE SERPL-MCNC: <1 NG/ML

## 2021-12-27 ENCOUNTER — OFFICE VISIT (OUTPATIENT)
Dept: SURGERY | Facility: CLINIC | Age: 61
End: 2021-12-27
Payer: COMMERCIAL

## 2021-12-27 VITALS — DIASTOLIC BLOOD PRESSURE: 76 MMHG | SYSTOLIC BLOOD PRESSURE: 128 MMHG

## 2021-12-27 DIAGNOSIS — Z48.89 ENCOUNTER FOR POSTOPERATIVE CARE: Primary | ICD-10-CM

## 2021-12-27 PROCEDURE — 99024 POSTOP FOLLOW-UP VISIT: CPT | Performed by: PHYSICIAN ASSISTANT

## 2021-12-27 NOTE — LETTER
"    12/27/2021         RE: Dayana Samaniego  [address]  [place] [address]        Dear Colleague,    Thank you for referring your patient, Dayana Samaniego, to the [facility] [place]. Please see a copy of my visit note below.    HPI: Pt is here for follow up of a wound exploration and debridement left groin with Dr. Bunch on 12/13/2021.  Is doing fairly well.  She still having quite a bit of pain and is concerned about that.  No erythema or swelling.  No fevers.      /76     EXAM:  GENERAL:Appears well  ABDOMEN:  Soft, +BS  SURGICAL WOUNDS:  Incisions healing well, no enduration or drainage.  Normal healing process without any evidence of infection  MICROSCOPIC AND DIAGNOSIS:   SKIN AND SOFT TISSUE, \"WOUND TISSUE\", LEFT INGUINAL, BIOPSY:        - BENIGN SKIN WITH FOCAL DEEP DERMAL AND SUPERFICIAL SUBCUTANEOUS   ACUTE AND          CHRONIC INFLAMMATION, EXTENSIVE FIBROSIS AND FOCAL FOREIGN BODY   GIANT CELL          REACTION        - LINING EPIDERMIS WITH REACTIVE CHANGES, NO EVIDENCE OF NEOPLASTIC   PROCESS       Assessment/Plan: . Doing well after surgery and should follow up as needed.  Reviewed her pathology and we talked about most likely she had reactive changes from suture material from her previous hysterectomy.  Overall healing nicely.  Tejinder Lawrence PA-C PA-C  [facility] Department of Surgery          Again, thank you for allowing me to participate in the care of your patient.        Sincerely,        Tejinder Lawrence PA-C    "

## 2021-12-31 DIAGNOSIS — M79.7 FIBROMYALGIA: ICD-10-CM

## 2021-12-31 DIAGNOSIS — E03.9 ACQUIRED HYPOTHYROIDISM: ICD-10-CM

## 2022-01-03 RX ORDER — LEVOTHYROXINE SODIUM 75 UG/1
TABLET ORAL
Qty: 90 TABLET | Refills: 2 | Status: SHIPPED | OUTPATIENT
Start: 2022-01-03 | End: 2022-10-15

## 2022-01-04 NOTE — PROGRESS NOTES
"HPI: Pt is here for follow up of a wound exploration and debridement left groin with Dr. Bunch on 12/13/2021.  Is doing fairly well.  She still having quite a bit of pain and is concerned about that.  No erythema or swelling.  No fevers.      /76     EXAM:  GENERAL:Appears well  ABDOMEN:  Soft, +BS  SURGICAL WOUNDS:  Incisions healing well, no enduration or drainage.  Normal healing process without any evidence of infection  MICROSCOPIC AND DIAGNOSIS:   SKIN AND SOFT TISSUE, \"WOUND TISSUE\", LEFT INGUINAL, BIOPSY:        - BENIGN SKIN WITH FOCAL DEEP DERMAL AND SUPERFICIAL SUBCUTANEOUS   ACUTE AND          CHRONIC INFLAMMATION, EXTENSIVE FIBROSIS AND FOCAL FOREIGN BODY   GIANT CELL          REACTION        - LINING EPIDERMIS WITH REACTIVE CHANGES, NO EVIDENCE OF NEOPLASTIC   PROCESS       Assessment/Plan: . Doing well after surgery and should follow up as needed.  Reviewed her pathology and we talked about most likely she had reactive changes from suture material from her previous hysterectomy.  Overall healing nicely.  Tejinder Lawrence PA-C PA-C  [facility] Department of Surgery      "

## 2022-01-04 NOTE — TELEPHONE ENCOUNTER
"Routing refill request to provider for review/approval because:  D[place] [facility] [place]     Last Written Prescription Date:  10/19/2021  Last Fill Quantity: 120,  # refills: 1   Last office visit provider:  12/09/2021 with Dr Cope.    Requested Prescriptions   Pending Prescriptions Disp Refills     tiZANidine (ZANAFLEX) 4 MG tablet [Pharmacy Med Name: TIZANIDINE 4MG TABLETS] 120 tablet 1     Sig: TAKE 1 TO 2 TABLETS BY MOUTH DURING THE DAY AND 2 TABLETS AT BEDTIME       There is no refill protocol information for this order        levothyroxine (SYNTHROID/LEVOTHROID) 75 MCG tablet [Pharmacy Med Name: LEVOTHYROXINE 0.075MG (75MCG) TABS] 90 tablet 0     Sig: TAKE 1 TABLET(75 MCG) BY MOUTH DAILY       Thyroid Protocol Passed - 12/31/2021 11:26 PM        Passed - Patient is 12 years or older        Passed - Recent (12 mo) or future (30 days) visit within the authorizing provider's specialty     Patient has had an office visit with the authorizing provider or a provider within the authorizing providers department within the previous 12 mos or has a future within next 30 days. See \"Patient Info\" tab in inbasket, or \"Choose Columns\" in Meds & Orders section of the refill encounter.              Passed - Medication is active on med list        Passed - Normal TSH on file in past 12 months     Recent Labs   Lab Test 08/31/21  1304   TSH 1.10              Passed - No active pregnancy on record     If patient is pregnant or has had a positive pregnancy test, please check TSH.          Passed - No positive pregnancy test in past 12 months     If patient is pregnant or has had a positive pregnancy test, please check TSH.             Last Written Prescription Date:  08/16/2021-levothyroxine.  Last Fill Quantity: 90,  # refills: 0   Last office visit provider:  12/09/2021 with Dr Cope.    Requested Prescriptions   Pending Prescriptions Disp Refills     tiZANidine (ZANAFLEX) 4 MG tablet [Pharmacy Med Name: TIZANIDINE " "4MG TABLETS] 120 tablet 1     Sig: TAKE 1 TO 2 TABLETS BY MOUTH DURING THE DAY AND 2 TABLETS AT BEDTIME       There is no refill protocol information for this order        levothyroxine (SYNTHROID/LEVOTHROID) 75 MCG tablet [Pharmacy Med Name: LEVOTHYROXINE 0.075MG (75MCG) TABS] 90 tablet 0     Sig: TAKE 1 TABLET(75 MCG) BY MOUTH DAILY       Thyroid Protocol Passed - 12/31/2021 11:26 PM        Passed - Patient is 12 years or older        Passed - Recent (12 mo) or future (30 days) visit within the authorizing provider's specialty     Patient has had an office visit with the authorizing provider or a provider within the authorizing providers department within the previous 12 mos or has a future within next 30 days. See \"Patient Info\" tab in inbasket, or \"Choose Columns\" in Meds & Orders section of the refill encounter.              Passed - Medication is active on med list        Passed - Normal TSH on file in past 12 months     Recent Labs   Lab Test 08/31/21  1304   TSH 1.10              Passed - No active pregnancy on record     If patient is pregnant or has had a positive pregnancy test, please check TSH.          Passed - No positive pregnancy test in past 12 months     If patient is pregnant or has had a positive pregnancy test, please check TSH.               Joana Rhodes 01/03/22 10:41 PM      "

## 2022-01-11 ENCOUNTER — TRANSFERRED RECORDS (OUTPATIENT)
Dept: HEALTH INFORMATION MANAGEMENT | Facility: CLINIC | Age: 62
End: 2022-01-11
Payer: COMMERCIAL

## 2022-01-14 ENCOUNTER — TRANSFERRED RECORDS (OUTPATIENT)
Dept: HEALTH INFORMATION MANAGEMENT | Facility: CLINIC | Age: 62
End: 2022-01-14
Payer: COMMERCIAL

## 2022-01-16 DIAGNOSIS — G43.019 COMMON MIGRAINE WITH INTRACTABLE MIGRAINE: ICD-10-CM

## 2022-01-17 RX ORDER — ERENUMAB-AOOE 70 MG/ML
INJECTION SUBCUTANEOUS
Qty: 1 ML | Refills: 11 | OUTPATIENT
Start: 2022-01-17

## 2022-01-17 NOTE — TELEPHONE ENCOUNTER
Refill request for Aimovig. Pt last seen 11/30/21 and will be due for follow up around 11/2022. Pt was sent with a 1 month supply with 11 refills at last appt. Refills should already be on file with pharmacy. Will deny this request.     Germaine Melendez RN on 1/17/2022 at 9:38 AM

## 2022-01-18 ENCOUNTER — TELEPHONE (OUTPATIENT)
Dept: NEUROLOGY | Facility: CLINIC | Age: 62
End: 2022-01-18
Payer: COMMERCIAL

## 2022-01-18 NOTE — TELEPHONE ENCOUNTER
Prior Authorization Retail Medication Request    Medication/Dose: AIMOVIG  ICD code (if different than what is on RX):    Previously Tried and Failed:    Rationale:      Insurance Name:    Insurance ID:        Pharmacy Information (if different than what is on RX)  Name:    Phone:

## 2022-01-18 NOTE — TELEPHONE ENCOUNTER
[facility] Call Center    Phone Message    May a detailed message be left on voicemail: yes     Reason for Call: Medication Question or concern regarding medication   Prescription Clarification  Name of Medication: erenumab-aooe (AIMOVIG) 70 MG/ML injection  Prescribing Provider: Dr. Schmitt   Pharmacy: [id number] [address] [place] [facility] [address]   What on the order needs clarification? Pharmacy reporting to pt that a prior auth is needed for this script, please.   Thank you.          Action Taken: Message routed to:  Other: [facility]    Travel Screening: Not Applicable

## 2022-01-20 ENCOUNTER — OFFICE VISIT (OUTPATIENT)
Dept: FAMILY MEDICINE | Facility: CLINIC | Age: 62
End: 2022-01-20
Payer: COMMERCIAL

## 2022-01-20 VITALS
WEIGHT: 169 LBS | BODY MASS INDEX: 28.16 KG/M2 | RESPIRATION RATE: 18 BRPM | HEIGHT: 65 IN | DIASTOLIC BLOOD PRESSURE: 80 MMHG | OXYGEN SATURATION: 97 % | SYSTOLIC BLOOD PRESSURE: 130 MMHG | HEART RATE: 91 BPM

## 2022-01-20 DIAGNOSIS — Z01.818 PREOP GENERAL PHYSICAL EXAM: Primary | ICD-10-CM

## 2022-01-20 DIAGNOSIS — J43.2 CENTRILOBULAR EMPHYSEMA (H): Chronic | ICD-10-CM

## 2022-01-20 DIAGNOSIS — F11.90 CHRONIC NARCOTIC USE: ICD-10-CM

## 2022-01-20 DIAGNOSIS — Z98.1 STATUS POST LUMBAR SPINAL FUSION: ICD-10-CM

## 2022-01-20 DIAGNOSIS — N18.31 STAGE 3A CHRONIC KIDNEY DISEASE (H): Chronic | ICD-10-CM

## 2022-01-20 DIAGNOSIS — G89.4 CHRONIC PAIN SYNDROME: Chronic | ICD-10-CM

## 2022-01-20 DIAGNOSIS — F31.81 BIPOLAR 2 DISORDER (H): Chronic | ICD-10-CM

## 2022-01-20 DIAGNOSIS — M85.89 OSTEOPENIA OF MULTIPLE SITES: Chronic | ICD-10-CM

## 2022-01-20 DIAGNOSIS — E78.2 MIXED HYPERLIPIDEMIA: Chronic | ICD-10-CM

## 2022-01-20 DIAGNOSIS — E03.9 ACQUIRED HYPOTHYROIDISM: Chronic | ICD-10-CM

## 2022-01-20 PROBLEM — M51.379 DDD (DEGENERATIVE DISC DISEASE), LUMBOSACRAL: Chronic | Status: ACTIVE | Noted: 2021-03-22

## 2022-01-20 LAB
ALBUMIN SERPL-MCNC: 3.9 G/DL (ref 3.5–5)
ALP SERPL-CCNC: 131 U/L (ref 45–120)
ALT SERPL W P-5'-P-CCNC: 16 U/L (ref 0–45)
ANION GAP SERPL CALCULATED.3IONS-SCNC: 15 MMOL/L (ref 5–18)
AST SERPL W P-5'-P-CCNC: 19 U/L (ref 0–40)
BILIRUB SERPL-MCNC: 0.6 MG/DL (ref 0–1)
BUN SERPL-MCNC: 16 MG/DL (ref 8–22)
CALCIUM SERPL-MCNC: 9.5 MG/DL (ref 8.5–10.5)
CHLORIDE BLD-SCNC: 104 MMOL/L (ref 98–107)
CO2 SERPL-SCNC: 19 MMOL/L (ref 22–31)
CREAT SERPL-MCNC: 1.16 MG/DL (ref 0.6–1.1)
CREAT UR-MCNC: 243 MG/DL
ERYTHROCYTE [DISTWIDTH] IN BLOOD BY AUTOMATED COUNT: 14.8 % (ref 10–15)
GFR SERPL CREATININE-BSD FRML MDRD: 53 ML/MIN/1.73M2
GLUCOSE BLD-MCNC: 120 MG/DL (ref 70–125)
HCT VFR BLD AUTO: 36.5 % (ref 35–47)
HGB BLD-MCNC: 11.5 G/DL (ref 11.7–15.7)
MCH RBC QN AUTO: 26.7 PG (ref 26.5–33)
MCHC RBC AUTO-ENTMCNC: 31.5 G/DL (ref 31.5–36.5)
MCV RBC AUTO: 85 FL (ref 78–100)
MICROALBUMIN UR-MCNC: 1.09 MG/DL (ref 0–1.99)
MICROALBUMIN/CREAT UR: 4.5 MG/G CR
PLATELET # BLD AUTO: 291 10E3/UL (ref 150–450)
POTASSIUM BLD-SCNC: 4.3 MMOL/L (ref 3.5–5)
PROT SERPL-MCNC: 7.3 G/DL (ref 6–8)
RBC # BLD AUTO: 4.3 10E6/UL (ref 3.8–5.2)
SODIUM SERPL-SCNC: 138 MMOL/L (ref 136–145)
WBC # BLD AUTO: 8.6 10E3/UL (ref 4–11)

## 2022-01-20 PROCEDURE — 36415 COLL VENOUS BLD VENIPUNCTURE: CPT | Performed by: FAMILY MEDICINE

## 2022-01-20 PROCEDURE — 82043 UR ALBUMIN QUANTITATIVE: CPT | Performed by: FAMILY MEDICINE

## 2022-01-20 PROCEDURE — 80053 COMPREHEN METABOLIC PANEL: CPT | Performed by: FAMILY MEDICINE

## 2022-01-20 PROCEDURE — 99214 OFFICE O/P EST MOD 30 MIN: CPT | Performed by: FAMILY MEDICINE

## 2022-01-20 PROCEDURE — 85027 COMPLETE CBC AUTOMATED: CPT | Performed by: FAMILY MEDICINE

## 2022-01-20 RX ORDER — BUPROPION HYDROCHLORIDE 75 MG/1
75 TABLET ORAL 2 TIMES DAILY
Status: ON HOLD | COMMUNITY
Start: 2022-01-03 | End: 2022-12-30

## 2022-01-20 ASSESSMENT — MIFFLIN-ST. JEOR: SCORE: 1319.52

## 2022-01-20 NOTE — TELEPHONE ENCOUNTER
Prior Authorization Not Needed per Insurance    Medication: AIMOVIG  Insurance Company: [employer] - Phone [phone] Fax [phone]  Expected CoPay:      Pharmacy Filling the Rx: [facility] #[id number] [place], [place] - [address] AT [facility] [address]  Pharmacy Notified: Yes  Patient Notified: Yes    An active PA is already on file with expiration date of 07/14/2022. Please wait to resubmit request within 60 days of that expiration date to obtain a PA renewal.  Spoke with pharmacy- medication went through insurance, patient has high deductible.

## 2022-01-20 NOTE — LETTER
January 21, 2022      Dayana Samaniego  [address]  [place] [address]        Dear ,    We are writing to inform you of your test results.    Your kidney function tests show a small decline, likely from using NSAIDs.  Your hemoglobin was also a bit lower than last check.  Perhaps due to past surgery?  Your other labs are normal.  See you in 6 months or sooner if you need.    Resulted Orders   Comprehensive metabolic panel   Result Value Ref Range    Sodium 138 136 - 145 mmol/L    Potassium 4.3 3.5 - 5.0 mmol/L    Chloride 104 98 - 107 mmol/L    Carbon Dioxide (CO2) 19 (L) 22 - 31 mmol/L    Anion Gap 15 5 - 18 mmol/L    Urea Nitrogen 16 8 - 22 mg/dL    Creatinine 1.16 (H) 0.60 - 1.10 mg/dL    Calcium 9.5 8.5 - 10.5 mg/dL    Glucose 120 70 - 125 mg/dL    Alkaline Phosphatase 131 (H) 45 - 120 U/L    AST 19 0 - 40 U/L    ALT 16 0 - 45 U/L    Protein Total 7.3 6.0 - 8.0 g/dL    Albumin 3.9 3.5 - 5.0 g/dL    Bilirubin Total 0.6 0.0 - 1.0 mg/dL    GFR Estimate 53 (L) >60 mL/min/1.73m2      Comment:      Effective December 21, 2021 eGFRcr in adults is calculated using the 2021 CKD-EPI creatinine equation which includes age and gender (Mark et al., NEJM, DOI: 10.1056/VZIKlf4401977)   Albumin Random Urine Quantitative with Creat Ratio   Result Value Ref Range    Microalbumin Urine mg/dL 1.09 0.00 - 1.99 mg/dL    Creatinine Urine mg/dL 243 mg/dL    Microalbumin Urine mg/g Cr 4.5 <=19.9 mg/g Cr    Narrative    Microalbumin, Random Urine   <2.0 mg/dL . . . . . . . . Normal   3.0-30.0 mg/dL . . . . . . Microalbuminuria   >30.0 mg/dL . . . . . .  . Clinical Proteinuria     Microalbumin/Creatinine Ratio, Random Urine   <20 mg/g . . . . .. . . . Normal    mg/g . . . . . . . Microalbuminuria   >300 mg/g . . . . . . . . Clinical Proteinuria   CBC with platelets   Result Value Ref Range    WBC Count 8.6 4.0 - 11.0 10e3/uL    RBC Count 4.30 3.80 - 5.20 10e6/uL    Hemoglobin 11.5 (L) 11.7 - 15.7 g/dL     Hematocrit 36.5 35.0 - 47.0 %    MCV 85 78 - 100 fL    MCH 26.7 26.5 - 33.0 pg    MCHC 31.5 31.5 - 36.5 g/dL    RDW 14.8 10.0 - 15.0 %    Platelet Count 291 150 - 450 10e3/uL       If you have any questions or concerns, please call the clinic at the number listed above.       Sincerely,      Joanne Weiner MD

## 2022-01-20 NOTE — PATIENT INSTRUCTIONS
Preparing for Your Surgery  Getting started  A nurse will call you to review your health history and instructions. They will give you an arrival time based on your scheduled surgery time. Please be ready to share:    Your doctor's clinic name and phone number    Your medical, surgical and anesthesia history    A list of allergies and sensitivities    A list of medicines, including herbal treatments and over-the-counter drugs    Whether the patient has a legal guardian (ask how to send us the papers in advance)  Please tell us if you're pregnant--or if there's any chance you might be pregnant. Some surgeries may injure a fetus (unborn baby), so they require a pregnancy test. Surgeries that are safe for a fetus don't always need a test, and you can choose whether to have one.   If you have a child who's having surgery, please ask for a copy of Preparing for Your Child's Surgery.    Preparing for surgery    Within 30 days of surgery: Have a pre-op exam (sometimes called an H&P, or History and Physical). This can be done at a clinic or pre-operative center.  ? If you're having a , you may not need this exam. Talk to your care team.    At your pre-op exam, talk to your care team about all medicines you take. If you need to stop any medicines before surgery, ask when to start taking them again.  ? We do this for your safety. Many medicines can make you bleed too much during surgery. Some change how well surgery (anesthesia) drugs work.    Call your insurance company to let them know you're having surgery. (If you don't have insurance, call [phone].)    Call your clinic if there's any change in your health. This includes signs of a cold or flu (sore throat, runny nose, cough, rash, fever). It also includes a scrape or scratch near the surgery site.    If you have questions on the day of surgery, call your hospital or surgery center.  COVID testing  You may need to be tested for COVID-19 before having  surgery. If so, your surgical team will give you instructions for scheduling this test, separate from your preoperative history and physical.  Eating and drinking guidelines  For your safety: Unless your surgeon tells you otherwise, follow the guidelines below.    Eat and drink as usual until 8 hours before surgery. After that, no food or milk.    Drink clear liquids until 2 hours before surgery. These are liquids you can see through, like water, Gatorade and Propel Water. You may also have black coffee and tea (no cream or milk).    Nothing by mouth within 2 hours of surgery. This includes gum, candy and breath mints.    If you drink alcohol: Stop drinking it the night before surgery.    If your care team tells you to take medicine on the morning of surgery, it's okay to take it with a sip of water.  Preventing infection    Shower or bathe the night before and morning of your surgery. Follow the instructions your clinic gave you. (If no instructions, use regular soap.)    Don't shave or clip hair near your surgery site. We'll remove the hair if needed.    Don't smoke or vape the morning of surgery. You may chew nicotine gum up to 2 hours before surgery. A nicotine patch is okay.  ? Note: Some surgeries require you to completely quit smoking and nicotine. Check with your surgeon.    Your care team will make every effort to keep you safe from infection. We will:  ? Clean our hands often with soap and water (or an alcohol-based hand rub).  ? Clean the skin at your surgery site with a special soap that kills germs.  ? Give you a special gown to keep you warm. (Cold raises the risk of infection.)  ? Wear special hair covers, masks, gowns and gloves during surgery.  ? Give antibiotic medicine, if prescribed. Not all surgeries need antibiotics.  What to bring on the day of surgery    Photo ID and insurance card    Copy of your health care directive, if you have one    Glasses and hearing aides (bring cases)  ? You can't  wear contacts during surgery    Inhaler and eye drops, if you use them (tell us about these when you arrive)    CPAP machine or breathing device, if you use them    A few personal items, if spending the night    If you have . . .  ? A pacemaker, ICD (cardiac defibrillator) or other implant: Bring the ID card.  ? An implanted stimulator: Bring the remote control.  ? A legal guardian: Bring a copy of the certified (court-stamped) guardianship papers.  Please remove any jewelry, including body piercings. Leave jewelry and other valuables at home.  If you're going home the day of surgery    You must have a responsible adult drive you home. They should stay with you overnight as well.    If you don't have someone to stay with you, and you aren't safe to go home alone, we may keep you overnight. Insurance often won't pay for this.  After surgery  If it's hard to control your pain or you need more pain medicine, please call your surgeon's office.  Questions?   If you have any questions for your care team, list them here: _________________________________________________________________________________________________________________________________________________________________________ ____________________________________ ____________________________________ ____________________________________  For informational purposes only. Not to replace the advice of your health care provider. Copyright   2003, 2019 [facility]. All rights reserved. Clinically reviewed by Keysha Palumbo MD. [employer] [id number] - REV 07/21.    Before Your Procedure or Hospital Admission  Testing for COVID-19 (Coronavirus)  Thank you for choosing [facility] for your health care needs. The COVID-19 pandemic is a very challenging time for everyone.   Our goal is to keep you and our team here at [facility] safe and healthy. We've taken many steps to make this happen. For example:    We test and screen our staff, care teams and  "patients for COVID-19.    Everyone at [facility] must wear a mask and stay 6 feet apart.    We are limiting hospital and clinic visitors.  Before you come in  All patients must get tested for COVID-19. Your test needs to happen 2 to 4 days before you check in to the hospital or surgery site.   A clinic scheduler will call about a week in advance to set up a testing time at one of our labs. We'll take a swab of your nose.  Note: If you go to a clinic or pharmacy like [facility] or [facility] for your test, make sure you get a test inside the nose. Tests inside the nose are:    A naso-pharyngeal (NP) RT-PCR test    An anterior nares RT-PCR test  Do NOT get a \"rapid\" test or a saliva (spit) test.  After the test, please stay at home and stay out of contact with other people. It will be harder for you to recover if you get COVID-19 before your treatment.  Please follow all current safety guidelines, including:    Limit trips outside your home.    Limit the number of people you see.    Always wear a mask outside your home.    Use social distancing. Stay 6 feet away from others whenever you can.    Wash your hands often.  If your test shows you have COVID-19  If your test is positive, we'll let you know. A positive test means that you have the virus.   We'll probably have to postpone your admission, surgery or procedure. Your doctor will discuss this with you. After that, we'll let you know what to do and when you can re-schedule.   We may need to cancel your treatment on short notice for other reasons, too.  If your test shows you DON'T have COVID-19  Even if your test is negative, you can still get COVID-19. It's rare but, sometimes, the test result is wrong. You could also catch the virus after taking the test.   There's a very small chance that you could catch COVID-19 in the hospital or surgery center. [facility] has taken many steps to prevent this from happening.   Day of your surgery or procedure    Please " come wearing a face covering that covers both your nose and mouth.    When you arrive, we'll ask you some questions to find out if you have any signs or symptoms of COVID-19.    Ask your care team if you can have visitors. All visitors must wear face coverings and will be screened for signs of COVID-19.  ? Even if no visitors are allowed, you can still have with you:    Your legal guardian or legal decision maker    A parent and one other visitor, if you are younger than 18 years old    A partner and a , if you are in labor  ? We might need to teach you about taking care of yourself after surgery. If so, a visitor can come into the hospital to learn about it, too.  ? The rules for visitors change often, depending on how much the virus is spreading. To learn more, see Visiting a Loved One in the Hospital during the COVID-19 Outbreak.  Please call your care team, hospital or surgery center if you have any questions. We thank you for your understanding and for choosing [facility] for your care.   Possible surgery delay    Like you, we want your surgery to happen when it's scheduled. But sometimes the hospital is so full that it's not safe for you to have your surgery. This is especially true during the pandemic. Your surgery may need to be re-scheduled at a later date. If this happens, we will call and tell you.  Questions and answers  Does it matter where I get tested for COVID-19?  Yes. We urge you to get tested at one of our [facility] COVID-19 testing sites. We process these tests in our lab and can get the results quickly. Your [facility] care team needs to get your results before you check in.  What should I do if I can't get tested at [facility]?  You can get tested somewhere else, but you'll need to take these extra steps:   1. Contact your family doctor or clinic to arrange your test.  2. Take the test within 4 days of your surgery or procedure. We can't accept tests older than  "4 days.  3. Make sure you're getting a test inside the nose. Tests inside the nose are:  ? A naso-pharyngeal (NP) RT-PCR test  ? An anterior nares RT-PCR test  Many pharmacies use \"rapid\" tests or saliva (spit) tests. We do NOT accept those tests before surgery or procedures. Tests from inside the nose are the most accurate tests.  1. Make sure your doctor or clinic faxes your results to [facility] at [phone].  If we don't get your results in time, we may have to delay or cancel your treatment.  For informational purposes only. Not to replace the advice of your health care provider. Copyright   2020 [employer]. All rights reserved. Clinically reviewed by In[facility] and the [facility] COVID-19 Clinical Team. [employer] 847998 - Rev 01/12/22.    How to Take Your Medication Before Surgery  - Take all of your medications before surgery except as noted below  - HOLD (do not take) NSAIDs  - STOP taking all vitamins and herbal supplements 14 days before surgery.  "

## 2022-01-20 NOTE — PROGRESS NOTES
[facility]  [address]  [place] [facility] [address]  Phone: [phone]  Fax: [phone]  Primary Provider: Patrice Sanon  Pre-op Performing Provider: RAZ REDDY      PREOPERATIVE EVALUATION:  Today's date: 1/20/2022    Dayana Samaniego is a 61 year old female who presents for a preoperative evaluation.    Surgical Information:  Surgery/Procedure:.  Lumbar Fusion  Surgery Location: [facility]  Surgeon: Dr. Sanon  Surgery Date: TBD  Time of Surgery: TBD  Where patient plans to recover: At home with family  Fax number for surgical facility: [phone]    Type of Anesthesia Anticipated: General    Preop general physical exam  Patient is cleared for surgery without further evaluation.  High risk factors include chronic narcotic use and polypharmacy.  - CBC with platelets  - Asymptomatic COVID-19 Virus (Coronavirus) by PCR Nasopharyngeal  - CBC with platelets    Status post lumbar spinal fusion  Patient continuing with pain and requires reevaluation with surgery.    Chronic pain syndrome  Fibromyalgia, degenerative disc disease, migraine headache.  Uses gabapentin, tizanidine, AIMOVIG    Chronic narcotic use  Longtime patient at [facility] pain clinic.  Patient reports taking 30 mg hydrocodone daily.    Centrilobular emphysema (H)  Has quit smoking in order to have this surgery.    Bipolar 2 disorder (H)  Regularly seen by Dr. Zeng.  Currently on bupropion, lamotrigine, fluoxetine and doxepin.    Mixed hyperlipidemia  Controlled, last LDL 95    Acquired hypothyroidism  Controlled.    Osteopenia of multiple sites  Not currently actively treated.    Stage 3a chronic kidney disease (H)  Minimal and likely secondary to NSAID use.  - Albumin Random Urine Quantitative with Creat Ratio  - Basic metabolic panel  - Albumin Random Urine Quantitative with Creat Ratio        Subjective     HPI related to upcoming procedure: Patient has  a long history of chronic pain and subsequent previous vertebrae fusions due to back pain.  3/17/2021 she had anterior and posterior spine fusion L4- S1 and decompression.  She is having persistent pain so the surgeon is going to go back in posteriorly and remove and replace hardware per patient.  They have been waiting for her to get nicotine testing as she needs to quit smoking in order to qualify for this surgery.  She also may need to wait due to pandemic and the suspension of all elective surgeries.    Preop Questions 1/20/2022   1. Have you ever had a heart attack or stroke? No   2. Have you ever had surgery on your heart or blood vessels, such as a stent placement, a coronary artery bypass, or surgery on an artery in your head, neck, heart, or legs? No   3. Do you have chest pain with activity? No   4. Do you have a history of  heart failure? No   5. Do you currently have a cold, bronchitis or symptoms of other infection? No   6. Do you have a cough, shortness of breath, or wheezing? No   7. Do you or anyone in your family have previous history of blood clots? No   8. Do you or does anyone in your family have a serious bleeding problem such as prolonged bleeding following surgeries or cuts? No   9. Have you ever had problems with anemia or been told to take iron pills? No   10. Have you had any abnormal blood loss such as black, tarry or bloody stools, or abnormal vaginal bleeding? No   11. Have you ever had a blood transfusion? No   12. Are you willing to have a blood transfusion if it is medically needed before, during, or after your surgery? Yes   13. Have you or any of your relatives ever had problems with anesthesia? YES - self and daughter--N/V,HA   14. Do you have sleep apnea, excessive snoring or daytime drowsiness? No   15. Do you have any artifical heart valves or other implanted medical devices like a pacemaker, defibrillator, or continuous glucose monitor? No   16. Do you have artificial joints?  No   17. Are you allergic to latex? No       Health Care Directive:  Patient does not have a Health Care Directive or Living Will: Discussed advance care planning with patient; information given to patient to review.    Preoperative Review of :   reviewed - controlled substances prescribed by other outside provider(s).  Patient is seen at [facility] pain clinic.      Review of Systems  CONSTITUTIONAL: NEGATIVE for fever, chills, change in weight  INTEGUMENTARY/SKIN: NEGATIVE for worrisome rashes, moles or lesions  EYES: NEGATIVE for vision changes or irritation  ENT/MOUTH: NEGATIVE for ear, mouth and throat problems  RESP:POSITIVE for Intermittent, dyspnea on exertion and smoking  CV: NEGATIVE for chest pain, palpitations or peripheral edema  GI: NEGATIVE for nausea, abdominal pain, heartburn, or change in bowel habits   female: Mild incontinence-urge, incontinence-stress  MUSCULOSKELETAL:POSITIVE  for arthralgias, back pain (mostly lumbar) and muscle spasm in the legs intermittently.  NEURO: POSITIVE for numbness or tingling In fingers and vertigo  ENDOCRINE: NEGATIVE for temperature intolerance, skin/hair changes  HEME: NEGATIVE for bleeding problems  PSYCHIATRIC: NEGATIVE for changes in mood or affect    Patient Active Problem List    Diagnosis Date Noted     Draining cutaneous sinus tract 12/01/2021     Priority: Medium     Added automatically from request for surgery [id number]       Abnormal reflex 09/08/2021     Priority: Medium     Asymptomatic postmenopausal status 08/31/2021     Priority: Medium     Formatting of this note might be different from the original.  Created by Conversion    Replacement Utility updated for latest IMO load       Menopausal disorder 08/31/2021     Priority: Medium     Formatting of this note might be different from the original.  Created by Conversion    Replacement Utility updated for latest IMO load       Migraine headache 08/31/2021     Priority: Medium     Formatting of  this note might be different from the original.  Created by Conversion    Replacement Utility updated for latest IMO load       Chronic pain syndrome 08/31/2021     Priority: Medium     DDD (degenerative disc disease), lumbosacral 03/22/2021     Priority: Medium     Spinal stenosis of lumbar region with neurogenic claudication 03/22/2021     Priority: Medium     Pain in right leg 03/21/2021     Priority: Medium     Other specified disorders of bone density and structure, multiple sites 03/21/2021     Priority: Medium     Other abnormalities of gait and mobility 03/21/2021     Priority: Medium     Moderate persistent asthma, uncomplicated 03/21/2021     Priority: Medium     Low back pain 03/21/2021     Priority: Medium     Encounter for other orthopedic aftercare 03/21/2021     Priority: Medium     Chronic obstructive pulmonary disease, unspecified (H) 03/21/2021     Priority: Medium     Anemia 03/18/2021     Priority: Medium     Hypotension 03/18/2021     Priority: Medium     Fibromyalgia 03/17/2021     Priority: Medium     Formatting of this note might be different from the original.  Created by Conversion    Formatting of this note might be different from the original.  Created by Conversion    Formatting of this note might be different from the original.  Formatting of this note might be different from the original.  Created by Conversion    Formatting of this note might be different from the original.  Created by Conversion  Formatting of this note might be different from the original.  Formatting of this note might be different from the original.  Created by Conversion    Formatting of this note might be different from the original.  Created by Conversion       Hyperlipidemia 03/17/2021     Priority: Medium     Formatting of this note might be different from the original.  Created by Conversion    Formatting of this note might be different from the original.  Formatting of this note might be different from the  original.  Created by Conversion  Formatting of this note might be different from the original.  Formatting of this note might be different from the original.  Created by Conversion       Hypothyroidism 03/17/2021     Priority: Medium     Formatting of this note might be different from the original.  Created by Conversion    Replacement Utility updated for latest IMO load    Formatting of this note might be different from the original.  Formatting of this note might be different from the original.  Created by Conversion    Replacement Utility updated for latest IMO load  Formatting of this note might be different from the original.  Formatting of this note might be different from the original.  Created by Conversion    Replacement Utility updated for latest IMO load       Common migraine with intractable migraine 02/15/2021     Priority: Medium     Chronic kidney disease, stage 3 (H) 01/14/2021     Priority: Medium     Created by Conversion         Osteopenia of multiple sites 09/01/2020     Priority: Medium     Centrilobular emphysema (H) 02/26/2018     Priority: Medium     Formatting of this note might be different from the original.  Mild, seen in 2018 CT scan, DLCO 60% predicted    Formatting of this note might be different from the original.  Formatting of this note might be different from the original.  Mild, seen in 2018 CT scan, DLCO 60% predicted  Formatting of this note might be different from the original.  Formatting of this note might be different from the original.  Mild, seen in 2018 CT scan, DLCO 60% predicted       Hiatal hernia 02/16/2017     Priority: Medium     Lung nodule 08/04/2016     Priority: Medium     Formatting of this note might be different from the original.  8/4/2016:  Left upper lobe nodule of 6.5 mm, likely benign given slow growth per radiologist.  See CT  6/27/2011:  measured  approximately 5.5 mm in greatest dimension       Bipolar 2 disorder (H) 06/01/2015     Priority: Medium      Formatting of this note might be different from the original.  Created by Conversion    Replacement Utility updated for latest IMO load    Formatting of this note might be different from the original.  MED TRIALS:  Doxepin has been helpful for sleep & fibromyalgia.  Topamax- caused cognitive slowing & poor concentration. Depakote caused wt gain. Seroquel was sedating. Trazodone caused insomnia. Has tried mellaril, effexor, depakote, remeron, buspar, risperdal,zyprexa, celexa,luvox - unsure what happened with these.  Formatting of this note might be different from the original.  MED TRIALS:  Doxepin has been helpful for sleep & fibromyalgia.  Topamax- caused cognitive slowing & poor concentration. Depakote caused wt gain. Seroquel was sedating. Trazodone caused insomnia. Has tried mellaril, effexor, depakote, remeron, buspar, risperdal,zyprexa, celexa,luvox - unsure what happened with these.       Borderline personality disorder (H) 06/01/2015     Priority: Medium     PTSD (post-traumatic stress disorder) 06/01/2015     Priority: Medium     GERD (gastroesophageal reflux disease) 10/16/2012     Priority: Medium     Formatting of this note might be different from the original.  Formatting of this note might be different from the original.  coegd 07/17/12, normal screening  Formatting of this note might be different from the original.  coegd 07/17/12, normal screening        Past Medical History:   Diagnosis Date     Abnormality of gait     Created by Conversion      Anemia 3/18/2021     Anxiety      Asthma      Asymptomatic postmenopausal status     Created by Conversion  Replacement Utility updated for latest IMO load     Bipolar 2 disorder (H)      Centrilobular emphysema (H) 2/26/2018    Mild, seen in 2018 CT scan, DLCO 60% predicted  Formatting of this note might be different from the original. Formatting of this note might be different from the original. Mild, seen in 2018 CT scan, DLCO 60% predicted      Cervical spinal stenosis     s/p cervical fusion     Chronic kidney disease      Chronic kidney disease, stage 3 (H) 1/14/2021     Chronic pain syndrome      Cigarette nicotine dependence without complication 3/4/2020     Common migraine with intractable migraine 2/15/2021     COPD (chronic obstructive pulmonary disease) (H)      DDD (degenerative disc disease), lumbosacral 3/22/2021     Depression      Fibrocystic breast      Fibromyalgia      GERD (gastroesophageal reflux disease)      Hallux abductovalgus with bunions, unspecified laterality 12/14/2015     Herpes zoster     Created by Conversion  Replacement Utility updated for latest IMO load     Hiatal hernia      History of electroconvulsive therapy      Hyperlipidemia 3/17/2021    Created by Conversion   Formatting of this note might be different from the original. Formatting of this note might be different from the original. Created by Conversion     Hypotension 3/18/2021     Hypothyroidism     hypothyroidism     IBS (irritable bowel syndrome)      Lung nodule 8/4/2016 8/4/2016:  Left upper lobe nodule of 6.5 mm, likely benign given slow growth per radiologist.  See CT 6/27/2011:  measured  approximately 5.5 mm in greatest dimension      Menopausal and postmenopausal disorder     Created by Conversion  Replacement Utility updated for latest IMO load     Migraine     Created by Conversion  Replacement Utility updated for latest IMO load     Migraines      Osteoarthritis      Osteopenia of multiple sites 9/1/2020     Other chronic pain      PONV (postoperative nausea and vomiting)      PTSD (post-traumatic stress disorder)      Shingles 2014    on back     Spinal stenosis of lumbar region with neurogenic claudication 3/22/2021     Tobacco use disorder     Created by Conversion      Past Surgical History:   Procedure Laterality Date     BIOPSY BREAST Left     Approx 5 bx     BUNIONECTOMY Right 12/15/2015    Procedure: MODIFIED LAI BUNIONECTOMY RIGHT  FOOT;  Surgeon: Jerome Calvin DPM;  Location: [place] [facility];  Service:      CERVICAL FUSION       CERVICAL FUSION Bilateral 2/16/2015    Procedure: ANTERIOR CERVICAL DECOMPRESSION/FUSION C3-5 BILATERAL, ANTERIOR HARDWARE REMOVAL C5-7 BILATERAL ;  Surgeon: Sawyer Roland MD;  Location: [facility] OR;  Service:      HC NIPPLE EXPLORATION      Description: Breast Nipple Explor W/ Excision Solitary Lactiferous Duct;  Recorded: 04/10/2008;     HYSTERECTOMY       IR CERVICAL EPIDURAL STEROID INJECTION  9/17/2003     IR CERVICAL EPIDURAL STEROID INJECTION  12/11/2003     IR CERVICAL EPIDURAL STEROID INJECTION  1/16/2004     IRRIGATION AND DEBRIDEMENT DECUBITUS WOUND, COMBINED Left 12/13/2021    Procedure: Wound exploration and debridement of Left Lower Abdomin Chronic wound.;  Surgeon: Crispin Bunch MD;  Location: [place] [facility]     LUMBAR DISCECTOMY      X2     MAMMOPLASTY AUGMENTATION Bilateral      approx 2006?     PELVIC LAPAROSCOPY      multiple     SHOULDER OPEN ROTATOR CUFF REPAIR Left     X2     ZZC TOTAL ABDOM HYSTERECTOMY      Description: Total Abdominal Hysterectomy;  Recorded: 06/10/2013;     Current Outpatient Medications   Medication Sig Dispense Refill     albuterol (PROAIR HFA) 108 (90 Base) MCG/ACT inhaler Inhale 2 puffs into the lungs every 4 hours 18 g 1     atorvastatin (LIPITOR) 40 MG tablet TAKE 1 TABLET(40 MG) BY MOUTH AT BEDTIME 90 tablet 3     benzonatate (TESSALON) 100 MG capsule TAKE 1 CAPSULE(100 MG) BY MOUTH THREE TIMES DAILY AS NEEDED FOR COUGH 30 capsule 0     buPROPion (WELLBUTRIN) 75 MG tablet TAKE 1/2 TABLET BY MOUTH TWICE DAILY FOR 1 WEEK THEN TAKE 1 TABLET BY MOUTH TWICE DAILY       doxepin (SINEQUAN) 100 MG capsule Take 75 mg by mouth At Bedtime        eletriptan (RELPAX) 40 MG tablet Take 1 tablet (40 mg) by mouth at onset of headache for migraine 12 tablet 3     erenumab-aooe (AIMOVIG) 70 MG/ML injection ADMINISTER 1 ML(70 MG) UNDER THE SKIN EVERY MONTH  1 mL 11     ergocalciferol (ERGOCALCIFEROL) 1.25 MG (78738 UT) capsule Take 1 capsule by mouth       esomeprazole (NEXIUM) 20 MG DR capsule Take 2 capsules by mouth every 24 hours       FLUoxetine (PROZAC) 40 MG capsule take 2 capsules by oral route at bedtime       fluticasone (FLONASE) 50 MCG/ACT nasal spray Spray 1 spray into both nostrils daily 16 g 3     folic acid (FOLVITE) 400 MCG tablet take 1 tablet by oral route  every day at HS       gabapentin (NEURONTIN) 300 MG capsule take 2 capsule by oral route 2 times every day. Follow titration instructions given in clinic.       HYDROcodone-acetaminophen (NORCO)  MG per tablet TAKE 1 TABLET BY MOUTH EVERY 4 TO 6 HOURS AS NEEDED FOR CHRONIC PAIN. MAX 4 PER DAY. START 9/7       hydrOXYzine (VISTARIL) 25 MG capsule take two every night and take one during the day as needed       lamoTRIgine (LAMICTAL) 200 MG tablet Take 1 tablet by mouth 2 times daily        levothyroxine (SYNTHROID/LEVOTHROID) 75 MCG tablet TAKE 1 TABLET(75 MCG) BY MOUTH DAILY 90 tablet 2     montelukast (SINGULAIR) 10 MG tablet Take 1 tablet (10 mg) by mouth every 24 hours 90 tablet 1     prazosin (MINIPRESS) 2 MG capsule Take 2 capsules by mouth every 24 hours       prochlorperazine (COMPAZINE) 10 MG tablet every 6 hours as needed        senna-docusate (SENOKOT-S/PERICOLACE) 8.6-50 MG tablet Take 2 tablets by mouth       tiotropium (SPIRIVA RESPIMAT) 2.5 MCG/ACT inhaler Inhale 2 puffs into the lungs daily 4 g 6     tiZANidine (ZANAFLEX) 4 MG tablet TAKE 1 TO 2 TABLETS BY MOUTH DURING THE DAY AND 2 TABLETS AT BEDTIME 120 tablet 1     varenicline (CHANTIX) 1 MG tablet take 1 tablet by oral route 2 times every day with glass of water after meals         Allergies   Allergen Reactions     Codeine Anaphylaxis     Nefazodone Nausea and Vomiting     Oxycodone-Acetaminophen Unknown     hallucinations     Cetirizine Nausea and Vomiting     Trazodone Nausea and Vomiting     Amoxicillin-Pot Clavulanate  "[Augmentin] Rash     Cephalexin Rash     Clavulanic Acid Rash     Cyclobenzaprine Rash     Eszopiclone Rash     Methocarbamol Rash     Penicillins Rash     Mild rash        Social History     Tobacco Use     Smoking status: Former Smoker     Years: 45.00     Types: Cigarettes     Quit date: 10/29/2021     Years since quittin.2     Smokeless tobacco: Former User   Substance Use Topics     Alcohol use: Not Currently     Comment: Alcoholic Drinks/day: rare--1-2 times in year     Family History   Problem Relation Age of Onset     Lung Cancer Mother          at age 52     Alcoholism Father      Heart Disease Maternal Grandfather      Diabetes Paternal Grandmother      Coronary Artery Disease Paternal Grandmother      Heart Disease Paternal Grandfather      Diabetes Sister      Hypertension Sister      Crohn's Disease Sister      Coronary Artery Disease Paternal Uncle      Asthma Maternal Aunt      History   Drug Use No         Objective     /80   Pulse 91   Resp 18   Ht 1.638 m (5' 4.5\")   Wt 76.7 kg (169 lb)   SpO2 97%   BMI 28.56 kg/m      Physical Exam  General appearance - alert, well appearing, and in no distress and overweight  Mental status - normal mood, behavior, speech, dress, motor activity, and thought processes  Eyes - pupils equal and reactive, extraocular eye movements intact  Ears - bilateral TM's and external ear canals normal  Nose - normal and patent, no erythema, discharge   Mouth - not examined and Covered with mask  Neck - supple, no significant adenopathy, carotids upstroke normal bilaterally, no bruits, thyroid exam: thyroid is normal in size without nodules or tenderness  Lymphatics - no palpable lymphadenopathy, no hepatosplenomegaly  Chest - clear to auscultation, no wheezes, rales or rhonchi, symmetric air entry  Heart - normal rate and regular rhythm, S1 and S2 normal, no murmurs noted  Abdomen - soft, nontender, nondistended, no masses or organomegaly  Back exam -near " full range of motion, no tenderness, palpable spasm on motion, pain with motion noted during exam  Neurological - alert, oriented, normal speech, no focal findings or movement disorder noted, cranial nerves II through XII intact, DTR's assymmetric with right-sided low-normal result and left side normal.  Musculoskeletal - no joint tenderness, deformity or swelling  Extremities - peripheral pulses normal, no pedal edema, no clubbing or cyanosis  Skin - normal coloration and turgor, no rashes, no suspicious skin lesions noted        Recent Labs   Lab Test 08/31/21  1304 04/13/21  1048 03/29/21  0748   HGB  --  12.3 9.3*   PLT  --  438 410    139 139   POTASSIUM 4.6 3.8 3.8   CR 1.10 0.87 1.03        Diagnostics:  Recent Results (from the past 48 hour(s))   Comprehensive metabolic panel    Collection Time: 01/20/22 11:53 AM   Result Value Ref Range    Sodium 138 136 - 145 mmol/L    Potassium 4.3 3.5 - 5.0 mmol/L    Chloride 104 98 - 107 mmol/L    Carbon Dioxide (CO2) 19 (L) 22 - 31 mmol/L    Anion Gap 15 5 - 18 mmol/L    Urea Nitrogen 16 8 - 22 mg/dL    Creatinine 1.16 (H) 0.60 - 1.10 mg/dL    Calcium 9.5 8.5 - 10.5 mg/dL    Glucose 120 70 - 125 mg/dL    Alkaline Phosphatase 131 (H) 45 - 120 U/L    AST 19 0 - 40 U/L    ALT 16 0 - 45 U/L    Protein Total 7.3 6.0 - 8.0 g/dL    Albumin 3.9 3.5 - 5.0 g/dL    Bilirubin Total 0.6 0.0 - 1.0 mg/dL    GFR Estimate 53 (L) >60 mL/min/1.73m2   CBC with platelets    Collection Time: 01/20/22 11:53 AM   Result Value Ref Range    WBC Count 8.6 4.0 - 11.0 10e3/uL    RBC Count 4.30 3.80 - 5.20 10e6/uL    Hemoglobin 11.5 (L) 11.7 - 15.7 g/dL    Hematocrit 36.5 35.0 - 47.0 %    MCV 85 78 - 100 fL    MCH 26.7 26.5 - 33.0 pg    MCHC 31.5 31.5 - 36.5 g/dL    RDW 14.8 10.0 - 15.0 %    Platelet Count 291 150 - 450 10e3/uL   Albumin Random Urine Quantitative with Creat Ratio    Collection Time: 01/20/22 11:57 AM   Result Value Ref Range    Microalbumin Urine mg/dL 1.09 0.00 - 1.99 mg/dL     Creatinine Urine mg/dL 243 mg/dL    Microalbumin Urine mg/g Cr 4.5 <=19.9 mg/g Cr      No EKG required, no history of coronary heart disease, significant arrhythmia, peripheral arterial disease or other structural heart disease.    Revised Cardiac Risk Index (RCRI):  The patient has the following serious cardiovascular risks for perioperative complications:   - No serious cardiac risks = 0 points     RCRI Interpretation: 1 point: Class II (low risk - 0.9% complication rate)           Signed Electronically by: Joanne Weiner MD  Copy of this evaluation report is provided to requesting physician.

## 2022-02-10 ENCOUNTER — OFFICE VISIT (OUTPATIENT)
Dept: FAMILY MEDICINE | Facility: CLINIC | Age: 62
End: 2022-02-10
Payer: COMMERCIAL

## 2022-02-10 VITALS
HEART RATE: 85 BPM | OXYGEN SATURATION: 97 % | HEIGHT: 65 IN | TEMPERATURE: 98.3 F | DIASTOLIC BLOOD PRESSURE: 80 MMHG | SYSTOLIC BLOOD PRESSURE: 120 MMHG | BODY MASS INDEX: 29.32 KG/M2 | WEIGHT: 176 LBS

## 2022-02-10 DIAGNOSIS — E03.9 ACQUIRED HYPOTHYROIDISM: ICD-10-CM

## 2022-02-10 DIAGNOSIS — E78.2 MIXED HYPERLIPIDEMIA: ICD-10-CM

## 2022-02-10 DIAGNOSIS — J43.2 CENTRILOBULAR EMPHYSEMA (H): ICD-10-CM

## 2022-02-10 DIAGNOSIS — G89.4 CHRONIC PAIN SYNDROME: ICD-10-CM

## 2022-02-10 DIAGNOSIS — Z01.818 PREOP GENERAL PHYSICAL EXAM: ICD-10-CM

## 2022-02-10 DIAGNOSIS — Z79.899 ENCOUNTER FOR LONG-TERM (CURRENT) USE OF MEDICATIONS: Primary | ICD-10-CM

## 2022-02-10 DIAGNOSIS — M48.062 SPINAL STENOSIS OF LUMBAR REGION WITH NEUROGENIC CLAUDICATION: ICD-10-CM

## 2022-02-10 LAB
ANION GAP SERPL CALCULATED.3IONS-SCNC: 15 MMOL/L (ref 5–18)
BUN SERPL-MCNC: 15 MG/DL (ref 8–22)
CALCIUM SERPL-MCNC: 9.5 MG/DL (ref 8.5–10.5)
CHLORIDE BLD-SCNC: 101 MMOL/L (ref 98–107)
CO2 SERPL-SCNC: 23 MMOL/L (ref 22–31)
CREAT SERPL-MCNC: 1.11 MG/DL (ref 0.6–1.1)
ERYTHROCYTE [DISTWIDTH] IN BLOOD BY AUTOMATED COUNT: 14.3 % (ref 10–15)
GFR SERPL CREATININE-BSD FRML MDRD: 56 ML/MIN/1.73M2
GLUCOSE BLD-MCNC: 86 MG/DL (ref 70–125)
HCT VFR BLD AUTO: 38.1 % (ref 35–47)
HGB BLD-MCNC: 11.7 G/DL (ref 11.7–15.7)
MCH RBC QN AUTO: 26.3 PG (ref 26.5–33)
MCHC RBC AUTO-ENTMCNC: 30.7 G/DL (ref 31.5–36.5)
MCV RBC AUTO: 86 FL (ref 78–100)
PLATELET # BLD AUTO: 282 10E3/UL (ref 150–450)
POTASSIUM BLD-SCNC: 4.3 MMOL/L (ref 3.5–5)
RBC # BLD AUTO: 4.45 10E6/UL (ref 3.8–5.2)
SODIUM SERPL-SCNC: 139 MMOL/L (ref 136–145)
WBC # BLD AUTO: 6.9 10E3/UL (ref 4–11)

## 2022-02-10 PROCEDURE — 85027 COMPLETE CBC AUTOMATED: CPT | Performed by: FAMILY MEDICINE

## 2022-02-10 PROCEDURE — 80048 BASIC METABOLIC PNL TOTAL CA: CPT | Performed by: FAMILY MEDICINE

## 2022-02-10 PROCEDURE — 93005 ELECTROCARDIOGRAM TRACING: CPT | Performed by: FAMILY MEDICINE

## 2022-02-10 PROCEDURE — 99214 OFFICE O/P EST MOD 30 MIN: CPT | Performed by: FAMILY MEDICINE

## 2022-02-10 PROCEDURE — 93010 ELECTROCARDIOGRAM REPORT: CPT | Performed by: INTERNAL MEDICINE

## 2022-02-10 PROCEDURE — 36415 COLL VENOUS BLD VENIPUNCTURE: CPT | Performed by: FAMILY MEDICINE

## 2022-02-10 ASSESSMENT — MIFFLIN-ST. JEOR: SCORE: 1351.27

## 2022-02-10 NOTE — PATIENT INSTRUCTIONS
Preparing for Your Surgery  Getting started  A nurse will call you to review your health history and instructions. They will give you an arrival time based on your scheduled surgery time. Please be ready to share:    Your doctor's clinic name and phone number    Your medical, surgical and anesthesia history    A list of allergies and sensitivities    A list of medicines, including herbal treatments and over-the-counter drugs    Whether the patient has a legal guardian (ask how to send us the papers in advance)  Please tell us if you're pregnant--or if there's any chance you might be pregnant. Some surgeries may injure a fetus (unborn baby), so they require a pregnancy test. Surgeries that are safe for a fetus don't always need a test, and you can choose whether to have one.   If you have a child who's having surgery, please ask for a copy of Preparing for Your Child's Surgery.    Preparing for surgery    Within 30 days of surgery: Have a pre-op exam (sometimes called an H&P, or History and Physical). This can be done at a clinic or pre-operative center.  ? If you're having a , you may not need this exam. Talk to your care team.    At your pre-op exam, talk to your care team about all medicines you take. If you need to stop any medicines before surgery, ask when to start taking them again.  ? We do this for your safety. Many medicines can make you bleed too much during surgery. Some change how well surgery (anesthesia) drugs work.    Call your insurance company to let them know you're having surgery. (If you don't have insurance, call [phone].)    Call your clinic if there's any change in your health. This includes signs of a cold or flu (sore throat, runny nose, cough, rash, fever). It also includes a scrape or scratch near the surgery site.    If you have questions on the day of surgery, call your hospital or surgery center.  COVID testing  You may need to be tested for COVID-19 before having  surgery. If so, your surgical team will give you instructions for scheduling this test, separate from your preoperative history and physical.  Eating and drinking guidelines  For your safety: Unless your surgeon tells you otherwise, follow the guidelines below.    Eat and drink as usual until 8 hours before surgery. After that, no food or milk.    Drink clear liquids until 2 hours before surgery. These are liquids you can see through, like water, Gatorade and Propel Water. You may also have black coffee and tea (no cream or milk).    Nothing by mouth within 2 hours of surgery. This includes gum, candy and breath mints.    If you drink alcohol: Stop drinking it the night before surgery.    If your care team tells you to take medicine on the morning of surgery, it's okay to take it with a sip of water.  Preventing infection    Shower or bathe the night before and morning of your surgery. Follow the instructions your clinic gave you. (If no instructions, use regular soap.)    Don't shave or clip hair near your surgery site. We'll remove the hair if needed.    Don't smoke or vape the morning of surgery. You may chew nicotine gum up to 2 hours before surgery. A nicotine patch is okay.  ? Note: Some surgeries require you to completely quit smoking and nicotine. Check with your surgeon.    Your care team will make every effort to keep you safe from infection. We will:  ? Clean our hands often with soap and water (or an alcohol-based hand rub).  ? Clean the skin at your surgery site with a special soap that kills germs.  ? Give you a special gown to keep you warm. (Cold raises the risk of infection.)  ? Wear special hair covers, masks, gowns and gloves during surgery.  ? Give antibiotic medicine, if prescribed. Not all surgeries need antibiotics.  What to bring on the day of surgery    Photo ID and insurance card    Copy of your health care directive, if you have one    Glasses and hearing aides (bring cases)  ? You can't  wear contacts during surgery    Inhaler and eye drops, if you use them (tell us about these when you arrive)    CPAP machine or breathing device, if you use them    A few personal items, if spending the night    If you have . . .  ? A pacemaker, ICD (cardiac defibrillator) or other implant: Bring the ID card.  ? An implanted stimulator: Bring the remote control.  ? A legal guardian: Bring a copy of the certified (court-stamped) guardianship papers.  Please remove any jewelry, including body piercings. Leave jewelry and other valuables at home.  If you're going home the day of surgery    You must have a responsible adult drive you home. They should stay with you overnight as well.    If you don't have someone to stay with you, and you aren't safe to go home alone, we may keep you overnight. Insurance often won't pay for this.  After surgery  If it's hard to control your pain or you need more pain medicine, please call your surgeon's office.  Questions?   If you have any questions for your care team, list them here: _________________________________________________________________________________________________________________________________________________________________________ ____________________________________ ____________________________________ ____________________________________  For informational purposes only. Not to replace the advice of your health care provider. Copyright   2003, 2019 [facility]. All rights reserved. Clinically reviewed by Keysha Palumbo MD. [employer] [id number] - REV 07/21.    Before Your Procedure or Hospital Admission  Testing for COVID-19 (Coronavirus)  Thank you for choosing [facility] for your health care needs. This is a very challenging time for everyone. The World Health Organization and the [facility] have declared the COVID-19 virus a pandemic.   Our goal is to keep you and our team here at [facility] safe and healthy. We've taken several  "steps to make this happen. For example:    We screen our staff, care teams and patients for COVID-19.    Everyone at [facility] must wear a mask and stay 6 feet apart.    We are limiting hospital and clinic visitors.  Before you come in  All patients must get tested for COVID-19. Your test needs to happen 2 to 4 days before you check in to the hospital or surgery site.   A clinic scheduler will call about a week in advance to set up a testing time at one of our labs where we'll take a swab of your nose or throat.  Note: If you go to a clinic or pharmacy like [facility] or [facility] for your test, make sure it's a \"RT-PCR\" test, not a \"rapid\" COVID-19 test. (See Questions and Answers below.)  After the test, please stay at home and stay out of contact with other people. It will be harder for you to recover if you get COVID-19 before your treatment.  Please follow all current safety guidelines, including:    Limit trips outside your home.    Limit the number of people you see.    Always wear a mask outside your home.    Use social distancing. (Stay 6 feet away from others whenever you can.)    Wash your hands often.  If your test shows you have COVID-19  If your test is positive, we'll let you know. A positive test means that you have the virus.   We'll probably have to postpone your admission, surgery or procedure. Your doctor will discuss this with you. After that, we'll let you know what to do and when you can reschedule.   We may need to cancel your treatment on short notice for other reasons, too.  If your test shows you DON'T have COVID-19  Even if your test is negative, you may still get COVID-19. It's rare but, sometimes, the test result is wrong. You could also catch the virus after taking the test.   There's a very small chance that you could catch COVID-19 in the hospital or surgery center. [facility] has taken many steps to prevent this from happening.   Day of your surgery or procedure    Please " "come wearing a mask or something else that covers both your nose and mouth.    When you arrive, we'll ask you some questions to find out if you have any signs or symptoms of COVID-19.    Ask your care team if you can have visitors. All visitors must wear masks and will be screened for signs of COVID-19.  ? Even if no visitors are allowed, you can still have with you:    Your legal guardian or legal decision maker    A parent and one other visitor, if you are younger than 18 years old    A partner and a , if you are in labor  ? We might need to teach you about taking care of yourself after surgery. If so, a visitor can come into the hospital to learn about it, too.  ? The rules for visitors change often, depending on how much the virus is spreading. To learn more, see Visiting a Loved One in the Hospital during the COVID-19 Outbreak.  Please call your care team, hospital or surgery center if you have any questions. We thank you for your understanding and for choosing [facility] for your care.   Questions and Answers  Does it matter where I get tested for COVID-19?  Yes. We urge you to get tested at one of our [facility] COVID-19 testing sites. We process these tests in our lab and can get the results quickly. Your [facility] care team needs to get your results before you check in.  What should I do if I can't get tested at [facility]?  You can get tested somewhere else, but you'll need to take these extra steps:  1. Contact your family doctor or clinic to arrange your test.  2. Take the test within 4 days of your surgery or procedure. We can't accept tests older than 4 days.  3. Make sure your doctor or clinic faxes your results to [facility] at [phone].  If we don't get your results in time, we may have to postpone or cancel your treatment.  Ask if you're getting a \"RT-PCR\" COVID-19 test. It should NOT be a \"rapid\" COVID-19 test. Many drug stores use \"rapid\" tests, but " "they may not be as accurate. We don't accept the results of \"rapid\" tests.  For informational purposes only. Not to replace the advice of your health care provider. Copyright   2020 [facility]. All rights reserved. Clinically reviewed by In[facility] [employer] and the [facility] COVID-19 Clinical Team. [employer] 317297 - Rev 10/07/20.    How to Take Your Medication Before Surgery  - Take all of your medications before surgery except as noted below  - HOLD (do not take) Aspirin for 7 days  - HOLD (do not take) NSAIDS for 14 days  - STOP taking all vitamins and herbal supplements 14 days before surgery.  "

## 2022-02-10 NOTE — PROGRESS NOTES
[facility]  [address]  [place] [facility] [address]  Phone: [phone]  Fax: [phone]  Primary Provider: Patrice Sanon  Pre-op Performing Provider: RAZ REDYD      PREOPERATIVE EVALUATION:  Today's date: 2/10/2022    Dayana Samaniego is a 61 year old female who presents for a preoperative evaluation.    Surgical Information:  Surgery/Procedure: PSEUDOARTHROSIS REPAIR L4-S1, INSTRUMENTATION REPLACEMENT L4-S1 BILATERAL, ILIAC CREST BONE GRAFT HARVEST RIGHT  Surgery Location: [facility]   Surgeon:  Dr Sanon   Surgery Date: 2/21/22  Time of Surgery: noon   Where patient plans to recover: At home with family  Fax number for surgical facility: [phone]    Type of Anesthesia Anticipated: General      Preop general physical exam  Patient is cleared for surgery without further evaluation.  High risk factors include chronic narcotic use and polypharmacy.  - CBC with platelets  - Asymptomatic COVID-19 Virus (Coronavirus) by PCR Nasopharyngeal  - EKG     Status post lumbar spinal fusion  Patient continuing with pain and requires reevaluation with surgery.     Chronic pain syndrome  Fibromyalgia, degenerative disc disease, migraine headache.  Uses gabapentin, tizanidine, AIMOVIG     Chronic narcotic use  Longtime patient at [facility] pain clinic.  Patient reports taking 30 mg hydrocodone daily.     Centrilobular emphysema (H)  Has quit smoking in order to have this surgery.     Bipolar 2 disorder (H)  Regularly seen by Dr. Zeng.  Currently on bupropion, lamotrigine, fluoxetine and doxepin.     Mixed hyperlipidemia  Controlled, last LDL 95     Acquired hypothyroidism  Controlled.     Osteopenia of multiple sites  Not currently actively treated.     Stage 3a chronic kidney disease (H)  Minimal and likely secondary to NSAID use.  - Basic metabolic panel      Subjective     HPI related to upcoming procedure:  Patient has a long  history of chronic pain and subsequent previous vertebrae fusions due to back pain.  3/17/2021 she had anterior and posterior spine fusion L4- S1 and decompression.  She is having persistent pain so the surgeon is going to go back in posteriorly and remove and replace hardware per patient.  They have been waiting for her to get nicotine testing as she needs to quit smoking in order to qualify for this surgery.  She also may need to wait due to pandemic and the suspension of all elective surgeries.    Preop Questions 2/10/2022   1. Have you ever had a heart attack or stroke? No   2. Have you ever had surgery on your heart or blood vessels, such as a stent placement, a coronary artery bypass, or surgery on an artery in your head, neck, heart, or legs? No   3. Do you have chest pain with activity? No   4. Do you have a history of  heart failure? No   5. Do you currently have a cold, bronchitis or symptoms of other infection? No   6. Do you have a cough, shortness of breath, or wheezing? No   7. Do you or anyone in your family have previous history of blood clots? No   8. Do you or does anyone in your family have a serious bleeding problem such as prolonged bleeding following surgeries or cuts? No   9. Have you ever had problems with anemia or been told to take iron pills? No   10. Have you had any abnormal blood loss such as black, tarry or bloody stools, or abnormal vaginal bleeding? No   11. Have you ever had a blood transfusion? No   12. Are you willing to have a blood transfusion if it is medically needed before, during, or after your surgery? Yes   13. Have you or any of your relatives ever had problems with anesthesia? YES -self and daughter with nausea, vomiting and headache   14. Do you have sleep apnea, excessive snoring or daytime drowsiness? UNKNOWN -unlikely   15. Do you have any artifical heart valves or other implanted medical devices like a pacemaker, defibrillator, or continuous glucose monitor? No    16. Do you have artificial joints? No   17. Are you allergic to latex? No       Health Care Directive:  Patient does not have a Health Care Directive or Living Will: Discussed advance care planning with patient; information given to patient to review.    Preoperative Review of :   reviewed - controlled substances prescribed by other outside provider(s).  Patient is seen and treated with narcotics and nonnarcotic measures at [facility] pain clinic.      Review of Systems  CONSTITUTIONAL: NEGATIVE for fever, chills, change in weight  INTEGUMENTARY/SKIN: NEGATIVE for worrisome rashes, moles or lesions  EYES: NEGATIVE for vision changes or irritation  ENT/MOUTH: NEGATIVE for ear, mouth and throat problems  RESP:POSITIVE for Intermittent, dyspnea on exertion and smoking  CV: NEGATIVE for chest pain, palpitations or peripheral edema  GI: NEGATIVE for nausea, abdominal pain, heartburn, or change in bowel habits   female: Mild incontinence-urge, incontinence-stress  MUSCULOSKELETAL:POSITIVE  for arthralgias, back pain (mostly lumbar) and muscle spasm in the legs intermittently.  NEURO: POSITIVE for numbness or tingling In fingers and vertigo  ENDOCRINE: NEGATIVE for temperature intolerance, skin/hair changes  HEME: NEGATIVE for bleeding problems  PSYCHIATRIC: NEGATIVE for changes in mood or affect    Patient Active Problem List    Diagnosis Date Noted     Draining cutaneous sinus tract 12/01/2021     Priority: Medium     Added automatically from request for surgery [id number]       Abnormal reflex 09/08/2021     Priority: Medium     Asymptomatic postmenopausal status 08/31/2021     Priority: Medium          Menopausal disorder 08/31/2021     Priority: Medium          Migraine headache 08/31/2021     Priority: Medium          Chronic pain syndrome 08/31/2021     Priority: Medium     DDD (degenerative disc disease), lumbosacral 03/22/2021     Priority: Medium     Spinal stenosis of lumbar region with neurogenic  claudication 03/22/2021     Priority: Medium     Pain in right leg 03/21/2021     Priority: Medium     Other specified disorders of bone density and structure, multiple sites 03/21/2021     Priority: Medium     Other abnormalities of gait and mobility 03/21/2021     Priority: Medium     Moderate persistent asthma, uncomplicated 03/21/2021     Priority: Medium     Low back pain 03/21/2021     Priority: Medium     Encounter for other orthopedic aftercare 03/21/2021     Priority: Medium     Chronic obstructive pulmonary disease, unspecified (H) 03/21/2021     Priority: Medium     Anemia 03/18/2021     Priority: Medium     Hypotension 03/18/2021     Priority: Medium     Fibromyalgia 03/17/2021     Priority: Medium          Hyperlipidemia 03/17/2021     Priority: Medium          Hypothyroidism 03/17/2021     Priority: Medium          Common migraine with intractable migraine 02/15/2021     Priority: Medium     Chronic kidney disease, stage 3 (H) 01/14/2021     Priority: Medium     Created by Conversion     Osteopenia of multiple sites 09/01/2020     Priority: Medium     Centrilobular emphysema (H) 02/26/2018     Priority: Medium     Formatting of this note might be different from the original.  Mild, seen in 2018 CT scan, DLCO 60% predicted     Hiatal hernia 02/16/2017     Priority: Medium     Lung nodule 08/04/2016     Priority: Medium     8/4/2016:  Left upper lobe nodule of 6.5 mm, likely benign given slow growth per radiologist.  See CT  6/27/2011:  measured  approximately 5.5 mm in greatest dimension       Bipolar 2 disorder (H) 06/01/2015     Priority: Medium     Created by Conversion  Replacement Utility updated for latest IMO load.  MED TRIALS:  Doxepin has been helpful for sleep & fibromyalgia.  Topamax- caused cognitive slowing & poor concentration. Depakote caused wt gain. Seroquel was sedating. Trazodone caused insomnia. Has tried mellaril, effexor, depakote, remeron, buspar, risperdal,zyprexa, celexa,luvox -  unsure what happened with these.  Formatting of this note might be different from the original.  MED TRIALS:  Doxepin has been helpful for sleep & fibromyalgia.  Topamax- caused cognitive slowing & poor concentration. Depakote caused wt gain. Seroquel was sedating. Trazodone caused insomnia. Has tried mellaril, effexor, depakote, remeron, buspar, risperdal,zyprexa, celexa,luvox - unsure what happened with these.       Borderline personality disorder (H) 06/01/2015     Priority: Medium     PTSD (post-traumatic stress disorder) 06/01/2015     Priority: Medium     GERD (gastroesophageal reflux disease) 10/16/2012     Priority: Medium             Past Medical History:   Diagnosis Date     Abnormality of gait     Created by Conversion      Anemia 3/18/2021     Anxiety      Asthma      Asymptomatic postmenopausal status     Created by Conversion  Replacement Utility updated for latest IMO load     Bipolar 2 disorder (H)      Centrilobular emphysema (H) 2/26/2018    Mild, seen in 2018 CT scan, DLCO 60% predicted  Formatting of this note might be different from the original. Formatting of this note might be different from the original. Mild, seen in 2018 CT scan, DLCO 60% predicted     Cervical spinal stenosis     s/p cervical fusion     Chronic kidney disease      Chronic kidney disease, stage 3 (H) 1/14/2021     Chronic pain syndrome      Cigarette nicotine dependence without complication 3/4/2020     Common migraine with intractable migraine 2/15/2021     COPD (chronic obstructive pulmonary disease) (H)      DDD (degenerative disc disease), lumbosacral 3/22/2021     Depression      Fibrocystic breast      Fibromyalgia      GERD (gastroesophageal reflux disease)      Hallux abductovalgus with bunions, unspecified laterality 12/14/2015     Herpes zoster     Created by Conversion  Replacement Utility updated for latest IMO load     Hiatal hernia      History of electroconvulsive therapy      Hyperlipidemia 3/17/2021     Created by Conversion   Formatting of this note might be different from the original. Formatting of this note might be different from the original. Created by Conversion     Hypotension 3/18/2021     Hypothyroidism     hypothyroidism     IBS (irritable bowel syndrome)      Lung nodule 8/4/2016 8/4/2016:  Left upper lobe nodule of 6.5 mm, likely benign given slow growth per radiologist.  See CT 6/27/2011:  measured  approximately 5.5 mm in greatest dimension      Menopausal and postmenopausal disorder     Created by Conversion  Replacement Utility updated for latest IMO load     Migraine     Created by Conversion  Replacement Utility updated for latest IMO load     Migraines      Osteoarthritis      Osteopenia of multiple sites 9/1/2020     Other chronic pain      PONV (postoperative nausea and vomiting)      PTSD (post-traumatic stress disorder)      Shingles 2014    on back     Spinal stenosis of lumbar region with neurogenic claudication 3/22/2021     Tobacco use disorder     Created by Conversion      Past Surgical History:   Procedure Laterality Date     BIOPSY BREAST Left     Approx 5 bx     BUNIONECTOMY Right 12/15/2015    Procedure: MODIFIED LAI BUNIONECTOMY RIGHT FOOT;  Surgeon: Jerome Calvin DPM;  Location: [place] [facility];  Service:      CERVICAL FUSION       CERVICAL FUSION Bilateral 2/16/2015    Procedure: ANTERIOR CERVICAL DECOMPRESSION/FUSION C3-5 BILATERAL, ANTERIOR HARDWARE REMOVAL C5-7 BILATERAL ;  Surgeon: Sawyer Roland MD;  Location: [facility] OR;  Service:       NIPPLE EXPLORATION      Description: Breast Nipple Explor W/ Excision Solitary Lactiferous Duct;  Recorded: 04/10/2008;     HYSTERECTOMY       IR CERVICAL EPIDURAL STEROID INJECTION  9/17/2003     IR CERVICAL EPIDURAL STEROID INJECTION  12/11/2003     IR CERVICAL EPIDURAL STEROID INJECTION  1/16/2004     IRRIGATION AND DEBRIDEMENT DECUBITUS WOUND, COMBINED Left 12/13/2021    Procedure: Wound exploration and  debridement of Left Lower Abdomin Chronic wound.;  Surgeon: Crispin Bunch MD;  Location: [place] [facility] OR     LUMBAR DISCECTOMY      X2     MAMMOPLASTY AUGMENTATION Bilateral      approx 2006?     PELVIC LAPAROSCOPY      multiple     SHOULDER OPEN ROTATOR CUFF REPAIR Left     X2     ZZC TOTAL ABDOM HYSTERECTOMY      Description: Total Abdominal Hysterectomy;  Recorded: 06/10/2013;     Current Outpatient Medications   Medication Sig Dispense Refill     albuterol (PROAIR HFA) 108 (90 Base) MCG/ACT inhaler Inhale 2 puffs into the lungs every 4 hours 18 g 1     atorvastatin (LIPITOR) 40 MG tablet TAKE 1 TABLET(40 MG) BY MOUTH AT BEDTIME 90 tablet 3     buPROPion (WELLBUTRIN) 75 MG tablet TAKE 1/2 TABLET BY MOUTH TWICE DAILY FOR 1 WEEK THEN TAKE 1 TABLET BY MOUTH TWICE DAILY       doxepin (SINEQUAN) 100 MG capsule Take 75 mg by mouth At Bedtime        eletriptan (RELPAX) 40 MG tablet Take 1 tablet (40 mg) by mouth at onset of headache for migraine 12 tablet 3     erenumab-aooe (AIMOVIG) 70 MG/ML injection ADMINISTER 1 ML(70 MG) UNDER THE SKIN EVERY MONTH 1 mL 11     ergocalciferol (ERGOCALCIFEROL) 1.25 MG (14413 UT) capsule Take 1 capsule by mouth       esomeprazole (NEXIUM) 20 MG DR capsule Take 2 capsules by mouth every 24 hours       FLUoxetine (PROZAC) 40 MG capsule take 2 capsules by oral route at bedtime       fluticasone (FLONASE) 50 MCG/ACT nasal spray Spray 1 spray into both nostrils daily 16 g 3     gabapentin (NEURONTIN) 300 MG capsule take 2 capsule by oral route 2 times every day. Follow titration instructions given in clinic.       HYDROcodone-acetaminophen (NORCO)  MG per tablet TAKE 1 TABLET BY MOUTH EVERY 4 TO 6 HOURS AS NEEDED FOR CHRONIC PAIN. MAX 4 PER DAY. START 9/7       hydrOXYzine (VISTARIL) 25 MG capsule take two every night and take one during the day as needed       lamoTRIgine (LAMICTAL) 200 MG tablet Take 1 tablet by mouth 2 times daily        levothyroxine  (SYNTHROID/LEVOTHROID) 75 MCG tablet TAKE 1 TABLET(75 MCG) BY MOUTH DAILY 90 tablet 2     montelukast (SINGULAIR) 10 MG tablet Take 1 tablet (10 mg) by mouth every 24 hours 90 tablet 1     prazosin (MINIPRESS) 2 MG capsule Take 2 capsules by mouth every 24 hours       prochlorperazine (COMPAZINE) 10 MG tablet every 6 hours as needed        senna-docusate (SENOKOT-S/PERICOLACE) 8.6-50 MG tablet Take 2 tablets by mouth       tiotropium (SPIRIVA RESPIMAT) 2.5 MCG/ACT inhaler Inhale 2 puffs into the lungs daily 4 g 6     tiZANidine (ZANAFLEX) 4 MG tablet TAKE 1 TO 2 TABLETS BY MOUTH DURING THE DAY AND 2 TABLETS AT BEDTIME 120 tablet 1     benzonatate (TESSALON) 100 MG capsule TAKE 1 CAPSULE(100 MG) BY MOUTH THREE TIMES DAILY AS NEEDED FOR COUGH (Patient not taking: Reported on 2/10/2022) 30 capsule 0     folic acid (FOLVITE) 400 MCG tablet take 1 tablet by oral route  every day at HS (Patient not taking: Reported on 2/10/2022)         Allergies   Allergen Reactions     Codeine Anaphylaxis     Nefazodone Nausea and Vomiting     Oxycodone-Acetaminophen Unknown     hallucinations     Cetirizine Nausea and Vomiting     Trazodone Nausea and Vomiting     Amoxicillin-Pot Clavulanate [Augmentin] Rash     Cephalexin Rash     Clavulanic Acid Rash     Cyclobenzaprine Rash     Eszopiclone Rash     Methocarbamol Rash     Penicillins Rash     Mild rash        Social History     Tobacco Use     Smoking status: Former Smoker     Years: 45.00     Types: Cigarettes     Quit date: 10/29/2021     Years since quittin.2     Smokeless tobacco: Former User   Substance Use Topics     Alcohol use: Not Currently     Comment: Alcoholic Drinks/day: rare--1-2 times in year     Family History   Problem Relation Age of Onset     Lung Cancer Mother          at age 52     Alcoholism Father      Heart Disease Maternal Grandfather      Diabetes Paternal Grandmother      Coronary Artery Disease Paternal Grandmother      Heart Disease Paternal  "Grandfather      Diabetes Sister      Hypertension Sister      Crohn's Disease Sister      Coronary Artery Disease Paternal Uncle      Asthma Maternal Aunt      History   Drug Use No         Objective     /80   Pulse 85   Temp 98.3  F (36.8  C)   Ht 1.638 m (5' 4.5\")   Wt 79.8 kg (176 lb)   SpO2 97%   Breastfeeding No   BMI 29.74 kg/m      Physical Exam  General appearance - alert, well appearing, and in no distress and overweight  Mental status - normal mood, behavior, speech, dress, motor activity, and thought processes  Eyes - pupils equal and reactive, extraocular eye movements intact  Ears - bilateral TM's and external ear canals normal  Nose - normal and patent, no erythema, discharge   Mouth - not examined and Covered with mask  Neck - supple, no significant adenopathy, carotids upstroke normal bilaterally, no bruits, thyroid exam: thyroid is normal in size without nodules or tenderness  Lymphatics - no palpable lymphadenopathy, no hepatosplenomegaly  Chest - clear to auscultation, no wheezes, rales or rhonchi, symmetric air entry  Heart - normal rate and regular rhythm, S1 and S2 normal, no murmurs noted  Abdomen - soft, nontender, nondistended, no masses or organomegaly  Back exam -near full range of motion, no tenderness, palpable spasm on motion, pain with motion noted during exam  Neurological - alert, oriented, normal speech, no focal findings or movement disorder noted, cranial nerves II through XII intact, DTR's assymmetric with right-sided low-normal result and left side normal.  Musculoskeletal - no joint tenderness, deformity or swelling  Extremities - peripheral pulses normal, no pedal edema, no clubbing or cyanosis  Skin - normal coloration and turgor, no rashes, no suspicious skin lesions noted    Recent Labs   Lab Test 01/20/22  1153 08/31/21  1304 04/13/21  1048   HGB 11.5*  --  12.3     --  438    141 139   POTASSIUM 4.3 4.6 3.8   CR 1.16* 1.10 0.87    "     Diagnostics:  Recent Results (from the past 168 hour(s))   EKG 12-lead, tracing only    Collection Time: 02/10/22 12:58 PM   Result Value Ref Range    Systolic Blood Pressure  mmHg    Diastolic Blood Pressure  mmHg    Ventricular Rate 78 BPM    Atrial Rate 78 BPM    AK Interval 160 ms    QRS Duration 72 ms     ms    QTc 460 ms    P Axis 59 degrees    R AXIS 64 degrees    T Axis 66 degrees    Interpretation ECG       Sinus rhythm  Normal ECG  When compared with ECG of 14-JAN-2021 10:07,  Premature ventricular complexes are no longer Present  Confirmed by NIDHI JENSEN MD LOC:[facility] ([id number]) on 2/11/2022 8:41:22 AM     Basic metabolic panel    Collection Time: 02/10/22  1:23 PM   Result Value Ref Range    Sodium 139 136 - 145 mmol/L    Potassium 4.3 3.5 - 5.0 mmol/L    Chloride 101 98 - 107 mmol/L    Carbon Dioxide (CO2) 23 22 - 31 mmol/L    Anion Gap 15 5 - 18 mmol/L    Urea Nitrogen 15 8 - 22 mg/dL    Creatinine 1.11 (H) 0.60 - 1.10 mg/dL    Calcium 9.5 8.5 - 10.5 mg/dL    Glucose 86 70 - 125 mg/dL    GFR Estimate 56 (L) >60 mL/min/1.73m2   CBC with platelets    Collection Time: 02/10/22  1:23 PM   Result Value Ref Range    WBC Count 6.9 4.0 - 11.0 10e3/uL    RBC Count 4.45 3.80 - 5.20 10e6/uL    Hemoglobin 11.7 11.7 - 15.7 g/dL    Hematocrit 38.1 35.0 - 47.0 %    MCV 86 78 - 100 fL    MCH 26.3 (L) 26.5 - 33.0 pg    MCHC 30.7 (L) 31.5 - 36.5 g/dL    RDW 14.3 10.0 - 15.0 %    Platelet Count 282 150 - 450 10e3/uL      EKG: Normal Sinus Rhythm, Normal EKG, unchanged from previous tracings    Revised Cardiac Risk Index (RCRI):  The patient has the following serious cardiovascular risks for perioperative complications:   - High risk surgery (>5% cardiac complication risk) = 1 point     RCRI Interpretation: 1 point: Class II (low risk - 0.9% complication rate)         Signed Electronically by: Joanne Weiner MD  Copy of this evaluation report is provided to requesting physician.

## 2022-02-11 ENCOUNTER — TRANSFERRED RECORDS (OUTPATIENT)
Dept: HEALTH INFORMATION MANAGEMENT | Facility: CLINIC | Age: 62
End: 2022-02-11
Payer: COMMERCIAL

## 2022-02-11 DIAGNOSIS — M79.7 FIBROMYALGIA: ICD-10-CM

## 2022-02-11 LAB
ATRIAL RATE - MUSE: 78 BPM
DIASTOLIC BLOOD PRESSURE - MUSE: NORMAL MMHG
INTERPRETATION ECG - MUSE: NORMAL
P AXIS - MUSE: 59 DEGREES
PR INTERVAL - MUSE: 160 MS
QRS DURATION - MUSE: 72 MS
QT - MUSE: 404 MS
QTC - MUSE: 460 MS
R AXIS - MUSE: 64 DEGREES
SYSTOLIC BLOOD PRESSURE - MUSE: NORMAL MMHG
T AXIS - MUSE: 66 DEGREES
VENTRICULAR RATE- MUSE: 78 BPM

## 2022-02-15 ENCOUNTER — OFFICE VISIT (OUTPATIENT)
Dept: FAMILY MEDICINE | Facility: CLINIC | Age: 62
End: 2022-02-15
Payer: COMMERCIAL

## 2022-02-15 ENCOUNTER — TRANSFERRED RECORDS (OUTPATIENT)
Dept: HEALTH INFORMATION MANAGEMENT | Facility: CLINIC | Age: 62
End: 2022-02-15

## 2022-02-15 VITALS
HEART RATE: 78 BPM | DIASTOLIC BLOOD PRESSURE: 80 MMHG | SYSTOLIC BLOOD PRESSURE: 128 MMHG | TEMPERATURE: 98 F | HEIGHT: 65 IN | WEIGHT: 176 LBS | BODY MASS INDEX: 29.32 KG/M2

## 2022-02-15 DIAGNOSIS — L60.0 INGROWN TOENAIL: Primary | ICD-10-CM

## 2022-02-15 PROCEDURE — 99213 OFFICE O/P EST LOW 20 MIN: CPT | Performed by: PHYSICIAN ASSISTANT

## 2022-02-15 RX ORDER — SULFAMETHOXAZOLE/TRIMETHOPRIM 800-160 MG
1 TABLET ORAL 2 TIMES DAILY
Qty: 20 TABLET | Refills: 0 | Status: SHIPPED | OUTPATIENT
Start: 2022-02-15 | End: 2022-02-25

## 2022-02-15 ASSESSMENT — ENCOUNTER SYMPTOMS
VOMITING: 0
RHINORRHEA: 0
CONSTIPATION: 0
WHEEZING: 0
NECK PAIN: 0
EYE ITCHING: 0
SORE THROAT: 0
EYE DISCHARGE: 0
SINUS PAIN: 0
DIARRHEA: 0
ABDOMINAL PAIN: 0
EYE PAIN: 0
NECK STIFFNESS: 0
FATIGUE: 0
CHILLS: 0
FEVER: 0
COUGH: 0
SHORTNESS OF BREATH: 0
ACTIVITY CHANGE: 0
PALPITATIONS: 0
EYE REDNESS: 0

## 2022-02-15 ASSESSMENT — PATIENT HEALTH QUESTIONNAIRE - PHQ9
SUM OF ALL RESPONSES TO PHQ QUESTIONS 1-9: 20
SUM OF ALL RESPONSES TO PHQ QUESTIONS 1-9: 20
10. IF YOU CHECKED OFF ANY PROBLEMS, HOW DIFFICULT HAVE THESE PROBLEMS MADE IT FOR YOU TO DO YOUR WORK, TAKE CARE OF THINGS AT HOME, OR GET ALONG WITH OTHER PEOPLE: VERY DIFFICULT

## 2022-02-15 ASSESSMENT — MIFFLIN-ST. JEOR: SCORE: 1351.27

## 2022-02-15 NOTE — TELEPHONE ENCOUNTER
Routing refill request to provider for review/approval because:  Drug not on the [facility] Refill Protocol    Last Written Prescription Date:  1/5/22  Last Fill Quantity: 120,  # refills: 0   Last office visit provider: 2/10/22    Requested Prescriptions   Pending Prescriptions Disp Refills     tiZANidine (ZANAFLEX) 4 MG tablet [Pharmacy Med Name: TIZANIDINE 4MG TABLETS] 120 tablet 1     Sig: TAKE 1 TO 2 TABLETS BY MOUTH DURING THE DAY AND 2 TABLETS AT BEDTIME       There is no refill protocol information for this order          Katheryn Holbrook RN 02/14/22 8:54 PM

## 2022-02-15 NOTE — PROGRESS NOTES
Progress Note  2/15/22     Chief Complaint   Patient presents with     Toe Pain     right big toe, red painful,  surgery 2/21          HPI    Dayana Samaniego is a pleasant  61 year old year old female  who present to the clinic today for evaluation of pain, swelling, drainage to the nailbed of her right great toe.  Symptoms started 3 days ago.  She has been having some purulent drainage from the area.  She is never had an ingrown toenail before.  She denies any fevers or chills.  She is otherwise feeling well.  He has been soaking her toes and Epson salt the last few days.  This has not worsened over the last day or so.      ROS    Review of Systems   Constitutional: Negative for activity change, chills, fatigue and fever.   HENT: Negative for congestion, ear discharge, ear pain, postnasal drip, rhinorrhea, sinus pain and sore throat.    Eyes: Negative for pain, discharge, redness and itching.   Respiratory: Negative for cough, shortness of breath and wheezing.    Cardiovascular: Negative for chest pain and palpitations.   Gastrointestinal: Negative for abdominal pain, constipation, diarrhea and vomiting.   Musculoskeletal: Negative for neck pain and neck stiffness.        Pain, swelling, and drainage of the right great toenail.    Skin: Negative for rash.            Patient Active Problem List   Diagnosis     Common migraine with intractable migraine     Chronic kidney disease, stage 3 (H)     Anemia     Asymptomatic postmenopausal status     Bipolar 2 disorder (H)     Borderline personality disorder (H)     Centrilobular emphysema (H)     Fibromyalgia     DDD (degenerative disc disease), lumbosacral     GERD (gastroesophageal reflux disease)     Hiatal hernia     Hyperlipidemia     Hypotension     Hypothyroidism     Lung nodule     Menopausal disorder     Migraine headache     Osteopenia of multiple sites     PTSD (post-traumatic stress disorder)     Spinal stenosis of lumbar region with neurogenic claudication      Pain in right leg     Other specified disorders of bone density and structure, multiple sites     Other abnormalities of gait and mobility     Moderate persistent asthma, uncomplicated     Low back pain     Encounter for other orthopedic aftercare     Chronic obstructive pulmonary disease, unspecified (H)     Chronic pain syndrome     Draining cutaneous sinus tract     Abnormal reflex     Ingrown toenail        Past Medical History:   Diagnosis Date     Abnormality of gait     Created by Conversion      Anemia 3/18/2021     Anxiety      Asthma      Asymptomatic postmenopausal status     Created by Conversion  Replacement Utility updated for latest IMO load     Bipolar 2 disorder (H)      Centrilobular emphysema (H) 2/26/2018    Mild, seen in 2018 CT scan, DLCO 60% predicted  Formatting of this note might be different from the original. Formatting of this note might be different from the original. Mild, seen in 2018 CT scan, DLCO 60% predicted     Cervical spinal stenosis     s/p cervical fusion     Chronic kidney disease      Chronic kidney disease, stage 3 (H) 1/14/2021     Chronic pain syndrome      Cigarette nicotine dependence without complication 3/4/2020     Common migraine with intractable migraine 2/15/2021     COPD (chronic obstructive pulmonary disease) (H)      DDD (degenerative disc disease), lumbosacral 3/22/2021     Depression      Fibrocystic breast      Fibromyalgia      GERD (gastroesophageal reflux disease)      Hallux abductovalgus with bunions, unspecified laterality 12/14/2015     Herpes zoster     Created by Conversion  Replacement Utility updated for latest IMO load     Hiatal hernia      History of electroconvulsive therapy      Hyperlipidemia 3/17/2021    Created by Conversion   Formatting of this note might be different from the original. Formatting of this note might be different from the original. Created by Conversion     Hypotension 3/18/2021     Hypothyroidism     hypothyroidism      IBS (irritable bowel syndrome)      Lung nodule 2016:  Left upper lobe nodule of 6.5 mm, likely benign given slow growth per radiologist.  See CT 2011:  measured  approximately 5.5 mm in greatest dimension      Menopausal and postmenopausal disorder     Created by Conversion  Replacement Utility updated for latest IMO load     Migraine     Created by Conversion  Replacement Utility updated for latest IMO load     Migraines      Osteoarthritis      Osteopenia of multiple sites 2020     Other chronic pain      PONV (postoperative nausea and vomiting)      PTSD (post-traumatic stress disorder)      Shingles 2014    on back     Spinal stenosis of lumbar region with neurogenic claudication 3/22/2021     Tobacco use disorder     Created by Conversion           Social History     Socioeconomic History     Marital status:      Spouse name: Not on file     Number of children: Not on file     Years of education: Not on file     Highest education level: Not on file   Occupational History     Not on file   Tobacco Use     Smoking status: Former Smoker     Years: 45.00     Types: Cigarettes     Quit date: 10/29/2021     Years since quittin.2     Smokeless tobacco: Former User   Substance and Sexual Activity     Alcohol use: Not Currently     Comment: Alcoholic Drinks/day: rare--1-2 times in year     Drug use: No     Sexual activity: Yes     Partners: Male   Other Topics Concern     Not on file   Social History Narrative     Not on file     Social Determinants of Health     Financial Resource Strain: Not on file   Food Insecurity: Not on file   Transportation Needs: Not on file   Physical Activity: Not on file   Stress: Not on file   Social Connections: Not on file   Intimate Partner Violence: Not on file   Housing Stability: Not on file           Allergies   Allergen Reactions     Codeine Anaphylaxis     Nefazodone Nausea and Vomiting     Oxycodone-Acetaminophen Unknown     hallucinations  "    Cetirizine Nausea and Vomiting     Trazodone Nausea and Vomiting     Amoxicillin-Pot Clavulanate [Augmentin] Rash     Cephalexin Rash     Clavulanic Acid Rash     Cyclobenzaprine Rash     Eszopiclone Rash     Methocarbamol Rash     Penicillins Rash     Mild rash          Current Outpatient Medications   Medication     albuterol (PROAIR HFA) 108 (90 Base) MCG/ACT inhaler     atorvastatin (LIPITOR) 40 MG tablet     benzonatate (TESSALON) 100 MG capsule     buPROPion (WELLBUTRIN) 75 MG tablet     doxepin (SINEQUAN) 100 MG capsule     eletriptan (RELPAX) 40 MG tablet     erenumab-aooe (AIMOVIG) 70 MG/ML injection     ergocalciferol (ERGOCALCIFEROL) 1.25 MG (47092 UT) capsule     esomeprazole (NEXIUM) 20 MG DR capsule     FLUoxetine (PROZAC) 40 MG capsule     fluticasone (FLONASE) 50 MCG/ACT nasal spray     folic acid (FOLVITE) 400 MCG tablet     gabapentin (NEURONTIN) 300 MG capsule     HYDROcodone-acetaminophen (NORCO)  MG per tablet     hydrOXYzine (VISTARIL) 25 MG capsule     lamoTRIgine (LAMICTAL) 200 MG tablet     levothyroxine (SYNTHROID/LEVOTHROID) 75 MCG tablet     montelukast (SINGULAIR) 10 MG tablet     prazosin (MINIPRESS) 2 MG capsule     prochlorperazine (COMPAZINE) 10 MG tablet     senna-docusate (SENOKOT-S/PERICOLACE) 8.6-50 MG tablet     sulfamethoxazole-trimethoprim (BACTRIM DS) 800-160 MG tablet     tiotropium (SPIRIVA RESPIMAT) 2.5 MCG/ACT inhaler     tiZANidine (ZANAFLEX) 4 MG tablet     No current facility-administered medications for this visit.            /80   Pulse 78   Temp 98  F (36.7  C)   Ht 1.638 m (5' 4.5\")   Wt 79.8 kg (176 lb)   Breastfeeding No   BMI 29.74 kg/m       Recent Results (from the past 240 hour(s))   EKG 12-lead, tracing only    Collection Time: 02/10/22 12:58 PM   Result Value Ref Range    Systolic Blood Pressure  mmHg    Diastolic Blood Pressure  mmHg    Ventricular Rate 78 BPM    Atrial Rate 78 BPM    ID Interval 160 ms    QRS Duration 72 ms     " ms    QTc 460 ms    P Axis 59 degrees    R AXIS 64 degrees    T Axis 66 degrees    Interpretation ECG       Sinus rhythm  Normal ECG  When compared with ECG of 14-JAN-2021 10:07,  Premature ventricular complexes are no longer Present  Confirmed by NIDHI JENSEN MD LOC:[facility] ([id number]) on 2/11/2022 8:41:22 AM     Basic metabolic panel    Collection Time: 02/10/22  1:23 PM   Result Value Ref Range    Sodium 139 136 - 145 mmol/L    Potassium 4.3 3.5 - 5.0 mmol/L    Chloride 101 98 - 107 mmol/L    Carbon Dioxide (CO2) 23 22 - 31 mmol/L    Anion Gap 15 5 - 18 mmol/L    Urea Nitrogen 15 8 - 22 mg/dL    Creatinine 1.11 (H) 0.60 - 1.10 mg/dL    Calcium 9.5 8.5 - 10.5 mg/dL    Glucose 86 70 - 125 mg/dL    GFR Estimate 56 (L) >60 mL/min/1.73m2   CBC with platelets    Collection Time: 02/10/22  1:23 PM   Result Value Ref Range    WBC Count 6.9 4.0 - 11.0 10e3/uL    RBC Count 4.45 3.80 - 5.20 10e6/uL    Hemoglobin 11.7 11.7 - 15.7 g/dL    Hematocrit 38.1 35.0 - 47.0 %    MCV 86 78 - 100 fL    MCH 26.3 (L) 26.5 - 33.0 pg    MCHC 30.7 (L) 31.5 - 36.5 g/dL    RDW 14.3 10.0 - 15.0 %    Platelet Count 282 150 - 450 10e3/uL        Physical Exam:     Physical Exam  Vitals and nursing note reviewed.   Constitutional:       General: She is awake. She is not in acute distress.     Appearance: Normal appearance. She is well-developed and well-groomed.   Cardiovascular:      Rate and Rhythm: Normal rate and regular rhythm.      Heart sounds: No murmur heard.  No friction rub. No gallop.    Pulmonary:      Effort: Pulmonary effort is normal. No accessory muscle usage, prolonged expiration or respiratory distress.      Breath sounds: No decreased breath sounds, wheezing, rhonchi or rales.   Musculoskeletal:        Feet:    Feet:      Comments: Erythremia, swelling, and drainage to the right great toenail. Ingrown toe nail to the area. No warmth.   Neurological:      Mental Status: She is alert.   Psychiatric:         Behavior: Behavior is  cooperative.            Assessment/Plan:       #1 Right toenail to right great toe-today she does have what appears to be an ingrown toenail which is infected.  She has purulent drainage, erythema, and swelling to the medial aspect of the nailbed.  No warmth today.  She has not had any fevers or chills.  I will start her on Bactrim twice a day for 10 days.  She is allergic to penicillins and cephalosporins.  She is to take this with food.  Side effects of the medication were discussed.  Is to monitor for worsening signs of infection and if this occurs to follow-up.  Continue with Epson salt soaks.  Did recommend having the ingrown toenail removed once infection is cleared.    Of note she is supposed to be having surgery next week Monday.  Preop was done earlier this week.  I did encourage her to call the surgery center and update them on the infected toenail.      Irvin Morgan PA-C

## 2022-02-15 NOTE — TELEPHONE ENCOUNTER
I filled this last fall but did not originate this Rx. Her pain clinic should be refilling this med.

## 2022-02-16 ASSESSMENT — PATIENT HEALTH QUESTIONNAIRE - PHQ9: SUM OF ALL RESPONSES TO PHQ QUESTIONS 1-9: 20

## 2022-02-17 ENCOUNTER — LAB (OUTPATIENT)
Dept: LAB | Facility: CLINIC | Age: 62
End: 2022-02-17
Payer: COMMERCIAL

## 2022-02-17 DIAGNOSIS — Z01.818 PREOP GENERAL PHYSICAL EXAM: ICD-10-CM

## 2022-02-17 PROCEDURE — U0005 INFEC AGEN DETEC AMPLI PROBE: HCPCS

## 2022-02-17 PROCEDURE — U0003 INFECTIOUS AGENT DETECTION BY NUCLEIC ACID (DNA OR RNA); SEVERE ACUTE RESPIRATORY SYNDROME CORONAVIRUS 2 (SARS-COV-2) (CORONAVIRUS DISEASE [COVID-19]), AMPLIFIED PROBE TECHNIQUE, MAKING USE OF HIGH THROUGHPUT TECHNOLOGIES AS DESCRIBED BY CMS-2020-01-R: HCPCS

## 2022-02-18 LAB — SARS-COV-2 RNA RESP QL NAA+PROBE: NEGATIVE

## 2022-02-18 NOTE — TELEPHONE ENCOUNTER
Talked to patient. She fill follow up with her pain clinic for a refill. Pt had no further questions.    Anh Page CMA.

## 2022-02-25 ENCOUNTER — APPOINTMENT (OUTPATIENT)
Dept: ULTRASOUND IMAGING | Facility: CLINIC | Age: 62
End: 2022-02-25
Attending: EMERGENCY MEDICINE
Payer: COMMERCIAL

## 2022-02-25 ENCOUNTER — HOSPITAL ENCOUNTER (EMERGENCY)
Facility: CLINIC | Age: 62
Discharge: HOME | End: 2022-02-25
Attending: EMERGENCY MEDICINE | Admitting: EMERGENCY MEDICINE
Payer: COMMERCIAL

## 2022-02-25 ENCOUNTER — APPOINTMENT (OUTPATIENT)
Dept: CT IMAGING | Facility: CLINIC | Age: 62
End: 2022-02-25
Attending: EMERGENCY MEDICINE
Payer: COMMERCIAL

## 2022-02-25 VITALS
OXYGEN SATURATION: 92 % | HEART RATE: 66 BPM | DIASTOLIC BLOOD PRESSURE: 61 MMHG | BODY MASS INDEX: 31.43 KG/M2 | SYSTOLIC BLOOD PRESSURE: 125 MMHG | WEIGHT: 186 LBS | TEMPERATURE: 97.5 F | RESPIRATION RATE: 16 BRPM

## 2022-02-25 DIAGNOSIS — R06.02 SHORTNESS OF BREATH: ICD-10-CM

## 2022-02-25 DIAGNOSIS — T40.601A NARCOTIC OVERDOSE, ACCIDENTAL OR UNINTENTIONAL, INITIAL ENCOUNTER (H): ICD-10-CM

## 2022-02-25 LAB
ANION GAP SERPL CALCULATED.3IONS-SCNC: 14 MMOL/L (ref 5–18)
ATRIAL RATE - MUSE: 62 BPM
BASOPHILS # BLD AUTO: 0 10E3/UL (ref 0–0.2)
BASOPHILS NFR BLD AUTO: 1 %
BUN SERPL-MCNC: 18 MG/DL (ref 8–22)
CALCIUM SERPL-MCNC: 8.5 MG/DL (ref 8.5–10.5)
CHLORIDE BLD-SCNC: 103 MMOL/L (ref 98–107)
CO2 SERPL-SCNC: 20 MMOL/L (ref 22–31)
CREAT SERPL-MCNC: 1.17 MG/DL (ref 0.6–1.1)
DIASTOLIC BLOOD PRESSURE - MUSE: 59 MMHG
EOSINOPHIL # BLD AUTO: 0.5 10E3/UL (ref 0–0.7)
EOSINOPHIL NFR BLD AUTO: 6 %
ERYTHROCYTE [DISTWIDTH] IN BLOOD BY AUTOMATED COUNT: 15.3 % (ref 10–15)
GFR SERPL CREATININE-BSD FRML MDRD: 53 ML/MIN/1.73M2
GLUCOSE BLD-MCNC: 115 MG/DL (ref 70–125)
HCT VFR BLD AUTO: 30.8 % (ref 35–47)
HGB BLD-MCNC: 9.4 G/DL (ref 11.7–15.7)
IMM GRANULOCYTES # BLD: 0 10E3/UL
IMM GRANULOCYTES NFR BLD: 0 %
INTERPRETATION ECG - MUSE: NORMAL
LYMPHOCYTES # BLD AUTO: 1.3 10E3/UL (ref 0.8–5.3)
LYMPHOCYTES NFR BLD AUTO: 16 %
MCH RBC QN AUTO: 26.5 PG (ref 26.5–33)
MCHC RBC AUTO-ENTMCNC: 30.5 G/DL (ref 31.5–36.5)
MCV RBC AUTO: 87 FL (ref 78–100)
MONOCYTES # BLD AUTO: 0.5 10E3/UL (ref 0–1.3)
MONOCYTES NFR BLD AUTO: 6 %
NEUTROPHILS # BLD AUTO: 5.8 10E3/UL (ref 1.6–8.3)
NEUTROPHILS NFR BLD AUTO: 71 %
NRBC # BLD AUTO: 0 10E3/UL
NRBC BLD AUTO-RTO: 0 /100
P AXIS - MUSE: 49 DEGREES
PLATELET # BLD AUTO: 342 10E3/UL (ref 150–450)
POTASSIUM BLD-SCNC: 3.8 MMOL/L (ref 3.5–5)
PR INTERVAL - MUSE: 162 MS
QRS DURATION - MUSE: 78 MS
QT - MUSE: 428 MS
QTC - MUSE: 434 MS
R AXIS - MUSE: 62 DEGREES
RBC # BLD AUTO: 3.55 10E6/UL (ref 3.8–5.2)
SODIUM SERPL-SCNC: 137 MMOL/L (ref 136–145)
SYSTOLIC BLOOD PRESSURE - MUSE: 127 MMHG
T AXIS - MUSE: 78 DEGREES
TROPONIN I SERPL-MCNC: <0.01 NG/ML (ref 0–0.29)
VENTRICULAR RATE- MUSE: 62 BPM
WBC # BLD AUTO: 8.1 10E3/UL (ref 4–11)

## 2022-02-25 PROCEDURE — 250N000011 HC RX IP 250 OP 636: Performed by: EMERGENCY MEDICINE

## 2022-02-25 PROCEDURE — 93971 EXTREMITY STUDY: CPT | Mod: LT

## 2022-02-25 PROCEDURE — 36415 COLL VENOUS BLD VENIPUNCTURE: CPT | Performed by: EMERGENCY MEDICINE

## 2022-02-25 PROCEDURE — 93005 ELECTROCARDIOGRAM TRACING: CPT | Performed by: EMERGENCY MEDICINE

## 2022-02-25 PROCEDURE — 71275 CT ANGIOGRAPHY CHEST: CPT

## 2022-02-25 PROCEDURE — 84484 ASSAY OF TROPONIN QUANT: CPT | Performed by: EMERGENCY MEDICINE

## 2022-02-25 PROCEDURE — 99285 EMERGENCY DEPT VISIT HI MDM: CPT | Mod: 25

## 2022-02-25 PROCEDURE — 85025 COMPLETE CBC W/AUTO DIFF WBC: CPT | Performed by: EMERGENCY MEDICINE

## 2022-02-25 PROCEDURE — 80048 BASIC METABOLIC PNL TOTAL CA: CPT | Performed by: EMERGENCY MEDICINE

## 2022-02-25 RX ORDER — IOPAMIDOL 755 MG/ML
100 INJECTION, SOLUTION INTRAVASCULAR ONCE
Status: COMPLETED | OUTPATIENT
Start: 2022-02-25 | End: 2022-02-25

## 2022-02-25 RX ADMIN — IOPAMIDOL 75 ML: 755 INJECTION, SOLUTION INTRAVENOUS at 04:04

## 2022-02-25 ASSESSMENT — ENCOUNTER SYMPTOMS
BACK PAIN: 1
SHORTNESS OF BREATH: 1
FEVER: 0

## 2022-02-25 NOTE — ED PROVIDER NOTES
EMERGENCY DEPARTMENT ENCOUNTER      NAME: Dayana Samaniego  AGE: 61 year old female  YOB: 1960  MRN: [id number]  EVALUATION DATE & TIME: 2/25/2022  2:29 AM    PCP: Patrice Sanon    ED PROVIDER: Jt Najera M.D.      Chief Complaint   Patient presents with     Shortness of Breath     Chest Pain         FINAL IMPRESSION:  1. Narcotic overdose, accidental or unintentional, initial encounter (H)    2. Shortness of breath          ED COURSE & MEDICAL DECISION MAKING:    Pertinent Labs & Imaging studies reviewed. (See chart for details)  61 year old female presents to the Emergency Department for evaluation of dyspnea.  Patient status post lumbar surgery.  EKG is normal.  I do not think this is cardiac in nature.  Troponins negative.  Did do a CT scan of her chest looking for PE or other cause.  No pneumonia or PE is seen.  Patient is very somnolent and has been taking Dilaudid.  I think this is likely the cause of her dyspnea and her somnolence.  Her ultrasound of her left lower extremity as she does have some pain there does not show any signs of DVT.  At this time I do think patient safe for discharge home.  Did discuss with her narcotic safety.  She will limit her narcotic use.  Patient will be discharged home.    2:45 AM I met with the patient to gather history and to perform my initial exam. I discussed the plan for care while in the Emergency Department. PPE: Provider wore goggles and surgical mask.     At the conclusion of the encounter I discussed the results of all of the tests and the disposition. The questions were answered. The patient or family acknowledged understanding and was agreeable with the care plan.         MEDICATIONS GIVEN IN THE EMERGENCY:  Medications   iopamidol (ISOVUE-370) solution 100 mL (75 mLs Intravenous Given 2/25/22 0404)       NEW PRESCRIPTIONS STARTED AT TODAY'S ER VISIT  New Prescriptions    No medications on file           =================================================================    HPI    Patient information was obtained from: patient and triage note    Use of : N/A         Dayana Samaniego is a 61 year old female with a pertinent history of lumbosacral DDD s/p repair (2/21/22), COPD, emphysema, chronic pain syndrome who presents to this ED via EMS for evaluation of shortness of breath.     Per chart review, patient was admitted to [facility] on 2/21/22. She underwent pseudoarthrosis repair L4-S1, instrumentation replacement L4-S1 bilateral, and bone marrow harvest on 2/21/22 by Dr. Sanon. She was discharged home in stable condition on 2/24/22 with a prescription for 2mg dilaudid PRN.    Patient had back surgery on 2/21 at [facility] and was discharged home yesterday (2/24). Patient reports left calf pain and swelling, which began while driving home from the hospital. Notes that this is abnormal for her, as she intermittently has swelling in the right leg. Reports shortness of breath onset last night, shortly before she went to bed. Took 2mg prescription dilaudid before midnight but unsure of the exact time. Back pain is currently mild.    Also reports pain in bilateral shoulders onset yesterday afternoon, which has gradually gotten worse. Endorses frequency and constipation but otherwise denies fever or any other complaints at this time.      REVIEW OF SYSTEMS   Review of Systems   Constitutional: Negative for fever.   Respiratory: Positive for shortness of breath.    Cardiovascular: Positive for leg swelling (left).   Musculoskeletal: Positive for back pain (not new).        Positive for bilateral shoulder pain.   All other systems reviewed and are negative.       PAST MEDICAL HISTORY:  Past Medical History:   Diagnosis Date     Abnormality of gait     Created by Conversion      Anemia 3/18/2021     Anxiety      Asthma      Asymptomatic postmenopausal status     Created by Conversion  Replacement  Utility updated for latest IMO load     Bipolar 2 disorder (H)      Centrilobular emphysema (H) 2/26/2018    Mild, seen in 2018 CT scan, DLCO 60% predicted  Formatting of this note might be different from the original. Formatting of this note might be different from the original. Mild, seen in 2018 CT scan, DLCO 60% predicted     Cervical spinal stenosis     s/p cervical fusion     Chronic kidney disease      Chronic kidney disease, stage 3 (H) 1/14/2021     Chronic pain syndrome      Cigarette nicotine dependence without complication 3/4/2020     Common migraine with intractable migraine 2/15/2021     COPD (chronic obstructive pulmonary disease) (H)      DDD (degenerative disc disease), lumbosacral 3/22/2021     Depression      Fibrocystic breast      Fibromyalgia      GERD (gastroesophageal reflux disease)      Hallux abductovalgus with bunions, unspecified laterality 12/14/2015     Herpes zoster     Created by Conversion  Replacement Utility updated for latest IMO load     Hiatal hernia      History of electroconvulsive therapy      Hyperlipidemia 3/17/2021    Created by Conversion   Formatting of this note might be different from the original. Formatting of this note might be different from the original. Created by Conversion     Hypotension 3/18/2021     Hypothyroidism     hypothyroidism     IBS (irritable bowel syndrome)      Lung nodule 8/4/2016 8/4/2016:  Left upper lobe nodule of 6.5 mm, likely benign given slow growth per radiologist.  See CT 6/27/2011:  measured  approximately 5.5 mm in greatest dimension      Menopausal and postmenopausal disorder     Created by Conversion  Replacement Utility updated for latest IMO load     Migraine     Created by Conversion  Replacement Utility updated for latest IMO load     Migraines      Osteoarthritis      Osteopenia of multiple sites 9/1/2020     Other chronic pain      PONV (postoperative nausea and vomiting)      PTSD (post-traumatic stress disorder)       Shingles 2014    on back     Spinal stenosis of lumbar region with neurogenic claudication 3/22/2021     Tobacco use disorder     Created by Conversion        PAST SURGICAL HISTORY:  Past Surgical History:   Procedure Laterality Date     BIOPSY BREAST Left     Approx 5 bx     BUNIONECTOMY Right 12/15/2015    Procedure: MODIFIED LAI BUNIONECTOMY RIGHT FOOT;  Surgeon: Jerome Calvin DPM;  Location: [facility] OR;  Service:      CERVICAL FUSION       CERVICAL FUSION Bilateral 2/16/2015    Procedure: ANTERIOR CERVICAL DECOMPRESSION/FUSION C3-5 BILATERAL, ANTERIOR HARDWARE REMOVAL C5-7 BILATERAL ;  Surgeon: Sawyer Roland MD;  Location: [facility] OR;  Service:       NIPPLE EXPLORATION      Description: Breast Nipple Explor W/ Excision Solitary Lactiferous Duct;  Recorded: 04/10/2008;     HYSTERECTOMY       IR CERVICAL EPIDURAL STEROID INJECTION  9/17/2003     IR CERVICAL EPIDURAL STEROID INJECTION  12/11/2003     IR CERVICAL EPIDURAL STEROID INJECTION  1/16/2004     IRRIGATION AND DEBRIDEMENT DECUBITUS WOUND, COMBINED Left 12/13/2021    Procedure: Wound exploration and debridement of Left Lower Abdomin Chronic wound.;  Surgeon: Crispin Bunch MD;  Location: [facility]     LUMBAR DISCECTOMY      X2     MAMMOPLASTY AUGMENTATION Bilateral      approx 2006?     PELVIC LAPAROSCOPY      multiple     SHOULDER OPEN ROTATOR CUFF REPAIR Left     X2     ZZC TOTAL ABDOM HYSTERECTOMY      Description: Total Abdominal Hysterectomy;  Recorded: 06/10/2013;           CURRENT MEDICATIONS:    No current facility-administered medications for this encounter.     Current Outpatient Medications   Medication     albuterol (PROAIR HFA) 108 (90 Base) MCG/ACT inhaler     atorvastatin (LIPITOR) 40 MG tablet     benzonatate (TESSALON) 100 MG capsule     buPROPion (WELLBUTRIN) 75 MG tablet     doxepin (SINEQUAN) 100 MG capsule     eletriptan (RELPAX) 40 MG tablet     erenumab-aooe (AIMOVIG) 70 MG/ML injection      ergocalciferol (ERGOCALCIFEROL) 1.25 MG (09060 UT) capsule     esomeprazole (NEXIUM) 20 MG DR capsule     FLUoxetine (PROZAC) 40 MG capsule     fluticasone (FLONASE) 50 MCG/ACT nasal spray     folic acid (FOLVITE) 400 MCG tablet     gabapentin (NEURONTIN) 300 MG capsule     HYDROcodone-acetaminophen (NORCO)  MG per tablet     hydrOXYzine (VISTARIL) 25 MG capsule     lamoTRIgine (LAMICTAL) 200 MG tablet     levothyroxine (SYNTHROID/LEVOTHROID) 75 MCG tablet     montelukast (SINGULAIR) 10 MG tablet     prazosin (MINIPRESS) 2 MG capsule     prochlorperazine (COMPAZINE) 10 MG tablet     senna-docusate (SENOKOT-S/PERICOLACE) 8.6-50 MG tablet     sulfamethoxazole-trimethoprim (BACTRIM DS) 800-160 MG tablet     tiotropium (SPIRIVA RESPIMAT) 2.5 MCG/ACT inhaler     tiZANidine (ZANAFLEX) 4 MG tablet         ALLERGIES:  Allergies   Allergen Reactions     Codeine Anaphylaxis     Nefazodone Nausea and Vomiting     Oxycodone-Acetaminophen Unknown     hallucinations     Cetirizine Nausea and Vomiting     Trazodone Nausea and Vomiting     Amoxicillin-Pot Clavulanate [Augmentin] Rash     Cephalexin Rash     Clavulanic Acid Rash     Cyclobenzaprine Rash     Eszopiclone Rash     Methocarbamol Rash     Penicillins Rash     Mild rash       FAMILY HISTORY:  Family History   Problem Relation Age of Onset     Lung Cancer Mother          at age 52     Alcoholism Father      Heart Disease Maternal Grandfather      Diabetes Paternal Grandmother      Coronary Artery Disease Paternal Grandmother      Heart Disease Paternal Grandfather      Diabetes Sister      Hypertension Sister      Crohn's Disease Sister      Coronary Artery Disease Paternal Uncle      Asthma Maternal Aunt        SOCIAL HISTORY:   Social History     Socioeconomic History     Marital status:      Spouse name: Not on file     Number of children: Not on file     Years of education: Not on file     Highest education level: Not on file   Occupational History      Not on file   Tobacco Use     Smoking status: Former Smoker     Years: 45.00     Types: Cigarettes     Quit date: 10/29/2021     Years since quittin.3     Smokeless tobacco: Former User   Substance and Sexual Activity     Alcohol use: Not Currently     Comment: Alcoholic Drinks/day: rare--1-2 times in year     Drug use: No     Sexual activity: Yes     Partners: Male   Other Topics Concern     Not on file   Social History Narrative     Not on file     Social Determinants of Health     Financial Resource Strain: Not on file   Food Insecurity: Not on file   Transportation Needs: Not on file   Physical Activity: Not on file   Stress: Not on file   Social Connections: Not on file   Intimate Partner Violence: Not on file   Housing Stability: Not on file       VITALS:  /57   Pulse 66   Temp 97.5  F (36.4  C) (Oral)   Resp 15   Wt 84.4 kg (186 lb)   SpO2 93%   BMI 31.43 kg/m      PHYSICAL EXAM    Physical Exam  Constitutional:       General: She is not in acute distress.     Appearance: She is not diaphoretic.      Comments: Somnolent.  Arousable.   HENT:      Head: Atraumatic.      Mouth/Throat:      Pharynx: No oropharyngeal exudate.   Eyes:      General: No scleral icterus.     Pupils: Pupils are equal, round, and reactive to light.   Cardiovascular:      Heart sounds: Normal heart sounds.   Pulmonary:      Effort: No respiratory distress.      Breath sounds: Normal breath sounds.   Abdominal:      Palpations: Abdomen is soft.      Tenderness: There is no abdominal tenderness. There is no guarding or rebound.   Musculoskeletal:         General: No tenderness.   Skin:     General: Skin is warm.      Findings: No rash.   Neurological:      General: No focal deficit present.           LAB:  All pertinent labs reviewed and interpreted.  Labs Ordered and Resulted from Time of ED Arrival to Time of ED Departure   BASIC METABOLIC PANEL - Abnormal       Result Value    Sodium 137      Potassium 3.8       Chloride 103      Carbon Dioxide (CO2) 20 (*)     Anion Gap 14      Urea Nitrogen 18      Creatinine 1.17 (*)     Calcium 8.5      Glucose 115      GFR Estimate 53 (*)    CBC WITH PLATELETS AND DIFFERENTIAL - Abnormal    WBC Count 8.1      RBC Count 3.55 (*)     Hemoglobin 9.4 (*)     Hematocrit 30.8 (*)     MCV 87      MCH 26.5      MCHC 30.5 (*)     RDW 15.3 (*)     Platelet Count 342      % Neutrophils 71      % Lymphocytes 16      % Monocytes 6      % Eosinophils 6      % Basophils 1      % Immature Granulocytes 0      NRBCs per 100 WBC 0      Absolute Neutrophils 5.8      Absolute Lymphocytes 1.3      Absolute Monocytes 0.5      Absolute Eosinophils 0.5      Absolute Basophils 0.0      Absolute Immature Granulocytes 0.0      Absolute NRBCs 0.0     TROPONIN I - Normal    Troponin I <0.01         RADIOLOGY:  Reviewed all pertinent imaging. Please see official radiology report.  CT Chest Pulmonary Embolism w Contrast   Final Result   IMPRESSION:   1.  No pulmonary emboli.   2.  Emphysema.   3.  Bibasilar atelectasis.   4.  Tiny pulmonary nodule stable.   5.  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      US Lower Extremity Venous Duplex Left   Final Result   IMPRESSION:   No deep venous thrombosis in the left lower extremity.          EKG:    Performed at: 245  Impression: Sinus rhythm without any acute abnormalities.  Unchanged previous dated February 10, 2022  Sinus rhythm at a rate of 62.  .  QRS 78.  QTc 430    I have independently reviewed and interpreted the EKG(s) documented above.      I, Katheryn Banks, am serving as a scribe to document services personally performed by Dr. Jt Najera, based on my observation and the provider's statements to me. I, Jt Najera MD attest that Katheryn Banks is acting in a scribe capacity, has observed my performance of the services and has documented them in accordance with my direction.    Jt Najera  M.D.  Emergency Medicine  [facility] EMERGENCY ROOM  [address] [id number] [phone]  Dept: [phone]     Jt Najera MD  02/25/22 0637

## 2022-02-25 NOTE — ED NOTES
Oxygen saturations dropped to 88% while falling asleep.  Placed on 2L nasal canula. Saturations up to 95%.

## 2022-02-25 NOTE — DISCHARGE INSTRUCTIONS
Please limit your opiates to only as much as you need as I think you are taking too much and this is causing your shortness of breath.

## 2022-02-28 ENCOUNTER — OFFICE VISIT (OUTPATIENT)
Dept: FAMILY MEDICINE | Facility: CLINIC | Age: 62
End: 2022-02-28
Payer: COMMERCIAL

## 2022-02-28 ENCOUNTER — TELEPHONE (OUTPATIENT)
Dept: FAMILY MEDICINE | Facility: CLINIC | Age: 62
End: 2022-02-28

## 2022-02-28 VITALS
OXYGEN SATURATION: 94 % | BODY MASS INDEX: 29.91 KG/M2 | HEART RATE: 90 BPM | SYSTOLIC BLOOD PRESSURE: 138 MMHG | DIASTOLIC BLOOD PRESSURE: 60 MMHG | WEIGHT: 177 LBS

## 2022-02-28 DIAGNOSIS — R06.02 SOB (SHORTNESS OF BREATH): ICD-10-CM

## 2022-02-28 DIAGNOSIS — J44.9 CHRONIC OBSTRUCTIVE PULMONARY DISEASE, UNSPECIFIED COPD TYPE (H): Primary | ICD-10-CM

## 2022-02-28 PROCEDURE — 99213 OFFICE O/P EST LOW 20 MIN: CPT | Performed by: FAMILY MEDICINE

## 2022-02-28 RX ORDER — ALBUTEROL SULFATE 90 UG/1
2 AEROSOL, METERED RESPIRATORY (INHALATION) EVERY 4 HOURS
Qty: 18 G | Refills: 11 | Status: SHIPPED | OUTPATIENT
Start: 2022-02-28 | End: 2023-08-08

## 2022-02-28 RX ORDER — DOCUSATE SODIUM 100 MG/1
100 CAPSULE, LIQUID FILLED ORAL 2 TIMES DAILY
Status: ON HOLD | COMMUNITY
End: 2022-12-30

## 2022-02-28 RX ORDER — ACETAMINOPHEN 500 MG
1000 TABLET ORAL
Status: ON HOLD | COMMUNITY
Start: 2022-02-24 | End: 2022-12-30

## 2022-02-28 NOTE — PATIENT INSTRUCTIONS
Patient Education     Treating COPD       Your healthcare provider will prescribe the best treatments for your COPD.     Treatment  Treatments include:    Medicines. Some medicines help ease symptoms. Others control lung inflammation. Always take your medicines as prescribed. Learn the names of your medicines, and how and when to use them. Talk with your provider about other conditions you have and the medicines you take. When using a metered dose inhaler or nebulizer, use the correct techniques. If you have any questions about how to use your medicine delivery system, call your provider or refer to the user manual.    Tests. To monitor risks, your provider may advise a blood or sputum tests, or other lung function tests.  All people with COPD should be screened once for alpha-1 antitrypsin deficiency (AATD).    Oxygen therapy. If your blood contains too little oxygen, you may need oxygen therapy. Ask about long-term oxygen therapy with your provider.    Smoking. If you smoke, quit. Smoking is the main cause of COPD. Quitting will help you be able to better manage your COPD. Also don't use e-cigarettes or vaping products. Ask your provider to help you quit.    Preventing infections. Infections such as a cold or the flu can worsen your symptoms. Try to stay away from sick people. Wash your hands often. And ask your provider about vaccines for the flu and pneumonia.    Surgery. In a few cases, surgery may be needed.   Coping with shortness of breath  Coping tips include:    Exercise. Be as active as you can. This will help your energy and strengthen your muscles so you can do more.    Breathing methods. Ask your provider or nurse to show you how to do pursed-lip breathing.    Pollution. Stay away from both indoor and outdoor pollution. Indoor pollution includes things like burning wood, smoke from home cooking, and heating fuels. Outdoor pollution includes things like dusts, vapors, fumes, gases, and other  chemicals.    Balance rest and activity. Balance rest with activity. For example, you might start the day with getting dressed and eating breakfast. Then you can relax and read the paper. After that, take a brief walk. And then sit with your feet up for a while.    Pulmonary rehab.  Community and home-based programs work as well as hospital-based programs as long as they are done as often and as intensely. These programs help with shortness of breath in people with COPD. Supervised, traditional pulmonary rehab is the best option for people with COPD. These programs help manage your disease, breathing methods, exercise, support, and counseling. To find one, ask your provider or call your local hospital. Also talk with your provider about which program is best for you.    Healthy eating. Eating a healthy, balanced diet is [place] to staying as healthy as possible. So is staying at your ideal weight. Being over- or underweight can affect your health. Make sure you have a lot of fruits and vegetables every day. And also eat  whole grains, lean meats and fish, and low-fat dairy products.  [employer] last reviewed this educational content on 8/1/2018 2000-2021 [employer]. All rights reserved. This information is not intended as a substitute for professional medical care. Always follow your healthcare professional's instructions.

## 2022-02-28 NOTE — PROGRESS NOTES
"  Assessment & Plan     (J44.9) Chronic obstructive pulmonary disease, unspecified COPD type (H)  (primary encounter diagnosis)  Comment: pt has copd and former smoker 45 pack year. She uses albuterol inh 4 times a day with some help. She used to have Rx of spiriva but had not use it. No wheezing and fever, cough.   Plan: acetaminophen (TYLENOL) 500 MG tablet, docusate        sodium (DSS) 100 MG capsule, albuterol (PROAIR         HFA) 108 (90 Base) MCG/ACT inhaler, tiotropium         (SPIRIVA RESPIMAT) 2.5 MCG/ACT inhaler       Strongly advise to resume Spiriva.   likely she is recover from the back surgery that may affect her comfortable to breath. Also narcotic oxycodone may depression her breathing.   Advise lung exercise.     (R06.02) SOB (shortness of breath)  Comment: sob last week and had er visit. It improved after stopped dilaudid. No dvt and pe.  No fever and wheezing.   Plan: continue lung exercise and use the inhalers.    :[id number]}     BMI:   Estimated body mass index is 29.91 kg/m  as calculated from the following:    Height as of 2/15/22: 1.638 m (5' 4.5\").    Weight as of this encounter: 80.3 kg (177 lb).       Return in about 2 weeks (around 3/14/2022).    Erin Jimenez MD  [facility]    Suzie Anne is a 61 year old who presents for the following health issues  accompanied by her son.    HPI     Pt has sob for one week and got worse 4 days ago,. Improved some by now. No fever, cough, cp, headache.      Reviewed the chart pt went to er d3 days ago. She had negative leg us and chest ct pe. Lab were negative. Was Dxed likely due to Dilaudid cause it . Dilaudid stopped and she takes oxycodone prn for pain.     She is s/p lumber spine surgery. She is slowly recovery and pain is adequate controlled.       Review of Systems   Constitutional, HEENT, cardiovascular, pulmonary, gi and gu systems are negative, except as otherwise noted.      Objective    /60   Pulse 90   " Wt 80.3 kg (177 lb)   SpO2 94%   BMI 29.91 kg/m    Body mass index is 29.91 kg/m .  Physical Exam   GENERAL: healthy, alert and no distress  NECK: no adenopathy, no asymmetry, masses, or scars and thyroid normal to palpation  RESP: lungs clear to auscultation - no rales, rhonchi or wheezes  CV: regular rate and rhythm, normal S1 S2, no S3 or S4, no murmur, click or rub, no peripheral edema and peripheral pulses strong  ABDOMEN: soft, nontender, no hepatosplenomegaly, no masses and bowel sounds normal  MS: no gross musculoskeletal defects noted, no edema

## 2022-02-28 NOTE — TELEPHONE ENCOUNTER
Forms Request  Name of form/paperwork:  Physician's Order(s)  Have you been seen for this request: N/A  Do we have the form: Yes - placed in Provider's Inbox (yellow folder)  When is form needed by: when done  How would you like the form returned:   Fax: [phone]  Patient Notified form requests are processed in 3-5 business days: N/A  Okay to leave a detailed message? N/A

## 2022-03-01 ENCOUNTER — TELEPHONE (OUTPATIENT)
Dept: FAMILY MEDICINE | Facility: CLINIC | Age: 62
End: 2022-03-01
Payer: COMMERCIAL

## 2022-03-01 ENCOUNTER — MEDICAL CORRESPONDENCE (OUTPATIENT)
Dept: HEALTH INFORMATION MANAGEMENT | Facility: CLINIC | Age: 62
End: 2022-03-01
Payer: COMMERCIAL

## 2022-03-01 NOTE — TELEPHONE ENCOUNTER
Forms Request  Name of form/paperwork: [facility]  Have you been seen for this request: Yes:  2/10  Do we have the form: yes, in providers inbox  When is form needed by: when done  How would you like the form returned: fax  Patient Notified form requests are processed in 3-5 business days: n/a    Okay to leave a detailed message? n/a

## 2022-03-02 ENCOUNTER — TELEPHONE (OUTPATIENT)
Dept: FAMILY MEDICINE | Facility: CLINIC | Age: 62
End: 2022-03-02
Payer: COMMERCIAL

## 2022-03-02 NOTE — TELEPHONE ENCOUNTER
Forms Request  Name of form/paperwork: [facility]  Have you been seen for this request: N/A  Do we have the form: yes, in providers inbox  When is form needed by: when done  How would you like the form returned: fax  Patient Notified form requests are processed in 3-5 business days: n/a    Okay to leave a detailed message? n/a

## 2022-03-03 ENCOUNTER — MEDICAL CORRESPONDENCE (OUTPATIENT)
Dept: HEALTH INFORMATION MANAGEMENT | Facility: CLINIC | Age: 62
End: 2022-03-03
Payer: COMMERCIAL

## 2022-03-09 ENCOUNTER — OFFICE VISIT (OUTPATIENT)
Dept: SURGERY | Facility: CLINIC | Age: 62
End: 2022-03-09
Payer: COMMERCIAL

## 2022-03-09 VITALS — SYSTOLIC BLOOD PRESSURE: 130 MMHG | DIASTOLIC BLOOD PRESSURE: 70 MMHG

## 2022-03-09 DIAGNOSIS — S31.109D OPEN WOUND OF ABDOMINAL WALL, SUBSEQUENT ENCOUNTER: Primary | ICD-10-CM

## 2022-03-09 PROCEDURE — 99212 OFFICE O/P EST SF 10 MIN: CPT | Performed by: SURGERY

## 2022-03-09 NOTE — LETTER
3/9/2022         RE: Dayana Samaniego  [address]  [place] [address]        Dear Colleague,    Thank you for referring your patient, Dayana Samaniego, to the [facility] [place]. Please see a copy of my visit note below.    GENERAL SURGICAL CONSULTATION    I was requested by Joanne Weiner to consult on this pt to evaluate them for a chronically draining wound.    HPI:  This is a 60 year old female here today with a chronically draining wound on the scar of her  from .    The would started to drain about a year ago.   I took this patient to the operating room on 2021 and excised that chronic wound.  I closed it primarily.  The pathology came back showing a foreign body giant cell reaction which I suspect is related to suture.  In the last week this wound opened up and drained a bit and she is left with a small area of granulation tissue at the closure site of that wound.    There is no induration no erythema is not tender to palpation the does not appear to be infected.  My suspicion is that the patient developed some fat necrosis with the closure of this wound that liquefied and drained out.  I encouraged good wound care to the skin and I suspect this will heal.    There is a possibility that the source of the sinus is not taking care of and this is a restart of the process that she had before but we should let this heal over before we jump to the conclusion.    The patient is welcome to follow-up with me on an as-needed basis.            Crispin Bunch MD  [facility]  [phone]      Again, thank you for allowing me to participate in the care of your patient.        Sincerely,        Crispin Bunch MD

## 2022-03-09 NOTE — PROGRESS NOTES
GENERAL SURGICAL CONSULTATION    I was requested by Joanne Weiner to consult on this pt to evaluate them for a chronically draining wound.    HPI:  This is a 60 year old female here today with a chronically draining wound on the scar of her  from .    The would started to drain about a year ago.   I took this patient to the operating room on 2021 and excised that chronic wound.  I closed it primarily.  The pathology came back showing a foreign body giant cell reaction which I suspect is related to suture.  In the last week this wound opened up and drained a bit and she is left with a small area of granulation tissue at the closure site of that wound.    There is no induration no erythema is not tender to palpation the does not appear to be infected.  My suspicion is that the patient developed some fat necrosis with the closure of this wound that liquefied and drained out.  I encouraged good wound care to the skin and I suspect this will heal.    There is a possibility that the source of the sinus is not taking care of and this is a restart of the process that she had before but we should let this heal over before we jump to the conclusion.    The patient is welcome to follow-up with me on an as-needed basis.            Crispin Bunch MD  [facility]  [phone]

## 2022-03-11 ENCOUNTER — TRANSFERRED RECORDS (OUTPATIENT)
Dept: HEALTH INFORMATION MANAGEMENT | Facility: CLINIC | Age: 62
End: 2022-03-11
Payer: COMMERCIAL

## 2022-03-21 ENCOUNTER — TELEPHONE (OUTPATIENT)
Dept: FAMILY MEDICINE | Facility: CLINIC | Age: 62
End: 2022-03-21
Payer: COMMERCIAL

## 2022-03-21 NOTE — TELEPHONE ENCOUNTER
Forms Request  Name of form/paperwork: Home Health Orders - [facility]   Have you been seen for this request: n/a   Do we have the form: yes   When is form needed by: when completed   How would you like the form returned: fax   Fax: [phone]  Patient Notified form requests are processed in 3-5 business days: n/a  Okay to leave a detailed message? n/a

## 2022-03-24 ENCOUNTER — MEDICAL CORRESPONDENCE (OUTPATIENT)
Dept: HEALTH INFORMATION MANAGEMENT | Facility: CLINIC | Age: 62
End: 2022-03-24
Payer: COMMERCIAL

## 2022-03-24 ENCOUNTER — TELEPHONE (OUTPATIENT)
Dept: FAMILY MEDICINE | Facility: CLINIC | Age: 62
End: 2022-03-24

## 2022-03-24 NOTE — TELEPHONE ENCOUNTER
No form to fill out and Dr. Benitez's patient. I believe she has taken care of the request.    Josiah Michael MD

## 2022-03-24 NOTE — TELEPHONE ENCOUNTER
Reason for Call: Request for an order or referral:    Order or referral being requested:  PT ORDER     Date needed: as soon as possible    Has the patient been seen by the PCP for this problem? YES    Additional comments:     Phone number Patient can be reached at:  [phone]    Best Time:  ANY   Can we leave a detailed message on this number?  YES    Call taken on 3/24/2022 at 10:31 AM by Che Acosta

## 2022-03-28 ENCOUNTER — TELEPHONE (OUTPATIENT)
Dept: FAMILY MEDICINE | Facility: CLINIC | Age: 62
End: 2022-03-28
Payer: COMMERCIAL

## 2022-03-28 NOTE — TELEPHONE ENCOUNTER
Forms Request  Name of form/paperwork: [facility]  Have you been seen for this request: N/A  Do we have the form: yes, in providers inbox  When is form needed by: when done  How would you like the form returned: fax  Patient Notified form requests are processed in 3-5 business days: n/a    Okay to leave a detailed message? n/a

## 2022-04-04 ENCOUNTER — OFFICE VISIT (OUTPATIENT)
Dept: PULMONOLOGY | Facility: OTHER | Age: 62
End: 2022-04-04
Payer: COMMERCIAL

## 2022-04-04 VITALS
BODY MASS INDEX: 29.49 KG/M2 | WEIGHT: 174.5 LBS | SYSTOLIC BLOOD PRESSURE: 159 MMHG | DIASTOLIC BLOOD PRESSURE: 65 MMHG | HEART RATE: 87 BPM | OXYGEN SATURATION: 95 %

## 2022-04-04 DIAGNOSIS — J43.2 CENTRILOBULAR EMPHYSEMA (H): Primary | Chronic | ICD-10-CM

## 2022-04-04 DIAGNOSIS — J95.3: ICD-10-CM

## 2022-04-04 DIAGNOSIS — R05.9 COUGH: ICD-10-CM

## 2022-04-04 DIAGNOSIS — R91.1 LUNG NODULE: ICD-10-CM

## 2022-04-04 PROCEDURE — 99214 OFFICE O/P EST MOD 30 MIN: CPT | Performed by: INTERNAL MEDICINE

## 2022-04-04 RX ORDER — BENZONATATE 100 MG/1
CAPSULE ORAL
Qty: 30 CAPSULE | Refills: 3 | Status: SHIPPED | OUTPATIENT
Start: 2022-04-04 | End: 2022-12-29

## 2022-04-04 NOTE — PROGRESS NOTES
Pulmonary Clinic Follow-up Visit    Assessment:  61yoF with history of tobacco abuse now in remission, asthma and emphysema as well as very small lung nodule here with persistent dyspnea.          Plan:  Trial of Trelegy, stop Lama  Continue flonase   Lung nodules only 2mm in size, stable and no need for follow up imaging for this but would meet criteria for lung cancer screening.   Repeat PFTs, last done a while ago and new dyspnea.  Consider cardiac workup too if pulmonary workup negative.         Angelia Bradford MD  Pulmonary/Critical Care  Pager: [phone]    ----------------------------    CCx: dyspnea    HPI: 61yoF with history of tobacco use, asthma and emphysema here for follow up. She was seen by Dr. James in October for dyspnea.     Was in the ED 2/25/22 for dyspnea. CTA PE negative for embolism, likely had too  Many narcotics after spinal surgery. She was discharged home after narcotic safety discussion.    She returns today for follow up. Had 2 back surgeries since I saw her last. Repeat surgery done in February 2022. Is doing much better after her recent surgery.     Issues with shortness of breath with ambulation. Also feeling shortness of breath at night.     Stopped smoking 10/2021! She had to stop for back surgery so was able to stay abstinent.           ROS:  12-point review performed and notable for  Dyspnea, fatigue. The remainder reviewed and negative.       Current Meds:  Current Outpatient Medications   Medication Sig Dispense Refill     benzonatate (TESSALON) 100 MG capsule TAKE 1 CAPSULE(100 MG) BY MOUTH THREE TIMES DAILY AS NEEDED FOR COUGH 30 capsule 3     Fluticasone-Umeclidin-Vilanterol (TRELEGY ELLIPTA) 100-62.5-25 MCG/INH oral inhaler Inhale 1 puff into the lungs daily 1 each 11     acetaminophen (TYLENOL) 500 MG tablet Take 1,000 mg by mouth       albuterol (PROAIR HFA) 108 (90 Base) MCG/ACT inhaler Inhale 2 puffs into the lungs every 4 hours 18 g 11     atorvastatin (LIPITOR) 40 MG  tablet TAKE 1 TABLET(40 MG) BY MOUTH AT BEDTIME 90 tablet 3     buPROPion (WELLBUTRIN) 75 MG tablet TAKE 1/2 TABLET BY MOUTH TWICE DAILY FOR 1 WEEK THEN TAKE 1 TABLET BY MOUTH TWICE DAILY       docusate sodium (DSS) 100 MG capsule Take 100 mg by mouth every morning and 200 mg every evening       doxepin (SINEQUAN) 100 MG capsule Take 75 mg by mouth At Bedtime        eletriptan (RELPAX) 40 MG tablet Take 1 tablet (40 mg) by mouth at onset of headache for migraine 12 tablet 3     erenumab-aooe (AIMOVIG) 70 MG/ML injection ADMINISTER 1 ML(70 MG) UNDER THE SKIN EVERY MONTH 1 mL 11     ergocalciferol (ERGOCALCIFEROL) 1.25 MG (75757 UT) capsule Take 1 capsule by mouth       esomeprazole (NEXIUM) 20 MG DR capsule Take 2 capsules by mouth every 24 hours       FLUoxetine (PROZAC) 40 MG capsule take 2 capsules by oral route at bedtime       fluticasone (FLONASE) 50 MCG/ACT nasal spray Spray 1 spray into both nostrils daily 16 g 3     gabapentin (NEURONTIN) 300 MG capsule take 2 capsule by oral route 2 times every day. Follow titration instructions given in clinic.       HYDROcodone-acetaminophen (NORCO)  MG per tablet TAKE 1 TABLET BY MOUTH EVERY 4 TO 6 HOURS AS NEEDED FOR CHRONIC PAIN. MAX 4 PER DAY. START 9/7       hydrOXYzine (VISTARIL) 25 MG capsule take two every night and take one during the day as needed       lamoTRIgine (LAMICTAL) 200 MG tablet Take 1 tablet by mouth 2 times daily        levothyroxine (SYNTHROID/LEVOTHROID) 75 MCG tablet TAKE 1 TABLET(75 MCG) BY MOUTH DAILY 90 tablet 2     montelukast (SINGULAIR) 10 MG tablet Take 1 tablet (10 mg) by mouth every 24 hours 90 tablet 1     prazosin (MINIPRESS) 2 MG capsule Take 2 capsules by mouth every 24 hours       prochlorperazine (COMPAZINE) 10 MG tablet every 6 hours as needed        senna-docusate (SENOKOT-S/PERICOLACE) 8.6-50 MG tablet Take 2 tablets by mouth       tiotropium (SPIRIVA RESPIMAT) 2.5 MCG/ACT inhaler Inhale 2 puffs into the lungs daily 4 g 11      tiZANidine (ZANAFLEX) 4 MG tablet TAKE 1 TO 2 TABLETS BY MOUTH DURING THE DAY AND 2 TABLETS AT BEDTIME 120 tablet 1       Physical Exam:  BP (!) 159/65   Pulse 87   Wt 79.2 kg (174 lb 8 oz)   SpO2 95%   BMI 29.49 kg/m    Gen: alert, oriented, no distress  HEENT: no lymphadenopathy  Neck: Trachea midline, No Accessory muscle use  CV: RRR, no M/G/R  Resp: CTAB, no focal crackles or wheezes  Abd: soft, nontender, no palpable organomegaly  Skin: no apparent rashes  Ext: no cyanosis, clubbing or edema  Neuro: alert, nonfocal    Labs:  CBC RESULTS:   Recent Labs   Lab Test 02/25/22  0256   WBC 8.1   RBC 3.55*   HGB 9.4*   HCT 30.8*   MCV 87   MCH 26.5   MCHC 30.5*   RDW 15.3*        Sodium   Date Value Ref Range Status   02/25/2022 137 136 - 145 mmol/L Final     Potassium   Date Value Ref Range Status   02/25/2022 3.8 3.5 - 5.0 mmol/L Final     Chloride   Date Value Ref Range Status   02/25/2022 103 98 - 107 mmol/L Final     Carbon Dioxide (CO2)   Date Value Ref Range Status   02/25/2022 20 (L) 22 - 31 mmol/L Final     Anion Gap   Date Value Ref Range Status   02/25/2022 14 5 - 18 mmol/L Final     Glucose   Date Value Ref Range Status   02/25/2022 115 70 - 125 mg/dL Final     Urea Nitrogen   Date Value Ref Range Status   02/25/2022 18 8 - 22 mg/dL Final     Creatinine   Date Value Ref Range Status   02/25/2022 1.17 (H) 0.60 - 1.10 mg/dL Final     GFR Estimate   Date Value Ref Range Status   02/25/2022 53 (L) >60 mL/min/1.73m2 Final     Comment:     Effective December 21, 2021 eGFRcr in adults is calculated using the 2021 CKD-EPI creatinine equation which includes age and gender (Mark ngo al., NEJ, DOI: 10.1056/BQFPaw5920080)   04/13/2021 >60 >60 mL/min/1.73m2 Final     Calcium   Date Value Ref Range Status   02/25/2022 8.5 8.5 - 10.5 mg/dL Final         Imaging studies:  Personally reviewed image/s and Personally reviewed impression/s  EXAM: CT CHEST PULMONARY EMBOLISM W CONTRAST  LOCATION: [facility]  DATE/TIME: 2/25/2022 3:24 AM     INDICATION: PE suspected, high prob shortness of breath and left chest pain  COMPARISON: 05/19/2021  TECHNIQUE: CT chest pulmonary angiogram during arterial phase injection of IV contrast. Multiplanar reformats and MIP reconstructions were performed. Dose reduction techniques were used.   CONTRAST: [id number] 75ml     FINDINGS:  ANGIOGRAM CHEST: Pulmonary arteries are normal caliber and negative for pulmonary emboli. Thoracic aorta is negative for dissection. No CT evidence of right heart strain.     LUNGS AND PLEURA: 2 mm nodule left apex series 7 image 56, 2 mm subpleural nodule right upper lobe image 62 and 2 mm right upper lobe nodule image 54 stable. Emphysema. Basilar atelectasis.     MEDIASTINUM/AXILLAE: Small hiatal hernia. Trace amount of pericardial fluid.     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     MUSCULOSKELETAL: Breast implants.                                                                      IMPRESSION:  1.  No pulmonary emboli.  2.  Emphysema.  3.  Bibasilar atelectasis.  4.  Tiny pulmonary nodule stable.  5.  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      PFT's  Spirometry 2019:  FEV1/FVC is 70 and is normal.  FEV1 is 1.88 L (70% predicted) and is reduced.  FVC is 2.68 L (78% predicted) and reduced.          11:03-11:13; 4:02-4:27; 35 minutes spent in total.

## 2022-04-07 ENCOUNTER — TRANSFERRED RECORDS (OUTPATIENT)
Dept: HEALTH INFORMATION MANAGEMENT | Facility: CLINIC | Age: 62
End: 2022-04-07
Payer: COMMERCIAL

## 2022-04-14 ENCOUNTER — HOSPITAL ENCOUNTER (OUTPATIENT)
Dept: CARDIOLOGY | Facility: CLINIC | Age: 62
Discharge: HOME | End: 2022-04-14
Attending: INTERNAL MEDICINE | Admitting: INTERNAL MEDICINE
Payer: COMMERCIAL

## 2022-04-14 DIAGNOSIS — J43.2 CENTRILOBULAR EMPHYSEMA (H): Chronic | ICD-10-CM

## 2022-04-14 DIAGNOSIS — J95.3: ICD-10-CM

## 2022-04-14 PROCEDURE — 93306 TTE W/DOPPLER COMPLETE: CPT

## 2022-04-14 PROCEDURE — 93306 TTE W/DOPPLER COMPLETE: CPT | Mod: 26 | Performed by: INTERNAL MEDICINE

## 2022-04-19 ENCOUNTER — TELEPHONE (OUTPATIENT)
Dept: FAMILY MEDICINE | Facility: CLINIC | Age: 62
End: 2022-04-19
Payer: COMMERCIAL

## 2022-04-19 NOTE — TELEPHONE ENCOUNTER
Forms Request  Name of form/paperwork: [facility]  Have you been seen for this request: N/A  Do we have the form: yes, in providers inbox  When is form needed by: when done  How would you like the form returned: fax  Patient Notified form requests are processed in 3-5 business days: n/a    Okay to leave a detailed message? n/a

## 2022-04-21 ENCOUNTER — MEDICAL CORRESPONDENCE (OUTPATIENT)
Dept: HEALTH INFORMATION MANAGEMENT | Facility: CLINIC | Age: 62
End: 2022-04-21
Payer: COMMERCIAL

## 2022-04-26 NOTE — TELEPHONE ENCOUNTER
Natalie with [facility] calling to report they did not receive faxed orders. Please refax to [phone].

## 2022-05-03 ENCOUNTER — TRANSFERRED RECORDS (OUTPATIENT)
Dept: HEALTH INFORMATION MANAGEMENT | Facility: CLINIC | Age: 62
End: 2022-05-03
Payer: COMMERCIAL

## 2022-05-16 ENCOUNTER — TRANSFERRED RECORDS (OUTPATIENT)
Dept: HEALTH INFORMATION MANAGEMENT | Facility: CLINIC | Age: 62
End: 2022-05-16
Payer: COMMERCIAL

## 2022-05-18 ENCOUNTER — VIRTUAL VISIT (OUTPATIENT)
Dept: FAMILY MEDICINE | Facility: CLINIC | Age: 62
End: 2022-05-18
Payer: COMMERCIAL

## 2022-05-18 DIAGNOSIS — J01.90 ACUTE SINUSITIS WITH SYMPTOMS > 10 DAYS: Primary | ICD-10-CM

## 2022-05-18 PROCEDURE — 99213 OFFICE O/P EST LOW 20 MIN: CPT | Mod: 95 | Performed by: NURSE PRACTITIONER

## 2022-05-18 RX ORDER — DOXYCYCLINE 100 MG/1
100 CAPSULE ORAL 2 TIMES DAILY
Qty: 14 CAPSULE | Refills: 0 | Status: SHIPPED | OUTPATIENT
Start: 2022-05-18 | End: 2022-05-25

## 2022-05-18 NOTE — PROGRESS NOTES
Dayana is a 61 year old who is being evaluated via a billable telephone visit.      What phone number would you like to be contacted at? [phone]  How would you like to obtain your AVS? MyChart    Assessment & Plan     Acute sinusitis with symptoms > 10 days  Start on doxy.  Continue supportive cares.  If not improving over the next 3 days or if any worsening should be seen for follow-up in person.   - doxycycline hyclate (VIBRAMYCIN) 100 MG capsule; Take 1 capsule (100 mg) by mouth 2 times daily for 7 days                 Return in about 4 months (around 9/18/2022) for Preventive Visit, In-Clinic Visit with PCP .    Deja Cramer, KARLOS CNP  [facility]   Dayana is a 61 year old who presents for the following health issues     HPI     Acute Illness  Acute illness concerns: horse voice, cough, SOB  Onset/Duration: 2 months  Symptoms:  Fever: no  Chills/Sweats: no  Headache (location?): YES  Sinus Pressure: YES  Conjunctivitis:  no  Ear Pain: no  Rhinorrhea: no  Congestion: YES  Sore Throat: YES- off and on  Cough: YES  Wheeze: no  Decreased Appetite: no  Nausea: no  Vomiting: no  Diarrhea: no  Dysuria/Freq.: no  Dysuria or Hematuria: no  Fatigue/Achiness: YES  Sick/Strep Exposure: no  Therapies tried and outcome: allergy med--daily   Has had symptoms for 2 months.   Symptoms would improve for a few days and then be right back.   Now has been symptomatic for 2 weeks.   Had nasal drainage for 3 days--thick green.  C/o PND.   Can get phlegm to back of the throat but can't spit it out.   Quit smoking Oct 2021.  Tries to speak loudly due to hoarse voice which makes her feel SOB.   Having more headaches (hx of migraines and these don't feel like her typical migraine).  Two days ago took a covid test and was negative.   Has had 3 doses of covid vaccine.     Review of Systems   Constitutional, HEENT, cardiovascular, pulmonary, gi and gu systems are negative, except as otherwise  noted.      Objective           Vitals:  No vitals were obtained today due to virtual visit.    Physical Exam   healthy, alert and no distress  PSYCH: Alert and oriented times 3; coherent speech, normal   rate and volume, able to articulate logical thoughts, able   to abstract reason, no tangential thoughts, no hallucinations   or delusions  Her affect is normal  RESP: No cough, no audible wheezing, able to talk in full sentences, hoarse voice  Remainder of exam unable to be completed due to telephone visits    No results found for this or any previous visit (from the past 24 hour(s)).            Phone call duration: 10 minutes

## 2022-05-20 ENCOUNTER — TELEPHONE (OUTPATIENT)
Dept: BEHAVIORAL HEALTH | Facility: CLINIC | Age: 62
End: 2022-05-20
Payer: COMMERCIAL

## 2022-05-20 NOTE — TELEPHONE ENCOUNTER
Writer has fwd medication management referral only to TC RN pool as next level of care set and replied to referral source.    Marie Malik  05/20/22  401    ----- Message from Edison Tran sent at 5/20/2022  3:55 PM CDT -----  Regarding: Transition Clinic Referral  Transition Clinic Referral   [place] Only   Limited [place] Availability    Type of Referral:      _____Therapy  _____Therapy & Medication (Psychiatry next level of care appointment needs to be scheduled)  ___X__Medication Only (Psychiatry next level of care appointment needs to be scheduled)  _____Diagnostic Assessment Only      Referring Provider Name: Edison Tran    Clinician completing the assessment. NA    Referring Provider: OTHER: NA    If known, referring provider contact name: NA; Phone Number: NA  Service Line/Location: NA    Reason for Transition Clinic Referral: To bridge the gap of care, until pt is able to meet with their provider in November    Next Level of Care Patient Will Be Transitioned To: Long Term Psychiatry    Start Date for Next Level of Care Therapy (Required): IRINA  Provider- IRINA  Location -     Start Date for Next Level of Care Medication (Required): 11/17/22   Provider- Juliet Cuba  Location- [facility] Psychiatry cannot see patients who do not have active medical insurance    What Would Be Helpful from the Transition Clinic: To assist the pt with psychiatric care, until they are able to meet with their provider.     Needs: NO    Does Patient Have Access to Technology: Yes    Patient E-mail Address: [email or web address]    Current Patient Phone Number: [phone];     Clinician Gender Preference (if applicable): NO    Edison Tran

## 2022-05-24 ENCOUNTER — TELEPHONE (OUTPATIENT)
Dept: BEHAVIORAL HEALTH | Facility: CLINIC | Age: 62
End: 2022-05-24
Payer: COMMERCIAL

## 2022-05-24 NOTE — TELEPHONE ENCOUNTER
Mental Health &Addiction (MH&A)Transition Clinic (TC):     Provides Patient Support While Waiting to Access Programmatic and Outpatient MH&A Care and Provides Select Crisis Assessment Services     NURSING Referral Review  _________________________________________    This RN has reviewed this Medication Management referral to the Transition Clinic and deemed the referral   [x] Appropriate  [] Inappropriate   []Consulting     Based on the following criteria:    Pt has a psychiatric provider (or pending plan) in place for future prescribing: Yes: Outpt Psych 11/17/22 w/ Juliet Cuba CNP at [facility]      Timeframe until pt's scheduled psychiatry appointment is less than 6 months: Yes: 6 mo     Pt takes psychiatric medications: Yes: wellbutrin, hx of doxepin, gabapentin, hydroxyzine, lamotrigine     Pt's goals seem to align with this temporary service: Yes: bridge care     Any additional pertinent information regarding this referral: Pt was established w/ an [facility] provider who is retiring; referral from FP    Initial contact w/ patient/parent: Wtr made 1st attempt to reach pt via [place].    The Transition Clinic phone # is [phone].    Juan Honeycutt RN on May 24, 2022 at 8:37 AM    [facility],     Medication only-     Start Date for Next Level of Care Medication (Required): 11/17/22   Provider- Juliet Cuba   Location- [facility]       Marie Malik   05/20/22   400             Previous Messages       ----- Message -----   From: Edison Tran   Sent: 5/20/2022   3:58 PM CDT   To: Transition Clinic   Subject: Transition Clinic Referral                       Transition Clinic Referral   [place] Only   Limited [place] Availability     Type of Referral:       _____Therapy   _____Therapy & Medication (Psychiatry next level of care appointment needs to be scheduled)   ___X__Medication Only (Psychiatry next level of care appointment needs to be scheduled)    _____Diagnostic Assessment Only       Referring Provider Name: Edison Tran     Clinician completing the assessment. NA     Referring Provider: OTHER: NA     If known, referring provider contact name: NA; Phone Number: IRINA   Service Line/Location: NA     Reason for Transition Clinic Referral: To bridge the gap of care, until pt is able to meet with their provider in November     Next Level of Care Patient Will Be Transitioned To: Long Term Psychiatry     Start Date for Next Level of Care Therapy (Required): IRINA   Provider- IRINA   Location -      Start Date for Next Level of Care Medication (Required): 11/17/22   Provider- Juliet Cuba   Location- [facility] Psychiatry cannot see patients who do not have active medical insurance     What Would Be Helpful from the Transition Clinic: To assist the pt with psychiatric care, until they are able to meet with their provider.      Needs: NO     Does Patient Have Access to Technology: Yes     Patient E-mail Address: [email or web address]     Current Patient Phone Number: [phone];     Clinician Gender Preference (if applicable): NO     Edison Tran

## 2022-05-25 NOTE — TELEPHONE ENCOUNTER
This RN made 2nd attempt to contact this patient to discuss scheduling a appointment with the Transition Clinic.  There was no answer.  A message was left for this patient to contact the Transition Clinic at [phone] to discuss future appointment.  In addition, this writer reminded the patient that his Transition clinic colleague Juan had sent her a My Chart message about making an appointment with the Transition Clinic.  RN awaiting patient response.

## 2022-05-27 NOTE — TELEPHONE ENCOUNTER
Wtr made 3rd attempt to reach pt to schedule TC Psychiatry appt to bridge care to 11/17/2022 Outpt Psych appt; LVM requesting return call to [facility] at [phone] to schedule or reply to Rajesh msg.    Closing referral w/o scheduling due to inability to reach pt; will re-open if pt engages.    Juan Honeycutt RN on May 27, 2022 at 8:25 AM

## 2022-05-31 ENCOUNTER — TRANSFERRED RECORDS (OUTPATIENT)
Dept: HEALTH INFORMATION MANAGEMENT | Facility: CLINIC | Age: 62
End: 2022-05-31
Payer: COMMERCIAL

## 2022-06-15 ENCOUNTER — TRANSFERRED RECORDS (OUTPATIENT)
Dept: HEALTH INFORMATION MANAGEMENT | Facility: CLINIC | Age: 62
End: 2022-06-15

## 2022-06-17 ENCOUNTER — OFFICE VISIT (OUTPATIENT)
Dept: PULMONOLOGY | Facility: OTHER | Age: 62
End: 2022-06-17
Payer: COMMERCIAL

## 2022-06-17 ENCOUNTER — ALLIED HEALTH/NURSE VISIT (OUTPATIENT)
Dept: PULMONOLOGY | Facility: OTHER | Age: 62
End: 2022-06-17
Payer: COMMERCIAL

## 2022-06-17 VITALS
HEART RATE: 75 BPM | BODY MASS INDEX: 29.57 KG/M2 | DIASTOLIC BLOOD PRESSURE: 60 MMHG | WEIGHT: 175 LBS | SYSTOLIC BLOOD PRESSURE: 116 MMHG | OXYGEN SATURATION: 96 %

## 2022-06-17 DIAGNOSIS — J44.9 CHRONIC OBSTRUCTIVE PULMONARY DISEASE, UNSPECIFIED COPD TYPE (H): Primary | ICD-10-CM

## 2022-06-17 DIAGNOSIS — R06.09 DYSPNEA ON EXERTION: ICD-10-CM

## 2022-06-17 DIAGNOSIS — J32.9 SINUSITIS, UNSPECIFIED CHRONICITY, UNSPECIFIED LOCATION: ICD-10-CM

## 2022-06-17 DIAGNOSIS — J43.2 CENTRILOBULAR EMPHYSEMA (H): Chronic | ICD-10-CM

## 2022-06-17 LAB — HGB BLD-MCNC: 10.4 G/DL

## 2022-06-17 PROCEDURE — 99213 OFFICE O/P EST LOW 20 MIN: CPT | Mod: 25 | Performed by: INTERNAL MEDICINE

## 2022-06-17 PROCEDURE — 94726 PLETHYSMOGRAPHY LUNG VOLUMES: CPT | Performed by: INTERNAL MEDICINE

## 2022-06-17 PROCEDURE — 94729 DIFFUSING CAPACITY: CPT | Performed by: INTERNAL MEDICINE

## 2022-06-17 PROCEDURE — 85018 HEMOGLOBIN: CPT

## 2022-06-17 PROCEDURE — 94060 EVALUATION OF WHEEZING: CPT | Performed by: INTERNAL MEDICINE

## 2022-06-17 RX ORDER — CETIRIZINE HYDROCHLORIDE 10 MG/1
10 TABLET ORAL DAILY
Status: ON HOLD | COMMUNITY
End: 2022-12-30

## 2022-06-17 RX ORDER — PREDNISONE 20 MG/1
TABLET ORAL
Qty: 10 TABLET | Refills: 0 | Status: SHIPPED | OUTPATIENT
Start: 2022-06-17 | End: 2022-08-15

## 2022-06-17 RX ORDER — ALBUTEROL SULFATE 0.83 MG/ML
2.5 SOLUTION RESPIRATORY (INHALATION) EVERY 6 HOURS PRN
Qty: 180 ML | Refills: 3 | Status: ON HOLD | OUTPATIENT
Start: 2022-06-17 | End: 2022-12-30

## 2022-06-17 ASSESSMENT — ASTHMA QUESTIONNAIRES
QUESTION_5 LAST FOUR WEEKS HOW WOULD YOU RATE YOUR ASTHMA CONTROL: POORLY CONTROLLED
QUESTION_1 LAST FOUR WEEKS HOW MUCH OF THE TIME DID YOUR ASTHMA KEEP YOU FROM GETTING AS MUCH DONE AT WORK, SCHOOL OR AT HOME: SOME OF THE TIME
QUESTION_3 LAST FOUR WEEKS HOW OFTEN DID YOUR ASTHMA SYMPTOMS (WHEEZING, COUGHING, SHORTNESS OF BREATH, CHEST TIGHTNESS OR PAIN) WAKE YOU UP AT NIGHT OR EARLIER THAN USUAL IN THE MORNING: FOUR OR MORE NIGHTS A WEEK
ACT_TOTALSCORE: 9
QUESTION_2 LAST FOUR WEEKS HOW OFTEN HAVE YOU HAD SHORTNESS OF BREATH: MORE THAN ONCE A DAY
QUESTION_4 LAST FOUR WEEKS HOW OFTEN HAVE YOU USED YOUR RESCUE INHALER OR NEBULIZER MEDICATION (SUCH AS ALBUTEROL): ONE OR TWO TIMES PER DAY
ACT_TOTALSCORE: 9

## 2022-06-17 NOTE — PROGRESS NOTES
Pulmonary Clinic Follow-up Visit    Assessment:  61yoF with history of tobacco abuse now in remission, asthma and emphysema as well as very small lung nodule here with persistent dyspnea. There has not been a large change in her lung function and her COPD is only moderate so does not seem to explain her extreme degree of dyspnea while trying to lay flat.         Plan:  Continue Trelegy  Referral to cardiology for potential ischemic workup  Wonder how much pain is actually contributing to the dyspnea  Referral to allergy given extensive allergic rhinitis not responsive to flonase  Referral to ENT given hoarseness and sinusitis.         Angelia Bradford MD  Pulmonary/Critical Care    ----------------------------    CCx: dyspnea    HPI: 61yoF with history of tobacco use, asthma and emphysema here for follow up. She was seen by Dr. James in October for dyspnea.     Was in the ED 2/25/22 for dyspnea. CTA PE negative for embolism, likely had too  Many narcotics after spinal surgery. She was discharged home after narcotic safety discussion.    Still signfiicant pain since surgeries. Breathing worse with increased cough, sputum and rhinitis.     Continues to not smoke.     CT: 3+4+3+5+3+2+3+3=26      ROS:  12-point review performed and notable for  Dyspnea, fatigue, back pain. The remainder reviewed and negative.       Current Meds:  Current Outpatient Medications   Medication Sig Dispense Refill     acetaminophen (TYLENOL) 500 MG tablet Take 1,000 mg by mouth       albuterol (PROAIR HFA) 108 (90 Base) MCG/ACT inhaler Inhale 2 puffs into the lungs every 4 hours 18 g 11     atorvastatin (LIPITOR) 40 MG tablet TAKE 1 TABLET(40 MG) BY MOUTH AT BEDTIME 90 tablet 3     benzonatate (TESSALON) 100 MG capsule TAKE 1 CAPSULE(100 MG) BY MOUTH THREE TIMES DAILY AS NEEDED FOR COUGH 30 capsule 3     buPROPion (WELLBUTRIN) 75 MG tablet Take 75 mg by mouth 2 times daily       cetirizine (ZYRTEC) 10 MG tablet Take 10 mg by mouth daily        docusate sodium (COLACE) 100 MG capsule Take 100 mg by mouth 2 times daily       doxepin (SINEQUAN) 100 MG capsule Take 75 mg by mouth At Bedtime        eletriptan (RELPAX) 40 MG tablet Take 1 tablet (40 mg) by mouth at onset of headache for migraine 12 tablet 3     erenumab-aooe (AIMOVIG) 70 MG/ML injection ADMINISTER 1 ML(70 MG) UNDER THE SKIN EVERY MONTH 1 mL 11     esomeprazole (NEXIUM) 20 MG DR capsule Take 2 capsules by mouth every 24 hours       FLUoxetine (PROZAC) 40 MG capsule Take 40 mg by mouth daily       Fluticasone-Umeclidin-Vilanterol (TRELEGY ELLIPTA) 100-62.5-25 MCG/INH oral inhaler Inhale 1 puff into the lungs daily 1 each 11     gabapentin (NEURONTIN) 300 MG capsule Take 600 mg by mouth 2 times daily       HYDROcodone-acetaminophen (NORCO)  MG per tablet Take 1 tablet by mouth every 6 hours as needed       hydrOXYzine (VISTARIL) 25 MG capsule Take 25 mg by mouth 3 times daily as needed       lamoTRIgine (LAMICTAL) 200 MG tablet Take 1 tablet by mouth 2 times daily        levothyroxine (SYNTHROID/LEVOTHROID) 75 MCG tablet TAKE 1 TABLET(75 MCG) BY MOUTH DAILY 90 tablet 2     montelukast (SINGULAIR) 10 MG tablet Take 1 tablet (10 mg) by mouth every 24 hours 90 tablet 1     prazosin (MINIPRESS) 2 MG capsule Take 2 capsules by mouth every 24 hours       prochlorperazine (COMPAZINE) 10 MG tablet Take 10 mg by mouth every 6 hours as needed       senna-docusate (SENOKOT-S/PERICOLACE) 8.6-50 MG tablet Take 2 tablets by mouth       tiZANidine (ZANAFLEX) 4 MG tablet TAKE 1 TO 2 TABLETS BY MOUTH DURING THE DAY AND 2 TABLETS AT BEDTIME 120 tablet 1     vitamin D2 (ERGOCALCIFEROL) 39862 units (1250 mcg) capsule TAKE 1 CAPSULE BY MOUTH 1 TIME A WEEK 13 capsule 0       Physical Exam:  /60 (BP Location: Right arm, Patient Position: Chair, Cuff Size: Adult Regular)   Pulse 75   Wt 79.4 kg (175 lb)   SpO2 96%   BMI 29.57 kg/m    Gen: alert, oriented, no distress  HEENT: no lymphadenopathy  Neck:  Trachea midline, No Accessory muscle use  CV: RRR, no M/G/R  Resp: CTAB, no focal crackles or wheezes  Abd: soft, nontender, no palpable organomegaly  Skin: no apparent rashes  Ext: no cyanosis, clubbing or edema  Neuro: alert, nonfocal    Labs:  CBC RESULTS:   Recent Labs   Lab Test 02/25/22  0256   WBC 8.1   RBC 3.55*   HGB 9.4*   HCT 30.8*   MCV 87   MCH 26.5   MCHC 30.5*   RDW 15.3*        Sodium   Date Value Ref Range Status   02/25/2022 137 136 - 145 mmol/L Final     Potassium   Date Value Ref Range Status   02/25/2022 3.8 3.5 - 5.0 mmol/L Final     Chloride   Date Value Ref Range Status   02/25/2022 103 98 - 107 mmol/L Final     Carbon Dioxide (CO2)   Date Value Ref Range Status   02/25/2022 20 (L) 22 - 31 mmol/L Final     Anion Gap   Date Value Ref Range Status   02/25/2022 14 5 - 18 mmol/L Final     Glucose   Date Value Ref Range Status   02/25/2022 115 70 - 125 mg/dL Final     Urea Nitrogen   Date Value Ref Range Status   02/25/2022 18 8 - 22 mg/dL Final     Creatinine   Date Value Ref Range Status   02/25/2022 1.17 (H) 0.60 - 1.10 mg/dL Final     GFR Estimate   Date Value Ref Range Status   02/25/2022 53 (L) >60 mL/min/1.73m2 Final     Comment:     Effective December 21, 2021 eGFRcr in adults is calculated using the 2021 CKD-EPI creatinine equation which includes age and gender (Mark et al., NEJM, DOI: 10.1056/SYRHbg1276815)   04/13/2021 >60 >60 mL/min/1.73m2 Final     Calcium   Date Value Ref Range Status   02/25/2022 8.5 8.5 - 10.5 mg/dL Final         Imaging studies:  Personally reviewed image/s and Personally reviewed impression/s  EXAM: CT CHEST PULMONARY EMBOLISM W CONTRAST  LOCATION: [facility]  DATE/TIME: 2/25/2022 3:24 AM     INDICATION: PE suspected, high prob shortness of breath and left chest pain  COMPARISON: 05/19/2021  TECHNIQUE: CT chest pulmonary angiogram during arterial phase injection of IV contrast. Multiplanar reformats and MIP reconstructions were  performed. Dose reduction techniques were used.   CONTRAST: [id number] 75ml     FINDINGS:  ANGIOGRAM CHEST: Pulmonary arteries are normal caliber and negative for pulmonary emboli. Thoracic aorta is negative for dissection. No CT evidence of right heart strain.     LUNGS AND PLEURA: 2 mm nodule left apex series 7 image 56, 2 mm subpleural nodule right upper lobe image 62 and 2 mm right upper lobe nodule image 54 stable. Emphysema. Basilar atelectasis.     MEDIASTINUM/AXILLAE: Small hiatal hernia. Trace amount of pericardial fluid.     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     MUSCULOSKELETAL: Breast implants.                                                                      IMPRESSION:  1.  No pulmonary emboli.  2.  Emphysema.  3.  Bibasilar atelectasis.  4.  Tiny pulmonary nodule stable.  5.  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      PFT's  FEV1 1.71L 67% predicted from 1.77L  FVC 2.59L, 80% predicted from 2.54L  FEV1/FVC 66  TLC 5.28L, 103% predicted  DLCO 65% predicted    Impression: moderate obstruction without reversibility. Air trapping. Mild diffusion capacity defect.

## 2022-06-20 LAB
DLCOCOR-%PRED-PRE: 65 %
DLCOCOR-PRE: 13.63 ML/MIN/MMHG
DLCOUNC-%PRED-PRE: 58 %
DLCOUNC-PRE: 12.18 ML/MIN/MMHG
DLCOUNC-PRED: 20.75 ML/MIN/MMHG
ERV-%PRED-PRE: 71 %
ERV-PRE: 0.48 L
ERV-PRED: 0.67 L
EXPTIME-PRE: 7.84 SEC
FEF2575-%PRED-POST: 50 %
FEF2575-%PRED-PRE: 40 %
FEF2575-POST: 1.13 L/SEC
FEF2575-PRE: 0.91 L/SEC
FEF2575-PRED: 2.23 L/SEC
FEFMAX-%PRED-PRE: 66 %
FEFMAX-PRE: 4.18 L/SEC
FEFMAX-PRED: 6.34 L/SEC
FEV1-%PRED-PRE: 67 %
FEV1-PRE: 1.71 L
FEV1FEV6-PRE: 67 %
FEV1FEV6-PRED: 80 %
FEV1FVC-PRE: 66 %
FEV1FVC-PRED: 79 %
FEV1SVC-PRE: 57 %
FEV1SVC-PRED: 75 %
FIFMAX-PRE: 5.77 L/SEC
FRCPLETH-%PRED-PRE: 107 %
FRCPLETH-PRE: 2.96 L
FRCPLETH-PRED: 2.76 L
FVC-%PRED-PRE: 80 %
FVC-PRE: 2.59 L
FVC-PRED: 3.22 L
IC-%PRED-PRE: 86 %
IC-PRE: 2.33 L
IC-PRED: 2.69 L
RVPLETH-%PRED-PRE: 115 %
RVPLETH-PRE: 2.28 L
RVPLETH-PRED: 1.98 L
TLCPLETH-%PRED-PRE: 103 %
TLCPLETH-PRE: 5.28 L
TLCPLETH-PRED: 5.11 L
VA-%PRED-PRE: 90 %
VA-PRE: 4.51 L
VC-%PRED-PRE: 89 %
VC-PRE: 3 L
VC-PRED: 3.36 L

## 2022-07-13 ENCOUNTER — TRANSFERRED RECORDS (OUTPATIENT)
Dept: HEALTH INFORMATION MANAGEMENT | Facility: CLINIC | Age: 62
End: 2022-07-13

## 2022-07-25 ENCOUNTER — TRANSFERRED RECORDS (OUTPATIENT)
Dept: HEALTH INFORMATION MANAGEMENT | Facility: CLINIC | Age: 62
End: 2022-07-25

## 2022-07-27 ENCOUNTER — TRANSFERRED RECORDS (OUTPATIENT)
Dept: HEALTH INFORMATION MANAGEMENT | Facility: CLINIC | Age: 62
End: 2022-07-27

## 2022-07-28 ENCOUNTER — OFFICE VISIT (OUTPATIENT)
Dept: OTOLARYNGOLOGY | Facility: CLINIC | Age: 62
End: 2022-07-28
Attending: INTERNAL MEDICINE
Payer: COMMERCIAL

## 2022-07-28 DIAGNOSIS — R05.3 CHRONIC COUGH: ICD-10-CM

## 2022-07-28 DIAGNOSIS — R13.10 DYSPHAGIA, UNSPECIFIED TYPE: ICD-10-CM

## 2022-07-28 DIAGNOSIS — J32.9 CHRONIC RHINOSINUSITIS: ICD-10-CM

## 2022-07-28 DIAGNOSIS — R49.0 HOARSENESS: Primary | ICD-10-CM

## 2022-07-28 PROCEDURE — 99203 OFFICE O/P NEW LOW 30 MIN: CPT | Mod: 25 | Performed by: OTOLARYNGOLOGY

## 2022-07-28 PROCEDURE — 31575 DIAGNOSTIC LARYNGOSCOPY: CPT | Performed by: OTOLARYNGOLOGY

## 2022-07-28 RX ORDER — AZELASTINE 1 MG/ML
SPRAY, METERED NASAL
Qty: 30 ML | Refills: 11 | Status: SHIPPED | OUTPATIENT
Start: 2022-07-28 | End: 2022-08-15

## 2022-07-28 NOTE — PROGRESS NOTES
CHIEF COMPLAINT: Patient presents with:  Ent Problem: Sinus issues really runny nose runs down her throat and  causes cough x 3-4 years comes and goes          HISTORY OF PRESENT ILLNESS    Dayana was seen at the behest of Angelia Bradford MD for chronic rhinosinusitis and hoarsness.       Referral note:      Pulmonary Clinic Follow-up Visit     Assessment:  61yoF with history of tobacco abuse now in remission, asthma and emphysema as well as very small lung nodule here with persistent dyspnea. There has not been a large change in her lung function and her COPD is only moderate so does not seem to explain her extreme degree of dyspnea while trying to lay flat.           Plan:  Continue Trelegy  Referral to cardiology for potential ischemic workup  Wonder how much pain is actually contributing to the dyspnea  Referral to allergy given extensive allergic rhinitis not responsive to flonase  Referral to ENT given hoarseness and sinusitis.            REVIEW OF SYSTEMS    Review of Systems as per HPI and PMHx, otherwise 10 system review system are negative.     Codeine, Nefazodone, Oxycodone-acetaminophen, Cetirizine, Trazodone, Amoxicillin-pot clavulanate [augmentin], Cephalexin, Clavulanic acid, Cyclobenzaprine, Eszopiclone, Methocarbamol, and Penicillins     There were no vitals taken for this visit.    HEAD: Normal appearance and symmetry:  No cutaneous lesions.      NECK:  supple     EARS: normal TM, EACs    EYES:  EOMI    CN VII/XII:  intact     NOSE:     Dorsum:   straight  Septum:  midline  Mucosa:  moist        ORAL CAVITY/OROPHARYNX:     Lips:  Normal.  Tongue: normal, midline  Mucosa:   no lesions     NECK:  Trachea:  midline.              Thyroid:  normal              Adenopathy:  none        NEURO:   Alert and Oriented     GAIT AND STATION:  normal     RESPIRATORY:   Symmetry and Respiratory effort     PSYCH:  Normal mood and affect     SKIN:   warm and dry         FLEX LARYNGOSCOPY:    After obtaining consent, a  flexible laryngoscope is used to examine both nasal cavities, the nasopharynx, pharnx, and larynx.      Nasal cavity: wnl  NP: wnl  Pharnx: wnl  Larynx: wnl        IMPRESSION:    Encounter Diagnoses   Name Primary?     Hoarseness Yes     Chronic rhinosinusitis      Dysphagia, unspecified type      Chronic cough           RECOMMENDATIONS:      Orders Placed This Encounter   Procedures     LARYNGOSCOPY FLEX FIBEROPTIC, DIAGNOSTIC     XR Esophagram     CT Sinus w/o Contrast     Adult General Surg Referral     Speech Therapy Referral      Trial of astelin nasal spray  Referral to Gen Surgery  XR esophagram  CT sinus

## 2022-07-28 NOTE — LETTER
7/28/2022         RE: Dayana Samaniego  [address]  [place] [address]        Dear Colleague,    Thank you for referring your patient, Dayana Samaniego, to the [facility]. Please see a copy of my visit note below.    CHIEF COMPLAINT: Patient presents with:  Ent Problem: Sinus issues really runny nose runs down her throat and  causes cough x 3-4 years comes and goes          HISTORY OF PRESENT ILLNESS    Dayana was seen at the behest of Angelia Bradford MD for chronic rhinosinusitis and hoarsness.       Referral note:      Pulmonary Clinic Follow-up Visit     Assessment:  61yoF with history of tobacco abuse now in remission, asthma and emphysema as well as very small lung nodule here with persistent dyspnea. There has not been a large change in her lung function and her COPD is only moderate so does not seem to explain her extreme degree of dyspnea while trying to lay flat.           Plan:  Continue Trelegy  Referral to cardiology for potential ischemic workup  Wonder how much pain is actually contributing to the dyspnea  Referral to allergy given extensive allergic rhinitis not responsive to flonase  Referral to ENT given hoarseness and sinusitis.            REVIEW OF SYSTEMS    Review of Systems as per HPI and PMHx, otherwise 10 system review system are negative.     Codeine, Nefazodone, Oxycodone-acetaminophen, Cetirizine, Trazodone, Amoxicillin-pot clavulanate [augmentin], Cephalexin, Clavulanic acid, Cyclobenzaprine, Eszopiclone, Methocarbamol, and Penicillins     There were no vitals taken for this visit.    HEAD: Normal appearance and symmetry:  No cutaneous lesions.      NECK:  supple     EARS: normal TM, EACs    EYES:  EOMI    CN VII/XII:  intact     NOSE:     Dorsum:   straight  Septum:  midline  Mucosa:  moist        ORAL CAVITY/OROPHARYNX:     Lips:  Normal.  Tongue: normal, midline  Mucosa:   no lesions     NECK:  Trachea:  midline.              Thyroid:   normal              Adenopathy:  none        NEURO:   Alert and Oriented     GAIT AND STATION:  normal     RESPIRATORY:   Symmetry and Respiratory effort     PSYCH:  Normal mood and affect     SKIN:   warm and dry         FLEX LARYNGOSCOPY:    After obtaining consent, a flexible laryngoscope is used to examine both nasal cavities, the nasopharynx, pharnx, and larynx.      Nasal cavity: wnl  NP: wnl  Pharnx: wnl  Larynx: wnl        IMPRESSION:    Encounter Diagnoses   Name Primary?     Hoarseness Yes     Chronic rhinosinusitis      Dysphagia, unspecified type      Chronic cough           RECOMMENDATIONS:      Orders Placed This Encounter   Procedures     LARYNGOSCOPY FLEX FIBEROPTIC, DIAGNOSTIC     XR Esophagram     CT Sinus w/o Contrast     Adult General Surg Referral     Speech Therapy Referral      Trial of astelin nasal spray  Referral to Gen Surgery  XR esophagram  CT sinus          Again, thank you for allowing me to participate in the care of your patient.        Sincerely,        Alfredo Peterson MD

## 2022-07-29 ENCOUNTER — HOSPITAL ENCOUNTER (EMERGENCY)
Facility: CLINIC | Age: 62
Discharge: HOME | End: 2022-07-29
Attending: EMERGENCY MEDICINE | Admitting: EMERGENCY MEDICINE
Payer: COMMERCIAL

## 2022-07-29 ENCOUNTER — APPOINTMENT (OUTPATIENT)
Dept: RADIOLOGY | Facility: CLINIC | Age: 62
End: 2022-07-29
Attending: EMERGENCY MEDICINE
Payer: COMMERCIAL

## 2022-07-29 VITALS
SYSTOLIC BLOOD PRESSURE: 146 MMHG | HEART RATE: 84 BPM | RESPIRATION RATE: 20 BRPM | OXYGEN SATURATION: 97 % | WEIGHT: 174 LBS | BODY MASS INDEX: 30.83 KG/M2 | HEIGHT: 63 IN | DIASTOLIC BLOOD PRESSURE: 74 MMHG | TEMPERATURE: 98.3 F

## 2022-07-29 DIAGNOSIS — J45.901 MODERATE ASTHMA WITH EXACERBATION, UNSPECIFIED WHETHER PERSISTENT: ICD-10-CM

## 2022-07-29 DIAGNOSIS — J20.9 ACUTE BRONCHITIS, UNSPECIFIED ORGANISM: ICD-10-CM

## 2022-07-29 LAB
ALBUMIN SERPL-MCNC: 3.7 G/DL (ref 3.5–5)
ALP SERPL-CCNC: 127 U/L (ref 45–120)
ALT SERPL W P-5'-P-CCNC: 13 U/L (ref 0–45)
ANION GAP SERPL CALCULATED.3IONS-SCNC: 12 MMOL/L (ref 5–18)
AST SERPL W P-5'-P-CCNC: 14 U/L (ref 0–40)
ATRIAL RATE - MUSE: 88 BPM
BILIRUB SERPL-MCNC: 0.3 MG/DL (ref 0–1)
BNP SERPL-MCNC: 145 PG/ML (ref 0–98)
BUN SERPL-MCNC: 13 MG/DL (ref 8–22)
CALCIUM SERPL-MCNC: 9.2 MG/DL (ref 8.5–10.5)
CHLORIDE BLD-SCNC: 105 MMOL/L (ref 98–107)
CO2 SERPL-SCNC: 22 MMOL/L (ref 22–31)
CREAT SERPL-MCNC: 1.05 MG/DL (ref 0.6–1.1)
DIASTOLIC BLOOD PRESSURE - MUSE: NORMAL MMHG
ERYTHROCYTE [DISTWIDTH] IN BLOOD BY AUTOMATED COUNT: 18.3 % (ref 10–15)
GFR SERPL CREATININE-BSD FRML MDRD: 60 ML/MIN/1.73M2
GLUCOSE BLD-MCNC: 150 MG/DL (ref 70–125)
HCT VFR BLD AUTO: 32.4 % (ref 35–47)
HGB BLD-MCNC: 9.8 G/DL (ref 11.7–15.7)
HOLD SPECIMEN: NORMAL
INTERPRETATION ECG - MUSE: NORMAL
MAGNESIUM SERPL-MCNC: 1.9 MG/DL (ref 1.8–2.6)
MCH RBC QN AUTO: 22.6 PG (ref 26.5–33)
MCHC RBC AUTO-ENTMCNC: 30.2 G/DL (ref 31.5–36.5)
MCV RBC AUTO: 75 FL (ref 78–100)
P AXIS - MUSE: 78 DEGREES
PLATELET # BLD AUTO: 376 10E3/UL (ref 150–450)
POTASSIUM BLD-SCNC: 3.5 MMOL/L (ref 3.5–5)
PR INTERVAL - MUSE: 154 MS
PROT SERPL-MCNC: 7.4 G/DL (ref 6–8)
QRS DURATION - MUSE: 78 MS
QT - MUSE: 390 MS
QTC - MUSE: 471 MS
R AXIS - MUSE: 83 DEGREES
RBC # BLD AUTO: 4.33 10E6/UL (ref 3.8–5.2)
SARS-COV-2 RNA RESP QL NAA+PROBE: NEGATIVE
SODIUM SERPL-SCNC: 139 MMOL/L (ref 136–145)
SYSTOLIC BLOOD PRESSURE - MUSE: NORMAL MMHG
T AXIS - MUSE: 83 DEGREES
TROPONIN I SERPL-MCNC: <0.01 NG/ML (ref 0–0.29)
VENTRICULAR RATE- MUSE: 88 BPM
WBC # BLD AUTO: 10.6 10E3/UL (ref 4–11)

## 2022-07-29 PROCEDURE — 250N000012 HC RX MED GY IP 250 OP 636 PS 637: Performed by: EMERGENCY MEDICINE

## 2022-07-29 PROCEDURE — 83735 ASSAY OF MAGNESIUM: CPT | Performed by: EMERGENCY MEDICINE

## 2022-07-29 PROCEDURE — 250N000009 HC RX 250: Performed by: EMERGENCY MEDICINE

## 2022-07-29 PROCEDURE — 999N000157 HC STATISTIC RCP TIME EA 10 MIN

## 2022-07-29 PROCEDURE — 71045 X-RAY EXAM CHEST 1 VIEW: CPT

## 2022-07-29 PROCEDURE — 83880 ASSAY OF NATRIURETIC PEPTIDE: CPT | Performed by: EMERGENCY MEDICINE

## 2022-07-29 PROCEDURE — 93005 ELECTROCARDIOGRAM TRACING: CPT | Performed by: EMERGENCY MEDICINE

## 2022-07-29 PROCEDURE — 80053 COMPREHEN METABOLIC PANEL: CPT | Performed by: EMERGENCY MEDICINE

## 2022-07-29 PROCEDURE — U0003 INFECTIOUS AGENT DETECTION BY NUCLEIC ACID (DNA OR RNA); SEVERE ACUTE RESPIRATORY SYNDROME CORONAVIRUS 2 (SARS-COV-2) (CORONAVIRUS DISEASE [COVID-19]), AMPLIFIED PROBE TECHNIQUE, MAKING USE OF HIGH THROUGHPUT TECHNOLOGIES AS DESCRIBED BY CMS-2020-01-R: HCPCS | Performed by: EMERGENCY MEDICINE

## 2022-07-29 PROCEDURE — 99285 EMERGENCY DEPT VISIT HI MDM: CPT | Mod: CS,25

## 2022-07-29 PROCEDURE — 82040 ASSAY OF SERUM ALBUMIN: CPT | Performed by: EMERGENCY MEDICINE

## 2022-07-29 PROCEDURE — 94640 AIRWAY INHALATION TREATMENT: CPT

## 2022-07-29 PROCEDURE — 250N000011 HC RX IP 250 OP 636: Performed by: EMERGENCY MEDICINE

## 2022-07-29 PROCEDURE — 84484 ASSAY OF TROPONIN QUANT: CPT | Performed by: EMERGENCY MEDICINE

## 2022-07-29 PROCEDURE — 85018 HEMOGLOBIN: CPT | Performed by: EMERGENCY MEDICINE

## 2022-07-29 PROCEDURE — 36415 COLL VENOUS BLD VENIPUNCTURE: CPT | Performed by: EMERGENCY MEDICINE

## 2022-07-29 PROCEDURE — C9803 HOPD COVID-19 SPEC COLLECT: HCPCS

## 2022-07-29 RX ORDER — IPRATROPIUM BROMIDE AND ALBUTEROL SULFATE 2.5; .5 MG/3ML; MG/3ML
3 SOLUTION RESPIRATORY (INHALATION) ONCE
Status: COMPLETED | OUTPATIENT
Start: 2022-07-29 | End: 2022-07-29

## 2022-07-29 RX ORDER — PREDNISONE 20 MG/1
TABLET ORAL
Qty: 10 TABLET | Refills: 0 | Status: SHIPPED | OUTPATIENT
Start: 2022-07-29 | End: 2022-09-28

## 2022-07-29 RX ORDER — METHYLPREDNISOLONE SODIUM SUCCINATE 125 MG/2ML
125 INJECTION, POWDER, LYOPHILIZED, FOR SOLUTION INTRAMUSCULAR; INTRAVENOUS ONCE
Status: DISCONTINUED | OUTPATIENT
Start: 2022-07-29 | End: 2022-07-29 | Stop reason: HOSPADM

## 2022-07-29 RX ORDER — AZITHROMYCIN 250 MG/1
TABLET, FILM COATED ORAL
Qty: 6 TABLET | Refills: 0 | Status: SHIPPED | OUTPATIENT
Start: 2022-07-29 | End: 2022-08-03

## 2022-07-29 RX ORDER — PREDNISONE 20 MG/1
40 TABLET ORAL ONCE
Status: COMPLETED | OUTPATIENT
Start: 2022-07-29 | End: 2022-07-29

## 2022-07-29 RX ADMIN — IPRATROPIUM BROMIDE AND ALBUTEROL SULFATE 3 ML: .5; 3 SOLUTION RESPIRATORY (INHALATION) at 11:27

## 2022-07-29 RX ADMIN — PREDNISONE 40 MG: 20 TABLET ORAL at 12:27

## 2022-07-29 RX ADMIN — IPRATROPIUM BROMIDE AND ALBUTEROL SULFATE 3 ML: .5; 3 SOLUTION RESPIRATORY (INHALATION) at 11:35

## 2022-07-29 NOTE — ED TRIAGE NOTES
Patient presents to the ED with chest pain/shortness of breath x3-4 days. History of asthma and emphysema. Has been using her rescue inhaler every 2 hours. 97% on room air but very tachypnic.     Triage Assessment     Row Name 07/29/22 1104       Triage Assessment (Adult)    Airway WDL WDL       Respiratory WDL    Respiratory WDL X  shortness of breath/cough/tachypnea       Skin Circulation/Temperature WDL    Skin Circulation/Temperature WDL WDL       Cardiac WDL    Cardiac WDL X  chest pain       Peripheral/Neurovascular WDL    Peripheral Neurovascular WDL WDL

## 2022-07-29 NOTE — ED PROVIDER NOTES
EMERGENCY DEPARTMENT ENCOUNTER      NAME: Dayana Samaniego  AGE: 61 year old female  YOB: 1960  MRN: [id number]  EVALUATION DATE & TIME: 7/29/2022 11:20 AM    PCP: Patrice Sanon    ED PROVIDER: Bryan Leon M.D.      Chief Complaint   Patient presents with     Shortness of Breath     Chest Pain         FINAL IMPRESSION:  Asthma exacerbation  Acute bronchitis      ED COURSE & MEDICAL DECISION MAKING:    Pertinent Labs & Imaging studies reviewed. (See chart for details)  61 year old female presents to the Emergency Department for evaluation of shortness of breath and cough.  Patient reports onset of symptoms of the last 3 to 4 days.  Worsened significantly today.  He is using her inhaler without improvement.  Patient has not had to use inhaler for quite some time.  Denies any new exposures.  Patient seen by ENT yesterday for chronic hoarseness.  No reports of respiratory complaints yesterday.  Patient seen by pulmonology relatively recently also.  Patient with continued dyspnea but stable COPD.  On exam she is anxious and somewhat hyperventilating.  Oxygenation excellent on room air.  Lungs with coarse inspiratory/expiratory wheezing/rhonchi.  With coughing lungs sound clear.  Patient given 2 nebulizations on arrival.  Will give Solu-Medrol out of caution.  Baseline blood work being obtained along with chest x-ray.  Swab sent for COVID.  Patient appears non toxic     11:26 AM I met with the patient for the initial interview and physical examination. Discussed plan for treatment and workup in the ED.    12:05 PM.  CBC remarkable for moderate microcytic anemia.  Hemoglobin 9.8.  Mean cell volume 75.  Glucose slightly elevated 150.  Alkaline phosphatase minimally at 127.  Remainder of electrolytes and liver function tests normal.  Awaiting chest x-ray.  EKG unremarkable other than baseline artifact.  12:48 PM.  BNP normal at 145.  Troponin normal 0.01.  Magnesium normal 1.9.  Chest x-ray pending.   Patient improved Sen auscultation.  Over persistent rhonchi in right lower lobe.  1:30 PM.  Chest x-ray unremarkable.  Plan will be for continued outpatient management with prednisone.  Zithromax given for potential acute bronchitis.  At the conclusion of the encounter I discussed the results of all of the tests and the disposition. The questions were answered and return precautions provided. The patient or family acknowledged understanding and was agreeable with the care plan.       PPE: Provider wore gloves, eye protection, surgical cap, and paper mask.     MEDICATIONS GIVEN IN THE EMERGENCY:  Medications   ipratropium - albuterol 0.5 mg/2.5 mg/3 mL (DUONEB) neb solution 3 mL (3 mLs Nebulization Given 7/29/22 1127)   ipratropium - albuterol 0.5 mg/2.5 mg/3 mL (DUONEB) neb solution 3 mL (3 mLs Nebulization Given 7/29/22 1135)   methylPREDNISolone sodium succinate (solu-MEDROL) injection 125 mg (125 mg Intravenous Given 7/29/22 1145)       NEW PRESCRIPTIONS STARTED AT TODAY'S ER VISIT  Current Discharge Medication List      START taking these medications    Details   azithromycin (ZITHROMAX Z-GABBY) 250 MG tablet Two tablets on the first day, then one tablet daily for the next 4 days  Qty: 6 tablet, Refills: 0      !! predniSONE (DELTASONE) 20 MG tablet Take two tablets (= 40mg) each day for 5 (five) days  Qty: 10 tablet, Refills: 0       !! - Potential duplicate medications found. Please discuss with provider.             =================================================================    HPI    Patient information was obtained from: Patient     Use of Intrepreter: N/A         Dayana Samaniego is a 61 year old female with a pertient medical history of COPD, asthma, lung nodule, anxiety, and tobacco use disorder who presents to the ED for evaluation of shortness of breath and cough.     Since Monday (4 days ago), the patient reports of worsening cough accompanied by shortness of breath. The patient states that  "both of these symptoms have been getting worse, especially upon exertion. She adds that these symptoms feel similar to her asthma flare ups and emphysema in the past. Here in the ED, the patient reports of dizziness. She also nods when asked about nausea and vomiting. The patient has access to inhalers at home, which she has been using every 2 hours, but she has no oxygen machine. The patient states that she has been \"getting better,\" so she has not needed an oxygen machine for a while. She otherwise denies fevers or any other symptoms or complaints at this time. No recent sick contacts.                                           REVIEW OF SYSTEMS   Constitutional:  Denies fever, chills  Respiratory:  Positive for cough and increased work of breathing  Cardiovascular:  Denies chest pain, palpitations  GI: Positive for nausea and vomiting. Denies abdominal pain or change in bowel or bladder habits   Musculoskeletal:  Denies any new muscle/joint swelling  Skin:  Denies rash   Neurologic: Positive for dizziness. Denies focal weakness  All systems negative except as marked.     PAST MEDICAL HISTORY:  Past Medical History:   Diagnosis Date     Abnormality of gait     Created by Conversion      Anemia 3/18/2021     Anxiety      Asthma      Asymptomatic postmenopausal status     Created by Conversion  Replacement Utility updated for latest IMO load     Bipolar 2 disorder (H)      Centrilobular emphysema (H) 2/26/2018    Mild, seen in 2018 CT scan, DLCO 60% predicted  Formatting of this note might be different from the original. Formatting of this note might be different from the original. Mild, seen in 2018 CT scan, DLCO 60% predicted     Cervical spinal stenosis     s/p cervical fusion     Chronic kidney disease      Chronic kidney disease, stage 3 (H) 1/14/2021     Chronic pain syndrome      Cigarette nicotine dependence without complication 3/4/2020     Common migraine with intractable migraine 2/15/2021     COPD " (chronic obstructive pulmonary disease) (H)      DDD (degenerative disc disease), lumbosacral 3/22/2021     Depression      Fibrocystic breast      Fibromyalgia      GERD (gastroesophageal reflux disease)      Hallux abductovalgus with bunions, unspecified laterality 12/14/2015     Herpes zoster     Created by Conversion  Replacement Utility updated for latest IMO load     Hiatal hernia      History of electroconvulsive therapy      Hyperlipidemia 3/17/2021    Created by Conversion   Formatting of this note might be different from the original. Formatting of this note might be different from the original. Created by Conversion     Hypotension 3/18/2021     Hypothyroidism     hypothyroidism     IBS (irritable bowel syndrome)      Lung nodule 8/4/2016 8/4/2016:  Left upper lobe nodule of 6.5 mm, likely benign given slow growth per radiologist.  See CT 6/27/2011:  measured  approximately 5.5 mm in greatest dimension      Menopausal and postmenopausal disorder     Created by Conversion  Replacement Utility updated for latest IMO load     Migraine     Created by Conversion  Replacement Utility updated for latest IMO load     Migraines      Osteoarthritis      Osteopenia of multiple sites 9/1/2020     Other chronic pain      PONV (postoperative nausea and vomiting)      PTSD (post-traumatic stress disorder)      Shingles 2014    on back     Spinal stenosis of lumbar region with neurogenic claudication 3/22/2021     Tobacco use disorder     Created by Conversion        PAST SURGICAL HISTORY:  Past Surgical History:   Procedure Laterality Date     BIOPSY BREAST Left     Approx 5 bx     BUNIONECTOMY Right 12/15/2015    Procedure: MODIFIED LAI BUNIONECTOMY RIGHT FOOT;  Surgeon: Jerome Calvin DPM;  Location: [facility];  Service:      CERVICAL FUSION       CERVICAL FUSION Bilateral 2/16/2015    Procedure: ANTERIOR CERVICAL DECOMPRESSION/FUSION C3-5 BILATERAL, ANTERIOR HARDWARE REMOVAL C5-7 BILATERAL ;  Surgeon:  Sawyer Roland MD;  Location: [facility] OR;  Service:      HC NIPPLE EXPLORATION      Description: Breast Nipple Explor W/ Excision Solitary Lactiferous Duct;  Recorded: 04/10/2008;     HYSTERECTOMY       IR CERVICAL EPIDURAL STEROID INJECTION  9/17/2003     IR CERVICAL EPIDURAL STEROID INJECTION  12/11/2003     IR CERVICAL EPIDURAL STEROID INJECTION  1/16/2004     IRRIGATION AND DEBRIDEMENT DECUBITUS WOUND, COMBINED Left 12/13/2021    Procedure: Wound exploration and debridement of Left Lower Abdomin Chronic wound.;  Surgeon: Crispin Bunch MD;  Location: [facility]     LUMBAR DISCECTOMY      X2     MAMMOPLASTY AUGMENTATION Bilateral      approx 2006?     PELVIC LAPAROSCOPY      multiple     SHOULDER OPEN ROTATOR CUFF REPAIR Left     X2     ZZC TOTAL ABDOM HYSTERECTOMY      Description: Total Abdominal Hysterectomy;  Recorded: 06/10/2013;         CURRENT MEDICATIONS:    No current facility-administered medications for this encounter.    Current Outpatient Medications:      acetaminophen (TYLENOL) 500 MG tablet, Take 1,000 mg by mouth, Disp: , Rfl:      albuterol (PROAIR HFA) 108 (90 Base) MCG/ACT inhaler, Inhale 2 puffs into the lungs every 4 hours, Disp: 18 g, Rfl: 11     albuterol (PROVENTIL) (2.5 MG/3ML) 0.083% neb solution, Take 1 vial (2.5 mg) by nebulization every 6 hours as needed for shortness of breath / dyspnea or wheezing, Disp: 180 mL, Rfl: 3     atorvastatin (LIPITOR) 40 MG tablet, TAKE 1 TABLET(40 MG) BY MOUTH AT BEDTIME, Disp: 90 tablet, Rfl: 3     azelastine (ASTELIN) 0.1 % nasal spray, 2 sprays each nostril 1-2x daily as needed for nasal congestion (use nightly for first 2 week), Disp: 30 mL, Rfl: 11     benzonatate (TESSALON) 100 MG capsule, TAKE 1 CAPSULE(100 MG) BY MOUTH THREE TIMES DAILY AS NEEDED FOR COUGH, Disp: 30 capsule, Rfl: 3     buPROPion (WELLBUTRIN) 75 MG tablet, Take 75 mg by mouth 2 times daily (Patient not taking: Reported on 7/28/2022), Disp: , Rfl:       cetirizine (ZYRTEC) 10 MG tablet, Take 10 mg by mouth daily, Disp: , Rfl:      docusate sodium (COLACE) 100 MG capsule, Take 100 mg by mouth 2 times daily, Disp: , Rfl:      doxepin (SINEQUAN) 100 MG capsule, Take 75 mg by mouth At Bedtime , Disp: , Rfl:      eletriptan (RELPAX) 40 MG tablet, Take 1 tablet (40 mg) by mouth at onset of headache for migraine, Disp: 12 tablet, Rfl: 3     erenumab-aooe (AIMOVIG) 70 MG/ML injection, ADMINISTER 1 ML(70 MG) UNDER THE SKIN EVERY MONTH, Disp: 1 mL, Rfl: 11     esomeprazole (NEXIUM) 20 MG DR capsule, Take 2 capsules by mouth every 24 hours, Disp: , Rfl:      FLUoxetine (PROZAC) 40 MG capsule, Take 40 mg by mouth daily, Disp: , Rfl:      Fluticasone-Umeclidin-Vilanterol (TRELEGY ELLIPTA) 100-62.5-25 MCG/INH oral inhaler, Inhale 1 puff into the lungs daily (Patient not taking: Reported on 7/28/2022), Disp: 1 each, Rfl: 11     gabapentin (NEURONTIN) 300 MG capsule, Take 600 mg by mouth 2 times daily, Disp: , Rfl:      HYDROcodone-acetaminophen (NORCO)  MG per tablet, Take 1 tablet by mouth every 6 hours as needed, Disp: , Rfl:      hydrOXYzine (VISTARIL) 25 MG capsule, Take 25 mg by mouth 3 times daily as needed, Disp: , Rfl:      lamoTRIgine (LAMICTAL) 200 MG tablet, Take 1 tablet by mouth 2 times daily , Disp: , Rfl:      levothyroxine (SYNTHROID/LEVOTHROID) 75 MCG tablet, TAKE 1 TABLET(75 MCG) BY MOUTH DAILY, Disp: 90 tablet, Rfl: 2     montelukast (SINGULAIR) 10 MG tablet, Take 1 tablet (10 mg) by mouth every 24 hours, Disp: 90 tablet, Rfl: 1     prazosin (MINIPRESS) 2 MG capsule, Take 2 capsules by mouth every 24 hours, Disp: , Rfl:      predniSONE (DELTASONE) 20 MG tablet, Take 2 tabs daily for 5 days. (Patient not taking: Reported on 7/28/2022), Disp: 10 tablet, Rfl: 0     prochlorperazine (COMPAZINE) 10 MG tablet, Take 10 mg by mouth every 6 hours as needed, Disp: , Rfl:      senna-docusate (SENOKOT-S/PERICOLACE) 8.6-50 MG tablet, Take 2 tablets by mouth,  "Disp: , Rfl:      tiZANidine (ZANAFLEX) 4 MG tablet, TAKE 1 TO 2 TABLETS BY MOUTH DURING THE DAY AND 2 TABLETS AT BEDTIME, Disp: 120 tablet, Rfl: 1     vitamin D2 (ERGOCALCIFEROL) 87620 units (1250 mcg) capsule, TAKE 1 CAPSULE BY MOUTH 1 TIME A WEEK, Disp: 13 capsule, Rfl: 0    ALLERGIES:  Allergies   Allergen Reactions     Codeine Anaphylaxis     Nefazodone Nausea and Vomiting     Oxycodone-Acetaminophen Unknown     hallucinations     Cetirizine Nausea and Vomiting     Trazodone Nausea and Vomiting     Amoxicillin-Pot Clavulanate [Augmentin] Rash     Cephalexin Rash     Clavulanic Acid Rash     Cyclobenzaprine Rash     Eszopiclone Rash     Methocarbamol Rash     Penicillins Rash     Mild rash       FAMILY HISTORY:  Family History   Problem Relation Age of Onset     Lung Cancer Mother          at age 52     Alcoholism Father      Heart Disease Maternal Grandfather      Diabetes Paternal Grandmother      Coronary Artery Disease Paternal Grandmother      Heart Disease Paternal Grandfather      Diabetes Sister      Hypertension Sister      Crohn's Disease Sister      Coronary Artery Disease Paternal Uncle      Asthma Maternal Aunt        SOCIAL HISTORY:   Social History     Socioeconomic History     Marital status:    Tobacco Use     Smoking status: Former Smoker     Packs/day: 1.00     Years: 45.00     Pack years: 45.00     Types: Cigarettes     Quit date: 10/29/2021     Years since quittin.7     Smokeless tobacco: Former User   Vaping Use     Vaping Use: Never used   Substance and Sexual Activity     Alcohol use: Not Currently     Comment: Alcoholic Drinks/day: rare--1-2 times in year     Drug use: No     Sexual activity: Yes     Partners: Male       VITALS:  Patient Vitals for the past 24 hrs:   BP Temp Temp src Pulse Resp SpO2 Height Weight   22 1127 -- -- -- -- -- 98 % -- --   22 1103 (!) 155/73 98.1  F (36.7  C) Oral 98 (!) 36 97 % 1.6 m (5' 3\") 78.9 kg (174 lb)        PHYSICAL EXAM  "   Constitutional:  Awake, alert, in moderate distress, anxious.  HENT:  Normocephalic, Atraumatic. Bilateral external ears normal. Oropharynx moist. Nose normal. Neck- Normal range of motion with no guarding, No midline cervical tenderness, Supple, No stridor.   Eyes:  PERRL, EOMI with no signs of entrapment, Conjunctiva normal, No discharge.   Respiratory:  Normal breath sounds, No respiratory distress. Inspiratory and expiratory wheeze/rhonchi with good air flow.    Cardiovascular:  Tachycardic, Normal rhythm, No appreciable rubs or gallops.   GI:  Soft, No tenderness, No distension, No palpable masses  Musculoskeletal:  Intact distal pulses, No edema. Good range of motion in all major joints. No tenderness to palpation or major deformities noted.  Integument:  Warm, Dry, No erythema, No rash.   Neurologic:  Alert & oriented, Normal motor function, Normal sensory function, No focal deficits noted.   Psychiatric:  Affect normal, Judgment normal, Mood normal.       LAB:  All pertinent labs reviewed and interpreted.  Results for orders placed or performed during the hospital encounter of 07/29/22   XR Chest Port 1 View     Status: None    Narrative    EXAM: XR CHEST PORT 1 VIEW  LOCATION: [facility]  DATE/TIME: 7/29/2022 12:39 PM    INDICATION: chest pain  COMPARISON: Chest CTA 02/25/2022 and older studies, chest x-ray 12/26/2019      Impression    IMPRESSION: Lungs are clear. No hydropneumothorax or fracture. Heart and pulmonary vascularity are normal.   Ethan Draw     Status: None    Narrative    The following orders were created for panel order Ethan Draw.  Procedure                               Abnormality         Status                     ---------                               -----------         ------                     Extra Blue Top Tube[[id number]]                              Final result               Extra Red Top Tube[[id number]]                               Final result                Extra Green Top (Lithium...[[id number]]                      Final result               Extra Purple Top Tube[[id number]]                            Final result                 Please view results for these tests on the individual orders.   Extra Blue Top Tube     Status: None   Result Value Ref Range    Hold Specimen JIC    Extra Red Top Tube     Status: None   Result Value Ref Range    Hold Specimen JIC    Extra Green Top (Lithium Heparin) Tube     Status: None   Result Value Ref Range    Hold Specimen JIC    Extra Purple Top Tube     Status: None   Result Value Ref Range    Hold Specimen JIC    Troponin I     Status: Normal   Result Value Ref Range    Troponin I <0.01 0.00 - 0.29 ng/mL   Magnesium     Status: Normal   Result Value Ref Range    Magnesium 1.9 1.8 - 2.6 mg/dL   Comprehensive metabolic panel     Status: Abnormal   Result Value Ref Range    Sodium 139 136 - 145 mmol/L    Potassium 3.5 3.5 - 5.0 mmol/L    Chloride 105 98 - 107 mmol/L    Carbon Dioxide (CO2) 22 22 - 31 mmol/L    Anion Gap 12 5 - 18 mmol/L    Urea Nitrogen 13 8 - 22 mg/dL    Creatinine 1.05 0.60 - 1.10 mg/dL    Calcium 9.2 8.5 - 10.5 mg/dL    Glucose 150 (H) 70 - 125 mg/dL    Alkaline Phosphatase 127 (H) 45 - 120 U/L    AST 14 0 - 40 U/L    ALT 13 0 - 45 U/L    Protein Total 7.4 6.0 - 8.0 g/dL    Albumin 3.7 3.5 - 5.0 g/dL    Bilirubin Total 0.3 0.0 - 1.0 mg/dL    GFR Estimate 60 (L) >60 mL/min/1.73m2   B-Type Natriuretic Peptide ([facility] Only)     Status: Abnormal   Result Value Ref Range     (H) 0 - 98 pg/mL   CBC (+ platelets, no diff)     Status: Abnormal   Result Value Ref Range    WBC Count 10.6 4.0 - 11.0 10e3/uL    RBC Count 4.33 3.80 - 5.20 10e6/uL    Hemoglobin 9.8 (L) 11.7 - 15.7 g/dL    Hematocrit 32.4 (L) 35.0 - 47.0 %    MCV 75 (L) 78 - 100 fL    MCH 22.6 (L) 26.5 - 33.0 pg    MCHC 30.2 (L) 31.5 - 36.5 g/dL    RDW 18.3 (H) 10.0 - 15.0 %    Platelet Count 376 150 - 450 10e3/uL   Asymptomatic COVID-19  Virus (Coronavirus) by PCR Nasopharyngeal     Status: Normal    Specimen: Nasopharyngeal; Swab   Result Value Ref Range    SARS CoV2 PCR Negative Negative    Narrative    Testing was performed using the Trelliseert Xpress SARS-CoV-2 Assay on the   Cepheid Gene-Xpert Instrument Systems. Additional information about   this Emergency Use Authorization (EUA) assay can be found via the Lab   Guide. This test should be ordered for the detection of SARS-CoV-2 in   individuals who meet SARS-CoV-2 clinical and/or epidemiological   criteria. Test performance is unknown in asymptomatic patients. This   test is for in vitro diagnostic use under the FDA EUA for   laboratories certified under CLIA to perform high complexity testing.   This test has not been FDA cleared or approved. A negative result   does not rule out the presence of PCR inhibitors in the specimen or   target RNA in concentration below the limit of detection for the   assay. The possibility of a false negative should be considered if   the patient's recent exposure or clinical presentation suggests   COVID-19. This test was validated by the [facility]. This laboratory is certified under the Clinical Laboratory Improvement Amendments of 1988 (CLIA-88) as qualified to perform high complexity laboratory testing.       RADIOLOGY:  Reviewed all pertinent imaging. Please see official radiology report.  XR Chest Port 1 View    Result Date: 7/29/2022  EXAM: XR CHEST PORT 1 VIEW LOCATION: [facility] DATE/TIME: 7/29/2022 12:39 PM INDICATION: chest pain COMPARISON: Chest CTA 02/25/2022 and older studies, chest x-ray 12/26/2019     IMPRESSION: Lungs are clear. No hydropneumothorax or fracture. Heart and pulmonary vascularity are normal.    EKG:    Normal sinus rhythm.  Rate of 88.  Biatrial enlargement.  Normal QRS.  No acute ST segment abnormalities.  Essentially unchanged compared to February 25, 2022  I have  independently reviewed and interpreted the EKG(s) documented above.          I, Enrico Reyes, am serving as a scribe to document services personally performed by Bryan Leon MD, based on my observation and the provider's statements to me. I, Bryan Leon MD attest that Enrico Reyes is acting in a scribe capacity, has observed my performance of the services and has documented them in accordance with my direction.    Bryan Leon M.D.  Emergency Medicine  [facility] EMERGENCY ROOM     Bryan Leon MD  07/29/22 [id number]       Bryan Leon MD  07/29/22 [id number]

## 2022-07-29 NOTE — ED NOTES
Pt on RA SPO2 98%, HR 87, RR 24, BS coarse with insp/exp wheezes. Pt stated has Asthma and Emphysema.Pt quit smoking last year. Pt has not had to do any nebs at home for a long time and could not find her nebulizer. Pt given Duo neb x 2. After neb treatment pt feels more open less tight and SPO2 98% , HR 98 , RR 24-26 , BS coarse . Pt getting solumedrol.   Zamzam Barbosa, RT

## 2022-08-02 ENCOUNTER — OFFICE VISIT (OUTPATIENT)
Dept: CARDIOLOGY | Facility: CLINIC | Age: 62
End: 2022-08-02
Attending: INTERNAL MEDICINE
Payer: COMMERCIAL

## 2022-08-02 VITALS
SYSTOLIC BLOOD PRESSURE: 162 MMHG | HEART RATE: 76 BPM | BODY MASS INDEX: 30.11 KG/M2 | RESPIRATION RATE: 16 BRPM | WEIGHT: 170 LBS | DIASTOLIC BLOOD PRESSURE: 68 MMHG

## 2022-08-02 DIAGNOSIS — I65.23 BILATERAL CAROTID ARTERY STENOSIS: ICD-10-CM

## 2022-08-02 DIAGNOSIS — D64.9 ANEMIA, UNSPECIFIED TYPE: ICD-10-CM

## 2022-08-02 DIAGNOSIS — R07.9 CHEST PAIN, UNSPECIFIED TYPE: ICD-10-CM

## 2022-08-02 DIAGNOSIS — E78.2 MIXED HYPERLIPIDEMIA: ICD-10-CM

## 2022-08-02 DIAGNOSIS — R06.09 DYSPNEA ON EXERTION: Primary | ICD-10-CM

## 2022-08-02 LAB
RETICS # AUTO: 0.04 10E6/UL (ref 0.01–0.11)
RETICS/RBC NFR AUTO: 0.9 % (ref 0.8–2.7)

## 2022-08-02 PROCEDURE — 83540 ASSAY OF IRON: CPT | Performed by: INTERNAL MEDICINE

## 2022-08-02 PROCEDURE — 36415 COLL VENOUS BLD VENIPUNCTURE: CPT | Performed by: INTERNAL MEDICINE

## 2022-08-02 PROCEDURE — 83550 IRON BINDING TEST: CPT | Performed by: INTERNAL MEDICINE

## 2022-08-02 PROCEDURE — 85045 AUTOMATED RETICULOCYTE COUNT: CPT | Performed by: INTERNAL MEDICINE

## 2022-08-02 PROCEDURE — 82728 ASSAY OF FERRITIN: CPT | Performed by: INTERNAL MEDICINE

## 2022-08-02 PROCEDURE — 83721 ASSAY OF BLOOD LIPOPROTEIN: CPT | Performed by: INTERNAL MEDICINE

## 2022-08-02 PROCEDURE — 99204 OFFICE O/P NEW MOD 45 MIN: CPT | Performed by: INTERNAL MEDICINE

## 2022-08-02 NOTE — LETTER
8/2/2022    Patrice Sanon MD  [facility] [address]  [facility] [address]    RE: Dayana Samaniego       Dear Colleague,     I had the pleasure of seeing Dayana Samaniego in the [facility].    HEART CARE ENCOUNTER CONSULTATON NOTE      [facility]  [phone]      Assessment/Recommendations   Assessment:   1. Exertional dyspnea   2. Macrocytic anemia, likely significant iron deficiency.  3. Carotid artery disease   4. Asthma   5. Hyperlipidemia   6. Elevated blood pressure likely secondary to prednisone us today.     Plan:   1. Coronary CT angiogram given exertional dyspnea, atypical chest pain, patient can not exercise given arthritis in bilateral knees and bilateral breast implant (artifact on NM stress testing).  Minimal coronary calcification in CT screening in 2020.    2. Evaluate microcytic anemia (retic count, ferritin, iron panel significant iron deficiency could explain her shortness of breath.  3. Carotid ultrasound given bilateral bruits determine severity of stenosis  4.  Direct LDL target less than 70 given carotid artery disease         History of Present Illness/Subjective    HPI: Dayana Samaniego is a 61 year old female no prior cardiac history presents to cardiology clinic in consultation for dyspnea on exertion at the request of Dr. Bradford from pulmonary medicine.    According to the patient has been experiencing worsening dyspnea on exertion over the past few months.  This is associated with fatigue and low energy state.  She denies any anginal chest pain.  Does note occasional atypical chest pain.  No significant orthopnea or PND symptoms.  Trace lower extremity edema at times.  Blood pressure is elevated today as she is on prednisone for acute bronchitis along with azithromycin.      No recent stress test.  She did undergo noncontrast CT in 2020 which did not demonstrate any significant coronary calcifications.    Long  conversation regarding the patient's symptoms.  Would recommend further evaluation to rule out obstructive coronary disease with coronary CT angiogram.  She is not able to exercise due to bilateral osteoarthritis of her knees.  She has bilateral breast implants which will make nuclear pharmacologic stress testing difficult and likely cause anterior artifacts resulting in indeterminate study.  Strongly recommend coronary CT angiogram as a way to rule out obstructive coronary artery disease given the patient's current circumstances.    Also recommend further evaluation for her microcytic anemia.  Likely has iron deficiency anemia.  Notes occasional dark stools which would need to be further evaluated by her primary care if iron deficiency is confirmed.  This was outlined to the patient in detail      EC22: Normal sinus rhythm.  Bilateral atrial enlargemt.  Nonspecific ST segment abnormalities in lateral leads.  Personally reviewed.     Echocardiogram Results: 2022  1. Normal left ventricular size and systolic performance with a visually  estimated ejection fraction of 65-70%.  2. No significant valvular heart disease is identified on this study.  3. Normal right ventricular size and systolic performance.  4. Right ventricular systolic pressure cannot be directly estimated from TR  velocities due to insufficient tricuspid regurgitation.     CT Chest : 22:     FINDINGS:  ANGIOGRAM CHEST: Pulmonary arteries are normal caliber and negative for pulmonary emboli. Thoracic aorta is negative for dissection. No CT evidence of right heart strain.     LUNGS AND PLEURA: 2 mm nodule left apex series 7 image 56, 2 mm subpleural nodule right upper lobe image 62 and 2 mm right upper lobe nodule image 54 stable. Emphysema. Basilar atelectasis.     MEDIASTINUM/AXILLAE: Small hiatal hernia. Trace amount of pericardial fluid.     UPPER ABDOMEN: Probable stenosis origin of the celiac artery due to arcuate ligament  compression.     MUSCULOSKELETAL: Breast implants.     Physical Examination  Review of Systems   Vitals: BP (!) 162/68 (BP Location: Left arm, Patient Position: Sitting, Cuff Size: Adult Regular)   Pulse 76   Resp 16   Wt 77.1 kg (170 lb)   BMI 30.11 kg/m    BMI= Body mass index is 30.11 kg/m .  Wt Readings from Last 3 Encounters:   08/02/22 77.1 kg (170 lb)   07/29/22 78.9 kg (174 lb)   06/17/22 79.4 kg (175 lb)        Mildly obese   ENT/Mouth: membranes moist, no oral lesions or bleeding gums.      EYES:  no scleral icterus, normal conjunctivae       Chest/Lungs:   lungs are clear to auscultation, no rales or wheezing,no sternal scar, equal chest wall expansion    Cardiovascular:   Regular. Normal first and second heart sounds with no murmurs, rubs, or gallops; the carotid, radial and posterior tibial pulses are intact, Jugular venous pressure normal, no edema bilaterally    Abdomen:  no tenderness; bowel sounds are present   Extremities: no cyanosis or clubbing   Skin: no xanthelasma, warm.    Neurologic: normal  bilateral, no tremors     Psychiatric: alert and oriented x3, calm        Please refer above for cardiac ROS details.        Medical History  Surgical History Family History Social History   Past Medical History:   Diagnosis Date     Abnormality of gait     Created by Conversion      Anemia 3/18/2021     Anxiety      Asthma      Asymptomatic postmenopausal status     Created by Conversion  Replacement Utility updated for latest IMO load     Bipolar 2 disorder (H)      Centrilobular emphysema (H) 2/26/2018    Mild, seen in 2018 CT scan, DLCO 60% predicted  Formatting of this note might be different from the original. Formatting of this note might be different from the original. Mild, seen in 2018 CT scan, DLCO 60% predicted     Cervical spinal stenosis     s/p cervical fusion     Chronic kidney disease      Chronic kidney disease, stage 3 (H) 1/14/2021     Chronic pain syndrome      Cigarette  nicotine dependence without complication 3/4/2020     Common migraine with intractable migraine 2/15/2021     COPD (chronic obstructive pulmonary disease) (H)      DDD (degenerative disc disease), lumbosacral 3/22/2021     Depression      Fibrocystic breast      Fibromyalgia      GERD (gastroesophageal reflux disease)      Hallux abductovalgus with bunions, unspecified laterality 12/14/2015     Herpes zoster     Created by Conversion  Replacement Utility updated for latest IMO load     Hiatal hernia      History of electroconvulsive therapy      Hyperlipidemia 3/17/2021    Created by Conversion   Formatting of this note might be different from the original. Formatting of this note might be different from the original. Created by Conversion     Hypotension 3/18/2021     Hypothyroidism     hypothyroidism     IBS (irritable bowel syndrome)      Lung nodule 8/4/2016 8/4/2016:  Left upper lobe nodule of 6.5 mm, likely benign given slow growth per radiologist.  See CT 6/27/2011:  measured  approximately 5.5 mm in greatest dimension      Menopausal and postmenopausal disorder     Created by Conversion  Replacement Utility updated for latest IMO load     Migraine     Created by Conversion  Replacement Utility updated for latest IMO load     Migraines      Osteoarthritis      Osteopenia of multiple sites 9/1/2020     Other chronic pain      PONV (postoperative nausea and vomiting)      PTSD (post-traumatic stress disorder)      Shingles 2014    on back     Spinal stenosis of lumbar region with neurogenic claudication 3/22/2021     Tobacco use disorder     Created by Conversion      Past Surgical History:   Procedure Laterality Date     BIOPSY BREAST Left     Approx 5 bx     BUNIONECTOMY Right 12/15/2015    Procedure: MODIFIED LAI BUNIONECTOMY RIGHT FOOT;  Surgeon: Jerome Calvin DPM;  Location: [facility];  Service:      CERVICAL FUSION       CERVICAL FUSION Bilateral 2/16/2015    Procedure: ANTERIOR CERVICAL  DECOMPRESSION/FUSION C3-5 BILATERAL, ANTERIOR HARDWARE REMOVAL C5-7 BILATERAL ;  Surgeon: Sawyer Roland MD;  Location: [facility] OR;  Service:      HC NIPPLE EXPLORATION      Description: Breast Nipple Explor W/ Excision Solitary Lactiferous Duct;  Recorded: 04/10/2008;     HYSTERECTOMY       IR CERVICAL EPIDURAL STEROID INJECTION  2003     IR CERVICAL EPIDURAL STEROID INJECTION  2003     IR CERVICAL EPIDURAL STEROID INJECTION  2004     IRRIGATION AND DEBRIDEMENT DECUBITUS WOUND, COMBINED Left 2021    Procedure: Wound exploration and debridement of Left Lower Abdomin Chronic wound.;  Surgeon: Crispin Bunch MD;  Location: [place] [facility]     LUMBAR DISCECTOMY      X2     MAMMOPLASTY AUGMENTATION Bilateral      approx ?     PELVIC LAPAROSCOPY      multiple     SHOULDER OPEN ROTATOR CUFF REPAIR Left     X2     ZZC TOTAL ABDOM HYSTERECTOMY      Description: Total Abdominal Hysterectomy;  Recorded: 06/10/2013;     Family History   Problem Relation Age of Onset     Lung Cancer Mother          at age 52     Alcoholism Father      Heart Disease Maternal Grandfather      Diabetes Paternal Grandmother      Coronary Artery Disease Paternal Grandmother      Heart Disease Paternal Grandfather      Diabetes Sister      Hypertension Sister      Crohn's Disease Sister      Coronary Artery Disease Paternal Uncle      Asthma Maternal Aunt         Social History     Socioeconomic History     Marital status:      Spouse name: Not on file     Number of children: Not on file     Years of education: Not on file     Highest education level: Not on file   Occupational History     Not on file   Tobacco Use     Smoking status: Former Smoker     Packs/day: 1.00     Years: 45.00     Pack years: 45.00     Types: Cigarettes     Quit date: 10/29/2021     Years since quittin.7     Smokeless tobacco: Former User   Vaping Use     Vaping Use: Never used   Substance and Sexual Activity      Alcohol use: Not Currently     Comment: Alcoholic Drinks/day: rare--1-2 times in year     Drug use: No     Sexual activity: Yes     Partners: Male   Other Topics Concern     Not on file   Social History Narrative     Not on file     Social Determinants of Health     Financial Resource Strain: Not on file   Food Insecurity: Not on file   Transportation Needs: Not on file   Physical Activity: Not on file   Stress: Not on file   Social Connections: Not on file   Intimate Partner Violence: Not on file   Housing Stability: Not on file           Medications  Allergies   Current Outpatient Medications   Medication Sig Dispense Refill     acetaminophen (TYLENOL) 500 MG tablet Take 1,000 mg by mouth       albuterol (PROAIR HFA) 108 (90 Base) MCG/ACT inhaler Inhale 2 puffs into the lungs every 4 hours 18 g 11     albuterol (PROVENTIL) (2.5 MG/3ML) 0.083% neb solution Take 1 vial (2.5 mg) by nebulization every 6 hours as needed for shortness of breath / dyspnea or wheezing 180 mL 3     atorvastatin (LIPITOR) 40 MG tablet TAKE 1 TABLET(40 MG) BY MOUTH AT BEDTIME 90 tablet 3     azelastine (ASTELIN) 0.1 % nasal spray 2 sprays each nostril 1-2x daily as needed for nasal congestion (use nightly for first 2 week) 30 mL 11     benzonatate (TESSALON) 100 MG capsule TAKE 1 CAPSULE(100 MG) BY MOUTH THREE TIMES DAILY AS NEEDED FOR COUGH 30 capsule 3     docusate sodium (COLACE) 100 MG capsule Take 100 mg by mouth 2 times daily       doxepin (SINEQUAN) 100 MG capsule Take 75 mg by mouth At Bedtime        eletriptan (RELPAX) 40 MG tablet Take 1 tablet (40 mg) by mouth at onset of headache for migraine 12 tablet 3     erenumab-aooe (AIMOVIG) 70 MG/ML injection ADMINISTER 1 ML(70 MG) UNDER THE SKIN EVERY MONTH 1 mL 11     esomeprazole (NEXIUM) 20 MG DR capsule Take 2 capsules by mouth every 24 hours       FLUoxetine (PROZAC) 40 MG capsule Take 40 mg by mouth daily       gabapentin (NEURONTIN) 300 MG capsule Take 600 mg by mouth 2 times  daily       HYDROcodone-acetaminophen (NORCO)  MG per tablet Take 1 tablet by mouth every 6 hours as needed       hydrOXYzine (VISTARIL) 25 MG capsule Take 25 mg by mouth 3 times daily as needed       levothyroxine (SYNTHROID/LEVOTHROID) 75 MCG tablet TAKE 1 TABLET(75 MCG) BY MOUTH DAILY 90 tablet 2     montelukast (SINGULAIR) 10 MG tablet Take 1 tablet (10 mg) by mouth every 24 hours 90 tablet 1     prazosin (MINIPRESS) 2 MG capsule Take 2 capsules by mouth every 24 hours       prochlorperazine (COMPAZINE) 10 MG tablet Take 10 mg by mouth every 6 hours as needed       senna-docusate (SENOKOT-S/PERICOLACE) 8.6-50 MG tablet Take 2 tablets by mouth       tiZANidine (ZANAFLEX) 4 MG tablet TAKE 1 TO 2 TABLETS BY MOUTH DURING THE DAY AND 2 TABLETS AT BEDTIME 120 tablet 1     vitamin D2 (ERGOCALCIFEROL) 39021 units (1250 mcg) capsule TAKE 1 CAPSULE BY MOUTH 1 TIME A WEEK 13 capsule 0     azithromycin (ZITHROMAX Z-GABBY) 250 MG tablet Two tablets on the first day, then one tablet daily for the next 4 days 6 tablet 0     buPROPion (WELLBUTRIN) 75 MG tablet Take 75 mg by mouth 2 times daily (Patient not taking: No sig reported)       cetirizine (ZYRTEC) 10 MG tablet Take 10 mg by mouth daily (Patient not taking: Reported on 8/2/2022)       Fluticasone-Umeclidin-Vilanterol (TRELEGY ELLIPTA) 100-62.5-25 MCG/INH oral inhaler Inhale 1 puff into the lungs daily (Patient not taking: Reported on 7/28/2022) 1 each 11     lamoTRIgine (LAMICTAL) 200 MG tablet Take 1 tablet by mouth 2 times daily  (Patient not taking: Reported on 8/2/2022)       predniSONE (DELTASONE) 20 MG tablet Take two tablets (= 40mg) each day for 5 (five) days 10 tablet 0     predniSONE (DELTASONE) 20 MG tablet Take 2 tabs daily for 5 days. (Patient not taking: Reported on 7/28/2022) 10 tablet 0       Allergies   Allergen Reactions     Codeine Anaphylaxis     Nefazodone Nausea and Vomiting     Oxycodone-Acetaminophen Unknown     hallucinations     Cetirizine  Nausea and Vomiting     Trazodone Nausea and Vomiting     Amoxicillin-Pot Clavulanate [Augmentin] Rash     Cephalexin Rash     Clavulanic Acid Rash     Cyclobenzaprine Rash     Eszopiclone Rash     Methocarbamol Rash     Penicillins Rash     Mild rash          Lab Results    Chemistry/lipid CBC Cardiac Enzymes/BNP/TSH/INR   Recent Labs   Lab Test 08/31/21  1304   CHOL 207*   HDL 67   LDL 95   TRIG 227*     Recent Labs   Lab Test 08/31/21  1304 01/14/21  1048 07/21/20  1412   LDL 95 100 116     Recent Labs   Lab Test 07/29/22  1122      POTASSIUM 3.5   CHLORIDE 105   CO2 22   *   BUN 13   CR 1.05   GFRESTIMATED 60*   CAITLIN 9.2     Recent Labs   Lab Test 07/29/22  1122 02/25/22  0256 02/10/22  1323   CR 1.05 1.17* 1.11*     Recent Labs   Lab Test 05/06/15  0840   A1C 5.7          Recent Labs   Lab Test 07/29/22  1122   WBC 10.6   HGB 9.8*   HCT 32.4*   MCV 75*        Recent Labs   Lab Test 07/29/22  1122 06/17/22  1327 02/25/22  0256   HGB 9.8* 10.4 9.4*    Recent Labs   Lab Test 07/29/22  1122 02/25/22  0256   TROPONINI <0.01 <0.01     Recent Labs   Lab Test 07/29/22  1122   *     Recent Labs   Lab Test 08/31/21  1304   TSH 1.10     No results for input(s): INR in the last 21024 hours.     Dylan Amezcua DO    Today's clinic visit entailed:  55 minutes spent on the date of the encounter doing chart review, history and exam, documentation and further activities per the note  Provider  Link to [facility] Help Grid     Reviewed prior CT scans.  Viewed EKG.  Reviewed Dr. Bradford's notes.  Reviewed lab testing in detail with patient.  Reviewed indications for testing in detail with patient as well    Thank you for allowing me to participate in the care of your patient.      Sincerely,     Dylan Amezcua DO     [facility]  cc:   Angelia Bradford MD  [address]  [id number]  [place] [address]

## 2022-08-02 NOTE — PROGRESS NOTES
HEART CARE ENCOUNTER CONSULTATON NOTE      [facility]  [phone]      Assessment/Recommendations   Assessment:   1. Exertional dyspnea   2. Macrocytic anemia, likely significant iron deficiency.  3. Carotid artery disease   4. Asthma   5. Hyperlipidemia   6. Elevated blood pressure likely secondary to prednisone us today.     Plan:   1. Coronary CT angiogram given exertional dyspnea, atypical chest pain, patient can not exercise given arthritis in bilateral knees and bilateral breast implant (artifact on NM stress testing).  Minimal coronary calcification in CT screening in 2020.    2. Evaluate microcytic anemia (retic count, ferritin, iron panel significant iron deficiency could explain her shortness of breath.  3. Carotid ultrasound given bilateral bruits determine severity of stenosis  4.  Direct LDL target less than 70 given carotid artery disease         History of Present Illness/Subjective    HPI: Dayana Samaniego is a 61 year old female no prior cardiac history presents to cardiology clinic in consultation for dyspnea on exertion at the request of Dr. Bradford from pulmonary medicine.    According to the patient has been experiencing worsening dyspnea on exertion over the past few months.  This is associated with fatigue and low energy state.  She denies any anginal chest pain.  Does note occasional atypical chest pain.  No significant orthopnea or PND symptoms.  Trace lower extremity edema at times.  Blood pressure is elevated today as she is on prednisone for acute bronchitis along with azithromycin.      No recent stress test.  She did undergo noncontrast CT in 2020 which did not demonstrate any significant coronary calcifications.    Long conversation regarding the patient's symptoms.  Would recommend further evaluation to rule out obstructive coronary disease with coronary CT angiogram.  She is not able to exercise due to bilateral osteoarthritis of her knees.  She has bilateral  breast implants which will make nuclear pharmacologic stress testing difficult and likely cause anterior artifacts resulting in indeterminate study.  Strongly recommend coronary CT angiogram as a way to rule out obstructive coronary artery disease given the patient's current circumstances.    Also recommend further evaluation for her microcytic anemia.  Likely has iron deficiency anemia.  Notes occasional dark stools which would need to be further evaluated by her primary care if iron deficiency is confirmed.  This was outlined to the patient in detail      EC22: Normal sinus rhythm.  Bilateral atrial enlargemt.  Nonspecific ST segment abnormalities in lateral leads.  Personally reviewed.     Echocardiogram Results: 2022  1. Normal left ventricular size and systolic performance with a visually  estimated ejection fraction of 65-70%.  2. No significant valvular heart disease is identified on this study.  3. Normal right ventricular size and systolic performance.  4. Right ventricular systolic pressure cannot be directly estimated from TR  velocities due to insufficient tricuspid regurgitation.     CT Chest : 22:     FINDINGS:  ANGIOGRAM CHEST: Pulmonary arteries are normal caliber and negative for pulmonary emboli. Thoracic aorta is negative for dissection. No CT evidence of right heart strain.     LUNGS AND PLEURA: 2 mm nodule left apex series 7 image 56, 2 mm subpleural nodule right upper lobe image 62 and 2 mm right upper lobe nodule image 54 stable. Emphysema. Basilar atelectasis.     MEDIASTINUM/AXILLAE: Small hiatal hernia. Trace amount of pericardial fluid.     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     MUSCULOSKELETAL: Breast implants.     Physical Examination  Review of Systems   Vitals: BP (!) 162/68 (BP Location: Left arm, Patient Position: Sitting, Cuff Size: Adult Regular)   Pulse 76   Resp 16   Wt 77.1 kg (170 lb)   BMI  30.11 kg/m    BMI= Body mass index is 30.11 kg/m .  Wt Readings from Last 3 Encounters:   08/02/22 77.1 kg (170 lb)   07/29/22 78.9 kg (174 lb)   06/17/22 79.4 kg (175 lb)        Mildly obese   ENT/Mouth: membranes moist, no oral lesions or bleeding gums.      EYES:  no scleral icterus, normal conjunctivae       Chest/Lungs:   lungs are clear to auscultation, no rales or wheezing,no sternal scar, equal chest wall expansion    Cardiovascular:   Regular. Normal first and second heart sounds with no murmurs, rubs, or gallops; the carotid, radial and posterior tibial pulses are intact, Jugular venous pressure normal, no edema bilaterally    Abdomen:  no tenderness; bowel sounds are present   Extremities: no cyanosis or clubbing   Skin: no xanthelasma, warm.    Neurologic: normal  bilateral, no tremors     Psychiatric: alert and oriented x3, calm        Please refer above for cardiac ROS details.        Medical History  Surgical History Family History Social History   Past Medical History:   Diagnosis Date     Abnormality of gait     Created by Conversion      Anemia 3/18/2021     Anxiety      Asthma      Asymptomatic postmenopausal status     Created by Conversion  Replacement Utility updated for latest IMO load     Bipolar 2 disorder (H)      Centrilobular emphysema (H) 2/26/2018    Mild, seen in 2018 CT scan, DLCO 60% predicted  Formatting of this note might be different from the original. Formatting of this note might be different from the original. Mild, seen in 2018 CT scan, DLCO 60% predicted     Cervical spinal stenosis     s/p cervical fusion     Chronic kidney disease      Chronic kidney disease, stage 3 (H) 1/14/2021     Chronic pain syndrome      Cigarette nicotine dependence without complication 3/4/2020     Common migraine with intractable migraine 2/15/2021     COPD (chronic obstructive pulmonary disease) (H)      DDD (degenerative disc disease), lumbosacral 3/22/2021     Depression      Fibrocystic  breast      Fibromyalgia      GERD (gastroesophageal reflux disease)      Hallux abductovalgus with bunions, unspecified laterality 12/14/2015     Herpes zoster     Created by Conversion  Replacement Utility updated for latest IMO load     Hiatal hernia      History of electroconvulsive therapy      Hyperlipidemia 3/17/2021    Created by Conversion   Formatting of this note might be different from the original. Formatting of this note might be different from the original. Created by Conversion     Hypotension 3/18/2021     Hypothyroidism     hypothyroidism     IBS (irritable bowel syndrome)      Lung nodule 8/4/2016 8/4/2016:  Left upper lobe nodule of 6.5 mm, likely benign given slow growth per radiologist.  See CT 6/27/2011:  measured  approximately 5.5 mm in greatest dimension      Menopausal and postmenopausal disorder     Created by Conversion  Replacement Utility updated for latest IMO load     Migraine     Created by Conversion  Replacement Utility updated for latest IMO load     Migraines      Osteoarthritis      Osteopenia of multiple sites 9/1/2020     Other chronic pain      PONV (postoperative nausea and vomiting)      PTSD (post-traumatic stress disorder)      Shingles 2014    on back     Spinal stenosis of lumbar region with neurogenic claudication 3/22/2021     Tobacco use disorder     Created by Conversion      Past Surgical History:   Procedure Laterality Date     BIOPSY BREAST Left     Approx 5 bx     BUNIONECTOMY Right 12/15/2015    Procedure: MODIFIED LAI BUNIONECTOMY RIGHT FOOT;  Surgeon: Jerome Calvin DPM;  Location: [facility];  Service:      CERVICAL FUSION       CERVICAL FUSION Bilateral 2/16/2015    Procedure: ANTERIOR CERVICAL DECOMPRESSION/FUSION C3-5 BILATERAL, ANTERIOR HARDWARE REMOVAL C5-7 BILATERAL ;  Surgeon: Sawyer Roland MD;  Location: [facility];  Service:      HC NIPPLE EXPLORATION      Description: Breast Nipple Explor W/ Excision Solitary  Lactiferous Duct;  Recorded: 04/10/2008;     HYSTERECTOMY       IR CERVICAL EPIDURAL STEROID INJECTION  2003     IR CERVICAL EPIDURAL STEROID INJECTION  2003     IR CERVICAL EPIDURAL STEROID INJECTION  2004     IRRIGATION AND DEBRIDEMENT DECUBITUS WOUND, COMBINED Left 2021    Procedure: Wound exploration and debridement of Left Lower Abdomin Chronic wound.;  Surgeon: Crispin Bunch MD;  Location: [facility]     LUMBAR DISCECTOMY      X2     MAMMOPLASTY AUGMENTATION Bilateral      approx ?     PELVIC LAPAROSCOPY      multiple     SHOULDER OPEN ROTATOR CUFF REPAIR Left     X2     ZZC TOTAL ABDOM HYSTERECTOMY      Description: Total Abdominal Hysterectomy;  Recorded: 06/10/2013;     Family History   Problem Relation Age of Onset     Lung Cancer Mother          at age 52     Alcoholism Father      Heart Disease Maternal Grandfather      Diabetes Paternal Grandmother      Coronary Artery Disease Paternal Grandmother      Heart Disease Paternal Grandfather      Diabetes Sister      Hypertension Sister      Crohn's Disease Sister      Coronary Artery Disease Paternal Uncle      Asthma Maternal Aunt         Social History     Socioeconomic History     Marital status:      Spouse name: Not on file     Number of children: Not on file     Years of education: Not on file     Highest education level: Not on file   Occupational History     Not on file   Tobacco Use     Smoking status: Former Smoker     Packs/day: 1.00     Years: 45.00     Pack years: 45.00     Types: Cigarettes     Quit date: 10/29/2021     Years since quittin.7     Smokeless tobacco: Former User   Vaping Use     Vaping Use: Never used   Substance and Sexual Activity     Alcohol use: Not Currently     Comment: Alcoholic Drinks/day: rare--1-2 times in year     Drug use: No     Sexual activity: Yes     Partners: Male   Other Topics Concern     Not on file   Social History Narrative     Not on file     Social  Determinants of Health     Financial Resource Strain: Not on file   Food Insecurity: Not on file   Transportation Needs: Not on file   Physical Activity: Not on file   Stress: Not on file   Social Connections: Not on file   Intimate Partner Violence: Not on file   Housing Stability: Not on file           Medications  Allergies   Current Outpatient Medications   Medication Sig Dispense Refill     acetaminophen (TYLENOL) 500 MG tablet Take 1,000 mg by mouth       albuterol (PROAIR HFA) 108 (90 Base) MCG/ACT inhaler Inhale 2 puffs into the lungs every 4 hours 18 g 11     albuterol (PROVENTIL) (2.5 MG/3ML) 0.083% neb solution Take 1 vial (2.5 mg) by nebulization every 6 hours as needed for shortness of breath / dyspnea or wheezing 180 mL 3     atorvastatin (LIPITOR) 40 MG tablet TAKE 1 TABLET(40 MG) BY MOUTH AT BEDTIME 90 tablet 3     azelastine (ASTELIN) 0.1 % nasal spray 2 sprays each nostril 1-2x daily as needed for nasal congestion (use nightly for first 2 week) 30 mL 11     benzonatate (TESSALON) 100 MG capsule TAKE 1 CAPSULE(100 MG) BY MOUTH THREE TIMES DAILY AS NEEDED FOR COUGH 30 capsule 3     docusate sodium (COLACE) 100 MG capsule Take 100 mg by mouth 2 times daily       doxepin (SINEQUAN) 100 MG capsule Take 75 mg by mouth At Bedtime        eletriptan (RELPAX) 40 MG tablet Take 1 tablet (40 mg) by mouth at onset of headache for migraine 12 tablet 3     erenumab-aooe (AIMOVIG) 70 MG/ML injection ADMINISTER 1 ML(70 MG) UNDER THE SKIN EVERY MONTH 1 mL 11     esomeprazole (NEXIUM) 20 MG DR capsule Take 2 capsules by mouth every 24 hours       FLUoxetine (PROZAC) 40 MG capsule Take 40 mg by mouth daily       gabapentin (NEURONTIN) 300 MG capsule Take 600 mg by mouth 2 times daily       HYDROcodone-acetaminophen (NORCO)  MG per tablet Take 1 tablet by mouth every 6 hours as needed       hydrOXYzine (VISTARIL) 25 MG capsule Take 25 mg by mouth 3 times daily as needed       levothyroxine (SYNTHROID/LEVOTHROID)  75 MCG tablet TAKE 1 TABLET(75 MCG) BY MOUTH DAILY 90 tablet 2     montelukast (SINGULAIR) 10 MG tablet Take 1 tablet (10 mg) by mouth every 24 hours 90 tablet 1     prazosin (MINIPRESS) 2 MG capsule Take 2 capsules by mouth every 24 hours       prochlorperazine (COMPAZINE) 10 MG tablet Take 10 mg by mouth every 6 hours as needed       senna-docusate (SENOKOT-S/PERICOLACE) 8.6-50 MG tablet Take 2 tablets by mouth       tiZANidine (ZANAFLEX) 4 MG tablet TAKE 1 TO 2 TABLETS BY MOUTH DURING THE DAY AND 2 TABLETS AT BEDTIME 120 tablet 1     vitamin D2 (ERGOCALCIFEROL) 03259 units (1250 mcg) capsule TAKE 1 CAPSULE BY MOUTH 1 TIME A WEEK 13 capsule 0     azithromycin (ZITHROMAX Z-GABBY) 250 MG tablet Two tablets on the first day, then one tablet daily for the next 4 days 6 tablet 0     buPROPion (WELLBUTRIN) 75 MG tablet Take 75 mg by mouth 2 times daily (Patient not taking: No sig reported)       cetirizine (ZYRTEC) 10 MG tablet Take 10 mg by mouth daily (Patient not taking: Reported on 8/2/2022)       Fluticasone-Umeclidin-Vilanterol (TRELEGY ELLIPTA) 100-62.5-25 MCG/INH oral inhaler Inhale 1 puff into the lungs daily (Patient not taking: Reported on 7/28/2022) 1 each 11     lamoTRIgine (LAMICTAL) 200 MG tablet Take 1 tablet by mouth 2 times daily  (Patient not taking: Reported on 8/2/2022)       predniSONE (DELTASONE) 20 MG tablet Take two tablets (= 40mg) each day for 5 (five) days 10 tablet 0     predniSONE (DELTASONE) 20 MG tablet Take 2 tabs daily for 5 days. (Patient not taking: Reported on 7/28/2022) 10 tablet 0       Allergies   Allergen Reactions     Codeine Anaphylaxis     Nefazodone Nausea and Vomiting     Oxycodone-Acetaminophen Unknown     hallucinations     Cetirizine Nausea and Vomiting     Trazodone Nausea and Vomiting     Amoxicillin-Pot Clavulanate [Augmentin] Rash     Cephalexin Rash     Clavulanic Acid Rash     Cyclobenzaprine Rash     Eszopiclone Rash     Methocarbamol Rash     Penicillins Rash      Mild rash          Lab Results    Chemistry/lipid CBC Cardiac Enzymes/BNP/TSH/INR   Recent Labs   Lab Test 08/31/21  1304   CHOL 207*   HDL 67   LDL 95   TRIG 227*     Recent Labs   Lab Test 08/31/21  1304 01/14/21  1048 07/21/20  1412   LDL 95 100 116     Recent Labs   Lab Test 07/29/22  1122      POTASSIUM 3.5   CHLORIDE 105   CO2 22   *   BUN 13   CR 1.05   GFRESTIMATED 60*   CAITLIN 9.2     Recent Labs   Lab Test 07/29/22  1122 02/25/22  0256 02/10/22  1323   CR 1.05 1.17* 1.11*     Recent Labs   Lab Test 05/06/15  0840   A1C 5.7          Recent Labs   Lab Test 07/29/22  1122   WBC 10.6   HGB 9.8*   HCT 32.4*   MCV 75*        Recent Labs   Lab Test 07/29/22  1122 06/17/22  1327 02/25/22  0256   HGB 9.8* 10.4 9.4*    Recent Labs   Lab Test 07/29/22  1122 02/25/22  0256   TROPONINI <0.01 <0.01     Recent Labs   Lab Test 07/29/22  1122   *     Recent Labs   Lab Test 08/31/21  1304   TSH 1.10     No results for input(s): INR in the last [id number] hours.     Dylan Amezcua DO    Today's clinic visit entailed:  55 minutes spent on the date of the encounter doing chart review, history and exam, documentation and further activities per the note  Provider  Link to [facility] Help Grid     Reviewed prior CT scans.  Viewed EKG.  Reviewed Dr. Bradford's notes.  Reviewed lab testing in detail with patient.  Reviewed indications for testing in detail with patient as well

## 2022-08-03 ENCOUNTER — TRANSFERRED RECORDS (OUTPATIENT)
Dept: HEALTH INFORMATION MANAGEMENT | Facility: CLINIC | Age: 62
End: 2022-08-03

## 2022-08-03 LAB
FERRITIN SERPL-MCNC: 13 NG/ML (ref 11–328)
IRON BINDING CAPACITY (ROCHE): 349 UG/DL (ref 240–430)
IRON SATN MFR SERPL: 6 % (ref 15–46)
IRON SERPL-MCNC: 20 UG/DL (ref 37–145)
LDLC SERPL DIRECT ASSAY-MCNC: 85 MG/DL

## 2022-08-03 NOTE — RESULT ENCOUNTER NOTE
Patient has significant iron deficiency anemia which needs treatment.  Needs follow-up with her primary care provider determine the cause of her iron deficiency anemia and treatment.  Proceed with coronary CT angiogram to rule out obstructive coronary disease as cause of dyspnea.

## 2022-08-11 ENCOUNTER — TRANSFERRED RECORDS (OUTPATIENT)
Dept: HEALTH INFORMATION MANAGEMENT | Facility: CLINIC | Age: 62
End: 2022-08-11

## 2022-08-15 ENCOUNTER — TELEPHONE (OUTPATIENT)
Dept: NEUROLOGY | Facility: CLINIC | Age: 62
End: 2022-08-15

## 2022-08-15 ENCOUNTER — OFFICE VISIT (OUTPATIENT)
Dept: FAMILY MEDICINE | Facility: CLINIC | Age: 62
End: 2022-08-15
Payer: COMMERCIAL

## 2022-08-15 VITALS
HEART RATE: 91 BPM | WEIGHT: 167 LBS | SYSTOLIC BLOOD PRESSURE: 124 MMHG | DIASTOLIC BLOOD PRESSURE: 60 MMHG | BODY MASS INDEX: 29.58 KG/M2 | OXYGEN SATURATION: 98 %

## 2022-08-15 DIAGNOSIS — D50.9 IRON DEFICIENCY ANEMIA, UNSPECIFIED IRON DEFICIENCY ANEMIA TYPE: ICD-10-CM

## 2022-08-15 DIAGNOSIS — N39.46 MIXED INCONTINENCE URGE AND STRESS (MALE)(FEMALE): ICD-10-CM

## 2022-08-15 DIAGNOSIS — E78.2 MIXED HYPERLIPIDEMIA: ICD-10-CM

## 2022-08-15 DIAGNOSIS — R13.14 PHARYNGOESOPHAGEAL DYSPHAGIA: ICD-10-CM

## 2022-08-15 DIAGNOSIS — J43.2 CENTRILOBULAR EMPHYSEMA (H): Primary | Chronic | ICD-10-CM

## 2022-08-15 DIAGNOSIS — I25.10 CORONARY ARTERY DISEASE INVOLVING NATIVE CORONARY ARTERY OF NATIVE HEART, UNSPECIFIED WHETHER ANGINA PRESENT: ICD-10-CM

## 2022-08-15 DIAGNOSIS — E03.9 ACQUIRED HYPOTHYROIDISM: ICD-10-CM

## 2022-08-15 DIAGNOSIS — Z12.11 SPECIAL SCREENING FOR MALIGNANT NEOPLASMS, COLON: ICD-10-CM

## 2022-08-15 LAB
CHOLEST SERPL-MCNC: 217 MG/DL
HDLC SERPL-MCNC: 82 MG/DL
LDLC SERPL CALC-MCNC: 90 MG/DL
NONHDLC SERPL-MCNC: 135 MG/DL
TRIGL SERPL-MCNC: 225 MG/DL
TSH SERPL DL<=0.005 MIU/L-ACNC: 1.2 UIU/ML (ref 0.3–4.2)

## 2022-08-15 PROCEDURE — 36415 COLL VENOUS BLD VENIPUNCTURE: CPT | Performed by: FAMILY MEDICINE

## 2022-08-15 PROCEDURE — 80061 LIPID PANEL: CPT | Performed by: FAMILY MEDICINE

## 2022-08-15 PROCEDURE — 99214 OFFICE O/P EST MOD 30 MIN: CPT | Performed by: FAMILY MEDICINE

## 2022-08-15 PROCEDURE — 84443 ASSAY THYROID STIM HORMONE: CPT | Performed by: FAMILY MEDICINE

## 2022-08-15 ASSESSMENT — PATIENT HEALTH QUESTIONNAIRE - PHQ9
SUM OF ALL RESPONSES TO PHQ QUESTIONS 1-9: 17
SUM OF ALL RESPONSES TO PHQ QUESTIONS 1-9: 17
10. IF YOU CHECKED OFF ANY PROBLEMS, HOW DIFFICULT HAVE THESE PROBLEMS MADE IT FOR YOU TO DO YOUR WORK, TAKE CARE OF THINGS AT HOME, OR GET ALONG WITH OTHER PEOPLE: VERY DIFFICULT
5. POOR APPETITE OR OVEREATING: MORE THAN HALF THE DAYS

## 2022-08-15 ASSESSMENT — ANXIETY QUESTIONNAIRES
5. BEING SO RESTLESS THAT IT IS HARD TO SIT STILL: NEARLY EVERY DAY
3. WORRYING TOO MUCH ABOUT DIFFERENT THINGS: MORE THAN HALF THE DAYS
GAD7 TOTAL SCORE: 16
6. BECOMING EASILY ANNOYED OR IRRITABLE: NEARLY EVERY DAY
7. FEELING AFRAID AS IF SOMETHING AWFUL MIGHT HAPPEN: MORE THAN HALF THE DAYS
IF YOU CHECKED OFF ANY PROBLEMS ON THIS QUESTIONNAIRE, HOW DIFFICULT HAVE THESE PROBLEMS MADE IT FOR YOU TO DO YOUR WORK, TAKE CARE OF THINGS AT HOME, OR GET ALONG WITH OTHER PEOPLE: SOMEWHAT DIFFICULT
1. FEELING NERVOUS, ANXIOUS, OR ON EDGE: MORE THAN HALF THE DAYS
2. NOT BEING ABLE TO STOP OR CONTROL WORRYING: MORE THAN HALF THE DAYS
GAD7 TOTAL SCORE: 16

## 2022-08-15 ASSESSMENT — PAIN SCALES - GENERAL: PAINLEVEL: NO PAIN (0)

## 2022-08-15 NOTE — PROGRESS NOTES
Centrilobular emphysema (H)  Saw Dr. Bradford on 6/17 complaining of shortness of breath on exertion.  Not using her Trelegy.  Was referred to cardiology, allergy and ENT.  Patient continues to smoke.    Coronary artery disease involving native coronary artery of native heart, unspecified whether angina present  Saw Dr. Amezcua of cardiology who saw that she had a hemoglobin of 9.8.  He did follow-up labs showing she is iron deficient.  He heard a right bruit.  He has ordered bilateral carotid ultrasound and coronary CT angiogram and wants her LDL to be under 70 and to be evaluated for anemia.    Mixed hyperlipidemia  Last LDL was 85.  Currently on Lipitor 40 and may need to be moved to Crestor if not at goal today.  - Lipid panel reflex to direct LDL Fasting  - Lipid panel reflex to direct LDL Fasting    Iron deficiency anemia, unspecified iron deficiency anemia type  Patient's last colonoscopy was in 2016 and was normal, but they told her to follow-up in 5 years for colon cancer screening and she never did.  She also has reflux.  I will send her for colonoscopy.  Ears nose and throat doctor has ordered an esophagram for her dysphagia, but perhaps she needs an EGD?  -Consider GI consult    Acquired hypothyroidism  On levothyroxine 75 mcg, will monitor labs  - TSH with free T4 reflex  - TSH with free T4 reflex    Pharyngoesophageal dysphagia  Will refer for EGD.  Will need colonoscopy as well.  Possibly future consult.  - Adult GI  Referral - Procedure Only    Mixed incontinence urge and stress (male)(female)  Wants to go to physical therapy which is reasonable.  - Physical Therapy Referral    Special screening for malignant neoplasms, colon  Declines a colonoscopy but is willing to do a Cologuard test.  If that is positive she will need to have the colonoscopy anyway.  - COLOGUARD([employer])    I will get back to her on her lab results by [employer] and only call with grossly abnormal values.  We will see  how all of these procedures go and if there are no reasons to see me sooner, she can see me at her usual 6-month med check/annual wellness visit.    Suzie Anne is a 61 year old, presenting for the following health issues:  RECHECK (Discuss lab results from 8/2/22)  Patient was seen by Dr. Amezcua her cardiologist on 8/2 for exertional dyspnea.  Labs were done which included a relatively low ferritin of 13, iron of 20 that is low and percent saturation of 6% which is low.  His plan was to do a coronary CT angiogram, carotid ultrasound because of bruits that he found, goal of LDL of under 70 and for me to evaluate the anemia.  She saw Dr. Peterson on 8/17 who is an ENT specialist who ordered an XR esophagram, CT of the sinuses.  She also is due for recheck of thyroid and cholesterol because her last LDL was higher than 70, it was 85 and we may need to have to change her statin.  Despite having emphysema and coronary artery disease she continues to smoke.  She saw Dr. Bradford on 6/17 who told her to continue her Trelegy, which she does not take and she was the one that actually referred the patient to cardiology and ENT.  She has dysphagia and chronic GERD and is chronically on Nexium 40 mg daily.  She complains today of her incontinence which is longstanding but worsening with time.  She has tried medication and because of her polypharmacy, I think it would be wise for her to do physical therapy instead.  We also noted that she is due for her mammogram and colonoscopy.    History of Present Illness       Reason for visit:  REFERAL FROM HEART DOCTOR LOW IRON  Symptom onset:  More than a month  Symptoms include:  TIRED ALL OF THE TIME  Symptom intensity:  Severe  Symptom progression:  Worsening  Had these symptoms before:  Yes  Has tried/received treatment for these symptoms:  No  What makes it worse:  NO  What makes it better:  NO    She eats 0-1 servings of fruits and vegetables daily. She is missing 2 dose(s) of  medications per week.  She is not taking prescribed medications regularly due to remembering to take.    Today's PHQ-9         PHQ-9 Total Score: 17    PHQ-9 Q9 Thoughts of better off dead/self-harm past 2 weeks :   Several days  Thoughts of suicide or self harm: (P) No  Self-harm Plan:     Self-harm Action:       Safety concerns for self or others: (P) No    How difficult have these problems made it for you to do your work, take care of things at home, or get along with other people: Very difficult     Depression and Anxiety Follow-Up    How are you doing with your depression since your last visit? No change    How are you doing with your anxiety since your last visit?  No change    Are you having other symptoms that might be associated with depression or anxiety? inattentive, affect flat, anxious, fatigued, judgement and insight intact and appearance well groomed    Have you had a significant life event? No     Do you have any concerns with your use of alcohol or other drugs? No    Social History     Tobacco Use     Smoking status: Former Smoker     Packs/day: 1.00     Years: 45.00     Pack years: 45.00     Types: Cigarettes     Quit date: 10/29/2021     Years since quittin.8     Smokeless tobacco: Former User   Vaping Use     Vaping Use: Never used   Substance Use Topics     Alcohol use: Not Currently     Comment: Alcoholic Drinks/day: rare--1-2 times in year     Drug use: No     PHQ 2021 2/15/2022 8/15/2022   PHQ-9 Total Score 16 20 17   Q9: Thoughts of better off dead/self-harm past 2 weeks Several days Several days Several days   F/U: Thoughts of suicide or self-harm - Yes No   F/U: Self harm-plan - No -   F/U: Self-harm action - No -   F/U: Safety concerns - No No     DAWSON-7 SCORE 2021 2021 8/15/2022   Total Score 12 12 16     Last PHQ-9 8/15/2022   1.  Little interest or pleasure in doing things 2   2.  Feeling down, depressed, or hopeless 2   3.  Trouble falling or staying asleep, or  sleeping too much 3   4.  Feeling tired or having little energy 2   5.  Poor appetite or overeating 2   6.  Feeling bad about yourself 2   7.  Trouble concentrating 2   8.  Moving slowly or restless 1   Q9: Thoughts of better off dead/self-harm past 2 weeks 1   PHQ-9 Total Score 17   Difficulty at work, home, or with people -   In the past two weeks have you had thoughts of suicide or self harm? No   Do you have concerns about your personal safety or the safety of others? No   In the past 2 weeks have you thought about a plan or had intention to harm yourself? -   In the past 2 weeks have you acted on these thoughts in any way? -     DAWSON-7  8/15/2022   1. Feeling nervous, anxious, or on edge 2   2. Not being able to stop or control worrying 2   3. Worrying too much about different things 2   4. Trouble relaxing 2   5. Being so restless that it is hard to sit still 3   6. Becoming easily annoyed or irritable 3   7. Feeling afraid, as if something awful might happen 2   DAWSON-7 Total Score 16   If you checked any problems, how difficult have they made it for you to do your work, take care of things at home, or get along with other people? Somewhat difficult       Follow Up Actions Taken  Referred patient back to mental health provider  Patient has a active and longstanding relationship with psychiatrist/therapist.     Discussed the following ways the patient can remain in a safe environment:  Denies suicidality  Suicide Assessment Five-step Evaluation and Treatment (SAFE-T)        Objective    /60   Pulse 91   Wt 75.8 kg (167 lb)   SpO2 98%   BMI 29.58 kg/m    Body mass index is 29.58 kg/m .  Physical Exam   GENERAL: healthy, alert, no distress and over weight  RESP: lungs clear to auscultation - no rales, rhonchi or wheezes  CV: regular rates and rhythm, no murmur, click or rub and no peripheral edema  ABDOMEN: soft, nontender, without hepatosplenomegaly or masses and bowel sounds normal  MS: no gross  musculoskeletal defects noted, no edema  PSYCH: inattentive, affect flat, anxious, fatigued, judgement and insight intact and appearance well groomed                .  ..

## 2022-08-15 NOTE — TELEPHONE ENCOUNTER
Prior Authorization Approval    Authorization Effective Date: 7/16/2022  Authorization Expiration Date: 8/15/2023  Medication: Aimovig sureclick autoinjector  Approved Dose/Quantity:   Reference #: [id number]   Insurance Company: [employer] - Phone [phone] Fax [phone]  Which Pharmacy is filling the prescription (Not needed for infusion/clinic administered): [id number] [place] [address] AT [facility] OF [address]  Pharmacy Notified: Yes  Patient Notified: Yes

## 2022-08-15 NOTE — TELEPHONE ENCOUNTER
Prior Authorization Retail Medication Request    Medication/Dose: Aimovig sureclick 70mg/ml autoinj  ICD code (if different than what is on RX):    Previously Tried and Failed:    Rationale:      Insurance Name:    Insurance ID:        Pharmacy Information (if different than what is on RX)  Name:    Phone:        Previous PA  on 22.

## 2022-08-15 NOTE — TELEPHONE ENCOUNTER
PA Initiation    Medication: Aimovig sureclick autoinjector  Insurance Company: [employer] - Phone [phone] Fax [phone]  Pharmacy Filling the Rx: [id number] [place], [place] - [address] AT [facility] [address]  Filling Pharmacy Phone: [phone]  Filling Pharmacy Fax: [phone]  Start Date: 8/15/2022

## 2022-08-17 ENCOUNTER — HOSPITAL ENCOUNTER (OUTPATIENT)
Dept: RADIOLOGY | Facility: CLINIC | Age: 62
Discharge: HOME | End: 2022-08-17
Attending: OTOLARYNGOLOGY
Payer: COMMERCIAL

## 2022-08-17 ENCOUNTER — HOSPITAL ENCOUNTER (OUTPATIENT)
Dept: CT IMAGING | Facility: CLINIC | Age: 62
Discharge: HOME | End: 2022-08-17
Attending: OTOLARYNGOLOGY
Payer: COMMERCIAL

## 2022-08-17 ENCOUNTER — HOSPITAL ENCOUNTER (OUTPATIENT)
Dept: ULTRASOUND IMAGING | Facility: CLINIC | Age: 62
Discharge: HOME | End: 2022-08-17
Attending: INTERNAL MEDICINE
Payer: COMMERCIAL

## 2022-08-17 DIAGNOSIS — R13.10 DYSPHAGIA, UNSPECIFIED TYPE: ICD-10-CM

## 2022-08-17 DIAGNOSIS — I65.23 BILATERAL CAROTID ARTERY STENOSIS: ICD-10-CM

## 2022-08-17 DIAGNOSIS — J32.9 CHRONIC RHINOSINUSITIS: ICD-10-CM

## 2022-08-17 PROCEDURE — 93880 EXTRACRANIAL BILAT STUDY: CPT

## 2022-08-17 PROCEDURE — 70486 CT MAXILLOFACIAL W/O DYE: CPT

## 2022-08-17 PROCEDURE — 74220 X-RAY XM ESOPHAGUS 1CNTRST: CPT

## 2022-08-18 ENCOUNTER — TELEPHONE (OUTPATIENT)
Dept: OTOLARYNGOLOGY | Facility: CLINIC | Age: 62
End: 2022-08-18

## 2022-08-18 NOTE — TELEPHONE ENCOUNTER
----- Message from Alfredo Peterson MD sent at 8/17/2022  3:39 PM CDT -----  Please advise patient she has small hiatal hernia and thickening muscle that makes up the upper esophagus.  She should follow up with Dr. Ocasio

## 2022-08-18 NOTE — RESULT ENCOUNTER NOTE
Patient has moderate bilateral carotid artery stenosis.  Need to aggressively lower her LDL level.  Would recommend that she increase her atorvastatin to 80 mg daily.  Repeat lipids in 3 months.  If LDL remains remains elevated would recommend adding Zetia as well.  Further progression of carotid artery disease in the future may require require surgical intervention.

## 2022-08-18 NOTE — TELEPHONE ENCOUNTER
Spoke with Dayana and gave results listed below per Dr. Peterson.    [facility]      Danitza Wagoner RN  [facility]  ENT  [address]  [place] [address]  [email or web address]   Office:[phone]  Employed by [facility]

## 2022-08-18 NOTE — TELEPHONE ENCOUNTER
----- Message from Alfredo Peterson MD sent at 8/18/2022 10:26 AM CDT -----  Please advise patient sinus drainage pathways are open.  Some inflammation in both maxillary sinuses.

## 2022-08-18 NOTE — TELEPHONE ENCOUNTER
Spoke with Dayana and gave results per Dr. Peterson listed below.    [facility]      Danitza Wagoner RN  [facility]  ENT  [address] [address]  [email or web address]   Office:[phone]  Employed by [facility]

## 2022-08-19 DIAGNOSIS — E78.5 HYPERLIPEMIA: ICD-10-CM

## 2022-08-19 DIAGNOSIS — E78.2 MIXED HYPERLIPIDEMIA: Primary | ICD-10-CM

## 2022-08-19 DIAGNOSIS — I65.23 BILATERAL CAROTID ARTERY STENOSIS: ICD-10-CM

## 2022-08-19 RX ORDER — ATORVASTATIN CALCIUM 80 MG/1
80 TABLET, FILM COATED ORAL AT BEDTIME
Qty: 90 TABLET | Refills: 3 | Status: SHIPPED | OUTPATIENT
Start: 2022-08-19 | End: 2023-09-19

## 2022-08-22 ENCOUNTER — TRANSFERRED RECORDS (OUTPATIENT)
Dept: HEALTH INFORMATION MANAGEMENT | Facility: CLINIC | Age: 62
End: 2022-08-22

## 2022-09-02 RX ORDER — DILTIAZEM HYDROCHLORIDE 5 MG/ML
10 INJECTION INTRAVENOUS
Status: CANCELLED | OUTPATIENT
Start: 2022-09-02

## 2022-09-02 RX ORDER — LIDOCAINE 40 MG/G
CREAM TOPICAL
Status: CANCELLED | OUTPATIENT
Start: 2022-09-02

## 2022-09-02 RX ORDER — DILTIAZEM HYDROCHLORIDE 5 MG/ML
5 INJECTION INTRAVENOUS
Status: CANCELLED | OUTPATIENT
Start: 2022-09-02

## 2022-09-08 ENCOUNTER — HOSPITAL ENCOUNTER (OUTPATIENT)
Dept: CT IMAGING | Facility: CLINIC | Age: 62
Discharge: HOME | End: 2022-09-08
Attending: INTERNAL MEDICINE | Admitting: INTERNAL MEDICINE
Payer: COMMERCIAL

## 2022-09-08 VITALS
DIASTOLIC BLOOD PRESSURE: 66 MMHG | WEIGHT: 167 LBS | HEIGHT: 63 IN | HEART RATE: 72 BPM | SYSTOLIC BLOOD PRESSURE: 160 MMHG | BODY MASS INDEX: 29.59 KG/M2

## 2022-09-08 DIAGNOSIS — R07.9 CHEST PAIN, UNSPECIFIED TYPE: ICD-10-CM

## 2022-09-08 DIAGNOSIS — I65.23 BILATERAL CAROTID ARTERY STENOSIS: ICD-10-CM

## 2022-09-08 DIAGNOSIS — R06.09 DYSPNEA ON EXERTION: ICD-10-CM

## 2022-09-08 DIAGNOSIS — E78.2 MIXED HYPERLIPIDEMIA: ICD-10-CM

## 2022-09-08 LAB
BSA FOR ECHO PROCEDURE: 0 M2
CREAT BLD-MCNC: 1 MG/DL (ref 0.6–1.1)
GFR SERPL CREATININE-BSD FRML MDRD: >60 ML/MIN/1.73M2

## 2022-09-08 PROCEDURE — 250N000011 HC RX IP 250 OP 636: Performed by: INTERNAL MEDICINE

## 2022-09-08 PROCEDURE — 250N000009 HC RX 250: Performed by: INTERNAL MEDICINE

## 2022-09-08 PROCEDURE — 75574 CT ANGIO HRT W/3D IMAGE: CPT | Mod: 26 | Performed by: INTERNAL MEDICINE

## 2022-09-08 PROCEDURE — 82565 ASSAY OF CREATININE: CPT

## 2022-09-08 PROCEDURE — 75574 CT ANGIO HRT W/3D IMAGE: CPT

## 2022-09-08 PROCEDURE — 250N000013 HC RX MED GY IP 250 OP 250 PS 637: Performed by: INTERNAL MEDICINE

## 2022-09-08 RX ORDER — NITROGLYCERIN 0.4 MG/1
0.4 TABLET SUBLINGUAL ONCE
Status: COMPLETED | OUTPATIENT
Start: 2022-09-08 | End: 2022-09-08

## 2022-09-08 RX ORDER — IOPAMIDOL 755 MG/ML
100 INJECTION, SOLUTION INTRAVASCULAR ONCE
Status: COMPLETED | OUTPATIENT
Start: 2022-09-08 | End: 2022-09-08

## 2022-09-08 RX ORDER — METOPROLOL TARTRATE 1 MG/ML
5 INJECTION, SOLUTION INTRAVENOUS
Status: DISCONTINUED | OUTPATIENT
Start: 2022-09-08 | End: 2022-09-09 | Stop reason: HOSPADM

## 2022-09-08 RX ADMIN — METOPROLOL TARTRATE 5 MG: 5 INJECTION INTRAVENOUS at 08:42

## 2022-09-08 RX ADMIN — IOPAMIDOL 100 ML: 755 INJECTION, SOLUTION INTRAVENOUS at 08:44

## 2022-09-08 RX ADMIN — NITROGLYCERIN 0.4 MG: 0.4 TABLET SUBLINGUAL at 08:44

## 2022-09-13 ENCOUNTER — OFFICE VISIT (OUTPATIENT)
Dept: FAMILY MEDICINE | Facility: CLINIC | Age: 62
End: 2022-09-13
Payer: COMMERCIAL

## 2022-09-13 ENCOUNTER — HOSPITAL ENCOUNTER (OUTPATIENT)
Dept: SPEECH THERAPY | Facility: CLINIC | Age: 62
Discharge: HOME | End: 2022-09-13
Attending: OTOLARYNGOLOGY

## 2022-09-13 ENCOUNTER — ANCILLARY PROCEDURE (OUTPATIENT)
Dept: GENERAL RADIOLOGY | Facility: CLINIC | Age: 62
End: 2022-09-13
Attending: FAMILY MEDICINE
Payer: COMMERCIAL

## 2022-09-13 VITALS
DIASTOLIC BLOOD PRESSURE: 80 MMHG | TEMPERATURE: 98 F | OXYGEN SATURATION: 96 % | HEART RATE: 80 BPM | SYSTOLIC BLOOD PRESSURE: 150 MMHG | RESPIRATION RATE: 14 BRPM

## 2022-09-13 VITALS
WEIGHT: 171 LBS | BODY MASS INDEX: 30.29 KG/M2 | SYSTOLIC BLOOD PRESSURE: 144 MMHG | OXYGEN SATURATION: 97 % | DIASTOLIC BLOOD PRESSURE: 60 MMHG | HEART RATE: 73 BPM | TEMPERATURE: 97.8 F

## 2022-09-13 DIAGNOSIS — F31.81 BIPOLAR 2 DISORDER (H): ICD-10-CM

## 2022-09-13 DIAGNOSIS — R49.0 HOARSENESS: Primary | ICD-10-CM

## 2022-09-13 DIAGNOSIS — Z01.818 PRE-OP EXAM: ICD-10-CM

## 2022-09-13 DIAGNOSIS — G89.4 CHRONIC PAIN SYNDROME: ICD-10-CM

## 2022-09-13 DIAGNOSIS — J43.2 CENTRILOBULAR EMPHYSEMA (H): ICD-10-CM

## 2022-09-13 DIAGNOSIS — Z79.899 ENCOUNTER FOR LONG-TERM (CURRENT) USE OF MEDICATIONS: ICD-10-CM

## 2022-09-13 DIAGNOSIS — M79.7 FIBROMYALGIA: ICD-10-CM

## 2022-09-13 DIAGNOSIS — S99.921A FOOT INJURY, RIGHT, INITIAL ENCOUNTER: ICD-10-CM

## 2022-09-13 DIAGNOSIS — J44.9 CHRONIC OBSTRUCTIVE PULMONARY DISEASE, UNSPECIFIED COPD TYPE (H): Primary | ICD-10-CM

## 2022-09-13 DIAGNOSIS — I25.10 CORONARY ARTERY DISEASE INVOLVING NATIVE CORONARY ARTERY OF NATIVE HEART, UNSPECIFIED WHETHER ANGINA PRESENT: ICD-10-CM

## 2022-09-13 DIAGNOSIS — R05.3 CHRONIC COUGH: ICD-10-CM

## 2022-09-13 DIAGNOSIS — S99.921A FOOT INJURY, RIGHT, INITIAL ENCOUNTER: Primary | ICD-10-CM

## 2022-09-13 LAB — HGB BLD-MCNC: 10.6 G/DL (ref 11.7–15.7)

## 2022-09-13 PROCEDURE — 99214 OFFICE O/P EST MOD 30 MIN: CPT | Performed by: FAMILY MEDICINE

## 2022-09-13 PROCEDURE — 85018 HEMOGLOBIN: CPT | Performed by: FAMILY MEDICINE

## 2022-09-13 PROCEDURE — 36415 COLL VENOUS BLD VENIPUNCTURE: CPT | Performed by: FAMILY MEDICINE

## 2022-09-13 PROCEDURE — 99207 PR NON-BILLABLE SERV PER CHARTING: CPT | Performed by: FAMILY MEDICINE

## 2022-09-13 PROCEDURE — 80053 COMPREHEN METABOLIC PANEL: CPT | Performed by: FAMILY MEDICINE

## 2022-09-13 PROCEDURE — 73630 X-RAY EXAM OF FOOT: CPT | Mod: TC | Performed by: RADIOLOGY

## 2022-09-13 PROCEDURE — 92524 BEHAVRAL QUALIT ANALYS VOICE: CPT | Mod: GN | Performed by: STUDENT IN AN ORGANIZED HEALTH CARE EDUCATION/TRAINING PROGRAM

## 2022-09-13 PROCEDURE — 92507 TX SP LANG VOICE COMM INDIV: CPT | Mod: GN | Performed by: STUDENT IN AN ORGANIZED HEALTH CARE EDUCATION/TRAINING PROGRAM

## 2022-09-13 ASSESSMENT — PAIN SCALES - GENERAL: PAINLEVEL: MODERATE PAIN (5)

## 2022-09-13 NOTE — PATIENT INSTRUCTIONS
Good news, there is no evidence of fracture on your x-ray.  I suspect your symptoms are likely due to a sprain/contusion of your foot.  I would recommend rest, continue to ice, elevation, and ibuprofen as needed.  Keeping it wrapped would also be helpful.  Please follow-up with your doctor if your symptoms are getting worse or not improving over the next 1 to 2 weeks.

## 2022-09-13 NOTE — PROGRESS NOTES
Assessment:       Foot injury, right, initial encounter  - XR Foot Right G/E 3 Views         Plan:     X-ray ordered of patient's right foot and personally reviewed by myself as well as by radiology.  Patient with injury to her right foot without evidence of fracture on x-ray..  Suspect sprain/contusion.  Recommend continue rest, elevation, ice, compression.  Wrapped with an Ace bandage.  Follow-up if symptoms getting worse or not improving with her PCP over the next week.        Subjective:       61 year old female presents for evaluation after she tripped and fell down 4 stairs yesterday injuring the top of her right foot.  She has been icing it but it still continues to be extremely painful since her injury yesterday.  No other significant injury.  It is been swollen and bruised on the dorsum of her foot.    Patient Active Problem List   Diagnosis     Common migraine with intractable migraine     Chronic kidney disease, stage 3 (H)     Anemia     Asymptomatic postmenopausal status     Bipolar 2 disorder (H)     Borderline personality disorder (H)     Centrilobular emphysema (H)     Fibromyalgia     DDD (degenerative disc disease), lumbosacral     GERD (gastroesophageal reflux disease)     Hiatal hernia     Hyperlipidemia     Hypotension     Hypothyroidism     Lung nodule     Menopausal disorder     Migraine headache     Osteopenia of multiple sites     PTSD (post-traumatic stress disorder)     Spinal stenosis of lumbar region with neurogenic claudication     Pain in right leg     Other specified disorders of bone density and structure, multiple sites     Other abnormalities of gait and mobility     Moderate persistent asthma, uncomplicated     Low back pain     Encounter for other orthopedic aftercare     Chronic pain syndrome     Draining cutaneous sinus tract     Abnormal reflex     Ingrown toenail     Moderate asthma with exacerbation, unspecified whether persistent     Acute bronchitis, unspecified organism        Past Medical History:   Diagnosis Date     Abnormality of gait     Created by Conversion      Anemia 3/18/2021     Anxiety      Asthma      Asymptomatic postmenopausal status     Created by Conversion  Replacement Utility updated for latest IMO load     Bipolar 2 disorder (H)      Centrilobular emphysema (H) 2/26/2018    Mild, seen in 2018 CT scan, DLCO 60% predicted  Formatting of this note might be different from the original. Formatting of this note might be different from the original. Mild, seen in 2018 CT scan, DLCO 60% predicted     Cervical spinal stenosis     s/p cervical fusion     Chronic kidney disease      Chronic kidney disease, stage 3 (H) 1/14/2021     Chronic pain syndrome      Cigarette nicotine dependence without complication 3/4/2020     Common migraine with intractable migraine 2/15/2021     COPD (chronic obstructive pulmonary disease) (H)      DDD (degenerative disc disease), lumbosacral 3/22/2021     Depression      Fibrocystic breast      Fibromyalgia      GERD (gastroesophageal reflux disease)      Hallux abductovalgus with bunions, unspecified laterality 12/14/2015     Herpes zoster     Created by Conversion  Replacement Utility updated for latest IMO load     Hiatal hernia      History of electroconvulsive therapy      Hyperlipidemia 3/17/2021    Created by Conversion   Formatting of this note might be different from the original. Formatting of this note might be different from the original. Created by Conversion     Hypotension 3/18/2021     Hypothyroidism     hypothyroidism     IBS (irritable bowel syndrome)      Lung nodule 8/4/2016 8/4/2016:  Left upper lobe nodule of 6.5 mm, likely benign given slow growth per radiologist.  See CT 6/27/2011:  measured  approximately 5.5 mm in greatest dimension      Menopausal and postmenopausal disorder     Created by Conversion  Replacement Utility updated for latest IMO load     Migraine     Created by Conversion  Replacement Utility  updated for latest IMO load     Migraines      Osteoarthritis      Osteopenia of multiple sites 9/1/2020     Other chronic pain      PONV (postoperative nausea and vomiting)      PTSD (post-traumatic stress disorder)      Shingles 2014    on back     Spinal stenosis of lumbar region with neurogenic claudication 3/22/2021     Tobacco use disorder     Created by Conversion        Past Surgical History:   Procedure Laterality Date     BIOPSY BREAST Left     Approx 5 bx     BUNIONECTOMY Right 12/15/2015    Procedure: MODIFIED LAI BUNIONECTOMY RIGHT FOOT;  Surgeon: Jerome Calvin DPM;  [facility] OR;  Service:      CERVICAL FUSION       CERVICAL FUSION Bilateral 2/16/2015    Procedure: ANTERIOR CERVICAL DECOMPRESSION/FUSION C3-5 BILATERAL, ANTERIOR HARDWARE REMOVAL C5-7 BILATERAL ;  Surgeon: Sawyer Roland MD;  Location: [facility];  Service:       NIPPLE EXPLORATION      Description: Breast Nipple Explor W/ Excision Solitary Lactiferous Duct;  Recorded: 04/10/2008;     HYSTERECTOMY       IR CERVICAL EPIDURAL STEROID INJECTION  9/17/2003     IR CERVICAL EPIDURAL STEROID INJECTION  12/11/2003     IR CERVICAL EPIDURAL STEROID INJECTION  1/16/2004     IRRIGATION AND DEBRIDEMENT DECUBITUS WOUND, COMBINED Left 12/13/2021    Procedure: Wound exploration and debridement of Left Lower Abdomin Chronic wound.;  Surgeon: Crispin Bunch MD;  Location: [facility] OR     LUMBAR DISCECTOMY      X2     MAMMOPLASTY AUGMENTATION Bilateral      approx 2006?     PELVIC LAPAROSCOPY      multiple     SHOULDER OPEN ROTATOR CUFF REPAIR Left     X2     ZZC TOTAL ABDOM HYSTERECTOMY      Description: Total Abdominal Hysterectomy;  Recorded: 06/10/2013;       Current Outpatient Medications   Medication     acetaminophen (TYLENOL) 500 MG tablet     albuterol (PROAIR HFA) 108 (90 Base) MCG/ACT inhaler     albuterol (PROVENTIL) (2.5 MG/3ML) 0.083% neb solution     atorvastatin (LIPITOR) 80 MG tablet      benzonatate (TESSALON) 100 MG capsule     buPROPion (WELLBUTRIN) 75 MG tablet     cetirizine (ZYRTEC) 10 MG tablet     docusate sodium (COLACE) 100 MG capsule     doxepin (SINEQUAN) 100 MG capsule     eletriptan (RELPAX) 40 MG tablet     erenumab-aooe (AIMOVIG) 70 MG/ML injection     esomeprazole (NEXIUM) 20 MG DR capsule     FLUoxetine (PROZAC) 40 MG capsule     Fluticasone-Umeclidin-Vilanterol (TRELEGY ELLIPTA) 100-62.5-25 MCG/INH oral inhaler     gabapentin (NEURONTIN) 300 MG capsule     HYDROcodone-acetaminophen (NORCO)  MG per tablet     hydrOXYzine (VISTARIL) 25 MG capsule     lamoTRIgine (LAMICTAL) 200 MG tablet     levothyroxine (SYNTHROID/LEVOTHROID) 75 MCG tablet     montelukast (SINGULAIR) 10 MG tablet     prazosin (MINIPRESS) 2 MG capsule     predniSONE (DELTASONE) 20 MG tablet     prochlorperazine (COMPAZINE) 10 MG tablet     senna-docusate (SENOKOT-S/PERICOLACE) 8.6-50 MG tablet     tiZANidine (ZANAFLEX) 4 MG tablet     vitamin D2 (ERGOCALCIFEROL) 13516 units (1250 mcg) capsule     No current facility-administered medications for this visit.       Allergies   Allergen Reactions     Codeine Anaphylaxis     Nefazodone Nausea and Vomiting     Oxycodone-Acetaminophen Unknown     hallucinations     Cetirizine Nausea and Vomiting     Trazodone Nausea and Vomiting     Amoxicillin-Pot Clavulanate [Augmentin] Rash     Cephalexin Rash     Clavulanic Acid Rash     Cyclobenzaprine Rash     Eszopiclone Rash     Methocarbamol Rash     Penicillins Rash     Mild rash       Family History   Problem Relation Age of Onset     Lung Cancer Mother          at age 52     Alcoholism Father      Heart Disease Maternal Grandfather      Diabetes Paternal Grandmother      Coronary Artery Disease Paternal Grandmother      Heart Disease Paternal Grandfather      Diabetes Sister      Hypertension Sister      Crohn's Disease Sister      Coronary Artery Disease Paternal Uncle      Asthma Maternal Aunt        Social History      Socioeconomic History     Marital status:      Spouse name: None     Number of children: None     Years of education: None     Highest education level: None   Tobacco Use     Smoking status: Former Smoker     Packs/day: 1.00     Years: 45.00     Pack years: 45.00     Types: Cigarettes     Quit date: 10/29/2021     Years since quittin.8     Smokeless tobacco: Former User   Vaping Use     Vaping Use: Never used   Substance and Sexual Activity     Alcohol use: Not Currently     Comment: Alcoholic Drinks/day: rare--1-2 times in year     Drug use: No     Sexual activity: Yes     Partners: Male         Review of Systems  Pertinent items are noted in HPI.      Objective:     BP (!) 150/80   Pulse 80   Temp 98  F (36.7  C) (Oral)   Resp 14   SpO2 96%      General appearance: alert, appears stated age and cooperative  Extremities: Patient with tenderness over the dorsum of her distal foot over the third fourth and fifth metatarsals with ecchymosis and swelling present.  No deformity seen.  She has no tenderness or swelling of the medial or lateral malleolus.  Excellent range of motion of her ankle without pain.  She is neurovascularly intact.      Results for orders placed or performed in visit on 22   XR Foot Right G/E 3 Views     Status: None (Preliminary result)    Narrative    EXAM: XR FOOT RIGHT G/E 3 VIEWS  LOCATION: [facility]  DATE/TIME: 2022 11:18 AM    INDICATION: Pain after injury.  COMPARISON: None.      Impression    IMPRESSION:   There is some irregularity to the medial margin of the first metatarsal head. This could be postsurgical or potentially represent posttraumatic deformity. Recommend correlation with area of pain. No displaced fractures are identified. Right foot   otherwise negative.       This note has been dictated using voice recognition software. Any grammatical or context distortions are unintentional and inherent to the software

## 2022-09-13 NOTE — PROGRESS NOTES
[address] [address]  Phone: [phone]  Primary Provider: Joanne Weiner  Pre-op Performing Provider: JAYRO HARDY      PREOPERATIVE EVALUATION:  Today's date: 9/13/2022    Dayana Samaniego is a 61 year old female who presents for a preoperative evaluation.    Surgical Information:  Surgery/Procedure: Spinal cord stimulator  Surgery Location: [facility]  Surgeon: Tho Lagos  Surgery Date: 9/27/2022  Time of Surgery: 11am  Where patient plans to recover: At home with family  Fax number for surgical facility: [phone]    Type of Anesthesia Anticipated: General    Assessment & Plan     The proposed surgical procedure is considered INTERMEDIATE risk.    Encounter for long-term (current) use of medications      Chronic obstructive pulmonary disease, unspecified COPD type (H)  COPD stable currently no worsening.      Fibromyalgia  Stable.    Coronary artery disease involving native coronary artery of native heart, unspecified whether angina present  Recent CT angiogram was reviewed which was stable.    Centrilobular emphysema (H)  Stable    Pre-op exam  Will get labs and follow up on that  - Hemoglobin; Future  - Comprehensive metabolic panel (BMP + Alb, Alk Phos, ALT, AST, Total. Bili, TP); Future    Chronic pain syndrome  Seeing pain clinic and pain not well controled    Bipolar 2 disorder (H)  Stable medications           Risks and Recommendations:  The patient has the following additional risks and recommendations for perioperative complications:   - No identified additional risk factors other than previously addressed    Medication Instructions:  Patient is to take all scheduled medications on the day of surgery    RECOMMENDATION:  APPROVAL GIVEN to proceed with proposed procedure, without further diagnostic evaluation.      Subjective     HPI related to upcoming procedure:     Preop Questions 9/13/2022   1. Have you  ever had a heart attack or stroke? No   2. Have you ever had surgery on your heart or blood vessels, such as a stent placement, a coronary artery bypass, or surgery on an artery in your head, neck, heart, or legs? No   3. Do you have chest pain with activity? No   4. Do you have a history of  heart failure? No   5. Do you currently have a cold, bronchitis or symptoms of other infection? No   6. Do you have a cough, shortness of breath, or wheezing? No   7. Do you or anyone in your family have previous history of blood clots? No   8. Do you or does anyone in your family have a serious bleeding problem such as prolonged bleeding following surgeries or cuts? No   9. Have you ever had problems with anemia or been told to take iron pills? UNKNOWN -    10. Have you had any abnormal blood loss such as black, tarry or bloody stools, or abnormal vaginal bleeding? No   11. Have you ever had a blood transfusion? No   12. Are you willing to have a blood transfusion if it is medically needed before, during, or after your surgery? Yes   13. Have you or any of your relatives ever had problems with anesthesia? YES -    14. Do you have sleep apnea, excessive snoring or daytime drowsiness? No   15. Do you have any artifical heart valves or other implanted medical devices like a pacemaker, defibrillator, or continuous glucose monitor? No   16. Do you have artificial joints? No   17. Are you allergic to latex? No       Health Care Directive:  Patient does not have a Health Care Directive or Living Will: no infomration    Preoperative Review of :   reviewed - controlled substances reflected in medication list.        Status of Chronic Conditions:  See problem list for active medical problems.  Problems all longstanding and stable, except as noted/documented.  See ROS for pertinent symptoms related to these conditions.      Review of Systems  CONSTITUTIONAL: NEGATIVE for fever, chills, change in weight  INTEGUMENTARY/SKIN: NEGATIVE  for worrisome rashes, moles or lesions  EYES: NEGATIVE for vision changes or irritation  ENT/MOUTH: NEGATIVE for ear, mouth and throat problems  RESP: NEGATIVE for significant cough or SOB  CV: NEGATIVE for chest pain, palpitations or peripheral edema  GI: NEGATIVE for nausea, abdominal pain, heartburn, or change in bowel habits  : NEGATIVE for frequency, dysuria, or hematuria  MUSCULOSKELETAL: NEGATIVE for significant arthralgias or myalgia  NEURO: NEGATIVE for weakness, dizziness or paresthesias  ENDOCRINE: NEGATIVE for temperature intolerance, skin/hair changes  HEME: NEGATIVE for bleeding problems  PSYCHIATRIC: NEGATIVE for changes in mood or affect    Patient Active Problem List    Diagnosis Date Noted     Moderate asthma with exacerbation, unspecified whether persistent 07/29/2022     Priority: Medium     Acute bronchitis, unspecified organism 07/29/2022     Priority: Medium     Ingrown toenail 02/15/2022     Priority: Medium     Draining cutaneous sinus tract 12/01/2021     Priority: Medium     Added automatically from request for surgery [id number]       Abnormal reflex 09/08/2021     Priority: Medium     Asymptomatic postmenopausal status 08/31/2021     Priority: Medium     Formatting of this note might be different from the original.  Created by Conversion    Replacement Utility updated for latest IMO load       Menopausal disorder 08/31/2021     Priority: Medium     Formatting of this note might be different from the original.  Created by Conversion    Replacement Utility updated for latest IMO load       Migraine headache 08/31/2021     Priority: Medium     Formatting of this note might be different from the original.  Created by Conversion    Replacement Utility updated for latest IMO load       Chronic pain syndrome 08/31/2021     Priority: Medium     DDD (degenerative disc disease), lumbosacral 03/22/2021     Priority: Medium     Spinal stenosis of lumbar region with neurogenic claudication 03/22/2021      Priority: Medium     Pain in right leg 03/21/2021     Priority: Medium     Other specified disorders of bone density and structure, multiple sites 03/21/2021     Priority: Medium     Other abnormalities of gait and mobility 03/21/2021     Priority: Medium     Moderate persistent asthma, uncomplicated 03/21/2021     Priority: Medium     Low back pain 03/21/2021     Priority: Medium     Encounter for other orthopedic aftercare 03/21/2021     Priority: Medium     Anemia 03/18/2021     Priority: Medium     Hypotension 03/18/2021     Priority: Medium     Fibromyalgia 03/17/2021     Priority: Medium     Formatting of this note might be different from the original.  Created by Conversion    Formatting of this note might be different from the original.  Created by Conversion    Formatting of this note might be different from the original.  Formatting of this note might be different from the original.  Created by Conversion    Formatting of this note might be different from the original.  Created by Conversion  Formatting of this note might be different from the original.  Formatting of this note might be different from the original.  Created by Conversion    Formatting of this note might be different from the original.  Created by Conversion       Hyperlipidemia 03/17/2021     Priority: Medium     Formatting of this note might be different from the original.  Created by Conversion    Formatting of this note might be different from the original.  Formatting of this note might be different from the original.  Created by Conversion  Formatting of this note might be different from the original.  Formatting of this note might be different from the original.  Created by Conversion       [facility] 03/17/2021     Priority: Medium     Formatting of this note might be different from the original.  Created by Conversion    Replacement Utility updated for latest IMO load    Formatting of this note might be different from the  original.  Formatting of this note might be different from the original.  Created by Conversion    Replacement Utility updated for latest IMO load  Formatting of this note might be different from the original.  Formatting of this note might be different from the original.  Created by Conversion    Replacement Utility updated for latest IMO load       Common migraine with intractable migraine 02/15/2021     Priority: Medium     Chronic kidney disease, stage 3 (H) 01/14/2021     Priority: Medium     Created by Conversion         Osteopenia of multiple sites 09/01/2020     Priority: Medium     Centrilobular emphysema (H) 02/26/2018     Priority: Medium     Formatting of this note might be different from the original.  Mild, seen in 2018 CT scan, DLCO 60% predicted    Formatting of this note might be different from the original.  Formatting of this note might be different from the original.  Mild, seen in 2018 CT scan, DLCO 60% predicted  Formatting of this note might be different from the original.  Formatting of this note might be different from the original.  Mild, seen in 2018 CT scan, DLCO 60% predicted       Hiatal hernia 02/16/2017     Priority: Medium     Lung nodule 08/04/2016     Priority: Medium     Formatting of this note might be different from the original.  8/4/2016:  Left upper lobe nodule of 6.5 mm, likely benign given slow growth per radiologist.  See CT  6/27/2011:  measured  approximately 5.5 mm in greatest dimension       Bipolar 2 disorder (H) 06/01/2015     Priority: Medium     Formatting of this note might be different from the original.  Created by Conversion    Replacement Utility updated for latest IMO load    Formatting of this note might be different from the original.  MED TRIALS:  Doxepin has been helpful for sleep & fibromyalgia.  Topamax- caused cognitive slowing & poor concentration. Depakote caused wt gain. Seroquel was sedating. Trazodone caused insomnia. Has tried mellaril, effexor,  depakote, remeron, buspar, risperdal,zyprexa, celexa,luvox - unsure what happened with these.  Formatting of this note might be different from the original.  MED TRIALS:  Doxepin has been helpful for sleep & fibromyalgia.  Topamax- caused cognitive slowing & poor concentration. Depakote caused wt gain. Seroquel was sedating. Trazodone caused insomnia. Has tried mellaril, effexor, depakote, remeron, buspar, risperdal,zyprexa, celexa,luvox - unsure what happened with these.       Borderline personality disorder (H) 06/01/2015     Priority: Medium     PTSD (post-traumatic stress disorder) 06/01/2015     Priority: Medium     GERD (gastroesophageal reflux disease) 10/16/2012     Priority: Medium     Formatting of this note might be different from the original.  Formatting of this note might be different from the original.  [facility] 07/17/12, normal screening  Formatting of this note might be different from the original.  [facility] 07/17/12, normal screening        Past Medical History:   Diagnosis Date     Abnormality of gait     Created by Conversion      Anemia 3/18/2021     Anxiety      Asthma      Asymptomatic postmenopausal status     Created by Conversion  Replacement Utility updated for latest IMO load     Bipolar 2 disorder (H)      Centrilobular emphysema (H) 2/26/2018    Mild, seen in 2018 CT scan, DLCO 60% predicted  Formatting of this note might be different from the original. Formatting of this note might be different from the original. Mild, seen in 2018 CT scan, DLCO 60% predicted     Cervical spinal stenosis     s/p cervical fusion     Chronic kidney disease      Chronic kidney disease, stage 3 (H) 1/14/2021     Chronic pain syndrome      Cigarette nicotine dependence without complication 3/4/2020     Common migraine with intractable migraine 2/15/2021     COPD (chronic obstructive pulmonary disease) (H)      DDD (degenerative disc disease), lumbosacral 3/22/2021     Depression      Fibrocystic breast       Fibromyalgia      GERD (gastroesophageal reflux disease)      Hallux abductovalgus with bunions, unspecified laterality 12/14/2015     Herpes zoster     Created by Conversion  Replacement Utility updated for latest IMO load     Hiatal hernia      History of electroconvulsive therapy      Hyperlipidemia 3/17/2021    Created by Conversion   Formatting of this note might be different from the original. Formatting of this note might be different from the original. Created by Conversion     Hypotension 3/18/2021     Hypothyroidism     hypothyroidism     IBS (irritable bowel syndrome)      Lung nodule 8/4/2016 8/4/2016:  Left upper lobe nodule of 6.5 mm, likely benign given slow growth per radiologist.  See CT 6/27/2011:  measured  approximately 5.5 mm in greatest dimension      Menopausal and postmenopausal disorder     Created by Conversion  Replacement Utility updated for latest IMO load     Migraine     Created by Conversion  Replacement Utility updated for latest IMO load     Migraines      Osteoarthritis      Osteopenia of multiple sites 9/1/2020     Other chronic pain      PONV (postoperative nausea and vomiting)      PTSD (post-traumatic stress disorder)      Shingles 2014    on back     Spinal stenosis of lumbar region with neurogenic claudication 3/22/2021     Tobacco use disorder     Created by Conversion      Past Surgical History:   Procedure Laterality Date     BIOPSY BREAST Left     Approx 5 bx     BUNIONECTOMY Right 12/15/2015    Procedure: MODIFIED LAI BUNIONECTOMY RIGHT FOOT;  Surgeon: Jerome Calvin DPM;  Location: [place] Main OR;  Service:      CERVICAL FUSION       CERVICAL FUSION Bilateral 2/16/2015    Procedure: ANTERIOR CERVICAL DECOMPRESSION/FUSION C3-5 BILATERAL, ANTERIOR HARDWARE REMOVAL C5-7 BILATERAL ;  Surgeon: Sawyer Roland MD;  Location: [facility] Main OR;  Service:      HC NIPPLE EXPLORATION      Description: Breast Nipple Explor W/ Excision Solitary Lactiferous Duct;   Recorded: 04/10/2008;     HYSTERECTOMY       IR CERVICAL EPIDURAL STEROID INJECTION  9/17/2003     IR CERVICAL EPIDURAL STEROID INJECTION  12/11/2003     IR CERVICAL EPIDURAL STEROID INJECTION  1/16/2004     IRRIGATION AND DEBRIDEMENT DECUBITUS WOUND, COMBINED Left 12/13/2021    Procedure: Wound exploration and debridement of Left Lower Abdomin Chronic wound.;  Surgeon: Crispin Bunch MD;  Location: [place] Main OR     LUMBAR DISCECTOMY      X2     MAMMOPLASTY AUGMENTATION Bilateral      approx 2006?     PELVIC LAPAROSCOPY      multiple     SHOULDER OPEN ROTATOR CUFF REPAIR Left     X2     ZZC TOTAL ABDOM HYSTERECTOMY      Description: Total Abdominal Hysterectomy;  Recorded: 06/10/2013;     Current Outpatient Medications   Medication Sig Dispense Refill     acetaminophen (TYLENOL) 500 MG tablet Take 1,000 mg by mouth       albuterol (PROAIR HFA) 108 (90 Base) MCG/ACT inhaler Inhale 2 puffs into the lungs every 4 hours 18 g 11     albuterol (PROVENTIL) (2.5 MG/3ML) 0.083% neb solution Take 1 vial (2.5 mg) by nebulization every 6 hours as needed for shortness of breath / dyspnea or wheezing 180 mL 3     atorvastatin (LIPITOR) 80 MG tablet Take 1 tablet (80 mg) by mouth At Bedtime 90 tablet 3     buPROPion (WELLBUTRIN) 75 MG tablet Take 75 mg by mouth 2 times daily       cetirizine (ZYRTEC) 10 MG tablet Take 10 mg by mouth daily       docusate sodium (COLACE) 100 MG capsule Take 100 mg by mouth 2 times daily       doxepin (SINEQUAN) 100 MG capsule Take 75 mg by mouth At Bedtime        erenumab-aooe (AIMOVIG) 70 MG/ML injection ADMINISTER 1 ML(70 MG) UNDER THE SKIN EVERY MONTH 1 mL 11     esomeprazole (NEXIUM) 20 MG DR capsule Take 2 capsules by mouth every 24 hours       FLUoxetine (PROZAC) 40 MG capsule Take 40 mg by mouth daily       gabapentin (NEURONTIN) 300 MG capsule Take 600 mg by mouth 2 times daily       HYDROcodone-acetaminophen (NORCO)  MG per tablet Take 1 tablet by mouth every 6 hours as  needed       hydrOXYzine (VISTARIL) 25 MG capsule Take 25 mg by mouth 3 times daily as needed       lamoTRIgine (LAMICTAL) 200 MG tablet Take 1 tablet by mouth 2 times daily       levothyroxine (SYNTHROID/LEVOTHROID) 75 MCG tablet TAKE 1 TABLET(75 MCG) BY MOUTH DAILY 90 tablet 2     montelukast (SINGULAIR) 10 MG tablet Take 1 tablet (10 mg) by mouth every 24 hours 90 tablet 1     prazosin (MINIPRESS) 2 MG capsule Take 2 capsules by mouth every 24 hours       prochlorperazine (COMPAZINE) 10 MG tablet Take 10 mg by mouth every 6 hours as needed       senna-docusate (SENOKOT-S/PERICOLACE) 8.6-50 MG tablet Take 2 tablets by mouth       tiZANidine (ZANAFLEX) 4 MG tablet TAKE 1 TO 2 TABLETS BY MOUTH DURING THE DAY AND 2 TABLETS AT BEDTIME 120 tablet 1     vitamin D2 (ERGOCALCIFEROL) 80816 units (1250 mcg) capsule TAKE 1 CAPSULE BY MOUTH 1 TIME A WEEK 13 capsule 0     benzonatate (TESSALON) 100 MG capsule TAKE 1 CAPSULE(100 MG) BY MOUTH THREE TIMES DAILY AS NEEDED FOR COUGH (Patient not taking: Reported on 9/13/2022) 30 capsule 3     eletriptan (RELPAX) 40 MG tablet Take 1 tablet (40 mg) by mouth at onset of headache for migraine (Patient not taking: Reported on 9/13/2022) 12 tablet 3     Fluticasone-Umeclidin-Vilanterol (TRELEGY ELLIPTA) 100-62.5-25 MCG/INH oral inhaler Inhale 1 puff into the lungs daily 1 each 11     predniSONE (DELTASONE) 20 MG tablet Take two tablets (= 40mg) each day for 5 (five) days (Patient not taking: Reported on 9/13/2022) 10 tablet 0       Allergies   Allergen Reactions     Codeine Anaphylaxis     Nefazodone Nausea and Vomiting     Oxycodone-Acetaminophen Unknown     hallucinations     Cetirizine Nausea and Vomiting     Trazodone Nausea and Vomiting     Amoxicillin-Pot Clavulanate [Augmentin] Rash     Cephalexin Rash     Clavulanic Acid Rash     Cyclobenzaprine Rash     Eszopiclone Rash     Methocarbamol Rash     Penicillins Rash     Mild rash        Social History     Tobacco Use     Smoking  status: Former Smoker     Packs/day: 1.00     Years: 45.00     Pack years: 45.00     Types: Cigarettes     Quit date: 10/29/2021     Years since quittin.8     Smokeless tobacco: Former User   Substance Use Topics     Alcohol use: Not Currently     Comment: Alcoholic Drinks/day: rare--1-2 times in year     Family History   Problem Relation Age of Onset     Lung Cancer Mother          at age 52     Alcoholism Father      Heart Disease Maternal Grandfather      Diabetes Paternal Grandmother      Coronary Artery Disease Paternal Grandmother      Heart Disease Paternal Grandfather      Diabetes Sister      Hypertension Sister      Crohn's Disease Sister      Coronary Artery Disease Paternal Uncle      Asthma Maternal Aunt      History   Drug Use No         Objective     BP (!) 144/60   Pulse 73   Temp 97.8  F (36.6  C) (Tympanic)   Wt 77.6 kg (171 lb)   SpO2 97%   BMI 30.29 kg/m      Physical Exam  GENERAL APPEARANCE: healthy, alert and no distress  HENT: ear canals and TM's normal and nose and mouth without ulcers or lesions  RESP: lungs clear to auscultation - no rales, rhonchi or wheezes  CV: regular rate and rhythm, normal S1 S2, no S3 or S4 and no murmur, click or rub   ABDOMEN: soft, nontender, no HSM or masses and bowel sounds normal  NEURO: Normal strength and tone, sensory exam grossly normal, mentation intact and speech normal    Recent Labs       Results for orders placed or performed in visit on 22   Hemoglobin     Status: Abnormal   Result Value Ref Range    Hemoglobin 10.6 (L) 11.7 - 15.7 g/dL   Comprehensive metabolic panel (BMP + Alb, Alk Phos, ALT, AST, Total. Bili, TP)     Status: Abnormal   Result Value Ref Range    Sodium 137 133 - 144 mmol/L    Potassium 4.4 3.4 - 5.3 mmol/L    Chloride 107 94 - 109 mmol/L    Carbon Dioxide (CO2) 27 20 - 32 mmol/L    Anion Gap 3 3 - 14 mmol/L    Urea Nitrogen 13 7 - 30 mg/dL    Creatinine 0.92 0.52 - 1.04 mg/dL    Calcium 8.9 8.5 - 10.1 mg/dL     Glucose 100 (H) 70 - 99 mg/dL    Alkaline Phosphatase 138 40 - 150 U/L    AST 18 0 - 45 U/L    ALT 25 0 - 50 U/L    Protein Total 7.2 6.8 - 8.8 g/dL    Albumin 3.8 3.4 - 5.0 g/dL    Bilirubin Total 0.3 0.2 - 1.3 mg/dL    GFR Estimate 70 >60 mL/min/1.73m2   Results for orders placed or performed in visit on 09/13/22   XR Foot Right G/E 3 Views     Status: None    Narrative    EXAM: XR FOOT RIGHT G/E 3 VIEWS  LOCATION: [facility]  DATE/TIME: 9/13/2022 11:18 AM    INDICATION: Pain after injury.  COMPARISON: None.      Impression    IMPRESSION:   There is some irregularity to the medial margin of the first metatarsal head. This could be postsurgical or potentially represent posttraumatic deformity. Recommend correlation with area of pain. No displaced fractures are identified. Right foot   otherwise negative.       Diagnostics:  Labs pending at this time.  Results will be reviewed when available.   No EKG this visit, completed in the last 90 days.    Revised Cardiac Risk Index (RCRI):  The patient has the following serious cardiovascular risks for perioperative complications:   - No serious cardiac risks = 0 points              Signed Electronically by: William Roland MD  Copy of this evaluation report is provided to requesting physician.

## 2022-09-14 ENCOUNTER — TRANSFERRED RECORDS (OUTPATIENT)
Dept: HEALTH INFORMATION MANAGEMENT | Facility: CLINIC | Age: 62
End: 2022-09-14

## 2022-09-14 LAB
ALBUMIN SERPL-MCNC: 3.8 G/DL (ref 3.4–5)
ALP SERPL-CCNC: 138 U/L (ref 40–150)
ALT SERPL W P-5'-P-CCNC: 25 U/L (ref 0–50)
ANION GAP SERPL CALCULATED.3IONS-SCNC: 3 MMOL/L (ref 3–14)
AST SERPL W P-5'-P-CCNC: 18 U/L (ref 0–45)
BILIRUB SERPL-MCNC: 0.3 MG/DL (ref 0.2–1.3)
BUN SERPL-MCNC: 13 MG/DL (ref 7–30)
CALCIUM SERPL-MCNC: 8.9 MG/DL (ref 8.5–10.1)
CHLORIDE BLD-SCNC: 107 MMOL/L (ref 94–109)
CO2 SERPL-SCNC: 27 MMOL/L (ref 20–32)
CREAT SERPL-MCNC: 0.92 MG/DL (ref 0.52–1.04)
GFR SERPL CREATININE-BSD FRML MDRD: 70 ML/MIN/1.73M2
GLUCOSE BLD-MCNC: 100 MG/DL (ref 70–99)
POTASSIUM BLD-SCNC: 4.4 MMOL/L (ref 3.4–5.3)
PROT SERPL-MCNC: 7.2 G/DL (ref 6.8–8.8)
SODIUM SERPL-SCNC: 137 MMOL/L (ref 133–144)

## 2022-09-14 NOTE — PROGRESS NOTES
[facility]      OUTPATIENT SPEECH LANGUAGE PATHOLOGY VOICE EVALUATION  PLAN OF TREATMENT FOR OUTPATIENT REHABILITATION  (COMPLETE FOR INITIAL CLAIMS ONLY)    Patient's Last Name, First Name, M.I.  YOB: 1960  Dayana Samaniego                        Provider s Name: [facility] Medical Record No.  [id number]     Onset Date:  7/28/2022 (order date)    Start of Care Date:  9/13/2022   Type:     ___PT  __OT   _X_SLP    Medical Diagnosis: Dysphonia   Speech Language Pathology Diagnosis:  Dysphonia    Visits from SOC: 1      _________________________________________________________________________________  Plan of Treatment/Functional Goals:  Voice         Goals     1. Goal Identifier: Generalization       Goal Description: Patient will report a week of typical activities in which dysphonia and vocal effort do not exceed a level of 2 out of 10, 90% of the time by SLP judgment, so that patient is able to meet her vocal demands for conversations with family and friends.       Target Date: 12/12/22   2. Goal Identifier: Voice quality       Goal Description: In a 20-minute speech task, patient will demonstrate roughness and strain that do not exceed a level of 2 out of 10, 90% of the time by SLP judgment, so that patient is able to meet her voice quality demands.       Target Date: 12/12/22   3. Goal Identifier: Volume       Goal Description: Patient will implement strategies to improve breath to voice coordination to achieve adequate volume per SLP judgment across a 10 minute conversational or reading speech task given min cues so that patient is able to be heard by others in daily conversations.       Target Date: 12/12/22   4. Goal Identifier: Vocal function       Goal Description: In order to improve laryngeal strength, flexibility, and coordination for daily vocal tasks,  patient will extend average maximum phonation time in exercise 4 of modified Vocal Function Exercises to 10 seconds without roughness, breathiness, or strain when given min assist.       Target Date: 12/12/22   5. Goal Identifier: Massage       Goal Description: Patient will learn, demonstrate, and implement use of circumlaryngeal massage exercises independently 1-2x per day, in order to promote reduced laryngeal discomfort and tension.       Target Date: 12/12/22              Frequency and Duration: 1x/week for 6 weeks with 1-2 monthly follow-ups  Sindi Troy, SLP       I CERTIFY THE NEED FOR THESE SERVICES FURNISHED UNDER        THIS PLAN OF TREATMENT AND WHILE UNDER MY CARE     (Physician co-signature of this document indicates review and certification of the therapy plan).                Certification Date From:  09/13/22  Certification Date To:  12/12/22  Referring Provider: Alfredo Peterson MD                 Initial Assessment        See Epic Evaluation Start of Care

## 2022-09-14 NOTE — PROGRESS NOTES
Speech-Language Pathology Department   VOICE EVALUATION  [facility]    09/13/22 1400   General Information   Type Of Visit Initial   Start Of Care Date 09/13/22   Referring Physician Alfredo Peterson MD  (ENT)   Orders Evaluate And Treat   Medical Diagnosis Dysphonia, Chronic cough   Onset Of Illness/injury Or Date Of Surgery 07/28/22  (order date)   Precautions/Limitations  fall precautions  (Pt recently fell and sprained her ankle, using cane to assist with ambulation)   Hearing WFL for 1:1 conversation in session   Surgical/Medical history reviewed Yes   Pertinent History Of Current Problem 62yo female presenting with several years of dysphonia.  Pt reports that her voice gets hoarse, especially the more she talks.  People frequently tell her that she doesn't talk loud enough, even though she feels like she is shouting.  Coughing from allergies and lung problems (asthma, emphysema) will also make her voice worse.  She will sometimes experience a sensation of irritation in her throat, but denies throat pain.  She is also experiencing swallowing problems, noting choking and throwing up foods and liquids.  She is scheduled for upper endoscopy with GI next week for further evaluation.  PMH also significant for smoking history.  Please see chart for additional PMH.   Prior Level of Functioning Others noticed my problems.   General Observations Pt was accompanied by her .  She reports that today is a typical voice day.   Patient/family Goals To be heard by others in conversation   Evaluation Results   Voice Observations COUGH/THROAT CLEAR: One instance of brief, productive coughing observed during the evaluation.  PALPATION OF THYROHYOID REGION: Firm musculature with reduced thyrohyoid space and reported tenderness with laryngeal manipulation.  Pt also reporting mild tenderness of the BOT musculature.   Voice Profile during conversation, 1 min monologue and paragraph  reading   Voice Quality Raspy   Voice quality comments SPEECH: Consistent moderate strain with intermittent mild-moderate roughness.  SINGING: Breathy at lower pitches, otherwise improved quality.  THERAPY PROBES: Good improvement in voice quality with forward resonance and semi-occluded vocal tract probes.   Voice quality severity rating continuum (1=Severe, 7=WNL) 4  (CAPE-V Overall Severity: 43/100)   Breath control Tight   Breath Control comments Inspirations for speech are shallow and occasionally mildly audible.  Poor respiratory/phonatory coordination.   Breath control severity rating continuum (1=Severe, 7=WNL) 5   Voice Use / Effort Pinched / squeezed larynx;Throat push   Voice Use / Effort comments Pt rates her current phonatory effort for speech as 4/10 (10 is maximum effort).   Voice use / Effort severity rating continuum (1=Severe, 7=WNL) 5   Fundamental frequency (Hz) 155.56 Hz  (Centered around Eb3)   Pitch / Frequency comments Increased dysphonia at lower pitches.   Pitch / Frequency severity rating continuum (1=Severe, 7=WNL) 6   Volume Too quiet   Volume comments Volume for conversational speech is mildly reduced, but still adequate for the setting (1:1 conversation in quiet room).  A whisper is normal.  Soft phonation is mildly rough.  Loud phonation is mildly strained, but with good volume increase.   Volume severity rating continuum (1=Severe, 7=WNL) 6   Neuromuscular Control WNL   Neuromuscular Control severity rating continuum (1=Severe, 7=WNL) 7   Resonance Other   (Laryngeal pharyngeal focus resonance)   Resonance severity rating continuum (1=Severe, 7=WNL) 6   Comments Moderate dysphonia characterized by roughness, strain, reduced volume, poor respiratory/phonatory coordination, laryngeal pharyngeal focus resonance, increased dysphonia at lower pitches, and increased phonatory effort with tight laryngeal musculature during phonation.   Adduction /Abduction Function   Laryngeal diadokinetic  speed   (Mildly slow)   Laryngeal diadokinetic strength   (Mildly strained)   Laryngeal diadokinetic consistency Regular   Adduction / Abduction function scale Age 11 - 65, norm per sec:  5+   Vibratory Function of Vocal Folds   Prolonged 'ah' at mid pitch (sec) 7.6 seconds at Eb3 with mild roughness and breathiness and soft volume   Vibratory Function of Vocal Folds Scale Females 20 - 80 yrs: 10 - 22 secs   Vibratory Function of Vocal Folds Comments Reduced in duration and quality   Function of Lengtheners / Shorteners (CT and TA Muscles)   Pitch glides Lower pitches more dysphonic;Upper register present  (Lowest: B2; Highest: D5)   Videostroboscopy / Endoscopy   Other observations Laryngoscopy completed by Dr. Peterson on 7/28/22 was unremarkable.   General Therapy Interventions   Planned Therapy Interventions Voice   Voice Breath flow to sound flow;Voice quality/pitch or volume tasks;Resonant voice techniques;Larynx strengthening;Larynx and TVF flexibility   Impressions and Recommendations   Communication Diagnosis Dysphonia   Summary Ms. Samaniego presents with moderate dysphonia characterized by roughness, strain, reduced volume, poor respiratory/phonatory coordination, laryngeal pharyngeal focus resonance, increased dysphonia at lower pitches, and increased phonatory effort with tight laryngeal musculature during phonation.  Based on today's evaluation and recent laryngoscopy with ENT, dysphonia is likely accounted for by hyperfunction and/or imbalance in function of the intrinsic and extrinsic laryngeal musculature in the context of laryngeal irritation from coughing, allergies, and esophageal dysphagia.   Recommendations A course of skilled speech therapy is recommended to optimize vocal technique, improve voice quality, improve vocal volume, and promote reduced laryngeal effort, fatigue, discomfort, and irritation, so that patient is able to meet her vocal demands for conversations with family and friends.    Frequency and Duration 1x/week for 6 weeks with 1-2 monthly follow-ups   Prognosis  Good with intervention   Risks and Benefits of Treatment have been explained. Yes   Patient & /or Caregiver  in agreement with plan of care Yes   Patient Education SLP provided education regarding evaluation findings and proposed POC.  Therapy initiated today.   Educational Assessment   Barriers to Learning No barriers   Preferred Learning Style Listening;Reading;Demonstration;Pictures / Video   Voice Goals   Voice Goals 1;2;3;4;5   Voice Goal 1   Goal Identifier Generalization   Goal Description Patient will report a week of typical activities in which dysphonia and vocal effort do not exceed a level of 2 out of 10, 90% of the time by SLP judgment, so that patient is able to meet her vocal demands for conversations with family and friends.   Target Date 12/12/22   Voice Goal 2   Goal Identifier Voice quality   Goal Description In a 20-minute speech task, patient will demonstrate roughness and strain that do not exceed a level of 2 out of 10, 90% of the time by SLP judgment, so that patient is able to meet her voice quality demands.   Target Date 12/12/22   Voice Goal 3   Goal Identifier Volume   Goal Description Patient will implement strategies to improve breath to voice coordination to achieve adequate volume per SLP judgment across a 10 minute conversational or reading speech task given min cues so that patient is able to be heard by others in daily conversations.   Target Date 12/12/22   Voice Goal 4   Goal Identifier Vocal function   Goal Description In order to improve laryngeal strength, flexibility, and coordination for daily vocal tasks, patient will extend average maximum phonation time in exercise 4 of modified Vocal Function Exercises to 10 seconds without roughness, breathiness, or strain when given min assist.   Target Date 12/12/22   Voice Goal 5   Goal Identifier Massage   Goal Description Patient will learn,  demonstrate, and implement use of circumlaryngeal massage exercises independently 1-2x per day, in order to promote reduced laryngeal discomfort and tension.   Target Date 12/12/22   Total Session Time   Voice Minutes (26321) 30   Total Evaluation Time 40   Therapy Certification   Certification date from 09/13/22   Certification date to 12/12/22   Medical Diagnosis Dysphonia     Thank you for the referral of this patient.    Allison Alpers Ackmann, B.A. (FANY andujar, CCC-SLP  Speech-Language Pathologist  [facility] Certificate of Vocology  [facility]  [phone]

## 2022-09-18 DIAGNOSIS — E55.9 VITAMIN D DEFICIENCY: ICD-10-CM

## 2022-09-18 DIAGNOSIS — J30.89 ENVIRONMENTAL AND SEASONAL ALLERGIES: ICD-10-CM

## 2022-09-20 ENCOUNTER — TRANSFERRED RECORDS (OUTPATIENT)
Dept: HEALTH INFORMATION MANAGEMENT | Facility: CLINIC | Age: 62
End: 2022-09-20

## 2022-09-20 RX ORDER — ERGOCALCIFEROL 1.25 MG/1
CAPSULE, LIQUID FILLED ORAL
Qty: 13 CAPSULE | Refills: 0 | Status: SHIPPED | OUTPATIENT
Start: 2022-09-20 | End: 2023-01-16

## 2022-09-20 RX ORDER — VARENICLINE TARTRATE 1 MG/1
TABLET, FILM COATED ORAL
Qty: 60 TABLET | OUTPATIENT
Start: 2022-09-20

## 2022-09-20 RX ORDER — MONTELUKAST SODIUM 10 MG/1
TABLET ORAL
Qty: 90 TABLET | Refills: 1 | Status: SHIPPED | OUTPATIENT
Start: 2022-09-20 | End: 2022-12-21

## 2022-09-20 NOTE — TELEPHONE ENCOUNTER
Outpatient Medication Detail     Disp Refills Start End ARGELIA   varenicline (CHANTIX) 1 MG tablet (Discontinued)    2/10/2022 No   Sig: take 1 tablet by oral route 2 times every day with glass of water after meals   Patient not taking: Reported on 2/10/2022        Class: Historical   Reason for Discontinue: Therapy completed   Order: [id number]

## 2022-09-27 PROBLEM — L60.0 INGROWN TOENAIL: Status: RESOLVED | Noted: 2022-02-15 | Resolved: 2022-09-27

## 2022-09-28 ENCOUNTER — TELEPHONE (OUTPATIENT)
Dept: SURGERY | Facility: CLINIC | Age: 62
End: 2022-09-28

## 2022-09-28 ENCOUNTER — OFFICE VISIT (OUTPATIENT)
Dept: SURGERY | Facility: CLINIC | Age: 62
End: 2022-09-28
Attending: OTOLARYNGOLOGY
Payer: COMMERCIAL

## 2022-09-28 DIAGNOSIS — R13.10 DYSPHAGIA, UNSPECIFIED TYPE: ICD-10-CM

## 2022-09-28 PROCEDURE — 99214 OFFICE O/P EST MOD 30 MIN: CPT | Performed by: SURGERY

## 2022-09-28 NOTE — PROGRESS NOTES
HPI: Dayana Samaniego is a 61 year old female referred to see me by Joanne Weiner for evaluation of gastroesophageal reflux disease.  She notes  a several year history of gastroesophageal reflux disease which is described as severe significant spontaneous regurgitation with an inability to lay flat while sleeping.  She does have a intermittent nighttime cough as well as waterbrash and severe esophageal burning.  She underwent evaluation with an esophagram which demonstrated reflux in the setting of a hiatal hernia.  Additionally, she states she recently had an upper endoscopy which was unremarkable in terms of metaplasia or dysplasia.  Her GERD HR Q OL is 44.    Allergies:Codeine, Nefazodone, Oxycodone-acetaminophen, Cetirizine, Trazodone, Amoxicillin-pot clavulanate [augmentin], Cephalexin, Clavulanic acid, Cyclobenzaprine, Eszopiclone, Methocarbamol, and Penicillins    Past Medical History:   Diagnosis Date     Abnormality of gait     Created by Conversion      Anemia 3/18/2021     Anxiety      Asthma      Asymptomatic postmenopausal status     Created by Conversion  Replacement Utility updated for latest IMO load     Bipolar 2 disorder (H)      Centrilobular emphysema (H) 2/26/2018    Mild, seen in 2018 CT scan, DLCO 60% predicted  Formatting of this note might be different from the original. Formatting of this note might be different from the original. Mild, seen in 2018 CT scan, DLCO 60% predicted     Cervical spinal stenosis     s/p cervical fusion     Chronic kidney disease      Chronic kidney disease, stage 3 (H) 1/14/2021     Chronic pain syndrome      Cigarette nicotine dependence without complication 3/4/2020     Common migraine with intractable migraine 2/15/2021     COPD (chronic obstructive pulmonary disease) (H)      DDD (degenerative disc disease), lumbosacral 3/22/2021     Depression      Fibrocystic breast      Fibromyalgia      GERD (gastroesophageal reflux disease)      Hallux  abductovalgus with bunions, unspecified laterality 12/14/2015     Herpes zoster     Created by Conversion  Replacement Utility updated for latest IMO load     Hiatal hernia      History of electroconvulsive therapy      Hyperlipidemia 3/17/2021    Created by Conversion   Formatting of this note might be different from the original. Formatting of this note might be different from the original. Created by Conversion     Hypotension 3/18/2021     Hypothyroidism     hypothyroidism     IBS (irritable bowel syndrome)      Lung nodule 8/4/2016 8/4/2016:  Left upper lobe nodule of 6.5 mm, likely benign given slow growth per radiologist.  See CT 6/27/2011:  measured  approximately 5.5 mm in greatest dimension      Menopausal and postmenopausal disorder     Created by Conversion  Replacement Utility updated for latest IMO load     Migraine     Created by Conversion  Replacement Utility updated for latest IMO load     Migraines      Osteoarthritis      Osteopenia of multiple sites 9/1/2020     Other chronic pain      PONV (postoperative nausea and vomiting)      PTSD (post-traumatic stress disorder)      Shingles 2014    on back     Spinal stenosis of lumbar region with neurogenic claudication 3/22/2021     Tobacco use disorder     Created by Conversion        Past Surgical History:   Procedure Laterality Date     BIOPSY BREAST Left     Approx 5 bx     BUNIONECTOMY Right 12/15/2015    Procedure: MODIFIED LAI BUNIONECTOMY RIGHT FOOT;  Surgeon: Jerome Calvin DPM;  Location: [facility];  Service:      CERVICAL FUSION       CERVICAL FUSION Bilateral 2/16/2015    Procedure: ANTERIOR CERVICAL DECOMPRESSION/FUSION C3-5 BILATERAL, ANTERIOR HARDWARE REMOVAL C5-7 BILATERAL ;  Surgeon: Sawyer Roland MD;  Location: [facility] OR;  Service:      HC NIPPLE EXPLORATION      Description: Breast Nipple Explor W/ Excision Solitary Lactiferous Duct;  Recorded: 04/10/2008;     HYSTERECTOMY       IR CERVICAL EPIDURAL  STEROID INJECTION  9/17/2003     IR CERVICAL EPIDURAL STEROID INJECTION  12/11/2003     IR CERVICAL EPIDURAL STEROID INJECTION  1/16/2004     IRRIGATION AND DEBRIDEMENT DECUBITUS WOUND, COMBINED Left 12/13/2021    Procedure: Wound exploration and debridement of Left Lower Abdomin Chronic wound.;  Surgeon: Crispin Bunch MD;  Location: [facility]     LUMBAR DISCECTOMY      X2     MAMMOPLASTY AUGMENTATION Bilateral      approx 2006?     PELVIC LAPAROSCOPY      multiple     SHOULDER OPEN ROTATOR CUFF REPAIR Left     X2     ZZC TOTAL ABDOM HYSTERECTOMY      Description: Total Abdominal Hysterectomy;  Recorded: 06/10/2013;       CURRENT MEDS:    Current Outpatient Medications:      acetaminophen (TYLENOL) 500 MG tablet, Take 1,000 mg by mouth, Disp: , Rfl:      albuterol (PROAIR HFA) 108 (90 Base) MCG/ACT inhaler, Inhale 2 puffs into the lungs every 4 hours, Disp: 18 g, Rfl: 11     albuterol (PROVENTIL) (2.5 MG/3ML) 0.083% neb solution, Take 1 vial (2.5 mg) by nebulization every 6 hours as needed for shortness of breath / dyspnea or wheezing, Disp: 180 mL, Rfl: 3     atorvastatin (LIPITOR) 80 MG tablet, Take 1 tablet (80 mg) by mouth At Bedtime, Disp: 90 tablet, Rfl: 3     benzonatate (TESSALON) 100 MG capsule, TAKE 1 CAPSULE(100 MG) BY MOUTH THREE TIMES DAILY AS NEEDED FOR COUGH (Patient not taking: Reported on 9/13/2022), Disp: 30 capsule, Rfl: 3     buPROPion (WELLBUTRIN) 75 MG tablet, Take 75 mg by mouth 2 times daily, Disp: , Rfl:      cetirizine (ZYRTEC) 10 MG tablet, Take 10 mg by mouth daily, Disp: , Rfl:      docusate sodium (COLACE) 100 MG capsule, Take 100 mg by mouth 2 times daily, Disp: , Rfl:      doxepin (SINEQUAN) 100 MG capsule, Take 75 mg by mouth At Bedtime , Disp: , Rfl:      eletriptan (RELPAX) 40 MG tablet, Take 1 tablet (40 mg) by mouth at onset of headache for migraine (Patient not taking: Reported on 9/13/2022), Disp: 12 tablet, Rfl: 3     erenumab-aooe (AIMOVIG) 70 MG/ML  injection, ADMINISTER 1 ML(70 MG) UNDER THE SKIN EVERY MONTH, Disp: 1 mL, Rfl: 11     esomeprazole (NEXIUM) 20 MG DR capsule, Take 2 capsules by mouth every 24 hours, Disp: , Rfl:      FLUoxetine (PROZAC) 40 MG capsule, Take 40 mg by mouth daily, Disp: , Rfl:      Fluticasone-Umeclidin-Vilanterol (TRELEGY ELLIPTA) 100-62.5-25 MCG/INH oral inhaler, Inhale 1 puff into the lungs daily, Disp: 1 each, Rfl: 11     gabapentin (NEURONTIN) 300 MG capsule, Take 600 mg by mouth 2 times daily, Disp: , Rfl:      HYDROcodone-acetaminophen (NORCO)  MG per tablet, Take 1 tablet by mouth every 6 hours as needed, Disp: , Rfl:      hydrOXYzine (VISTARIL) 25 MG capsule, Take 25 mg by mouth 3 times daily as needed, Disp: , Rfl:      lamoTRIgine (LAMICTAL) 200 MG tablet, Take 1 tablet by mouth 2 times daily, Disp: , Rfl:      levothyroxine (SYNTHROID/LEVOTHROID) 75 MCG tablet, TAKE 1 TABLET(75 MCG) BY MOUTH DAILY, Disp: 90 tablet, Rfl: 2     montelukast (SINGULAIR) 10 MG tablet, TAKE 1 TABLET(10 MG) BY MOUTH EVERY 24 HOURS, Disp: 90 tablet, Rfl: 1     prazosin (MINIPRESS) 2 MG capsule, Take 2 capsules by mouth every 24 hours, Disp: , Rfl:      predniSONE (DELTASONE) 20 MG tablet, Take two tablets (= 40mg) each day for 5 (five) days (Patient not taking: Reported on 2022), Disp: 10 tablet, Rfl: 0     prochlorperazine (COMPAZINE) 10 MG tablet, Take 10 mg by mouth every 6 hours as needed, Disp: , Rfl:      senna-docusate (SENOKOT-S/PERICOLACE) 8.6-50 MG tablet, Take 2 tablets by mouth, Disp: , Rfl:      tiZANidine (ZANAFLEX) 4 MG tablet, TAKE 1 TO 2 TABLETS BY MOUTH DURING THE DAY AND 2 TABLETS AT BEDTIME, Disp: 120 tablet, Rfl: 1     vitamin D2 (ERGOCALCIFEROL) 24639 units (1250 mcg) capsule, TAKE 1 CAPSULE BY MOUTH 1 TIME A WEEK, Disp: 13 capsule, Rfl: 0    Family History   Problem Relation Age of Onset     Lung Cancer Mother          at age 52     Alcoholism Father      Heart Disease Maternal Grandfather      Diabetes  Paternal Grandmother      Coronary Artery Disease Paternal Grandmother      Heart Disease Paternal Grandfather      Diabetes Sister      Hypertension Sister      Crohn's Disease Sister      Coronary Artery Disease Paternal Uncle      Asthma Maternal Aunt         reports that she quit smoking about 10 months ago. Her smoking use included cigarettes. She has a 45.00 pack-year smoking history. She has quit using smokeless tobacco. She reports previous alcohol use. She reports that she does not use drugs.    Review of Systems:  The 12 system review is within normal limits except for as mentioned above.  General ROS: No complaints or constitutional symptoms  Ophthalmic ROS: No complaints of visual changes  Skin: No complaints or symptoms   Endocrine: No complaints or symptoms  Hematologic/Lymphatic: No symptoms or complaints  Psychiatric: No symptoms or complaints  Respiratory ROS: no cough, shortness of breath, or wheezing  Cardiovascular ROS: no chest pain or dyspnea on exertion  Gastrointestinal ROS: As per HPI  Genito-Urinary ROS: no dysuria, trouble voiding, or hematuria  Musculoskeletal ROS: no joint or muscle pain  Neurological ROS: no TIA or stroke symptoms      EXAM:  There were no vitals taken for this visit.  GENERAL: Well developed female, No acute distress, pleasant and conversant   EYES: Pupils equal, round and reactive, no scleral icterus  ABDOMEN: Soft, non-tender, no masses, non-distended  SKIN: Pink, warm and dry, no obvious rashes or lesions   NEURO:No focal deficits, ambulatory  MUSCULOSKELETAL:No obvious deformities, no swelling, normal appearing      LABS:  Lab Results   Component Value Date    WBC 10.6 07/29/2022    HGB 10.6 09/13/2022    HCT 32.4 07/29/2022    MCV 75 07/29/2022     07/29/2022     INR/Prothrombin Time  @LABRCNTIP(NA,K,CL,co2,bun,creatinine,labglom,glucose,calcium)@  Lab Results   Component Value Date    ALT 25 09/13/2022    AST 18 09/13/2022    ALKPHOS 138 09/13/2022        IMAGES:   Relevant images were reviewed and discussed with the patient.  Notable findings were: Esophagram images reviewed    Assessment/Plan:   Dayana Samaniego is a 61 year old female with signs and symptoms consistent with hiatal hernia and gastroesophageal reflux disease.  I have explained the pathophysiology of hiatal hernias and GERD in detail as well as the surgical versus non-operative management strategies.      After our extensive discussion she would like to pursue the surgical option.  She is not interested in a magnetic sphincteric augmentation.  She would prefer to have a partial fundoplication.  Given her symptomatology and the findings of her recent work-up I think this is reasonable.  The risks and benefits of the surgery were explained in detail and she has consented to proceed.  We will plan on robotic hiatal hernia repair with an antireflux procedure.    Davie Ocasio DO [facility]  [phone]  [facility] Department of Surgery

## 2022-09-28 NOTE — TELEPHONE ENCOUNTER
Spoke with patient today regarding surgery scheduling     Went over details/instructions.    Surgery Letter sent via [employer]

## 2022-09-28 NOTE — LETTER
We've received instruction to get you scheduled for surgery with Dr Ocasio. We have that arranged as follows:     Pre-op Physical:  12/5/2022 with an arrival time of 2:40 pm with Dr. Weiner at [facility].    Surgery Date: 12/30/2022     Location: [facility].  Pending sale to [facility] [place] [address]    Approximate Arrival Time: 5:30 am  (Unless instructed differently by the pre-op call nurse)     Post op Appointment: 1/11/2022 at 11:00 am at [facility]    Prep Tasks and Info:     1. Schedule a pre-op physical with your primary care doctor within 30 days of surgery. This is required by anesthesia and if not done, your surgery will be cancelled. Call them asap to get this scheduled.    2. Review your medications with your primary care or prescribing physician; they will advise you which meds to stop and when, and when you can resume taking.  Certain medications like blood thinners need to be stopped in advance of surgery to proceed safely.    3. You must get tested for COVID-19, even if you are vaccinated.    Admits after Surgery:  If you are staying overnight or longer following surgery, a PCR test is required 4 days before surgery instead of the rapid antigen test.   Please schedule a PCR test with [facility] by calling [phone] or visit [email or web address].  You are permitted to have this done outside of our system but must fax the result to [phone].     4. Please shower the evening before and morning of surgery with Hibiclens or Exidine soap.  This can be found at your local pharmacy.    5. Fasting instructions will be provided by the pre-op nurse who will call you 1-3 days before surgery.  Typically we advise normal food up to 8 hours before surgery then clear liquids only up to 2 hours before surgery then nothing at all by mouth for 2 hours including no gum or candy.  The nurse will review your specific  instructions with you at the call.      6. Smoking impacts your body's ability to heal properly.  If you are a smoker, we strongly urge you to stop smoking 4-6 weeks before surgery. Plastic surgery patients are required to be nicotine free for 6-8 weeks before surgery.     7. You will need an adult to drive you home and stay with you 24 hours after surgery. Public transportation or Medical Van Services are not permitted.    8. You may have one family member wait in the lobby at the surgery center during your surgery. Visitor restrictions are subject to change, please verify with the pre-op nurse when they call.    9. If the community sees a new COVID19 surge, your procedure may need to be postponed. We will contact you if this happens. You will be screened for high-risk exposure to Covid-19 during the pre-op call.  We encourage you to quarantine yourself away from any Covid-19 people for 10 days before surgery to avoid possible last minute cancellations.   When you arrive to the surgery center, you will again be screened for COVID19 symptoms. If you screen positive, your surgery will need to be postponed.    10. We always encourage you to notify your insurance any time you have medical tests or procedures scheduled including surgery. The number is usually right on the back of your insurance card. Please call [facility] Cost of Care at [phone] if you'd like a surgery quote.       Call our office if you have any questions! Thank you!

## 2022-09-28 NOTE — LETTER
9/28/2022         RE: Dayana Samaniego  [address]  [place] [address]        Dear Colleague,    Thank you for referring your patient, Dayana Samaniego, to the [facility] [place]. Please see a copy of my visit note below.    HPI: Dayana Samaniego is a 61 year old female referred to see me by Joanne Weiner for evaluation of gastroesophageal reflux disease.  She notes  a several year history of gastroesophageal reflux disease which is described as severe significant spontaneous regurgitation with an inability to lay flat while sleeping.  She does have a intermittent nighttime cough as well as waterbrash and severe esophageal burning.  She underwent evaluation with an esophagram which demonstrated reflux in the setting of a hiatal hernia.  Additionally, she states she recently had an upper endoscopy which was unremarkable in terms of metaplasia or dysplasia.  Her GERD HR Q OL is 44.    Allergies:Codeine, Nefazodone, Oxycodone-acetaminophen, Cetirizine, Trazodone, Amoxicillin-pot clavulanate [augmentin], Cephalexin, Clavulanic acid, Cyclobenzaprine, Eszopiclone, Methocarbamol, and Penicillins    Past Medical History:   Diagnosis Date     Abnormality of gait     Created by Conversion      Anemia 3/18/2021     Anxiety      Asthma      Asymptomatic postmenopausal status     Created by Conversion  Replacement Utility updated for latest IMO load     Bipolar 2 disorder (H)      Centrilobular emphysema (H) 2/26/2018    Mild, seen in 2018 CT scan, DLCO 60% predicted  Formatting of this note might be different from the original. Formatting of this note might be different from the original. Mild, seen in 2018 CT scan, DLCO 60% predicted     Cervical spinal stenosis     s/p cervical fusion     Chronic kidney disease      Chronic kidney disease, stage 3 (H) 1/14/2021     Chronic pain syndrome      Cigarette nicotine dependence without complication 3/4/2020     Common  migraine with intractable migraine 2/15/2021     COPD (chronic obstructive pulmonary disease) (H)      DDD (degenerative disc disease), lumbosacral 3/22/2021     Depression      Fibrocystic breast      Fibromyalgia      GERD (gastroesophageal reflux disease)      Hallux abductovalgus with bunions, unspecified laterality 12/14/2015     Herpes zoster     Created by Conversion  Replacement Utility updated for latest IMO load     Hiatal hernia      History of electroconvulsive therapy      Hyperlipidemia 3/17/2021    Created by Conversion   Formatting of this note might be different from the original. Formatting of this note might be different from the original. Created by Conversion     Hypotension 3/18/2021     Hypothyroidism     hypothyroidism     IBS (irritable bowel syndrome)      Lung nodule 8/4/2016 8/4/2016:  Left upper lobe nodule of 6.5 mm, likely benign given slow growth per radiologist.  See CT 6/27/2011:  measured  approximately 5.5 mm in greatest dimension      Menopausal and postmenopausal disorder     Created by Conversion  Replacement Utility updated for latest IMO load     Migraine     Created by Conversion  Replacement Utility updated for latest IMO load     Migraines      Osteoarthritis      Osteopenia of multiple sites 9/1/2020     Other chronic pain      PONV (postoperative nausea and vomiting)      PTSD (post-traumatic stress disorder)      Shingles 2014    on back     Spinal stenosis of lumbar region with neurogenic claudication 3/22/2021     Tobacco use disorder     Created by Conversion        Past Surgical History:   Procedure Laterality Date     BIOPSY BREAST Left     Approx 5 bx     BUNIONECTOMY Right 12/15/2015    Procedure: MODIFIED LAI BUNIONECTOMY RIGHT FOOT;  Surgeon: Jerome Calvin DPM;  Location: [facility];  Service:      CERVICAL FUSION       CERVICAL FUSION Bilateral 2/16/2015    Procedure: ANTERIOR CERVICAL DECOMPRESSION/FUSION C3-5 BILATERAL, ANTERIOR HARDWARE  REMOVAL C5-7 BILATERAL ;  Surgeon: Sawyer Roland MD;  Location: [facility] OR;  Service:      HC NIPPLE EXPLORATION      Description: Breast Nipple Explor W/ Excision Solitary Lactiferous Duct;  Recorded: 04/10/2008;     HYSTERECTOMY       IR CERVICAL EPIDURAL STEROID INJECTION  9/17/2003     IR CERVICAL EPIDURAL STEROID INJECTION  12/11/2003     IR CERVICAL EPIDURAL STEROID INJECTION  1/16/2004     IRRIGATION AND DEBRIDEMENT DECUBITUS WOUND, COMBINED Left 12/13/2021    Procedure: Wound exploration and debridement of Left Lower Abdomin Chronic wound.;  Surgeon: Crispin Bunch MD;  Location: [facility]     LUMBAR DISCECTOMY      X2     MAMMOPLASTY AUGMENTATION Bilateral      approx 2006?     PELVIC LAPAROSCOPY      multiple     SHOULDER OPEN ROTATOR CUFF REPAIR Left     X2     ZZC TOTAL ABDOM HYSTERECTOMY      Description: Total Abdominal Hysterectomy;  Recorded: 06/10/2013;       CURRENT MEDS:    Current Outpatient Medications:      acetaminophen (TYLENOL) 500 MG tablet, Take 1,000 mg by mouth, Disp: , Rfl:      albuterol (PROAIR HFA) 108 (90 Base) MCG/ACT inhaler, Inhale 2 puffs into the lungs every 4 hours, Disp: 18 g, Rfl: 11     albuterol (PROVENTIL) (2.5 MG/3ML) 0.083% neb solution, Take 1 vial (2.5 mg) by nebulization every 6 hours as needed for shortness of breath / dyspnea or wheezing, Disp: 180 mL, Rfl: 3     atorvastatin (LIPITOR) 80 MG tablet, Take 1 tablet (80 mg) by mouth At Bedtime, Disp: 90 tablet, Rfl: 3     benzonatate (TESSALON) 100 MG capsule, TAKE 1 CAPSULE(100 MG) BY MOUTH THREE TIMES DAILY AS NEEDED FOR COUGH (Patient not taking: Reported on 9/13/2022), Disp: 30 capsule, Rfl: 3     buPROPion (WELLBUTRIN) 75 MG tablet, Take 75 mg by mouth 2 times daily, Disp: , Rfl:      cetirizine (ZYRTEC) 10 MG tablet, Take 10 mg by mouth daily, Disp: , Rfl:      docusate sodium (COLACE) 100 MG capsule, Take 100 mg by mouth 2 times daily, Disp: , Rfl:      doxepin (SINEQUAN) 100 MG  capsule, Take 75 mg by mouth At Bedtime , Disp: , Rfl:      eletriptan (RELPAX) 40 MG tablet, Take 1 tablet (40 mg) by mouth at onset of headache for migraine (Patient not taking: Reported on 9/13/2022), Disp: 12 tablet, Rfl: 3     erenumab-aooe (AIMOVIG) 70 MG/ML injection, ADMINISTER 1 ML(70 MG) UNDER THE SKIN EVERY MONTH, Disp: 1 mL, Rfl: 11     esomeprazole (NEXIUM) 20 MG DR capsule, Take 2 capsules by mouth every 24 hours, Disp: , Rfl:      FLUoxetine (PROZAC) 40 MG capsule, Take 40 mg by mouth daily, Disp: , Rfl:      Fluticasone-Umeclidin-Vilanterol (TRELEGY ELLIPTA) 100-62.5-25 MCG/INH oral inhaler, Inhale 1 puff into the lungs daily, Disp: 1 each, Rfl: 11     gabapentin (NEURONTIN) 300 MG capsule, Take 600 mg by mouth 2 times daily, Disp: , Rfl:      HYDROcodone-acetaminophen (NORCO)  MG per tablet, Take 1 tablet by mouth every 6 hours as needed, Disp: , Rfl:      hydrOXYzine (VISTARIL) 25 MG capsule, Take 25 mg by mouth 3 times daily as needed, Disp: , Rfl:      lamoTRIgine (LAMICTAL) 200 MG tablet, Take 1 tablet by mouth 2 times daily, Disp: , Rfl:      levothyroxine (SYNTHROID/LEVOTHROID) 75 MCG tablet, TAKE 1 TABLET(75 MCG) BY MOUTH DAILY, Disp: 90 tablet, Rfl: 2     montelukast (SINGULAIR) 10 MG tablet, TAKE 1 TABLET(10 MG) BY MOUTH EVERY 24 HOURS, Disp: 90 tablet, Rfl: 1     prazosin (MINIPRESS) 2 MG capsule, Take 2 capsules by mouth every 24 hours, Disp: , Rfl:      predniSONE (DELTASONE) 20 MG tablet, Take two tablets (= 40mg) each day for 5 (five) days (Patient not taking: Reported on 9/13/2022), Disp: 10 tablet, Rfl: 0     prochlorperazine (COMPAZINE) 10 MG tablet, Take 10 mg by mouth every 6 hours as needed, Disp: , Rfl:      senna-docusate (SENOKOT-S/PERICOLACE) 8.6-50 MG tablet, Take 2 tablets by mouth, Disp: , Rfl:      tiZANidine (ZANAFLEX) 4 MG tablet, TAKE 1 TO 2 TABLETS BY MOUTH DURING THE DAY AND 2 TABLETS AT BEDTIME, Disp: 120 tablet, Rfl: 1     vitamin D2 (ERGOCALCIFEROL) 61136  units (1250 mcg) capsule, TAKE 1 CAPSULE BY MOUTH 1 TIME A WEEK, Disp: 13 capsule, Rfl: 0    Family History   Problem Relation Age of Onset     Lung Cancer Mother          at age 52     Alcoholism Father      Heart Disease Maternal Grandfather      Diabetes Paternal Grandmother      Coronary Artery Disease Paternal Grandmother      Heart Disease Paternal Grandfather      Diabetes Sister      Hypertension Sister      Crohn's Disease Sister      Coronary Artery Disease Paternal Uncle      Asthma Maternal Aunt         reports that she quit smoking about 10 months ago. Her smoking use included cigarettes. She has a 45.00 pack-year smoking history. She has quit using smokeless tobacco. She reports previous alcohol use. She reports that she does not use drugs.    Review of Systems:  The 12 system review is within normal limits except for as mentioned above.  General ROS: No complaints or constitutional symptoms  Ophthalmic ROS: No complaints of visual changes  Skin: No complaints or symptoms   Endocrine: No complaints or symptoms  Hematologic/Lymphatic: No symptoms or complaints  Psychiatric: No symptoms or complaints  Respiratory ROS: no cough, shortness of breath, or wheezing  Cardiovascular ROS: no chest pain or dyspnea on exertion  Gastrointestinal ROS: As per HPI  Genito-Urinary ROS: no dysuria, trouble voiding, or hematuria  Musculoskeletal ROS: no joint or muscle pain  Neurological ROS: no TIA or stroke symptoms      EXAM:  There were no vitals taken for this visit.  GENERAL: Well developed female, No acute distress, pleasant and conversant   EYES: Pupils equal, round and reactive, no scleral icterus  ABDOMEN: Soft, non-tender, no masses, non-distended  SKIN: Pink, warm and dry, no obvious rashes or lesions   NEURO:No focal deficits, ambulatory  MUSCULOSKELETAL:No obvious deformities, no swelling, normal appearing      LABS:  Lab Results   Component Value Date    WBC 10.6 2022    HGB 10.6 2022     HCT 32.4 07/29/2022    MCV 75 07/29/2022     07/29/2022     INR/Prothrombin Time  @LABRCNTIP(NA,K,CL,co2,bun,creatinine,labglom,glucose,calcium)@  Lab Results   Component Value Date    ALT 25 09/13/2022    AST 18 09/13/2022    ALKPHOS 138 09/13/2022       IMAGES:   Relevant images were reviewed and discussed with the patient.  Notable findings were: Esophagram images reviewed    Assessment/Plan:   Dayana Samaniego is a 61 year old female with signs and symptoms consistent with hiatal hernia and gastroesophageal reflux disease.  I have explained the pathophysiology of hiatal hernias and GERD in detail as well as the surgical versus non-operative management strategies.      After our extensive discussion she would like to pursue the surgical option.  She is not interested in a magnetic sphincteric augmentation.  She would prefer to have a partial fundoplication.  Given her symptomatology and the findings of her recent work-up I think this is reasonable.  The risks and benefits of the surgery were explained in detail and she has consented to proceed.  We will plan on robotic hiatal hernia repair with an antireflux procedure.    aDvie Ocasio DO FACS  [phone]  [facility] Department of Surgery      Again, thank you for allowing me to participate in the care of your patient.        Sincerely,        Davie Ocasio DO

## 2022-09-30 ENCOUNTER — TRANSFERRED RECORDS (OUTPATIENT)
Dept: HEALTH INFORMATION MANAGEMENT | Facility: CLINIC | Age: 62
End: 2022-09-30

## 2022-10-04 ENCOUNTER — TRANSFERRED RECORDS (OUTPATIENT)
Dept: HEALTH INFORMATION MANAGEMENT | Facility: CLINIC | Age: 62
End: 2022-10-04

## 2022-10-05 ENCOUNTER — TELEPHONE (OUTPATIENT)
Dept: CARDIOLOGY | Facility: CLINIC | Age: 62
End: 2022-10-05

## 2022-10-05 NOTE — TELEPHONE ENCOUNTER
[place] Community Measures Blood Pressure guideline reviewed.  Patients recent blood pressure is outside of guideline parameters.  Called pt to review, no answer.  Left voicemail message asking patient to check their blood pressure using a home blood pressure cuff or by going to a [facility].  Patient instructed to then call [phone] ([facility]) and leave a message with their name, date of birth, and blood pressure reading that was completed within the last 24 hours and where it was completed.  Will await call back for further review.  Radha Levin MA

## 2022-10-06 ENCOUNTER — TRANSFERRED RECORDS (OUTPATIENT)
Dept: HEALTH INFORMATION MANAGEMENT | Facility: CLINIC | Age: 62
End: 2022-10-06

## 2022-10-10 ENCOUNTER — OFFICE VISIT (OUTPATIENT)
Dept: ALLERGY | Facility: CLINIC | Age: 62
End: 2022-10-10
Attending: INTERNAL MEDICINE
Payer: COMMERCIAL

## 2022-10-10 VITALS
DIASTOLIC BLOOD PRESSURE: 77 MMHG | BODY MASS INDEX: 29.19 KG/M2 | OXYGEN SATURATION: 97 % | HEART RATE: 81 BPM | WEIGHT: 171 LBS | HEIGHT: 64 IN | SYSTOLIC BLOOD PRESSURE: 137 MMHG

## 2022-10-10 DIAGNOSIS — J32.9 SINUSITIS, UNSPECIFIED CHRONICITY, UNSPECIFIED LOCATION: ICD-10-CM

## 2022-10-10 DIAGNOSIS — J30.1 SEASONAL ALLERGIC RHINITIS DUE TO POLLEN: Primary | ICD-10-CM

## 2022-10-10 DIAGNOSIS — L30.9 HAND DERMATITIS: ICD-10-CM

## 2022-10-10 PROCEDURE — 99204 OFFICE O/P NEW MOD 45 MIN: CPT | Performed by: ALLERGY & IMMUNOLOGY

## 2022-10-10 RX ORDER — CLOBETASOL PROPIONATE 0.5 MG/G
CREAM TOPICAL 2 TIMES DAILY
Qty: 60 G | Refills: 1 | Status: SHIPPED | OUTPATIENT
Start: 2022-10-10 | End: 2023-05-23

## 2022-10-10 NOTE — LETTER
10/10/2022         RE: Dayana Samaniego  [address]  [place] [address]        Dear Colleague,    Thank you for referring your patient, Dayana Samaniego, to the [facility]. Please see a copy of my visit note below.        Subjective   Dayana is a 61 year old, presenting for the following health issues:  Allergy Consult      HPI     Chief complaint: Allergies    History of present illness: This is a pleasant 61-year-old woman I was asked to see for evaluation by Dr. Bradford in regards to allergies.  Patient states that she has had allergies all of her life.  Symptoms consist of a rash, difficulty breathing and sinus congestion.  States that the rash will worsen if something brushes up against her skin such as an [place] or grass.  She will develop hives.  Sometimes she develops hives without any specific trigger.  She does use hydroxyzine for anxiety and uses this at night but she is not sure if it helps.  She takes Benadryl only as needed.  Cetirizine has made her sick to her stomach.  She does have a history of asthma and COPD.  Follows with Dr. Bradford for this.  She states that her asthma now is well controlled but she did require prednisone this summer.  She is currently on Trelegy.  She also takes montelukast.  She has been allergy tested previously.  It was many years ago.  Positive to dust mites, molds.  [place] and [place] do have dogs and horses.  She states the barn seems to aggravate her symptoms.  She does note drainage down the back of her throat and sinus congestion.  She was given Flonase which did not seem to help.  She has seen ENT for dysphagia and was given Astelin nasal spray which did not help either.  She states she takes a lot of medication would prefer not to add more medication to her regimen.  She did have a pulmonary function test performed which demonstrated moderate fixed obstructive physiology without air  "trapping.    She does note that she has dry skin on the palmar surfaces of her hands.  She uses Eucerin cream on her hands.  She does not use any prescription cream.    Past medical history: Fibromyalgia, migraines, IBS, COPD, asthma, orthopedic surgeries, reflux, bipolar, chronic kidney disease    Social history: She is a farmer, former smoker, lives at home with central air to basement, has a farm and horses and a dog    Family history: Daughter with asthma, daughter with allergies  Review of Systems   Constitutional, HEENT, cardiovascular, pulmonary, gi and gu systems are negative, except as otherwise noted.     Objective    /77   Pulse 81   Ht 1.626 m (5' 4\")   Wt 77.6 kg (171 lb)   SpO2 97%   BMI 29.35 kg/m    Body mass index is 29.35 kg/m .  Physical Exam   Gen: Pleasant female not in acute distress  HEENT: Eyes no erythema of the bulbar or palpebral conjunctiva, no edema. Ears: TMs well visualized, no effusions. Nose: No congestion, mucosa normal. Mouth: Throat clear, no lip or tongue edema.   Cardiac: Regular rate and rhythm, no murmurs, rubs or gallops  Respiratory: Clear to auscultation bilaterally, no adventitious breath sounds  Lymph: No visible supraclavicular or cervical lymphadenopathy  Skin: Dry cracked skin on the palmar surfaces of both hands  Psych: Alert and oriented times 3    Impression report and plan:  1.  Allergic rhinitis  2.  Chronic hives  3.  Asthma and COPD  4.  Hand dermatitis    Reviewed wet wrap therapy and prescribed clobetasol cream.  I would like to check allergy testing but she took her hydroxyzine last night.  She uses this for anxiety.  Recommended that she check specific IgE to the environmental allergens including horse.  Pending this, consider allergen immunotherapy.  I went over the risks and benefits of allergy shots.  I stated risks include hives, swelling, shortness of breath.  I did state that one in 2.5 million shot administrations can result in death.  I " stated they must wait in the office for 30 minutes following the shot and carry an epinephrine device on the day of the shot.  I stated that shots are effective in about 90% of patients.  I stated that they should check with the insurance company prior to proceeding.  They understand the risks and benefits and pending results will let me know if she would like to pursue this option.  For her hives, I suspect this is chronic dermatographic hives.  Reviewed exacerbating factors.  Recommended Allegra 2 tablets in the morning and continue hydroxyzine 50 mg at night.  She will notify me if symptoms or not well controlled regarding the hives.               Again, thank you for allowing me to participate in the care of your patient.        Sincerely,        Suyapa FOX MD

## 2022-10-10 NOTE — PATIENT INSTRUCTIONS
For hives:    Allegra (Fexofenadine) 2 tabs every morning    Hydroxyzine 50 mg at night    If not improving, notify    Reassess every 2-4 weeks    Check blood work    Consider allergy shots    Wet wraps twice daily for hands

## 2022-10-10 NOTE — PROGRESS NOTES
Suzie Anne is a 61 year old, presenting for the following health issues:  Allergy Consult      HPI     Chief complaint: Allergies    History of present illness: This is a pleasant 61-year-old woman I was asked to see for evaluation by Dr. Bradford in regards to allergies.  Patient states that she has had allergies all of her life.  Symptoms consist of a rash, difficulty breathing and sinus congestion.  States that the rash will worsen if something brushes up against her skin such as an [place] or grass.  She will develop hives.  Sometimes she develops hives without any specific trigger.  She does use hydroxyzine for anxiety and uses this at night but she is not sure if it helps.  She takes Benadryl only as needed.  Cetirizine has made her sick to her stomach.  She does have a history of asthma and COPD.  Follows with Dr. Bradford for this.  She states that her asthma now is well controlled but she did require prednisone this summer.  She is currently on Trelegy.  She also takes montelukast.  She has been allergy tested previously.  It was many years ago.  Positive to dust mites, molds.  She states [place] farm and they do have dogs and horses.  She states the barn seems to aggravate her symptoms.  She does note drainage down the back of her throat and sinus congestion.  She was given Flonase which did not seem to help.  She has seen ENT for dysphagia and was given Astelin nasal spray which did not help either.  She states she takes a lot of medication would prefer not to add more medication to her regimen.  She did have a pulmonary function test performed which demonstrated moderate fixed obstructive physiology without air trapping.    She does note that she has dry skin on the palmar surfaces of her hands.  She uses Eucerin cream on her hands.  She does not use any prescription cream.    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      Objective    /77   Pulse 81   Ht 1.626 m (5' 4\")   Wt 77.6 kg (171 lb)   SpO2 97%   BMI 29.35 kg/m    Body mass index is 29.35 kg/m .  Physical Exam   Gen: Pleasant female not in acute distress  HEENT: Eyes no erythema of the bulbar or palpebral conjunctiva, no edema. Ears: TMs well visualized, no effusions. Nose: No congestion, mucosa normal. Mouth: Throat clear, no lip or tongue edema.   Cardiac: Regular rate and rhythm, no murmurs, rubs or gallops  Respiratory: Clear to auscultation bilaterally, no adventitious breath sounds  Lymph: No visible supraclavicular or cervical lymphadenopathy  Skin: Dry cracked skin on the palmar surfaces of both hands  Psych: Alert and oriented times 3    Impression report and plan:  1.  Allergic rhinitis  2.  Chronic hives  3.  Asthma and COPD  4.  Hand dermatitis    Reviewed wet wrap therapy and prescribed clobetasol cream.  I would like to check allergy testing but she took her hydroxyzine last night.  She uses this for anxiety.  Recommended that she check specific IgE to the environmental allergens including horse.  Pending this, consider allergen immunotherapy.  I went over the risks and benefits of allergy shots.  I stated risks include hives, swelling, shortness of breath.  I did state that one in 2.5 million shot administrations can result in death.  I stated they must wait in the office for 30 minutes following the shot and carry an epinephrine device on the day of the shot.  I stated that shots are effective in about 90% of patients.  I stated that they should check with the insurance company prior to proceeding.  They " understand the risks and benefits and pending results will let me know if she would like to pursue this option.  For her hives, I suspect this is chronic dermatographic hives.  Reviewed exacerbating factors.  Recommended Allegra 2 tablets in the morning and continue hydroxyzine 50 mg at night.  She will notify me if symptoms or not well controlled regarding the hives.

## 2022-10-13 ENCOUNTER — TRANSFERRED RECORDS (OUTPATIENT)
Dept: HEALTH INFORMATION MANAGEMENT | Facility: CLINIC | Age: 62
End: 2022-10-13

## 2022-10-14 DIAGNOSIS — E03.9 ACQUIRED HYPOTHYROIDISM: ICD-10-CM

## 2022-10-15 RX ORDER — LEVOTHYROXINE SODIUM 75 UG/1
TABLET ORAL
Qty: 90 TABLET | Refills: 2 | Status: SHIPPED | OUTPATIENT
Start: 2022-10-15

## 2022-10-15 NOTE — TELEPHONE ENCOUNTER
"Last Written Prescription Date:  1/3/22  Last Fill Quantity: 90,  # refills: 2   Last office visit provider:  8/15/22     Requested Prescriptions   Pending Prescriptions Disp Refills     levothyroxine (SYNTHROID/LEVOTHROID) 75 MCG tablet [Pharmacy Med Name: LEVOTHYROXINE 0.075MG (75MCG) TABS] 90 tablet 2     Sig: TAKE 1 TABLET(75 MCG) BY MOUTH DAILY       Thyroid Protocol Passed - 10/14/2022  9:50 PM        Passed - Patient is 12 years or older        Passed - Recent (12 mo) or future (30 days) visit within the authorizing provider's specialty     Patient has had an office visit with the authorizing provider or a provider within the authorizing providers department within the previous 12 mos or has a future within next 30 days. See \"Patient Info\" tab in inbasket, or \"Choose Columns\" in Meds & Orders section of the refill encounter.              Passed - Medication is active on med list        Passed - Normal TSH on file in past 12 months     Recent Labs   Lab Test 08/15/22  1218   TSH 1.20              Passed - No active pregnancy on record     If patient is pregnant or has had a positive pregnancy test, please check TSH.          Passed - No positive pregnancy test in past 12 months     If patient is pregnant or has had a positive pregnancy test, please check TSH.               Lauren Banuelos RN 10/15/22 1:28 PM  "

## 2022-10-23 ENCOUNTER — HEALTH MAINTENANCE LETTER (OUTPATIENT)
Age: 62
End: 2022-10-23

## 2022-11-11 ENCOUNTER — TRANSFERRED RECORDS (OUTPATIENT)
Dept: HEALTH INFORMATION MANAGEMENT | Facility: CLINIC | Age: 62
End: 2022-11-11

## 2022-11-21 ENCOUNTER — LAB (OUTPATIENT)
Dept: LAB | Facility: CLINIC | Age: 62
End: 2022-11-21
Payer: COMMERCIAL

## 2022-11-21 ENCOUNTER — LAB (OUTPATIENT)
Dept: CARDIOLOGY | Facility: CLINIC | Age: 62
End: 2022-11-21
Payer: COMMERCIAL

## 2022-11-21 DIAGNOSIS — J30.1 SEASONAL ALLERGIC RHINITIS DUE TO POLLEN: ICD-10-CM

## 2022-11-21 DIAGNOSIS — E78.2 MIXED HYPERLIPIDEMIA: ICD-10-CM

## 2022-11-21 DIAGNOSIS — I65.23 BILATERAL CAROTID ARTERY STENOSIS: ICD-10-CM

## 2022-11-21 LAB
CHOLEST SERPL-MCNC: 220 MG/DL
FASTING STATUS PATIENT QL REPORTED: YES
HDLC SERPL-MCNC: 73 MG/DL
LDLC SERPL CALC-MCNC: 112 MG/DL
TRIGL SERPL-MCNC: 173 MG/DL

## 2022-11-21 PROCEDURE — 86003 ALLG SPEC IGE CRUDE XTRC EA: CPT | Performed by: ALLERGY & IMMUNOLOGY

## 2022-11-21 PROCEDURE — 86003 ALLG SPEC IGE CRUDE XTRC EA: CPT

## 2022-11-21 PROCEDURE — 36415 COLL VENOUS BLD VENIPUNCTURE: CPT | Performed by: ALLERGY & IMMUNOLOGY

## 2022-11-21 PROCEDURE — 80061 LIPID PANEL: CPT

## 2022-11-21 PROCEDURE — 82785 ASSAY OF IGE: CPT

## 2022-11-22 LAB
EAST WHITE PINE IGE QN: <0.1 KU(A)/L
IGE SERPL-ACNC: 63 KU/L (ref 0–114)
NETTLE IGE QN: <0.1 KU(A)/L

## 2022-11-23 LAB
A ALTERNATA IGE QN: <0.1 KU(A)/L
A FUMIGATUS IGE QN: <0.1 KU(A)/L
C HERBARUM IGE QN: <0.1 KU(A)/L
CALIF WALNUT POLN IGE QN: <0.1 KU(A)/L
CAT DANDER IGG QN: <0.1 KU(A)/L
CEDAR IGE QN: <0.1 KU(A)/L
COCKSFOOT IGE QN: <0.1 KU(A)/L
COMMON RAGWEED IGE QN: <0.1 KU(A)/L
COTTONWOOD IGE QN: <0.1 KU(A)/L
D FARINAE IGE QN: <0.1 KU(A)/L
D PTERONYSS IGE QN: <0.1 KU(A)/L
DOG DANDER+EPITH IGE QN: <0.1 KU(A)/L
E PURPURASCENS IGE QN: <0.1 KU(A)/L
ENGL PLANTAIN IGE QN: <0.1 KU(A)/L
FIREBUSH IGE QN: <0.1 KU(A)/L
GIANT RAGWEED IGE QN: <0.1 KU(A)/L
GOOSEFOOT IGE QN: <0.1 KU(A)/L
JOHNSON GRASS IGE QN: <0.1 KU(A)/L
MAPLE IGE QN: <0.1 KU(A)/L
MUGWORT IGE QN: <0.1 KU(A)/L
P NOTATUM IGE QN: <0.1 KU(A)/L
RED MULBERRY IGE QN: <0.1 KU(A)/L
SALTWORT IGE QN: <0.1 KU(A)/L
SHEEP SORREL IGE QN: <0.1 KU(A)/L
SILVER BIRCH IGE QN: <0.1 KU(A)/L
TIMOTHY IGE QN: <0.1 KU(A)/L
WHITE ASH IGE QN: <0.1 KU(A)/L
WHITE ELM IGE QN: <0.1 KU(A)/L
WHITE MULBERRY IGE QN: <0.1 KU(A)/L
WHITE OAK IGE QN: <0.1 KU(A)/L
WORMWOOD IGE QN: <0.1 KU(A)/L

## 2022-11-25 LAB — HORSE DANDER IGE QN: <0.1 KU(A)/L

## 2022-11-29 DIAGNOSIS — E78.5 HYPERLIPEMIA: ICD-10-CM

## 2022-11-29 DIAGNOSIS — E78.2 MIXED HYPERLIPIDEMIA: Primary | ICD-10-CM

## 2022-11-29 RX ORDER — EZETIMIBE 10 MG/1
10 TABLET ORAL DAILY
Qty: 30 TABLET | Refills: 11 | Status: SHIPPED | OUTPATIENT
Start: 2022-11-29 | End: 2022-11-30

## 2022-11-30 DIAGNOSIS — E78.5 HYPERLIPEMIA: ICD-10-CM

## 2022-11-30 DIAGNOSIS — E78.2 MIXED HYPERLIPIDEMIA: ICD-10-CM

## 2022-11-30 RX ORDER — EZETIMIBE 10 MG/1
10 TABLET ORAL DAILY
Qty: 90 TABLET | Refills: 3 | Status: SHIPPED | OUTPATIENT
Start: 2022-11-30 | End: 2024-02-12

## 2022-12-03 ASSESSMENT — PATIENT HEALTH QUESTIONNAIRE - PHQ9
SUM OF ALL RESPONSES TO PHQ QUESTIONS 1-9: 16
SUM OF ALL RESPONSES TO PHQ QUESTIONS 1-9: 16
10. IF YOU CHECKED OFF ANY PROBLEMS, HOW DIFFICULT HAVE THESE PROBLEMS MADE IT FOR YOU TO DO YOUR WORK, TAKE CARE OF THINGS AT HOME, OR GET ALONG WITH OTHER PEOPLE: SOMEWHAT DIFFICULT

## 2022-12-05 ENCOUNTER — OFFICE VISIT (OUTPATIENT)
Dept: FAMILY MEDICINE | Facility: CLINIC | Age: 62
End: 2022-12-05
Payer: COMMERCIAL

## 2022-12-05 VITALS
HEART RATE: 93 BPM | SYSTOLIC BLOOD PRESSURE: 130 MMHG | OXYGEN SATURATION: 96 % | HEIGHT: 65 IN | DIASTOLIC BLOOD PRESSURE: 74 MMHG | RESPIRATION RATE: 20 BRPM | TEMPERATURE: 98.2 F | BODY MASS INDEX: 27.99 KG/M2 | WEIGHT: 168 LBS

## 2022-12-05 DIAGNOSIS — Z01.818 PREOP GENERAL PHYSICAL EXAM: Primary | ICD-10-CM

## 2022-12-05 DIAGNOSIS — Z12.31 ENCOUNTER FOR SCREENING MAMMOGRAM FOR BREAST CANCER: ICD-10-CM

## 2022-12-05 DIAGNOSIS — G89.29 CHRONIC PAIN OF RIGHT KNEE: ICD-10-CM

## 2022-12-05 DIAGNOSIS — Z23 IMMUNIZATION DUE: ICD-10-CM

## 2022-12-05 DIAGNOSIS — F31.81 BIPOLAR 2 DISORDER (H): Chronic | ICD-10-CM

## 2022-12-05 DIAGNOSIS — R13.14 PHARYNGOESOPHAGEAL DYSPHAGIA: ICD-10-CM

## 2022-12-05 DIAGNOSIS — K21.9 GASTROESOPHAGEAL REFLUX DISEASE WITHOUT ESOPHAGITIS: ICD-10-CM

## 2022-12-05 DIAGNOSIS — J43.2 CENTRILOBULAR EMPHYSEMA (H): Chronic | ICD-10-CM

## 2022-12-05 DIAGNOSIS — E78.2 MIXED HYPERLIPIDEMIA: Chronic | ICD-10-CM

## 2022-12-05 DIAGNOSIS — M25.561 CHRONIC PAIN OF RIGHT KNEE: ICD-10-CM

## 2022-12-05 DIAGNOSIS — E03.9 ACQUIRED HYPOTHYROIDISM: Chronic | ICD-10-CM

## 2022-12-05 DIAGNOSIS — G89.4 CHRONIC PAIN SYNDROME: Chronic | ICD-10-CM

## 2022-12-05 LAB
ATRIAL RATE - MUSE: 80 BPM
DIASTOLIC BLOOD PRESSURE - MUSE: NORMAL MMHG
ERYTHROCYTE [DISTWIDTH] IN BLOOD BY AUTOMATED COUNT: 17.5 % (ref 10–15)
HCT VFR BLD AUTO: 37.5 % (ref 35–47)
HGB BLD-MCNC: 11.2 G/DL (ref 11.7–15.7)
INTERPRETATION ECG - MUSE: NORMAL
MCH RBC QN AUTO: 21.4 PG (ref 26.5–33)
MCHC RBC AUTO-ENTMCNC: 29.9 G/DL (ref 31.5–36.5)
MCV RBC AUTO: 72 FL (ref 78–100)
P AXIS - MUSE: 72 DEGREES
PLATELET # BLD AUTO: 340 10E3/UL (ref 150–450)
PR INTERVAL - MUSE: 154 MS
QRS DURATION - MUSE: 72 MS
QT - MUSE: 392 MS
QTC - MUSE: 452 MS
R AXIS - MUSE: 77 DEGREES
RBC # BLD AUTO: 5.23 10E6/UL (ref 3.8–5.2)
SYSTOLIC BLOOD PRESSURE - MUSE: NORMAL MMHG
T AXIS - MUSE: 79 DEGREES
VENTRICULAR RATE- MUSE: 80 BPM
WBC # BLD AUTO: 6.4 10E3/UL (ref 4–11)

## 2022-12-05 PROCEDURE — G0008 ADMIN INFLUENZA VIRUS VAC: HCPCS | Performed by: FAMILY MEDICINE

## 2022-12-05 PROCEDURE — 90677 PCV20 VACCINE IM: CPT | Performed by: FAMILY MEDICINE

## 2022-12-05 PROCEDURE — 85027 COMPLETE CBC AUTOMATED: CPT | Performed by: FAMILY MEDICINE

## 2022-12-05 PROCEDURE — 36415 COLL VENOUS BLD VENIPUNCTURE: CPT | Performed by: FAMILY MEDICINE

## 2022-12-05 PROCEDURE — 90682 RIV4 VACC RECOMBINANT DNA IM: CPT | Performed by: FAMILY MEDICINE

## 2022-12-05 PROCEDURE — G0009 ADMIN PNEUMOCOCCAL VACCINE: HCPCS | Performed by: FAMILY MEDICINE

## 2022-12-05 PROCEDURE — 93005 ELECTROCARDIOGRAM TRACING: CPT | Performed by: FAMILY MEDICINE

## 2022-12-05 PROCEDURE — 80048 BASIC METABOLIC PNL TOTAL CA: CPT | Performed by: FAMILY MEDICINE

## 2022-12-05 PROCEDURE — 93010 ELECTROCARDIOGRAM REPORT: CPT | Performed by: INTERNAL MEDICINE

## 2022-12-05 PROCEDURE — 99215 OFFICE O/P EST HI 40 MIN: CPT | Mod: 25 | Performed by: FAMILY MEDICINE

## 2022-12-05 ASSESSMENT — ENCOUNTER SYMPTOMS
APPETITE CHANGE: 0
FEVER: 0
FATIGUE: 1
PALPITATIONS: 0
WHEEZING: 1
NAUSEA: 1
BACK PAIN: 1
DIZZINESS: 0
ARTHRALGIAS: 1
CHOKING: 1
SORE THROAT: 1
HEADACHES: 1
NECK PAIN: 1
CONSTIPATION: 1
DYSURIA: 1
SINUS PAIN: 0
CHILLS: 0
TREMORS: 1
ABDOMINAL PAIN: 0
MYALGIAS: 1
SHORTNESS OF BREATH: 1
CHEST TIGHTNESS: 1
JOINT SWELLING: 1
TROUBLE SWALLOWING: 1
DIARRHEA: 0
WEAKNESS: 1
VOMITING: 0
BLOOD IN STOOL: 0
COUGH: 1
EYES NEGATIVE: 1
VOICE CHANGE: 1
SINUS PRESSURE: 0
NUMBNESS: 1
NERVOUS/ANXIOUS: 1
FREQUENCY: 1

## 2022-12-05 NOTE — H&P (VIEW-ONLY)
[facility]  [address]  [place]  [facility] [address]  Phone: [phone]  Fax: [phone]  Primary Provider: Joanne Reddy  Pre-op Performing Provider: JOANNE REDDY      PREOPERATIVE EVALUATION:  Today's date: 12/5/2022    Dayana Samaniego is a 61 year old female who presents for a preoperative evaluation.    Surgical Information:  Surgery/Procedure: FUNDOPLICATION, partial ROBOT-ASSISTED, LAPAROSCOPIC, USING DA MARIBEL XI  Surgery Location: [facility]  Surgeon:  Dr. Davie Ocasio  Surgery Date: 12/30/22  Time of Surgery: TBD  Where patient plans to recover: At home with family  Fax number for surgical facility: Note does not need to be faxed, will be available electronically in Epic.    Type of Anesthesia Anticipated: General    Preop general physical exam  Patient is cleared for surgery without further evaluation required  - EKG 12-lead ([facility] Only)  - Chest X-Ray 2 VW  - CBC with platelets  - Basic metabolic panel  - Asymptomatic COVID-19 Virus (Coronavirus) by PCR Nasopharyngeal  - CBC with platelets  - Basic metabolic panel    Gastroesophageal reflux disease without esophagitis  Symptoms are severe and uncontrolled on PPI.  History of hiatal hernia and Schatzki's ring.     Pharyngoesophageal dysphagia  Secondary to GERD.    Centrilobular emphysema (H)  Longtime smoker.  Quit 10/29/2021 per records.  Actually has overlap syndrome with asthma and is on Trelegy Ellipta, albuterol, Singulair, cetirizine.    Bipolar 2 disorder (H)  Regularly seen by behavioral health and currently on Lamictal, Prozac, bupropion, hydroxyzine, doxepin.    Chronic pain syndrome  Seen regularly at [facility] pain clinic and is taking 40 mg of hydrocodone on a daily basis.  Also on tizanidine, Relpax, Aimovig, and Neurontin.    Chronic pain of right knee  Patient says she has been seen for this but needs an MRI of her right knee  "because of the amount of swelling she has behind the knee.  She said she was seen at [place] and an x-ray was done but they told her that the joint is \"okay\" per patient.  - MR Knee Right w/o Contrast    Mixed hyperlipidemia  Currently on Lipitor 80 mg and Zetia 10 mg.    Acquired hypothyroidism  On levothyroxine 75 mcg.    Encounter for screening mammogram for breast cancer  Overdue for this currently.  - MA SCREENING DIGITAL BILAT - Future  (s+30)    Immunization due  Willing to have her flu shot and pneumonia shot today.  - Pneumococcal 20 Valent Conjugate (Prevnar 20)  - INFLUENZA VACCINE 50-64 OR 18-64 W/EGG ALLERGY (FLUBLOK)        Answers for HPI/ROS submitted by the patient on 12/3/2022  If you checked off any problems, how difficult have these problems made it for you to do your work, take care of things at home, or get along with other people?: Somewhat difficult  PHQ9 TOTAL SCORE: 16        Subjective     HPI related to upcoming procedure: Patient has a long history of severe reflux and gastritis with small hiatal hernia and history of Schatzki's ring as well as dysphagia.  She is hoping the surgery will help with her symptoms.    Preop Questions 12/3/2022   1. Have you ever had a heart attack or stroke? No   2. Have you ever had surgery on your heart or blood vessels, such as a stent placement, a coronary artery bypass, or surgery on an artery in your head, neck, heart, or legs? No   3. Do you have chest pain with activity? No   4. Do you have a history of  heart failure? No   5. Do you currently have a cold, bronchitis or symptoms of other infection? No   6. Do you have a cough, shortness of breath, or wheezing? YES - asthma/COPD     7. Do you or anyone in your family have previous history of blood clots? No   8. Do you or does anyone in your family have a serious bleeding problem such as prolonged bleeding following surgeries or cuts? No   9. Have you ever had problems with anemia or been told to take " iron pills? yes - last hgb 10.6   10. Have you had any abnormal blood loss such as black, tarry or bloody stools, or abnormal vaginal bleeding? No   11. Have you ever had a blood transfusion? No   12. Are you willing to have a blood transfusion if it is medically needed before, during, or after your surgery? Yes   13. Have you or any of your relatives ever had problems with anesthesia? YES - pt w/ nausea/vomit   14. Do you have sleep apnea, excessive snoring or daytime drowsiness? No   15. Do you have any artifical heart valves or other implanted medical devices like a pacemaker, defibrillator, or continuous glucose monitor? No   16. Do you have artificial joints? No   17. Are you allergic to latex? No       Health Care Directive:  Patient does not have a Health Care Directive or Living Will: Discussed advance care planning with patient; however, patient declined at this time.    Preoperative Review of :   reviewed - controlled substances reflected in medication list.      Review of Systems   Constitutional: Positive for fatigue. Negative for appetite change, chills and fever.   HENT: Positive for congestion, postnasal drip, sore throat, trouble swallowing and voice change. Negative for sinus pressure and sinus pain.    Eyes: Negative.    Respiratory: Positive for cough, choking, chest tightness, shortness of breath and wheezing.    Cardiovascular: Negative for chest pain and palpitations.   Gastrointestinal: Positive for constipation and nausea. Negative for abdominal pain, blood in stool, diarrhea and vomiting.   Endocrine: Positive for cold intolerance. Negative for heat intolerance.   Genitourinary: Positive for dysuria and frequency. Negative for pelvic pain and vaginal discharge.   Musculoskeletal: Positive for arthralgias, back pain, gait problem, joint swelling, myalgias and neck pain.   Skin: Negative.    Neurological: Positive for tremors, weakness, numbness and headaches. Negative for dizziness.    Psychiatric/Behavioral: The patient is nervous/anxious.        Patient Active Problem List    Diagnosis Date Noted     Moderate asthma with exacerbation, unspecified whether persistent 07/29/2022     Priority: Medium     Acute bronchitis, unspecified organism 07/29/2022     Priority: Medium     Draining cutaneous sinus tract 12/01/2021     Priority: Medium     Added automatically from request for surgery [id number]       Abnormal reflex 09/08/2021     Priority: Medium     Asymptomatic postmenopausal status 08/31/2021     Priority: Medium     Formatting of this note might be different from the original.  Created by Conversion    Replacement Utility updated for latest IMO load       Menopausal disorder 08/31/2021     Priority: Medium     Formatting of this note might be different from the original.  Created by Conversion    Replacement Utility updated for latest IMO load       Migraine headache 08/31/2021     Priority: Medium     Formatting of this note might be different from the original.  Created by Conversion    Replacement Utility updated for latest IMO load       Chronic pain syndrome 08/31/2021     Priority: Medium     DDD (degenerative disc disease), lumbosacral 03/22/2021     Priority: Medium     Spinal stenosis of lumbar region with neurogenic claudication 03/22/2021     Priority: Medium     Pain in right leg 03/21/2021     Priority: Medium     Other specified disorders of bone density and structure, multiple sites 03/21/2021     Priority: Medium     Other abnormalities of gait and mobility 03/21/2021     Priority: Medium     Moderate persistent asthma, uncomplicated 03/21/2021     Priority: Medium     Low back pain 03/21/2021     Priority: Medium     Encounter for other orthopedic aftercare 03/21/2021     Priority: Medium     Anemia 03/18/2021     Priority: Medium     Hypotension 03/18/2021     Priority: Medium     Fibromyalgia 03/17/2021     Priority: Medium     Formatting of this note might be different  from the original.  Created by Conversion    Formatting of this note might be different from the original.  Created by Conversion    Formatting of this note might be different from the original.  Formatting of this note might be different from the original.  Created by Conversion    Formatting of this note might be different from the original.  Created by Conversion  Formatting of this note might be different from the original.  Formatting of this note might be different from the original.  Created by Conversion    Formatting of this note might be different from the original.  Created by Conversion       [facility] 03/17/2021     Priority: Medium     Formatting of this note might be different from the original.  Created by Conversion    Formatting of this note might be different from the original.  Formatting of this note might be different from the original.  Created by Conversion  Formatting of this note might be different from the original.  Formatting of this note might be different from the original.  Created by Conversion       [facility] 03/17/2021     Priority: Medium     Formatting of this note might be different from the original.  Created by Conversion    Replacement Utility updated for latest IMO load    Formatting of this note might be different from the original.  Formatting of this note might be different from the original.  Created by Conversion    Replacement Utility updated for latest IMO load  Formatting of this note might be different from the original.  Formatting of this note might be different from the original.  Created by Conversion    Replacement Utility updated for latest IMO load       Common migraine with intractable migraine 02/15/2021     Priority: Medium     Chronic kidney disease, stage 3 (H) 01/14/2021     Priority: Medium     Created by Conversion         Osteopenia of multiple sites 09/01/2020     Priority: Medium     Centrilobular emphysema (H) 02/26/2018     Priority:  Medium     Formatting of this note might be different from the original.  Mild, seen in 2018 CT scan, DLCO 60% predicted    Formatting of this note might be different from the original.  Formatting of this note might be different from the original.  Mild, seen in 2018 CT scan, DLCO 60% predicted  Formatting of this note might be different from the original.  Formatting of this note might be different from the original.  Mild, seen in 2018 CT scan, DLCO 60% predicted       Hiatal hernia 02/16/2017     Priority: Medium     Lung nodule 08/04/2016     Priority: Medium     Formatting of this note might be different from the original.  8/4/2016:  Left upper lobe nodule of 6.5 mm, likely benign given slow growth per radiologist.  See CT  6/27/2011:  measured  approximately 5.5 mm in greatest dimension       Bipolar 2 disorder (H) 06/01/2015     Priority: Medium     Formatting of this note might be different from the original.  Created by Conversion    Replacement Utility updated for latest IMO load    Formatting of this note might be different from the original.  MED TRIALS:  Doxepin has been helpful for sleep & fibromyalgia.  Topamax- caused cognitive slowing & poor concentration. Depakote caused wt gain. Seroquel was sedating. Trazodone caused insomnia. Has tried mellaril, effexor, depakote, remeron, buspar, risperdal,zyprexa, celexa,luvox - unsure what happened with these.  Formatting of this note might be different from the original.  MED TRIALS:  Doxepin has been helpful for sleep & fibromyalgia.  Topamax- caused cognitive slowing & poor concentration. Depakote caused wt gain. Seroquel was sedating. Trazodone caused insomnia. Has tried mellaril, effexor, depakote, remeron, buspar, risperdal,zyprexa, celexa,luvox - unsure what happened with these.       Borderline personality disorder (H) 06/01/2015     Priority: Medium     PTSD (post-traumatic stress disorder) 06/01/2015     Priority: Medium     GERD (gastroesophageal  reflux disease) 10/16/2012     Priority: Medium     Formatting of this note might be different from the original.  Formatting of this note might be different from the original.  coegd 07/17/12, normal screening  Formatting of this note might be different from the original.  coegd 07/17/12, normal screening        Past Medical History:   Diagnosis Date     Abnormality of gait     Created by Conversion      Anemia 3/18/2021     Anxiety      Asthma      Asymptomatic postmenopausal status     Created by Conversion  Replacement Utility updated for latest IMO load     Bipolar 2 disorder (H)      Centrilobular emphysema (H) 2/26/2018    Mild, seen in 2018 CT scan, DLCO 60% predicted  Formatting of this note might be different from the original. Formatting of this note might be different from the original. Mild, seen in 2018 CT scan, DLCO 60% predicted     Cervical spinal stenosis     s/p cervical fusion     Chronic kidney disease      Chronic kidney disease, stage 3 (H) 1/14/2021     Chronic pain syndrome      Cigarette nicotine dependence without complication 3/4/2020     Common migraine with intractable migraine 2/15/2021     COPD (chronic obstructive pulmonary disease) (H)      DDD (degenerative disc disease), lumbosacral 3/22/2021     Depression      Fibrocystic breast      Fibromyalgia      GERD (gastroesophageal reflux disease)      Hallux abductovalgus with bunions, unspecified laterality 12/14/2015     Herpes zoster     Created by Conversion  Replacement Utility updated for latest IMO load     Hiatal hernia      History of electroconvulsive therapy      Hyperlipidemia 3/17/2021    Created by Conversion   Formatting of this note might be different from the original. Formatting of this note might be different from the original. Created by Conversion     Hypotension 3/18/2021     Hypothyroidism     hypothyroidism     IBS (irritable bowel syndrome)      Lung nodule 8/4/2016 8/4/2016:  Left upper lobe nodule of 6.5  mm, likely benign given slow growth per radiologist.  See CT 6/27/2011:  measured  approximately 5.5 mm in greatest dimension      Menopausal and postmenopausal disorder     Created by Conversion  Replacement Utility updated for latest IMO load     Migraine     Created by Conversion  Replacement Utility updated for latest IMO load     Migraines      Osteoarthritis      Osteopenia of multiple sites 9/1/2020     Other chronic pain      PONV (postoperative nausea and vomiting)      PTSD (post-traumatic stress disorder)      Shingles 2014    on back     Spinal stenosis of lumbar region with neurogenic claudication 3/22/2021     Tobacco use disorder     Created by Conversion      Past Surgical History:   Procedure Laterality Date     BIOPSY BREAST Left     Approx 5 bx     BUNIONECTOMY Right 12/15/2015    Procedure: MODIFIED LAI BUNIONECTOMY RIGHT FOOT;  Surgeon: Jerome Calvin DPM;  Location: [place] [facility];  Service:      CERVICAL FUSION       CERVICAL FUSION Bilateral 2/16/2015    Procedure: ANTERIOR CERVICAL DECOMPRESSION/FUSION C3-5 BILATERAL, ANTERIOR HARDWARE REMOVAL C5-7 BILATERAL ;  Surgeon: Sawyer Roladn MD;  Location: [facility];  Service:      HC NIPPLE EXPLORATION      Description: Breast Nipple Explor W/ Excision Solitary Lactiferous Duct;  Recorded: 04/10/2008;     HYSTERECTOMY       IR CERVICAL EPIDURAL STEROID INJECTION  9/17/2003     IR CERVICAL EPIDURAL STEROID INJECTION  12/11/2003     IR CERVICAL EPIDURAL STEROID INJECTION  1/16/2004     IRRIGATION AND DEBRIDEMENT DECUBITUS WOUND, COMBINED Left 12/13/2021    Procedure: Wound exploration and debridement of Left Lower Abdomin Chronic wound.;  Surgeon: Crispin Bunch MD;  Location: [facility]     LUMBAR DISCECTOMY      X2     MAMMOPLASTY AUGMENTATION Bilateral      approx 2006?     PELVIC LAPAROSCOPY      multiple     SHOULDER OPEN ROTATOR CUFF REPAIR Left     X2     ZZC TOTAL ABDOM HYSTERECTOMY      Description:  Total Abdominal Hysterectomy;  Recorded: 06/10/2013;     Current Outpatient Medications   Medication Sig Dispense Refill     acetaminophen (TYLENOL) 500 MG tablet Take 1,000 mg by mouth       albuterol (PROAIR HFA) 108 (90 Base) MCG/ACT inhaler Inhale 2 puffs into the lungs every 4 hours 18 g 11     albuterol (PROVENTIL) (2.5 MG/3ML) 0.083% neb solution Take 1 vial (2.5 mg) by nebulization every 6 hours as needed for shortness of breath / dyspnea or wheezing 180 mL 3     atorvastatin (LIPITOR) 80 MG tablet Take 1 tablet (80 mg) by mouth At Bedtime 90 tablet 3     benzonatate (TESSALON) 100 MG capsule TAKE 1 CAPSULE(100 MG) BY MOUTH THREE TIMES DAILY AS NEEDED FOR COUGH 30 capsule 3     buPROPion (WELLBUTRIN) 75 MG tablet Take 75 mg by mouth 2 times daily       cetirizine (ZYRTEC) 10 MG tablet Take 10 mg by mouth daily       clobetasol (TEMOVATE) 0.05 % external cream Apply topically 2 times daily For max of 21 days 60 g 1     docusate sodium (COLACE) 100 MG capsule Take 100 mg by mouth 2 times daily       doxepin (SINEQUAN) 100 MG capsule Take 75 mg by mouth At Bedtime        eletriptan (RELPAX) 40 MG tablet Take 1 tablet (40 mg) by mouth at onset of headache for migraine 12 tablet 3     erenumab-aooe (AIMOVIG) 70 MG/ML injection ADMINISTER 1 ML(70 MG) UNDER THE SKIN EVERY MONTH 1 mL 11     esomeprazole (NEXIUM) 20 MG DR capsule Take 2 capsules by mouth every 24 hours       ezetimibe (ZETIA) 10 MG tablet Take 1 tablet (10 mg) by mouth daily 90 tablet 3     FLUoxetine (PROZAC) 40 MG capsule Take 40 mg by mouth daily       Fluticasone-Umeclidin-Vilanterol (TRELEGY ELLIPTA) 100-62.5-25 MCG/INH oral inhaler Inhale 1 puff into the lungs daily 1 each 11     gabapentin (NEURONTIN) 300 MG capsule Take 600 mg by mouth 2 times daily       HYDROcodone-acetaminophen (NORCO)  MG per tablet Take 1 tablet by mouth every 6 hours as needed       hydrOXYzine (VISTARIL) 25 MG capsule Take 25 mg by mouth 3 times daily as needed        lamoTRIgine (LAMICTAL) 200 MG tablet Take 1 tablet by mouth 2 times daily       levothyroxine (SYNTHROID/LEVOTHROID) 75 MCG tablet TAKE 1 TABLET(75 MCG) BY MOUTH DAILY 90 tablet 2     montelukast (SINGULAIR) 10 MG tablet TAKE 1 TABLET(10 MG) BY MOUTH EVERY 24 HOURS 90 tablet 1     prazosin (MINIPRESS) 2 MG capsule Take 2 capsules by mouth every 24 hours       prochlorperazine (COMPAZINE) 10 MG tablet Take 10 mg by mouth every 6 hours as needed       senna-docusate (SENOKOT-S/PERICOLACE) 8.6-50 MG tablet Take 2 tablets by mouth       tiZANidine (ZANAFLEX) 4 MG tablet TAKE 1 TO 2 TABLETS BY MOUTH DURING THE DAY AND 2 TABLETS AT BEDTIME 120 tablet 1     vitamin D2 (ERGOCALCIFEROL) 20056 units (1250 mcg) capsule TAKE 1 CAPSULE BY MOUTH 1 TIME A WEEK 13 capsule 0       Allergies   Allergen Reactions     Codeine Anaphylaxis     Nefazodone Nausea and Vomiting     Oxycodone-Acetaminophen Unknown     hallucinations     Cetirizine Nausea and Vomiting     Trazodone Nausea and Vomiting     Amoxicillin-Pot Clavulanate [Augmentin] Rash     Cephalexin Rash     Clavulanic Acid Rash     Cyclobenzaprine Rash     Eszopiclone Rash     Methocarbamol Rash     Penicillins Rash     Mild rash        Social History     Tobacco Use     Smoking status: Former     Packs/day: 1.00     Years: 45.00     Pack years: 45.00     Types: Cigarettes     Quit date: 10/29/2021     Years since quittin.1     Smokeless tobacco: Former   Substance Use Topics     Alcohol use: Not Currently     Comment: Alcoholic Drinks/day: rare--1-2 times in year     Family History   Problem Relation Age of Onset     Lung Cancer Mother          at age 52     Alcoholism Father      Heart Disease Maternal Grandfather      Diabetes Paternal Grandmother      Coronary Artery Disease Paternal Grandmother      Heart Disease Paternal Grandfather      Diabetes Sister      Hypertension Sister      Crohn's Disease Sister      Coronary Artery Disease Paternal Uncle      Asthma  "Maternal Aunt      History   Drug Use No         Objective     /74   Pulse 93   Temp 98.2  F (36.8  C) (Oral)   Resp 20   Ht 1.638 m (5' 4.5\")   Wt 76.2 kg (168 lb)   SpO2 96%   BMI 28.39 kg/m      Physical Exam    GENERAL APPEARANCE: healthy, alert and no distress     EYES: EOMI, PERRL     HENT: ear canals and TM's normal and nose and mouth without ulcers or lesions     NECK: no adenopathy, no asymmetry, masses, or scars and thyroid normal to palpation     RESP: lungs clear to auscultation - no rales, rhonchi or wheezes     CV: regular rates and rhythm, normal S1 S2, no S3 or S4 and no murmur, click or rub     ABDOMEN:  soft, nontender, no HSM or masses and bowel sounds normal     MS: extremities normal- no gross deformities noted, no evidence of inflammation in joints, FROM in all extremities.     SKIN: no suspicious lesions or rashes     NEURO: Normal strength and tone, sensory exam grossly normal, mentation intact and speech normal     PSYCH: mentation appears normal. and affect normal/bright     LYMPHATICS: No cervical adenopathy    Recent Labs   Lab Test 09/13/22  1553 09/08/22  0840 07/29/22  1122 06/17/22  1327 02/25/22  0256   HGB 10.6*  --  9.8*   < > 9.4*   PLT  --   --  376  --  342     --  139  --  137   POTASSIUM 4.4  --  3.5  --  3.8   CR 0.92 1.0 1.05  --  1.17*    < > = values in this interval not displayed.        Diagnostics:  Recent Results (from the past 240 hour(s))   EKG 12-lead ([facility] Only)    Collection Time: 12/05/22  4:18 PM   Result Value Ref Range    Systolic Blood Pressure  mmHg    Diastolic Blood Pressure  mmHg    Ventricular Rate 80 BPM    Atrial Rate 80 BPM    DC Interval 154 ms    QRS Duration 72 ms     ms    QTc 452 ms    P Axis 72 degrees    R AXIS 77 degrees    T Axis 79 degrees    Interpretation ECG       Sinus rhythm with Premature atrial complexes  Otherwise normal ECG  When compared with ECG of 29-JUL-2022 11:09,  Premature atrial " complexes are now Present  Nonspecific T wave abnormality no longer evident in Inferior leads  Nonspecific T wave abnormality no longer evident in Anterolateral leads  Confirmed by EDWARD LUIS MD LOC: ([id number]) on 12/5/2022 4:32:01 PM     CBC with platelets    Collection Time: 12/05/22  4:38 PM   Result Value Ref Range    WBC Count 6.4 4.0 - 11.0 10e3/uL    RBC Count 5.23 (H) 3.80 - 5.20 10e6/uL    Hemoglobin 11.2 (L) 11.7 - 15.7 g/dL    Hematocrit 37.5 35.0 - 47.0 %    MCV 72 (L) 78 - 100 fL    MCH 21.4 (L) 26.5 - 33.0 pg    MCHC 29.9 (L) 31.5 - 36.5 g/dL    RDW 17.5 (H) 10.0 - 15.0 %    Platelet Count 340 150 - 450 10e3/uL   Basic metabolic panel    Collection Time: 12/05/22  4:38 PM   Result Value Ref Range    Sodium 141 136 - 145 mmol/L    Potassium 4.6 3.4 - 5.3 mmol/L    Chloride 103 98 - 107 mmol/L    Carbon Dioxide (CO2) 23 22 - 29 mmol/L    Anion Gap 15 7 - 15 mmol/L    Urea Nitrogen 19.8 8.0 - 23.0 mg/dL    Creatinine 1.27 (H) 0.51 - 0.95 mg/dL    Calcium 9.4 8.8 - 10.2 mg/dL    Glucose 81 70 - 99 mg/dL    GFR Estimate 48 (L) >60 mL/min/1.73m2          Revised Cardiac Risk Index (RCRI):  The patient has the following serious cardiovascular risks for perioperative complications:   - High risk surgery (>5% cardiac complication risk) = 1 point     RCRI Interpretation: 1 point: Class II (low risk - 0.9% complication rate)           Signed Electronically by: Joanne Weiner MD  Copy of this evaluation report is provided to requesting physician.

## 2022-12-05 NOTE — PROGRESS NOTES
[facility]  [address]  [place]  [facility] [address]  Phone: [phone]  Fax: [phone]  Primary Provider: Joanne Reddy  Pre-op Performing Provider: JOANNE REDDY      PREOPERATIVE EVALUATION:  Today's date: 12/5/2022    Dayana Samaniego is a 61 year old female who presents for a preoperative evaluation.    Surgical Information:  Surgery/Procedure: FUNDOPLICATION, partial ROBOT-ASSISTED, LAPAROSCOPIC, USING DA MARIBEL XI  Surgery Location: [facility]  Surgeon:  Dr. Davie Ocasio  Surgery Date: 12/30/22  Time of Surgery: TBD  Where patient plans to recover: At home with family  Fax number for surgical facility: Note does not need to be faxed, will be available electronically in Epic.    Type of Anesthesia Anticipated: General    Preop general physical exam  Patient is cleared for surgery without further evaluation required  - EKG 12-lead ([facility] and [facility] Only)  - Chest X-Ray 2 VW  - CBC with platelets  - Basic metabolic panel  - Asymptomatic COVID-19 Virus (Coronavirus) by PCR Nasopharyngeal  - CBC with platelets  - Basic metabolic panel    Gastroesophageal reflux disease without esophagitis  Symptoms are severe and uncontrolled on PPI.  History of hiatal hernia and Schatzki's ring.     Pharyngoesophageal dysphagia  Secondary to GERD.    Centrilobular emphysema (H)  Longtime smoker.  Quit 10/29/2021 per records.  Actually has overlap syndrome with asthma and is on Trelegy Ellipta, albuterol, Singulair, cetirizine.    Bipolar 2 disorder (H)  Regularly seen by behavioral health and currently on Lamictal, Prozac, bupropion, hydroxyzine, doxepin.    Chronic pain syndrome  Seen regularly at [facility] pain clinic and is taking 40 mg of hydrocodone on a daily basis.  Also on tizanidine, Relpax, Aimovig, and Neurontin.    Chronic pain of right knee  Patient says she has been seen for this but needs an MRI of her right knee  "because of the amount of swelling she has behind the knee.  She said she was seen at [place] and an x-ray was done but they told her that the joint is \"okay\" per patient.  - MR Knee Right w/o Contrast    Mixed hyperlipidemia  Currently on Lipitor 80 mg and Zetia 10 mg.    Acquired hypothyroidism  On levothyroxine 75 mcg.    Encounter for screening mammogram for breast cancer  Overdue for this currently.  - MA SCREENING DIGITAL BILAT - Future  (s+30)    Immunization due  Willing to have her flu shot and pneumonia shot today.  - Pneumococcal 20 Valent Conjugate (Prevnar 20)  - INFLUENZA VACCINE 50-64 OR 18-64 W/EGG ALLERGY (FLUBLOK)        Answers for HPI/ROS submitted by the patient on 12/3/2022  If you checked off any problems, how difficult have these problems made it for you to do your work, take care of things at home, or get along with other people?: Somewhat difficult  PHQ9 TOTAL SCORE: 16        Subjective     HPI related to upcoming procedure: Patient has a long history of severe reflux and gastritis with small hiatal hernia and history of Schatzki's ring as well as dysphagia.  She is hoping the surgery will help with her symptoms.    Preop Questions 12/3/2022   1. Have you ever had a heart attack or stroke? No   2. Have you ever had surgery on your heart or blood vessels, such as a stent placement, a coronary artery bypass, or surgery on an artery in your head, neck, heart, or legs? No   3. Do you have chest pain with activity? No   4. Do you have a history of  heart failure? No   5. Do you currently have a cold, bronchitis or symptoms of other infection? No   6. Do you have a cough, shortness of breath, or wheezing? YES - asthma/COPD     7. Do you or anyone in your family have previous history of blood clots? No   8. Do you or does anyone in your family have a serious bleeding problem such as prolonged bleeding following surgeries or cuts? No   9. Have you ever had problems with anemia or been told to take " iron pills? yes - last hgb 10.6   10. Have you had any abnormal blood loss such as black, tarry or bloody stools, or abnormal vaginal bleeding? No   11. Have you ever had a blood transfusion? No   12. Are you willing to have a blood transfusion if it is medically needed before, during, or after your surgery? Yes   13. Have you or any of your relatives ever had problems with anesthesia? YES - pt w/ nausea/vomit   14. Do you have sleep apnea, excessive snoring or daytime drowsiness? No   15. Do you have any artifical heart valves or other implanted medical devices like a pacemaker, defibrillator, or continuous glucose monitor? No   16. Do you have artificial joints? No   17. Are you allergic to latex? No       Health Care Directive:  Patient does not have a Health Care Directive or Living Will: Discussed advance care planning with patient; however, patient declined at this time.    Preoperative Review of :   reviewed - controlled substances reflected in medication list.      Review of Systems   Constitutional: Positive for fatigue. Negative for appetite change, chills and fever.   HENT: Positive for congestion, postnasal drip, sore throat, trouble swallowing and voice change. Negative for sinus pressure and sinus pain.    Eyes: Negative.    Respiratory: Positive for cough, choking, chest tightness, shortness of breath and wheezing.    Cardiovascular: Negative for chest pain and palpitations.   Gastrointestinal: Positive for constipation and nausea. Negative for abdominal pain, blood in stool, diarrhea and vomiting.   Endocrine: Positive for cold intolerance. Negative for heat intolerance.   Genitourinary: Positive for dysuria and frequency. Negative for pelvic pain and vaginal discharge.   Musculoskeletal: Positive for arthralgias, back pain, gait problem, joint swelling, myalgias and neck pain.   Skin: Negative.    Neurological: Positive for tremors, weakness, numbness and headaches. Negative for dizziness.    Psychiatric/Behavioral: The patient is nervous/anxious.        Patient Active Problem List    Diagnosis Date Noted     Moderate asthma with exacerbation, unspecified whether persistent 07/29/2022     Priority: Medium     Acute bronchitis, unspecified organism 07/29/2022     Priority: Medium     Draining cutaneous sinus tract 12/01/2021     Priority: Medium     Added automatically from request for surgery [id number]       Abnormal reflex 09/08/2021     Priority: Medium     Asymptomatic postmenopausal status 08/31/2021     Priority: Medium     Formatting of this note might be different from the original.  Created by Conversion    Replacement Utility updated for latest IMO load       Menopausal disorder 08/31/2021     Priority: Medium     Formatting of this note might be different from the original.  Created by Conversion    Replacement Utility updated for latest IMO load       Migraine headache 08/31/2021     Priority: Medium     Formatting of this note might be different from the original.  Created by Conversion    Replacement Utility updated for latest IMO load       Chronic pain syndrome 08/31/2021     Priority: Medium     DDD (degenerative disc disease), lumbosacral 03/22/2021     Priority: Medium     Spinal stenosis of lumbar region with neurogenic claudication 03/22/2021     Priority: Medium     Pain in right leg 03/21/2021     Priority: Medium     Other specified disorders of bone density and structure, multiple sites 03/21/2021     Priority: Medium     Other abnormalities of gait and mobility 03/21/2021     Priority: Medium     Moderate persistent asthma, uncomplicated 03/21/2021     Priority: Medium     Low back pain 03/21/2021     Priority: Medium     Encounter for other orthopedic aftercare 03/21/2021     Priority: Medium     Anemia 03/18/2021     Priority: Medium     Hypotension 03/18/2021     Priority: Medium     Fibromyalgia 03/17/2021     Priority: Medium     Formatting of this note might be different  from the original.  Created by Conversion    Formatting of this note might be different from the original.  Created by Conversion    Formatting of this note might be different from the original.  Formatting of this note might be different from the original.  Created by Conversion    Formatting of this note might be different from the original.  Created by Conversion  Formatting of this note might be different from the original.  Formatting of this note might be different from the original.  Created by Conversion    Formatting of this note might be different from the original.  Created by Conversion       [facility] 03/17/2021     Priority: Medium     Formatting of this note might be different from the original.  Created by Conversion    Formatting of this note might be different from the original.  Formatting of this note might be different from the original.  Created by Conversion  Formatting of this note might be different from the original.  Formatting of this note might be different from the original.  Created by Conversion       [facility] 03/17/2021     Priority: Medium     Formatting of this note might be different from the original.  Created by Conversion    Replacement Utility updated for latest IMO load    Formatting of this note might be different from the original.  Formatting of this note might be different from the original.  Created by Conversion    Replacement Utility updated for latest IMO load  Formatting of this note might be different from the original.  Formatting of this note might be different from the original.  Created by Conversion    Replacement Utility updated for latest IMO load       Common migraine with intractable migraine 02/15/2021     Priority: Medium     Chronic kidney disease, stage 3 (H) 01/14/2021     Priority: Medium     Created by Conversion         Osteopenia of multiple sites 09/01/2020     Priority: Medium     Centrilobular emphysema (H) 02/26/2018     Priority:  Medium     Formatting of this note might be different from the original.  Mild, seen in 2018 CT scan, DLCO 60% predicted    Formatting of this note might be different from the original.  Formatting of this note might be different from the original.  Mild, seen in 2018 CT scan, DLCO 60% predicted  Formatting of this note might be different from the original.  Formatting of this note might be different from the original.  Mild, seen in 2018 CT scan, DLCO 60% predicted       Hiatal hernia 02/16/2017     Priority: Medium     Lung nodule 08/04/2016     Priority: Medium     Formatting of this note might be different from the original.  8/4/2016:  Left upper lobe nodule of 6.5 mm, likely benign given slow growth per radiologist.  See CT  6/27/2011:  measured  approximately 5.5 mm in greatest dimension       Bipolar 2 disorder (H) 06/01/2015     Priority: Medium     Formatting of this note might be different from the original.  Created by Conversion    Replacement Utility updated for latest IMO load    Formatting of this note might be different from the original.  MED TRIALS:  Doxepin has been helpful for sleep & fibromyalgia.  Topamax- caused cognitive slowing & poor concentration. Depakote caused wt gain. Seroquel was sedating. Trazodone caused insomnia. Has tried mellaril, effexor, depakote, remeron, buspar, risperdal,zyprexa, celexa,luvox - unsure what happened with these.  Formatting of this note might be different from the original.  MED TRIALS:  Doxepin has been helpful for sleep & fibromyalgia.  Topamax- caused cognitive slowing & poor concentration. Depakote caused wt gain. Seroquel was sedating. Trazodone caused insomnia. Has tried mellaril, effexor, depakote, remeron, buspar, risperdal,zyprexa, celexa,luvox - unsure what happened with these.       Borderline personality disorder (H) 06/01/2015     Priority: Medium     PTSD (post-traumatic stress disorder) 06/01/2015     Priority: Medium     GERD (gastroesophageal  reflux disease) 10/16/2012     Priority: Medium     Formatting of this note might be different from the original.  Formatting of this note might be different from the original.  coegd 07/17/12, normal screening  Formatting of this note might be different from the original.  coegd 07/17/12, normal screening        Past Medical History:   Diagnosis Date     Abnormality of gait     Created by Conversion      Anemia 3/18/2021     Anxiety      Asthma      Asymptomatic postmenopausal status     Created by Conversion  Replacement Utility updated for latest IMO load     Bipolar 2 disorder (H)      Centrilobular emphysema (H) 2/26/2018    Mild, seen in 2018 CT scan, DLCO 60% predicted  Formatting of this note might be different from the original. Formatting of this note might be different from the original. Mild, seen in 2018 CT scan, DLCO 60% predicted     Cervical spinal stenosis     s/p cervical fusion     Chronic kidney disease      Chronic kidney disease, stage 3 (H) 1/14/2021     Chronic pain syndrome      Cigarette nicotine dependence without complication 3/4/2020     Common migraine with intractable migraine 2/15/2021     COPD (chronic obstructive pulmonary disease) (H)      DDD (degenerative disc disease), lumbosacral 3/22/2021     Depression      Fibrocystic breast      Fibromyalgia      GERD (gastroesophageal reflux disease)      Hallux abductovalgus with bunions, unspecified laterality 12/14/2015     Herpes zoster     Created by Conversion  Replacement Utility updated for latest IMO load     Hiatal hernia      History of electroconvulsive therapy      Hyperlipidemia 3/17/2021    Created by Conversion   Formatting of this note might be different from the original. Formatting of this note might be different from the original. Created by Conversion     Hypotension 3/18/2021     Hypothyroidism     hypothyroidism     IBS (irritable bowel syndrome)      Lung nodule 8/4/2016 8/4/2016:  Left upper lobe nodule of 6.5  mm, likely benign given slow growth per radiologist.  See CT 6/27/2011:  measured  approximately 5.5 mm in greatest dimension      Menopausal and postmenopausal disorder     Created by Conversion  Replacement Utility updated for latest IMO load     Migraine     Created by Conversion  Replacement Utility updated for latest IMO load     Migraines      Osteoarthritis      Osteopenia of multiple sites 9/1/2020     Other chronic pain      PONV (postoperative nausea and vomiting)      PTSD (post-traumatic stress disorder)      Shingles 2014    on back     Spinal stenosis of lumbar region with neurogenic claudication 3/22/2021     Tobacco use disorder     Created by Conversion      Past Surgical History:   Procedure Laterality Date     BIOPSY BREAST Left     Approx 5 bx     BUNIONECTOMY Right 12/15/2015    Procedure: MODIFIED LAI BUNIONECTOMY RIGHT FOOT;  Surgeon: Jerome Calvin DPM;  Location: [place] [facility];  Service:      CERVICAL FUSION       CERVICAL FUSION Bilateral 2/16/2015    Procedure: ANTERIOR CERVICAL DECOMPRESSION/FUSION C3-5 BILATERAL, ANTERIOR HARDWARE REMOVAL C5-7 BILATERAL ;  Surgeon: Sawyer Roland MD;  Location: [facility];  Service:      HC NIPPLE EXPLORATION      Description: Breast Nipple Explor W/ Excision Solitary Lactiferous Duct;  Recorded: 04/10/2008;     HYSTERECTOMY       IR CERVICAL EPIDURAL STEROID INJECTION  9/17/2003     IR CERVICAL EPIDURAL STEROID INJECTION  12/11/2003     IR CERVICAL EPIDURAL STEROID INJECTION  1/16/2004     IRRIGATION AND DEBRIDEMENT DECUBITUS WOUND, COMBINED Left 12/13/2021    Procedure: Wound exploration and debridement of Left Lower Abdomin Chronic wound.;  Surgeon: Crispin Bunch MD;  Location: [place] [facility]     LUMBAR DISCECTOMY      X2     MAMMOPLASTY AUGMENTATION Bilateral      approx 2006?     PELVIC LAPAROSCOPY      multiple     SHOULDER OPEN ROTATOR CUFF REPAIR Left     X2     ZZC TOTAL ABDOM HYSTERECTOMY      Description:  Total Abdominal Hysterectomy;  Recorded: 06/10/2013;     Current Outpatient Medications   Medication Sig Dispense Refill     acetaminophen (TYLENOL) 500 MG tablet Take 1,000 mg by mouth       albuterol (PROAIR HFA) 108 (90 Base) MCG/ACT inhaler Inhale 2 puffs into the lungs every 4 hours 18 g 11     albuterol (PROVENTIL) (2.5 MG/3ML) 0.083% neb solution Take 1 vial (2.5 mg) by nebulization every 6 hours as needed for shortness of breath / dyspnea or wheezing 180 mL 3     atorvastatin (LIPITOR) 80 MG tablet Take 1 tablet (80 mg) by mouth At Bedtime 90 tablet 3     benzonatate (TESSALON) 100 MG capsule TAKE 1 CAPSULE(100 MG) BY MOUTH THREE TIMES DAILY AS NEEDED FOR COUGH 30 capsule 3     buPROPion (WELLBUTRIN) 75 MG tablet Take 75 mg by mouth 2 times daily       cetirizine (ZYRTEC) 10 MG tablet Take 10 mg by mouth daily       clobetasol (TEMOVATE) 0.05 % external cream Apply topically 2 times daily For max of 21 days 60 g 1     docusate sodium (COLACE) 100 MG capsule Take 100 mg by mouth 2 times daily       doxepin (SINEQUAN) 100 MG capsule Take 75 mg by mouth At Bedtime        eletriptan (RELPAX) 40 MG tablet Take 1 tablet (40 mg) by mouth at onset of headache for migraine 12 tablet 3     erenumab-aooe (AIMOVIG) 70 MG/ML injection ADMINISTER 1 ML(70 MG) UNDER THE SKIN EVERY MONTH 1 mL 11     esomeprazole (NEXIUM) 20 MG DR capsule Take 2 capsules by mouth every 24 hours       ezetimibe (ZETIA) 10 MG tablet Take 1 tablet (10 mg) by mouth daily 90 tablet 3     FLUoxetine (PROZAC) 40 MG capsule Take 40 mg by mouth daily       Fluticasone-Umeclidin-Vilanterol (TRELEGY ELLIPTA) 100-62.5-25 MCG/INH oral inhaler Inhale 1 puff into the lungs daily 1 each 11     gabapentin (NEURONTIN) 300 MG capsule Take 600 mg by mouth 2 times daily       HYDROcodone-acetaminophen (NORCO)  MG per tablet Take 1 tablet by mouth every 6 hours as needed       hydrOXYzine (VISTARIL) 25 MG capsule Take 25 mg by mouth 3 times daily as needed        lamoTRIgine (LAMICTAL) 200 MG tablet Take 1 tablet by mouth 2 times daily       levothyroxine (SYNTHROID/LEVOTHROID) 75 MCG tablet TAKE 1 TABLET(75 MCG) BY MOUTH DAILY 90 tablet 2     montelukast (SINGULAIR) 10 MG tablet TAKE 1 TABLET(10 MG) BY MOUTH EVERY 24 HOURS 90 tablet 1     prazosin (MINIPRESS) 2 MG capsule Take 2 capsules by mouth every 24 hours       prochlorperazine (COMPAZINE) 10 MG tablet Take 10 mg by mouth every 6 hours as needed       senna-docusate (SENOKOT-S/PERICOLACE) 8.6-50 MG tablet Take 2 tablets by mouth       tiZANidine (ZANAFLEX) 4 MG tablet TAKE 1 TO 2 TABLETS BY MOUTH DURING THE DAY AND 2 TABLETS AT BEDTIME 120 tablet 1     vitamin D2 (ERGOCALCIFEROL) 11772 units (1250 mcg) capsule TAKE 1 CAPSULE BY MOUTH 1 TIME A WEEK 13 capsule 0       Allergies   Allergen Reactions     Codeine Anaphylaxis     Nefazodone Nausea and Vomiting     Oxycodone-Acetaminophen Unknown     hallucinations     Cetirizine Nausea and Vomiting     Trazodone Nausea and Vomiting     Amoxicillin-Pot Clavulanate [Augmentin] Rash     Cephalexin Rash     Clavulanic Acid Rash     Cyclobenzaprine Rash     Eszopiclone Rash     Methocarbamol Rash     Penicillins Rash     Mild rash        Social History     Tobacco Use     Smoking status: Former     Packs/day: 1.00     Years: 45.00     Pack years: 45.00     Types: Cigarettes     Quit date: 10/29/2021     Years since quittin.1     Smokeless tobacco: Former   Substance Use Topics     Alcohol use: Not Currently     Comment: Alcoholic Drinks/day: rare--1-2 times in year     Family History   Problem Relation Age of Onset     Lung Cancer Mother          at age 52     Alcoholism Father      Heart Disease Maternal Grandfather      Diabetes Paternal Grandmother      Coronary Artery Disease Paternal Grandmother      Heart Disease Paternal Grandfather      Diabetes Sister      Hypertension Sister      Crohn's Disease Sister      Coronary Artery Disease Paternal Uncle      Asthma  "Maternal Aunt      History   Drug Use No         Objective     /74   Pulse 93   Temp 98.2  F (36.8  C) (Oral)   Resp 20   Ht 1.638 m (5' 4.5\")   Wt 76.2 kg (168 lb)   SpO2 96%   BMI 28.39 kg/m      Physical Exam    GENERAL APPEARANCE: healthy, alert and no distress     EYES: EOMI, PERRL     HENT: ear canals and TM's normal and nose and mouth without ulcers or lesions     NECK: no adenopathy, no asymmetry, masses, or scars and thyroid normal to palpation     RESP: lungs clear to auscultation - no rales, rhonchi or wheezes     CV: regular rates and rhythm, normal S1 S2, no S3 or S4 and no murmur, click or rub     ABDOMEN:  soft, nontender, no HSM or masses and bowel sounds normal     MS: extremities normal- no gross deformities noted, no evidence of inflammation in joints, FROM in all extremities.     SKIN: no suspicious lesions or rashes     NEURO: Normal strength and tone, sensory exam grossly normal, mentation intact and speech normal     PSYCH: mentation appears normal. and affect normal/bright     LYMPHATICS: No cervical adenopathy    Recent Labs   Lab Test 09/13/22  1553 09/08/22  0840 07/29/22  1122 06/17/22  1327 02/25/22  0256   HGB 10.6*  --  9.8*   < > 9.4*   PLT  --   --  376  --  342     --  139  --  137   POTASSIUM 4.4  --  3.5  --  3.8   CR 0.92 1.0 1.05  --  1.17*    < > = values in this interval not displayed.        Diagnostics:  Recent Results (from the past 240 hour(s))   EKG 12-lead ([facility] Only)    Collection Time: 12/05/22  4:18 PM   Result Value Ref Range    Systolic Blood Pressure  mmHg    Diastolic Blood Pressure  mmHg    Ventricular Rate 80 BPM    Atrial Rate 80 BPM    NJ Interval 154 ms    QRS Duration 72 ms     ms    QTc 452 ms    P Axis 72 degrees    R AXIS 77 degrees    T Axis 79 degrees    Interpretation ECG       Sinus rhythm with Premature atrial complexes  Otherwise normal ECG  When compared with ECG of 29-JUL-2022 11:09,  Premature atrial " complexes are now Present  Nonspecific T wave abnormality no longer evident in Inferior leads  Nonspecific T wave abnormality no longer evident in Anterolateral leads  Confirmed by EDWARD LUIS MD LOC: ([id number]) on 12/5/2022 4:32:01 PM     CBC with platelets    Collection Time: 12/05/22  4:38 PM   Result Value Ref Range    WBC Count 6.4 4.0 - 11.0 10e3/uL    RBC Count 5.23 (H) 3.80 - 5.20 10e6/uL    Hemoglobin 11.2 (L) 11.7 - 15.7 g/dL    Hematocrit 37.5 35.0 - 47.0 %    MCV 72 (L) 78 - 100 fL    MCH 21.4 (L) 26.5 - 33.0 pg    MCHC 29.9 (L) 31.5 - 36.5 g/dL    RDW 17.5 (H) 10.0 - 15.0 %    Platelet Count 340 150 - 450 10e3/uL   Basic metabolic panel    Collection Time: 12/05/22  4:38 PM   Result Value Ref Range    Sodium 141 136 - 145 mmol/L    Potassium 4.6 3.4 - 5.3 mmol/L    Chloride 103 98 - 107 mmol/L    Carbon Dioxide (CO2) 23 22 - 29 mmol/L    Anion Gap 15 7 - 15 mmol/L    Urea Nitrogen 19.8 8.0 - 23.0 mg/dL    Creatinine 1.27 (H) 0.51 - 0.95 mg/dL    Calcium 9.4 8.8 - 10.2 mg/dL    Glucose 81 70 - 99 mg/dL    GFR Estimate 48 (L) >60 mL/min/1.73m2          Revised Cardiac Risk Index (RCRI):  The patient has the following serious cardiovascular risks for perioperative complications:   - High risk surgery (>5% cardiac complication risk) = 1 point     RCRI Interpretation: 1 point: Class II (low risk - 0.9% complication rate)           Signed Electronically by: Joanne Weiner MD  Copy of this evaluation report is provided to requesting physician.

## 2022-12-05 NOTE — PATIENT INSTRUCTIONS
Preparing for Your Surgery  Getting started  A nurse will call you to review your health history and instructions. They will give you an arrival time based on your scheduled surgery time. Please be ready to share:  Your doctor's clinic name and phone number  Your medical, surgical, and anesthesia history  A list of allergies and sensitivities  A list of medicines, including herbal treatments and over-the-counter drugs  Whether the patient has a legal guardian (ask how to send us the papers in advance)  Please tell us if you're pregnant--or if there's any chance you might be pregnant. Some surgeries may injure a fetus (unborn baby), so they require a pregnancy test. Surgeries that are safe for a fetus don't always need a test, and you can choose whether to have one.   If you have a child who's having surgery, please ask for a copy of Preparing for Your Child's Surgery.    Preparing for surgery  Within 10 to 30 days of surgery: Have a pre-op exam (sometimes called an H&P, or History and Physical). This can be done at a clinic or pre-operative center.  If you're having a , you may not need this exam. Talk to your care team.  At your pre-op exam, talk to your care team about all medicines you take. If you need to stop any medicines before surgery, ask when to start taking them again.  We do this for your safety. Many medicines can make you bleed too much during surgery. Some change how well surgery (anesthesia) drugs work.  Call your insurance company to let them know you're having surgery. (If you don't have insurance, call [phone].)  Call your clinic if there's any change in your health. This includes signs of a cold or flu (sore throat, runny nose, cough, rash, fever). It also includes a scrape or scratch near the surgery site.  If you have questions on the day of surgery, call your hospital or surgery center.  COVID testing  You may need to be tested for COVID-19 before having surgery. If so, we will  give you instructions (or click here).  Eating and drinking guidelines  For your safety: Unless your surgeon tells you otherwise, follow the guidelines below.  Eat and drink as usual until 8 hours before you arrive for surgery. After that, no food or milk.  Drink clear liquids until 2 hours before you arrive. These are liquids you can see through, like water, Gatorade, and Propel Water. They also include plain black coffee and tea (no cream or milk), candy, and breath mints. You can spit out gum when you arrive.  If you drink alcohol: Stop drinking it the night before surgery.  If your care team tells you to take medicine on the morning of surgery, it's okay to take it with a sip of water.  Preventing infection  Shower or bathe the night before and morning of your surgery. Follow the instructions your clinic gave you. (If no instructions, use regular soap.)  Don't shave or clip hair near your surgery site. We'll remove the hair if needed.  Don't smoke or vape the morning of surgery. You may chew nicotine gum up to 2 hours before surgery. A nicotine patch is okay.  Note: Some surgeries require you to completely quit smoking and nicotine. Check with your surgeon.  Your care team will make every effort to keep you safe from infection. We will:  Clean our hands often with soap and water (or an alcohol-based hand rub).  Clean the skin at your surgery site with a special soap that kills germs.  Give you a special gown to keep you warm. (Cold raises the risk of infection.)  Wear special hair covers, masks, gowns and gloves during surgery.  Give antibiotic medicine, if prescribed. Not all surgeries need antibiotics.  What to bring on the day of surgery  Photo ID and insurance card  Copy of your health care directive, if you have one  Glasses and hearing aids (bring cases)  You can't wear contacts during surgery  Inhaler and eye drops, if you use them (tell us about these when you arrive)  CPAP machine or breathing device,  if you use them  A few personal items, if spending the night  If you have . . .  A pacemaker, ICD (cardiac defibrillator) or other implant: Bring the ID card.  An implanted stimulator: Bring the remote control.  A legal guardian: Bring a copy of the certified (court-stamped) guardianship papers.  Please remove any jewelry, including body piercings. Leave jewelry and other valuables at home.  If you're going home the day of surgery  You must have a responsible adult drive you home. They should stay with you overnight as well.  If you don't have someone to stay with you, and you aren't safe to go home alone, we may keep you overnight. Insurance often won't pay for this.  After surgery  If it's hard to control your pain or you need more pain medicine, please call your surgeon's office.  Questions?   If you have any questions for your care team, list them here: _________________________________________________________________________________________________________________________________________________________________________ ____________________________________ ____________________________________ ____________________________________  For informational purposes only. Not to replace the advice of your health care provider. Copyright   2003, 2019 [facility]. All rights reserved. Clinically reviewed by Keysha Palumbo MD. [employer] [id number] - REV 10/22.    How to Take Your Medication Before Surgery  - Take all of your medications before surgery except as noted below  - STOP taking all vitamins and herbal supplements 14 days before surgery.

## 2022-12-06 ENCOUNTER — ANCILLARY PROCEDURE (OUTPATIENT)
Dept: GENERAL RADIOLOGY | Facility: CLINIC | Age: 62
End: 2022-12-06
Attending: FAMILY MEDICINE
Payer: COMMERCIAL

## 2022-12-06 DIAGNOSIS — Z01.818 PREOP GENERAL PHYSICAL EXAM: ICD-10-CM

## 2022-12-06 LAB
ANION GAP SERPL CALCULATED.3IONS-SCNC: 15 MMOL/L (ref 7–15)
BUN SERPL-MCNC: 19.8 MG/DL (ref 8–23)
CALCIUM SERPL-MCNC: 9.4 MG/DL (ref 8.8–10.2)
CHLORIDE SERPL-SCNC: 103 MMOL/L (ref 98–107)
CREAT SERPL-MCNC: 1.27 MG/DL (ref 0.51–0.95)
DEPRECATED HCO3 PLAS-SCNC: 23 MMOL/L (ref 22–29)
GFR SERPL CREATININE-BSD FRML MDRD: 48 ML/MIN/1.73M2
GLUCOSE SERPL-MCNC: 81 MG/DL (ref 70–99)
POTASSIUM SERPL-SCNC: 4.6 MMOL/L (ref 3.4–5.3)
SODIUM SERPL-SCNC: 141 MMOL/L (ref 136–145)

## 2022-12-06 PROCEDURE — 71046 X-RAY EXAM CHEST 2 VIEWS: CPT | Mod: TC | Performed by: RADIOLOGY

## 2022-12-10 ENCOUNTER — HEALTH MAINTENANCE LETTER (OUTPATIENT)
Age: 62
End: 2022-12-10

## 2022-12-13 ENCOUNTER — TRANSFERRED RECORDS (OUTPATIENT)
Dept: HEALTH INFORMATION MANAGEMENT | Facility: CLINIC | Age: 62
End: 2022-12-13

## 2022-12-13 ENCOUNTER — HOSPITAL ENCOUNTER (OUTPATIENT)
Dept: MRI IMAGING | Facility: CLINIC | Age: 62
Discharge: HOME | End: 2022-12-13
Attending: FAMILY MEDICINE | Admitting: FAMILY MEDICINE
Payer: COMMERCIAL

## 2022-12-13 DIAGNOSIS — M25.561 CHRONIC PAIN OF RIGHT KNEE: ICD-10-CM

## 2022-12-13 DIAGNOSIS — G89.29 CHRONIC PAIN OF RIGHT KNEE: ICD-10-CM

## 2022-12-13 PROCEDURE — 73721 MRI JNT OF LWR EXTRE W/O DYE: CPT | Mod: RT

## 2022-12-21 ENCOUNTER — VIRTUAL VISIT (OUTPATIENT)
Dept: PULMONOLOGY | Facility: CLINIC | Age: 62
End: 2022-12-21
Payer: COMMERCIAL

## 2022-12-21 DIAGNOSIS — J30.89 ENVIRONMENTAL AND SEASONAL ALLERGIES: ICD-10-CM

## 2022-12-21 DIAGNOSIS — J44.1 COPD EXACERBATION (H): Primary | ICD-10-CM

## 2022-12-21 DIAGNOSIS — R06.09 DYSPNEA ON EXERTION: ICD-10-CM

## 2022-12-21 DIAGNOSIS — J44.9 CHRONIC OBSTRUCTIVE PULMONARY DISEASE, UNSPECIFIED COPD TYPE (H): ICD-10-CM

## 2022-12-21 PROCEDURE — 98968 PH1 ASSMT&MGMT NQHP 21-30: CPT | Mod: 95 | Performed by: NURSE PRACTITIONER

## 2022-12-21 RX ORDER — PREDNISONE 10 MG/1
TABLET ORAL
Qty: 27 TABLET | Refills: 0 | Status: ON HOLD | OUTPATIENT
Start: 2022-12-21 | End: 2022-12-30

## 2022-12-21 RX ORDER — BUDESONIDE AND FORMOTEROL FUMARATE DIHYDRATE 160; 4.5 UG/1; UG/1
2 AEROSOL RESPIRATORY (INHALATION) 2 TIMES DAILY
Qty: 10.2 G | Refills: 3 | Status: SHIPPED | OUTPATIENT
Start: 2022-12-21 | End: 2023-02-01

## 2022-12-21 RX ORDER — MONTELUKAST SODIUM 10 MG/1
10 TABLET ORAL AT BEDTIME
Qty: 90 TABLET | Refills: 4 | Status: SHIPPED | OUTPATIENT
Start: 2022-12-21 | End: 2023-08-08

## 2022-12-21 RX ORDER — IPRATROPIUM BROMIDE AND ALBUTEROL SULFATE 2.5; .5 MG/3ML; MG/3ML
1 SOLUTION RESPIRATORY (INHALATION) EVERY 6 HOURS PRN
Qty: 90 ML | Refills: 0 | Status: SHIPPED | OUTPATIENT
Start: 2022-12-21 | End: 2023-04-24

## 2022-12-21 NOTE — PATIENT INSTRUCTIONS
- it was good talking to you today. I'm sorry you haven't been feeling well.  - I am worried you are not getting your Trelegy medication into your lungs. Since you have an easier time with the puffer type of inhaler we will switch you to one of those, it may take some work to coordinate this with your insurance and the pharmacy. We will work on this and in the meantime continue to try and use your Trelegy. Remember to rinse your mouth after each use.   - start using nebulizer treatments at least 2 times daily, ideally you would use these 4 times daily for a few weeks to help clear up this cough.   - we will have you repeat the prednisone before your surgery to see if that helps your breathing and cough.     If you have worsening symptoms, questions, or need to speak with the nurse please call [phone].

## 2022-12-21 NOTE — NURSING NOTE
Patient declined individual allergy and medication review by support staff because pt stated they had been reviewed at an appt within the past few days and there are no changes.    Nichol Luke VF

## 2022-12-26 PROBLEM — D50.9 IRON DEFICIENCY ANEMIA: Status: ACTIVE | Noted: 2022-09-20

## 2022-12-27 ENCOUNTER — APPOINTMENT (OUTPATIENT)
Dept: LAB | Facility: CLINIC | Age: 62
End: 2022-12-27
Payer: COMMERCIAL

## 2022-12-27 LAB — SARS-COV-2 RNA RESP QL NAA+PROBE: NEGATIVE

## 2022-12-27 PROCEDURE — U0005 INFEC AGEN DETEC AMPLI PROBE: HCPCS | Performed by: FAMILY MEDICINE

## 2022-12-27 PROCEDURE — U0003 INFECTIOUS AGENT DETECTION BY NUCLEIC ACID (DNA OR RNA); SEVERE ACUTE RESPIRATORY SYNDROME CORONAVIRUS 2 (SARS-COV-2) (CORONAVIRUS DISEASE [COVID-19]), AMPLIFIED PROBE TECHNIQUE, MAKING USE OF HIGH THROUGHPUT TECHNOLOGIES AS DESCRIBED BY CMS-2020-01-R: HCPCS | Performed by: FAMILY MEDICINE

## 2022-12-29 ENCOUNTER — ANESTHESIA EVENT (OUTPATIENT)
Dept: SURGERY | Facility: CLINIC | Age: 62
End: 2022-12-29
Payer: COMMERCIAL

## 2022-12-29 DIAGNOSIS — R05.9 COUGH: ICD-10-CM

## 2022-12-29 RX ORDER — BENZONATATE 100 MG/1
CAPSULE ORAL
Qty: 30 CAPSULE | Refills: 3 | Status: ON HOLD | OUTPATIENT
Start: 2022-12-29 | End: 2022-12-30

## 2022-12-30 ENCOUNTER — ANESTHESIA (OUTPATIENT)
Dept: SURGERY | Facility: CLINIC | Age: 62
End: 2022-12-30
Payer: COMMERCIAL

## 2022-12-30 ENCOUNTER — HOSPITAL ENCOUNTER (OUTPATIENT)
Facility: CLINIC | Age: 62
Discharge: HOME | End: 2023-01-02
Attending: SURGERY | Admitting: SURGERY
Payer: COMMERCIAL

## 2022-12-30 DIAGNOSIS — G43.019 COMMON MIGRAINE WITH INTRACTABLE MIGRAINE: ICD-10-CM

## 2022-12-30 LAB
CREAT SERPL-MCNC: 1.03 MG/DL (ref 0.6–1.1)
GFR SERPL CREATININE-BSD FRML MDRD: 61 ML/MIN/1.73M2
GLUCOSE BLDC GLUCOMTR-MCNC: 97 MG/DL (ref 70–99)

## 2022-12-30 PROCEDURE — 999N000127 HC STATISTIC PERIPHERAL IV START W US GUIDANCE

## 2022-12-30 PROCEDURE — 82962 GLUCOSE BLOOD TEST: CPT

## 2022-12-30 PROCEDURE — 250N000009 HC RX 250: Performed by: NURSE ANESTHETIST, CERTIFIED REGISTERED

## 2022-12-30 PROCEDURE — 250N000011 HC RX IP 250 OP 636: Performed by: SURGERY

## 2022-12-30 PROCEDURE — 250N000013 HC RX MED GY IP 250 OP 250 PS 637: Performed by: SURGERY

## 2022-12-30 PROCEDURE — S2900 ROBOTIC SURGICAL SYSTEM: HCPCS | Performed by: SURGERY

## 2022-12-30 PROCEDURE — 43282 LAP PARAESOPH HER RPR W/MESH: CPT | Performed by: SURGERY

## 2022-12-30 PROCEDURE — 250N000011 HC RX IP 250 OP 636: Performed by: ANESTHESIOLOGY

## 2022-12-30 PROCEDURE — 710N000010 HC RECOVERY PHASE 1, LEVEL 2, PER MIN: Performed by: SURGERY

## 2022-12-30 PROCEDURE — 370N000017 HC ANESTHESIA TECHNICAL FEE, PER MIN: Performed by: SURGERY

## 2022-12-30 PROCEDURE — C9290 INJ, BUPIVACAINE LIPOSOME: HCPCS | Performed by: SURGERY

## 2022-12-30 PROCEDURE — 94640 AIRWAY INHALATION TREATMENT: CPT

## 2022-12-30 PROCEDURE — 258N000003 HC RX IP 258 OP 636: Performed by: ANESTHESIOLOGY

## 2022-12-30 PROCEDURE — 360N000080 HC SURGERY LEVEL 7, PER MIN: Performed by: SURGERY

## 2022-12-30 PROCEDURE — 250N000011 HC RX IP 250 OP 636: Performed by: NURSE ANESTHETIST, CERTIFIED REGISTERED

## 2022-12-30 PROCEDURE — 999N000141 HC STATISTIC PRE-PROCEDURE NURSING ASSESSMENT: Performed by: SURGERY

## 2022-12-30 PROCEDURE — 250N000025 HC SEVOFLURANE, PER MIN: Performed by: SURGERY

## 2022-12-30 PROCEDURE — 250N000013 HC RX MED GY IP 250 OP 250 PS 637: Performed by: ANESTHESIOLOGY

## 2022-12-30 PROCEDURE — 36415 COLL VENOUS BLD VENIPUNCTURE: CPT | Performed by: SURGERY

## 2022-12-30 PROCEDURE — C1781 MESH (IMPLANTABLE): HCPCS | Performed by: SURGERY

## 2022-12-30 PROCEDURE — 999N000285 HC STATISTIC VASC ACCESS LAB DRAW WITH PIV START

## 2022-12-30 PROCEDURE — 82565 ASSAY OF CREATININE: CPT | Performed by: SURGERY

## 2022-12-30 PROCEDURE — 999N000203 HC STATISTICAL VASC ACCESS NURSE TIME, 16-31 MINUTES

## 2022-12-30 PROCEDURE — 999N000157 HC STATISTIC RCP TIME EA 10 MIN

## 2022-12-30 PROCEDURE — 250N000009 HC RX 250: Performed by: ANESTHESIOLOGY

## 2022-12-30 PROCEDURE — 272N000001 HC OR GENERAL SUPPLY STERILE: Performed by: SURGERY

## 2022-12-30 DEVICE — BIO-A TISSUE REINFORCEMENT 7CMX10CM
Type: IMPLANTABLE DEVICE | Site: ABDOMEN | Status: FUNCTIONAL
Brand: GORE BIO-A TISSUE REINFORCEMENT

## 2022-12-30 RX ORDER — ONDANSETRON 2 MG/ML
4 INJECTION INTRAMUSCULAR; INTRAVENOUS EVERY 30 MIN PRN
Status: DISCONTINUED | OUTPATIENT
Start: 2022-12-30 | End: 2022-12-30 | Stop reason: HOSPADM

## 2022-12-30 RX ORDER — LABETALOL HYDROCHLORIDE 5 MG/ML
10 INJECTION, SOLUTION INTRAVENOUS ONCE
Status: COMPLETED | OUTPATIENT
Start: 2022-12-30 | End: 2022-12-30

## 2022-12-30 RX ORDER — ELETRIPTAN HYDROBROMIDE 20 MG/1
40 TABLET, FILM COATED ORAL
Status: DISCONTINUED | OUTPATIENT
Start: 2022-12-30 | End: 2023-01-02 | Stop reason: HOSPADM

## 2022-12-30 RX ORDER — BUDESONIDE AND FORMOTEROL FUMARATE DIHYDRATE 160; 4.5 UG/1; UG/1
2 AEROSOL RESPIRATORY (INHALATION)
Status: DISCONTINUED | OUTPATIENT
Start: 2022-12-30 | End: 2023-01-02 | Stop reason: HOSPADM

## 2022-12-30 RX ORDER — IPRATROPIUM BROMIDE AND ALBUTEROL SULFATE 2.5; .5 MG/3ML; MG/3ML
1 SOLUTION RESPIRATORY (INHALATION) EVERY 6 HOURS PRN
Status: DISCONTINUED | OUTPATIENT
Start: 2022-12-30 | End: 2023-01-02 | Stop reason: HOSPADM

## 2022-12-30 RX ORDER — DOXEPIN HYDROCHLORIDE 25 MG/1
75 CAPSULE ORAL AT BEDTIME
COMMUNITY
Start: 2022-12-29

## 2022-12-30 RX ORDER — HYDROXYZINE PAMOATE 25 MG/1
50 CAPSULE ORAL AT BEDTIME
COMMUNITY
End: 2023-05-23

## 2022-12-30 RX ORDER — EPHEDRINE SULFATE 50 MG/ML
INJECTION, SOLUTION INTRAMUSCULAR; INTRAVENOUS; SUBCUTANEOUS PRN
Status: DISCONTINUED | OUTPATIENT
Start: 2022-12-30 | End: 2022-12-30

## 2022-12-30 RX ORDER — DOXEPIN HYDROCHLORIDE 25 MG/1
75 CAPSULE ORAL AT BEDTIME
Status: DISCONTINUED | OUTPATIENT
Start: 2022-12-30 | End: 2023-01-02 | Stop reason: HOSPADM

## 2022-12-30 RX ORDER — MONTELUKAST SODIUM 10 MG/1
10 TABLET ORAL AT BEDTIME
Status: DISCONTINUED | OUTPATIENT
Start: 2022-12-30 | End: 2023-01-02 | Stop reason: HOSPADM

## 2022-12-30 RX ORDER — PROCHLORPERAZINE MALEATE 10 MG
10 TABLET ORAL EVERY 6 HOURS PRN
Status: DISCONTINUED | OUTPATIENT
Start: 2022-12-30 | End: 2023-01-02 | Stop reason: HOSPADM

## 2022-12-30 RX ORDER — KETOROLAC TROMETHAMINE 30 MG/ML
15 INJECTION, SOLUTION INTRAMUSCULAR; INTRAVENOUS EVERY 6 HOURS PRN
Status: DISCONTINUED | OUTPATIENT
Start: 2022-12-30 | End: 2023-01-02 | Stop reason: HOSPADM

## 2022-12-30 RX ORDER — BENZONATATE 100 MG/1
100 CAPSULE ORAL 3 TIMES DAILY PRN
COMMUNITY
End: 2023-06-05

## 2022-12-30 RX ORDER — HYDROMORPHONE HCL IN WATER/PF 6 MG/30 ML
0.2 PATIENT CONTROLLED ANALGESIA SYRINGE INTRAVENOUS EVERY 5 MIN PRN
Status: DISCONTINUED | OUTPATIENT
Start: 2022-12-30 | End: 2022-12-30 | Stop reason: HOSPADM

## 2022-12-30 RX ORDER — NALOXONE HYDROCHLORIDE 0.4 MG/ML
0.4 INJECTION, SOLUTION INTRAMUSCULAR; INTRAVENOUS; SUBCUTANEOUS
Status: DISCONTINUED | OUTPATIENT
Start: 2022-12-30 | End: 2023-01-02 | Stop reason: HOSPADM

## 2022-12-30 RX ORDER — IPRATROPIUM BROMIDE AND ALBUTEROL SULFATE 2.5; .5 MG/3ML; MG/3ML
3 SOLUTION RESPIRATORY (INHALATION) ONCE
Status: COMPLETED | OUTPATIENT
Start: 2022-12-30 | End: 2022-12-30

## 2022-12-30 RX ORDER — TIZANIDINE 2 MG/1
4 TABLET ORAL AT BEDTIME
Status: COMPLETED | OUTPATIENT
Start: 2022-12-30 | End: 2022-12-30

## 2022-12-30 RX ORDER — FENTANYL CITRATE 50 UG/ML
25 INJECTION, SOLUTION INTRAMUSCULAR; INTRAVENOUS EVERY 5 MIN PRN
Status: DISCONTINUED | OUTPATIENT
Start: 2022-12-30 | End: 2022-12-30 | Stop reason: HOSPADM

## 2022-12-30 RX ORDER — DEXAMETHASONE SODIUM PHOSPHATE 4 MG/ML
INJECTION, SOLUTION INTRA-ARTICULAR; INTRALESIONAL; INTRAMUSCULAR; INTRAVENOUS; SOFT TISSUE PRN
Status: DISCONTINUED | OUTPATIENT
Start: 2022-12-30 | End: 2022-12-30

## 2022-12-30 RX ORDER — ALBUTEROL SULFATE 90 UG/1
2 AEROSOL, METERED RESPIRATORY (INHALATION) EVERY 4 HOURS
Status: DISCONTINUED | OUTPATIENT
Start: 2022-12-30 | End: 2023-01-02 | Stop reason: HOSPADM

## 2022-12-30 RX ORDER — IPRATROPIUM BROMIDE AND ALBUTEROL SULFATE 2.5; .5 MG/3ML; MG/3ML
3 SOLUTION RESPIRATORY (INHALATION)
Status: DISCONTINUED | OUTPATIENT
Start: 2022-12-30 | End: 2022-12-30 | Stop reason: HOSPADM

## 2022-12-30 RX ORDER — BENZONATATE 100 MG/1
100 CAPSULE ORAL 3 TIMES DAILY PRN
Status: DISCONTINUED | OUTPATIENT
Start: 2022-12-30 | End: 2023-01-02 | Stop reason: HOSPADM

## 2022-12-30 RX ORDER — ATORVASTATIN CALCIUM 40 MG/1
80 TABLET, FILM COATED ORAL AT BEDTIME
Status: DISCONTINUED | OUTPATIENT
Start: 2022-12-30 | End: 2023-01-02 | Stop reason: HOSPADM

## 2022-12-30 RX ORDER — ACETAMINOPHEN 325 MG/1
975 TABLET ORAL ONCE
Status: COMPLETED | OUTPATIENT
Start: 2022-12-30 | End: 2022-12-30

## 2022-12-30 RX ORDER — HYDROMORPHONE HCL IN WATER/PF 6 MG/30 ML
0.4 PATIENT CONTROLLED ANALGESIA SYRINGE INTRAVENOUS EVERY 5 MIN PRN
Status: DISCONTINUED | OUTPATIENT
Start: 2022-12-30 | End: 2022-12-30 | Stop reason: HOSPADM

## 2022-12-30 RX ORDER — LEVOTHYROXINE SODIUM 75 UG/1
75 TABLET ORAL DAILY
Status: DISCONTINUED | OUTPATIENT
Start: 2022-12-30 | End: 2023-01-02 | Stop reason: HOSPADM

## 2022-12-30 RX ORDER — HYDROXYZINE HYDROCHLORIDE 25 MG/1
25 TABLET, FILM COATED ORAL 2 TIMES DAILY PRN
Status: DISCONTINUED | OUTPATIENT
Start: 2022-12-30 | End: 2023-01-02 | Stop reason: HOSPADM

## 2022-12-30 RX ORDER — FLUTICASONE FUROATE AND VILANTEROL 100; 25 UG/1; UG/1
1 POWDER RESPIRATORY (INHALATION) DAILY
Status: DISCONTINUED | OUTPATIENT
Start: 2022-12-30 | End: 2022-12-30

## 2022-12-30 RX ORDER — FENTANYL CITRATE 50 UG/ML
25 INJECTION, SOLUTION INTRAMUSCULAR; INTRAVENOUS
Status: DISCONTINUED | OUTPATIENT
Start: 2022-12-30 | End: 2022-12-30 | Stop reason: HOSPADM

## 2022-12-30 RX ORDER — ERGOCALCIFEROL 1.25 MG/1
50000 CAPSULE, LIQUID FILLED ORAL WEEKLY
Status: DISCONTINUED | OUTPATIENT
Start: 2023-01-01 | End: 2023-01-02 | Stop reason: HOSPADM

## 2022-12-30 RX ORDER — LIDOCAINE 40 MG/G
CREAM TOPICAL
Status: DISCONTINUED | OUTPATIENT
Start: 2022-12-30 | End: 2022-12-30 | Stop reason: HOSPADM

## 2022-12-30 RX ORDER — HYDRALAZINE HYDROCHLORIDE 20 MG/ML
5 INJECTION INTRAMUSCULAR; INTRAVENOUS EVERY 8 HOURS PRN
Status: DISCONTINUED | OUTPATIENT
Start: 2022-12-30 | End: 2023-01-02 | Stop reason: HOSPADM

## 2022-12-30 RX ORDER — NALOXONE HYDROCHLORIDE 0.4 MG/ML
0.2 INJECTION, SOLUTION INTRAMUSCULAR; INTRAVENOUS; SUBCUTANEOUS
Status: DISCONTINUED | OUTPATIENT
Start: 2022-12-30 | End: 2023-01-02 | Stop reason: HOSPADM

## 2022-12-30 RX ORDER — FENTANYL CITRATE 50 UG/ML
INJECTION, SOLUTION INTRAMUSCULAR; INTRAVENOUS PRN
Status: DISCONTINUED | OUTPATIENT
Start: 2022-12-30 | End: 2022-12-30

## 2022-12-30 RX ORDER — ERENUMAB-AOOE 70 MG/ML
INJECTION SUBCUTANEOUS
Qty: 1 ML | Refills: 0 | Status: SHIPPED | OUTPATIENT
Start: 2022-12-30 | End: 2023-02-14

## 2022-12-30 RX ORDER — PROPOFOL 10 MG/ML
INJECTION, EMULSION INTRAVENOUS PRN
Status: DISCONTINUED | OUTPATIENT
Start: 2022-12-30 | End: 2022-12-30

## 2022-12-30 RX ORDER — PRAZOSIN HYDROCHLORIDE 2 MG/1
4 CAPSULE ORAL AT BEDTIME
Status: DISCONTINUED | OUTPATIENT
Start: 2022-12-30 | End: 2023-01-02 | Stop reason: HOSPADM

## 2022-12-30 RX ORDER — SCOLOPAMINE TRANSDERMAL SYSTEM 1 MG/1
1 PATCH, EXTENDED RELEASE TRANSDERMAL
Status: DISCONTINUED | OUTPATIENT
Start: 2022-12-30 | End: 2023-01-02 | Stop reason: HOSPADM

## 2022-12-30 RX ORDER — ONDANSETRON 4 MG/1
4 TABLET, ORALLY DISINTEGRATING ORAL EVERY 30 MIN PRN
Status: DISCONTINUED | OUTPATIENT
Start: 2022-12-30 | End: 2022-12-30 | Stop reason: HOSPADM

## 2022-12-30 RX ORDER — HYDROCODONE BITARTRATE AND ACETAMINOPHEN 10; 325 MG/1; MG/1
1 TABLET ORAL EVERY 4 HOURS PRN
Status: DISCONTINUED | OUTPATIENT
Start: 2022-12-30 | End: 2023-01-02 | Stop reason: HOSPADM

## 2022-12-30 RX ORDER — FENTANYL CITRATE 50 UG/ML
50 INJECTION, SOLUTION INTRAMUSCULAR; INTRAVENOUS EVERY 5 MIN PRN
Status: DISCONTINUED | OUTPATIENT
Start: 2022-12-30 | End: 2022-12-30 | Stop reason: HOSPADM

## 2022-12-30 RX ORDER — PROCHLORPERAZINE MALEATE 10 MG
10 TABLET ORAL EVERY 6 HOURS PRN
COMMUNITY

## 2022-12-30 RX ORDER — MEPERIDINE HYDROCHLORIDE 25 MG/ML
12.5 INJECTION INTRAMUSCULAR; INTRAVENOUS; SUBCUTANEOUS
Status: DISCONTINUED | OUTPATIENT
Start: 2022-12-30 | End: 2022-12-30 | Stop reason: HOSPADM

## 2022-12-30 RX ORDER — AMOXICILLIN 250 MG
2 CAPSULE ORAL AT BEDTIME
Status: DISCONTINUED | OUTPATIENT
Start: 2022-12-30 | End: 2023-01-02 | Stop reason: HOSPADM

## 2022-12-30 RX ORDER — LIDOCAINE 40 MG/G
CREAM TOPICAL
Status: DISCONTINUED | OUTPATIENT
Start: 2022-12-30 | End: 2023-01-02 | Stop reason: HOSPADM

## 2022-12-30 RX ORDER — ENOXAPARIN SODIUM 100 MG/ML
40 INJECTION SUBCUTANEOUS EVERY 24 HOURS
Status: DISCONTINUED | OUTPATIENT
Start: 2022-12-31 | End: 2023-01-02 | Stop reason: HOSPADM

## 2022-12-30 RX ORDER — LAMOTRIGINE 100 MG/1
200 TABLET ORAL 2 TIMES DAILY
Status: DISCONTINUED | OUTPATIENT
Start: 2022-12-30 | End: 2023-01-02 | Stop reason: HOSPADM

## 2022-12-30 RX ORDER — CLINDAMYCIN PHOSPHATE 900 MG/50ML
900 INJECTION, SOLUTION INTRAVENOUS ONCE
Status: COMPLETED | OUTPATIENT
Start: 2022-12-30 | End: 2022-12-30

## 2022-12-30 RX ORDER — SODIUM CHLORIDE, SODIUM LACTATE, POTASSIUM CHLORIDE, CALCIUM CHLORIDE 600; 310; 30; 20 MG/100ML; MG/100ML; MG/100ML; MG/100ML
INJECTION, SOLUTION INTRAVENOUS CONTINUOUS
Status: DISCONTINUED | OUTPATIENT
Start: 2022-12-30 | End: 2022-12-30 | Stop reason: HOSPADM

## 2022-12-30 RX ORDER — GABAPENTIN 300 MG/1
600 CAPSULE ORAL 2 TIMES DAILY
Status: DISCONTINUED | OUTPATIENT
Start: 2022-12-30 | End: 2023-01-02 | Stop reason: HOSPADM

## 2022-12-30 RX ORDER — KETAMINE HYDROCHLORIDE 10 MG/ML
INJECTION INTRAMUSCULAR; INTRAVENOUS PRN
Status: DISCONTINUED | OUTPATIENT
Start: 2022-12-30 | End: 2022-12-30

## 2022-12-30 RX ORDER — EZETIMIBE 10 MG/1
10 TABLET ORAL DAILY
Status: DISCONTINUED | OUTPATIENT
Start: 2022-12-30 | End: 2023-01-02 | Stop reason: HOSPADM

## 2022-12-30 RX ADMIN — HYDRALAZINE HYDROCHLORIDE 5 MG: 20 INJECTION INTRAMUSCULAR; INTRAVENOUS at 18:26

## 2022-12-30 RX ADMIN — BUDESONIDE AND FORMOTEROL FUMARATE DIHYDRATE 2 PUFF: 160; 4.5 AEROSOL RESPIRATORY (INHALATION) at 21:14

## 2022-12-30 RX ADMIN — HYDROCODONE BITARTRATE AND ACETAMINOPHEN 1 TABLET: 10; 325 TABLET ORAL at 23:41

## 2022-12-30 RX ADMIN — PRAZOSIN HYDROCHLORIDE 4 MG: 2 CAPSULE ORAL at 21:15

## 2022-12-30 RX ADMIN — ONDANSETRON 4 MG: 2 INJECTION INTRAMUSCULAR; INTRAVENOUS at 08:27

## 2022-12-30 RX ADMIN — ACETAMINOPHEN 975 MG: 325 TABLET ORAL at 06:28

## 2022-12-30 RX ADMIN — SCOPALAMINE 1 PATCH: 1 PATCH, EXTENDED RELEASE TRANSDERMAL at 07:32

## 2022-12-30 RX ADMIN — FENTANYL CITRATE 50 MCG: 50 INJECTION, SOLUTION INTRAMUSCULAR; INTRAVENOUS at 10:06

## 2022-12-30 RX ADMIN — HYDROCODONE BITARTRATE AND ACETAMINOPHEN 1 TABLET: 10; 325 TABLET ORAL at 16:03

## 2022-12-30 RX ADMIN — SUGAMMADEX 240 MG: 100 INJECTION, SOLUTION INTRAVENOUS at 09:47

## 2022-12-30 RX ADMIN — KETAMINE HYDROCHLORIDE 20 MG: 10 INJECTION, SOLUTION INTRAMUSCULAR; INTRAVENOUS at 08:08

## 2022-12-30 RX ADMIN — FENTANYL CITRATE 50 MCG: 50 INJECTION, SOLUTION INTRAMUSCULAR; INTRAVENOUS at 09:33

## 2022-12-30 RX ADMIN — DEXAMETHASONE SODIUM PHOSPHATE 4 MG: 4 INJECTION, SOLUTION INTRA-ARTICULAR; INTRALESIONAL; INTRAMUSCULAR; INTRAVENOUS; SOFT TISSUE at 08:10

## 2022-12-30 RX ADMIN — TIZANIDINE 4 MG: 2 TABLET ORAL at 23:36

## 2022-12-30 RX ADMIN — KETAMINE HYDROCHLORIDE 10 MG: 10 INJECTION, SOLUTION INTRAMUSCULAR; INTRAVENOUS at 08:20

## 2022-12-30 RX ADMIN — ROCURONIUM BROMIDE 80 MG: 10 INJECTION, SOLUTION INTRAVENOUS at 07:53

## 2022-12-30 RX ADMIN — Medication 10 MG: at 08:26

## 2022-12-30 RX ADMIN — FENTANYL CITRATE 100 MCG: 50 INJECTION, SOLUTION INTRAMUSCULAR; INTRAVENOUS at 07:52

## 2022-12-30 RX ADMIN — HYDROCORTISONE SODIUM SUCCINATE 100 MG: 100 INJECTION, POWDER, FOR SOLUTION INTRAMUSCULAR; INTRAVENOUS at 07:41

## 2022-12-30 RX ADMIN — ATORVASTATIN CALCIUM 80 MG: 40 TABLET, FILM COATED ORAL at 21:15

## 2022-12-30 RX ADMIN — LIDOCAINE HYDROCHLORIDE 20 MG: 10 INJECTION, SOLUTION INFILTRATION; PERINEURAL at 07:52

## 2022-12-30 RX ADMIN — SENNOSIDES AND DOCUSATE SODIUM 2 TABLET: 50; 8.6 TABLET ORAL at 21:15

## 2022-12-30 RX ADMIN — Medication 5 MG: at 08:41

## 2022-12-30 RX ADMIN — ACETAMINOPHEN, ASPIRIN, CAFFEINE 1 TABLET: 250; 250; 65 TABLET, FILM COATED ORAL at 18:23

## 2022-12-30 RX ADMIN — PROPOFOL 150 MG: 10 INJECTION, EMULSION INTRAVENOUS at 07:53

## 2022-12-30 RX ADMIN — SODIUM CHLORIDE, POTASSIUM CHLORIDE, SODIUM LACTATE AND CALCIUM CHLORIDE: 600; 310; 30; 20 INJECTION, SOLUTION INTRAVENOUS at 08:44

## 2022-12-30 RX ADMIN — SODIUM CHLORIDE, POTASSIUM CHLORIDE, SODIUM LACTATE AND CALCIUM CHLORIDE: 600; 310; 30; 20 INJECTION, SOLUTION INTRAVENOUS at 07:22

## 2022-12-30 RX ADMIN — GABAPENTIN 600 MG: 300 CAPSULE ORAL at 21:15

## 2022-12-30 RX ADMIN — CLINDAMYCIN PHOSPHATE 900 MG: 900 INJECTION, SOLUTION INTRAVENOUS at 07:28

## 2022-12-30 RX ADMIN — MIDAZOLAM 1 MG: 1 INJECTION INTRAMUSCULAR; INTRAVENOUS at 07:47

## 2022-12-30 RX ADMIN — KETOROLAC TROMETHAMINE 15 MG: 30 INJECTION, SOLUTION INTRAMUSCULAR at 21:54

## 2022-12-30 RX ADMIN — LAMOTRIGINE 200 MG: 100 TABLET ORAL at 21:15

## 2022-12-30 RX ADMIN — IPRATROPIUM BROMIDE AND ALBUTEROL SULFATE 3 ML: 2.5; .5 SOLUTION RESPIRATORY (INHALATION) at 07:07

## 2022-12-30 RX ADMIN — MONTELUKAST 10 MG: 10 TABLET, FILM COATED ORAL at 21:15

## 2022-12-30 RX ADMIN — KETOROLAC TROMETHAMINE 15 MG: 30 INJECTION, SOLUTION INTRAMUSCULAR at 14:12

## 2022-12-30 RX ADMIN — MIDAZOLAM 1 MG: 1 INJECTION INTRAMUSCULAR; INTRAVENOUS at 07:52

## 2022-12-30 RX ADMIN — DOXEPIN HYDROCHLORIDE 75 MG: 25 CAPSULE ORAL at 21:15

## 2022-12-30 RX ADMIN — LABETALOL HYDROCHLORIDE 10 MG: 5 INJECTION, SOLUTION INTRAVENOUS at 10:08

## 2022-12-30 ASSESSMENT — ACTIVITIES OF DAILY LIVING (ADL)
WEAR_GLASSES_OR_BLIND: NO
ADLS_ACUITY_SCORE: 20
DRESSING/BATHING_DIFFICULTY: NO
ADLS_ACUITY_SCORE: 40
ADLS_ACUITY_SCORE: 37
CONCENTRATING,_REMEMBERING_OR_MAKING_DECISIONS_DIFFICULTY: NO
ADLS_ACUITY_SCORE: 20
WALKING_OR_CLIMBING_STAIRS_DIFFICULTY: NO
ADLS_ACUITY_SCORE: 37
ADLS_ACUITY_SCORE: 20
ADLS_ACUITY_SCORE: 37
DIFFICULTY_EATING/SWALLOWING: NO
DOING_ERRANDS_INDEPENDENTLY_DIFFICULTY: NO
FALL_HISTORY_WITHIN_LAST_SIX_MONTHS: NO
ADLS_ACUITY_SCORE: 20
TOILETING_ISSUES: NO
CHANGE_IN_FUNCTIONAL_STATUS_SINCE_ONSET_OF_CURRENT_ILLNESS/INJURY: NO
ADLS_ACUITY_SCORE: 20

## 2022-12-30 ASSESSMENT — ENCOUNTER SYMPTOMS: SEIZURES: 0

## 2022-12-30 ASSESSMENT — COPD QUESTIONNAIRES: COPD: 1

## 2022-12-30 NOTE — ANESTHESIA CARE TRANSFER NOTE
Patient: Dayana Samaniego    Procedure: Procedure(s):  FUNDOPLICATION, partial ROBOT-ASSISTED, LAPAROSCOPIC, USING DA MARIBEL XI       Diagnosis: Dysphagia, unspecified type [R13.10]  Diagnosis Additional Information: No value filed.    Anesthesia Type:   General     Note:    Oropharynx: oropharynx clear of all foreign objects and spontaneously breathing  Level of Consciousness: drowsy  Oxygen Supplementation: face mask  Level of Supplemental Oxygen (L/min / FiO2): 8  Independent Airway: airway patency satisfactory and stable    Vital Signs Stable: post-procedure vital signs reviewed and stable  Report to RN Given: handoff report given  Patient transferred to: PACU    Handoff Report: Identifed the Patient, Identified the Reponsible Provider, Reviewed the pertinent medical history, Discussed the surgical course, Reviewed Intra-OP anesthesia mangement and issues during anesthesia, Set expectations for post-procedure period and Allowed opportunity for questions and acknowledgement of understanding      Vitals:  Vitals Value Taken Time   /85 12/30/22 1004   Temp 37.2  C (98.9  F) 12/30/22 1001   Pulse 94 12/30/22 1004   Resp 23 12/30/22 1004   SpO2 100 % 12/30/22 1004   Vitals shown include unvalidated device data.    Electronically Signed By: KARLOS Orellana CRNA  December 30, 2022  10:06 AM

## 2022-12-30 NOTE — PHARMACY-ADMISSION MEDICATION HISTORY
Pharmacy Note - Admission Medication History    Pertinent Provider Information:     Was taking prednisone: taper 40mg x 3 days then 30mg x 3 days then 20mg x 3 days - finished taper 12/29/22   ______________________________________________________________________    Prior To Admission (PTA) med list completed and updated in EMR.       PTA Med List   Medication Sig Last Dose     albuterol (PROAIR HFA) 108 (90 Base) MCG/ACT inhaler Inhale 2 puffs into the lungs every 4 hours Past Month     aspirin-acetaminophen-caffeine (EXCEDRIN MIGRAINE) 250-250-65 MG tablet Take 1 tablet by mouth daily as needed for headaches MR x 1 12/25/2022     atorvastatin (LIPITOR) 80 MG tablet Take 1 tablet (80 mg) by mouth At Bedtime 12/29/2022     benzonatate (TESSALON) 100 MG capsule Take 100 mg by mouth 3 times daily as needed for cough 12/29/2022     budesonide-formoterol (SYMBICORT) 160-4.5 MCG/ACT Inhaler Inhale 2 puffs into the lungs 2 times daily 12/30/2022     clobetasol (TEMOVATE) 0.05 % external cream Apply topically 2 times daily For max of 21 days (Patient taking differently: Apply topically 2 times daily as needed Apply to hands as needed) 12/29/2022     doxepin (SINEQUAN) 25 MG capsule Take 75 mg by mouth At Bedtime 12/29/2022     eletriptan (RELPAX) 40 MG tablet Take 1 tablet (40 mg) by mouth at onset of headache for migraine More than a month     erenumab-aooe (AIMOVIG) 70 MG/ML injection ADMINISTER 1 ML(70 MG) UNDER THE SKIN EVERY MONTH 12/25/2022     esomeprazole (NEXIUM) 20 MG DR capsule Take 2 capsules by mouth At Bedtime 12/29/2022     ezetimibe (ZETIA) 10 MG tablet Take 1 tablet (10 mg) by mouth daily 12/30/2022     FLUoxetine (PROZAC) 40 MG capsule Take 80 mg by mouth daily 12/30/2022     gabapentin (NEURONTIN) 300 MG capsule Take 600 mg by mouth 2 times daily 12/30/2022     HYDROcodone-acetaminophen (NORCO)  MG per tablet Take 1 tablet by mouth every 4 hours as needed 12/30/2022 at 0515     hydrOXYzine  (VISTARIL) 25 MG capsule Take 50 mg by mouth At Bedtime 12/29/2022     hydrOXYzine (VISTARIL) 25 MG capsule Take 25 mg by mouth 2 times daily as needed for anxiety During the day Past Week     ipratropium - albuterol 0.5 mg/2.5 mg/3 mL (DUONEB) 0.5-2.5 (3) MG/3ML neb solution Take 1 vial (3 mLs) by nebulization every 6 hours as needed for shortness of breath, wheezing or cough 12/29/2022     lamoTRIgine (LAMICTAL) 200 MG tablet Take 1 tablet by mouth 2 times daily 12/30/2022     levothyroxine (SYNTHROID/LEVOTHROID) 75 MCG tablet TAKE 1 TABLET(75 MCG) BY MOUTH DAILY (Patient taking differently: Take 75 mcg by mouth every evening) 12/29/2022     montelukast (SINGULAIR) 10 MG tablet Take 1 tablet (10 mg) by mouth At Bedtime (Patient taking differently: Take 10 mg by mouth daily before breakfast) 12/30/2022     prazosin (MINIPRESS) 2 MG capsule Take 2 capsules by mouth At Bedtime 12/29/2022     prochlorperazine (COMPAZINE) 10 MG tablet Take 10 mg by mouth every 6 hours as needed for nausea When having migraine 12/25/2022     senna-docusate (SENOKOT-S/PERICOLACE) 8.6-50 MG tablet Take 2 tablets by mouth At Bedtime 12/29/2022     tiZANidine (ZANAFLEX) 4 MG tablet TAKE 1 TO 2 TABLETS BY MOUTH DURING THE DAY AND 2 TABLETS AT BEDTIME 12/29/2022     vitamin D2 (ERGOCALCIFEROL) 84844 units (1250 mcg) capsule TAKE 1 CAPSULE BY MOUTH 1 TIME A WEEK 12/25/2022       [facility]    Method of interview communication: in-person    Patient was asked about OTC/herbal products specifically.  PTA med list reflects this.    Based on the pharmacist's assessment, the PTA med list information appears reliable    Allergies were reviewed, assessed, and updated with the patient.      Medications available for use during hospital stay: symbicort inhaler.   Did not bring albuterol inhaler uses duonebs more often than albuterol inhaler.     Thank you for the opportunity to participate in the care  of this patient.      Sarah Cameron, Pharm D, BCPS     12/30/2022     7:13 AM

## 2022-12-30 NOTE — TELEPHONE ENCOUNTER
Medication refill request for erenumab-aooe (AIMOVIG) 70 MG/ML injection.     Last Written Prescription Date:  11/30/2021  Last Fill Quantity: 1 mL,  # refills: 11  Last office visit provider:  11/30/2021  Next appointment scheduled: None. Pt is due for a follow up appt. Per message below, Luz Elena LO for the pt to call to schedule an appt with Gianna.      Medication T'd for review and signature    SOLITARIO Zamudio on 12/30/2022 at 11:13 AM

## 2022-12-30 NOTE — ANESTHESIA POSTPROCEDURE EVALUATION
Patient: Dayana Samaniego    Procedure: Procedure(s):  FUNDOPLICATION, partial ROBOT-ASSISTED, LAPAROSCOPIC, USING DA MARIBEL XI       Anesthesia Type:  General    Note:     Postop Pain Control: Uneventful            Sign Out: Well controlled pain   PONV: No   Neuro/Psych: Uneventful            Sign Out: Acceptable/Baseline neuro status   Airway/Respiratory: Uneventful            Sign Out: Acceptable/Baseline resp. status   CV/Hemodynamics: Uneventful            Sign Out: Acceptable CV status; No obvious hypovolemia; No obvious fluid overload   Other NRE: NONE   DID A NON-ROUTINE EVENT OCCUR? No           Last vitals:  Vitals Value Taken Time   /72 12/30/22 1120   Temp 36.7  C (98.1  F) 12/30/22 1120   Pulse 77 12/30/22 1126   Resp 20 12/30/22 1126   SpO2 95 % 12/30/22 1126   Vitals shown include unvalidated device data.    Electronically Signed By: Chema Payton MD  December 30, 2022  1:01 PM

## 2022-12-30 NOTE — CARE PLAN
PRIMARY DIAGNOSIS: GENERAL SURGERY  OUTPATIENT/OBSERVATION GOALS TO BE MET BEFORE DISCHARGE:  1. ADLs back to baseline: Yes    2. Activity and level of assistance: Up with standby assistance.    3. Pain status: Improved-controlled with oral pain medications.    4. Return to near baseline physical activity: No     Discharge Planner Nurse   Safe discharge environment identified: Yes  Barriers to discharge: No       Entered by: Sourav Garner RN 12/30/2022 2:25 PM   Patient ambulated to bathroom, toradol given for pain. SBP remain > 170s to 180s, MD paged and, awaiting call back.   1600- Dr Ocasio paged re /84, no orders for BP medications at this time, ok to give norco early for pain. Continue to monitor.   Please review provider order for any additional goals.   Nurse to notify provider when observation goals have been met and patient is ready for discharge.

## 2022-12-30 NOTE — OP NOTE
Name:  Dayana Samaniego  PCP:  Joanne Weiner  Procedure Date:  12/30/2022      Procedure(s):  FUNDOPLICATION, partial ROBOT-ASSISTED, LAPAROSCOPIC, USING DA MARIBEL XI    Pre-Procedure Diagnosis:  Dysphagia, unspecified type [R13.10]     Post-Procedure Diagnosis:    Hiatal hernia    Surgeon(s):  Davie Ocasio DO    Circulator: Lucy Mcclain RN  Relief Circulator: Corine De La Cruz RN  Scrub Person: Félix Giraldo    Anesthesia Type:  GET      Findings:  Hiatal hernia    Operative Report:    The Patient was taken back to the operating room and placed in a supine position.  Endotracheal intubation was performed and a Vang catheter was placed.  The abdomen was prepped and draped in a sterile fashion.  I  made an 8 mm incision.  I then establish pneumoperitoneum using a Veress needle technique.  Once this was complete I placed an 8 mm trocar with a 5 mm 30 degree camera into the abdomen.  I scrutinized the surrounding structures for any injuries upon entry none were found.  Next, I placed a Clara liver retractor via a 5 mm subxiphoid incision.  This was secured to the bed in the standard fashion.  I then placed 3 additional 8 mm trochars in the abdomen.  One in the right upper quadrant and 2 in the left upper quadrant.  I then upsized the left mid abdominal a millimeter trocar to a 12 mm trocar. Bio A mesh was placed into the abdomen via the 12 mm trocar.  The robot was docked in the standard fashion once the patient was placed in reverse Trendelenburg.    I then proceeded to address the hiatus.  I took down the pars lucidum and spared the vessels and gastrohepatic ligament.  There were large vessels within the gastrohepatic ligament which were preserved.  I then entered the avascular plane between the hernia sac and the crura and circumferentially dissected out the hernia sac using vessel sealing device.  Once I came to the left rigo I then turned my attention to the greater curvature of the  stomach.  I took down the short gastrics with the vessel sealing device all the way up to the angle of His.  I then created a retroesophageal window and placed 1/4 inch Penrose through this for esophageal retraction.  The posterior sac was also dissected free.   I ensured a wide and deep mediastinal dissection of the periesophageal tissue taking care as to preserve the vagus nerves.  Once this was complete I then reapproximated the left and right crura with a 0 nonabsorbable V lock suture in a running fashion.  I then placed the previously fashioned Bio A mesh into the retroesophageal window and sutured this in place for extra added support with additional 2-0 Vicryl sutures.  The mesh was wrapped around the esophagus without stricturing or narrowing.  Once this was in place I then created a 270 posterior fundoplication.  This was done with adequate abdominal esophageal length and no evidence of gastric or esophageal retraction back into the chest cavity.  The fundoplication was completed with 0 Ethibond sutures in interrupted fashion.  Once this was complete all nonabsorbable material was removed.  The robot was undocked and Exparel local anesthetic was injected into all port sites.  The liver tractor was removed and the left mid abdominal 8 mm trocar and 12 mm fascial defect was reapproximated with 0 Vicryl suture using an Endo fascial closure technique.  I then removed all trochars and reapproximated skin edges with 4-0 Monocryl suture.  The abdomen was cleaned and all wounds were cleaned and covered with Steri-Strips and Band-Aids.  The Vang catheter was removed the patient was extubated sent to the PACU to undergo recovery.  All lap counts and needle counts were correct at the end of the procedure.    Estimated Blood Loss:   10 cc    Specimens:    * No specimens in log *       Drains:        Complications:    None    Davie Ocasio DO

## 2022-12-30 NOTE — ANESTHESIA PROCEDURE NOTES
Airway       Patient location during procedure: OR       Procedure Start/Stop Times: 12/30/2022 8:09 AM  Staff -        CRNA: Karen Finch APRN CRNA       Performed By: CRNA  Consent for Airway        Urgency: elective  Indications and Patient Condition       Indications for airway management: marli-procedural       Induction type:intravenous       Mask difficulty assessment: 1 - vent by mask    Final Airway Details       Final airway type: endotracheal airway       Successful airway: ETT - single  Endotracheal Airway Details        ETT size (mm): 7.0       Cuffed: yes       Cuff volume (mL): 10       Successful intubation technique: direct laryngoscopy       DL Blade Type: Hinton 2       Grade View of Cords: 1       Adjucts: stylet       Position: Right       Measured from: lips       Secured at (cm): 20       Bite block used: None    Post intubation assessment        Placement verified by: capnometry, equal breath sounds and chest rise        Number of attempts at approach: 1       Number of other approaches attempted: 0       Secured with: silk tape       Ease of procedure: easy       Dentition: Unchanged       Dental guard used and removed. Dental Guard Type: Proguard Red.    Medication(s) Administered   Medication Administration Time: 12/30/2022 8:09 AM

## 2022-12-30 NOTE — OR NURSING
Notified Dr. Payton of patient Blood pressure 186 Systolic.  Dr. Payton advised that we continue to monitor until patient more awake.  No further med order at this time.

## 2022-12-30 NOTE — CARE PLAN
PRIMARY DIAGNOSIS: GENERAL SURGERY  OUTPATIENT/OBSERVATION GOALS TO BE MET BEFORE DISCHARGE:  1. ADLs back to baseline: Yes    2. Activity and level of assistance: Up with standby assistance.    3. Pain status: No improvement noted. Consider adjustment in pain regimen.    4. Return to near baseline physical activity: No     Discharge Planner Nurse   Safe discharge environment identified: Yes  Barriers to discharge: No          Entered by: Sourav Garner RN 12/30/2022 5:52 PM   Patient reports pain tolerable but rates it 7/10 worse with activity. Norco given, ambulated around hallway. Toradol when due next, requested Excedrin for HA. On pureed diet, patient denies N/V. IS encouraged.   Please review provider order for any additional goals.   Nurse to notify provider when observation goals have been met and patient is ready for discharge.

## 2022-12-30 NOTE — TELEPHONE ENCOUNTER
Shonda sent a refill request for Aimovig 70mg/ml # 1. I have lm for pt to schedule a Follow-up with Gianna Mendes CNP

## 2022-12-30 NOTE — ANESTHESIA PREPROCEDURE EVALUATION
Anesthesia Pre-Procedure Evaluation    Patient: Dayana Samaniego   MRN: [id number] : 1960        Procedure : Procedure(s):  FUNDOPLICATION, partial ROBOT-ASSISTED, LAPAROSCOPIC, USING DA MARIBEL XI          Past Medical History:   Diagnosis Date     Abnormality of gait     Created by Conversion      Anemia 3/18/2021     Anxiety      Asthma      Asymptomatic postmenopausal status     Created by Conversion  Replacement Utility updated for latest IMO load     Bipolar 2 disorder (H)      Centrilobular emphysema (H) 2018    Mild, seen in 2018 CT scan, DLCO 60% predicted  Formatting of this note might be different from the original. Formatting of this note might be different from the original. Mild, seen in 2018 CT scan, DLCO 60% predicted     Cervical spinal stenosis     s/p cervical fusion     Chronic kidney disease      Chronic kidney disease, stage 3 (H) 2021     Chronic pain syndrome      Cigarette nicotine dependence without complication 3/4/2020     Common migraine with intractable migraine 2/15/2021     COPD (chronic obstructive pulmonary disease) (H)      DDD (degenerative disc disease), lumbosacral 3/22/2021     Depression      Fibrocystic breast      Fibromyalgia      GERD (gastroesophageal reflux disease)      Hallux abductovalgus with bunions, unspecified laterality 2015     Herpes zoster     Created by Conversion  Replacement Utility updated for latest IMO load     Hiatal hernia      History of electroconvulsive therapy      Hyperlipidemia 3/17/2021    Created by Conversion   Formatting of this note might be different from the original. Formatting of this note might be different from the original. Created by Conversion     Hypotension 3/18/2021     Hypothyroidism     hypothyroidism     IBS (irritable bowel syndrome)      Lung nodule 2016:  Left upper lobe nodule of 6.5 mm, likely benign given slow growth per radiologist.  See CT 2011:  measured  approximately 5.5  mm in greatest dimension      Menopausal and postmenopausal disorder     Created by Conversion  Replacement Utility updated for latest IMO load     Migraine     Created by Conversion  Replacement Utility updated for latest IMO load     Migraines      Osteoarthritis      Osteopenia of multiple sites 9/1/2020     Other chronic pain      PONV (postoperative nausea and vomiting)      PTSD (post-traumatic stress disorder)      Shingles 2014    on back     Spinal stenosis of lumbar region with neurogenic claudication 3/22/2021     Tobacco use disorder     Created by Conversion       Past Surgical History:   Procedure Laterality Date     BIOPSY BREAST Left     Approx 5 bx     BUNIONECTOMY Right 12/15/2015    Procedure: MODIFIED LAI BUNIONECTOMY RIGHT FOOT;  Surgeon: Jerome Calvin DPM;  Location: [facility] OR;  Service:      CERVICAL FUSION       CERVICAL FUSION Bilateral 2/16/2015    Procedure: ANTERIOR CERVICAL DECOMPRESSION/FUSION C3-5 BILATERAL, ANTERIOR HARDWARE REMOVAL C5-7 BILATERAL ;  Surgeon: Sawyer Roland MD;  [place] [facility] [place]  Service:      HC NIPPLE EXPLORATION      Description: Breast Nipple Explor W/ Excision Solitary Lactiferous Duct;  Recorded: 04/10/2008;     HYSTERECTOMY       IR CERVICAL EPIDURAL STEROID INJECTION  9/17/2003     IR CERVICAL EPIDURAL STEROID INJECTION  12/11/2003     IR CERVICAL EPIDURAL STEROID INJECTION  1/16/2004     IRRIGATION AND DEBRIDEMENT DECUBITUS WOUND, COMBINED Left 12/13/2021    Procedure: Wound exploration and debridement of Left Lower Abdomin Chronic wound.;  Surgeon: Crispin Bunch MD;  Location: [facility] OR     LUMBAR DISCECTOMY      X2     MAMMOPLASTY AUGMENTATION Bilateral      approx 2006?     PELVIC LAPAROSCOPY      multiple     SHOULDER OPEN ROTATOR CUFF REPAIR Left     X2     ZZC TOTAL ABDOM HYSTERECTOMY      Description: Total Abdominal Hysterectomy;  Recorded: 06/10/2013;      Allergies   Allergen Reactions     Codeine  Anaphylaxis     Nefazodone Nausea and Vomiting     Oxycodone-Acetaminophen Unknown     hallucinations     Cetirizine Nausea and Vomiting     Trazodone Nausea and Vomiting     Amoxicillin-Pot Clavulanate [Augmentin] Rash     Cephalexin Rash     Clavulanic Acid Rash     Cyclobenzaprine Rash     Eszopiclone Rash     Methocarbamol Rash     Penicillins Rash     Mild rash      Social History     Tobacco Use     Smoking status: Former     Packs/day: 1.00     Years: 45.00     Pack years: 45.00     Types: Cigarettes     Quit date: 10/29/2021     Years since quittin.1     Smokeless tobacco: Former   Substance Use Topics     Alcohol use: Not Currently     Comment: Alcoholic Drinks/day: rare--1-2 times in year      Wt Readings from Last 1 Encounters:   22 74.6 kg (164 lb 6.4 oz)        Anesthesia Evaluation            ROS/MED HX  ENT/Pulmonary:     (+) Moderate Persistent, asthma COPD,  (-) sleep apnea   Neurologic: Comment: Cervical spine fusion    (-) no seizures and no CVA   Cardiovascular: Comment: 2022 TTE  1. Normal left ventricular size and systolic performance with a visually  estimated ejection fraction of 65-70%.  2. No significant valvular heart disease is identified on this study.  3. Normal right ventricular size and systolic performance.  4. Right ventricular systolic pressure cannot be directly estimated from TR  velocities due to insufficient tricuspid regurgitation. However, there are no  indirect findings such as abnormal RV volume/geometry, altered pulmonary flow  velocity profile, or leftward septal displacement to suggest moderate or  severe pulmonary hypertension.    (+) -----BARRON.     METS/Exercise Tolerance:     Hematologic:     (+) anemia,     Musculoskeletal:       GI/Hepatic:     (+) GERD, Symptomatic, hiatal hernia,     Renal/Genitourinary:     (+) renal disease, type: CRI,     Endo:  - neg endo ROS   (+) thyroid problem, hypothyroidism,     Psychiatric/Substance Use:     (+) psychiatric  history bipolar H/O chronic opiod use .     Infectious Disease:  - neg infectious disease ROS     Malignancy:       Other:      (+) , H/O Chronic Pain,        Physical Exam    Airway  airway exam normal      Mallampati: I   TM distance: > 3 FB   Neck ROM: limited   Mouth opening: > 3 cm    Respiratory Devices and Support         Dental  no notable dental history         Cardiovascular   cardiovascular exam normal       Rhythm and rate: regular and normal     Pulmonary   pulmonary exam normal        breath sounds clear to auscultation           OUTSIDE LABS:  CBC:   Lab Results   Component Value Date    WBC 6.4 12/05/2022    WBC 10.6 07/29/2022    HGB 11.2 (L) 12/05/2022    HGB 10.6 (L) 09/13/2022    HCT 37.5 12/05/2022    HCT 32.4 (L) 07/29/2022     12/05/2022     07/29/2022     BMP:   Lab Results   Component Value Date     12/05/2022     09/13/2022    POTASSIUM 4.6 12/05/2022    POTASSIUM 4.4 09/13/2022    CHLORIDE 103 12/05/2022    CHLORIDE 107 09/13/2022    CO2 23 12/05/2022    CO2 27 09/13/2022    BUN 19.8 12/05/2022    BUN 13 09/13/2022    CR 1.27 (H) 12/05/2022    CR 0.92 09/13/2022    GLC 81 12/05/2022     (H) 09/13/2022     COAGS: No results found for: PTT, INR, FIBR  POC: No results found for: BGM, HCG, HCGS  HEPATIC:   Lab Results   Component Value Date    ALBUMIN 3.8 09/13/2022    PROTTOTAL 7.2 09/13/2022    ALT 25 09/13/2022    AST 18 09/13/2022    ALKPHOS 138 09/13/2022    BILITOTAL 0.3 09/13/2022     OTHER:   Lab Results   Component Value Date    A1C 5.7 05/06/2015    CAITLIN 9.4 12/05/2022    MAG 1.9 07/29/2022    TSH 1.20 08/15/2022       Anesthesia Plan    ASA Status:  3      Anesthesia Type: General.     - Airway: ETT   Induction: RSI.      Techniques and Equipment:     - Airway: Video-Laryngoscope         Consents    Anesthesia Plan(s) and associated risks, benefits, and realistic alternatives discussed. Questions answered and patient/representative(s) expressed  understanding.    - Discussed:     - Discussed with:  Patient         Postoperative Care    Pain management: Multi-modal analgesia.   PONV prophylaxis: Ondansetron (or other 5HT-3), Dexamethasone or Solumedrol     Comments:    Other Comments:     Hydrocortisone 100mg on induction            Colt Cuellar MD

## 2022-12-30 NOTE — INTERVAL H&P NOTE
I have reviewed the surgical (or preoperative) H&P that is linked to this encounter, and examined the patient. There are no significant changes    Plan for Procedure(s):  FUNDOPLICATION, partial ROBOT-ASSISTED, LAPAROSCOPIC, USING DA MARIBEL XI     Zane Ocasio [facility] Surgery  [phone]

## 2022-12-31 LAB — GLUCOSE BLDC GLUCOMTR-MCNC: 108 MG/DL (ref 70–99)

## 2022-12-31 PROCEDURE — 250N000013 HC RX MED GY IP 250 OP 250 PS 637: Performed by: SURGERY

## 2022-12-31 PROCEDURE — 82962 GLUCOSE BLOOD TEST: CPT

## 2022-12-31 PROCEDURE — 250N000013 HC RX MED GY IP 250 OP 250 PS 637: Performed by: PHYSICIAN ASSISTANT

## 2022-12-31 PROCEDURE — 250N000013 HC RX MED GY IP 250 OP 250 PS 637: Performed by: HOSPITALIST

## 2022-12-31 PROCEDURE — 99024 POSTOP FOLLOW-UP VISIT: CPT | Performed by: PHYSICIAN ASSISTANT

## 2022-12-31 RX ORDER — SIMETHICONE 80 MG
80 TABLET,CHEWABLE ORAL EVERY 6 HOURS PRN
Status: DISCONTINUED | OUTPATIENT
Start: 2022-12-31 | End: 2023-01-02 | Stop reason: HOSPADM

## 2022-12-31 RX ORDER — TIZANIDINE 2 MG/1
4 TABLET ORAL AT BEDTIME
Status: DISCONTINUED | OUTPATIENT
Start: 2022-12-31 | End: 2023-01-02 | Stop reason: HOSPADM

## 2022-12-31 RX ADMIN — ALBUTEROL SULFATE 2 PUFF: 90 AEROSOL, METERED RESPIRATORY (INHALATION) at 03:49

## 2022-12-31 RX ADMIN — HYDROCODONE BITARTRATE AND ACETAMINOPHEN 1 TABLET: 10; 325 TABLET ORAL at 03:49

## 2022-12-31 RX ADMIN — LAMOTRIGINE 200 MG: 100 TABLET ORAL at 08:43

## 2022-12-31 RX ADMIN — GABAPENTIN 600 MG: 300 CAPSULE ORAL at 08:43

## 2022-12-31 RX ADMIN — HYDROCODONE BITARTRATE AND ACETAMINOPHEN 1 TABLET: 10; 325 TABLET ORAL at 20:11

## 2022-12-31 RX ADMIN — MONTELUKAST 10 MG: 10 TABLET, FILM COATED ORAL at 20:27

## 2022-12-31 RX ADMIN — ALBUTEROL SULFATE 2 PUFF: 90 AEROSOL, METERED RESPIRATORY (INHALATION) at 16:06

## 2022-12-31 RX ADMIN — EZETIMIBE 10 MG: 10 TABLET ORAL at 08:43

## 2022-12-31 RX ADMIN — LEVOTHYROXINE SODIUM 75 MCG: 75 TABLET ORAL at 08:43

## 2022-12-31 RX ADMIN — PRAZOSIN HYDROCHLORIDE 4 MG: 2 CAPSULE ORAL at 20:28

## 2022-12-31 RX ADMIN — SIMETHICONE 80 MG: 80 TABLET, CHEWABLE ORAL at 16:04

## 2022-12-31 RX ADMIN — LAMOTRIGINE 200 MG: 100 TABLET ORAL at 20:26

## 2022-12-31 RX ADMIN — FLUOXETINE 80 MG: 20 CAPSULE ORAL at 08:43

## 2022-12-31 RX ADMIN — HYDROCODONE BITARTRATE AND ACETAMINOPHEN 1 TABLET: 10; 325 TABLET ORAL at 08:43

## 2022-12-31 RX ADMIN — ALBUTEROL SULFATE 2 PUFF: 90 AEROSOL, METERED RESPIRATORY (INHALATION) at 23:54

## 2022-12-31 RX ADMIN — GABAPENTIN 600 MG: 300 CAPSULE ORAL at 20:26

## 2022-12-31 RX ADMIN — SENNOSIDES AND DOCUSATE SODIUM 2 TABLET: 50; 8.6 TABLET ORAL at 20:29

## 2022-12-31 RX ADMIN — TIZANIDINE 4 MG: 2 TABLET ORAL at 21:03

## 2022-12-31 RX ADMIN — ALBUTEROL SULFATE 2 PUFF: 90 AEROSOL, METERED RESPIRATORY (INHALATION) at 08:42

## 2022-12-31 RX ADMIN — DOXEPIN HYDROCHLORIDE 75 MG: 25 CAPSULE ORAL at 20:24

## 2022-12-31 RX ADMIN — ALBUTEROL SULFATE 2 PUFF: 90 AEROSOL, METERED RESPIRATORY (INHALATION) at 20:30

## 2022-12-31 RX ADMIN — HYDROCODONE BITARTRATE AND ACETAMINOPHEN 1 TABLET: 10; 325 TABLET ORAL at 16:00

## 2022-12-31 RX ADMIN — BUDESONIDE AND FORMOTEROL FUMARATE DIHYDRATE 2 PUFF: 160; 4.5 AEROSOL RESPIRATORY (INHALATION) at 08:48

## 2022-12-31 RX ADMIN — BUDESONIDE AND FORMOTEROL FUMARATE DIHYDRATE 2 PUFF: 160; 4.5 AEROSOL RESPIRATORY (INHALATION) at 21:45

## 2022-12-31 RX ADMIN — ALBUTEROL SULFATE 2 PUFF: 90 AEROSOL, METERED RESPIRATORY (INHALATION) at 12:47

## 2022-12-31 RX ADMIN — ATORVASTATIN CALCIUM 80 MG: 40 TABLET, FILM COATED ORAL at 21:02

## 2022-12-31 ASSESSMENT — ACTIVITIES OF DAILY LIVING (ADL)
ADLS_ACUITY_SCORE: 20

## 2022-12-31 NOTE — PLAN OF CARE
Patient is alert and oriented. She is up independently in room, she walked in the hallways as well. She is sating at 88 % O2 on RA and 90-92% On 2L of oxygen via nasal cannula. Pain is controlled on oral pain medication. Voiding spontaneously.     Problem: Pain Acute  Goal: Optimal Pain Control and Function  Outcome: Progressing  Intervention: Develop Pain Management Plan  Recent Flowsheet Documentation  Taken 12/31/2022 0848 by Pily Matias RN  Pain Management Interventions:   medication (see MAR)   care clustered   breathing exercises   emotional support   quiet environment facilitated  Intervention: Prevent or Manage Pain  Recent Flowsheet Documentation  Taken 12/31/2022 0848 by Pily Matias RN  Medication Review/Management: medications reviewed     Problem: Plan of Care - These are the overarching goals to be used throughout the patient stay.    Goal: Absence of Hospital-Acquired Illness or Injury  Intervention: Identify and Manage Fall Risk  Recent Flowsheet Documentation  Taken 12/31/2022 0848 by Pily Matias RN  Safety Promotion/Fall Prevention:   supervised activity   nonskid shoes/slippers when out of bed   lighting adjusted   clutter free environment maintained   activity supervised  Intervention: Prevent Skin Injury  Recent Flowsheet Documentation  Taken 12/31/2022 0848 by Pily Matias RN  Body Position: position changed independently  Intervention: Prevent and Manage VTE (Venous Thromboembolism) Risk  Recent Flowsheet Documentation  Taken 12/31/2022 0848 by Pily Matias RN  VTE Prevention/Management: (Ambulating in room)   SCDs (sequential compression devices) off   patient refused intervention  Goal: Optimal Comfort and Wellbeing  Intervention: Monitor Pain and Promote Comfort  Recent Flowsheet Documentation  Taken 12/31/2022 0848 by Pily Matias RN  Pain Management Interventions:   medication (see MAR)   care clustered   breathing exercises   emotional  support   quiet environment facilitated     Problem: Gas Exchange Impaired  Goal: Optimal Gas Exchange  Outcome: Not Progressing  Intervention: Optimize Oxygenation and Ventilation  Recent Flowsheet Documentation  Taken 12/31/2022 0848 by Pily Matias RN  Head of Bed (HOB) Positioning: HOB at 20-30 degrees   Goal Outcome Evaluation:

## 2022-12-31 NOTE — PROGRESS NOTES
ASSESSMENT:  No diagnosis found.    Dayana Samaniego is a 62 year old female who is s/p robotic fundolopication on 12/30, POD# 1     PLAN:  -Pureed diet  -Increase activity as tolerated  -Pain control  -IS  -Wean O2  -Ok with discharge if continues to do well and can wean off O2, will get orders placed and see how she is doing after a few hours    Chaitanya Pelayo PA-C  [facility]  Surgery [address]  [address]  [place] [address]?  Office: [phone]      SUBJECTIVE:   She is doing fine, wants to go home, pain tolerable, tolerating diet, no n/v, on O2 due to low sats, denies SOB    Patient Vitals for the past 24 hrs:   BP Temp Temp src Pulse Resp SpO2   12/31/22 0747 133/63 98.1  F (36.7  C) Oral 71 16 93 %   12/31/22 0431 -- -- -- -- -- 95 %   12/31/22 0336 127/60 -- -- 66 16 93 %   12/31/22 0330 -- -- -- -- -- (!) 88 %   12/30/22 2334 (!) 161/71 98.4  F (36.9  C) Oral 73 16 94 %   12/30/22 2115 (!) 177/79 -- -- -- -- --   12/30/22 1946 (!) 172/73 -- -- 75 16 --   12/30/22 1725 (!) 178/75 -- -- 77 16 --   12/30/22 1550 (!) 199/84 97.8  F (36.6  C) Oral 72 16 95 %   12/30/22 1354 (!) 174/74 -- -- 84 -- 93 %   12/30/22 1350 (!) 187/79 -- -- 81 -- --   12/30/22 1255 (!) 183/73 -- -- 81 -- 94 %   12/30/22 1240 (!) 162/71 -- -- 80 16 94 %   12/30/22 1225 (!) 163/70 -- -- 81 16 94 %   12/30/22 1205 (!) 183/72 98.1  F (36.7  C) Oral 72 18 94 %   12/30/22 1150 (!) 173/73 -- -- 79 16 94 %   12/30/22 1120 (!) 168/72 98.1  F (36.7  C) Temporal 78 18 90 %   12/30/22 1110 (!) 177/70 -- -- 79 25 93 %        PHYSICAL EXAM:  GEN: No acute distress, comfortable  ABD:soft, nondistended, expected ttp, dressings c/d/i  EXT:No cyanosis, edema or obvious abnormalities    12/30 0700 - 12/31 0659  In: 1910 [P.O.:410; I.V.:1500]  Out: 1710 [Urine:1700]    Lab Results   Component Value Date    WBC 6.4 12/05/2022    HGB 11.2 12/05/2022    HCT 37.5 12/05/2022    MCV 72 12/05/2022      12/05/2022     INR/Prothrombin Time  No results for input(s): NA, CO2, BUN, CREATININE, GLUCOSE in the last 168 hours.    Invalid input(s): K, CL, LABGLOM, CALCIUM  Lab Results   Component Value Date    ALT 25 09/13/2022    AST 18 09/13/2022    ALKPHOS 138 09/13/2022

## 2022-12-31 NOTE — PROGRESS NOTES
"PRIMARY DIAGNOSIS: General Surgery  OUTPATIENT/OBSERVATION GOALS TO BE MET BEFORE DISCHARGE:  1. ADLs back to baseline: Yes, improved BP. Spo2 dropped to 88% RA overnight. Pt denies difficulty breathing and SOB. States, \"My oxygen dropped the last time I went through surgery too.\" Currently 95% on 2.5 L O2.     2. Activity and level of assistance: Ambulating independently.    3. Pain status: Improved-controlled with oral pain medications (toradol and norco).    4. Return to near baseline physical activity: Yes     Discharge Planner Nurse   Safe discharge environment identified: Yes  Barriers to discharge: No       Entered by: Myra Escamilla RN 12/31/2022 4:03 AM     Please review provider order for any additional goals.   Nurse to notify provider when observation goals have been met and patient is ready for discharge.  "

## 2022-12-31 NOTE — PROVIDER NOTIFICATION
Spoke with Dr. Ocasio over the phone. Pt requesting home medication Tizanidine to be ordered. Provider okay to resume home medication. Updated on pt /79. No new BP orders at this moment.

## 2022-12-31 NOTE — PROGRESS NOTES
PRIMARY DIAGNOSIS: General Surgery  OUTPATIENT/OBSERVATION GOALS TO BE MET BEFORE DISCHARGE:  1. ADLs back to baseline: No, increased BP.    2. Activity and level of assistance: Ambulating independently.    3. Pain status: Improved-controlled with oral pain medications.    4. Return to near baseline physical activity: Yes     Discharge Planner Nurse   Safe discharge environment identified: Yes  Barriers to discharge: No       Entered by: Myra Escamilla RN 12/31/2022 12:17 AM     Please review provider order for any additional goals.   Nurse to notify provider when observation goals have been met and patient is ready for discharge.

## 2022-12-31 NOTE — PLAN OF CARE
Patient continues to require oxygen to keep saturations > 90%, attempting to wean off oxygen as patient is eager to return home. Pain is controlled on oral pain medication. She is up independently in room and has walked the hallway again. VSS.

## 2023-01-01 ENCOUNTER — APPOINTMENT (OUTPATIENT)
Dept: CT IMAGING | Facility: CLINIC | Age: 63
End: 2023-01-01
Attending: PHYSICIAN ASSISTANT
Payer: COMMERCIAL

## 2023-01-01 LAB — GLUCOSE BLDC GLUCOMTR-MCNC: 112 MG/DL (ref 70–99)

## 2023-01-01 PROCEDURE — 96372 THER/PROPH/DIAG INJ SC/IM: CPT | Performed by: SURGERY

## 2023-01-01 PROCEDURE — 250N000013 HC RX MED GY IP 250 OP 250 PS 637: Performed by: HOSPITALIST

## 2023-01-01 PROCEDURE — 94640 AIRWAY INHALATION TREATMENT: CPT | Mod: 76

## 2023-01-01 PROCEDURE — 82962 GLUCOSE BLOOD TEST: CPT

## 2023-01-01 PROCEDURE — 250N000013 HC RX MED GY IP 250 OP 250 PS 637: Performed by: SURGERY

## 2023-01-01 PROCEDURE — 71275 CT ANGIOGRAPHY CHEST: CPT

## 2023-01-01 PROCEDURE — 99024 POSTOP FOLLOW-UP VISIT: CPT | Performed by: PHYSICIAN ASSISTANT

## 2023-01-01 PROCEDURE — 250N000011 HC RX IP 250 OP 636: Performed by: SURGERY

## 2023-01-01 PROCEDURE — 250N000009 HC RX 250: Performed by: SURGERY

## 2023-01-01 PROCEDURE — 250N000013 HC RX MED GY IP 250 OP 250 PS 637: Performed by: PHYSICIAN ASSISTANT

## 2023-01-01 RX ORDER — IOPAMIDOL 755 MG/ML
100 INJECTION, SOLUTION INTRAVASCULAR ONCE
Status: COMPLETED | OUTPATIENT
Start: 2023-01-01 | End: 2023-01-01

## 2023-01-01 RX ADMIN — ATORVASTATIN CALCIUM 80 MG: 40 TABLET, FILM COATED ORAL at 20:16

## 2023-01-01 RX ADMIN — ENOXAPARIN SODIUM 40 MG: 100 INJECTION SUBCUTANEOUS at 04:02

## 2023-01-01 RX ADMIN — SIMETHICONE 80 MG: 80 TABLET, CHEWABLE ORAL at 08:28

## 2023-01-01 RX ADMIN — EZETIMIBE 10 MG: 10 TABLET ORAL at 08:15

## 2023-01-01 RX ADMIN — FLUOXETINE 80 MG: 20 CAPSULE ORAL at 08:16

## 2023-01-01 RX ADMIN — GABAPENTIN 600 MG: 300 CAPSULE ORAL at 20:23

## 2023-01-01 RX ADMIN — ALBUTEROL SULFATE 2 PUFF: 90 AEROSOL, METERED RESPIRATORY (INHALATION) at 15:59

## 2023-01-01 RX ADMIN — LAMOTRIGINE 200 MG: 100 TABLET ORAL at 08:16

## 2023-01-01 RX ADMIN — HYDROCODONE BITARTRATE AND ACETAMINOPHEN 1 TABLET: 10; 325 TABLET ORAL at 15:57

## 2023-01-01 RX ADMIN — BUDESONIDE AND FORMOTEROL FUMARATE DIHYDRATE 2 PUFF: 160; 4.5 AEROSOL RESPIRATORY (INHALATION) at 12:11

## 2023-01-01 RX ADMIN — MONTELUKAST 10 MG: 10 TABLET, FILM COATED ORAL at 20:21

## 2023-01-01 RX ADMIN — ALBUTEROL SULFATE 2 PUFF: 90 AEROSOL, METERED RESPIRATORY (INHALATION) at 20:26

## 2023-01-01 RX ADMIN — SENNOSIDES AND DOCUSATE SODIUM 2 TABLET: 50; 8.6 TABLET ORAL at 20:24

## 2023-01-01 RX ADMIN — TIZANIDINE 4 MG: 2 TABLET ORAL at 20:24

## 2023-01-01 RX ADMIN — PRAZOSIN HYDROCHLORIDE 4 MG: 2 CAPSULE ORAL at 20:19

## 2023-01-01 RX ADMIN — BUDESONIDE AND FORMOTEROL FUMARATE DIHYDRATE 2 PUFF: 160; 4.5 AEROSOL RESPIRATORY (INHALATION) at 21:47

## 2023-01-01 RX ADMIN — HYDROCODONE BITARTRATE AND ACETAMINOPHEN 1 TABLET: 10; 325 TABLET ORAL at 08:16

## 2023-01-01 RX ADMIN — HYDROCODONE BITARTRATE AND ACETAMINOPHEN 1 TABLET: 10; 325 TABLET ORAL at 04:11

## 2023-01-01 RX ADMIN — DOXEPIN HYDROCHLORIDE 75 MG: 25 CAPSULE ORAL at 20:18

## 2023-01-01 RX ADMIN — ALBUTEROL SULFATE 2 PUFF: 90 AEROSOL, METERED RESPIRATORY (INHALATION) at 03:59

## 2023-01-01 RX ADMIN — GABAPENTIN 600 MG: 300 CAPSULE ORAL at 08:16

## 2023-01-01 RX ADMIN — IPRATROPIUM BROMIDE AND ALBUTEROL SULFATE 3 ML: 2.5; .5 SOLUTION RESPIRATORY (INHALATION) at 02:18

## 2023-01-01 RX ADMIN — ERGOCALCIFEROL 50000 UNITS: 1.25 CAPSULE ORAL at 12:10

## 2023-01-01 RX ADMIN — ALBUTEROL SULFATE 2 PUFF: 90 AEROSOL, METERED RESPIRATORY (INHALATION) at 12:10

## 2023-01-01 RX ADMIN — ALBUTEROL SULFATE 2 PUFF: 90 AEROSOL, METERED RESPIRATORY (INHALATION) at 08:14

## 2023-01-01 RX ADMIN — SIMETHICONE 80 MG: 80 TABLET, CHEWABLE ORAL at 17:08

## 2023-01-01 RX ADMIN — LAMOTRIGINE 200 MG: 100 TABLET ORAL at 20:19

## 2023-01-01 RX ADMIN — LEVOTHYROXINE SODIUM 75 MCG: 75 TABLET ORAL at 08:16

## 2023-01-01 RX ADMIN — IOPAMIDOL 100 ML: 755 INJECTION, SOLUTION INTRAVENOUS at 11:45

## 2023-01-01 ASSESSMENT — ACTIVITIES OF DAILY LIVING (ADL)
ADLS_ACUITY_SCORE: 20

## 2023-01-01 NOTE — PLAN OF CARE
Problem: Plan of Care - These are the overarching goals to be used throughout the patient stay.    Goal: Optimal Comfort and Wellbeing  Outcome: Progressing  Intervention: Monitor Pain and Promote Comfort  Recent Flowsheet Documentation  Taken 1/1/2023 0400 by Suyapa Markham RN  Pain Management Interventions:   medication (see MAR)   ambulation/increased activity   care clustered   cold applied   emotional support   pain management plan reviewed with patient/caregiver   quiet environment facilitated   repositioned   rest   therapeutic presence  Taken 1/1/2023 0000 by Suyapa Markham RN  Pain Management Interventions:   care clustered   cold applied  Taken 12/31/2022 2001 by Suyapa Markham RN  Pain Management Interventions:   medication (see MAR)   care clustered   cold applied   emotional support   pain management plan reviewed with patient/caregiver   quiet environment facilitated   repositioned   rest   therapeutic presence     Patient's vitals are stable, afebrile. Patient is a deep sleeper and mouth breather. Oxygen saturation was decreasing to 87-88% on 1-2L nasal cannula. RT here to give neb and switch from nasal cannula to oxy mask for overnight. Oxy mask is now at 4 Liters, pt's oxygen level 92%.   Pt. Complains of abdominal pain, norco administered twice overnight with relief (see MAR). Lap sites intact. Pt. Up to bathroom with stand by assist, voiding. IV is saline locked. Will continue to monitor.     ELIAS Markham RN

## 2023-01-01 NOTE — PROGRESS NOTES
Care Management Follow Up    Length of Stay (days): 0    Expected Discharge Date: 01/02/2023     Concerns to be Addressed:  Surgery and diet progression      Patient plan of care discussed at interdisciplinary rounds: Yes    Anticipated Discharge Disposition:  Home      Anticipated Discharge Services:  None anticipated   Anticipated Discharge DME:  TBD     Patient/family educated on Medicare website which has current facility and service quality ratings:  No  Education Provided on the Discharge Plan:  No   Patient/Family in Agreement with the Plan: yes    Referrals Placed by CM/SW:  None   Private pay costs discussed: Not applicable    Additional Information:    Chart reviewed.  Pt lives with spouse.  No CM needs identified.  Family to transport.     ROMERO Arroyo         negative

## 2023-01-01 NOTE — PROGRESS NOTES
ASSESSMENT:  No diagnosis found.    Dayana Samaniego is a 62 year old female who is s/p robotic fundolopication on 12/30, POD# 2     PLAN:  -Will get CT to r/o PE  -Pureed diet  -Increase activity as tolerated  -Pain control  -IS  -Further recs once CT done    ADDENDUM -  No PE on CT. Still requiring O2. Continue with IS. If able to wean off O2, ok with discharge today.    Chaitanya Pelayo PA-C  [facility]  Surgery [address]  [place] [address]?  Office: [phone]      SUBJECTIVE:   Doing ok, more O2 overnight, some tightness in chess, no calf pain    Patient Vitals for the past 24 hrs:   BP Temp Temp src Pulse Resp SpO2   01/01/23 0751 128/59 98.3  F (36.8  C) Oral 82 14 91 %   01/01/23 0400 131/58 98.9  F (37.2  C) Oral 87 20 92 %   01/01/23 0300 -- -- -- -- -- 92 %   01/01/23 0232 -- -- -- -- -- 90 %   01/01/23 0218 -- -- -- -- -- 91 %   01/01/23 0000 127/59 97.8  F (36.6  C) Oral 72 20 94 %   12/31/22 2001 (!) 141/59 98  F (36.7  C) Oral 77 16 92 %   12/31/22 1845 -- -- -- -- -- 92 %   12/31/22 1702 -- -- -- -- -- 93 %   12/31/22 1625 (!) 140/60 98  F (36.7  C) Oral 78 14 92 %   12/31/22 1514 -- -- -- -- -- 92 %   12/31/22 1154 (!) 143/64 98.1  F (36.7  C) Oral 74 16 94 %        PHYSICAL EXAM:  GEN: No acute distress, comfortable  ABD:soft, nondistended, expected ttp, dressings c/d/i  EXT:No cyanosis, edema or obvious abnormalities, neg melanie's bilaterally     12/31 0700 - 01/01 0659  In: -   Out: 1500 [Urine:1500]    Lab Results   Component Value Date    WBC 6.4 12/05/2022    HGB 11.2 12/05/2022    HCT 37.5 12/05/2022    MCV 72 12/05/2022     12/05/2022     INR/Prothrombin Time  No results for input(s): NA, CO2, BUN, CREATININE, GLUCOSE in the last 168 hours.    Invalid input(s): K, CL, LABGLOM, CALCIUM  Lab Results   Component Value Date    ALT 25 09/13/2022    AST 18 09/13/2022    ALKPHOS 138 09/13/2022

## 2023-01-01 NOTE — PROGRESS NOTES
GENERAL SURGERY    I agree with the progress note from Chaitanya Pelayo PA-C    Patient status post a robotic hiatal hernia repair with toupee fundoplication.  She had an increased O2 requirement today and with that we evaluated her with a chest CT.  We ruled out pulmonary embolus.  I have spoken with her about increasing her use of the incentive spirometry.  Aside from the O2 requirements she looks to be recovering quite well from her surgery.    Crispin Bunch  [facility] Surgeons  [phone]

## 2023-01-01 NOTE — PROGRESS NOTES
Patient requested to go outside as she wanted to smoke a cigarette. Writer did tell patient that hospital grounds are tobacco free. Writer provided patient with options such as nicotine gum, nicotine patch and aromatherapy all for which she declined.

## 2023-01-02 VITALS
DIASTOLIC BLOOD PRESSURE: 56 MMHG | HEART RATE: 80 BPM | HEIGHT: 64 IN | WEIGHT: 164.4 LBS | SYSTOLIC BLOOD PRESSURE: 117 MMHG | BODY MASS INDEX: 28.07 KG/M2 | OXYGEN SATURATION: 90 % | RESPIRATION RATE: 14 BRPM | TEMPERATURE: 98.1 F

## 2023-01-02 LAB — PLATELET # BLD AUTO: 322 10E3/UL (ref 150–450)

## 2023-01-02 PROCEDURE — 85049 AUTOMATED PLATELET COUNT: CPT | Performed by: SURGERY

## 2023-01-02 PROCEDURE — 96372 THER/PROPH/DIAG INJ SC/IM: CPT | Performed by: SURGERY

## 2023-01-02 PROCEDURE — 99024 POSTOP FOLLOW-UP VISIT: CPT | Performed by: NURSE PRACTITIONER

## 2023-01-02 PROCEDURE — 94640 AIRWAY INHALATION TREATMENT: CPT | Mod: 76

## 2023-01-02 PROCEDURE — 250N000009 HC RX 250: Performed by: SURGERY

## 2023-01-02 PROCEDURE — 250N000011 HC RX IP 250 OP 636: Performed by: SURGERY

## 2023-01-02 PROCEDURE — 36415 COLL VENOUS BLD VENIPUNCTURE: CPT | Performed by: SURGERY

## 2023-01-02 PROCEDURE — 250N000013 HC RX MED GY IP 250 OP 250 PS 637: Performed by: SURGERY

## 2023-01-02 RX ADMIN — EZETIMIBE 10 MG: 10 TABLET ORAL at 09:11

## 2023-01-02 RX ADMIN — LEVOTHYROXINE SODIUM 75 MCG: 75 TABLET ORAL at 09:11

## 2023-01-02 RX ADMIN — ALBUTEROL SULFATE 2 PUFF: 90 AEROSOL, METERED RESPIRATORY (INHALATION) at 04:07

## 2023-01-02 RX ADMIN — LAMOTRIGINE 200 MG: 100 TABLET ORAL at 09:10

## 2023-01-02 RX ADMIN — FLUOXETINE 80 MG: 20 CAPSULE ORAL at 09:10

## 2023-01-02 RX ADMIN — HYDROCODONE BITARTRATE AND ACETAMINOPHEN 1 TABLET: 10; 325 TABLET ORAL at 01:50

## 2023-01-02 RX ADMIN — ALBUTEROL SULFATE 2 PUFF: 90 AEROSOL, METERED RESPIRATORY (INHALATION) at 00:03

## 2023-01-02 RX ADMIN — ALBUTEROL SULFATE 2 PUFF: 90 AEROSOL, METERED RESPIRATORY (INHALATION) at 09:19

## 2023-01-02 RX ADMIN — ACETAMINOPHEN, ASPIRIN, CAFFEINE 1 TABLET: 250; 250; 65 TABLET, FILM COATED ORAL at 04:10

## 2023-01-02 RX ADMIN — ENOXAPARIN SODIUM 40 MG: 100 INJECTION SUBCUTANEOUS at 04:07

## 2023-01-02 RX ADMIN — GABAPENTIN 600 MG: 300 CAPSULE ORAL at 09:10

## 2023-01-02 RX ADMIN — IPRATROPIUM BROMIDE AND ALBUTEROL SULFATE 3 ML: 2.5; .5 SOLUTION RESPIRATORY (INHALATION) at 05:18

## 2023-01-02 ASSESSMENT — ACTIVITIES OF DAILY LIVING (ADL)
ADLS_ACUITY_SCORE: 20

## 2023-01-02 NOTE — PROGRESS NOTES
ASSESSMENT:  No diagnosis found.    Dayana Samaniego is a 62 year old female who is s/p partial fundoplication on 12/30/22.  She is doing very well from a surgical standpoint but has struggled to maintain an O2 saturation greater than 90% without supplemental oxygen.  Patient has been routinely sitting in the mid 90s on 1 to 2 L and without oxygen has been sitting between 88 and 91% on room air.  Given patient has longstanding COPD, this is likely her baseline.  Patient has remained on room air for the past 3 hours while maintaining an O2 sat greater than 88%.  This is an acceptable target for somebody with COPD especially given the fact that she has no shortness of breath or difficulty breathing.  We will discharge patient today.    PLAN:  Home today  Discharge recommendations/instructions placed  Patient will stay on an oatmeal consistency diet until she sees Dr. Ocasio in postop  Diet instructions given to patient    SUBJECTIVE:   She is feeling very well.  Pain is controlled.  She has no reflux, nausea, vomiting, fever or chills.  Patient is passing flatus but has not had a bowel movement.  She is tolerating a puréed diet without difficulty.  Has remained on oxygen but states that she has no shortness of breath and no chest pain.  Patient has been up and walking and has had no difficulty breathing and states she is at her baseline.  She does have longstanding COPD but does not use oxygen at home.      Patient Vitals for the past 24 hrs:   BP Temp Temp src Pulse Resp SpO2   01/02/23 0726 117/56 98.1  F (36.7  C) Oral 80 14 94 %   01/02/23 0415 -- -- -- -- -- 96 %   01/02/23 0400 -- -- -- -- -- 96 %   01/02/23 0001 100/51 98.3  F (36.8  C) Oral 69 18 93 %   01/01/23 2000 -- -- -- -- -- 93 %   01/01/23 1906 -- -- -- -- -- 90 %   01/01/23 1609 (!) 177/79 98.2  F (36.8  C) Oral 77 18 91 %   01/01/23 1600 -- -- -- -- -- 93 %   01/01/23 1213 -- -- -- -- -- 96 %         PHYSICAL EXAM:  GEN: No acute distress,  comfortable  LUNGS: CTA bilaterally; no expiratory wheezes noted; O2 saturation is 89 to 91% on room air  CV:RRR  ABD: Soft, not distended, incision CDI  EXT:No cyanosis, edema or obvious abnormalities    01/01 0700 - 01/02 0659  In: 20 [P.O.:20]  Out: 250 [Urine:250]    Admission on 12/30/2022   Component Date Value     GLUCOSE BY METER POCT 12/30/2022 97      Creatinine 12/30/2022 1.03      GFR Estimate 12/30/2022 61      GLUCOSE BY METER POCT 12/31/2022 108 (H)      GLUCOSE BY METER POCT 01/01/2023 112 (H)           Lilly Laguna, KARLOS CNP

## 2023-01-02 NOTE — PLAN OF CARE
Problem: Plan of Care - These are the overarching goals to be used throughout the patient stay.    Goal: Optimal Comfort and Wellbeing  Intervention: Monitor Pain and Promote Comfort  Recent Flowsheet Documentation  Taken 1/2/2023 0415 by Suyapa Markham RN  Pain Management Interventions:   medication (see MAR)   ambulation/increased activity   care clustered   emotional support   quiet environment facilitated   repositioned   rest   therapeutic presence  Taken 1/2/2023 0001 by Suyapa Markham RN  Pain Management Interventions: care clustered  Taken 1/1/2023 2000 by Suyapa Markham RN  Pain Management Interventions: care clustered     Patient's vitals are stable, afebrile. O2 sats 92% on 1 Liter nasal cannula.   Rates abdominal pain 7-8/10, relieved with Norco (see MAR). Pt. Complained of headache pain , requested excedrin (see MAR). Pt. Up independently to bathroom, voiding. Will continue to monitor.     ELIAS Markham RN

## 2023-01-02 NOTE — PLAN OF CARE
Problem: Pain Acute  Goal: Optimal Pain Control and Function  Outcome: Met  Intervention: Prevent or Manage Pain  Recent Flowsheet Documentation  Taken 1/2/2023 0931 by Sourav Garner RN  Medication Review/Management: medications reviewed  Patient discharge home, instructions and teaching given. Patient educated about follow up appointments.

## 2023-01-02 NOTE — PROGRESS NOTES
Care Management Follow Up    Length of Stay (days): 0    Expected Discharge Date: 01/03/2023     Concerns to be Addressed:       Patient plan of care discussed at interdisciplinary rounds: Yes    Anticipated Discharge Disposition:  Home       Additional Information:  Reviewed. Pt to discharge home pending medical progression. Currently wean off 02      ROMERO Oglesby

## 2023-01-02 NOTE — PLAN OF CARE
Patient is alert and oriented. She is up independently in room. She does report pain but it is controlled on oral medication. Chest x ray results are in chart. Patient is sating at 92% on 0.5 LPM via nasal cannula. She did shower today. IV saline locked.   Problem: Pain Acute  Goal: Optimal Pain Control and Function  Intervention: Develop Pain Management Plan  Recent Flowsheet Documentation  Taken 1/1/2023 0816 by Pily Matias, RN  Pain Management Interventions:   medication (see MAR)   ambulation/increased activity   care clustered   cold applied   emotional support   pain management plan reviewed with patient/caregiver   quiet environment facilitated   repositioned   rest   therapeutic presence  Intervention: Prevent or Manage Pain  Recent Flowsheet Documentation  Taken 1/1/2023 1559 by Pily Matias, RN  Medication Review/Management: medications reviewed  Taken 1/1/2023 0816 by Pily Matias, RN  Medication Review/Management: medications reviewed

## 2023-01-05 ENCOUNTER — TRANSFERRED RECORDS (OUTPATIENT)
Dept: HEALTH INFORMATION MANAGEMENT | Facility: CLINIC | Age: 63
End: 2023-01-05

## 2023-01-11 ENCOUNTER — TELEPHONE (OUTPATIENT)
Dept: SURGERY | Facility: CLINIC | Age: 63
End: 2023-01-11

## 2023-01-11 NOTE — TELEPHONE ENCOUNTER
Left message for patient to call back.    .   RODRIGO [facility]      Shefali Calderón RN  [facility]  General Surgery  [address] [place] [address]  [email or web address]   Office:[phone]  Employed by [facility] [employer],

## 2023-01-11 NOTE — TELEPHONE ENCOUNTER
Spoke to Dayana. She had a fundoplication 12/30 by Dr. Ocasio . She says her incision on the left side is very sore compared to the other ones. No drainage. No fevers or chills. Will have her send a picture to see how it looks. The incision on the picture looks good. No erythema. No signs of infection. It does look like there could be a small seroma. Otherwise it looks really good. I explained this and told her if it gets bigger, red, inflamed or she has a fever or chills to call back. She expressed understanding.       [facility]      Shefali Calderón RN  [facility]  General Surgery  [facility] [address]  [email or web address]   Office:[phone]  Employed by [facility],

## 2023-01-11 NOTE — TELEPHONE ENCOUNTER
Patient's PO appt was bumped today and next available appt is 1/30.  She said one of her incision sites is swollen and sore, not red though.  Very sore compared to the other incision sites.    Please call and advise.    Thanks!

## 2023-01-31 ASSESSMENT — ASTHMA QUESTIONNAIRES
EMERGENCY_ROOM_LAST_YEAR_TOTAL: TWO
ACT_TOTALSCORE: 10
QUESTION_4 LAST FOUR WEEKS HOW OFTEN HAVE YOU USED YOUR RESCUE INHALER OR NEBULIZER MEDICATION (SUCH AS ALBUTEROL): ONE OR TWO TIMES PER DAY
ACT_TOTALSCORE: 10
QUESTION_2 LAST FOUR WEEKS HOW OFTEN HAVE YOU HAD SHORTNESS OF BREATH: MORE THAN ONCE A DAY
QUESTION_5 LAST FOUR WEEKS HOW WOULD YOU RATE YOUR ASTHMA CONTROL: SOMEWHAT CONTROLLED
QUESTION_3 LAST FOUR WEEKS HOW OFTEN DID YOUR ASTHMA SYMPTOMS (WHEEZING, COUGHING, SHORTNESS OF BREATH, CHEST TIGHTNESS OR PAIN) WAKE YOU UP AT NIGHT OR EARLIER THAN USUAL IN THE MORNING: TWO OR THREE NIGHTS A WEEK
QUESTION_1 LAST FOUR WEEKS HOW MUCH OF THE TIME DID YOUR ASTHMA KEEP YOU FROM GETTING AS MUCH DONE AT WORK, SCHOOL OR AT HOME: MOST OF THE TIME

## 2023-02-01 ENCOUNTER — TRANSFERRED RECORDS (OUTPATIENT)
Dept: HEALTH INFORMATION MANAGEMENT | Facility: CLINIC | Age: 63
End: 2023-02-01

## 2023-02-01 ENCOUNTER — OFFICE VISIT (OUTPATIENT)
Dept: PULMONOLOGY | Facility: CLINIC | Age: 63
End: 2023-02-01
Payer: COMMERCIAL

## 2023-02-01 VITALS
HEART RATE: 92 BPM | WEIGHT: 156 LBS | OXYGEN SATURATION: 96 % | BODY MASS INDEX: 26.78 KG/M2 | DIASTOLIC BLOOD PRESSURE: 62 MMHG | SYSTOLIC BLOOD PRESSURE: 124 MMHG

## 2023-02-01 DIAGNOSIS — J44.9 CHRONIC OBSTRUCTIVE PULMONARY DISEASE, UNSPECIFIED COPD TYPE (H): Primary | ICD-10-CM

## 2023-02-01 DIAGNOSIS — J30.89 ENVIRONMENTAL AND SEASONAL ALLERGIES: ICD-10-CM

## 2023-02-01 DIAGNOSIS — Z72.0 TOBACCO ABUSE: ICD-10-CM

## 2023-02-01 DIAGNOSIS — R91.1 LUNG NODULE: ICD-10-CM

## 2023-02-01 DIAGNOSIS — J43.2 CENTRILOBULAR EMPHYSEMA (H): ICD-10-CM

## 2023-02-01 PROCEDURE — 99214 OFFICE O/P EST MOD 30 MIN: CPT | Performed by: NURSE PRACTITIONER

## 2023-02-01 RX ORDER — BUDESONIDE AND FORMOTEROL FUMARATE DIHYDRATE 160; 4.5 UG/1; UG/1
2 AEROSOL RESPIRATORY (INHALATION) 2 TIMES DAILY
Qty: 10.2 G | Refills: 11 | Status: SHIPPED | OUTPATIENT
Start: 2023-02-01 | End: 2023-08-08

## 2023-02-01 NOTE — PROGRESS NOTES
Pulmonary Clinic Follow-up Visit  February 1, 2023    Assessment:  62yoF with history of tobacco abuse (recently started smoking again), asthma and emphysema as well as very small lung nodule. PFTs show her FEV1 is 67% and her DLCO is 65%. She is using Symbicort with good control. Cardiology did a coronary angio that was unremarkable. ENT did not find significant sinus issues but it was found that she had a hiatal hernia that was repaired on 12/30/22.      Plan:   Continue Symbicort. Will consider adding LAMA at follow up if symptoms worsen in the spring.   Continue duonebs 2-4 times prn  Continue follow up with allergy given extensive allergic rhinitis not responsive to flonase.  Due for next LDCT for lung cancer screening in 01/204  Follow up with surgeon next week, encouraged to address intake issues.     Follow up in 6 months    Action Plan if she exacerbates:  - prednisone 40mg daily x 5 days  - azithromycin, z pack x 5 days    Ana Hager CNP  Pulmonary Medicine  [facility]   [phone]      ----------------------------    CCx:   Chief Complaint   Patient presents with     Follow Up     Asthma and emphysema        HPI: 62 year old F with history of tobacco use, asthma and emphysema here for follow up. She was last seen in clinic in 12/22, where she was switched from Trelegy to Symbicort due to difficulty of use with dry powder inhalers. She has since had a fundoplication on 12/30/22 for hiatal hernia. During her hospital stay for this surgery she did require continued supplemental oxygen so a chest CT was obtained to rule out a PE where it was found she had groundglass and reticular interstitial opacities in the right midlung and lingula, her previously noted lung nodule appears stable. Of note, she had started smoking daily again, 3-4 cigarettes per day. Since surgery, her cough has improved. She still coughs 1-2 times daily that is productive of thick sputum that is clear/white. Denies fever,  chills, or body aches. Denies wheeze or chest tightness throughout the day, she does note occasional chest tightness when laying down at night but this has significantly improved since surgery. She has been using duonebs BID.     ED visit for asthma exacerbation on 7/29/22.    Denies reflux symptoms but is having a hard time eating since her surgery. She reports minimal food intake as she is only able to tolerate soft foods and broth at this point. She is not drinking much water. She does admit to weight loss and having low energy.     She does note drainage down the back of her throat and sinus congestion, although this seems to have improved since her last visit. She was previously given Flonase which did not seem to help.  She has seen ENT for dysphagia, CT sinus was unremarkable and esophagram did show a small hiatal hernia, she was given Astelin nasal spray which did not help. She is being seen by Dr. Medina in allergy and they were discussing possible biologics to help control some of these symptoms, IgE and allergy panels were unremarkable.      Followed by [facility] in cardiology. CT heart cath was unremarkable.       Patient supplied answers from flow sheet for:  COPD Assessment Test (CAT)  2009 [employer]. All rights reserved.  COPD assessment test (CAT) 10/6/2021 6/17/2022 1/31/2023   Cough 4 3 3   Phlegm 4 4 2   Chest tightness 4 3 3   Walk up hill 4 5 4   Limited activities 3 3 3   Leaving my home 1 2 2   Sleep 5 3 2   Energy 5 3 3   Total Score 30 26 22          ROS:  10-point review performed and notable for  Dyspnea, fatigue, back pain. The remainder reviewed and negative.       Current Meds:  Current Outpatient Medications   Medication Sig Dispense Refill     albuterol (PROAIR HFA) 108 (90 Base) MCG/ACT inhaler Inhale 2 puffs into the lungs every 4 hours 18 g 11     aspirin-acetaminophen-caffeine (EXCEDRIN MIGRAINE) 250-250-65 MG tablet Take 1 tablet by mouth daily as needed for headaches MR x 1        atorvastatin (LIPITOR) 80 MG tablet Take 1 tablet (80 mg) by mouth At Bedtime 90 tablet 3     benzonatate (TESSALON) 100 MG capsule Take 100 mg by mouth 3 times daily as needed for cough       budesonide-formoterol (SYMBICORT) 160-4.5 MCG/ACT Inhaler Inhale 2 puffs into the lungs 2 times daily 10.2 g 3     clobetasol (TEMOVATE) 0.05 % external cream Apply topically 2 times daily For max of 21 days (Patient taking differently: Apply topically 2 times daily as needed Apply to hands as needed) 60 g 1     doxepin (SINEQUAN) 25 MG capsule Take 75 mg by mouth At Bedtime       eletriptan (RELPAX) 40 MG tablet Take 1 tablet (40 mg) by mouth at onset of headache for migraine 12 tablet 3     erenumab-aooe (AIMOVIG) 70 MG/ML injection ADMINISTER 1 ML(70 MG) UNDER THE SKIN EVERY MONTH 1 mL 0     esomeprazole (NEXIUM) 20 MG DR capsule Take 2 capsules by mouth At Bedtime       ezetimibe (ZETIA) 10 MG tablet Take 1 tablet (10 mg) by mouth daily 90 tablet 3     FLUoxetine (PROZAC) 40 MG capsule Take 60 mg by mouth daily       gabapentin (NEURONTIN) 300 MG capsule Take 600 mg by mouth 2 times daily       HYDROcodone-acetaminophen (NORCO)  MG per tablet Take 1 tablet by mouth every 4 hours as needed       hydrOXYzine (VISTARIL) 25 MG capsule Take 50 mg by mouth At Bedtime       hydrOXYzine (VISTARIL) 25 MG capsule Take 25 mg by mouth 2 times daily as needed for anxiety During the day       ipratropium - albuterol 0.5 mg/2.5 mg/3 mL (DUONEB) 0.5-2.5 (3) MG/3ML neb solution Take 1 vial (3 mLs) by nebulization every 6 hours as needed for shortness of breath, wheezing or cough 90 mL 0     lamoTRIgine (LAMICTAL) 200 MG tablet Take 1 tablet by mouth 2 times daily       levothyroxine (SYNTHROID/LEVOTHROID) 75 MCG tablet TAKE 1 TABLET(75 MCG) BY MOUTH DAILY (Patient taking differently: Take 75 mcg by mouth every evening) 90 tablet 2     montelukast (SINGULAIR) 10 MG tablet Take 1 tablet (10 mg) by mouth At Bedtime (Patient taking  differently: Take 10 mg by mouth daily before breakfast) 90 tablet 4     prazosin (MINIPRESS) 2 MG capsule Take 2 capsules by mouth At Bedtime       prochlorperazine (COMPAZINE) 10 MG tablet Take 10 mg by mouth every 6 hours as needed for nausea When having migraine       senna-docusate (SENOKOT-S/PERICOLACE) 8.6-50 MG tablet Take 2 tablets by mouth At Bedtime       tiZANidine (ZANAFLEX) 4 MG tablet TAKE 1 TO 2 TABLETS BY MOUTH DURING THE DAY AND 2 TABLETS AT BEDTIME 120 tablet 1     vitamin D2 (ERGOCALCIFEROL) 75954 units (1250 mcg) capsule TAKE 1 CAPSULE BY MOUTH 1 TIME A WEEK 13 capsule 1       Physical Exam:  /62 (BP Location: Right arm, Patient Position: Chair, Cuff Size: Adult Regular)   Pulse 92   Wt 70.8 kg (156 lb)   SpO2 96%   BMI 26.78 kg/m      Physical Exam  Constitutional:       General: She is not in acute distress.     Appearance: She is not ill-appearing or diaphoretic.   HENT:      Nose: Nose normal.   Cardiovascular:      Rate and Rhythm: Normal rate and regular rhythm.      Pulses: Normal pulses.      Heart sounds: Normal heart sounds.   Pulmonary:      Effort: Pulmonary effort is normal. No respiratory distress.      Breath sounds: No wheezing or rhonchi.   Musculoskeletal:      Right lower leg: No edema.      Left lower leg: No edema.   Skin:     General: Skin is warm and dry.      Findings: No rash.   Neurological:      Mental Status: She is alert.   Psychiatric:         Behavior: Behavior normal.         Labs:  CBC RESULTS:   Recent Labs   Lab Test 02/25/22  0256   WBC 8.1   RBC 3.55*   HGB 9.4*   HCT 30.8*   MCV 87   MCH 26.5   MCHC 30.5*   RDW 15.3*        Sodium   Date Value Ref Range Status   12/05/2022 141 136 - 145 mmol/L Final     Potassium   Date Value Ref Range Status   12/05/2022 4.6 3.4 - 5.3 mmol/L Final   09/13/2022 4.4 3.4 - 5.3 mmol/L Final     Chloride   Date Value Ref Range Status   12/05/2022 103 98 - 107 mmol/L Final   09/13/2022 107 94 - 109 mmol/L Final      Carbon Dioxide (CO2)   Date Value Ref Range Status   12/05/2022 23 22 - 29 mmol/L Final   09/13/2022 27 20 - 32 mmol/L Final     Anion Gap   Date Value Ref Range Status   12/05/2022 15 7 - 15 mmol/L Final   09/13/2022 3 3 - 14 mmol/L Final     Glucose   Date Value Ref Range Status   09/13/2022 100 (H) 70 - 99 mg/dL Final     GLUCOSE BY METER POCT   Date Value Ref Range Status   01/01/2023 112 (H) 70 - 99 mg/dL Final     Urea Nitrogen   Date Value Ref Range Status   12/05/2022 19.8 8.0 - 23.0 mg/dL Final   09/13/2022 13 7 - 30 mg/dL Final     Creatinine   Date Value Ref Range Status   12/30/2022 1.03 0.60 - 1.10 mg/dL Final     GFR Estimate   Date Value Ref Range Status   12/30/2022 61 >60 mL/min/1.73m2 Final     Comment:     Effective December 21, 2021 eGFRcr in adults is calculated using the 2021 CKD-EPI creatinine equation which includes age and gender (Mark et al., NEJ, DOI: 10.1056/DTRXwh9048778)   04/13/2021 >60 >60 mL/min/1.73m2 Final     GFR, ESTIMATED POCT   Date Value Ref Range Status   09/08/2022 >60 >60 mL/min/1.73m2 Final     Calcium   Date Value Ref Range Status   12/05/2022 9.4 8.8 - 10.2 mg/dL Final         Imaging studies:    EXAM: XR CHEST 2 VIEWS  LOCATION: [facility]  DATE/TIME: 12/6/2022 11:21 AM  INDICATION:  Preop general physical exam  COMPARISON: 07/29/2022                                                                IMPRESSION: Heart size is normal. No pleural effusion, pneumothorax, or abnormal area of consolidation. Biapical pleural thickening.    CT CHEST PULMONARY EMBOLISM W CONTRAST, DATE/TIME: 2/25/2022 3:24 AM  INDICATION: PE suspected, high prob shortness of breath and left chest pain  COMPARISON: 05/19/2021   FINDINGS:  ANGIOGRAM CHEST: Pulmonary arteries are normal caliber and negative for pulmonary emboli. Thoracic aorta is negative for dissection. No CT evidence of right heart strain.   LUNGS AND PLEURA: 2 mm nodule left apex series 7 image 56,  2 mm subpleural nodule right upper lobe image 62 and 2 mm right upper lobe nodule image 54 stable. Emphysema. Basilar atelectasis.  MEDIASTINUM/AXILLAE: Small hiatal hernia. Trace amount of pericardial fluid.  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  MUSCULOSKELETAL: Breast implants.                                                              IMPRESSION:  1.  No pulmonary emboli.  2.  Emphysema.  3.  Bibasilar atelectasis.  4.  Tiny pulmonary nodule stable.  5.  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    CT CHEST PULMONARY EMBOLISM W CONTRAST, DATE/TIME: 1/1/2023 11:49 AM  INDICATION: Recent fundoplication. Chest tightness.   COMPARISON: 02/25/2022  CT. Radiograph 12/06/2022.  FINDINGS:  ANGIOGRAM CHEST: Pulmonary arteries are normal caliber and negative for pulmonary emboli. Thoracic aorta is negative for dissection.  LUNGS AND PLEURA: Small pleural effusions and dependent atelectasis. Emphysema. Mild apical scarring. New groundglass and reticular interstitial opacities in the right midlung and lingula.  There is debris in the airways to the lower lobes.  MEDIASTINUM/AXILLAE: Small air locules in the mediastinum.                                                                IMPRESSION:  1.  No PE.  2.  New groundglass and reticular interstitial opacities in the lungs could be atypical infection. Edema is less likely.   3.  Emphysema. Small pleural effusions with atelectasis. Debris in the airways.  4.  Small air locules in the mediastinum are likely normal postsurgical change.    Coronary CT angiogram 09/2022  1.  Normal coronary CT angiography, no detectable coronary atherosclerotic plaques. Co-dominant system.  2.  Normal visualized aortic segments.  3.  No thrombus in left atrial appendage.  4.  No pericardial effusion or calcification.    XR ESOPHAGRAM, DATE/TIME: 8/17/2022 8:10 AM  INDICATION:  Dysphagia,  unspecified type  COMPARISON: None.  FINDINGS:  ESOPHAGUS: Normal primary peristalsis. There are distal esophageal tertiary contraction waves.  There is a prominent cricopharyngeus muscle, and a small anterior cervical esophageal well at the same level of the cricopharyngeus. This did not impede passage of the 13 mm barium tablet.  There is a small sliding hiatal hernia with gastroesophageal reflux.  The 13 mm barium tablet became stuck at the GE junction for at least 7 minutes. This did not produce symptoms.                                                               IMPRESSION:  1.  Prominent cricopharyngeus muscle, with small anterior cervical esophageal web. This might be the cause the sensation of food sticking.  2.  Prolonged transit barium tablet at the GE junction.  3.  Small hiatal hernia.  4.  Gastroesophageal reflux.    PFT's  FEV1 1.71L 67% predicted from 1.77L  FVC 2.59L, 80% predicted from 2.54L  FEV1/FVC 66  TLC 5.28L, 103% predicted  DLCO 65% predicted  Impression: moderate obstruction without reversibility. Air trapping. Mild diffusion capacity defect.

## 2023-02-01 NOTE — PATIENT INSTRUCTIONS
- we will follow up in 6 months to see how you do in the spring.   - I sent the chantix prescription to help with smoking cessation. I know you can do it!  - you are due for lung cancer screening in Jan 2024  - continue Symbicort as you have been doing.     If you have worsening symptoms, questions, or need to speak with the nurse please call [phone].

## 2023-02-06 ENCOUNTER — OFFICE VISIT (OUTPATIENT)
Dept: SURGERY | Facility: CLINIC | Age: 63
End: 2023-02-06
Payer: COMMERCIAL

## 2023-02-06 ENCOUNTER — HOSPITAL ENCOUNTER (OUTPATIENT)
Dept: MRI IMAGING | Facility: CLINIC | Age: 63
Discharge: HOME | End: 2023-02-06
Attending: PSYCHIATRY & NEUROLOGY | Admitting: PSYCHIATRY & NEUROLOGY
Payer: COMMERCIAL

## 2023-02-06 DIAGNOSIS — R13.10 DYSPHAGIA, UNSPECIFIED TYPE: Primary | ICD-10-CM

## 2023-02-06 DIAGNOSIS — G95.9 CERVICAL MYELOPATHY (H): ICD-10-CM

## 2023-02-06 PROCEDURE — 72141 MRI NECK SPINE W/O DYE: CPT

## 2023-02-06 PROCEDURE — 99024 POSTOP FOLLOW-UP VISIT: CPT | Performed by: SURGERY

## 2023-02-06 NOTE — PROGRESS NOTES
HPI: Dayana Samaniego is here for follow up after her partial fundoplication.  She is complaining of difficulty with belching but is able to eat normal food except for cheeseburgers and feels as though it gets stuck in her distal esophagus.  She does have increased abdominal flatulence and bloating.  However, she also notices significant esophageal burning.  She does have some coughing.  She also had a CT scan performed which appears unremarkable.    Allergies, Medications, Social History, Past Medical History and Past Surgical History were reviewed and are noted in the chart.    There were no vitals taken for this visit.  There is no height or weight on file to calculate BMI.      EXAM:   GENERAL: Appears well  Abdomen-soft    Incision/Surgical Site 12/30/22 Abdomen (Active)       Assessment/Plan: Dayana Samaniego has symptoms of an unclear etiology.  She is able to eat an essentially normal diet apart from ground beef.  This leads me to believe that the wrap is allowing food passage without difficulty.  However, she also complains of an inability to belch which points more towards a tight wrap.  It is unclear as to the etiology of her esophageal burning in the setting of an inability to belch.  Regardless, we will order an esophagram to see how the wrap is functioning as well as the esophagus.  Once this is complete we will discuss the findings and options on the phone.    Davie Ocasio DO [facility] Department of Surgery

## 2023-02-06 NOTE — LETTER
2/6/2023         RE: Dayana Samaniego  [address] [facility] [address]        Dear Colleague,    Thank you for referring your patient, Dayana Samaniego, to the [facility] [place]. Please see a copy of my visit note below.         HPI: Dayana Samaniego is here for follow up after her partial fundoplication.  She is complaining of difficulty with belching but is able to eat normal food except for cheeseburgers and feels as though it gets stuck in her distal esophagus.  She does have increased abdominal flatulence and bloating.  However, she also notices significant esophageal burning.  She does have some coughing.  She also had a CT scan performed which appears unremarkable.    Allergies, Medications, Social History, Past Medical History and Past Surgical History were reviewed and are noted in the chart.    There were no vitals taken for this visit.  There is no height or weight on file to calculate BMI.      EXAM:   GENERAL: Appears well  Abdomen-soft    Incision/Surgical Site 12/30/22 Abdomen (Active)       Assessment/Plan: Dayana Samaniego has symptoms of an unclear etiology.  She is able to eat an essentially normal diet apart from ground beef.  This leads me to believe that the wrap is allowing food passage without difficulty.  However, she also complains of an inability to belch which points more towards a tight wrap.  It is unclear as to the etiology of her esophageal burning in the setting of an inability to belch.  Regardless, we will order an esophagram to see how the wrap is functioning as well as the esophagus.  Once this is complete we will discuss the findings and options on the phone.    Davie Ocasio,  [facility] Department of Surgery      Again, thank you for allowing me to participate in the care of your patient.        Sincerely,        Davie Ocasio, DO

## 2023-02-07 ENCOUNTER — HOSPITAL ENCOUNTER (OUTPATIENT)
Dept: RADIOLOGY | Facility: CLINIC | Age: 63
Discharge: HOME | End: 2023-02-07
Attending: SURGERY | Admitting: SURGERY
Payer: COMMERCIAL

## 2023-02-07 DIAGNOSIS — R13.10 DYSPHAGIA, UNSPECIFIED TYPE: ICD-10-CM

## 2023-02-07 PROCEDURE — 255N000001 HC RX 255: Performed by: SURGERY

## 2023-02-07 PROCEDURE — 74221 X-RAY XM ESOPHAGUS 2CNTRST: CPT

## 2023-02-07 RX ADMIN — ANTACID/ANTIFLATULENT 4 G: 380; 550; 10; 10 GRANULE, EFFERVESCENT ORAL at 10:13

## 2023-02-10 ENCOUNTER — TRANSFERRED RECORDS (OUTPATIENT)
Dept: HEALTH INFORMATION MANAGEMENT | Facility: CLINIC | Age: 63
End: 2023-02-10

## 2023-02-13 DIAGNOSIS — G43.019 COMMON MIGRAINE WITH INTRACTABLE MIGRAINE: ICD-10-CM

## 2023-02-13 PROBLEM — Z47.89 ENCOUNTER FOR OTHER ORTHOPEDIC AFTERCARE: Status: RESOLVED | Noted: 2021-03-21 | Resolved: 2023-02-13

## 2023-02-13 PROBLEM — L98.8 DRAINING CUTANEOUS SINUS TRACT: Status: RESOLVED | Noted: 2021-12-01 | Resolved: 2023-02-13

## 2023-02-13 NOTE — TELEPHONE ENCOUNTER
Shonda sent a refill request for Aimovig . I had lm at the last refill request for pt to make a appt.

## 2023-02-13 NOTE — LETTER
2/13/2023        RE: Dayana Samaniego  [address] [address] [place] [id number]              Dear Dayana,         We recently provided you with medication refills.  Many medications require routine follow-up with your doctor.    Your prescription(s) have been refilled for 30 days so you may have time for the above noted follow-up. Please call to schedule soon so we can assure you have an appointment before your next refills are needed. If you have already made a follow up appointment, please disregard this letter.           Sincerely,        [facility] [place]     (Formerly known as [facility])  Dr. Schmitt Care Team

## 2023-02-14 NOTE — TELEPHONE ENCOUNTER
Refill request for: Aimovig 70mg/ml  Directions: ADMINISTER 1 ML(70 MG) UNDER THE SKIN EVERY MONTH    LOV: 11/30/21 Letter mailed to pt to remind to schedule an appt.  NOV: No future appt  Scheduled. Luz Elena had already left a message for pt to schedule on 12/30/22 . Letter sent today.    30 day supply with 0 refills Medication T'd for review and signature    Mary White LPN on 2/14/2023 at 10:51 AM

## 2023-02-15 RX ORDER — ERENUMAB-AOOE 70 MG/ML
INJECTION SUBCUTANEOUS
Qty: 1 ML | Refills: 0 | Status: SHIPPED | OUTPATIENT
Start: 2023-02-15 | End: 2023-03-15

## 2023-02-20 ENCOUNTER — VIRTUAL VISIT (OUTPATIENT)
Dept: SURGERY | Facility: CLINIC | Age: 63
End: 2023-02-20
Payer: COMMERCIAL

## 2023-02-20 DIAGNOSIS — R13.10 DYSPHAGIA, UNSPECIFIED TYPE: Primary | ICD-10-CM

## 2023-02-20 PROCEDURE — 99024 POSTOP FOLLOW-UP VISIT: CPT | Performed by: SURGERY

## 2023-02-20 NOTE — LETTER
"    2/20/2023         RE: Dayana Samaniego  [address] [address] [place] [address]        Dear Colleague,    Thank you for referring your patient, Dayana Samaniego, to the [facility] SURGERY CLINIC AND BARIATRICS [facility]. Please see a copy of my visit note below.      The patient has been notified of following:     \"This telephone visit will be conducted via a call between you and your physician/provider. We have found that certain health care needs can be provided without the need for a physical exam.  This service lets us provide the care you need with a short phone conversation.  If a prescription is necessary we can send it directly to your pharmacy.  If lab work is needed we can place an order for that and you can then stop by our lab to have the test done at a later time.    Telephone visits are billed at different rates depending on your insurance coverage. During this emergency period, for some insurers they may be billed the same as an in-person visit.  Please reach out to your insurance provider with any questions.    If during the course of the call the physician/provider feels a telephone visit is not appropriate, you will not be charged for this service.\"    Patient has given verbal consent to a Telephone visit? Yes    What phone number would you like to be contacted at? [phone]    Patient would like to receive their AVS by [facility]          Distant Location (provider location):  On-site    Additional provider notes: I spoke with Dayana regarding the findings of the esophagram.  Unfortunately there is no evidence of any tight partial wrap nor any other complications secondary to surgery.  However, she does have some narrowing in the upper oral pharyngeal space.  She continues to have nausea and significant bloating with intermittent regurgitation of an unclear etiology.  She also notes that sometimes she does feel though food does get stuck down in her distal esophagus.  She is able to " 01-Jan-2022 11:19 eat chicken although she has to chew it fairly well.  She had a recent MRI of her cervical spine and was told by her neurologist that she should follow-up with her surgeon to address the findings.  At this point I would like her to follow-up with her surgeon who is addressing her cervical spine to see if they plan on doing any surgical interventions.  There may be a component of nerve compression which is affecting her upper gastrointestinal motility.  When she has met with her surgeon she will follow-up with me and we can have an ongoing discussion for possible repeat endoscopy versus dilation.    Phone call duration: 15 minutes    Zane Ocasio DO [facility]  [phone]          Again, thank you for allowing me to participate in the care of your patient.        Sincerely,        Davie Ocasio, DO

## 2023-02-20 NOTE — PROGRESS NOTES
"  The patient has been notified of following:     \"This telephone visit will be conducted via a call between you and your physician/provider. We have found that certain health care needs can be provided without the need for a physical exam.  This service lets us provide the care you need with a short phone conversation.  If a prescription is necessary we can send it directly to your pharmacy.  If lab work is needed we can place an order for that and you can then stop by our lab to have the test done at a later time.    Telephone visits are billed at different rates depending on your insurance coverage. During this emergency period, for some insurers they may be billed the same as an in-person visit.  Please reach out to your insurance provider with any questions.    If during the course of the call the physician/provider feels a telephone visit is not appropriate, you will not be charged for this service.\"    Patient has given verbal consent to a Telephone visit? Yes    What phone number would you like to be contacted at? [phone]    [facility]          Distant Location (provider location):  On-site    Additional provider notes: I spoke with Dayana regarding the findings of the esophagram.  Unfortunately there is no evidence of any tight partial wrap nor any other complications secondary to surgery.  However, she does have some narrowing in the upper oral pharyngeal space.  She continues to have nausea and significant bloating with intermittent regurgitation of an unclear etiology.  She also notes that sometimes she does feel though food does get stuck down in her distal esophagus.  She is able to eat chicken although she has to chew it fairly well.  She had a recent MRI of her cervical spine and was told by her neurologist that she should follow-up with her surgeon to address the findings.  At this point I would like her to follow-up with her surgeon who is addressing her " cervical spine to see if they plan on doing any surgical interventions.  There may be a component of nerve compression which is affecting her upper gastrointestinal motility.  When she has met with her surgeon she will follow-up with me and we can have an ongoing discussion for possible repeat endoscopy versus dilation.    Phone call duration: 15 minutes    Zane Ocasio DO [facility]  [phone]

## 2023-02-28 ENCOUNTER — LAB (OUTPATIENT)
Dept: FAMILY MEDICINE | Facility: CLINIC | Age: 63
End: 2023-02-28

## 2023-02-28 ENCOUNTER — OFFICE VISIT (OUTPATIENT)
Dept: FAMILY MEDICINE | Facility: CLINIC | Age: 63
End: 2023-02-28
Payer: COMMERCIAL

## 2023-02-28 VITALS
BODY MASS INDEX: 25.49 KG/M2 | OXYGEN SATURATION: 97 % | DIASTOLIC BLOOD PRESSURE: 64 MMHG | TEMPERATURE: 99 F | WEIGHT: 153 LBS | RESPIRATION RATE: 20 BRPM | HEART RATE: 88 BPM | HEIGHT: 65 IN | SYSTOLIC BLOOD PRESSURE: 112 MMHG

## 2023-02-28 DIAGNOSIS — F31.81 BIPOLAR 2 DISORDER (H): Chronic | ICD-10-CM

## 2023-02-28 DIAGNOSIS — R82.90 BAD ODOR OF URINE: ICD-10-CM

## 2023-02-28 DIAGNOSIS — M85.89 OSTEOPENIA OF MULTIPLE SITES: Chronic | ICD-10-CM

## 2023-02-28 DIAGNOSIS — J43.2 CENTRILOBULAR EMPHYSEMA (H): Chronic | ICD-10-CM

## 2023-02-28 DIAGNOSIS — Z23 IMMUNIZATION DUE: ICD-10-CM

## 2023-02-28 DIAGNOSIS — N18.31 STAGE 3A CHRONIC KIDNEY DISEASE (H): Chronic | ICD-10-CM

## 2023-02-28 DIAGNOSIS — J45.901 MODERATE ASTHMA WITH EXACERBATION, UNSPECIFIED WHETHER PERSISTENT: ICD-10-CM

## 2023-02-28 DIAGNOSIS — G89.4 CHRONIC PAIN SYNDROME: ICD-10-CM

## 2023-02-28 DIAGNOSIS — Z98.890 STATUS POST LAPAROSCOPIC FUNDOPLICATION: ICD-10-CM

## 2023-02-28 DIAGNOSIS — E78.2 MIXED HYPERLIPIDEMIA: Chronic | ICD-10-CM

## 2023-02-28 DIAGNOSIS — Z12.11 SCREEN FOR COLON CANCER: ICD-10-CM

## 2023-02-28 DIAGNOSIS — E03.9 ACQUIRED HYPOTHYROIDISM: Chronic | ICD-10-CM

## 2023-02-28 DIAGNOSIS — Z78.0 POSTMENOPAUSAL STATUS: ICD-10-CM

## 2023-02-28 DIAGNOSIS — Z00.00 ENCOUNTER FOR MEDICARE ANNUAL WELLNESS EXAM: Primary | ICD-10-CM

## 2023-02-28 DIAGNOSIS — Z12.31 BREAST CANCER SCREENING BY MAMMOGRAM: ICD-10-CM

## 2023-02-28 LAB
ALBUMIN SERPL BCG-MCNC: 4.4 G/DL (ref 3.5–5.2)
ALBUMIN UR-MCNC: ABNORMAL MG/DL
ALP SERPL-CCNC: 151 U/L (ref 35–104)
ALT SERPL W P-5'-P-CCNC: 15 U/L (ref 10–35)
ANION GAP SERPL CALCULATED.3IONS-SCNC: 16 MMOL/L (ref 7–15)
APPEARANCE UR: CLEAR
AST SERPL W P-5'-P-CCNC: 28 U/L (ref 10–35)
BACTERIA #/AREA URNS HPF: ABNORMAL /HPF
BILIRUB SERPL-MCNC: 0.2 MG/DL
BILIRUB UR QL STRIP: ABNORMAL
BUN SERPL-MCNC: 15.5 MG/DL (ref 8–23)
CALCIUM SERPL-MCNC: 9.4 MG/DL (ref 8.8–10.2)
CHLORIDE SERPL-SCNC: 104 MMOL/L (ref 98–107)
CHOLEST SERPL-MCNC: 154 MG/DL
COLOR UR AUTO: YELLOW
CREAT SERPL-MCNC: 1.22 MG/DL (ref 0.51–0.95)
CREAT UR-MCNC: 298 MG/DL
DEPRECATED HCO3 PLAS-SCNC: 22 MMOL/L (ref 22–29)
ERYTHROCYTE [DISTWIDTH] IN BLOOD BY AUTOMATED COUNT: 17.8 % (ref 10–15)
GFR SERPL CREATININE-BSD FRML MDRD: 50 ML/MIN/1.73M2
GLUCOSE SERPL-MCNC: 97 MG/DL (ref 70–99)
GLUCOSE UR STRIP-MCNC: NEGATIVE MG/DL
HCT VFR BLD AUTO: 36 % (ref 35–47)
HDLC SERPL-MCNC: 68 MG/DL
HGB BLD-MCNC: 10.5 G/DL (ref 11.7–15.7)
HGB UR QL STRIP: NEGATIVE
KETONES UR STRIP-MCNC: NEGATIVE MG/DL
LDLC SERPL CALC-MCNC: 62 MG/DL
LEUKOCYTE ESTERASE UR QL STRIP: NEGATIVE
MCH RBC QN AUTO: 20.8 PG (ref 26.5–33)
MCHC RBC AUTO-ENTMCNC: 29.2 G/DL (ref 31.5–36.5)
MCV RBC AUTO: 71 FL (ref 78–100)
MUCOUS THREADS #/AREA URNS LPF: PRESENT /LPF
NITRATE UR QL: NEGATIVE
NONHDLC SERPL-MCNC: 86 MG/DL
PH UR STRIP: 5.5 [PH] (ref 5–8)
PLATELET # BLD AUTO: 352 10E3/UL (ref 150–450)
POTASSIUM SERPL-SCNC: 4.4 MMOL/L (ref 3.4–5.3)
PROT SERPL-MCNC: 7.3 G/DL (ref 6.4–8.3)
RBC # BLD AUTO: 5.04 10E6/UL (ref 3.8–5.2)
RBC #/AREA URNS AUTO: ABNORMAL /HPF
SODIUM SERPL-SCNC: 142 MMOL/L (ref 136–145)
SP GR UR STRIP: >=1.03 (ref 1–1.03)
SQUAMOUS #/AREA URNS AUTO: ABNORMAL /LPF
TRIGL SERPL-MCNC: 121 MG/DL
TSH SERPL DL<=0.005 MIU/L-ACNC: 3.61 UIU/ML (ref 0.3–4.2)
UROBILINOGEN UR STRIP-ACNC: 0.2 E.U./DL
WBC # BLD AUTO: 7.7 10E3/UL (ref 4–11)
WBC #/AREA URNS AUTO: ABNORMAL /HPF

## 2023-02-28 PROCEDURE — 0124A COVID-19 VACCINE BIVALENT BOOSTER 12+ (PFIZER): CPT | Performed by: FAMILY MEDICINE

## 2023-02-28 PROCEDURE — 82570 ASSAY OF URINE CREATININE: CPT | Performed by: FAMILY MEDICINE

## 2023-02-28 PROCEDURE — 36415 COLL VENOUS BLD VENIPUNCTURE: CPT | Performed by: FAMILY MEDICINE

## 2023-02-28 PROCEDURE — 81001 URINALYSIS AUTO W/SCOPE: CPT | Performed by: FAMILY MEDICINE

## 2023-02-28 PROCEDURE — 80061 LIPID PANEL: CPT | Performed by: FAMILY MEDICINE

## 2023-02-28 PROCEDURE — 85027 COMPLETE CBC AUTOMATED: CPT | Performed by: FAMILY MEDICINE

## 2023-02-28 PROCEDURE — 80053 COMPREHEN METABOLIC PANEL: CPT | Performed by: FAMILY MEDICINE

## 2023-02-28 PROCEDURE — 82043 UR ALBUMIN QUANTITATIVE: CPT | Performed by: FAMILY MEDICINE

## 2023-02-28 PROCEDURE — 99214 OFFICE O/P EST MOD 30 MIN: CPT | Mod: 25 | Performed by: FAMILY MEDICINE

## 2023-02-28 PROCEDURE — 84443 ASSAY THYROID STIM HORMONE: CPT | Performed by: FAMILY MEDICINE

## 2023-02-28 PROCEDURE — 91312 COVID-19 VACCINE BIVALENT BOOSTER 12+ (PFIZER): CPT | Performed by: FAMILY MEDICINE

## 2023-02-28 PROCEDURE — G0438 PPPS, INITIAL VISIT: HCPCS | Performed by: FAMILY MEDICINE

## 2023-02-28 RX ORDER — BACLOFEN 10 MG/1
TABLET ORAL
COMMUNITY
Start: 2023-02-01 | End: 2023-04-10

## 2023-02-28 NOTE — PATIENT INSTRUCTIONS
Patient Education   Personalized Prevention Plan  You are due for the preventive services outlined below.  Your care team is available to assist you in scheduling these services.  If you have already completed any of these items, please share that information with your care team to update in your medical record.  Health Maintenance Due   Topic Date Due     URINE DRUG SCREEN  Never done     Discuss Advance Care Planning  Never done     COPD Action Plan  Never done     Zoster (Shingles) Vaccine (1 of 2) Never done     COVID-19 Vaccine (4 - Booster for Pfizer series) 01/11/2022     Talk to your care team about options to quit tobacco use.  07/01/2022     Mammogram  08/26/2022     Diptheria Tetanus Pertussis (DTAP/TDAP/TD) Vaccine (3 - Td or Tdap) 09/17/2022     Kidney Microalbumin Urine Test  01/20/2023

## 2023-02-28 NOTE — PROGRESS NOTES
SUBJECTIVE:   Dayana is a 62 year old who presents for Preventive Visit.  This is a complicated patient who has mental health and physical disease.  She is a longtime smoker.  She is seen by the [facility] pain clinic for chronic pain.  She has history of multiple surgeries.  Her most recent was a fundoplication and now she is talking about having back spine surgery again.    Patient has been advised of split billing requirements and indicates understanding: Yes  Are you in the first 12 months of your Medicare coverage?  No    HPI    Have you ever done Advance Care Planning? (For example, a Health Directive, POLST, or a discussion with a medical provider or your loved ones about your wishes): No, advance care planning information given to patient to review.  Advanced care planning was discussed at today's visit.       Fall risk  Fallen 2 or more times in the past year?: Yes  Any fall with injury in the past year?: No    Cognitive Screening   1) Repeat 3 items (Leader, Season, Table)    2) Clock draw: NORMAL  3) 3 item recall: Recalls 2 objects   Results: NORMAL clock, 1-2 items recalled: COGNITIVE IMPAIRMENT LESS LIKELY    Mini-CogTM Copyright CHARAN Clements. Licensed by the author for use in [facility]; reprinted with permission (joan@[facility]). All rights reserved.      Reviewed and updated as needed this visit by clinical staff   Tobacco  Allergies  Meds              Reviewed and updated as needed this visit by Provider                 Social History     Tobacco Use     Smoking status: Every Day     Packs/day: 1.00     Years: 45.00     Pack years: 45.00     Types: Cigarettes     Smokeless tobacco: Never   Substance Use Topics     Alcohol use: Not Currently     Comment: Alcoholic Drinks/day: rare--1-2 times in year     If you drink alcohol do you typically have >3 drinks per day or >7 drinks per week? No    No flowsheet data found.        Hyperlipidemia Follow-Up      Are you regularly taking any  medication or supplement to lower your cholesterol?   Yes- lip 80    Are you having muscle aches or other side effects that you think could be caused by your cholesterol lowering medication?  No    Depression and Anxiety Follow-Up    How are you doing with your depression since your last visit? No change    How are you doing with your anxiety since your last visit?  No change    Are you having other symptoms that might be associated with depression or anxiety? Yes:  Poor sleep, chronic pain    Have you had a significant life event? OTHER: Recent surgery     Do you have any concerns with your use of alcohol or other drugs? No    Social History     Tobacco Use     Smoking status: Every Day     Packs/day: 1.00     Years: 45.00     Pack years: 45.00     Types: Cigarettes     Smokeless tobacco: Never   Vaping Use     Vaping Use: Never used   Substance Use Topics     Alcohol use: Not Currently     Comment: Alcoholic Drinks/day: rare--1-2 times in year     Drug use: No     PHQ 2/15/2022 8/15/2022 12/3/2022   PHQ-9 Total Score 20 17 16   Q9: Thoughts of better off dead/self-harm past 2 weeks Several days Several days Not at all   F/U: Thoughts of suicide or self-harm Yes No -   F/U: Self harm-plan No - -   F/U: Self-harm action No - -   F/U: Safety concerns No No -     DAWSON-7 SCORE 1/14/2021 11/16/2021 8/15/2022   Total Score 12 12 16     Last PHQ-9 12/3/2022   1.  Little interest or pleasure in doing things 1   2.  Feeling down, depressed, or hopeless 2   3.  Trouble falling or staying asleep, or sleeping too much 3   4.  Feeling tired or having little energy 2   5.  Poor appetite or overeating 2   6.  Feeling bad about yourself 1   7.  Trouble concentrating 2   8.  Moving slowly or restless 3   Q9: Thoughts of better off dead/self-harm past 2 weeks 0   PHQ-9 Total Score 16   Difficulty at work, home, or with people -   In the past two weeks have you had thoughts of suicide or self harm? -   Do you have concerns about your  personal safety or the safety of others? -   In the past 2 weeks have you thought about a plan or had intention to harm yourself? -   In the past 2 weeks have you acted on these thoughts in any way? -     DAWSON-7  8/15/2022   1. Feeling nervous, anxious, or on edge 2   2. Not being able to stop or control worrying 2   3. Worrying too much about different things 2   4. Trouble relaxing 2   5. Being so restless that it is hard to sit still 3   6. Becoming easily annoyed or irritable 3   7. Feeling afraid, as if something awful might happen 2   DAWSON-7 Total Score 16   If you checked any problems, how difficult have they made it for you to do your work, take care of things at home, or get along with other people? Somewhat difficult       Asthma Follow-Up    Was ACT completed today?    Yes    ACT Total Scores 1/31/2023   ACT TOTAL SCORE (Goal Greater than or Equal to 20) 10   In the past 12 months, how many times did you visit the emergency room for your asthma without being admitted to the hospital? 2   In the past 12 months, how many times were you hospitalized overnight because of your asthma? 0          How many days per week do you miss taking your asthma controller medication?  1    Please describe any recent triggers for your asthma: upper respiratory infections, pollens, mold, exercise or sports, cold air and Gastric Reflux    Have you had any Emergency Room Visits, Urgent Care Visits, or Hospital Admissions since your last office visit?  No    COPD Follow-Up    Overall, how are your COPD symptoms since your last clinic visit?  Better    How much fatigue or shortness of breath do you have when you are walking?  Same as usual    How much shortness of breath do you have when you are resting?  None    How often do you cough? Sometimes    Have you noticed any change in your sputum/phlegm?  No    Have you experienced a recent fever? No    Please describe how far you can walk without stopping to rest:  Less than a  mile    How many flights of stairs are you able to walk up without stopping?  2    Have you had any Emergency Room Visits, Urgent Care Visits, or Hospital Admissions because of your COPD since your last office visit?  No    History   Smoking Status     Every Day     Packs/day: 1.00     Years: 45.00     Types: Cigarettes   Smokeless Tobacco     Never     No results found for: FEV1, [id number]    Chronic Kidney Disease Follow-up    Do you take any over the counter pain medicine?: Yes  What over the counter medicine are you taking for your pain?:  Non- NSAIDs     How often do you take this medicine?:  Three times daily    Hypothyroidism Follow-up      Since last visit, patient describes the following symptoms: Weight stable, no hair loss, no skin changes, no constipation, no loose stools      Current providers sharing in care for this patient include:   Patient Care Team:  Joanne Weiner MD as PCP - General (Family Medicine)  Joanne Weiner MD as Assigned PCP  Jaylon Schmitt MD as Assigned Neuroscience Provider  Angelia rBadford MD as Assigned Pulmonology Provider  Alfredo Peterson MD as MD (Otolaryngology)  Angelia Bradford MD as Referring Physician (Critical Care)  Dylan Amezcua DO as MD (Cardiovascular Disease)  Dylan Amezcua DO as Assigned Heart and Vascular Provider  Suyapa Medina MD as Assigned Allergy Provider  Davie Ocasio DO as Assigned Surgical Provider    The following health maintenance items are reviewed in Epic and correct as of today:  Health Maintenance   Topic Date Due     COPD ACTION PLAN  Never done     ZOSTER IMMUNIZATION (1 of 2) Never done     MAMMO SCREENING  08/26/2022     DTAP/TDAP/TD IMMUNIZATION (3 - Td or Tdap) 09/17/2022     HPV TEST  09/18/2023     PAP  09/18/2023     ANNUAL REVIEW OF HM ORDERS  12/05/2023     LUNG CANCER SCREENING  01/01/2024     NICOTINE/TOBACCO CESSATION COUNSELING Q 1 YR  02/28/2024     MEDICARE ANNUAL WELLNESS VISIT   "02/28/2024     BMP  02/28/2024     LIPID  02/28/2024     MICROALBUMIN  02/28/2024     TSH W/FREE T4 REFLEX  02/28/2024     URINE DRUG SCREEN  02/28/2024     HEMOGLOBIN  02/28/2024     COLORECTAL CANCER SCREENING  03/26/2026     ADVANCE CARE PLANNING  02/28/2028     SPIROMETRY  Completed     HEPATITIS C SCREENING  Completed     HIV SCREENING  Completed     PHQ-2 (once per calendar year)  Completed     INFLUENZA VACCINE  Completed     Pneumococcal Vaccine: Pediatrics (0 to 5 Years) and At-Risk Patients (6 to 64 Years)  Completed     URINALYSIS  Completed     COVID-19 Vaccine  Completed     IPV IMMUNIZATION  Aged Out     MENINGITIS IMMUNIZATION  Aged Out     Labs reviewed in EPIC  BP Readings from Last 3 Encounters:   02/28/23 112/64   02/01/23 124/62   01/02/23 117/56    Wt Readings from Last 3 Encounters:   02/28/23 69.4 kg (153 lb)   02/01/23 70.8 kg (156 lb)   12/30/22 74.6 kg (164 lb 6.4 oz)                  Last 3 Pap and HPV Results:   PAP / HPV Latest Ref Rng & Units 9/18/2018   PAP - Negative for squamous intraepithelial lesion or malignancy  Electronically signed by Mary Alice Jaquez CT (ASCP) on 9/27/2018 at 11:25 AM     HPV16 NEG Negative   HPV18 NEG Negative   HRHPV NEG Negative       Breast CA Risk Assessment (FHS-7) 12/9/2021   Do you have a family history of breast, colon, or ovarian cancer? No / Unknown       Mammogram Screening: Recommended mammography every 1-2 years with patient discussion and risk factor consideration  Pertinent mammograms are reviewed under the imaging tab.        OBJECTIVE:   /64   Pulse 88   Temp 99  F (37.2  C) (Oral)   Resp 20   Ht 1.638 m (5' 4.5\")   Wt 69.4 kg (153 lb)   SpO2 97%   BMI 25.86 kg/m   Estimated body mass index is 25.86 kg/m  as calculated from the following:    Height as of this encounter: 1.638 m (5' 4.5\").    Weight as of this encounter: 69.4 kg (153 lb).  Physical Exam  General appearance - alert, well appearing, and in no distress and " normal appearing weight  Mental status - normal mood, behavior, speech, dress, motor activity, and thought processes  Eyes - pupils equal and reactive, extraocular eye movements intact  Ears - bilateral TM's and external ear canals normal  Nose - normal and patent, no erythema, discharge   Mouth - mucous membranes moist, pharynx normal without lesions  Neck - supple, no significant adenopathy, carotids upstroke normal bilaterally, no bruits, thyroid exam: thyroid is normal in size without nodules or tendernes  Chest - clear to auscultation, no wheezes, rales or rhonchi, symmetric air entry  Heart - normal rate and regular rhythm, no murmurs noted  Abdomen - soft, nontender, nondistended, no masses or organomegaly  Breasts - not examined  Pelvic - examination not indicated  Back exam - limited range of motion, pain with motion noted during exam  Neurological - alert, oriented, normal speech, no focal findings or movement disorder noted, cranial nerves II through XII intact, DTR's normal and symmetric  Musculoskeletal - no joint tenderness, deformity or swelling  Extremities - peripheral pulses normal, no pedal edema, no clubbing or cyanosis  Skin - normal coloration and turgor, no rashes, no suspicious skin lesions noted      Labs reviewed in Epic  Results for orders placed or performed in visit on 02/28/23   Lipid panel reflex to direct LDL Fasting     Status: Normal   Result Value Ref Range    Cholesterol 154 <200 mg/dL    Triglycerides 121 <150 mg/dL    Direct Measure HDL 68 >=50 mg/dL    LDL Cholesterol Calculated 62 <=100 mg/dL    Non HDL Cholesterol 86 <130 mg/dL    Narrative    Cholesterol  Desirable:  <200 mg/dL    Triglycerides  Normal:  Less than 150 mg/dL  Borderline High:  150-199 mg/dL  High:  200-499 mg/dL  Very High:  Greater than or equal to 500 mg/dL    Direct Measure HDL  Female:  Greater than or equal to 50 mg/dL   Male:  Greater than or equal to 40 mg/dL    LDL Cholesterol  Desirable:   <100mg/dL  Above Desirable:  100-129 mg/dL   Borderline High:  130-159 mg/dL   High:  160-189 mg/dL   Very High:  >= 190 mg/dL    Non HDL Cholesterol  Desirable:  130 mg/dL  Above Desirable:  130-159 mg/dL  Borderline High:  160-189 mg/dL  High:  190-219 mg/dL  Very High:  Greater than or equal to 220 mg/dL   Albumin Random Urine Quantitative with Creat Ratio     Status: None   Result Value Ref Range    Creatinine Urine mg/dL 298.0 mg/dL    Albumin Urine mg/L <12.0 mg/L    Albumin Urine mg/g Cr     TSH with free T4 reflex     Status: Normal   Result Value Ref Range    TSH 3.61 0.30 - 4.20 uIU/mL   Comprehensive metabolic panel (BMP + Alb, Alk Phos, ALT, AST, Total. Bili, TP)     Status: Abnormal   Result Value Ref Range    Sodium 142 136 - 145 mmol/L    Potassium 4.4 3.4 - 5.3 mmol/L    Chloride 104 98 - 107 mmol/L    Carbon Dioxide (CO2) 22 22 - 29 mmol/L    Anion Gap 16 (H) 7 - 15 mmol/L    Urea Nitrogen 15.5 8.0 - 23.0 mg/dL    Creatinine 1.22 (H) 0.51 - 0.95 mg/dL    Calcium 9.4 8.8 - 10.2 mg/dL    Glucose 97 70 - 99 mg/dL    Alkaline Phosphatase 151 (H) 35 - 104 U/L    AST 28 10 - 35 U/L    ALT 15 10 - 35 U/L    Protein Total 7.3 6.4 - 8.3 g/dL    Albumin 4.4 3.5 - 5.2 g/dL    Bilirubin Total 0.2 <=1.2 mg/dL    GFR Estimate 50 (L) >60 mL/min/1.73m2   CBC with platelets     Status: Abnormal   Result Value Ref Range    WBC Count 7.7 4.0 - 11.0 10e3/uL    RBC Count 5.04 3.80 - 5.20 10e6/uL    Hemoglobin 10.5 (L) 11.7 - 15.7 g/dL    Hematocrit 36.0 35.0 - 47.0 %    MCV 71 (L) 78 - 100 fL    MCH 20.8 (L) 26.5 - 33.0 pg    MCHC 29.2 (L) 31.5 - 36.5 g/dL    RDW 17.8 (H) 10.0 - 15.0 %    Platelet Count 352 150 - 450 10e3/uL   UA Macro with Reflex to Micro and Culture - lab collect     Status: Abnormal    Specimen: Urine, NOS   Result Value Ref Range    Color Urine Yellow Colorless, Straw, Light Yellow, Yellow    Appearance Urine Clear Clear    Glucose Urine Negative Negative mg/dL    Bilirubin Urine Small (A) Negative     Ketones Urine Negative Negative mg/dL    Specific Gravity Urine >=1.030 1.005 - 1.030    Blood Urine Negative Negative    pH Urine 5.5 5.0 - 8.0    Protein Albumin Urine Trace (A) Negative mg/dL    Urobilinogen Urine 0.2 0.2, 1.0 E.U./dL    Nitrite Urine Negative Negative    Leukocyte Esterase Urine Negative Negative   Urine Creatinine for Drug Screen Panel     Status: None   Result Value Ref Range    Creatinine Urine for Drug Screen 298 mg/dL   UA Microscopic with Reflex to Culture     Status: Abnormal   Result Value Ref Range    Bacteria Urine Few (A) None Seen /HPF    RBC Urine None Seen 0-2 /HPF /HPF    WBC Urine 0-5 0-5 /HPF /HPF    Squamous Epithelials Urine None Seen None Seen /LPF    Mucus Urine Present (A) None Seen /LPF    Narrative    Urine Culture not indicated   [facility]; [id number]; Drug Analysis Profile, Comprehensive, Urine (include creatinine) (Laboratory Miscellaneous Order)     Status: None   Result Value Ref Range    Performing Laboratory [facility]     Test Name       Drug Analysis Profile, Comprehensive, Urine  (include creatinine)    Test Code 374235    [id number] - Urine Drug Confirmation Panel (Comprehensive)     Status: None    Narrative    The following orders were created for panel order [id number] - Urine Drug Confirmation Panel (Comprehensive).  Procedure                               Abnormality         Status                     ---------                               -----------         ------                     Urine Drug Confirmation ...[[id number]]                                                 Urine Creatinine for Raul...[[id number]]                      Final result                 Please view results for these tests on the individual orders.       ASSESSMENT / PLAN:   Encounter for Medicare annual wellness exam  Patient had Cologuard testing in March 2020 and is due again for colorectal cancer screening.  Patient's last mammogram it was in 2020 and she is overdue.  Last DEXA scan was  August 2020, last Pap was in September 2018.  Patient does not have a cervix, had a total hysterectomy and 1985.  Did Pap to prove no cervix in 2018.    Status post laparoscopic fundoplication  Recently done by .  Had horrible GERD and dysphagia with history of hiatal hernia and Schatzki's ring on EGD.    Centrilobular emphysema (H)  Seen by Dr. Bradford.  Appears to have overlap syndrome.  Had quit smoking but now is smoking again.  Has DuoNeb, albuterol, Symbicort.    Moderate asthma without exacerbation, unspecified whether persistent  Less problems when not smoking.    Bipolar 2 disorder (H)  Seen by outside mental health specialists.  On Lamictal.  - CBC with platelets  - CBC with platelets    Chronic pain syndrome  Seen at [facility].  I do not give her narcotics.  System is asking for UDS.  - [id number] - Urine Drug Confirmation Panel (Comprehensive)  - [id number] - Urine Drug Confirmation Panel (Comprehensive)  - [facility]; [id number]; Drug Analysis Profile, Comprehensive, Urine (include creatinine) (Laboratory Miscellaneous Order)  - [facility]; [id number]; Drug Analysis Profile, Comprehensive, Urine (include creatinine) (Laboratory Miscellaneous Order)  - [facility] Miscellaneous Testing    Acquired hypothyroidism  On levothyroxine 75 mcg.  Will monitor labs.  - TSH with free T4 reflex  - TSH with free T4 reflex    Mixed hyperlipidemia  On Lipitor 80 mg, will monitor labs.  - Lipid panel reflex to direct LDL Fasting  - Lipid panel reflex to direct LDL Fasting    Stage 3a chronic kidney disease (H)  Marginal.  Will monitor.  - Albumin Random Urine Quantitative with Creat Ratio  - Comprehensive metabolic panel (BMP + Alb, Alk Phos, ALT, AST, Total. Bili, TP)  - Albumin Random Urine Quantitative with Creat Ratio  - Comprehensive metabolic panel (BMP + Alb, Alk Phos, ALT, AST, Total. Bili, TP)    Osteopenia of multiple sites  Last DEXA scan was 8/30/2020.    Screen for colon cancer  Near due.  Will order Cologuard  "testing.  - [facility]([employer])    Postmenopausal status  Can do bone density this summer.  - DX Hip/Pelvis/Spine    Breast cancer screening by mammogram  If possible can do DEXA scan and mammogram together.  - MA Screen Bilateral w/Carlos Alberto    Bad odor of urine  Patient would like a urinalysis.  - UA Macro with Reflex to Micro and Culture - lab collect  - UA Macro with Reflex to Micro and Culture - lab collect  - UA Microscopic with Reflex to Culture    Immunization due  Willing to have her COVID-vaccine.  - COVID-19 VACCINE BIVALENT BOOSTER 12+ (PFIZER)    I will get back to her on her lab results by [facility] and only call with grossly abnormal values.  I will refill appropriate medications to be done by me, not specialist medications.  Patient asks me to fill out a handicap disability parking certificate application.  She should see me back in no longer than 6 months for her next med check.        Patient has been advised of split billing requirements and indicates understanding: Yes      COUNSELING:  Reviewed preventive health counseling, as reflected in patient instructions      BMI:   Estimated body mass index is 25.86 kg/m  as calculated from the following:    Height as of this encounter: 1.638 m (5' 4.5\").    Weight as of this encounter: 69.4 kg (153 lb).   Weight management plan: Discussed healthy diet and exercise guidelines      She reports that she has been smoking cigarettes. She has a 45.00 pack-year smoking history. She has never used smokeless tobacco.  Nicotine/Tobacco Cessation Plan:   Patient is precontemplative.  She has cut down a lot.      Appropriate preventive services were discussed with this patient, including applicable screening as appropriate for cardiovascular disease, diabetes, osteopenia/osteoporosis, and glaucoma.  As appropriate for age/gender, discussed screening for colorectal cancer, prostate cancer, breast cancer, and cervical cancer. Checklist reviewing preventive services " available has been given to the patient.    Reviewed patients plan of care and provided an AVS. The Complex Care Plan (for patients with higher acuity and needing more deliberate coordination of services) for Dayana meets the Care Plan requirement. This Care Plan has been established and reviewed with the Patient.      Joanne Weiner MD  [facility]    Identified Health Risks:

## 2023-03-01 LAB
CREAT UR-MCNC: 298 MG/DL
MICROALBUMIN UR-MCNC: <12 MG/L
MICROALBUMIN/CREAT UR: NORMAL MG/G{CREAT}

## 2023-03-09 ENCOUNTER — HOSPITAL ENCOUNTER (OUTPATIENT)
Dept: MAMMOGRAPHY | Facility: CLINIC | Age: 63
Discharge: HOME | End: 2023-03-09
Attending: FAMILY MEDICINE | Admitting: FAMILY MEDICINE
Payer: COMMERCIAL

## 2023-03-09 ENCOUNTER — ANCILLARY PROCEDURE (OUTPATIENT)
Dept: BONE DENSITY | Facility: CLINIC | Age: 63
End: 2023-03-09
Attending: FAMILY MEDICINE
Payer: COMMERCIAL

## 2023-03-09 DIAGNOSIS — Z12.31 BREAST CANCER SCREENING BY MAMMOGRAM: ICD-10-CM

## 2023-03-09 DIAGNOSIS — Z78.0 POSTMENOPAUSAL STATUS: ICD-10-CM

## 2023-03-09 PROCEDURE — 77081 DXA BONE DENSITY APPENDICULR: CPT | Mod: TC | Performed by: RADIOLOGY

## 2023-03-09 PROCEDURE — 77067 SCR MAMMO BI INCL CAD: CPT

## 2023-03-09 PROCEDURE — 77080 DXA BONE DENSITY AXIAL: CPT | Mod: TC | Performed by: RADIOLOGY

## 2023-03-14 ENCOUNTER — OFFICE VISIT (OUTPATIENT)
Dept: FAMILY MEDICINE | Facility: CLINIC | Age: 63
End: 2023-03-14
Payer: COMMERCIAL

## 2023-03-14 ENCOUNTER — TELEPHONE (OUTPATIENT)
Dept: FAMILY MEDICINE | Facility: CLINIC | Age: 63
End: 2023-03-14

## 2023-03-14 VITALS
SYSTOLIC BLOOD PRESSURE: 124 MMHG | WEIGHT: 153 LBS | OXYGEN SATURATION: 96 % | BODY MASS INDEX: 25.49 KG/M2 | HEART RATE: 88 BPM | RESPIRATION RATE: 16 BRPM | HEIGHT: 65 IN | DIASTOLIC BLOOD PRESSURE: 70 MMHG

## 2023-03-14 DIAGNOSIS — R63.8 DECREASED ORAL INTAKE: ICD-10-CM

## 2023-03-14 DIAGNOSIS — G43.019 COMMON MIGRAINE WITH INTRACTABLE MIGRAINE: Primary | ICD-10-CM

## 2023-03-14 DIAGNOSIS — R42 DIZZINESS: ICD-10-CM

## 2023-03-14 LAB
ALBUMIN SERPL BCG-MCNC: 4.2 G/DL (ref 3.5–5.2)
ALP SERPL-CCNC: 138 U/L (ref 35–104)
ALT SERPL W P-5'-P-CCNC: 14 U/L (ref 10–35)
ANION GAP SERPL CALCULATED.3IONS-SCNC: 10 MMOL/L (ref 7–15)
AST SERPL W P-5'-P-CCNC: 30 U/L (ref 10–35)
BASOPHILS # BLD AUTO: 0 10E3/UL (ref 0–0.2)
BASOPHILS NFR BLD AUTO: 1 %
BILIRUB SERPL-MCNC: 0.3 MG/DL
BUN SERPL-MCNC: 17.7 MG/DL (ref 8–23)
CALCIUM SERPL-MCNC: 9.4 MG/DL (ref 8.8–10.2)
CHLORIDE SERPL-SCNC: 104 MMOL/L (ref 98–107)
CREAT SERPL-MCNC: 1.17 MG/DL (ref 0.51–0.95)
DEPRECATED HCO3 PLAS-SCNC: 27 MMOL/L (ref 22–29)
EOSINOPHIL # BLD AUTO: 0.3 10E3/UL (ref 0–0.7)
EOSINOPHIL NFR BLD AUTO: 4 %
ERYTHROCYTE [DISTWIDTH] IN BLOOD BY AUTOMATED COUNT: 18.3 % (ref 10–15)
GFR SERPL CREATININE-BSD FRML MDRD: 52 ML/MIN/1.73M2
GLUCOSE SERPL-MCNC: 117 MG/DL (ref 70–99)
HCT VFR BLD AUTO: 37.3 % (ref 35–47)
HGB BLD-MCNC: 10.8 G/DL (ref 11.7–15.7)
IMM GRANULOCYTES # BLD: 0 10E3/UL
IMM GRANULOCYTES NFR BLD: 0 %
LYMPHOCYTES # BLD AUTO: 1.3 10E3/UL (ref 0.8–5.3)
LYMPHOCYTES NFR BLD AUTO: 18 %
MAGNESIUM SERPL-MCNC: 2.6 MG/DL (ref 1.7–2.3)
MCH RBC QN AUTO: 20.9 PG (ref 26.5–33)
MCHC RBC AUTO-ENTMCNC: 29 G/DL (ref 31.5–36.5)
MCV RBC AUTO: 72 FL (ref 78–100)
MONOCYTES # BLD AUTO: 0.3 10E3/UL (ref 0–1.3)
MONOCYTES NFR BLD AUTO: 5 %
NEUTROPHILS # BLD AUTO: 5 10E3/UL (ref 1.6–8.3)
NEUTROPHILS NFR BLD AUTO: 72 %
PLATELET # BLD AUTO: 330 10E3/UL (ref 150–450)
POTASSIUM SERPL-SCNC: 4.8 MMOL/L (ref 3.4–5.3)
PROT SERPL-MCNC: 7 G/DL (ref 6.4–8.3)
RBC # BLD AUTO: 5.17 10E6/UL (ref 3.8–5.2)
SODIUM SERPL-SCNC: 141 MMOL/L (ref 136–145)
WBC # BLD AUTO: 6.9 10E3/UL (ref 4–11)

## 2023-03-14 PROCEDURE — 80053 COMPREHEN METABOLIC PANEL: CPT | Performed by: FAMILY MEDICINE

## 2023-03-14 PROCEDURE — 99214 OFFICE O/P EST MOD 30 MIN: CPT | Mod: 25 | Performed by: FAMILY MEDICINE

## 2023-03-14 PROCEDURE — 85025 COMPLETE CBC W/AUTO DIFF WBC: CPT | Performed by: FAMILY MEDICINE

## 2023-03-14 PROCEDURE — 36415 COLL VENOUS BLD VENIPUNCTURE: CPT | Performed by: FAMILY MEDICINE

## 2023-03-14 PROCEDURE — 83735 ASSAY OF MAGNESIUM: CPT | Performed by: FAMILY MEDICINE

## 2023-03-14 PROCEDURE — 96372 THER/PROPH/DIAG INJ SC/IM: CPT | Performed by: FAMILY MEDICINE

## 2023-03-14 RX ORDER — KETOROLAC TROMETHAMINE 30 MG/ML
30 INJECTION, SOLUTION INTRAMUSCULAR; INTRAVENOUS ONCE
Status: COMPLETED | OUTPATIENT
Start: 2023-03-14 | End: 2023-03-14

## 2023-03-14 RX ADMIN — KETOROLAC TROMETHAMINE 30 MG: 30 INJECTION, SOLUTION INTRAMUSCULAR; INTRAVENOUS at 14:57

## 2023-03-14 NOTE — PROGRESS NOTES
Common migraine with intractable migraine  She has had this before although she has had it less now that she is postmenopausal.  I will give her 30 mg Toradol IM as she has had that in the past and has had good success.  - ketorolac (TORADOL) injection 30 mg    Dizziness  She complains that this has been a big problem lately.  She thinks she is dehydrated.  We discussed checking electrolytes and I also want to check her kidney function.  - Comprehensive metabolic panel (BMP + Alb, Alk Phos, ALT, AST, Total. Bili, TP)  - CBC with platelets and differential  - Magnesium  - Comprehensive metabolic panel (BMP + Alb, Alk Phos, ALT, AST, Total. Bili, TP)  - CBC with platelets and differential  - Magnesium    Decreased oral intake  This started before her migraine actually.  She has not had a surgery for a hiatal hernia and ever since she has had no appetite and has taken in very little food and not a lot of fluids recently either.  We do not have IV fluids in the clinic unfortunately.  I did suggest that she continue to take water and electrolyte containing fluids in very small amounts frequently.    After her blood was drawn and she was given her injection, she rested for a few minutes and then was helped to her car.  Her  is here to take her home.      Suzie Anne is a 62 year old accompanied by her spouse, Beni who is awaiting in the lobby, presenting for the following health issues:  Headache      Concern - migraine  Onset: 3 days ago  Description: Intense, waxing and waning  Intensity: severe  Progression of Symptoms:  same  Accompanying Signs & Symptoms: Decreased oral intake, nausea  Previous history of similar problem: Yes, history of migraine headache  Precipitating factors:        Worsened by: Lights and noise and movement  Alleviating factors:        Improved by: Darkness, quiet, pain med  Therapies tried and outcome:  none       Objective    /70   Pulse 88   Resp 16   Ht 1.638 m (5'  "4.5\")   Wt 69.4 kg (153 lb)   SpO2 96%   BMI 25.86 kg/m    Body mass index is 25.86 kg/m .  Physical Exam   GENERAL: alert, moderate distress, pale and frail  EYES: Wearing dark sunglasses  RESP: lungs clear to auscultation - no rales, rhonchi or wheezes  CV: regular rates and rhythm                Answers for HPI/ROS submitted by the patient on 3/14/2023  Your back pain is: chronic  Where is your back pain located? : right lower back, left lower back, right middle of back, right shoulder, left shoulder, right buttock, left buttock, right hip, left hip  How would you describe your back pain? : burning, cramping, dull ache, shooting  Where does your back pain spread? : right buttocks, left buttocks, left thigh, left shoulder, right side of neck  Since you noticed your back pain, how has it changed? : gradually worsening  Does your back pain interfere with your job?: Not applicable  If yes, which:: acetaminophen (Tylenol), heat, rest  What is the reason for your visit today? : Back and leg pain. Handicap  sticker. Urine test.  How many servings of fruits and vegetables do you eat daily?: 0-1  On average, how many sweetened beverages do you drink each day (Examples: soda, juice, sweet tea, etc.  Do NOT count diet or artificially sweetened beverages)?: 3  How many minutes a day do you exercise enough to make your heart beat faster?: 9 or less  How many days a week do you exercise enough to make your heart beat faster?: 3 or less  How many days per week do you miss taking your medication?: 0      "

## 2023-03-14 NOTE — TELEPHONE ENCOUNTER
Order/Referral Request    Who is requesting: Patient    Orders being requested: Injection for Migraines - not sure what name of injection is. States she has had it before with Dr. Weiner     Reason service is needed/diagnosis: Migraines    When are orders needed by: ASAP, hoping for today either with Dr. Weiner or with a RN    Has this been discussed with Provider: No    Does patient have a preference on a Group/Provider/Facility? F      Does patient have an appointment scheduled?: No    Where to send orders: N/A    Could we send this information to you in [email or web address] or would you prefer to receive a phone call?:   Patient would prefer a phone call   Okay to leave a detailed message?: Yes at Cell number on file:    Telephone Information:   Mobile [phone]

## 2023-03-15 DIAGNOSIS — G43.019 COMMON MIGRAINE WITH INTRACTABLE MIGRAINE: ICD-10-CM

## 2023-03-15 RX ORDER — ERENUMAB-AOOE 70 MG/ML
INJECTION SUBCUTANEOUS
Qty: 1 ML | Refills: 0 | Status: SHIPPED | OUTPATIENT
Start: 2023-03-15 | End: 2023-04-11

## 2023-03-15 NOTE — TELEPHONE ENCOUNTER
Refill request for: Aimovig 70mg/ml   Directions: ADMINISTER 1 ML(70 MG) UNDER THE SKIN EVERY MONTH    LOV: 11/30/21  NOV: No future appt scheduled. Letter has already been mailed to pt on 2/14/23 to remind to schedule.    30 day supply with 0 refills Medication T'd for review and signature    Mary White LPN on 3/15/2023 at 4:05 PM

## 2023-03-16 ENCOUNTER — TRANSFERRED RECORDS (OUTPATIENT)
Dept: HEALTH INFORMATION MANAGEMENT | Facility: CLINIC | Age: 63
End: 2023-03-16

## 2023-03-17 ENCOUNTER — ANCILLARY PROCEDURE (OUTPATIENT)
Dept: GENERAL RADIOLOGY | Facility: CLINIC | Age: 63
End: 2023-03-17
Attending: PHYSICIAN ASSISTANT
Payer: COMMERCIAL

## 2023-03-17 DIAGNOSIS — M25.551 RIGHT HIP PAIN: ICD-10-CM

## 2023-03-17 PROCEDURE — 73502 X-RAY EXAM HIP UNI 2-3 VIEWS: CPT | Mod: TC | Performed by: RADIOLOGY

## 2023-03-20 ENCOUNTER — E-VISIT (OUTPATIENT)
Dept: FAMILY MEDICINE | Facility: CLINIC | Age: 63
End: 2023-03-20
Payer: COMMERCIAL

## 2023-03-20 DIAGNOSIS — B30.9 VIRAL CONJUNCTIVITIS: Primary | ICD-10-CM

## 2023-03-20 PROCEDURE — 99207 PR NON-BILLABLE SERV PER CHARTING: CPT | Performed by: FAMILY MEDICINE

## 2023-03-20 NOTE — PATIENT INSTRUCTIONS
Conjunctivitis, Nonspecific    The membrane that covers the white part of your eye (the conjunctiva) is inflamed. Inflammation happens when your body responds to an injury, allergic reaction, infection, or illness. Symptoms of inflammation in the eye may include redness, irritation, itching, swelling, or burning. These symptoms should go away within the next 24 hours. Conjunctivitis may be related to a particle that was in your eye. If so, it may wash out with your tears or irrigation treatment. Being exposed to liquid chemicals or fumes may also cause this reaction.    Home care    Put a cold pack on the eye for 20 minutes at a time. This will reduce pain. To make a cold pack, put ice cubes in a plastic bag that seals at the top. Wrap the bag in a clean, thin towel or cloth.    Artificial tears may be prescribed to reduce irritation or redness. These should be used 3 to 4 times a day.    You may use acetaminophen or ibuprofen to control pain, unless another medicine was prescribed. If you have chronic liver or kidney disease, talk with your healthcare provider before using these medicines. Also talk with your provider if you have ever had a stomach ulcer or gastrointestinal bleeding.    If you wear contact lenses, don't use them until your healthcare provider says it's OK.    Follow-up care  Follow up with your healthcare provider, or as advised.   When to seek medical advice  Call your healthcare provider right away if any of the following occur:     Eyelid swells more    Eye pain gets worse    Redness or drainage from the eye gets worse    Blurry vision gets worse, or you have increased sensitivity to light    Normal vision does not return within 24 to 48 hours  [employer] last reviewed this educational content on 6/1/2022 2000-2022 [employer]. All rights reserved. This information is not intended as a substitute for professional medical care. Always follow your healthcare professional's  instructions.        I believe you are experiencing viral conjunctivitis.  It sounds like you have other cold symptoms.  If you have a virus on your hands and you rub your eye then it can get infected with the virus.  Even though there is some drainage and some redness and pain usually this goes away within 4 days.  Make sure that you are washing your hands on a regular basis so you do not spread the virus to others in the household.  If you are not better by later this week, may be Thursday, let me know on Thursday and I will reassess.  I did not charge you for this.

## 2023-03-29 ENCOUNTER — TRANSFERRED RECORDS (OUTPATIENT)
Dept: HEALTH INFORMATION MANAGEMENT | Facility: CLINIC | Age: 63
End: 2023-03-29
Payer: COMMERCIAL

## 2023-03-31 ENCOUNTER — TELEPHONE (OUTPATIENT)
Dept: FAMILY MEDICINE | Facility: CLINIC | Age: 63
End: 2023-03-31
Payer: COMMERCIAL

## 2023-03-31 NOTE — TELEPHONE ENCOUNTER
General Call    Contacts       Type Contact Phone/Fax    03/31/2023 09:14 AM CDT Phone (Incoming) Dayana Samaniego (Self) [phone] (M)        Reason for Call:  Handicap parking    What are your questions or concerns:  Pt calling back about handicap parking paperwork.  States she discussed with Dr Weiner at [facility] on 2/28/23, but she forgot to follow up.  Her sticker expires today, so she would like to get this asap  Please call her when ready for     Could we send this information to you in [employer] or would you prefer to receive a phone call?:   Patient would prefer a phone call   Okay to leave a detailed message?: Yes at Cell number on file:    Telephone Information:   Mobile [phone]

## 2023-04-11 DIAGNOSIS — G43.019 COMMON MIGRAINE WITH INTRACTABLE MIGRAINE: ICD-10-CM

## 2023-04-11 RX ORDER — ERENUMAB-AOOE 70 MG/ML
INJECTION SUBCUTANEOUS
Qty: 1 ML | Refills: 8 | Status: SHIPPED | OUTPATIENT
Start: 2023-04-11 | End: 2023-12-27

## 2023-04-11 NOTE — TELEPHONE ENCOUNTER
Refill request for: aimovig 70mg/ml   Directions: ADMINISTER 1 ML(70 MG) UNDER THE SKIN EVERY MONTH     LOV:11/30/21   NOV: 12/27/23    30 day supply with 8 refills Medication T'd for review and signature    Mary White LPN on 4/11/2023 at 3:20 PM

## 2023-04-11 NOTE — TELEPHONE ENCOUNTER
[facility] Call Center    Phone Message    May a detailed message be left on voicemail: no     Reason for Call: Medication Refill Request    Has the patient contacted the pharmacy for the refill? Yes   Name of medication being requested: erenumab-aooe (AIMOVIG) 70 MG/ML injection  Provider who prescribed the medication: Jaylon Schmitt  Pharmacy: [id number] [address] AT [facility] [address]  Date medication is needed: ASAP     Patient is requesting a refill for the medication listed above. Please call pt back to advise at # [phone]      Action Taken: Message routed to:  Other: [facility]     Travel Screening: Not Applicable

## 2023-04-12 ENCOUNTER — OFFICE VISIT (OUTPATIENT)
Dept: SURGERY | Facility: CLINIC | Age: 63
End: 2023-04-12
Payer: COMMERCIAL

## 2023-04-12 VITALS — BODY MASS INDEX: 24.75 KG/M2 | HEIGHT: 64 IN | WEIGHT: 145 LBS

## 2023-04-12 DIAGNOSIS — R11.2 NAUSEA AND VOMITING, UNSPECIFIED VOMITING TYPE: Primary | ICD-10-CM

## 2023-04-12 LAB
Lab: NORMAL
PERFORMING LABORATORY: NORMAL
SPECIMEN STATUS: NORMAL
TEST NAME: NORMAL

## 2023-04-12 PROCEDURE — 99213 OFFICE O/P EST LOW 20 MIN: CPT | Performed by: SURGERY

## 2023-04-12 NOTE — LETTER
"    4/12/2023         RE: Dayana Samaniego  [address] [address] [place] [address]        Dear Colleague,    Thank you for referring your patient, Dayana Samaniego, to the [facility] [place]. Please see a copy of my visit note below.         HPI: Dayana Samaniego is here for follow up for her gastrointestinal complaints.  She continues to have nausea and vomiting with liquids but can still eat chicken and fish without difficulty.  She denies any food getting stuck in her distal esophagus and denies any dysphagia.  Her main complaint is excessive gas and flatulence after eating.  There is no specific foods that cause this symptom.  She denies any abdominal discomfort.    Allergies, Medications, Social History, Past Medical History and Past Surgical History were reviewed and are noted in the chart.    Ht 1.626 m (5' 4\")   Wt 65.8 kg (145 lb)   BMI 24.89 kg/m    Body mass index is 24.89 kg/m .      EXAM:   GENERAL: Appears well      Incision/Surgical Site 12/30/22 Abdomen (Active)       Assessment/Plan: Dayana Samaniego continues to have gastrointestinal complaints of an unclear etiology.  It does not appear as though her partial fundoplication is the culprit given the fact she is able to eat solid food.  Typically excessive gas and flatulence is secondary to a full Nissen fundoplication.  She is also able to vomit which again lends itself towards perhaps something else as an etiology.  At this point, I will order gastric emptying study and will refer her to [facility] for further investigations as to what it is that is causing her excessive gas.      Davie Ocasio DO [facility]      Again, thank you for allowing me to participate in the care of your patient.        Sincerely,        Davie Ocasio,     "

## 2023-04-12 NOTE — NURSING NOTE
Your surgeon is referring you to [facility] ([facility]) for additional reflux testing.  Because this appointment is outside of our organization, please let us know when you are scheduled.  Contact us via [employer] or phone at [phone] option 2, to let us know the date(s) for your [facility] appointments.  At that time, we will schedule you for a follow-up with your surgeon to review results and plan next steps. If you do not receive a call from [facility], please call [phone] option 1.    Shakir Alcaraz  [facility]  Surgery Clinic [facility]  Weight Management Clinic [address] [address]  Office: [phone]  Fax: [phone]

## 2023-04-12 NOTE — PROGRESS NOTES
"     HPI: Dayana Samaniego is here for follow up for her gastrointestinal complaints.  She continues to have nausea and vomiting with liquids but can still eat chicken and fish without difficulty.  She denies any food getting stuck in her distal esophagus and denies any dysphagia.  Her main complaint is excessive gas and flatulence after eating.  There is no specific foods that cause this symptom.  She denies any abdominal discomfort.    Allergies, Medications, Social History, Past Medical History and Past Surgical History were reviewed and are noted in the chart.    Ht 1.626 m (5' 4\")   Wt 65.8 kg (145 lb)   BMI 24.89 kg/m    Body mass index is 24.89 kg/m .      EXAM:   GENERAL: Appears well      Incision/Surgical Site 12/30/22 Abdomen (Active)       Assessment/Plan: Dayana Samaniego continues to have gastrointestinal complaints of an unclear etiology.  It does not appear as though her partial fundoplication is the culprit given the fact she is able to eat solid food.  Typically excessive gas and flatulence is secondary to a full Nissen fundoplication.  She is also able to vomit which again lends itself towards perhaps something else as an etiology.  At this point, I will order gastric emptying study and will refer her to [facility] for further investigations as to what it is that is causing her excessive gas.      Davie Ocasio,  [facility] Department of Surgery  "

## 2023-04-14 ENCOUNTER — TRANSFERRED RECORDS (OUTPATIENT)
Dept: HEALTH INFORMATION MANAGEMENT | Facility: CLINIC | Age: 63
End: 2023-04-14
Payer: COMMERCIAL

## 2023-04-17 ENCOUNTER — HOSPITAL ENCOUNTER (OUTPATIENT)
Dept: NUCLEAR MEDICINE | Facility: HOSPITAL | Age: 63
Discharge: HOME | End: 2023-04-17
Attending: SURGERY
Payer: COMMERCIAL

## 2023-04-17 DIAGNOSIS — R11.2 NAUSEA AND VOMITING, UNSPECIFIED VOMITING TYPE: ICD-10-CM

## 2023-04-17 PROCEDURE — A9541 TC99M SULFUR COLLOID: HCPCS | Performed by: SURGERY

## 2023-04-17 PROCEDURE — 78264 GASTRIC EMPTYING IMG STUDY: CPT

## 2023-04-17 PROCEDURE — 343N000001 HC RX 343: Performed by: SURGERY

## 2023-04-17 RX ADMIN — Medication 1 MILLICURIE: at 07:15

## 2023-04-23 DIAGNOSIS — J44.1 COPD EXACERBATION (H): ICD-10-CM

## 2023-04-23 DIAGNOSIS — J44.9 CHRONIC OBSTRUCTIVE PULMONARY DISEASE, UNSPECIFIED COPD TYPE (H): ICD-10-CM

## 2023-04-23 DIAGNOSIS — R06.09 DYSPNEA ON EXERTION: ICD-10-CM

## 2023-04-24 RX ORDER — IPRATROPIUM BROMIDE AND ALBUTEROL SULFATE 2.5; .5 MG/3ML; MG/3ML
SOLUTION RESPIRATORY (INHALATION)
Qty: 180 ML | Refills: 4 | Status: SHIPPED | OUTPATIENT
Start: 2023-04-24 | End: 2024-02-08

## 2023-05-05 LAB — NONINV COLON CA DNA+OCC BLD SCRN STL QL: NEGATIVE

## 2023-05-09 DIAGNOSIS — Z72.0 TOBACCO ABUSE: ICD-10-CM

## 2023-05-12 ENCOUNTER — OFFICE VISIT (OUTPATIENT)
Dept: FAMILY MEDICINE | Facility: CLINIC | Age: 63
End: 2023-05-12
Payer: COMMERCIAL

## 2023-05-12 ENCOUNTER — NURSE TRIAGE (OUTPATIENT)
Dept: NURSING | Facility: CLINIC | Age: 63
End: 2023-05-12

## 2023-05-12 VITALS
HEIGHT: 64 IN | TEMPERATURE: 98.1 F | DIASTOLIC BLOOD PRESSURE: 76 MMHG | WEIGHT: 145 LBS | RESPIRATION RATE: 16 BRPM | OXYGEN SATURATION: 99 % | SYSTOLIC BLOOD PRESSURE: 135 MMHG | BODY MASS INDEX: 24.75 KG/M2 | HEART RATE: 68 BPM

## 2023-05-12 DIAGNOSIS — R74.8 ELEVATED ALKALINE PHOSPHATASE LEVEL: ICD-10-CM

## 2023-05-12 DIAGNOSIS — R10.13 EPIGASTRIC PAIN: Primary | ICD-10-CM

## 2023-05-12 DIAGNOSIS — R10.11 POSTPRANDIAL RUQ PAIN: ICD-10-CM

## 2023-05-12 LAB
ALBUMIN SERPL BCG-MCNC: 4.4 G/DL (ref 3.5–5.2)
ALP SERPL-CCNC: 132 U/L (ref 35–104)
ALT SERPL W P-5'-P-CCNC: 17 U/L (ref 10–35)
ANION GAP SERPL CALCULATED.3IONS-SCNC: 10 MMOL/L (ref 7–15)
AST SERPL W P-5'-P-CCNC: 32 U/L (ref 10–35)
BILIRUB SERPL-MCNC: 0.3 MG/DL
BUN SERPL-MCNC: 14.3 MG/DL (ref 8–23)
CALCIUM SERPL-MCNC: 9.6 MG/DL (ref 8.8–10.2)
CHLORIDE SERPL-SCNC: 105 MMOL/L (ref 98–107)
CREAT SERPL-MCNC: 1.11 MG/DL (ref 0.51–0.95)
DEPRECATED HCO3 PLAS-SCNC: 27 MMOL/L (ref 22–29)
ERYTHROCYTE [DISTWIDTH] IN BLOOD BY AUTOMATED COUNT: ABNORMAL %
GFR SERPL CREATININE-BSD FRML MDRD: 56 ML/MIN/1.73M2
GGT SERPL-CCNC: 47 U/L (ref 5–36)
GLUCOSE SERPL-MCNC: 105 MG/DL (ref 70–99)
HCT VFR BLD AUTO: 42.9 % (ref 35–47)
HGB BLD-MCNC: 13.4 G/DL (ref 11.7–15.7)
LIPASE SERPL-CCNC: 17 U/L (ref 13–60)
MCH RBC QN AUTO: 24.8 PG (ref 26.5–33)
MCHC RBC AUTO-ENTMCNC: 31.2 G/DL (ref 31.5–36.5)
MCV RBC AUTO: 79 FL (ref 78–100)
PLATELET # BLD AUTO: 315 10E3/UL (ref 150–450)
POTASSIUM SERPL-SCNC: 4.5 MMOL/L (ref 3.4–5.3)
PROT SERPL-MCNC: 7 G/DL (ref 6.4–8.3)
RBC # BLD AUTO: 5.41 10E6/UL (ref 3.8–5.2)
SODIUM SERPL-SCNC: 142 MMOL/L (ref 136–145)
WBC # BLD AUTO: 7.6 10E3/UL (ref 4–11)

## 2023-05-12 PROCEDURE — 82977 ASSAY OF GGT: CPT | Performed by: FAMILY MEDICINE

## 2023-05-12 PROCEDURE — 80053 COMPREHEN METABOLIC PANEL: CPT | Performed by: FAMILY MEDICINE

## 2023-05-12 PROCEDURE — 83690 ASSAY OF LIPASE: CPT | Performed by: FAMILY MEDICINE

## 2023-05-12 PROCEDURE — 99214 OFFICE O/P EST MOD 30 MIN: CPT | Performed by: FAMILY MEDICINE

## 2023-05-12 PROCEDURE — 85027 COMPLETE CBC AUTOMATED: CPT | Performed by: FAMILY MEDICINE

## 2023-05-12 PROCEDURE — 36415 COLL VENOUS BLD VENIPUNCTURE: CPT | Performed by: FAMILY MEDICINE

## 2023-05-12 ASSESSMENT — ENCOUNTER SYMPTOMS: ABDOMINAL PAIN: 1

## 2023-05-12 NOTE — TELEPHONE ENCOUNTER
Pt calling with concerns about;    issues with eating for at least the last month  Whenever eating too much ( which still isn t a lot )   gets nauseous and gagging not able to throw up.   severe severe pain across rib cage area     Last PM, ate 1/2 chick thigh and some mashed potatoes with butter, no gravy  spent hours in bathroom with dry heaves/gagging    Hiatal surgery in Dec/2022    Has 'wanted to go to hospital several times but  wasn't home to take her'    Denies;  Difficulty breathing/SOB  Chest pain/pressure  Fever  Changes in Bowel    According to the protocol, patient should see a HCP in office today.  Care advice given. Patient verbalizes understanding and agrees with plan of care. Transferred to scheduling.     Esmer Fernandez RN, Nurse Advisor 10:46 AM 5/12/2023  Reason for Disposition    Age > 60 years    Additional Information    Negative: Passed out (i.e., fainted, collapsed and was not responding)    Negative: Shock suspected (e.g., cold/pale/clammy skin, too weak to stand, low BP, rapid pulse)    Negative: Visible sweat on face or sweat is dripping down    Negative: Chest pain    Negative: SEVERE abdominal pain (e.g., excruciating)    Negative: Pain lasting > 10 minutes and over 50 years old    Negative: Pain lasting > 10 minutes and over 40 years old and associated chest, arm, neck, upper back, or jaw pain    Negative: Pain lasting > 10 minutes and over 35 years old and at least one cardiac risk factor (i.e., hypertension, diabetes, obesity, smoker or strong family history of heart disease)    Negative: Pain lasting > 10 minutes and history of heart disease (i.e., heart attack, bypass surgery, angina, angioplasty, CHF)    Negative: Recent injury to the abdomen    Negative: Vomiting red blood or black (coffee ground) material    Negative: Bloody, black, or tarry bowel movements  (Exception: Chronic-unchanged black-grey bowel movements and is taking iron pills or Pepto-Bismol.)    Negative:  Pregnant 20 weeks or more and new hand or face swelling    Negative: Constant abdominal pain lasting > 2 hours    Negative: Vomiting bile (green color)    Negative: Patient sounds very sick or weak to the triager    Negative: Vomiting and abdomen looks much more swollen than usual    Negative: White of the eyes have turned yellow (i.e., jaundice)    Negative: Fever > 103 F (39.4 C)    Negative: Fever > 101 F (38.3 C) and over 60 years of age    Negative: Fever > 100.0 F (37.8 C) and has diabetes mellitus or a weak immune system (e.g., HIV positive, cancer chemotherapy, organ transplant, splenectomy, chronic steroids)    Negative: Fever > 100.0 F (37.8 C) and bedridden (e.g., nursing home patient, stroke, chronic illness, recovering from surgery)    Protocols used: ABDOMINAL PAIN - UPPER-A-OH

## 2023-05-12 NOTE — ASSESSMENT & PLAN NOTE
62-year-old woman with complicated health history presenting with a couple of months of postprandial upper abdominal pain.  She presents the question of whether or not this could be related to her gallbladder.  Interestingly, back in March she did have an elevated alkaline phosphatase.  Her symptoms in some ways would fit with postprandial biliary pain and I think it is reasonable to evaluate her right upper quadrant with an ultrasound.  We will repeat some of the labs that were done 6+ weeks ago with the addition of a GGT and a lipase.  Chronic mild pancreatitis could fit with her symptoms as well although its unclear what would be precipitating pancreatitis.  Her surgeon who performed the gastric fundoplication wanted her to see gastroenterology.  If the testing comes back without abnormalities, I think she should see gastroenterology as the next step in the work-up.  Couple of other options that may need to be evaluated include C. difficile colitis as she has described consistently loose stools.  I doubt this is in play as she has not been hospitalized, on an antibiotic and she sometimes goes several days without having a bowel movement.  Ischemic colitis theoretically could also cause some of the symptoms she has been describing although she has not had any blood per rectum.  She has a history of carotid artery stenosis and is currently on maximal cholesterol lowering therapy.

## 2023-05-12 NOTE — PROGRESS NOTES
Assessment & Plan   Problem List Items Addressed This Visit     Epigastric pain - Primary     62-year-old woman with complicated health history presenting with a couple of months of postprandial upper abdominal pain.  She presents the question of whether or not this could be related to her gallbladder.  Interestingly, back in March she did have an elevated alkaline phosphatase.  Her symptoms in some ways would fit with postprandial biliary pain and I think it is reasonable to evaluate her right upper quadrant with an ultrasound.  We will repeat some of the labs that were done 6+ weeks ago with the addition of a GGT and a lipase.  Chronic mild pancreatitis could fit with her symptoms as well although its unclear what would be precipitating pancreatitis.  Her surgeon who performed the gastric fundoplication wanted her to see gastroenterology.  If the testing comes back without abnormalities, I think she should see gastroenterology as the next step in the work-up.  Couple of other options that may need to be evaluated include C. difficile colitis as she has described consistently loose stools.  I doubt this is in play as she has not been hospitalized, on an antibiotic and she sometimes goes several days without having a bowel movement.  Ischemic colitis theoretically could also cause some of the symptoms she has been describing although she has not had any blood per rectum.  She has a history of carotid artery stenosis and is currently on maximal cholesterol lowering therapy.           Relevant Orders    US Abdomen Limited    Comprehensive metabolic panel (BMP + Alb, Alk Phos, ALT, AST, Total. Bili, TP)    CBC with platelets    Lipase    GGT   Other Visit Diagnoses     Postprandial RUQ pain        Relevant Orders    US Abdomen Limited    Comprehensive metabolic panel (BMP + Alb, Alk Phos, ALT, AST, Total. Bili, TP)    CBC with platelets    Lipase    GGT    Elevated alkaline phosphatase level        Relevant Orders     "SOUMYA Ceja MD  [facility] MANNY Anne is a 62 year old, presenting for the following health issues:  Abdominal Pain (H/o hiatal hernia surgery in Dec. C/o severe pain in stomach after eating and terrible gas since surgery.)        5/12/2023     1:34 PM   Additional Questions   Roomed by SAC   Accompanied by self         5/12/2023     1:34 PM   Patient Reported Additional Medications   Patient reports taking the following new medications no     Abodminal pain:   - post prandial nausea and abdominal pain.  Intercostal that radiates laterally left and right. \"I can't sit, stand.\"  Pain will subside after about an hour.   Pain is worse with volume but not type of food.  Example: 1/2 chicken thigh and 5 bites of potatoes cause nausea.    -  History of partial fundoplication.  She saw her surgeon.  - severe enough to consider ED visit  - associated gassiness.   - passing gas and stool does not provide relief generally. No changes in BM.     - stools are green.  Stools are soft.  Not formed.     Abdominal Pain     History of Present Illness       Reason for visit:  Possible gallbladder issues  Symptom onset:  More than a month  Symptoms include:  Pain across rib cage, gas, can't eat much without getting sick,  Symptom intensity:  Severe  Symptom progression:  Worsening  Had these symptoms before:  No  What makes it worse:  Eating, bending over  What makes it better:  Anita consumes 4 sweetened beverage(s) daily.She exercises with enough effort to increase her heart rate 9 or less minutes per day.  She exercises with enough effort to increase her heart rate 3 or less days per week.   She is taking medications regularly.      Review of Systems   Gastrointestinal: Positive for abdominal pain.          Objective    /76 (BP Location: Left arm, Patient Position: Sitting, Cuff Size: Adult Regular)   Pulse 68   Temp 98.1  F (36.7  C) (Oral)   Resp 16   Ht 1.626 m (5' " "4\")   Wt 65.8 kg (145 lb)   SpO2 99%   BMI 24.89 kg/m    Body mass index is 24.89 kg/m .  Physical Exam  Nursing note reviewed.   Constitutional:       General: She is not in acute distress.     Appearance: Normal appearance. She is not ill-appearing.   HENT:      Head: Normocephalic and atraumatic.      Right Ear: External ear normal.      Left Ear: External ear normal.   Eyes:      General: No scleral icterus.     Extraocular Movements: Extraocular movements intact.      Conjunctiva/sclera: Conjunctivae normal.   Cardiovascular:      Rate and Rhythm: Normal rate and regular rhythm.      Heart sounds: Normal heart sounds. No murmur heard.     No friction rub. No gallop.   Pulmonary:      Effort: Pulmonary effort is normal. No respiratory distress.      Breath sounds: Normal breath sounds. No wheezing or rales.   Abdominal:      Tenderness: There is abdominal tenderness. There is guarding.   Musculoskeletal:         General: No swelling. Normal range of motion.   Skin:     General: Skin is warm.      Coloration: Skin is not jaundiced.      Findings: No rash.   Neurological:      General: No focal deficit present.      Mental Status: She is alert and oriented to person, place, and time. Mental status is at baseline.   Psychiatric:         Attention and Perception: Attention normal.         Mood and Affect: Mood normal.         Speech: Speech normal.         Thought Content: Thought content normal.          Narrative & Impression   EXAM: NM GASTRIC EMPTYING  LOCATION: [facility]  DATE/TIME: 4/17/2023 11:36 AM CDT     INDICATION: Nausea and vomiting. Gastroparesis evaluation.   COMPARISON: CT 03/22/2021  TECHNIQUE: 1.0 mCi of technetium-99m sulfur colloid labeled egg product. Oral ingestion of standard meal. Anterior and posterior planar imaging for four hours. Geometric mean of emptying/retention calculated.     FINDINGS: The stomach is scintigraphically normal. No evidence of " gastroesophageal reflux.     PATIENT'S RETENTION: 1 hour = 71%, 2 hours = 55%, 4 hours = 19%  NORMAL RETENTION: 1 hour = 30-90%, 2 hours = <60%, 4 hours = <10%                                                                      IMPRESSION:      Delayed gastric emptying at 4 hours.

## 2023-05-16 ENCOUNTER — HOSPITAL ENCOUNTER (OUTPATIENT)
Dept: ULTRASOUND IMAGING | Facility: CLINIC | Age: 63
Discharge: HOME | End: 2023-05-16
Attending: FAMILY MEDICINE | Admitting: FAMILY MEDICINE
Payer: COMMERCIAL

## 2023-05-16 DIAGNOSIS — R10.13 EPIGASTRIC PAIN: ICD-10-CM

## 2023-05-16 DIAGNOSIS — R10.11 POSTPRANDIAL RUQ PAIN: ICD-10-CM

## 2023-05-16 PROCEDURE — 76705 ECHO EXAM OF ABDOMEN: CPT

## 2023-05-23 ENCOUNTER — OFFICE VISIT (OUTPATIENT)
Dept: FAMILY MEDICINE | Facility: CLINIC | Age: 63
End: 2023-05-23
Payer: COMMERCIAL

## 2023-05-23 VITALS
WEIGHT: 142.5 LBS | SYSTOLIC BLOOD PRESSURE: 146 MMHG | DIASTOLIC BLOOD PRESSURE: 77 MMHG | HEART RATE: 73 BPM | BODY MASS INDEX: 23.74 KG/M2 | RESPIRATION RATE: 16 BRPM | OXYGEN SATURATION: 94 % | TEMPERATURE: 98.5 F | HEIGHT: 65 IN

## 2023-05-23 DIAGNOSIS — E03.9 ACQUIRED HYPOTHYROIDISM: ICD-10-CM

## 2023-05-23 DIAGNOSIS — F31.81 BIPOLAR 2 DISORDER (H): ICD-10-CM

## 2023-05-23 DIAGNOSIS — G89.4 CHRONIC PAIN SYNDROME: ICD-10-CM

## 2023-05-23 DIAGNOSIS — Z01.818 PREOP GENERAL PHYSICAL EXAM: Primary | ICD-10-CM

## 2023-05-23 DIAGNOSIS — J45.40 MODERATE PERSISTENT ASTHMA, UNCOMPLICATED: ICD-10-CM

## 2023-05-23 DIAGNOSIS — N18.31 STAGE 3A CHRONIC KIDNEY DISEASE (H): ICD-10-CM

## 2023-05-23 PROCEDURE — 99214 OFFICE O/P EST MOD 30 MIN: CPT | Performed by: PHYSICIAN ASSISTANT

## 2023-05-23 RX ORDER — HYDROXYZINE HYDROCHLORIDE 25 MG/1
TABLET, FILM COATED ORAL
COMMUNITY
Start: 2023-04-23

## 2023-05-23 ASSESSMENT — PAIN SCALES - GENERAL: PAINLEVEL: NO PAIN (0)

## 2023-05-23 NOTE — PROGRESS NOTES
[address], [address]  [facility] [address]  Phone: [phone]  Primary Provider: Joanne Weiner  Pre-op Performing Provider: ALLIE SILVA      PREOPERATIVE EVALUATION:  Today's date: 5/23/2023    Dayana Samaniego is a 62 year old female who presents for a preoperative evaluation.    Surgical Information:  Surgery/Procedure: Bilateral Epidural Steroid Injection   Surgery Location: [facility]  Surgeon:  Surgery Date: 10:15AM  Time of Surgery: 5/26/23  Where patient plans to recover: At home with family  Fax number for surgical facility: [phone]    Assessment & Plan     The proposed surgical procedure is considered LOW risk.    (Z01.818) Preop general physical exam  (primary encounter diagnosis)  Comment: chronic issues stable, not on blood thinners, no h/o DM, able to do 4 METS  Plan: cleared for minor procedure  Avoid nephrotoxin and hypotension     (G89.4) Chronic pain syndrome  Comment: chronic low back pain  Plan:     (J45.40) Moderate persistent asthma, uncomplicated  Comment:   Plan:     (E03.9) Acquired hypothyroidism  Comment:   Plan:     (F31.81) Bipolar 2 disorder (H)  Comment:   Plan:     (N18.31) Stage 3a chronic kidney disease (H)  Comment: baseline GFR in 50s  Plan: avoid nephrotoxins     Risks and Recommendations:  The patient has the following additional risks and recommendations for perioperative complications:   - Consult Hospitalist / IM to assist with post-op medical management    Antiplatelet or Anticoagulation Medication Instructions:   - Patient is on no antiplatelet or anticoagulation medications.    Additional Medication Instructions:  She is not on NSAIDS  She will take am medications after procedure    RECOMMENDATION:  APPROVAL GIVEN to proceed with proposed procedure, without further diagnostic evaluation.    Allie Mills PA-C  30 minutes on the day of the encounter doing chart  review, history and exam, documentation and further activities as noted above.      Subjective       HPI related to upcoming procedure: Dayana is here for pre-op for spinal injection for chronic back pain.    Aside from her chronic pain, she feels okay.  She is able to walk up a flight of stairs without any chest pain or significant sob.    She has been having some abdominal pain since having surgery for hiatal hernia, she had a normal abdominal US on 5/16/23.  She will see GI later this week.    She denies history of DM, CVA, MI or DVT.      5/23/2023     1:26 PM   Preop Questions   1. Have you ever had a heart attack or stroke? No   2. Have you ever had surgery on your heart or blood vessels, such as a stent placement, a coronary artery bypass, or surgery on an artery in your head, neck, heart, or legs? No   3. Do you have chest pain with activity? No   4. Do you have a history of  heart failure? No   5. Do you currently have a cold, bronchitis or symptoms of other infection? No   6. Do you have a cough, shortness of breath, or wheezing? No   7. Do you or anyone in your family have previous history of blood clots? No   8. Do you or does anyone in your family have a serious bleeding problem such as prolonged bleeding following surgeries or cuts? No   9. Have you ever had problems with anemia or been told to take iron pills? YES -    10. Have you had any abnormal blood loss such as black, tarry or bloody stools, or abnormal vaginal bleeding? No   11. Have you ever had a blood transfusion? No   12. Are you willing to have a blood transfusion if it is medically needed before, during, or after your surgery? Yes   13. Have you or any of your relatives ever had problems with anesthesia? YES -    14. Do you have sleep apnea, excessive snoring or daytime drowsiness? No   15. Do you have any artifical heart valves or other implanted medical devices like a pacemaker, defibrillator, or continuous glucose monitor? No   16. Do  you have artificial joints? No   17. Are you allergic to latex? No     Preoperative Review of :   reviewed - controlled substances reflected in medication list.      Review of Systems  CONSTITUTIONAL: NEGATIVE for fever, chills, change in weight  INTEGUMENTARY/SKIN: NEGATIVE for worrisome rashes, moles or lesions  EYES: NEGATIVE for vision changes or irritation  ENT/MOUTH: NEGATIVE for ear, mouth and throat problems  RESP: NEGATIVE for significant cough or SOB  CV: NEGATIVE for chest pain, palpitations or peripheral edema  GI: NEGATIVE for nausea, abdominal pain, heartburn, or change in bowel habits  : NEGATIVE for frequency, dysuria, or hematuria  MUSCULOSKELETAL: NEGATIVE for significant arthralgias or myalgia  NEURO: NEGATIVE for weakness, dizziness or paresthesias  HEME: NEGATIVE for bleeding problems    Patient Active Problem List    Diagnosis Date Noted     Epigastric pain 05/12/2023     Priority: Medium     Iron deficiency anemia 09/20/2022     Priority: Medium     Moderate asthma with exacerbation, unspecified whether persistent 07/29/2022     Priority: Medium     Acute bronchitis, unspecified organism 07/29/2022     Priority: Medium     Abnormal reflex 09/08/2021     Priority: Medium     Asymptomatic postmenopausal status 08/31/2021     Priority: Medium     Formatting of this note might be different from the original.  Created by Conversion    Replacement Utility updated for latest IMO load       Menopausal disorder 08/31/2021     Priority: Medium     Formatting of this note might be different from the original.  Created by Conversion    Replacement Utility updated for latest IMO load       Migraine headache 08/31/2021     Priority: Medium     Formatting of this note might be different from the original.  Created by Conversion    Replacement Utility updated for latest IMO load       Chronic pain syndrome 08/31/2021     Priority: Medium     DDD (degenerative disc disease), lumbosacral 03/22/2021      Priority: Medium     Spinal stenosis of lumbar region with neurogenic claudication 03/22/2021     Priority: Medium     Pain in right leg 03/21/2021     Priority: Medium     Other specified disorders of bone density and structure, multiple sites 03/21/2021     Priority: Medium     Other abnormalities of gait and mobility 03/21/2021     Priority: Medium     Moderate persistent asthma, uncomplicated 03/21/2021     Priority: Medium     Low back pain 03/21/2021     Priority: Medium     Anemia 03/18/2021     Priority: Medium     Hypotension 03/18/2021     Priority: Medium     Fibromyalgia 03/17/2021     Priority: Medium     Formatting of this note might be different from the original.  Created by Conversion    Formatting of this note might be different from the original.  Created by Conversion    Formatting of this note might be different from the original.  Formatting of this note might be different from the original.  Created by Conversion    Formatting of this note might be different from the original.  Created by Conversion  Formatting of this note might be different from the original.  Formatting of this note might be different from the original.  Created by Conversion    Formatting of this note might be different from the original.  Created by Conversion       Hyperlipidemia 03/17/2021     Priority: Medium     Formatting of this note might be different from the original.  Created by Conversion    Formatting of this note might be different from the original.  Formatting of this note might be different from the original.  Created by Conversion  Formatting of this note might be different from the original.  Formatting of this note might be different from the original.  Created by Conversion       Hypothyroidism 03/17/2021     Priority: Medium     Formatting of this note might be different from the original.  Created by Conversion    Replacement Utility updated for latest IMO load    Formatting of this note might be  different from the original.  Formatting of this note might be different from the original.  Created by Conversion    Replacement Utility updated for latest IMO load  Formatting of this note might be different from the original.  Formatting of this note might be different from the original.  Created by Conversion    Replacement Utility updated for latest IMO load       Common migraine with intractable migraine 02/15/2021     Priority: Medium     Chronic kidney disease, stage 3 (H) 01/14/2021     Priority: Medium     Created by Conversion         Osteopenia of multiple sites 09/01/2020     Priority: Medium     Centrilobular emphysema (H) 02/26/2018     Priority: Medium     Formatting of this note might be different from the original.  Mild, seen in 2018 CT scan, DLCO 60% predicted    Formatting of this note might be different from the original.  Formatting of this note might be different from the original.  Mild, seen in 2018 CT scan, DLCO 60% predicted  Formatting of this note might be different from the original.  Formatting of this note might be different from the original.  Mild, seen in 2018 CT scan, DLCO 60% predicted       Hiatal hernia 02/16/2017     Priority: Medium     Hemorrhoids 11/22/2016     Priority: Medium     Lung nodule 08/04/2016     Priority: Medium     Formatting of this note might be different from the original.  8/4/2016:  Left upper lobe nodule of 6.5 mm, likely benign given slow growth per radiologist.  See CT  6/27/2011:  measured  approximately 5.5 mm in greatest dimension       Bipolar 2 disorder (H) 06/01/2015     Priority: Medium     Formatting of this note might be different from the original.  Created by Conversion    Replacement Utility updated for latest IMO load    Formatting of this note might be different from the original.  MED TRIALS:  Doxepin has been helpful for sleep & fibromyalgia.  Topamax- caused cognitive slowing & poor concentration. Depakote caused wt gain. Seroquel was  sedating. Trazodone caused insomnia. Has tried mellaril, effexor, depakote, remeron, buspar, risperdal,zyprexa, celexa,luvox - unsure what happened with these.  Formatting of this note might be different from the original.  MED TRIALS:  Doxepin has been helpful for sleep & fibromyalgia.  Topamax- caused cognitive slowing & poor concentration. Depakote caused wt gain. Seroquel was sedating. Trazodone caused insomnia. Has tried mellaril, effexor, depakote, remeron, buspar, risperdal,zyprexa, celexa,luvox - unsure what happened with these.       Borderline personality disorder (H) 06/01/2015     Priority: Medium     PTSD (post-traumatic stress disorder) 06/01/2015     Priority: Medium     GERD (gastroesophageal reflux disease) 10/16/2012     Priority: Medium     Formatting of this note might be different from the original.  Formatting of this note might be different from the original.  coegd 07/17/12, normal screening  Formatting of this note might be different from the original.  coegd 07/17/12, normal screening       Dysphagia 07/19/2012     Priority: Medium      Past Medical History:   Diagnosis Date     Abnormal reflex 9/8/2021     Abnormality of gait     Created by Conversion      Acute bronchitis, unspecified organism 7/29/2022     Anemia 3/18/2021     Anxiety      Asthma      Asymptomatic postmenopausal status     Created by Conversion  Replacement Utility updated for latest IMO load     Bipolar 2 disorder (H)      Borderline personality disorder (H) 6/1/2015     Centrilobular emphysema (H) 2/26/2018    Mild, seen in 2018 CT scan, DLCO 60% predicted  Formatting of this note might be different from the original. Formatting of this note might be different from the original. Mild, seen in 2018 CT scan, DLCO 60% predicted     Cervical spinal stenosis     s/p cervical fusion     Chronic kidney disease      Chronic kidney disease, stage 3 (H) 1/14/2021     Chronic pain syndrome      Cigarette nicotine dependence  without complication 3/4/2020     Common migraine with intractable migraine 2/15/2021     COPD (chronic obstructive pulmonary disease) (H)      DDD (degenerative disc disease), lumbosacral 3/22/2021     Depression      Draining cutaneous sinus tract 12/1/2021    Added automatically from request for surgery [id number]     Dysphagia 7/19/2012     Encounter for other orthopedic aftercare 3/21/2021     Fibrocystic breast      Fibromyalgia      GERD (gastroesophageal reflux disease)      Hallux abductovalgus with bunions, unspecified laterality 12/14/2015     Hemorrhoids 11/22/2016     Herpes zoster     Created by Conversion  Replacement Utility updated for latest IMO load     Hiatal hernia      History of electroconvulsive therapy      Hyperlipidemia 3/17/2021    Created by Conversion   Formatting of this note might be different from the original. Formatting of this note might be different from the original. Created by Conversion     Hypotension 3/18/2021     Hypothyroidism     hypothyroidism     IBS (irritable bowel syndrome)      Iron deficiency anemia 9/20/2022     Low back pain 3/21/2021     Lung nodule 8/4/2016 8/4/2016:  Left upper lobe nodule of 6.5 mm, likely benign given slow growth per radiologist.  See CT 6/27/2011:  measured  approximately 5.5 mm in greatest dimension      Menopausal and postmenopausal disorder     Created by Conversion  Replacement Utility updated for latest IMO load     Menopausal disorder 8/31/2021    Formatting of this note might be different from the original. Created by Conversion  Replacement Utility updated for latest IMO load     Migraine     Created by Conversion  Replacement Utility updated for latest IMO load     Migraine headache 8/31/2021    Formatting of this note might be different from the original. Created by Conversion  Replacement Utility updated for latest IMO load     Migraines      Moderate asthma with exacerbation, unspecified whether persistent 7/29/2022     Moderate  persistent asthma, uncomplicated 3/21/2021     Osteoarthritis      Osteopenia of multiple sites 9/1/2020     Other abnormalities of gait and mobility 3/21/2021     Other chronic pain      Other specified disorders of bone density and structure, multiple sites 3/21/2021     Pain in right leg 3/21/2021     PONV (postoperative nausea and vomiting)      PTSD (post-traumatic stress disorder)      Shingles 2014    on back     Spinal stenosis of lumbar region with neurogenic claudication 3/22/2021     Tobacco use disorder     Created by Conversion      Past Surgical History:   Procedure Laterality Date     BIOPSY BREAST Left     Approx 5 bx     BUNIONECTOMY Right 12/15/2015    Procedure: MODIFIED LAI BUNIONECTOMY RIGHT FOOT;  Surgeon: Jerome Calvin DPM;  Location: [facility] OR;  Service:      CERVICAL FUSION       CERVICAL FUSION Bilateral 2/16/2015    Procedure: ANTERIOR CERVICAL DECOMPRESSION/FUSION C3-5 BILATERAL, ANTERIOR HARDWARE REMOVAL C5-7 BILATERAL ;  Surgeon: Sawyer Roland MD;  Location: [facility] Main OR;  Service:       NIPPLE EXPLORATION      Description: Breast Nipple Explor W/ Excision Solitary Lactiferous Duct;  Recorded: 04/10/2008;     HYSTERECTOMY       IR CERVICAL EPIDURAL STEROID INJECTION  9/17/2003     IR CERVICAL EPIDURAL STEROID INJECTION  12/11/2003     IR CERVICAL EPIDURAL STEROID INJECTION  1/16/2004     IRRIGATION AND DEBRIDEMENT DECUBITUS WOUND, COMBINED Left 12/13/2021    Procedure: Wound exploration and debridement of Left Lower Abdomin Chronic wound.;  Surgeon: Crispin Bunch MD;  Location: [facility] OR     LAPAROSCOPIC NISSEN FUNDOPLICATION N/A 12/30/2022    Procedure: FUNDOPLICATION, partial ROBOT-ASSISTED, LAPAROSCOPIC, USING DA MARIBEL XI;  Surgeon: Davie Ocasio DO;  Location: [facility] Main OR     LUMBAR DISCECTOMY      X2     MAMMOPLASTY AUGMENTATION Bilateral      approx 2006?     PELVIC LAPAROSCOPY      multiple     SHOULDER OPEN ROTATOR  CUFF REPAIR Left     X2     ZZC TOTAL ABDOM HYSTERECTOMY      Description: Total Abdominal Hysterectomy;  Recorded: 06/10/2013;     Current Outpatient Medications   Medication Sig Dispense Refill     albuterol (PROAIR HFA) 108 (90 Base) MCG/ACT inhaler Inhale 2 puffs into the lungs every 4 hours 18 g 11     aspirin-acetaminophen-caffeine (EXCEDRIN MIGRAINE) 250-250-65 MG tablet Take 1 tablet by mouth daily as needed for headaches MR x 1       atorvastatin (LIPITOR) 80 MG tablet Take 1 tablet (80 mg) by mouth At Bedtime 90 tablet 3     benzonatate (TESSALON) 100 MG capsule Take 100 mg by mouth 3 times daily as needed for cough       budesonide-formoterol (SYMBICORT) 160-4.5 MCG/ACT Inhaler Inhale 2 puffs into the lungs 2 times daily 10.2 g 11     clobetasol (TEMOVATE) 0.05 % external cream Apply topically 2 times daily For max of 21 days (Patient taking differently: Apply topically 2 times daily as needed Apply to hands as needed) 60 g 1     doxepin (SINEQUAN) 25 MG capsule Take 75 mg by mouth At Bedtime       eletriptan (RELPAX) 40 MG tablet Take 1 tablet (40 mg) by mouth at onset of headache for migraine 12 tablet 3     erenumab-aooe (AIMOVIG) 70 MG/ML injection ADMINISTER 1 ML(70 MG) UNDER THE SKIN EVERY MONTH NEEDS TO SCHEDULE AN APPT FOR FURTHER REFILLS 1 mL 8     ezetimibe (ZETIA) 10 MG tablet Take 1 tablet (10 mg) by mouth daily 90 tablet 3     FLUoxetine (PROZAC) 20 MG capsule TAKE 1 CAPSULE BY MOUTH EVERY DAY ALONG WITH A 40 MG CAPSULE       FLUoxetine (PROZAC) 40 MG capsule Take 60 mg by mouth daily       gabapentin (NEURONTIN) 300 MG capsule Take 600 mg by mouth 2 times daily       HYDROcodone-acetaminophen (NORCO)  MG per tablet Take 1 tablet by mouth every 4 hours as needed       hydrOXYzine (ATARAX) 25 MG tablet TAKE 2 TABLETS BY MOUTH AT BEDTIME AND 1 TABLET BY MOUTH DURING THE DAY AS NEEDED       ipratropium - albuterol 0.5 mg/2.5 mg/3 mL (DUONEB) 0.5-2.5 (3) MG/3ML neb solution USE 1 VIAL VIA  NEBULIZER EVERY 6 HOURS AS NEEDED 180 mL 4     lamoTRIgine (LAMICTAL) 200 MG tablet Take 1 tablet by mouth 2 times daily       levothyroxine (SYNTHROID/LEVOTHROID) 75 MCG tablet TAKE 1 TABLET(75 MCG) BY MOUTH DAILY (Patient taking differently: Take 75 mcg by mouth every evening) 90 tablet 2     montelukast (SINGULAIR) 10 MG tablet Take 1 tablet (10 mg) by mouth At Bedtime (Patient taking differently: Take 10 mg by mouth daily before breakfast) 90 tablet 4     prazosin (MINIPRESS) 2 MG capsule Take 2 capsules by mouth At Bedtime       prochlorperazine (COMPAZINE) 10 MG tablet Take 10 mg by mouth every 6 hours as needed for nausea When having migraine       senna-docusate (SENOKOT-S/PERICOLACE) 8.6-50 MG tablet Take 2 tablets by mouth At Bedtime       tiZANidine (ZANAFLEX) 4 MG tablet TAKE 1 TO 2 TABLETS BY MOUTH DURING THE DAY AND 2 TABLETS AT BEDTIME 120 tablet 1     varenicline (CHANTIX GABBY) 0.5 MG X 11 & 1 MG X 42 tablet Take 0.5 mg tab daily for 3 days, THEN 0.5 mg tab twice daily for 4 days, THEN 1 mg twice daily. 53 tablet 0     vitamin D2 (ERGOCALCIFEROL) 94998 units (1250 mcg) capsule TAKE 1 CAPSULE BY MOUTH 1 TIME A WEEK 13 capsule 1       Allergies   Allergen Reactions     Codeine Anaphylaxis     Nefazodone Nausea and Vomiting     Oxycodone-Acetaminophen Unknown     hallucinations     Cetirizine Nausea and Vomiting     Trazodone Nausea and Vomiting     Amoxicillin-Pot Clavulanate [Amoxicillin-Pot Clavulanate] Rash     Cephalexin Rash     Clavulanic Acid Rash     Cyclobenzaprine Rash     Eszopiclone Rash     Methocarbamol Rash     Penicillins Rash     Mild rash        Social History     Tobacco Use     Smoking status: Every Day     Packs/day: 1.00     Years: 45.00     Pack years: 45.00     Types: Cigarettes     Smokeless tobacco: Never   Vaping Use     Vaping status: Never Used   Substance Use Topics     Alcohol use: Not Currently     Comment: Alcoholic Drinks/day: rare--1-2 times in year     Family History  "  Problem Relation Age of Onset     Lung Cancer Mother          at age 52     Alcoholism Father      Heart Disease Maternal Grandfather      Diabetes Paternal Grandmother      Coronary Artery Disease Paternal Grandmother      Heart Disease Paternal Grandfather      Diabetes Sister      Hypertension Sister      Crohn's Disease Sister      Coronary Artery Disease Paternal Uncle      Asthma Maternal Aunt      History   Drug Use No         Objective     BP (!) 146/77 (BP Location: Right arm, Patient Position: Sitting, Cuff Size: Adult Regular)   Pulse 73   Temp 98.5  F (36.9  C) (Oral)   Resp 16   Ht 1.638 m (5' 4.5\")   Wt 64.6 kg (142 lb 8 oz)   LMP  (LMP Unknown)   SpO2 94%   Breastfeeding No   BMI 24.08 kg/m      Physical Exam      GENERAL APPEARANCE: healthy, alert and no distress     EYES: no scleral icterus     HENT: OP clear mouth without ulcers or lesions     NECK: supple, no bruits     RESP: lungs clear to auscultation - no rales, rhonchi or wheezes     CV: regular rates and rhythm, normal S1 S2, no S3 or S4 and no murmur, click or rub     ABDOMEN:  soft, nontender, no HSM or masses and bowel sounds normal     MS: extremities normal- no gross deformities noted, no edema     PSYCH: mentation appears normal. and affect normal/bright      Recent Labs   Lab Test 23  1402 23  1510   HGB 13.4 10.8*    330    141   POTASSIUM 4.5 4.8   CR 1.11* 1.17*        Diagnostics:  No labs were ordered during this visit.   No EKG required for low risk surgery (cataract, skin procedure, breast biopsy, etc).    Revised Cardiac Risk Index (RCRI):  The patient has the following serious cardiovascular risks for perioperative complications:   - No serious cardiac risks = 0 points     RCRI Interpretation: 0 points: Class I (very low risk - 0.4% complication rate)           Signed Electronically by: Allie Mills PA-C  Copy of this evaluation report is provided to requesting " physician.

## 2023-05-23 NOTE — PATIENT INSTRUCTIONS
Stop all NSAIDs (motrin, ibuprofen, naproxen, aleve, advil, naprosyn, aspirin)  for at least 5 days prior to surgery.  Tylenol is safe to use prior to surgery.    Use your symbicort the am of surgery.    Take the rest of your am medications after surgery on the day of procedure.    Take evening medications as usual the night before surgery.

## 2023-05-25 ENCOUNTER — TRANSFERRED RECORDS (OUTPATIENT)
Dept: HEALTH INFORMATION MANAGEMENT | Facility: CLINIC | Age: 63
End: 2023-05-25
Payer: COMMERCIAL

## 2023-06-02 DIAGNOSIS — J44.9 CHRONIC OBSTRUCTIVE PULMONARY DISEASE, UNSPECIFIED COPD TYPE (H): Primary | ICD-10-CM

## 2023-06-05 RX ORDER — BENZONATATE 100 MG/1
CAPSULE ORAL
Qty: 30 CAPSULE | Refills: 0 | Status: SHIPPED | OUTPATIENT
Start: 2023-06-05 | End: 2023-06-27

## 2023-06-21 ENCOUNTER — TELEPHONE (OUTPATIENT)
Dept: CARDIOLOGY | Facility: CLINIC | Age: 63
End: 2023-06-21
Payer: COMMERCIAL

## 2023-06-21 VITALS — DIASTOLIC BLOOD PRESSURE: 58 MMHG | SYSTOLIC BLOOD PRESSURE: 118 MMHG | HEART RATE: 86 BPM

## 2023-06-21 NOTE — TELEPHONE ENCOUNTER
Patient returned call and left voicemail message with update blood pressure reading.      Last Blood Pressure: 146/77   Last Heart Rate: 73  Date: 05/23/23  Location: PCP    Today's Blood Pressure: 118/58  Today's Heart Rate: 86  Location: PCP    Patient reported blood pressure updated in Epic. Blood pressure falls within [place] Community Measures guidelines.  Patient will follow up as previously advised.

## 2023-06-22 ENCOUNTER — TRANSFERRED RECORDS (OUTPATIENT)
Dept: HEALTH INFORMATION MANAGEMENT | Facility: CLINIC | Age: 63
End: 2023-06-22
Payer: COMMERCIAL

## 2023-06-27 DIAGNOSIS — J44.9 CHRONIC OBSTRUCTIVE PULMONARY DISEASE, UNSPECIFIED COPD TYPE (H): ICD-10-CM

## 2023-06-27 RX ORDER — BENZONATATE 100 MG/1
CAPSULE ORAL
Qty: 30 CAPSULE | Refills: 0 | Status: SHIPPED | OUTPATIENT
Start: 2023-06-27 | End: 2023-08-10

## 2023-07-06 DIAGNOSIS — Z72.0 TOBACCO ABUSE: ICD-10-CM

## 2023-07-06 RX ORDER — VARENICLINE TARTRATE 1 MG/1
TABLET, FILM COATED ORAL
Qty: 180 TABLET | Refills: 0 | Status: SHIPPED | OUTPATIENT
Start: 2023-07-06 | End: 2023-09-18

## 2023-07-06 RX ORDER — VARENICLINE TARTRATE 1 MG/1
1 TABLET, FILM COATED ORAL 2 TIMES DAILY
Qty: 60 TABLET | Refills: 1 | Status: SHIPPED | OUTPATIENT
Start: 2023-07-06 | End: 2023-07-06

## 2023-07-12 ENCOUNTER — TRANSFERRED RECORDS (OUTPATIENT)
Dept: HEALTH INFORMATION MANAGEMENT | Facility: CLINIC | Age: 63
End: 2023-07-12
Payer: COMMERCIAL

## 2023-07-21 ENCOUNTER — TRANSFERRED RECORDS (OUTPATIENT)
Dept: HEALTH INFORMATION MANAGEMENT | Facility: CLINIC | Age: 63
End: 2023-07-21
Payer: COMMERCIAL

## 2023-07-31 ENCOUNTER — OFFICE VISIT (OUTPATIENT)
Dept: FAMILY MEDICINE | Facility: CLINIC | Age: 63
End: 2023-07-31
Payer: COMMERCIAL

## 2023-07-31 VITALS
RESPIRATION RATE: 16 BRPM | OXYGEN SATURATION: 95 % | HEIGHT: 65 IN | BODY MASS INDEX: 22.82 KG/M2 | SYSTOLIC BLOOD PRESSURE: 118 MMHG | DIASTOLIC BLOOD PRESSURE: 62 MMHG | HEART RATE: 78 BPM | WEIGHT: 137 LBS

## 2023-07-31 DIAGNOSIS — M62.81 GENERALIZED MUSCLE WEAKNESS: ICD-10-CM

## 2023-07-31 DIAGNOSIS — G89.4 CHRONIC PAIN SYNDROME: Chronic | ICD-10-CM

## 2023-07-31 DIAGNOSIS — M65.30 TRIGGER FINGER, ACQUIRED: ICD-10-CM

## 2023-07-31 DIAGNOSIS — F31.81 BIPOLAR 2 DISORDER (H): Chronic | ICD-10-CM

## 2023-07-31 DIAGNOSIS — K21.9 GASTROESOPHAGEAL REFLUX DISEASE WITHOUT ESOPHAGITIS: ICD-10-CM

## 2023-07-31 DIAGNOSIS — Z23 NEED FOR DIPHTHERIA-TETANUS-PERTUSSIS (TDAP) VACCINE: ICD-10-CM

## 2023-07-31 DIAGNOSIS — R53.82 CHRONIC FATIGUE: ICD-10-CM

## 2023-07-31 DIAGNOSIS — R03.0 ELEVATED BLOOD PRESSURE READING WITHOUT DIAGNOSIS OF HYPERTENSION: Primary | ICD-10-CM

## 2023-07-31 PROCEDURE — 90471 IMMUNIZATION ADMIN: CPT | Performed by: FAMILY MEDICINE

## 2023-07-31 PROCEDURE — 90715 TDAP VACCINE 7 YRS/> IM: CPT | Performed by: FAMILY MEDICINE

## 2023-07-31 PROCEDURE — 99214 OFFICE O/P EST MOD 30 MIN: CPT | Mod: 25 | Performed by: FAMILY MEDICINE

## 2023-07-31 RX ORDER — ESOMEPRAZOLE MAGNESIUM 40 MG/1
40 CAPSULE, DELAYED RELEASE ORAL DAILY
COMMUNITY

## 2023-07-31 ASSESSMENT — PAIN SCALES - GENERAL: PAINLEVEL: SEVERE PAIN (7)

## 2023-07-31 NOTE — PROGRESS NOTES
Assessment & Plan     Elevated blood pressure reading without diagnosis of hypertension  This seems to be an isolated event.  The highest blood pressure I have in our system was 146/76.  This blood pressure was seen prior to having a procedure and may have been related to withdrawal as she was holding oral medications.    Chronic pain syndrome  Treated with Vicodin high-dose by the [facility] pain clinic.  She also has naloxone which I will put on her med list.  - naloxone (NARCAN) 4 MG/0.1ML nasal spray    Trigger finger, acquired  She is experiencing trigger finger and would like a referral to [facility].  - Orthopedic  Referral    Generalized muscle weakness  Bilateral leg weakness particularly.  Scheduled for future surgery in September on her back?    Chronic fatigue  Part of her whole chronic pain picture.  This interrupts her life.    Bipolar 2 disorder (H)  Is seen regularly in the [facility] system by both a psychiatrist and psychologist.  Recently started on Rexulti which I will put on her med list.  - brexpiprazole (REXULTI) 0.5 MG tablet    Gastroesophageal reflux disease without esophagitis  Seen by Dr. Mcnair at [facility] and had a EGD.  Started back on Nexium 40 mg.  I will put that on her med list.  - esomeprazole (NEXIUM) 40 MG DR capsule    Need for diphtheria-tetanus-pertussis (Tdap) vaccine  Due for her Tdap and is willing to do that today.  - TDAP 10-64Y (ADACEL,BOOSTRIX)    I reassured the patient that she can watch her blood pressure here and have a nurse only appointment anytime she would like.  She can call and get that scheduled if desired.  I am not overly concerned.  I think her high blood pressure may have been related to narcotic withdrawal.       FUTURE APPOINTMENTS:       - Follow-up visit in September which has already been scheduled previously.    Joanne Weiner MD  [facility]    Suzie Anne is a 62 year old, presenting for  the following health issues:  Hypertension (Bp check, has been running high, 220/9? , over the last few months , 2 weeks ago having procedure done. )        7/31/2023     3:12 PM   Additional Questions   Roomed by dov powell cma   Accompanied by grand daughter       History of Present Illness       Hypertension: She presents for follow up of hypertension.  She does not check blood pressure  regularly outside of the clinic. Outside blood pressures have been over 140/90. She does not follow a low salt diet.     She eats 2-3 servings of fruits and vegetables daily.She consumes 3 sweetened beverage(s) daily.She exercises with enough effort to increase her heart rate 9 or less minutes per day.  She exercises with enough effort to increase her heart rate 3 or less days per week. She is missing 2 dose(s) of medications per week.  She is not taking prescribed medications regularly due to side effects.     This patient comes in, with her granddaughter in [place], with a complaint of having quite high blood pressure seen outside of our clinic.  She denies having any symptoms.  When I review blood pressures taken in the [facility] system, the highest I see is 146/76 back in May when she had a preop eval for an epidural steroid injection at her pain clinic.  She has chronic pain particularly of her back and neck but also has fibromyalgia.  She is chronically fatigued.  She was having a procedure somewhere when this extremely high blood pressure was taken.  She is chronically on narcotics, and it is possible perhaps that she was withdrawing if she was holding medication for that procedure.  Today her blood pressure looks great.  She does complain of a finger that pains her and get stuck at times where she has to straighten it out herself.  We discussed trigger finger.  She tells me that she was seen on 7/21/2023 for bilateral leg weakness which is assumed to be because of her low back.  She says she has surgery set up for September.  Back  "in late May she saw Dr. Mcnair from [facility] for her atypical chest pain, history of Niesen surgery, and chronic nausea.  She had an EGD and we went over those results today.  She is on Nexium.  She also regularly sees a psychologist and a psychiatrist for her bipolar disorder and depression and anxiety.  She recently had a medication switch to Rexulti and is also on Lamictal, fluoxetine, doxepin, hydroxyzine, and prazosin.  She was put on the Rexulti back in late June and has follow-up in mid August.        Objective    /62   Pulse 78   Resp 16   Ht 1.638 m (5' 4.5\")   Wt 62.1 kg (137 lb)   LMP  (LMP Unknown)   SpO2 95%   BMI 23.15 kg/m    Body mass index is 23.15 kg/m .  Physical Exam   GENERAL: healthy, alert, no distress, and normal weight  RESP: lungs clear to auscultation - no rales, rhonchi or wheezes  CV: regular rates and rhythm and without edema  ABDOMEN: soft, nontender  MS: no gross musculoskeletal defects noted, no edema  PSYCH: mentation appears normal and affect normal/bright                      "

## 2023-08-08 ENCOUNTER — OFFICE VISIT (OUTPATIENT)
Dept: PULMONOLOGY | Facility: CLINIC | Age: 63
End: 2023-08-08
Payer: COMMERCIAL

## 2023-08-08 VITALS
WEIGHT: 136 LBS | SYSTOLIC BLOOD PRESSURE: 134 MMHG | OXYGEN SATURATION: 96 % | BODY MASS INDEX: 22.98 KG/M2 | HEART RATE: 73 BPM | DIASTOLIC BLOOD PRESSURE: 60 MMHG

## 2023-08-08 DIAGNOSIS — R40.0 DAYTIME SLEEPINESS: ICD-10-CM

## 2023-08-08 DIAGNOSIS — Z87.891 PERSONAL HISTORY OF TOBACCO USE: ICD-10-CM

## 2023-08-08 DIAGNOSIS — J30.89 ENVIRONMENTAL AND SEASONAL ALLERGIES: ICD-10-CM

## 2023-08-08 DIAGNOSIS — J44.9 CHRONIC OBSTRUCTIVE PULMONARY DISEASE, UNSPECIFIED COPD TYPE (H): Primary | ICD-10-CM

## 2023-08-08 PROCEDURE — 99406 BEHAV CHNG SMOKING 3-10 MIN: CPT | Performed by: NURSE PRACTITIONER

## 2023-08-08 PROCEDURE — 99214 OFFICE O/P EST MOD 30 MIN: CPT | Performed by: NURSE PRACTITIONER

## 2023-08-08 PROCEDURE — G0296 VISIT TO DETERM LDCT ELIG: HCPCS | Performed by: NURSE PRACTITIONER

## 2023-08-08 RX ORDER — MONTELUKAST SODIUM 10 MG/1
10 TABLET ORAL AT BEDTIME
Qty: 90 TABLET | Refills: 4 | Status: SHIPPED | OUTPATIENT
Start: 2023-08-08 | End: 2024-02-13

## 2023-08-08 RX ORDER — BUDESONIDE AND FORMOTEROL FUMARATE DIHYDRATE 160; 4.5 UG/1; UG/1
2 AEROSOL RESPIRATORY (INHALATION) 2 TIMES DAILY
Qty: 10.2 G | Refills: 11 | Status: SHIPPED | OUTPATIENT
Start: 2023-08-08 | End: 2024-02-08

## 2023-08-08 RX ORDER — ALBUTEROL SULFATE 90 UG/1
2 AEROSOL, METERED RESPIRATORY (INHALATION) EVERY 4 HOURS
Qty: 18 G | Refills: 11 | Status: SHIPPED | OUTPATIENT
Start: 2023-08-08

## 2023-08-08 NOTE — PROGRESS NOTES
Pulmonary Clinic Follow-up Visit  August 8, 2023    Assessment:  62yoF with history of tobacco abuse (started smoking again), asthma and emphysema as well as very small lung nodule. PFTs show her FEV1 is 67% and her DLCO is 65%. She is using Symbicort with good control. Cardiology did a coronary angio that was unremarkable. ENT did not find significant sinus issues but it was found that she had a hiatal hernia that was repaired on 12/30/22. She continues to have GI upset and difficulty with intake following surgery. Complains on continued daytime fatigue with poor sleep quality.      Plan:   Continue Symbicort. She was reminded to rinse her mouth after each use.   Continue duonebs 2-4 times daily prn  Consider follow up with allergy given extensive allergic rhinitis if responsive to flonase. Last seen in 10/2022.   Due for next LDCT for lung cancer screening in 01/204. Ordered today.   Encouraged follow up with cardiology or PCP regarding ongoing intermittent chest pain/pressure.   Referral for sleep medicine placed.   Discussed smoking cessation for 3 minutes. She continues to want to quit but feels her stress level is making it difficult     Follow up in 6 months    Action Plan if she exacerbates:  - prednisone 40mg daily x 5 days  - azithromycin, z pack x 5 days    Ana Hager CNP  Pulmonary Medicine  [facility]   [phone]      ----------------------------    CCx:   Chief Complaint   Patient presents with    Follow Up     COPD        HPI:   She was last seen in clinic in 02/2023, at that time she was doing well on Symbicort with prn nebs. She was switched from Trelegy to Symbicort due to difficulty of use with dry powder inhalers. No exacerbations since her last visit.     She had started smoking daily again, 1/2 ppd. She continues to use Chantix and feels it helps her cravings.    Since her Nissen fundoplication surgery, her cough has improved. She still coughs 1-2 times daily that is productive  of thick sputum that is clear/white.   Denies fever, chills, or body aches. Denies wheeze or chest tightness throughout the day, she does note occasional chest tightness when laying down at night but this has significantly improved since surgery. She has been using duonebs BID.   She reports intermittent left chest pain that will radiate to her back. The pain is not re-producible and is not helped by anything. This has been going on for months, she has not mentioned this to her PCP or cardiologist.     During her hospital stay for her hernia surgery she did require continued supplemental oxygen so a chest CT was obtained to rule out a PE where it was found she had groundglass and reticular interstitial opacities in the right midlung and lingula, her previously noted lung nodule appears stable.     Denies reflux symptoms but is having a hard time eating since her surgery. She is eating solid food but continues to have issues with gas, belching, and bloat. She was referred to GI by gen surgery following her Nissen fundoplication. All work up has been unremarkable and she continues to struggle.     She does note drainage down the back of her throat and sinus congestion, although this seems to have improved since her last visit. She was previously given Flonase which did not seem to help.  She has seen ENT for dysphagia, CT sinus was unremarkable and esophagram did show a small hiatal hernia, she was given Astelin nasal spray which did not help. She is being seen by Dr. Medina in allergy and they were discussing possible biologics to help control some of these symptoms, IgE and allergy panels were unremarkable.      Followed by [facility] in cardiology. CT heart cath was unremarkable.     Patient supplied answers from flow sheet for:  COPD Assessment Test (CAT)  2009 [employer]. All rights reserved.      10/6/2021    11:00 AM 6/17/2022     2:00 PM 1/31/2023     8:51 PM 8/8/2023     9:00 AM   COPD assessment test (CAT)   Cough 4 3 3  2   Phlegm 4 4 2 3   Chest tightness 4 3 3 0   Walk up hill 4 5 4 4   Limited activities 3 3 3 3   Leaving my home 1 2 2 0   Sleep 5 3 2 3   Energy 5 3 3 5   Total Score 30 26 22 20          ROS:  10-point review performed and notable for  Dyspnea, fatigue, back pain. The remainder reviewed and negative.       Current Meds:  Current Outpatient Medications   Medication Sig Dispense Refill    albuterol (PROAIR HFA) 108 (90 Base) MCG/ACT inhaler Inhale 2 puffs into the lungs every 4 hours 18 g 11    aspirin-acetaminophen-caffeine (EXCEDRIN MIGRAINE) 250-250-65 MG tablet Take 1 tablet by mouth daily as needed for headaches MR x 1      atorvastatin (LIPITOR) 80 MG tablet Take 1 tablet (80 mg) by mouth At Bedtime 90 tablet 3    benzonatate (TESSALON) 100 MG capsule TAKE 1 CAPSULE(100 MG) BY MOUTH THREE TIMES DAILY AS NEEDED FOR COUGH 30 capsule 0    brexpiprazole (REXULTI) 0.5 MG tablet Take 0.5 mg by mouth      budesonide-formoterol (SYMBICORT) 160-4.5 MCG/ACT Inhaler Inhale 2 puffs into the lungs 2 times daily 10.2 g 11    doxepin (SINEQUAN) 25 MG capsule Take 75 mg by mouth At Bedtime      eletriptan (RELPAX) 40 MG tablet Take 1 tablet (40 mg) by mouth at onset of headache for migraine 12 tablet 3    erenumab-aooe (AIMOVIG) 70 MG/ML injection ADMINISTER 1 ML(70 MG) UNDER THE SKIN EVERY MONTH NEEDS TO SCHEDULE AN APPT FOR FURTHER REFILLS 1 mL 8    esomeprazole (NEXIUM) 40 MG DR capsule Take 40 mg by mouth daily      ezetimibe (ZETIA) 10 MG tablet Take 1 tablet (10 mg) by mouth daily 90 tablet 3    FLUoxetine (PROZAC) 20 MG capsule TAKE 1 CAPSULE BY MOUTH EVERY DAY ALONG WITH A 40 MG CAPSULE      FLUoxetine (PROZAC) 40 MG capsule Take 60 mg by mouth daily      gabapentin (NEURONTIN) 300 MG capsule Take 600 mg by mouth 2 times daily      HYDROcodone-acetaminophen (NORCO)  MG per tablet Take 1 tablet by mouth every 4 hours as needed      hydrOXYzine (ATARAX) 25 MG tablet TAKE 2 TABLETS BY MOUTH AT BEDTIME AND 1  TABLET BY MOUTH DURING THE DAY AS NEEDED      ipratropium - albuterol 0.5 mg/2.5 mg/3 mL (DUONEB) 0.5-2.5 (3) MG/3ML neb solution USE 1 VIAL VIA NEBULIZER EVERY 6 HOURS AS NEEDED 180 mL 4    lamoTRIgine (LAMICTAL) 200 MG tablet Take 1 tablet by mouth 2 times daily      levothyroxine (SYNTHROID/LEVOTHROID) 75 MCG tablet TAKE 1 TABLET(75 MCG) BY MOUTH DAILY (Patient taking differently: Take 75 mcg by mouth every evening) 90 tablet 2    montelukast (SINGULAIR) 10 MG tablet Take 1 tablet (10 mg) by mouth At Bedtime 90 tablet 4    naloxone (NARCAN) 4 MG/0.1ML nasal spray       prazosin (MINIPRESS) 2 MG capsule Take 2 capsules by mouth At Bedtime      prochlorperazine (COMPAZINE) 10 MG tablet Take 10 mg by mouth every 6 hours as needed for nausea When having migraine      senna-docusate (SENOKOT-S/PERICOLACE) 8.6-50 MG tablet Take 2 tablets by mouth At Bedtime      tiZANidine (ZANAFLEX) 4 MG tablet TAKE 1 TO 2 TABLETS BY MOUTH DURING THE DAY AND 2 TABLETS AT BEDTIME 120 tablet 1    varenicline (CHANTIX) 1 MG tablet TAKE 1 TABLET(1 MG) BY MOUTH TWICE DAILY 180 tablet 0    vitamin D2 (ERGOCALCIFEROL) 81521 units (1250 mcg) capsule TAKE 1 CAPSULE BY MOUTH 1 TIME A WEEK 13 capsule 1       Physical Exam:  /60 (BP Location: Left arm, Patient Position: Chair, Cuff Size: Adult Regular)   Pulse 73   Wt 61.7 kg (136 lb)   LMP  (LMP Unknown)   SpO2 96%   BMI 22.98 kg/m      Physical Exam  Constitutional:       General: She is not in acute distress.     Appearance: She is not ill-appearing or diaphoretic.   HENT:      Nose: Nose normal.   Cardiovascular:      Rate and Rhythm: Normal rate and regular rhythm.      Pulses: Normal pulses.      Heart sounds: Normal heart sounds.   Pulmonary:      Effort: Pulmonary effort is normal. No respiratory distress.      Breath sounds: No wheezing or rhonchi.   Musculoskeletal:      Right lower leg: No edema.      Left lower leg: No edema.   Skin:     General: Skin is warm and dry.       Findings: No rash.   Neurological:      Mental Status: She is alert.   Psychiatric:         Behavior: Behavior normal.       Labs:  CBC RESULTS:   Recent Labs   Lab Test 02/25/22  0256   WBC 8.1   RBC 3.55*   HGB 9.4*   HCT 30.8*   MCV 87   MCH 26.5   MCHC 30.5*   RDW 15.3*        Sodium   Date Value Ref Range Status   05/12/2023 142 136 - 145 mmol/L Final     Potassium   Date Value Ref Range Status   05/12/2023 4.5 3.4 - 5.3 mmol/L Final   09/13/2022 4.4 3.4 - 5.3 mmol/L Final     Chloride   Date Value Ref Range Status   05/12/2023 105 98 - 107 mmol/L Final   09/13/2022 107 94 - 109 mmol/L Final     Carbon Dioxide (CO2)   Date Value Ref Range Status   05/12/2023 27 22 - 29 mmol/L Final   09/13/2022 27 20 - 32 mmol/L Final     Anion Gap   Date Value Ref Range Status   05/12/2023 10 7 - 15 mmol/L Final   09/13/2022 3 3 - 14 mmol/L Final     Glucose   Date Value Ref Range Status   05/12/2023 105 (H) 70 - 99 mg/dL Final   09/13/2022 100 (H) 70 - 99 mg/dL Final     GLUCOSE BY METER POCT   Date Value Ref Range Status   01/01/2023 112 (H) 70 - 99 mg/dL Final     Urea Nitrogen   Date Value Ref Range Status   05/12/2023 14.3 8.0 - 23.0 mg/dL Final   09/13/2022 13 7 - 30 mg/dL Final     Creatinine   Date Value Ref Range Status   05/12/2023 1.11 (H) 0.51 - 0.95 mg/dL Final     GFR Estimate   Date Value Ref Range Status   05/12/2023 56 (L) >60 mL/min/1.73m2 Final     Comment:     eGFR calculated using 2021 CKD-EPI equation.   04/13/2021 >60 >60 mL/min/1.73m2 Final     GFR, ESTIMATED POCT   Date Value Ref Range Status   09/08/2022 >60 >60 mL/min/1.73m2 Final     Calcium   Date Value Ref Range Status   05/12/2023 9.6 8.8 - 10.2 mg/dL Final         Imaging studies:    XR CHEST 2 VIEWS, DATE/TIME: 12/6/2022 11:21 AM  INDICATION:  Preop general physical exam  COMPARISON: 07/29/2022                                                                IMPRESSION: Heart size is normal. No pleural effusion, pneumothorax, or abnormal  area of consolidation. Biapical pleural thickening.    CT CHEST PULMONARY EMBOLISM W CONTRAST, DATE/TIME: 2/25/2022 3:24 AM  INDICATION: PE suspected, high prob shortness of breath and left chest pain  COMPARISON: 05/19/2021   FINDINGS:  ANGIOGRAM CHEST: Pulmonary arteries are normal caliber and negative for pulmonary emboli. Thoracic aorta is negative for dissection. No CT evidence of right heart strain.   LUNGS AND PLEURA: 2 mm nodule left apex series 7 image 56, 2 mm subpleural nodule right upper lobe image 62 and 2 mm right upper lobe nodule image 54 stable. Emphysema. Basilar atelectasis.  MEDIASTINUM/AXILLAE: Small hiatal hernia. Trace amount of pericardial fluid.  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  MUSCULOSKELETAL: Breast implants.                                                              IMPRESSION:  1.  No pulmonary emboli.  2.  Emphysema.  3.  Bibasilar atelectasis.  4.  Tiny pulmonary nodule stable.  5.  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    CT CHEST PULMONARY EMBOLISM W CONTRAST, DATE/TIME: 1/1/2023 11:49 AM  INDICATION: Recent fundoplication. Chest tightness.   COMPARISON: 02/25/2022  CT. Radiograph 12/06/2022.  FINDINGS:  ANGIOGRAM CHEST: Pulmonary arteries are normal caliber and negative for pulmonary emboli. Thoracic aorta is negative for dissection.  LUNGS AND PLEURA: Small pleural effusions and dependent atelectasis. Emphysema. Mild apical scarring. New groundglass and reticular interstitial opacities in the right midlung and lingula.  There is debris in the airways to the lower lobes.  MEDIASTINUM/AXILLAE: Small air locules in the mediastinum.                                                                IMPRESSION:  1.  No PE.  2.  New groundglass and reticular interstitial opacities in the lungs could be atypical infection. Edema is less likely.   3.  Emphysema. Small pleural  effusions with atelectasis. Debris in the airways.  4.  Small air locules in the mediastinum are likely normal postsurgical change.    Coronary CT angiogram 09/2022  1.  Normal coronary CT angiography, no detectable coronary atherosclerotic plaques. Co-dominant system.  2.  Normal visualized aortic segments.  3.  No thrombus in left atrial appendage.  4.  No pericardial effusion or calcification.    XR ESOPHAGRAM, DATE/TIME: 8/17/2022 8:10 AM  INDICATION:  Dysphagia, unspecified type  COMPARISON: None.  FINDINGS:  ESOPHAGUS: Normal primary peristalsis. There are distal esophageal tertiary contraction waves.  There is a prominent cricopharyngeus muscle, and a small anterior cervical esophageal well at the same level of the cricopharyngeus. This did not impede passage of the 13 mm barium tablet.  There is a small sliding hiatal hernia with gastroesophageal reflux.  The 13 mm barium tablet became stuck at the GE junction for at least 7 minutes. This did not produce symptoms.                                                               IMPRESSION:  1.  Prominent cricopharyngeus muscle, with small anterior cervical esophageal web. This might be the cause the sensation of food sticking.  2.  Prolonged transit barium tablet at the GE junction.  3.  Small hiatal hernia.  4.  Gastroesophageal reflux.    PFT's  FEV1 1.71L 67% predicted from 1.77L  FVC 2.59L, 80% predicted from 2.54L  FEV1/FVC 66  TLC 5.28L, 103% predicted  DLCO 65% predicted  Impression: moderate obstruction without reversibility. Air trapping. Mild diffusion capacity defect. Lung Cancer Screening Shared Decision Making Visit     Dayana Samaniego, a 62 year old female, is eligible for lung cancer screening    History   Smoking Status    Every Day    Packs/day: 1.00    Years: 45.00    Types: Cigarettes   Smokeless Tobacco    Never       I have discussed with patient the risks and benefits of screening for lung cancer with low-dose CT.     The risks  include:    radiation exposure: one low dose chest CT has as much ionizing radiation as about 15 chest x-rays, or 6 months of background radiation living in [place]      false positives: most findings/nodules are NOT cancer, but some might still require additional diagnostic evaluation, including biopsy    over-diagnosis: some slow growing cancers that might never have been clinically significant will be detected and treated unnecessarily     The benefit of early detection of lung cancer is contingent upon adherence to annual screening or more frequent follow up if indicated.     Furthermore, to benefit from screening, Dayana must be willing and able to undergo diagnostic procedures, if indicated. Although no specific guide is available for determining severity of comorbidities, it is reasonable to withhold screening in patients who have greater mortality risk from other diseases.     We did discuss that the best way to prevent lung cancer is to not smoke.    Some patients may value a numeric estimation of lung cancer risk when evaluating if lung cancer screening is right for them, here is one calculator:    ShouldIScreen

## 2023-08-08 NOTE — PATIENT INSTRUCTIONS
- continue to work at quitting smoking.   - continue Symbicort as you have been.     - contact your cardiologist to look into your chest discomfort.     If you have worsening symptoms, questions, or need to speak with the nurse please call [phone].    Lung Cancer Screening   Frequently Asked Questions  If you are at high-risk for lung cancer, getting screened with low-dose computed tomography (LDCT) every year can help save your life. This handout offers answers to some of the most common questions about lung cancer screening. If you have other questions, please call [phone] ([phone]).     What is it?  Lung cancer screening uses special X-ray technology to create an image of your lung tissue. The exam is quick and easy and takes less than 10 seconds. We don t give you any medicine or use any needles. You can eat before and after the exam. You don t need to change your clothes as long as the clothing on your chest doesn t contain metal. But, you do need to be able to hold your breath for at least 6 seconds during the exam.    What is the goal of lung cancer screening?  The goal of lung cancer screening is to save lives. Many times, lung cancer is not found until a person starts having physical symptoms. Lung cancer screening can help detect lung cancer in the earliest stages when it may be easier to treat.    Who should be screened for lung cancer?  We suggest lung cancer screening for anyone who is at high-risk for lung cancer. You are in the high-risk group if you:      are between the ages of 55 and 79, and    have smoked at least 1 pack of cigarettes a day for 20 or more years, and    still smoke or have quit within the past 15 years.    However, if you have a new cough or shortness of breath, you should talk to your doctor before being screened.    Why does it matter if I have symptoms?  Certain symptoms can be a sign that you have a condition in your lungs that should be checked and  treated by your doctor. These symptoms include fever, chest pain, a new or changing cough, shortness of breath that you have never felt before, coughing up blood or unexplained weight loss. Having any of these symptoms can greatly affect the results of lung cancer screening.       Should all smokers get an LDCT lung cancer screening exam?  It depends. Lung cancer screening is for a very specific group of men and women who have a history of heavy smoking over a long period of time (see  Who should be screened for lung cancer  above).  I am in the high-risk group, but have been diagnosed with cancer in the past. Is LDCT lung cancer screening right for me?  In some cases, you should not have LDCT lung screening, such as when your doctor is already following your cancer with CT scan studies. Your doctor will help you decide if LDCT lung screening is right for you.  Do I need to have a screening exam every year?  Yes. If you are in the high-risk group described earlier, you should get an LDCT lung cancer screening exam every year until you are 79, or are no longer willing or able to undergo screening and possible procedures to diagnose and treat lung cancer.  How effective is LDCT at preventing death from lung cancer?  Studies have shown that LDCT lung cancer screening can lower the risk of death from lung cancer by 20 percent in people who are at high-risk.  What are the risks?  There are some risks and limitations of LDCT lung cancer screening. We want to make sure you understand the risks and benefits, so please let us know if you have any questions. Your doctor may want to talk with you more about these risks.    Radiation exposure: As with any exam that uses radiation, there is a very small increased risk of cancer. The amount of radiation in LDCT is small--about the same amount a person would get from a mammogram. Your doctor orders the exam when he or she feels the potential benefits outweigh the risks.    False  negatives: No test is perfect, including LDCT. It is possible that you may have a medical condition, including lung cancer, that is not found during your exam. This is called a false negative result.    False positives and more testing: LDCT very often finds something in the lung that could be cancer, but in fact is not. This is called a false positive result. False positive tests often cause anxiety. To make sure these findings are not cancer, you may need to have more tests. These tests will be done only if you give us permission. Sometimes patients need a treatment that can have side effects, such as a biopsy. For more information on false positives, see  What can I expect from the results?     Findings not related to lung cancer: Your LDCT exam also takes pictures of areas of your body next to your lungs. In a very small number of cases, the CT scan will show an abnormal finding in one of these areas, such as your kidneys, adrenal glands, liver or thyroid. This finding may not be serious, but you may need more tests. Your doctor can help you decide what other tests you may need, if any.  What can I expect from the results?  About 1 out of 4 LDCT exams will find something that may need more tests. Most of the time, these findings are lung nodules. Lung nodules are very small collections of tissue in the lung. These nodules are very common, and the vast majority--more than 97 percent--are not cancer (benign). Most are normal lymph nodes or small areas of scarring from past infections.  But, if a small lung nodule is found to be cancer, the cancer can be cured more than 90 percent of the time. To know if the nodule is cancer, we may need to get more images before your next yearly screening exam. If the nodule has suspicious features (for example, it is large, has an odd shape or grows over time), we will refer you to a specialist for further testing.  Will my doctor also get the results?  Yes. Your doctor will get  a copy of your results.  Is it okay to keep smoking now that there s a cancer screening exam?  No. Tobacco is one of the strongest cancer-causing agents. It causes not only lung cancer, but other cancers and cardiovascular (heart) diseases as well. The damage caused by smoking builds over time. This means that the longer you smoke, the higher your risk of disease. While it is never too late to quit, the sooner you quit, the better.  Where can I find help to quit smoking?  The best way to prevent lung cancer is to stop smoking. If you have already quit smoking, congratulations and keep it up! For help on quitting smoking, please call [employer] at 3-361-QUITNOW (1-122.126.1175) or the [employer] at 1-287.650.3442 to find local resources near you.  One-on-one health coaching:  If you d prefer to work individually with a health care provider on tobacco cessation, we offer:      Medication Therapy Management:  Our specially trained pharmacists work closely with you and your doctor to help you quit smoking.  Call [phone] or [phone] (toll free).

## 2023-08-09 ENCOUNTER — TRANSFERRED RECORDS (OUTPATIENT)
Dept: HEALTH INFORMATION MANAGEMENT | Facility: CLINIC | Age: 63
End: 2023-08-09
Payer: COMMERCIAL

## 2023-08-10 DIAGNOSIS — J44.9 CHRONIC OBSTRUCTIVE PULMONARY DISEASE, UNSPECIFIED COPD TYPE (H): ICD-10-CM

## 2023-08-10 RX ORDER — BENZONATATE 100 MG/1
CAPSULE ORAL
Qty: 30 CAPSULE | Refills: 0 | Status: SHIPPED | OUTPATIENT
Start: 2023-08-10 | End: 2023-10-19

## 2023-08-11 ENCOUNTER — MYC MEDICAL ADVICE (OUTPATIENT)
Dept: FAMILY MEDICINE | Facility: CLINIC | Age: 63
End: 2023-08-11
Payer: COMMERCIAL

## 2023-08-11 NOTE — TELEPHONE ENCOUNTER
TR for additional information.     When patient calls back, please route to RN for possible triage.    Autumn Em RN, BSN, PHN  [facility]

## 2023-08-15 NOTE — TELEPHONE ENCOUNTER
Patient Returning Call    Reason for call:  Dayana returning call, please give her a call: [phone]     Could we send this information to you [facility] or would you prefer to receive a phone call?:   Patient would prefer a phone call   Okay to leave a detailed message?: Yes at Home number on file [phone] (home)

## 2023-08-15 NOTE — TELEPHONE ENCOUNTER
Roma Young, RN called Dayana on 8/15 and left a message to return call to clinic. If patient calls back, please route to any available RN to triage.    MELINA Albert RN  [facility]

## 2023-08-16 ENCOUNTER — HOSPITAL ENCOUNTER (EMERGENCY)
Facility: CLINIC | Age: 63
Discharge: HOME | End: 2023-08-16
Attending: EMERGENCY MEDICINE | Admitting: EMERGENCY MEDICINE
Payer: COMMERCIAL

## 2023-08-16 ENCOUNTER — APPOINTMENT (OUTPATIENT)
Dept: RADIOLOGY | Facility: CLINIC | Age: 63
End: 2023-08-16
Attending: EMERGENCY MEDICINE
Payer: COMMERCIAL

## 2023-08-16 ENCOUNTER — NURSE TRIAGE (OUTPATIENT)
Dept: FAMILY MEDICINE | Facility: CLINIC | Age: 63
End: 2023-08-16

## 2023-08-16 VITALS
BODY MASS INDEX: 22.16 KG/M2 | TEMPERATURE: 97.6 F | RESPIRATION RATE: 16 BRPM | WEIGHT: 133 LBS | OXYGEN SATURATION: 98 % | HEART RATE: 61 BPM | SYSTOLIC BLOOD PRESSURE: 180 MMHG | DIASTOLIC BLOOD PRESSURE: 80 MMHG | HEIGHT: 65 IN

## 2023-08-16 DIAGNOSIS — R07.89 ATYPICAL CHEST PAIN: ICD-10-CM

## 2023-08-16 LAB
ALBUMIN SERPL-MCNC: 4.4 G/DL (ref 3.5–5)
ALP SERPL-CCNC: 116 U/L (ref 45–120)
ALT SERPL W P-5'-P-CCNC: 27 U/L (ref 0–45)
ANION GAP SERPL CALCULATED.3IONS-SCNC: 14 MMOL/L (ref 5–18)
AST SERPL W P-5'-P-CCNC: 27 U/L (ref 0–40)
BILIRUB SERPL-MCNC: 0.8 MG/DL (ref 0–1)
BUN SERPL-MCNC: 17 MG/DL (ref 8–22)
C REACTIVE PROTEIN LHE: <0.1 MG/DL (ref 0–?)
CALCIUM SERPL-MCNC: 9.3 MG/DL (ref 8.5–10.5)
CHLORIDE BLD-SCNC: 102 MMOL/L (ref 98–107)
CO2 SERPL-SCNC: 23 MMOL/L (ref 22–31)
CREAT SERPL-MCNC: 1.04 MG/DL (ref 0.6–1.1)
ERYTHROCYTE [DISTWIDTH] IN BLOOD BY AUTOMATED COUNT: 13.4 % (ref 10–15)
ERYTHROCYTE [SEDIMENTATION RATE] IN BLOOD BY WESTERGREN METHOD: 8 MM/HR (ref 0–30)
GFR SERPL CREATININE-BSD FRML MDRD: 60 ML/MIN/1.73M2
GLUCOSE BLD-MCNC: 92 MG/DL (ref 70–125)
HCT VFR BLD AUTO: 44.9 % (ref 35–47)
HGB BLD-MCNC: 14.6 G/DL (ref 11.7–15.7)
HOLD SPECIMEN: NORMAL
LIPASE SERPL-CCNC: 16 U/L (ref 0–52)
MAGNESIUM SERPL-MCNC: 2.2 MG/DL (ref 1.8–2.6)
MCH RBC QN AUTO: 28.7 PG (ref 26.5–33)
MCHC RBC AUTO-ENTMCNC: 32.5 G/DL (ref 31.5–36.5)
MCV RBC AUTO: 88 FL (ref 78–100)
PLATELET # BLD AUTO: 284 10E3/UL (ref 150–450)
POTASSIUM BLD-SCNC: 3.9 MMOL/L (ref 3.5–5)
PROT SERPL-MCNC: 7.2 G/DL (ref 6–8)
RBC # BLD AUTO: 5.09 10E6/UL (ref 3.8–5.2)
SODIUM SERPL-SCNC: 139 MMOL/L (ref 136–145)
TROPONIN I SERPL-MCNC: <0.01 NG/ML (ref 0–0.29)
WBC # BLD AUTO: 7.9 10E3/UL (ref 4–11)

## 2023-08-16 PROCEDURE — 86140 C-REACTIVE PROTEIN: CPT | Performed by: EMERGENCY MEDICINE

## 2023-08-16 PROCEDURE — 99285 EMERGENCY DEPT VISIT HI MDM: CPT | Mod: 25

## 2023-08-16 PROCEDURE — 93005 ELECTROCARDIOGRAM TRACING: CPT | Performed by: EMERGENCY MEDICINE

## 2023-08-16 PROCEDURE — 36415 COLL VENOUS BLD VENIPUNCTURE: CPT | Performed by: EMERGENCY MEDICINE

## 2023-08-16 PROCEDURE — 71046 X-RAY EXAM CHEST 2 VIEWS: CPT

## 2023-08-16 PROCEDURE — 85027 COMPLETE CBC AUTOMATED: CPT | Performed by: EMERGENCY MEDICINE

## 2023-08-16 PROCEDURE — 85652 RBC SED RATE AUTOMATED: CPT | Performed by: EMERGENCY MEDICINE

## 2023-08-16 PROCEDURE — 80053 COMPREHEN METABOLIC PANEL: CPT | Performed by: EMERGENCY MEDICINE

## 2023-08-16 PROCEDURE — 83735 ASSAY OF MAGNESIUM: CPT | Performed by: EMERGENCY MEDICINE

## 2023-08-16 PROCEDURE — 84484 ASSAY OF TROPONIN QUANT: CPT | Performed by: EMERGENCY MEDICINE

## 2023-08-16 PROCEDURE — 83690 ASSAY OF LIPASE: CPT | Performed by: EMERGENCY MEDICINE

## 2023-08-16 ASSESSMENT — ACTIVITIES OF DAILY LIVING (ADL): ADLS_ACUITY_SCORE: 35

## 2023-08-16 NOTE — TELEPHONE ENCOUNTER
Called and unable to reach patient. Left detailed message advising that Dr. Moreira, covering for Dr. Weiner, agreed that you should be seen in ED and noted you were not there yet. Calling to check-in and requesting call back.    If patient calls back, please route to RN to obtain update from patient regarding treatment and symptoms.    Aliza SUMMERS, RN, PHN  [facility]

## 2023-08-16 NOTE — TELEPHONE ENCOUNTER
Nurse Triage SBAR    Is this a 2nd Level Triage? YES, LICENSED PRACTITIONER REVIEW IS REQUIRED    Situation: Current 3/10 chest pain centered above left breast, feels like it goes through to the center of back.    Background: Hyperlipemia, smoker.    Assessment: Patient reports current chest pain has been occurring for more than 5 minutes.     States chest pain has been intermittent for a couple of months- at worst, can be 7/10 pain. Experiences chest pain 1-2 times per day on average. Gets worse with prolonged walking or bending over.     For the past 3 weeks, she's had episodes of weakness 2-4 time per day where she needs to stop moving and put her hands on her knees for a while before she can go on. She reports that she has had a couple of these weakness episodes today.    Protocol Recommended Disposition:   Call  Now; however, patient declines stating she would feel much more comfortable with  taking her to the emergency room via private car.    Recommendation: Advised patient to call EMS now;  however, patient declines. She asked if she could go to ER tomorrow instead. Advised patient that our recommendation based on current chest pain lasting more than 5 minutes is to call 911 for EMS transport to ED. Provided education that based on current symptoms, concern for cardiac issue and that we would want to an rapid evaluation in ED to understand why she's having intermittent chest pain and episodes of weakness. Patient declines 911 and states she will call her  now [facility] Advised patient that if her symptoms worsen or if she faints, to have him call 911 to complete transport to hospital.     Routed to provider for review as patient plans to go to ED via private car rather than EMS as indicated by protocol.    Does the patient meet one of the following criteria for ADS visit consideration? 16+ years old, with an [facility] PCP     TIP  Providers, please consider if this  "condition is appropriate for management at one of our Acute and Diagnostic Services sites.     If patient is a good candidate, please use dotphrase <dot>triageresponse and select Refer to ADS to document.      Reason for Disposition   Chest pain lasting longer than 5 minutes and ANY of the following:* Over 44 years old* Over 30 years old and at least one cardiac risk factor (e.g., diabetes mellitus, high blood pressure, high cholesterol, smoker, or strong family history of heart disease)* History of heart disease (i.e., angina, heart attack, heart failure, bypass surgery, takes nitroglycerin)* Pain is crushing, pressure-like, or heavy    Additional Information   Negative: SEVERE difficulty breathing (e.g., struggling for each breath, speaks in single words)   Negative: Passed out (i.e., fainted, collapsed and was not responding)   Negative: Difficult to awaken or acting confused (e.g., disoriented, slurred speech)   Negative: Shock suspected (e.g., cold/pale/clammy skin, too weak to stand, low BP, rapid pulse)    Answer Assessment - Initial Assessment Questions  1. LOCATION: \"Where does it hurt?\"        Sharp pain in chest through to back.  2. RADIATION: \"Does the pain go anywhere else?\" (e.g., into neck, jaw, arms, back)      At times, can radiatte into shoulder and feels like an ache.   3. ONSET: \"When did the chest pain begin?\" (Minutes, hours or days)       Current pain for more than 5 minutes. Overall, has had intermittent chest pain for a few months.  4. PATTERN \"Does the pain come and go, or has it been constant since it started?\"  \"Does it get worse with exertion?\"       Movement can trigger it such as a lot of walking or bending over.  Reports that she gets chest pain if bending over.  5. DURATION: \"How long does it last\" (e.g., seconds, minutes, hours)      Usually lasts for less than 1 hour.  6. SEVERITY: \"How bad is the pain?\"  (e.g., Scale 1-10; mild, moderate, or severe)     - MILD (1-3): doesn't " "interfere with normal activities      - MODERATE (4-7): interferes with normal activities or awakens from sleep     - SEVERE (8-10): excruciating pain, unable to do any normal activities        Current pain 2-3/10. When having sharp chest pains, 7/10.  7. CARDIAC RISK FACTORS: \"Do you have any history of heart problems or risk factors for heart disease?\" (e.g., angina, prior heart attack; diabetes, high blood pressure, high cholesterol, smoker, or strong family history of heart disease)      Hyperlipidemia, smoker. Denies other history or family history of cardiac disease.  8. PULMONARY RISK FACTORS: \"Do you have any history of lung disease?\"  (e.g., blood clots in lung, asthma, emphysema, birth control pills)      Asthma, emphysema. Has something on lungs but has been stable for years.  9. CAUSE: \"What do you think is causing the chest pain?\"      Unsure  10. OTHER SYMPTOMS: \"Do you have any other symptoms?\" (e.g., dizziness, nausea, vomiting, sweating, fever, difficulty breathing, cough)        denies  11. PREGNANCY: \"Is there any chance you are pregnant?\" \"When was your last menstrual period?\"        *No Answer*    Protocols used: Chest Pain-A-[place]    Aliza SUMMERS, RN, PHN  [facility]    "

## 2023-08-16 NOTE — TELEPHONE ENCOUNTER
I agree she should be seen in the ER, I don't see that she is there yet, maybe we can call and check in on her later today

## 2023-08-16 NOTE — TELEPHONE ENCOUNTER
LMTRC.     When patient returns call, please route to RN queue.     Autumn Em RN, BSN, PHN  [facility]

## 2023-08-16 NOTE — ED PROVIDER NOTES
EMERGENCY DEPARTMENT ENCOUNTER      NAME: Dayana Samaniego  AGE: 62 year old female  YOB: 1960  MRN: [id number]  EVALUATION DATE & TIME: No admission date for patient encounter.    PCP: Joanne Weiner    ED PROVIDER: Bryan Leon M.D.      Chief Complaint   Patient presents with    Chest Pain         FINAL IMPRESSION:  No diagnosis found.      ED COURSE & MEDICAL DECISION MAKING:    Pertinent Labs & Imaging studies reviewed. (See chart for details)  62 year old female presents to the Emergency Department for evaluation of chest pain.  Reports severe squeezing sensation with radiation to her back and left shoulder.  Onset while she is drinking a Pepsi.  Symptoms much improved on arrival.  Patient with prior extensive evaluation for chest pain.  On review of records patient had CT angio and 9/8/2022 that was without evidence of detectable stenosis or plaques.  Patient also recently seen on 8/8/2023 by pulmonology.  Patient with increased respiratory issues, chronic cough.  Patient recently restarted smoking 1-1/2 pack cigarettes a day.  Patient with underlying COPD and asthma.  Patient also with esophagram on 2/7/2023 which did not reveal an overly tight fundoplication nor any signs of reflux.  Patient also had a gastric emptying study on 4/17/2023 per review of records.  This was normal.  Exam is unremarkable.  She is well-nourished follow-up female in mild distress.  Symptoms likely related to gaseous distention of her esophagus given extensive prior evaluation which has been unremarkable.  Reflux symptoms apparently improved after hiatal hernia repair on 12/30/2022. Patient appears non toxic with stable vitals signs. Overall exam is benign.  In regards to her early exhaustion with activities.  Patient had extensive laboratory evaluation for possible infectious or inflammatory process which all of been negative.      6:20 PM.  I met with the patient for the initial interview and physical  examination. Discussed plan for treatment and workup in the ED. patient accompanied by her  who provides additional insight and information.  7:33 PM.  Laboratory evaluation is unremarkable.  Lipase, metabolic panel, troponin, magnesium, CBC all normal.  C-reactive protein added this was normal.  Results shared with patient.  Recommendations were for avoidance of carbonated beverages at this point.  Sedimentation rate pending.  Patient is appropriate for continued outpatient management.  No indications for more advanced imaging, hospitalization.  Given initial presentation hospitalization considered however given findings unnecessary.  At the conclusion of the encounter I discussed the results of all of the tests and the disposition. The questions were answered and return precautions provided. The patient or family acknowledged understanding and was agreeable with the care plan.       .     MEDICATIONS GIVEN IN THE EMERGENCY:  Medications - No data to display    NEW PRESCRIPTIONS STARTED AT TODAY'S ER VISIT  New Prescriptions    No medications on file          =================================================================    HPI            Dayana Samaniego is a 62 year old female with a pertient medical history of anxiety/depression, GERD, fundoplication, atypical chest pain who presents to the ED for evaluation of recurrent chest pain.  Patient reports a severe squeezing sensation with radiation into her back and her left shoulder.  Symptoms onset shortly prior to arrival when she was drinking a Pepsi.  Patient also reports generally fatigued.  She states that she can only walk a short distance before she feels she needs to rest.      REVIEW OF SYSTEMS   Constitutional:  Denies fever, chills  Respiratory:  Denies productive cough or increased work of breathing  Cardiovascular:  Denies chest pain, palpitations  GI:  Denies abdominal pain, nausea, vomiting, or change in bowel or bladder habits    Musculoskeletal:  Denies any new muscle/joint swelling  Skin:  Denies rash   Neurologic:  Denies focal weakness  All systems negative except as marked.     PAST MEDICAL HISTORY:  Past Medical History:   Diagnosis Date    Abnormal reflex 9/8/2021    Abnormality of gait     Created by Conversion     Acute bronchitis, unspecified organism 7/29/2022    Anemia 3/18/2021    Anxiety     Asthma     Asymptomatic postmenopausal status     Created by Conversion  Replacement Utility updated for latest IMO load    Bipolar 2 disorder (H)     Borderline personality disorder (H) 6/1/2015    Centrilobular emphysema (H) 2/26/2018    Mild, seen in 2018 CT scan, DLCO 60% predicted  Formatting of this note might be different from the original. Formatting of this note might be different from the original. Mild, seen in 2018 CT scan, DLCO 60% predicted    Cervical spinal stenosis     s/p cervical fusion    Chronic kidney disease     Chronic kidney disease, stage 3 (H) 1/14/2021    Chronic pain syndrome     Cigarette nicotine dependence without complication 3/4/2020    Common migraine with intractable migraine 2/15/2021    COPD (chronic obstructive pulmonary disease) (H)     DDD (degenerative disc disease), lumbosacral 3/22/2021    Depression     Draining cutaneous sinus tract 12/1/2021    Added automatically from request for surgery [id number]    Dysphagia 7/19/2012    Encounter for other orthopedic aftercare 3/21/2021    Fibrocystic breast     Fibromyalgia     GERD (gastroesophageal reflux disease)     Hallux abductovalgus with bunions, unspecified laterality 12/14/2015    Hemorrhoids 11/22/2016    Herpes zoster     Created by Conversion  Replacement Utility updated for latest IMO load    Hiatal hernia     History of electroconvulsive therapy     Hyperlipidemia 3/17/2021    Created by Conversion   Formatting of this note might be different from the original. Formatting of this note might be different from the original. Created by  Conversion    Hypotension 3/18/2021    Hypothyroidism     hypothyroidism    IBS (irritable bowel syndrome)     Iron deficiency anemia 9/20/2022    Low back pain 3/21/2021    Lung nodule 8/4/2016 8/4/2016:  Left upper lobe nodule of 6.5 mm, likely benign given slow growth per radiologist.  See CT 6/27/2011:  measured  approximately 5.5 mm in greatest dimension     Menopausal and postmenopausal disorder     Created by Conversion  Replacement Utility updated for latest IMO load    Menopausal disorder 8/31/2021    Formatting of this note might be different from the original. Created by Conversion  Replacement Utility updated for latest IMO load    Migraine     Created by Conversion  Replacement Utility updated for latest IMO load    Migraine headache 8/31/2021    Formatting of this note might be different from the original. Created by Conversion  Replacement Utility updated for latest IMO load    Migraines     Moderate asthma with exacerbation, unspecified whether persistent 7/29/2022    Moderate persistent asthma, uncomplicated 3/21/2021    Osteoarthritis     Osteopenia of multiple sites 9/1/2020    Other abnormalities of gait and mobility 3/21/2021    Other chronic pain     Other specified disorders of bone density and structure, multiple sites 3/21/2021    Pain in right leg 3/21/2021    PONV (postoperative nausea and vomiting)     PTSD (post-traumatic stress disorder)     Shingles 2014    on back    Spinal stenosis of lumbar region with neurogenic claudication 3/22/2021    Tobacco use disorder     Created by Conversion        PAST SURGICAL HISTORY:  Past Surgical History:   Procedure Laterality Date    BIOPSY BREAST Left     Approx 5 bx    BUNIONECTOMY Right 12/15/2015    Procedure: MODIFIED LAI BUNIONECTOMY RIGHT FOOT;  Surgeon: Jerome Calvin DPM;  Location: [facility];  Service:     CERVICAL FUSION      CERVICAL FUSION Bilateral 2/16/2015    Procedure: ANTERIOR CERVICAL DECOMPRESSION/FUSION C3-5  BILATERAL, ANTERIOR HARDWARE REMOVAL C5-7 BILATERAL ;  Surgeon: Sawyer Roland MD;  Location: [facility] [place];  Service:     HC NIPPLE EXPLORATION      Description: Breast Nipple Explor W/ Excision Solitary Lactiferous Duct;  Recorded: 04/10/2008;    HYSTERECTOMY      IR CERVICAL EPIDURAL STEROID INJECTION  9/17/2003    IR CERVICAL EPIDURAL STEROID INJECTION  12/11/2003    IR CERVICAL EPIDURAL STEROID INJECTION  1/16/2004    IRRIGATION AND DEBRIDEMENT DECUBITUS WOUND, COMBINED Left 12/13/2021    Procedure: Wound exploration and debridement of Left Lower Abdomin Chronic wound.;  Surgeon: Crispin Bunch MD;  Location: [facility]    LAPAROSCOPIC NISSEN FUNDOPLICATION N/A 12/30/2022    Procedure: FUNDOPLICATION, partial ROBOT-ASSISTED, LAPAROSCOPIC, USING DA MARIBEL XI;  Surgeon: Davie Ocasio DO;  Location: [facility]    LUMBAR DISCECTOMY      X2    MAMMOPLASTY AUGMENTATION Bilateral      approx 2006?    PELVIC LAPAROSCOPY      multiple    SHOULDER OPEN ROTATOR CUFF REPAIR Left     X2    ZZC TOTAL ABDOM HYSTERECTOMY      Description: Total Abdominal Hysterectomy;  Recorded: 06/10/2013;         CURRENT MEDICATIONS:    No current facility-administered medications for this encounter.    Current Outpatient Medications:     albuterol (PROAIR HFA) 108 (90 Base) MCG/ACT inhaler, Inhale 2 puffs into the lungs every 4 hours, Disp: 18 g, Rfl: 11    aspirin-acetaminophen-caffeine (EXCEDRIN MIGRAINE) 250-250-65 MG tablet, Take 1 tablet by mouth daily as needed for headaches MR x 1, Disp: , Rfl:     atorvastatin (LIPITOR) 80 MG tablet, Take 1 tablet (80 mg) by mouth At Bedtime, Disp: 90 tablet, Rfl: 3    benzonatate (TESSALON) 100 MG capsule, TAKE 1 CAPSULE(100 MG) BY MOUTH THREE TIMES DAILY AS NEEDED FOR COUGH, Disp: 30 capsule, Rfl: 0    brexpiprazole (REXULTI) 0.5 MG tablet, Take 0.5 mg by mouth, Disp: , Rfl:     budesonide-formoterol (SYMBICORT) 160-4.5 MCG/ACT Inhaler, Inhale 2 puffs into  the lungs 2 times daily, Disp: 10.2 g, Rfl: 11    doxepin (SINEQUAN) 25 MG capsule, Take 75 mg by mouth At Bedtime, Disp: , Rfl:     eletriptan (RELPAX) 40 MG tablet, Take 1 tablet (40 mg) by mouth at onset of headache for migraine, Disp: 12 tablet, Rfl: 3    erenumab-aooe (AIMOVIG) 70 MG/ML injection, ADMINISTER 1 ML(70 MG) UNDER THE SKIN EVERY MONTH NEEDS TO SCHEDULE AN APPT FOR FURTHER REFILLS, Disp: 1 mL, Rfl: 8    esomeprazole (NEXIUM) 40 MG DR capsule, Take 40 mg by mouth daily, Disp: , Rfl:     ezetimibe (ZETIA) 10 MG tablet, Take 1 tablet (10 mg) by mouth daily, Disp: 90 tablet, Rfl: 3    FLUoxetine (PROZAC) 20 MG capsule, TAKE 1 CAPSULE BY MOUTH EVERY DAY ALONG WITH A 40 MG CAPSULE, Disp: , Rfl:     FLUoxetine (PROZAC) 40 MG capsule, Take 60 mg by mouth daily, Disp: , Rfl:     gabapentin (NEURONTIN) 300 MG capsule, Take 600 mg by mouth 2 times daily, Disp: , Rfl:     HYDROcodone-acetaminophen (NORCO)  MG per tablet, Take 1 tablet by mouth every 4 hours as needed, Disp: , Rfl:     hydrOXYzine (ATARAX) 25 MG tablet, TAKE 2 TABLETS BY MOUTH AT BEDTIME AND 1 TABLET BY MOUTH DURING THE DAY AS NEEDED, Disp: , Rfl:     ipratropium - albuterol 0.5 mg/2.5 mg/3 mL (DUONEB) 0.5-2.5 (3) MG/3ML neb solution, USE 1 VIAL VIA NEBULIZER EVERY 6 HOURS AS NEEDED, Disp: 180 mL, Rfl: 4    lamoTRIgine (LAMICTAL) 200 MG tablet, Take 1 tablet by mouth 2 times daily, Disp: , Rfl:     levothyroxine (SYNTHROID/LEVOTHROID) 75 MCG tablet, TAKE 1 TABLET(75 MCG) BY MOUTH DAILY (Patient taking differently: Take 75 mcg by mouth every evening), Disp: 90 tablet, Rfl: 2    montelukast (SINGULAIR) 10 MG tablet, Take 1 tablet (10 mg) by mouth At Bedtime, Disp: 90 tablet, Rfl: 4    naloxone (NARCAN) 4 MG/0.1ML nasal spray, , Disp: , Rfl:     prazosin (MINIPRESS) 2 MG capsule, Take 2 capsules by mouth At Bedtime, Disp: , Rfl:     prochlorperazine (COMPAZINE) 10 MG tablet, Take 10 mg by mouth every 6 hours as needed for nausea When having  migraine, Disp: , Rfl:     senna-docusate (SENOKOT-S/PERICOLACE) 8.6-50 MG tablet, Take 2 tablets by mouth At Bedtime, Disp: , Rfl:     tiZANidine (ZANAFLEX) 4 MG tablet, TAKE 1 TO 2 TABLETS BY MOUTH DURING THE DAY AND 2 TABLETS AT BEDTIME, Disp: 120 tablet, Rfl: 1    varenicline (CHANTIX) 1 MG tablet, TAKE 1 TABLET(1 MG) BY MOUTH TWICE DAILY, Disp: 180 tablet, Rfl: 0    vitamin D2 (ERGOCALCIFEROL) 83917 units (1250 mcg) capsule, TAKE 1 CAPSULE BY MOUTH 1 TIME A WEEK, Disp: 13 capsule, Rfl: 1    ALLERGIES:  Allergies   Allergen Reactions    Codeine Anaphylaxis    Nefazodone Nausea and Vomiting    Oxycodone-Acetaminophen Unknown     hallucinations    Cetirizine Nausea and Vomiting    Trazodone Nausea and Vomiting    Oxycodone Hallucination and Other (See Comments)    Amoxicillin-Pot Clavulanate [Amoxicillin-Pot Clavulanate] Rash    Cephalexin Rash    Clavulanic Acid Rash    Cyclobenzaprine Rash    Eszopiclone Rash    Methocarbamol Rash    Penicillins Rash     Mild rash       FAMILY HISTORY:  Family History   Problem Relation Age of Onset    Lung Cancer Mother          at age 52    Alcoholism Father     Heart Disease Maternal Grandfather     Diabetes Paternal Grandmother     Coronary Artery Disease Paternal Grandmother     Heart Disease Paternal Grandfather     Diabetes Sister     Hypertension Sister     Crohn's Disease Sister     Coronary Artery Disease Paternal Uncle     Asthma Maternal Aunt        SOCIAL HISTORY:   Social History     Socioeconomic History    Marital status:      Spouse name: None    Number of children: None    Years of education: None    Highest education level: None   Tobacco Use    Smoking status: Every Day     Packs/day: 1.00     Years: 45.00     Pack years: 45.00     Types: Cigarettes    Smokeless tobacco: Never   Vaping Use    Vaping Use: Never used   Substance and Sexual Activity    Alcohol use: Not Currently     Comment: Alcoholic Drinks/day: rare--1-2 times in year    Drug use:  No    Sexual activity: Yes     Partners: Male       VITALS:  No data found.     PHYSICAL EXAM    Constitutional:  Awake, alert, in no apparent distress  HENT:  Normocephalic, Atraumatic. Bilateral external ears normal. Oropharynx moist. Nose normal. Neck- Normal range of motion with no guarding,  Supple, No stridor.   Eyes:  PERRL, EOMI with no signs of entrapment, Conjunctiva normal, No discharge.   Respiratory:  Normal breath sounds, No respiratory distress, No wheezing.    Cardiovascular:  Normal heart rate, Normal rhythm, No appreciable rubs or gallops.   GI:  Soft, No tenderness, No distension, No palpable masses  Musculoskeletal: No edema. Good range of motion in all major joints. No tenderness to palpation or major deformities noted.  Integument:  Warm, Dry, No erythema, No rash.   Neurologic:  Alert & oriented, Normal motor function, Normal sensory function, No focal deficits noted.   Psychiatric:  Affect normal, Judgment normal, Mood normal.     LAB:  All pertinent labs reviewed and interpreted.     Results for orders placed or performed during the hospital encounter of 08/16/23   Comprehensive metabolic panel     Status: Abnormal   Result Value Ref Range    Sodium 139 136 - 145 mmol/L    Potassium 3.9 3.5 - 5.0 mmol/L    Chloride 102 98 - 107 mmol/L    Carbon Dioxide (CO2) 23 22 - 31 mmol/L    Anion Gap 14 5 - 18 mmol/L    Urea Nitrogen 17 8 - 22 mg/dL    Creatinine 1.04 0.60 - 1.10 mg/dL    Calcium 9.3 8.5 - 10.5 mg/dL    Glucose 92 70 - 125 mg/dL    Alkaline Phosphatase 116 45 - 120 U/L    AST 27 0 - 40 U/L    ALT 27 0 - 45 U/L    Protein Total 7.2 6.0 - 8.0 g/dL    Albumin 4.4 3.5 - 5.0 g/dL    Bilirubin Total 0.8 0.0 - 1.0 mg/dL    GFR Estimate 60 (L) >60 mL/min/1.73m2   Lipase     Status: Normal   Result Value Ref Range    Lipase 16 0 - 52 U/L   Troponin I     Status: Normal   Result Value Ref Range    Troponin I <0.01 0.00 - 0.29 ng/mL   Magnesium     Status: Normal   Result Value Ref Range     Magnesium 2.2 1.8 - 2.6 mg/dL   CBC (+ platelets, no diff)     Status: Normal   Result Value Ref Range    WBC Count 7.9 4.0 - 11.0 10e3/uL    RBC Count 5.09 3.80 - 5.20 10e6/uL    Hemoglobin 14.6 11.7 - 15.7 g/dL    Hematocrit 44.9 35.0 - 47.0 %    MCV 88 78 - 100 fL    MCH 28.7 26.5 - 33.0 pg    MCHC 32.5 31.5 - 36.5 g/dL    RDW 13.4 10.0 - 15.0 %    Platelet Count 284 150 - 450 10e3/uL   Extra Tube     Status: None (In process)    Narrative    The following orders were created for panel order Extra Tube.  Procedure                               Abnormality         Status                     ---------                               -----------         ------                     Extra Blue Top Tube[[id number]]                              In process                 Extra Red Top Tube[[id number]]                               In process                   Please view results for these tests on the individual orders.   Extra Tube     Status: None (In process)    Narrative    The following orders were created for panel order Extra Tube.  Procedure                               Abnormality         Status                     ---------                               -----------         ------                     Extra Blue Top Tube[[id number]]                              In process                   Please view results for these tests on the individual orders.   CRP inflammation     Status: Normal   Result Value Ref Range    CRP <0.1 0.0 - <0.8 mg/dL      RADIOLOGY:  Reviewed all pertinent imaging. Please see official radiology report.  No orders to display       EKG:    Normal sinus rhythm.  Rate of 71.  Normal QRS.  Normal ST segments.  Normal EKG.  I have independently reviewed and interpreted the EKG(s) documented above.           I, Antonio Nguyen, am serving as a scribe to document services personally performed by Bryan Leon MD, based on my observation and the provider's statements to me. I, Bryan Leon MD attest that  Antonio Nguyen is acting in a scribe capacity, has observed my performance of the services and has documented them in accordance with my direction.    Bryan Leon M.D.  Emergency Medicine  [facility] EMERGENCY ROOM     Bryan Leon MD  08/16/23 1934

## 2023-08-16 NOTE — ED TRIAGE NOTES
Pt reports left sided chest pain that radiates into her left shoulder and back.  Pt reports symptoms have been present for the last few months and pain comes on a few times a day.  Pt states she pain lasts about one hour when it does come on.  Pt denies increased shortness of breath.

## 2023-08-17 ENCOUNTER — TRANSFERRED RECORDS (OUTPATIENT)
Dept: HEALTH INFORMATION MANAGEMENT | Facility: CLINIC | Age: 63
End: 2023-08-17
Payer: COMMERCIAL

## 2023-08-17 LAB
ATRIAL RATE - MUSE: 71 BPM
DIASTOLIC BLOOD PRESSURE - MUSE: NORMAL MMHG
INTERPRETATION ECG - MUSE: NORMAL
P AXIS - MUSE: 75 DEGREES
PR INTERVAL - MUSE: 158 MS
QRS DURATION - MUSE: 76 MS
QT - MUSE: 378 MS
QTC - MUSE: 410 MS
R AXIS - MUSE: 74 DEGREES
SYSTOLIC BLOOD PRESSURE - MUSE: NORMAL MMHG
T AXIS - MUSE: 79 DEGREES
VENTRICULAR RATE- MUSE: 71 BPM

## 2023-08-17 NOTE — TELEPHONE ENCOUNTER
See nurse triage encounter for 8/16/23.    Autumn Em, RN, BSN, PHN  [facility]

## 2023-08-17 NOTE — TELEPHONE ENCOUNTER
Noted in chart that patient did go to the ED yesterday.    No further action/follow-up needed at this time.     Autumn Em, RN, BSN, PHN  [facility]

## 2023-08-22 ENCOUNTER — TELEPHONE (OUTPATIENT)
Dept: NEUROLOGY | Facility: CLINIC | Age: 63
End: 2023-08-22
Payer: COMMERCIAL

## 2023-08-23 NOTE — TELEPHONE ENCOUNTER
Central Prior Authorization Team  Phone: [phone]    PA Initiation    Medication: AIMOVIG 70 MG/ML SC SOAJ  Insurance Company: [employer] Non-Specialty PA's - Phone [phone] Fax [phone]  Pharmacy Filling the Rx: [id number] - [place], [place] - [address] AT [facility] [address]  Filling Pharmacy Phone: [phone]  Filling Pharmacy Fax:    Start Date: 8/23/2023

## 2023-08-23 NOTE — TELEPHONE ENCOUNTER
Prior Authorization Approval    Medication: AIMOVIG 70 MG/ML SC SOAJ  Authorization Effective Date: 7/24/2023  Authorization Expiration Date: 8/23/2024  Approved Dose/Quantity:   Reference #:     Insurance Company: [employer] Non-Specialty PA's - Phone [phone] Fax [phone]  Expected CoPay:       CoPay Card Available:      Financial Assistance Needed:   Which Pharm[facility] is filling the prescription[id number] [place] [address] AT [facility] OF [address]  Pharmacy Notified: Yes  Patient Notified:  **Instructed pharmacy to notify patient when script is ready to /ship.**

## 2023-08-27 ENCOUNTER — TRANSFERRED RECORDS (OUTPATIENT)
Dept: HEALTH INFORMATION MANAGEMENT | Facility: CLINIC | Age: 63
End: 2023-08-27
Payer: COMMERCIAL

## 2023-09-07 ENCOUNTER — TRANSFERRED RECORDS (OUTPATIENT)
Dept: HEALTH INFORMATION MANAGEMENT | Facility: CLINIC | Age: 63
End: 2023-09-07
Payer: COMMERCIAL

## 2023-09-11 ENCOUNTER — OFFICE VISIT (OUTPATIENT)
Dept: FAMILY MEDICINE | Facility: CLINIC | Age: 63
End: 2023-09-11
Payer: COMMERCIAL

## 2023-09-11 VITALS
BODY MASS INDEX: 22.16 KG/M2 | OXYGEN SATURATION: 98 % | WEIGHT: 133 LBS | HEIGHT: 65 IN | HEART RATE: 78 BPM | SYSTOLIC BLOOD PRESSURE: 136 MMHG | DIASTOLIC BLOOD PRESSURE: 50 MMHG | TEMPERATURE: 98.3 F | RESPIRATION RATE: 18 BRPM

## 2023-09-11 DIAGNOSIS — E78.2 MIXED HYPERLIPIDEMIA: Chronic | ICD-10-CM

## 2023-09-11 DIAGNOSIS — J45.901 MODERATE ASTHMA WITH EXACERBATION, UNSPECIFIED WHETHER PERSISTENT: Chronic | ICD-10-CM

## 2023-09-11 DIAGNOSIS — F17.210 CIGARETTE NICOTINE DEPENDENCE WITHOUT COMPLICATION: ICD-10-CM

## 2023-09-11 DIAGNOSIS — F31.81 BIPOLAR 2 DISORDER (H): Chronic | ICD-10-CM

## 2023-09-11 DIAGNOSIS — E03.9 ACQUIRED HYPOTHYROIDISM: Chronic | ICD-10-CM

## 2023-09-11 DIAGNOSIS — G89.4 CHRONIC PAIN SYNDROME: Chronic | ICD-10-CM

## 2023-09-11 DIAGNOSIS — J43.2 CENTRILOBULAR EMPHYSEMA (H): Chronic | ICD-10-CM

## 2023-09-11 DIAGNOSIS — R07.89 ATYPICAL CHEST PAIN: Primary | ICD-10-CM

## 2023-09-11 PROBLEM — J43.1 PANLOBULAR EMPHYSEMA (H): Status: ACTIVE | Noted: 2023-09-11

## 2023-09-11 PROBLEM — J43.1 PANLOBULAR EMPHYSEMA (H): Chronic | Status: ACTIVE | Noted: 2023-09-11

## 2023-09-11 PROCEDURE — 99215 OFFICE O/P EST HI 40 MIN: CPT | Performed by: FAMILY MEDICINE

## 2023-09-11 ASSESSMENT — PAIN SCALES - GENERAL: PAINLEVEL: MODERATE PAIN (5)

## 2023-09-11 NOTE — PROGRESS NOTES
Assessment & Plan     Atypical chest pain  Was in the ER on 8/16 and had all normal labs and studies.  She did not have a stress test which I think she really needs to have at this time.  She is due to see her cardiologist end of November.  I suggested she call and make that appointment for Dr. Amezcua as soon as possible.  - NM Lexiscan stress test    Cigarette nicotine dependence without complication  Unfortunately continues to smoke despite emphysema and asthma.  - NM Lexiscan stress test    Centrilobular emphysema (H)  Has been seen by the nurse practitioner in pulmonology.  She has a lung nodule which they are following.    Moderate asthma with exacerbation, unspecified whether persistent  Patient is on Symbicort 160/4.5 mcg which she takes 1 puff twice daily.  She also uses albuterol.  Last ACT in January was 10!    Chronic pain syndrome  Is seen at [facility] pain clinic.    Bipolar 2 disorder (H)  Is seen by a psychiatrist and therapist on a recent and regular basis.    Mixed hyperlipidemia  Well-controlled on her Lipitor.  LDL in February was 62.    Acquired hypothyroidism  Last lab was good.    Patient really does not need any lab work today as she had plenty of labs in the ER.  She also has plenty of labs done by specialists.  I will get back to her on her stress test results when they come back.    I spent a total of 45 minutes on the day of the visit.   Time spent by me doing chart review, history and exam, documentation and further activities per the note     MED REC REQUIRED  Post Medication Reconciliation Status:  Discharge medications reconciled, continue medications without change  FUTURE APPOINTMENTS:       - Follow-up visit in 6 months for her annual physical, or sooner if need be.    Joanne Weiner MD  [facility]        Suzie Anne is a 62 year old, presenting for the following health issues:  ER F/U      9/11/2023    10:41 AM   Additional Questions    Roomed by Breana ARCHER CMA       History of Present Illness       Reason for visit:  Heart  Symptom onset:  More than a month  Symptoms include:  Pain and weakness  Symptom intensity:  Severe  Symptom progression:  Worsening  Had these symptoms before:  Yes  Has tried/received treatment for these symptoms:  Yes  Previous treatment was successful:  No    She eats 0-1 servings of fruits and vegetables daily.She consumes 2 sweetened beverage(s) daily.She exercises with enough effort to increase her heart rate 9 or less minutes per day.  She exercises with enough effort to increase her heart rate 3 or less days per week. She is missing 2 dose(s) of medications per week.  She is not taking prescribed medications regularly due to side effects.       ED/UC Followup:    Facility:   ER   Date of visit: 8/16/2023  Reason for visit: Chest Pain  Current Status: no change, maybe a little worse    This is a patient with chronic pain who is seen at the pain clinic and has type II bipolar disorder.  She had a recent ER visit for chest pain but all her labs and studies were normal.  I did not do a stress test but she is not able to exercise she feels.  She has seen cardiology, Dr. Amezcua last November and was supposed to see him again this coming November.  Her chest pain does not seem to be heart related, but she is a smoker and so I do think she has more risk because of that.  She had surgery not too long ago for correction of her hiatal hernia.  She has lost quite a bit of weight since that time.  We discussed this.  We also discussed her smoking which she is precontemplative at this time to quit.  She quit for a whole year when her daughter had a baby.  We discussed her emphysema as well as asthma which is worsened with her smoking.  We went over every med she had because she wanted to see if she could get rid of any of them.  She is taking Lipitor 80 mg and last LDL was perfect at 62.  She is taking 75 mcg of levothyroxine.   She is seen pulmonology and she really needs to continue her Symbicort.  We concluded there is really no meds she can get rid of in my opinion.  I would not want to discontinue any of the meds prescribed by her psychiatrist or pulmonologist.      Objective    LMP 06/01/1983   Breastfeeding No   There is no height or weight on file to calculate BMI.  Physical Exam   GENERAL: healthy, alert, no distress, and fit  RESP: expiratory wheezes , inspiratory wheezes , decreased breath sounds , and actually breathing quite comfortably currently  CV: regular rates and rhythm and without murmur  ABDOMEN: soft, nontender  MS: no gross musculoskeletal defects noted, no edema  PSYCH: mentation appears normal, affect normal/bright, and appearance well groomed

## 2023-09-13 ENCOUNTER — TRANSFERRED RECORDS (OUTPATIENT)
Dept: HEALTH INFORMATION MANAGEMENT | Facility: CLINIC | Age: 63
End: 2023-09-13
Payer: COMMERCIAL

## 2023-09-18 DIAGNOSIS — Z72.0 TOBACCO ABUSE: ICD-10-CM

## 2023-09-18 RX ORDER — VARENICLINE TARTRATE 1 MG/1
1 TABLET, FILM COATED ORAL 2 TIMES DAILY
Qty: 180 TABLET | Refills: 0 | Status: SHIPPED | OUTPATIENT
Start: 2023-09-18 | End: 2024-01-24

## 2023-09-19 DIAGNOSIS — R06.09 DYSPNEA ON EXERTION: Primary | ICD-10-CM

## 2023-09-19 DIAGNOSIS — E78.5 HYPERLIPEMIA: ICD-10-CM

## 2023-09-19 RX ORDER — ATORVASTATIN CALCIUM 80 MG/1
80 TABLET, FILM COATED ORAL AT BEDTIME
Qty: 90 TABLET | Refills: 0 | Status: SHIPPED | OUTPATIENT
Start: 2023-09-19 | End: 2023-12-14

## 2023-09-19 NOTE — TELEPHONE ENCOUNTER
[facility] mg. Will send through 90 day supply. Pt due to see MAT. Follow-up order. CMM<Rn

## 2023-09-21 ENCOUNTER — TRANSFERRED RECORDS (OUTPATIENT)
Dept: HEALTH INFORMATION MANAGEMENT | Facility: CLINIC | Age: 63
End: 2023-09-21
Payer: COMMERCIAL

## 2023-09-25 ENCOUNTER — TRANSFERRED RECORDS (OUTPATIENT)
Dept: HEALTH INFORMATION MANAGEMENT | Facility: CLINIC | Age: 63
End: 2023-09-25
Payer: COMMERCIAL

## 2023-09-26 ENCOUNTER — HOSPITAL ENCOUNTER (OUTPATIENT)
Dept: CARDIOLOGY | Facility: CLINIC | Age: 63
Discharge: HOME | End: 2023-09-26
Attending: FAMILY MEDICINE
Payer: COMMERCIAL

## 2023-09-26 ENCOUNTER — HOSPITAL ENCOUNTER (OUTPATIENT)
Dept: NUCLEAR MEDICINE | Facility: CLINIC | Age: 63
Discharge: HOME | End: 2023-09-26
Attending: FAMILY MEDICINE
Payer: COMMERCIAL

## 2023-09-26 DIAGNOSIS — E55.9 VITAMIN D DEFICIENCY: ICD-10-CM

## 2023-09-26 DIAGNOSIS — R07.89 ATYPICAL CHEST PAIN: ICD-10-CM

## 2023-09-26 DIAGNOSIS — F17.210 CIGARETTE NICOTINE DEPENDENCE WITHOUT COMPLICATION: ICD-10-CM

## 2023-09-26 LAB
CV STRESS CURRENT BP HE: NORMAL
CV STRESS CURRENT HR HE: 61
CV STRESS CURRENT HR HE: 63
CV STRESS CURRENT HR HE: 64
CV STRESS CURRENT HR HE: 80
CV STRESS CURRENT HR HE: 81
CV STRESS CURRENT HR HE: 82
CV STRESS CURRENT HR HE: 82
CV STRESS CURRENT HR HE: 83
CV STRESS CURRENT HR HE: 83
CV STRESS CURRENT HR HE: 85
CV STRESS CURRENT HR HE: 87
CV STRESS CURRENT HR HE: 88
CV STRESS CURRENT HR HE: 89
CV STRESS CURRENT HR HE: 90
CV STRESS DEVIATION TIME HE: NORMAL
CV STRESS ECHO PERCENT HR HE: NORMAL
CV STRESS EXERCISE STAGE HE: NORMAL
CV STRESS FINAL RESTING BP HE: NORMAL
CV STRESS FINAL RESTING HR HE: 83
CV STRESS MAX HR HE: 92
CV STRESS MAX TREADMILL GRADE HE: 0
CV STRESS MAX TREADMILL SPEED HE: 0
CV STRESS PEAK DIA BP HE: NORMAL
CV STRESS PEAK SYS BP HE: NORMAL
CV STRESS PHASE HE: NORMAL
CV STRESS PROTOCOL HE: NORMAL
CV STRESS RESTING PT POSITION HE: NORMAL
CV STRESS ST DEVIATION AMOUNT HE: NORMAL
CV STRESS ST DEVIATION ELEVATION HE: NORMAL
CV STRESS ST EVELATION AMOUNT HE: NORMAL
CV STRESS TEST TYPE HE: NORMAL
CV STRESS TOTAL STAGE TIME MIN 1 HE: NORMAL
RATE PRESSURE PRODUCT: NORMAL
STRESS ECHO BASELINE DIASTOLIC HE: 66
STRESS ECHO BASELINE HR: 62
STRESS ECHO BASELINE SYSTOLIC BP: 127
STRESS ECHO CALCULATED PERCENT HR: 59 %
STRESS ECHO LAST STRESS DIASTOLIC BP: 62
STRESS ECHO LAST STRESS HR: 87
STRESS ECHO LAST STRESS SYSTOLIC BP: 131
STRESS ECHO TARGET HR: 157

## 2023-09-26 PROCEDURE — 93016 CV STRESS TEST SUPVJ ONLY: CPT | Performed by: INTERNAL MEDICINE

## 2023-09-26 PROCEDURE — 343N000001 HC RX 343: Performed by: FAMILY MEDICINE

## 2023-09-26 PROCEDURE — 78452 HT MUSCLE IMAGE SPECT MULT: CPT

## 2023-09-26 PROCEDURE — 93017 CV STRESS TEST TRACING ONLY: CPT

## 2023-09-26 PROCEDURE — A9500 TC99M SESTAMIBI: HCPCS | Performed by: FAMILY MEDICINE

## 2023-09-26 PROCEDURE — 250N000011 HC RX IP 250 OP 636: Performed by: FAMILY MEDICINE

## 2023-09-26 PROCEDURE — 93018 CV STRESS TEST I&R ONLY: CPT | Performed by: INTERNAL MEDICINE

## 2023-09-26 PROCEDURE — 78452 HT MUSCLE IMAGE SPECT MULT: CPT | Mod: 26 | Performed by: INTERNAL MEDICINE

## 2023-09-26 RX ORDER — AMINOPHYLLINE 25 MG/ML
50 INJECTION, SOLUTION INTRAVENOUS
Status: DISCONTINUED | OUTPATIENT
Start: 2023-09-26 | End: 2023-09-26 | Stop reason: HOSPADM

## 2023-09-26 RX ORDER — REGADENOSON 0.08 MG/ML
0.4 INJECTION, SOLUTION INTRAVENOUS ONCE
Status: COMPLETED | OUTPATIENT
Start: 2023-09-26 | End: 2023-09-26

## 2023-09-26 RX ADMIN — Medication 8 MILLICURIE: at 09:29

## 2023-09-26 RX ADMIN — REGADENOSON 0.4 MG: 0.08 INJECTION, SOLUTION INTRAVENOUS at 10:59

## 2023-09-26 RX ADMIN — Medication 28.1 MILLICURIE: at 11:00

## 2023-09-29 RX ORDER — ERGOCALCIFEROL 1.25 MG/1
CAPSULE, LIQUID FILLED ORAL
Qty: 13 CAPSULE | Refills: 0 | Status: SHIPPED | OUTPATIENT
Start: 2023-09-29 | End: 2024-02-03

## 2023-10-18 DIAGNOSIS — J44.9 CHRONIC OBSTRUCTIVE PULMONARY DISEASE, UNSPECIFIED COPD TYPE (H): ICD-10-CM

## 2023-10-19 RX ORDER — BENZONATATE 100 MG/1
CAPSULE ORAL
Qty: 30 CAPSULE | Refills: 0 | Status: SHIPPED | OUTPATIENT
Start: 2023-10-19 | End: 2024-02-16

## 2023-10-20 ENCOUNTER — TRANSFERRED RECORDS (OUTPATIENT)
Dept: HEALTH INFORMATION MANAGEMENT | Facility: CLINIC | Age: 63
End: 2023-10-20
Payer: COMMERCIAL

## 2023-10-27 ENCOUNTER — TRANSFERRED RECORDS (OUTPATIENT)
Dept: HEALTH INFORMATION MANAGEMENT | Facility: CLINIC | Age: 63
End: 2023-10-27
Payer: COMMERCIAL

## 2023-11-01 ENCOUNTER — HOSPITAL ENCOUNTER (OUTPATIENT)
Dept: NUCLEAR MEDICINE | Facility: CLINIC | Age: 63
Discharge: HOME | End: 2023-11-01
Attending: STUDENT IN AN ORGANIZED HEALTH CARE EDUCATION/TRAINING PROGRAM
Payer: COMMERCIAL

## 2023-11-01 DIAGNOSIS — R11.0 CHRONIC NAUSEA: ICD-10-CM

## 2023-11-01 PROCEDURE — 343N000001 HC RX 343

## 2023-11-01 PROCEDURE — A9541 TC99M SULFUR COLLOID: HCPCS

## 2023-11-01 PROCEDURE — 78264 GASTRIC EMPTYING IMG STUDY: CPT

## 2023-11-01 RX ADMIN — Medication 1.07 MILLICURIE: at 12:00

## 2023-11-10 ENCOUNTER — TRANSFERRED RECORDS (OUTPATIENT)
Dept: HEALTH INFORMATION MANAGEMENT | Facility: CLINIC | Age: 63
End: 2023-11-10
Payer: COMMERCIAL

## 2023-11-21 ENCOUNTER — TRANSFERRED RECORDS (OUTPATIENT)
Dept: HEALTH INFORMATION MANAGEMENT | Facility: CLINIC | Age: 63
End: 2023-11-21
Payer: COMMERCIAL

## 2023-12-05 ENCOUNTER — TRANSFERRED RECORDS (OUTPATIENT)
Dept: HEALTH INFORMATION MANAGEMENT | Facility: CLINIC | Age: 63
End: 2023-12-05
Payer: COMMERCIAL

## 2023-12-13 DIAGNOSIS — E78.5 HYPERLIPEMIA: ICD-10-CM

## 2023-12-14 RX ORDER — ATORVASTATIN CALCIUM 80 MG/1
80 TABLET, FILM COATED ORAL AT BEDTIME
Qty: 90 TABLET | Refills: 0 | Status: SHIPPED | OUTPATIENT
Start: 2023-12-14

## 2023-12-20 NOTE — PROGRESS NOTES
In person evaluation    HPI  3/16/2020, in person evaluation  11/30/2021, in person evaluation  12/27/2023, in person visit      63-year-old followed neurologically for:  Intractable migraine headaches  History of 3 cervical fusions  Longstanding generalized hyperreflexia  History of carotid bruits  Fibromyalgia    Last seen 2 years ago  Follow-up for intractable migraine headaches failed multiple medications as preventatives and abortive's  Has been treated with Aimovig  Follows at spine clinic and with other neurologist for her neck and low back difficulties status post surgery cervical and lumbar    Migraine headaches at least 15/month  4 headaches per week  Excedrin 4/week  Drink some Pepsi  Visual blurriness with a headache  Nausea but no vomiting  Relpax gives her nausea also although she does have Compazine through her primary to take with that but she waits until the headache is been there for a long time before using it    Previously used Botox but it lost its effectiveness  Last use may be 3 to 4 years ago  Aimovig seems to be losing some of its benefit    She was willing to go back to the Botox    If her insurance approves we could overlap them which would be the best as stopping the Aimovig abruptly could cause things to flareup and make it harder for the Botox to work          Neurologic summary:    A.  Patient with intractable migraine headaches       Onset of headaches as far back as 1987       Associated with photophobia/phonophobia and visual changes       History of carsickness       Family history of migraines in her daughter    Previous frequency of headaches are 2-3/week  Had used Botox injections up until March 2020  Switch to Aimovig end of March 2020      Patient uses the once a month injectable Aimovig  She gets at least 2 weeks of good response  Then the headaches seem to increase in frequency  During the good phase she has 1-2 headaches per week  Duration of the headache during the good  phases all day  Will use an Excedrin Migraine maybe 4/week during that time warned her about medication rebound headache  Uses the Relpax to break the headache                                11/2021 12/2023  Frequency           1-2/week               4/week    Duration              24 hours                 24 hours    Excedrin              4 /week                  4/week    B.  Also has sharp shooting pain above the left eye lasting 10 minutes at a time jabbing in nature more neuralgic-like    C.  Patient does have bipolar disorder is on Lamictal    D.  Past history of cervical surgeries       Chronic hyperreflexia        Has been treated with Zanaflex as a muscle relaxant    E.  Has  COPD and is on inhalers       Not a good candidate for beta-blockers     F.  Since last seen had lumbar canal surgery 3/17/2021 by Dr. Sanon        Complex surgery see official reports        Anterior discectomy fusion L4-5 and L5-S1        PEEK cage L4-5, L5-S1        Posterior fusion L4-5, L5-S1        Insertion pedicle screws maria e fixation L4, 5, S1        Bilateral hemilaminectomies L4 see official report    Patient fell when she was in rehab and had a sacral fracture 3/22/2021  This slow down healing    She has been left with a lot of low back pain  Weakness more in the right leg than the left  She is always had very brisk reflexes with clonus before  As decreased reflexes at the L4 on the right leg    Is now ambulating without a cane or walker but has them at home    Patient states that there is a concern that the grafts that they put in may not be healing as well and causing some of the pain but there is no new impingement  Did undergone an EMG 6/8/2021 at [facility]  At see neurology 9/23/2021 may be at [facility]  I can access some of the record but not all of it  She says that she has some residual nerve damage but no new impingement    I feel she should follow closely with her spine specialist and that has  access to all her scans and data    Previously worked up by the spine specialist who did her surgery had other neurologist and EMGs performed 2021 which I do not have full access to  Follows at [facility] neurology for spine difficulty  (Chronic cervical pain/calf cramps.)  MRI C-spine 2023 postop changes C3-C7, disc osteophyte similar to past scans per their report.  Last seen 2023      Past medical history  Intractable migraine headaches  Chronic daily headaches  Status post cervical fusion x3  Longstanding hyperreflexia and difficulty with imbalance and falls  Carotid artery bruits  Fibromyalgia/chronic pain  COPD.  Dyspnea on exertion with pulmonary deconditioning with past history of pulmonary nodules.   Right atrial enlargement.  Fatigue with possible obstructive sleep apnea.  Bipolar disorder.  Hypothyroidism.   GERD.   Hyperlipidemia.  Chronic kidney disease.      Habits  History of smoking  Alcohol 1-2/month    Family history  Mother with bone tumor,  at age 52  Father with liver disease and alcohol use,  at age 62  Sister with diabetes high blood pressure and hypercholesterolemia  Maternal aunt diabetes  Nephew with depression and suicidal thoughts  Uncle with heart disease  Daughters with migraine        Past work-up review  EEG 2011 background rhythm 7-9 Hz slightly slow nonspecific consistent with toxic encephalopathy  MRI scan brain 2016  A.  Small T2 hyperintensity changes nonspecific  B.  No hydrocephalus mass or acute changes  TSH 0.32.   Vitamin D level in the past was low at 26.2.  MRI C-spine 2023 see official report  1.  Postoperative changes from C3 through C7.  2.  Degenerative change at C7-T1 see official report.  3.  Moderate spinal stenosis slight flattening anterior cord similar to prior exam C7-T1        Migraine treatment review    Some medications that have been used in the past as preventatives have included:  1. Topamax.  2.  Verapamil.  3. Lyrica.  4. Amitriptyline.  5. Propranolol.    Medications tried in the past more as abortive agents:  a. Tizanidine.  b. Zomig.  c. Vicodin.  d. Relpax, which works sometimes, but not always.  Filled by primary MD  e. Toradol.    Currently, her medications are filled through her primary includin. Valium for anxiety.  2. Doxepin 25 mg three at bedtime.  3. Prozac 60 mg at bedtime.  4. Vistaril 25 mg two tablets p.o. q.6h. p.r.n. for migraine.  5. Avoids ibuprofen and aspirin due to her kidney disease, but recently had some Toradol.  6. Lamictal 200 mg at bedtime for bipolar.  7. Melatonin 5 mg for sleep.  8. Has had prednisone burst during headache flurries in the past.  9. Tizanidine 4 mg tablet, one during the day and two at night prescribed by primary.    Patient is a nonsmoker, does not drink alcohol.    Patient s past Botox injection schedules have included:  a. 2016, at the [facility], 25-30 units, did not tolerate, did not complete.  b. Second injection at the [facility] on 2017, 150 units given.    Through our clinic a 155 units using the headache template. Patient knows the risk and benefits of the medication:  a. 2017.  b. 2017.  c. 2018.  d. 2018, 200 units, given as she had a lot of neck spasm.  e. 2018, total of 200 units given as she has a lot of neck spasm.  f. 2019, 200 units given as she had a lot of neck spasm.  g. Halima 10, 2019 200 units headache template and some for neck spasm  h. 2019 155 units  i Dec 16th, 2019 155 units    Laboratory data review                               2023  NA/K                   139/3.9  BUN/Cr               17/1.04  GLU                     92  AST                     27  WBC/HGB           7.9/14.6  PLTs                     284,000  ESR/CRP             8/ <0.1              Review of Systems     Significant headaches as above  Sometimes gets a  sharp shooting pain more so in the left eye than the right varies seems to be the beginning of a migraine    No visual changes   No dysarthria dysphasia or diplopia  No actual visual field cut  Does have visual blurriness with a headache  Gets nausea with the headache  Has spasms in the neck trapezius chronically    Does get photophobia when the headaches are worse    No chest pain no shortness of breath no nausea vomiting no diarrhea no fever chills no gait changes otherwise review systems are negative     Does have the pain in the back and weakness of the right leg greater than the left but can ambulate    General exam  Vital signs documented chart  Blood pressure 130/65, pulse 64  Lungs clear  Heart rate regular  Abdomen soft  Symmetrical pulses  No edema the feet      Neurologic exam   Alert oriented x3  No aphasia  No neglect  Normal memory recall    Cranials 2 through 12 normal  Pupils are symmetrical and reactive  Optic fundi are good  Visual fields are intact  No ophthalmoplegia  No nystagmus  Face is symmetrical tongue twisters are good    Upper extremities  No drift no tremor normal finger-nose    Brisk reflexes in the lower extremities greater on the left than the right except decreased right knee reflex    Lower extremities  Pain versus weakness of the right iliopsoas but can bear weight okay and walk independently    Gait adequate        Assessment/Plan     1.  Chronic migraine without aura, intractable, without status migrainosus (G43.719)     2.  Started Aimovig March/April 2020       Aimovig 70mg/ml  Subcutaneous, Qmonth       Does seem to be helping the migraines    3.  Status post complex surgery on her lumbar spine March 17, 2021 with residual back pain and residual weakness of the right leg    4.  Sacral fracture 3/21/2021 while she was in rehab for her lumbar surgery    Previously worked up by the spine specialist who did her surgery had other neurologist and EMGs performed June 2021 which I do  not have full access to  Follows at [facility] for spine difficulty  (Chronic cervical pain/calf cramps.)  MRI C-spine 2/6/2023 postop changes C3-C7, disc osteophyte similar to past scans per their report.  Last seen 5/24/2023 by her spine neurologist.    No new recommendations in regards to her back or spine difficulty  Treatment of pain for her spine per other neurologist and spine specialist in regards to the back    Intractable migraine headache  Has failed multiple preventative therapies  Has been a while since she has been on the Botox  We will try to get Botox approved again and give this another try    Has been on Aimovig which may be losing its effectiveness  Sometimes rotating preventative medicines is helpful  She is trying to keep the Excedrin to 4/week to avoid medication rebound headache    Discussed multiple issues since she had not been seen in about 2+ years    Will try to set up the Botox injections    She gets most of her meds either through the pain clinic and or primary    Patient on narcotics and gabapentin through the pain clinic  Has been given a steroid burst and had steroid injections for her back    Multiple issues reviewed with her and her     Total care time today 32 minutes    As part of visit today  Reviewed outside MRI cervical spine  Reviewed neurology notes [facility]  Reviewed laboratory data  Reviewed [place] pain clinic notes 12/5/2023 (patient on chronic narcotic)

## 2023-12-27 ENCOUNTER — OFFICE VISIT (OUTPATIENT)
Dept: NEUROLOGY | Facility: CLINIC | Age: 63
End: 2023-12-27
Payer: COMMERCIAL

## 2023-12-27 VITALS
HEIGHT: 65 IN | SYSTOLIC BLOOD PRESSURE: 130 MMHG | HEART RATE: 64 BPM | DIASTOLIC BLOOD PRESSURE: 65 MMHG | WEIGHT: 135 LBS | BODY MASS INDEX: 22.49 KG/M2

## 2023-12-27 DIAGNOSIS — G43.019 COMMON MIGRAINE WITH INTRACTABLE MIGRAINE: Primary | ICD-10-CM

## 2023-12-27 PROCEDURE — 99214 OFFICE O/P EST MOD 30 MIN: CPT | Performed by: PSYCHIATRY & NEUROLOGY

## 2023-12-27 RX ORDER — METHYLPREDNISOLONE 4 MG
TABLET, DOSE PACK ORAL
COMMUNITY
Start: 2023-12-20 | End: 2024-01-24

## 2023-12-27 RX ORDER — CLONIDINE 0.1 MG/24H
1 PATCH, EXTENDED RELEASE TRANSDERMAL
COMMUNITY
Start: 2023-10-28 | End: 2024-01-30

## 2023-12-27 RX ORDER — BUPROPION HYDROCHLORIDE 100 MG/1
100 TABLET ORAL 2 TIMES DAILY
COMMUNITY
Start: 2023-12-12 | End: 2024-04-30

## 2023-12-27 RX ORDER — ERENUMAB-AOOE 70 MG/ML
INJECTION SUBCUTANEOUS
Qty: 1 ML | Refills: 11 | Status: SHIPPED | OUTPATIENT
Start: 2023-12-27 | End: 2024-03-18

## 2023-12-27 NOTE — NURSING NOTE
Chief Complaint   Patient presents with    Migraine     Pt states she is having about 15 migraines per month, usually about 4 days per week. Wakes up with a migraine most mornings. She has been taking excedrin migraine right away in the mornings. She feels the eletriptan makes her feel really nauseous.     Mary White LPN on 12/27/2023 at 10:18 AM

## 2023-12-27 NOTE — LETTER
12/27/2023         RE: Dayana Samaniego  [id number] [facility] [id number]        Dear Colleague,    Thank you for referring your patient, Dayana Samaniego, to the [facility] [place]. Please see a copy of my visit note below.    In person evaluation    HPI  3/16/2020, in person evaluation  11/30/2021, in person evaluation  12/27/2023, in person visit      63-year-old followed neurologically for:  Intractable migraine headaches  History of 3 cervical fusions  Longstanding generalized hyperreflexia  History of carotid bruits  Fibromyalgia    Last seen 2 years ago  Follow-up for intractable migraine headaches failed multiple medications as preventatives and abortive's  Has been treated with Aimovig  Follows at spine clinic and with other neurologist for her neck and low back difficulties status post surgery cervical and lumbar    Migraine headaches at least 15/month  4 headaches per week  Excedrin 4/week  Drink some Pepsi  Visual blurriness with a headache  Nausea but no vomiting  Relpax gives her nausea also although she does have Compazine through her primary to take with that but she waits until the headache is been there for a long time before using it    Previously used Botox but it lost its effectiveness  Last use may be 3 to 4 years ago  Aimovig seems to be losing some of its benefit    She was willing to go back to the Botox    If her insurance approves we could overlap them which would be the best as stopping the Aimovig abruptly could cause things to flareup and make it harder for the Botox to work          Neurologic summary:    A.  Patient with intractable migraine headaches       Onset of headaches as far back as 1987       Associated with photophobia/phonophobia and visual changes       History of carsickness       Family history of migraines in her daughter    Previous frequency of headaches are 2-3/week  Had used Botox injections up until March 2020  Switch to Aimovig  end of March 2020      Patient uses the once a month injectable Aimovig  She gets at least 2 weeks of good response  Then the headaches seem to increase in frequency  During the good phase she has 1-2 headaches per week  Duration of the headache during the good phases all day  Will use an Excedrin Migraine maybe 4/week during that time warned her about medication rebound headache  Uses the Relpax to break the headache                                11/2021 12/2023  Frequency           1-2/week               4/week    Duration              24 hours                 24 hours    Excedrin              4 /week                  4/week    B.  Also has sharp shooting pain above the left eye lasting 10 minutes at a time jabbing in nature more neuralgic-like    C.  Patient does have bipolar disorder is on Lamictal    D.  Past history of cervical surgeries       Chronic hyperreflexia        Has been treated with Zanaflex as a muscle relaxant    E.  Has  COPD and is on inhalers       Not a good candidate for beta-blockers     F.  Since last seen had lumbar canal surgery 3/17/2021 by Dr. Sanon        Complex surgery see official reports        Anterior discectomy fusion L4-5 and L5-S1        PEEK cage L4-5, L5-S1        Posterior fusion L4-5, L5-S1        Insertion pedicle screws maria e fixation L4, 5, S1        Bilateral hemilaminectomies L4 see official report    Patient fell when she was in rehab and had a sacral fracture 3/22/2021  This slow down healing    She has been left with a lot of low back pain  Weakness more in the right leg than the left  She is always had very brisk reflexes with clonus before  As decreased reflexes at the L4 on the right leg    Is now ambulating without a cane or walker but has them at home    Patient states that there is a concern that the grafts that they put in may not be healing as well and causing some of the pain but there is no new impingement  Did undergone an EMG 6/8/2021 at  [facility]  At see neurology 2021 may be at [facility]  I can access some of the record but not all of it  She says that she has some residual nerve damage but no new impingement    I feel she should follow closely with her spine specialist and that has access to all her scans and data    Previously worked up by the spine specialist who did her surgery had other neurologist and EMGs performed 2021 which I do not have full access to  Follows at [facility] neurology for spine difficulty  (Chronic cervical pain/calf cramps.)  MRI C-spine 2023 postop changes C3-C7, disc osteophyte similar to past scans per their report.  Last seen 2023      Past medical history  Intractable migraine headaches  Chronic daily headaches  Status post cervical fusion x3  Longstanding hyperreflexia and difficulty with imbalance and falls  Carotid artery bruits  Fibromyalgia/chronic pain  COPD.  Dyspnea on exertion with pulmonary deconditioning with past history of pulmonary nodules.   Right atrial enlargement.  Fatigue with possible obstructive sleep apnea.  Bipolar disorder.  Hypothyroidism.   GERD.   Hyperlipidemia.  Chronic kidney disease.      Habits  History of smoking  Alcohol 1-2/month    Family history  Mother with bone tumor,  at age 52  Father with liver disease and alcohol use,  at age 62  Sister with diabetes high blood pressure and hypercholesterolemia  Maternal aunt diabetes  Nephew with depression and suicidal thoughts  Uncle with heart disease  Daughters with migraine        Past work-up review  EEG 2011 background rhythm 7-9 Hz slightly slow nonspecific consistent with toxic encephalopathy  MRI scan brain 2016  A.  Small T2 hyperintensity changes nonspecific  B.  No hydrocephalus mass or acute changes  TSH 0.32.   Vitamin D level in the past was low at 26.2.  MRI C-spine 2023 see official report  1.  Postoperative changes from C3 through C7.  2.  Degenerative change at  C7-T1 see official report.  3.  Moderate spinal stenosis slight flattening anterior cord similar to prior exam C7-T1        Migraine treatment review    Some medications that have been used in the past as preventatives have included:  1. Topamax.  2. Verapamil.  3. Lyrica.  4. Amitriptyline.  5. Propranolol.    Medications tried in the past more as abortive agents:  a. Tizanidine.  b. Zomig.  c. Vicodin.  d. Relpax, which works sometimes, but not always.  Filled by primary MD ramirez Toradol.    Currently, her medications are filled through her primary includin. Valium for anxiety.  2. Doxepin 25 mg three at bedtime.  3. Prozac 60 mg at bedtime.  4. Vistaril 25 mg two tablets p.o. q.6h. p.r.n. for migraine.  5. Avoids ibuprofen and aspirin due to her kidney disease, but recently had some Toradol.  6. Lamictal 200 mg at bedtime for bipolar.  7. Melatonin 5 mg for sleep.  8. Has had prednisone burst during headache flurries in the past.  9. Tizanidine 4 mg tablet, one during the day and two at night prescribed by primary.    Patient is a nonsmoker, does not drink alcohol.    Patient s past Botox injection schedules have included:  a. 2016, at the [facility], 25-30 units, did not tolerate, did not complete.  b. Second injection at the [facility] on 2017, 150 units given.    Through our clinic a 155 units using the headache template. Patient knows the risk and benefits of the medication:  a. 2017.  b. 2017.  c. 2018.  d. 2018, 200 units, given as she had a lot of neck spasm.  e. 2018, total of 200 units given as she has a lot of neck spasm.  f. 2019, 200 units given as she had a lot of neck spasm.  g. Halima 10, 2019 200 units headache template and some for neck spasm  h. 2019 155 units  i Dec 16th, 2019 155 units    Laboratory data review                               2023  NA/K                   139/3.9  BUN/Cr                17/1.04  GLU                     92  AST                     27  WBC/HGB           7.9/14.6  PLTs                     284,000  ESR/CRP             8/ <0.1              Review of Systems     Significant headaches as above  Sometimes gets a sharp shooting pain more so in the left eye than the right varies seems to be the beginning of a migraine    No visual changes   No dysarthria dysphasia or diplopia  No actual visual field cut  Does have visual blurriness with a headache  Gets nausea with the headache  Has spasms in the neck trapezius chronically    Does get photophobia when the headaches are worse    No chest pain no shortness of breath no nausea vomiting no diarrhea no fever chills no gait changes otherwise review systems are negative     Does have the pain in the back and weakness of the right leg greater than the left but can ambulate    General exam  Vital signs documented chart  Blood pressure 130/65, pulse 64  Lungs clear  Heart rate regular  Abdomen soft  Symmetrical pulses  No edema the feet      Neurologic exam   Alert oriented x3  No aphasia  No neglect  Normal memory recall    Cranials 2 through 12 normal  Pupils are symmetrical and reactive  Optic fundi are good  Visual fields are intact  No ophthalmoplegia  No nystagmus  Face is symmetrical tongue twisters are good    Upper extremities  No drift no tremor normal finger-nose    Brisk reflexes in the lower extremities greater on the left than the right except decreased right knee reflex    Lower extremities  Pain versus weakness of the right iliopsoas but can bear weight okay and walk independently    Gait adequate        Assessment/Plan     1.  Chronic migraine without aura, intractable, without status migrainosus (G43.719)     2.  Started Aimovig March/April 2020       Aimovig 70mg/ml  Subcutaneous, Qmonth       Does seem to be helping the migraines    3.  Status post complex surgery on her lumbar spine March 17, 2021 with residual back pain  and residual weakness of the right leg    4.  Sacral fracture 3/21/2021 while she was in rehab for her lumbar surgery    Previously worked up by the spine specialist who did her surgery had other neurologist and EMGs performed June 2021 which I do not have full access to  Follows at [place] [facility] for spine difficulty  (Chronic cervical pain/calf cramps.)  MRI C-spine 2/6/2023 postop changes C3-C7, disc osteophyte similar to past scans per their report.  Last seen 5/24/2023 by her spine neurologist.    No new recommendations in regards to her back or spine difficulty  Treatment of pain for her spine per other neurologist and spine specialist in regards to the back    Intractable migraine headache  Has failed multiple preventative therapies  Has been a while since she has been on the Botox  We will try to get Botox approved again and give this another try    Has been on Aimovig which may be losing its effectiveness  Sometimes rotating preventative medicines is helpful  She is trying to keep the Excedrin to 4/week to avoid medication rebound headache    Discussed multiple issues since she had not been seen in about 2+ years    Will try to set up the Botox injections    She gets most of her meds either through the pain clinic and or primary    Patient on narcotics and gabapentin through the pain clinic  Has been given a steroid burst and had steroid injections for her back    Multiple issues reviewed with her and her     Total care time today 32 minutes    As part of visit today  Reviewed outside MRI cervical spine  Reviewed neurology notes [facility]  Reviewed laboratory data  Reviewed [place] pain clinic notes 12/5/2023 (patient on chronic narcotic)              Again, thank you for allowing me to participate in the care of your patient.        Sincerely,        timbo Schmitt MD

## 2024-01-11 ENCOUNTER — TRANSFERRED RECORDS (OUTPATIENT)
Dept: HEALTH INFORMATION MANAGEMENT | Facility: CLINIC | Age: 64
End: 2024-01-11
Payer: COMMERCIAL

## 2024-01-17 ENCOUNTER — HOSPITAL ENCOUNTER (OUTPATIENT)
Dept: CT IMAGING | Facility: CLINIC | Age: 64
Discharge: HOME | End: 2024-01-17
Attending: NURSE PRACTITIONER | Admitting: NURSE PRACTITIONER
Payer: COMMERCIAL

## 2024-01-17 DIAGNOSIS — Z87.891 PERSONAL HISTORY OF TOBACCO USE: ICD-10-CM

## 2024-01-17 PROCEDURE — 71271 CT THORAX LUNG CANCER SCR C-: CPT

## 2024-01-23 ASSESSMENT — PATIENT HEALTH QUESTIONNAIRE - PHQ9
SUM OF ALL RESPONSES TO PHQ QUESTIONS 1-9: 22
10. IF YOU CHECKED OFF ANY PROBLEMS, HOW DIFFICULT HAVE THESE PROBLEMS MADE IT FOR YOU TO DO YOUR WORK, TAKE CARE OF THINGS AT HOME, OR GET ALONG WITH OTHER PEOPLE: VERY DIFFICULT
SUM OF ALL RESPONSES TO PHQ QUESTIONS 1-9: 22

## 2024-01-24 ENCOUNTER — OFFICE VISIT (OUTPATIENT)
Dept: FAMILY MEDICINE | Facility: CLINIC | Age: 64
End: 2024-01-24
Payer: COMMERCIAL

## 2024-01-24 VITALS
SYSTOLIC BLOOD PRESSURE: 131 MMHG | HEART RATE: 77 BPM | TEMPERATURE: 97 F | WEIGHT: 133.1 LBS | BODY MASS INDEX: 22.17 KG/M2 | RESPIRATION RATE: 16 BRPM | HEIGHT: 65 IN | DIASTOLIC BLOOD PRESSURE: 67 MMHG | OXYGEN SATURATION: 97 %

## 2024-01-24 DIAGNOSIS — Z86.59 HISTORY OF SUICIDAL IDEATION: ICD-10-CM

## 2024-01-24 DIAGNOSIS — Z72.0 TOBACCO ABUSE: ICD-10-CM

## 2024-01-24 DIAGNOSIS — G43.019 COMMON MIGRAINE WITH INTRACTABLE MIGRAINE: Primary | ICD-10-CM

## 2024-01-24 PROCEDURE — 99214 OFFICE O/P EST MOD 30 MIN: CPT | Mod: 25

## 2024-01-24 PROCEDURE — 96372 THER/PROPH/DIAG INJ SC/IM: CPT

## 2024-01-24 RX ORDER — RESPIRATORY SYNCYTIAL VIRUS VACCINE 120MCG/0.5
0.5 KIT INTRAMUSCULAR ONCE
Qty: 1 EACH | Refills: 0 | Status: CANCELLED | OUTPATIENT
Start: 2024-01-24 | End: 2024-01-24

## 2024-01-24 RX ORDER — VARENICLINE TARTRATE 1 MG/1
1 TABLET, FILM COATED ORAL 2 TIMES DAILY
Qty: 180 TABLET | Refills: 0 | Status: SHIPPED | OUTPATIENT
Start: 2024-01-24 | End: 2024-05-20

## 2024-01-24 RX ORDER — KETOROLAC TROMETHAMINE 30 MG/ML
60 INJECTION, SOLUTION INTRAMUSCULAR; INTRAVENOUS ONCE
Status: COMPLETED | OUTPATIENT
Start: 2024-01-24 | End: 2024-01-24

## 2024-01-24 RX ADMIN — KETOROLAC TROMETHAMINE 60 MG: 30 INJECTION, SOLUTION INTRAMUSCULAR; INTRAVENOUS at 09:19

## 2024-01-24 ASSESSMENT — COLUMBIA-SUICIDE SEVERITY RATING SCALE - C-SSRS
2. IN THE PAST MONTH, HAVE YOU ACTUALLY HAD ANY THOUGHTS OF KILLING YOURSELF?: YES
3. IN THE PAST MONTH, HAVE YOU BEEN THINKING ABOUT HOW YOU MIGHT KILL YOURSELF?: NO
5. IN THE PAST MONTH, HAVE YOU STARTED TO WORK OUT OR WORKED OUT THE DETAILS OF HOW TO KILL YOURSELF? DO YOU INTEND TO CARRY OUT THIS PLAN?: NO
6. HAVE YOU EVER DONE ANYTHING, STARTED TO DO ANYTHING, OR PREPARED TO DO ANYTHING TO END YOUR LIFE?: NO
5. IN THE PAST MONTH, HAVE YOU STARTED TO WORK OUT OR WORKED OUT THE DETAILS OF HOW TO KILL YOURSELF? DO YOU INTEND TO CARRY OUT THIS PLAN?: NO
1. WITHIN THE PAST MONTH, HAVE YOU WISHED YOU WERE DEAD OR WISHED YOU COULD GO TO SLEEP AND NOT WAKE UP?: YES

## 2024-01-24 NOTE — PROGRESS NOTES
Assessment & Plan     (G43.019) Common migraine with intractable migraine  (primary encounter diagnosis)  Comment: Intractable.  1 week of migraine headache with typical presentation.  No alarm signs or red flags that require immediate need for imaging or immediate referral to the emergency department.  No neurological changes noted.  Typical presentation for patient.  Will give Toradol today in clinic, and encourage patient to take a dose of Compazine when she got home.  Educated on signs and symptoms that require close monitoring, and when patient would need to seek immediate medical attention.  Patient and spouse attested to understanding.  Consider repeating Toradol dose if symptoms do not resolve with this plan of care.  Plan: ketorolac (TORADOL) injection 60 mg  Dose of Compazine at home      (Z86.59) History of suicidal ideation  Comment: Present.  Patient declined further discussion regarding suicidal ideation.  She is in touch with frequent contact with psychiatry, is taking her mental health medications daily as prescribed.  Reports that she does not have the crisis hotline numbers readily available.  Patient is accompanied by her spouse, and declines need for further resources.  Patient is not immediately having suicidal ideation or plan in the clinic.  She notes that these thoughts and feelings wax and wane.  Discussed in depth and reiterated a number of times steps of care to utilize if suicidal ideation became present, if she noted that there is a plan in place, or thoughts of self-harm became prominent.  Suicide hotline number reiterated including number that she contacts rather than call.  Reiterated the second time that patient was not arrested and further discussion or resources today.  Patient verbalized that she was not interested in further resources or discussion today.  Suicide crisis safety plan attached after visit summary in my chart, hardcopy printed and given to patient prior to  discharge today.  Plan: Continue to follow-up with psychiatry for further assessment and management of concerns  Utilize crisis hotline as often as necessary with increased episodes of mental health, or for real-time discussion of mental health concerns    Prescription drug management  I spent a total of 27 minutes on the day of the visit.   Time spent by me doing chart review, history and exam, documentation and further activities per the note      Depression Screening Follow Up        1/23/2024     9:05 PM   PHQ   PHQ-9 Total Score 22   Q9: Thoughts of better off dead/self-harm past 2 weeks Several days   F/U: Thoughts of suicide or self-harm Yes   F/U: Self harm-plan No   F/U: Self-harm action No   F/U: Safety concerns No         1/23/2024     9:05 PM   Last PHQ-9   1.  Little interest or pleasure in doing things 3   2.  Feeling down, depressed, or hopeless 3   3.  Trouble falling or staying asleep, or sleeping too much 3   4.  Feeling tired or having little energy 3   5.  Poor appetite or overeating 2   6.  Feeling bad about yourself 2   7.  Trouble concentrating 2   8.  Moving slowly or restless 3   Q9: Thoughts of better off dead/self-harm past 2 weeks 1   PHQ-9 Total Score 22   In the past two weeks have you had thoughts of suicide or self harm? Yes   Do you have concerns about your personal safety or the safety of others? No   In the past 2 weeks have you thought about a plan or had intention to harm yourself? No   In the past 2 weeks have you acted on these thoughts in any way? No             1/24/2024     9:18 AM   [facility] ([facility])   Within the last month, have you wished you were dead or wished you could go to sleep and not wake up? Yes   Within the last month, have you had any actual thoughts of killing yourself? Yes   Within the last month, have you been thinking about how you might do this? No   Within the last month, have you had these thoughts and had some intention of acting on them? No    Within the last month, have you started to work out or worked out the details of how to kill yourself with the intent to carry out this plan? No   Within the last month, have you ever done anything, started to do anything, or prepared to do anything to end your life? No       Follow Up        Follow Up Actions Taken  Crisis resource information provided in the After Visit Summary  Referred patient back to mental health provider  Patient to follow up with PCP.  Clinic staff to schedule appointment if able.  Patient declined referral.  Patient declined for further referral or resources.  Declined further discussion regarding suicidal ideation.    Discussed the following ways the patient can remain in a safe environment:   Declined discussion regarding suicidal ideation.  Crisis resources offered, discussed in depth the 24/7 availability of the crisis hotline, and continue to encourage patient to utilize this resource if any plans became apparent to her about self-harm or suicidal action.  FUTURE APPOINTMENTS:       - Follow-up visit in 1 to 2 days if symptoms worsen or do not improve       - Follow-up for annual visit or as needed  Patient Instructions   Seems that you are presenting with a common migraine episode that is standard to your ongoing symptoms.  Will do a Toradol injection in the clinic today to help to acutely manage her migraine symptoms.  I would also encourage you to take a dose of your Compazine once you are home to manage the nausea.    This is unsuccessful to manage her symptoms I would encourage you to follow-up in the clinic for further management.    Please seek immediate medical attention with symptoms including high-grade fever, chest pain, shortness of breath, changes in neurological status such as blurry vision, double vision, changes in your hearing, or changes in your sensation or strength.  Please seek immediate attention for symptoms including thunderclap like headache, headache that  wakes you from your sleep, headache accompanied by fever, headache that worsens when laying flat, or headache that seems like the worst headache you have ever had in your life, and has not been well-managed by outpatient treatment plans.    I have provided the suicide crisis information to your after visit summary.  I am concerned about your reports of suicidal ideation and thoughts of self-harm.  I would encourage you to continue to follow-up with your psychiatrist, continue to take your mental health medications, and utilize the crisis resources as often as necessary.  Please seek immediate medical attention with thoughts of self-harm, or a plan in place for suicidal activity or self-harm actions.    Suzie Anne is a 63 year old, presenting for the following health issues:  office visit  and Recheck Medication (Pt is here for office visit )      1/24/2024     8:45 AM   Additional Questions   Roomed by ana   Accompanied by chase Cerdae is a 63-year-old female with an ongoing past medical history of migraines with frequent intractable episodes.  Patient has been experiencing these for years, and generally gets a migraine about once a month that last approximately 1 week.  She then generally has about 3 to 4 days without any migraine headache, and repeat the second shorter episode of migraine headache that lasted about 3 to 4 days.  She is generally pain-free between these times.  Patient's management plan has escalated significantly over the years, as many treatment options have not been successful for her.  She generally begin by utilizing Excedrin Migraine, and Replax as abortive therapy.  She is also added on Aimovig injection, which she gets monthly.  As these seem to have plateaued in her management, her prescribing provider has also added on recommendation for Botox injections, which she will get in the coming weeks.  Patient reports that her headache began about 1 week ago, and this is a  typical presentation for her without any abnormal findings.  Headache is bilateral in her frontal lobe, is throbbing and constant, and creates phonophobia, photophobia, and nausea.  Patient reports that she has not actually vomited.  She has tried her normal abortive mechanisms, which have not been helpful to manage her symptoms.  Patient reports that when this occurs she generally comes into the clinic for a Toradol shot, and that paired with a dose of Compazine is generally helpful to reduce her headache.  She oftentimes has to come in the next day for repeat shot, but this course of treatment is almost always successful.  She has not had a Toradol shot for this course of care since March 2023.  She declines any chest pain, shortness of breath, blurry or double vision, changes in her hearing, loss of sensation in her extremities, weakness in her extremities, word finding difficulty, or changes in cognition.    Of note, patient does have a standing history of depression with suicidal ideation.  Patient reports that suicidal thoughts are present, but she notes that she does not have a current plan in place for self-harm or suicidal action.  She reports that she is currently seen by psychology, and is taking her mental health medications including Wellbutrin, Prozac, and as needed Atarax.  She reports that she recently changed psychiatrists, as her previous psychiatrist had left.  She reports that she likes her new psychiatrist, and she feels that these visits are going well.  When asked if patient wanted to discuss further the suicidal thoughts and ideations, she declined further conversation.  She also declined the need for any additional resources or management with her mental health.  She reported that she did not have the crisis hotline number available to her immediately.  Reiterated that this would be provided to her at this visit.  History of Present Illness       Headaches:   Since the patient's last clinic  "visit, headaches are: worsened  The patient is getting headaches:  Almost daily  She is not able to do normal daily activities when she has a migraine.  The patient is taking the following rescue/relief medications:  Tylenol, Excedrin and Relpax   Patient states \"The relief is inconsistent\" from the rescue/relief medications.   The patient is taking the following medications to prevent migraines:  Other  In the past 4 weeks, the patient has gone to an Urgent Care or Emergency Room 0 times times due to headaches.    She eats 0-1 servings of fruits and vegetables daily.She consumes 3 sweetened beverage(s) daily.She exercises with enough effort to increase her heart rate 9 or less minutes per day.  She exercises with enough effort to increase her heart rate 3 or less days per week.   She is not taking prescribed medications regularly due to side effects and remembering to take.             Review of Systems  Constitutional, HEENT, cardiovascular, pulmonary, gi and gu systems are negative, except as otherwise noted.      Objective    /67 (BP Location: Left arm, Patient Position: Sitting, Cuff Size: Adult Regular)   Pulse 77   Temp 97  F (36.1  C) (Tympanic)   Resp 16   Ht 1.64 m (5' 4.57\")   Wt 60.4 kg (133 lb 1.6 oz)   LMP 06/01/1983   SpO2 97%   BMI 22.45 kg/m    Body mass index is 22.45 kg/m .  Physical Exam   GENERAL: healthy, alert and no distress  NECK: no adenopathy, no asymmetry, masses, or scars  RESP: Easy rate, depth, and effort of breathing.  No visible use of accessory muscles.  No rapid respiratory rate or increased work of breathing appreciated.  CV: peripheral edema, clubbing, or cyanosis  MS: no gross musculoskeletal defects noted, no edema  NEURO: Normal strength and tone, mentation intact and speech normal. CN II-XII grossly intact. RADHA Stevens DNP FNP-C  Family Nurse Practitioner - Same Day Provider  [facility]          Signed Electronically by: " KARLOS Rodriguez CNP

## 2024-01-24 NOTE — PATIENT INSTRUCTIONS
Seems that you are presenting with a common migraine episode that is standard to your ongoing symptoms.  Will do a Toradol injection in the clinic today to help to acutely manage her migraine symptoms.  I would also encourage you to take a dose of your Compazine once you are home to manage the nausea.    This is unsuccessful to manage her symptoms I would encourage you to follow-up in the clinic for further management.    Please seek immediate medical attention with symptoms including high-grade fever, chest pain, shortness of breath, changes in neurological status such as blurry vision, double vision, changes in your hearing, or changes in your sensation or strength.  Please seek immediate attention for symptoms including thunderclap like headache, headache that wakes you from your sleep, headache accompanied by fever, headache that worsens when laying flat, or headache that seems like the worst headache you have ever had in your life, and has not been well-managed by outpatient treatment plans.    I have provided the suicide crisis information to your after visit summary.  I am concerned about your reports of suicidal ideation and thoughts of self-harm.  I would encourage you to continue to follow-up with your psychiatrist, continue to take your mental health medications, and utilize the crisis resources as often as necessary.  Please seek immediate medical attention with thoughts of self-harm, or a plan in place for suicidal activity or self-harm actions.

## 2024-01-29 ENCOUNTER — PATIENT OUTREACH (OUTPATIENT)
Dept: CARE COORDINATION | Facility: CLINIC | Age: 64
End: 2024-01-29
Payer: COMMERCIAL

## 2024-01-30 ENCOUNTER — OFFICE VISIT (OUTPATIENT)
Dept: NEUROLOGY | Facility: CLINIC | Age: 64
End: 2024-01-30
Payer: COMMERCIAL

## 2024-01-30 VITALS
WEIGHT: 133 LBS | DIASTOLIC BLOOD PRESSURE: 56 MMHG | HEART RATE: 76 BPM | HEIGHT: 64 IN | SYSTOLIC BLOOD PRESSURE: 105 MMHG | BODY MASS INDEX: 22.71 KG/M2

## 2024-01-30 DIAGNOSIS — G43.019 COMMON MIGRAINE WITH INTRACTABLE MIGRAINE: Primary | ICD-10-CM

## 2024-01-30 DIAGNOSIS — G43.019 MIGRAINE WITHOUT AURA, INTRACTABLE, WITHOUT STATUS MIGRAINOSUS: ICD-10-CM

## 2024-01-30 PROCEDURE — 64615 CHEMODENERV MUSC MIGRAINE: CPT | Performed by: PSYCHIATRY & NEUROLOGY

## 2024-01-30 RX ORDER — BUPRENORPHINE 5 UG/H
1 PATCH TRANSDERMAL WEEKLY
COMMUNITY
Start: 2024-01-12 | End: 2024-02-13

## 2024-01-30 NOTE — PROGRESS NOTES
Subjective     CC: Dayana Samaniego  is a 63 year old female who presents to clinic today for the following health issues:   Chief Complaint   Patient presents with    Botox      In person evaluation    HPI  3/16/2020, in person evaluation  11/30/2021, in person evaluation  12/27/2023, in person visit  1/30/2024, Botox injection    63-year-old followed neurologically for:  Intractable migraine headaches  History of 3 cervical fusions  Longstanding generalized hyperreflexia  History of carotid bruits  Fibromyalgia      Follow-up for intractable migraine headaches failed multiple medications as preventatives and abortive's  Has been treated with Aimovig  Follows at spine clinic and with other neurologist for her neck and low back difficulties status post surgery cervical and lumbar    Migraine headaches at least 15/month  4 headaches per week  Excedrin 4/week  Drink some Pepsi  Visual blurriness with a headache  Nausea but no vomiting  Relpax gives her nausea also although she does have Compazine through her primary to take with that but she waits until the headache is been there for a long time before using it    Previously used Botox but it lost its effectiveness  Off Botox injections for a while  Last use may be 2019.    Aimovig seems to be losing some of its benefit.    Patient has gone back on the Botox injections which were restarted 1/30/2024            Neurologic summary:    A.  Patient with intractable migraine headaches       Onset of headaches as far back as 1987       Associated with photophobia/phonophobia and visual changes       History of carsickness       Family history of migraines in her daughter    Previous frequency of headaches are 2-3/week  Had used Botox injections up until March 2020  Switch to Aimovig end of March 2020      Patient uses the once a month injectable Aimovig  She gets at least 2 weeks of good response  Then the headaches seem to increase in frequency  During the good phase she  has 1-2 headaches per week  Duration of the headache during the good phases all day  Will use an Excedrin Migraine maybe 4/week during that time warned her about medication rebound headache  Uses the Relpax to break the headache                                11/2021 12/2023  Frequency           1-2/week               4/week    Duration              24 hours                 24 hours    Excedrin              4 /week                  4/week    B.  Also has sharp shooting pain above the left eye lasting 10 minutes at a time jabbing in nature more neuralgic-like    C.  Patient does have bipolar disorder is on Lamictal    D.  Past history of cervical surgeries       Chronic hyperreflexia        Has been treated with Zanaflex as a muscle relaxant    E.  Has  COPD and is on inhalers       Not a good candidate for beta-blockers     F.  Since last seen had lumbar canal surgery 3/17/2021 by Dr. Sanon        Complex surgery see official reports        Anterior discectomy fusion L4-5 and L5-S1        PEEK cage L4-5, L5-S1        Posterior fusion L4-5, L5-S1        Insertion pedicle screws maria e fixation L4, 5, S1        Bilateral hemilaminectomies L4 see official report    Patient fell when she was in rehab and had a sacral fracture 3/22/2021  This slow down healing    She has been left with a lot of low back pain  Weakness more in the right leg than the left  She is always had very brisk reflexes with clonus before  As decreased reflexes at the L4 on the right leg    Is now ambulating without a cane or walker but has them at home    Patient states that there is a concern that the grafts that they put in may not be healing as well and causing some of the pain but there is no new impingement  Did undergone an EMG 6/8/2021 at [facility]  At see neurology 9/23/2021 may be at [facility]  I can access some of the record but not all of it  She says that she has some residual nerve damage but no new impingement    I feel  she should follow closely with her spine specialist and that has access to all her scans and data    Previously worked up by the spine specialist who did her surgery had other neurologist and EMGs performed 2021 which I do not have full access to  Follows at [facility] neurology for spine difficulty  (Chronic cervical pain/calf cramps.)  MRI C-spine 2023 postop changes C3-C7, disc osteophyte similar to past scans per their report.  Last seen 2023      Past medical history  Intractable migraine headaches  Chronic daily headaches  Status post cervical fusion x3  Longstanding hyperreflexia and difficulty with imbalance and falls  Carotid artery bruits  Fibromyalgia/chronic pain  COPD.  Dyspnea on exertion with pulmonary deconditioning with past history of pulmonary nodules.   Right atrial enlargement.  Fatigue with possible obstructive sleep apnea.  Bipolar disorder.  Hypothyroidism.   GERD.   Hyperlipidemia.  Chronic kidney disease.      Habits  History of smoking  Alcohol 1-2/month    Family history  Mother with bone tumor,  at age 52  Father with liver disease and alcohol use,  at age 62  Sister with diabetes high blood pressure and hypercholesterolemia  Maternal aunt diabetes  Nephew with depression and suicidal thoughts  Uncle with heart disease  Daughters with migraine        Past work-up review  EEG 2011 background rhythm 7-9 Hz slightly slow nonspecific consistent with toxic encephalopathy  MRI scan brain 2016  A.  Small T2 hyperintensity changes nonspecific  B.  No hydrocephalus mass or acute changes  TSH 0.32.   Vitamin D level in the past was low at 26.2.  MRI C-spine 2023 see official report  1.  Postoperative changes from C3 through C7.  2.  Degenerative change at C7-T1 see official report.  3.  Moderate spinal stenosis slight flattening anterior cord similar to prior exam C7-T1        Migraine treatment review    Some medications that have been used in  the past as preventatives have included:  1. Topamax.  2. Verapamil.  3. Lyrica.  4. Amitriptyline.  5. Propranolol.    Medications tried in the past more as abortive agents:  a. Tizanidine.  b. Zomig.  c. Vicodin.  d. Relpax, which works sometimes, but not always.  Filled by primary MD  e. Toradol.    Currently, her medications are filled through her primary includin. Valium for anxiety.  2. Doxepin 25 mg three at bedtime.  3. Prozac 60 mg at bedtime.  4. Vistaril 25 mg two tablets p.o. q.6h. p.r.n. for migraine.  5. Avoids ibuprofen and aspirin due to her kidney disease, but recently had some Toradol.  6. Lamictal 200 mg at bedtime for bipolar.  7. Melatonin 5 mg for sleep.  8. Has had prednisone burst during headache flurries in the past.  9. Tizanidine 4 mg tablet, one during the day and two at night prescribed by primary.    Patient is a nonsmoker, does not drink alcohol.    Patient s past Botox injection schedules have included:  a. 2016, at the [facility], 25-30 units, did not tolerate, did not complete.  b. Second injection at the [facility] on 2017, 150 units given.    Through our clinic a 155 units using the headache template. Patient knows the risk and benefits of the medication:  a. 2017.  b. 2017.  c. 2018.  d. 2018, 200 units, given as she had a lot of neck spasm.  e. 2018, total of 200 units given as she has a lot of neck spasm.  f. 2019, 200 units given as she had a lot of neck spasm.  g. Halima 10, 2019 200 units headache template and some for neck spasm  h. 2019 155 units  i Dec 16th, 2019 155 units    Botox injections restarted  Botox 155 units injected using headache template patient tolerates injections.  1.  2024, 155 units injected        Laboratory data review                               2023  NA/K                   139/3.9  BUN/Cr               17/1.04  GLU                      92  AST                     27  WBC/HGB           7.9/14.6  PLTs                     284,000  ESR/CRP             8/ <0.1              Review of Systems     Significant headaches as above  Sometimes gets a sharp shooting pain more so in the left eye than the right varies seems to be the beginning of a migraine    No visual changes   No dysarthria dysphasia or diplopia  No actual visual field cut  Does have visual blurriness with a headache  Gets nausea with the headache  Has spasms in the neck trapezius chronically    Does get photophobia when the headaches are worse    No chest pain no shortness of breath no nausea vomiting no diarrhea no fever chills no gait changes otherwise review systems are negative     Does have the pain in the back and weakness of the right leg greater than the left but can ambulate    General exam  Alert oriented x 3  Vital signs Per chart  Lungs clear  Heart rate regular  Abdomen soft  Symmetrical pulses  No edema the feet      Neurologic exam   Alert oriented x3  No aphasia  No neglect  Normal memory recall    Cranials 2 through 12 normal  Pupils are symmetrical and reactive  Optic fundi are good  Visual fields are intact  No ophthalmoplegia  No nystagmus  Face is symmetrical tongue twisters are good    Upper extremities  No drift no tremor normal finger-nose    Brisk reflexes in the lower extremities greater on the left than the right except decreased right knee reflex    Lower extremities  Pain versus weakness of the right iliopsoas but can bear weight okay and walk independently    Gait adequate        Assessment/Plan     1.  Chronic migraine without aura, intractable, without status migrainosus (G43.719)     2.  Started Aimovig March/April 2020       Aimovig 70mg/ml  Subcutaneous, Qmonth       Does seem to be helping the migraines    3.  Status post complex surgery on her lumbar spine March 17, 2021 with residual back pain and residual weakness of the right leg    4.  Sacral fracture  3/21/2021 while she was in rehab for her lumbar surgery    Previously worked up by the spine specialist who did her surgery had other neurologist and EMGs performed June 2021 which I do not have full access to  Follows at [facility] neurology for spine difficulty  (Chronic cervical pain/calf cramps.)  MRI C-spine 2/6/2023 postop changes C3-C7, disc osteophyte similar to past scans per their report.  Last seen 5/24/2023 by her spine neurologist.    No new recommendations in regards to her back or spine difficulty  Treatment of pain for her spine per other neurologist and spine specialist in regards to the back    Intractable migraine headache  Has failed multiple preventative therapies  Has been a while since she has been on the Botox  We will try to get Botox approved again and give this another try    Has been on Aimovig which may be losing its effectiveness  Sometimes rotating preventative medicines is helpful  She is trying to keep the Excedrin to 4/week to avoid medication rebound headache    Restarted Botox injection:  155 units per template patient tolerates the Botox injections  Has found that the Botox injections decrease the severity more so than the frequency  Patient tolerates the Botox injections  1/30/2024, 155 units    Botox injections restarted  Botox 155 units injected using headache template patient tolerates injections.  1.  1/30/2024, 155 units injected        She gets most of her meds either through the pain clinic and or primary    Patient on narcotics and gabapentin through the pain clinic  Has been given a steroid burst and had steroid injections for her back        Units Injected: 155  Units Discarded: 45   Lot Number and Expiration:  [id number], expiration 06/2026  NDC number [id number]      DUNCAN QUAN

## 2024-01-30 NOTE — LETTER
1/30/2024         RE: Dayana Samaniego  [id number] [address] [place] [id number]        Dear Colleague,    Thank you for referring your patient, Dayana Samaniego, to the [facility] [place]. Please see a copy of my visit note below.    Subjective    CC: Dayana Samaniego  is a 63 year old female who presents to clinic today for the following health issues:   Chief Complaint   Patient presents with     Botox      In person evaluation    HPI  3/16/2020, in person evaluation  11/30/2021, in person evaluation  12/27/2023, in person visit  1/30/2024, Botox injection    63-year-old followed neurologically for:  Intractable migraine headaches  History of 3 cervical fusions  Longstanding generalized hyperreflexia  History of carotid bruits  Fibromyalgia    Last seen 2 years ago  Follow-up for intractable migraine headaches failed multiple medications as preventatives and abortive's  Has been treated with Aimovig  Follows at spine clinic and with other neurologist for her neck and low back difficulties status post surgery cervical and lumbar    Migraine headaches at least 15/month  4 headaches per week  Excedrin 4/week  Drink some Pepsi  Visual blurriness with a headache  Nausea but no vomiting  Relpax gives her nausea also although she does have Compazine through her primary to take with that but she waits until the headache is been there for a long time before using it    Previously used Botox but it lost its effectiveness  Last use may be 3 to 4 years ago  Aimovig seems to be losing some of its benefit    She was willing to go back to the Botox    If her insurance approves we could overlap them which would be the best as stopping the Aimovig abruptly could cause things to flareup and make it harder for the Botox to work          Neurologic summary:    A.  Patient with intractable migraine headaches       Onset of headaches as far back as 1987       Associated with photophobia/phonophobia and  visual changes       History of carsickness       Family history of migraines in her daughter    Previous frequency of headaches are 2-3/week  Had used Botox injections up until March 2020  Switch to Aimovig end of March 2020      Patient uses the once a month injectable Aimovig  She gets at least 2 weeks of good response  Then the headaches seem to increase in frequency  During the good phase she has 1-2 headaches per week  Duration of the headache during the good phases all day  Will use an Excedrin Migraine maybe 4/week during that time warned her about medication rebound headache  Uses the Relpax to break the headache                                11/2021 12/2023  Frequency           1-2/week               4/week    Duration              24 hours                 24 hours    Excedrin              4 /week                  4/week    B.  Also has sharp shooting pain above the left eye lasting 10 minutes at a time jabbing in nature more neuralgic-like    C.  Patient does have bipolar disorder is on Lamictal    D.  Past history of cervical surgeries       Chronic hyperreflexia        Has been treated with Zanaflex as a muscle relaxant    E.  Has  COPD and is on inhalers       Not a good candidate for beta-blockers     F.  Since last seen had lumbar canal surgery 3/17/2021 by Dr. Sanon        Complex surgery see official reports        Anterior discectomy fusion L4-5 and L5-S1        PEEK cage L4-5, L5-S1        Posterior fusion L4-5, L5-S1        Insertion pedicle screws maria e fixation L4, 5, S1        Bilateral hemilaminectomies L4 see official report    Patient fell when she was in rehab and had a sacral fracture 3/22/2021  This slow down healing    She has been left with a lot of low back pain  Weakness more in the right leg than the left  She is always had very brisk reflexes with clonus before  As decreased reflexes at the L4 on the right leg    Is now ambulating without a cane or walker but has  them at home    Patient states that there is a concern that the grafts that they put in may not be healing as well and causing some of the pain but there is no new impingement  Did undergone an EMG 2021 at [facility]  At see neurology 2021 may be at [facility]  I can access some of the record but not all of it  She says that she has some residual nerve damage but no new impingement    I feel she should follow closely with her spine specialist and that has access to all her scans and data    Previously worked up by the spine specialist who did her surgery had other neurologist and EMGs performed 2021 which I do not have full access to  Follows at [facility] neurology for spine difficulty  (Chronic cervical pain/calf cramps.)  MRI C-spine 2023 postop changes C3-C7, disc osteophyte similar to past scans per their report.  Last seen 2023      Past medical history  Intractable migraine headaches  Chronic daily headaches  Status post cervical fusion x3  Longstanding hyperreflexia and difficulty with imbalance and falls  Carotid artery bruits  Fibromyalgia/chronic pain  COPD.  Dyspnea on exertion with pulmonary deconditioning with past history of pulmonary nodules.   Right atrial enlargement.  Fatigue with possible obstructive sleep apnea.  Bipolar disorder.  Hypothyroidism.   GERD.   Hyperlipidemia.  Chronic kidney disease.      Habits  History of smoking  Alcohol 1-2/month    Family history  Mother with bone tumor,  at age 52  Father with liver disease and alcohol use,  at age 62  Sister with diabetes high blood pressure and hypercholesterolemia  Maternal aunt diabetes  Nephew with depression and suicidal thoughts  Uncle with heart disease  Daughters with migraine        Past work-up review  EEG 2011 background rhythm 7-9 Hz slightly slow nonspecific consistent with toxic encephalopathy  MRI scan brain 2016  A.  Small T2 hyperintensity changes nonspecific  B.  No  hydrocephalus mass or acute changes  TSH 0.32.   Vitamin D level in the past was low at 26.2.  MRI C-spine 2023 see official report  1.  Postoperative changes from C3 through C7.  2.  Degenerative change at C7-T1 see official report.  3.  Moderate spinal stenosis slight flattening anterior cord similar to prior exam C7-T1        Migraine treatment review    Some medications that have been used in the past as preventatives have included:  1. Topamax.  2. Verapamil.  3. Lyrica.  4. Amitriptyline.  5. Propranolol.    Medications tried in the past more as abortive agents:  a. Tizanidine.  b. Zomig.  c. Vicodin.  d. Relpax, which works sometimes, but not always.  Filled by primary MD  e. Toradol.    Currently, her medications are filled through her primary includin. Valium for anxiety.  2. Doxepin 25 mg three at bedtime.  3. Prozac 60 mg at bedtime.  4. Vistaril 25 mg two tablets p.o. q.6h. p.r.n. for migraine.  5. Avoids ibuprofen and aspirin due to her kidney disease, but recently had some Toradol.  6. Lamictal 200 mg at bedtime for bipolar.  7. Melatonin 5 mg for sleep.  8. Has had prednisone burst during headache flurries in the past.  9. Tizanidine 4 mg tablet, one during the day and two at night prescribed by primary.    Patient is a nonsmoker, does not drink alcohol.    Patient s past Botox injection schedules have included:  a. 2016, at the [facility], 25-30 units, did not tolerate, did not complete.  b. Second injection at the [facility] on 2017, 150 units given.    Through our clinic a 155 units using the headache template. Patient knows the risk and benefits of the medication:  a. 2017.  b. 2017.  c. 2018.  d. 2018, 200 units, given as she had a lot of neck spasm.  e. 2018, total of 200 units given as she has a lot of neck spasm.  f. 2019, 200 units given as she had a lot of neck spasm.  g. Halima 10, 2019 200  units headache template and some for neck spasm  h. sept 13th, 2019 155 units  i Dec 16th, 2019 155 units    Laboratory data review                               8/2023  NA/K                   139/3.9  BUN/Cr               17/1.04  GLU                     92  AST                     27  WBC/HGB           7.9/14.6  PLTs                     284,000  ESR/CRP             8/ <0.1              Review of Systems     Significant headaches as above  Sometimes gets a sharp shooting pain more so in the left eye than the right varies seems to be the beginning of a migraine    No visual changes   No dysarthria dysphasia or diplopia  No actual visual field cut  Does have visual blurriness with a headache  Gets nausea with the headache  Has spasms in the neck trapezius chronically    Does get photophobia when the headaches are worse    No chest pain no shortness of breath no nausea vomiting no diarrhea no fever chills no gait changes otherwise review systems are negative     Does have the pain in the back and weakness of the right leg greater than the left but can ambulate    General exam  Alert oriented x 3  Vital signs Per chart  Lungs clear  Heart rate regular  Abdomen soft  Symmetrical pulses  No edema the feet      Neurologic exam   Alert oriented x3  No aphasia  No neglect  Normal memory recall    Cranials 2 through 12 normal  Pupils are symmetrical and reactive  Optic fundi are good  Visual fields are intact  No ophthalmoplegia  No nystagmus  Face is symmetrical tongue twisters are good    Upper extremities  No drift no tremor normal finger-nose    Brisk reflexes in the lower extremities greater on the left than the right except decreased right knee reflex    Lower extremities  Pain versus weakness of the right iliopsoas but can bear weight okay and walk independently    Gait adequate        Assessment/Plan     1.  Chronic migraine without aura, intractable, without status migrainosus (G43.719)     2.  Started Aimovig  March/April 2020       Aimovig 70mg/ml  Subcutaneous, Qmonth       Does seem to be helping the migraines    3.  Status post complex surgery on her lumbar spine March 17, 2021 with residual back pain and residual weakness of the right leg    4.  Sacral fracture 3/21/2021 while she was in rehab for her lumbar surgery    Previously worked up by the spine specialist who did her surgery had other neurologist and EMGs performed June 2021 which I do not have full access to  Follows at [facility] neurology for spine difficulty  (Chronic cervical pain/calf cramps.)  MRI C-spine 2/6/2023 postop changes C3-C7, disc osteophyte similar to past scans per their report.  Last seen 5/24/2023 by her spine neurologist.    No new recommendations in regards to her back or spine difficulty  Treatment of pain for her spine per other neurologist and spine specialist in regards to the back    Intractable migraine headache  Has failed multiple preventative therapies  Has been a while since she has been on the Botox  We will try to get Botox approved again and give this another try    Has been on Aimovig which may be losing its effectiveness  Sometimes rotating preventative medicines is helpful  She is trying to keep the Excedrin to 4/week to avoid medication rebound headache    Restarted Botox injection:  155 units per template patient tolerates the Botox injections  Has found that the Botox injections decrease the severity more so than the frequency  Patient tolerates the Botox injections  1/30/2024, 155 units    Will try to set up the Botox injections    She gets most of her meds either through the pain clinic and or primary    Patient on narcotics and gabapentin through the pain clinic  Has been given a steroid burst and had steroid injections for her back        Units Injected: 155  Units Discarded: 45     Lot Number and Expiration:  [id number], expiration 06/2026  NDC number [id number]      DUNCAN QUAN      Again,  thank you for allowing me to participate in the care of your patient.        Sincerely,        timbo Schmitt MD

## 2024-02-02 ENCOUNTER — TRANSFERRED RECORDS (OUTPATIENT)
Dept: HEALTH INFORMATION MANAGEMENT | Facility: CLINIC | Age: 64
End: 2024-02-02
Payer: COMMERCIAL

## 2024-02-02 DIAGNOSIS — E55.9 VITAMIN D DEFICIENCY: ICD-10-CM

## 2024-02-03 RX ORDER — ERGOCALCIFEROL 1.25 MG/1
CAPSULE, LIQUID FILLED ORAL
Qty: 13 CAPSULE | Refills: 1 | Status: SHIPPED | OUTPATIENT
Start: 2024-02-03 | End: 2024-08-28

## 2024-02-06 ENCOUNTER — TRANSFERRED RECORDS (OUTPATIENT)
Dept: HEALTH INFORMATION MANAGEMENT | Facility: CLINIC | Age: 64
End: 2024-02-06
Payer: COMMERCIAL

## 2024-02-08 ENCOUNTER — OFFICE VISIT (OUTPATIENT)
Dept: PULMONOLOGY | Facility: CLINIC | Age: 64
End: 2024-02-08
Attending: NURSE PRACTITIONER
Payer: COMMERCIAL

## 2024-02-08 VITALS
WEIGHT: 130 LBS | SYSTOLIC BLOOD PRESSURE: 132 MMHG | DIASTOLIC BLOOD PRESSURE: 68 MMHG | BODY MASS INDEX: 22.31 KG/M2 | HEART RATE: 75 BPM | OXYGEN SATURATION: 97 %

## 2024-02-08 DIAGNOSIS — R06.09 DYSPNEA ON EXERTION: ICD-10-CM

## 2024-02-08 DIAGNOSIS — J44.9 CHRONIC OBSTRUCTIVE PULMONARY DISEASE, UNSPECIFIED COPD TYPE (H): Primary | ICD-10-CM

## 2024-02-08 PROCEDURE — 99214 OFFICE O/P EST MOD 30 MIN: CPT | Performed by: NURSE PRACTITIONER

## 2024-02-08 RX ORDER — BUDESONIDE AND FORMOTEROL FUMARATE DIHYDRATE 160; 4.5 UG/1; UG/1
2 AEROSOL RESPIRATORY (INHALATION) 2 TIMES DAILY
Qty: 10.2 G | Refills: 11 | Status: SHIPPED | OUTPATIENT
Start: 2024-02-08 | End: 2024-08-13 | Stop reason: ALTCHOICE

## 2024-02-08 RX ORDER — IPRATROPIUM BROMIDE AND ALBUTEROL SULFATE 2.5; .5 MG/3ML; MG/3ML
1 SOLUTION RESPIRATORY (INHALATION) EVERY 6 HOURS PRN
Qty: 180 ML | Refills: 4 | Status: SHIPPED | OUTPATIENT
Start: 2024-02-08

## 2024-02-08 NOTE — PROGRESS NOTES
Pulmonary Clinic Follow-up Visit  February 8, 2024      Assessment:  63 year old F with history of tobacco abuse (started smoking again), asthma and emphysema as well as very small lung nodule. PFTs show her FEV1 is 67% and her DLCO is 65%. She is using Symbicort with good control but has noted ongoing mild productive cough.  She admits she has not been using her nebulizer treatments.  Recent CT scan was negative for new pulmonary nodules but did show some airway thickening and mucous, possible chronic bronchitis.  Cardiology did a coronary angio that was unremarkable. ENT did not find significant sinus issues but it was found that she had a hiatal hernia that was repaired on 12/30/22. She continues to have GI upset and difficulty with intake following surgery. Complains on continued daytime fatigue with poor sleep quality.  She is planning on having repeat ECT treatment in the near future, previously 10 years ago.     Plan:   Continue Symbicort. She was reminded to rinse her mouth after each use.   Could consider adding a LAMA if daily nebs do not relieve cough.   Continue duonebs 2-4 times daily prn  Consider follow up with allergy given extensive allergic rhinitis if responsive to flonase. Last seen in 10/2022.   Due for next LDCT for lung cancer screening in 01/2025.   Referral for sleep medicine placed previous visit.  She has not set up sleep study yet.   Discussed smoking cessation for 3 minutes. She continues to want to quit but feels her stress level is making it difficult     Follow up in 6 months    Action Plan if she exacerbates:  - prednisone 40mg daily x 5 days  - azithromycin, z pack x 5 days    Ana Hager CNP  Pulmonary Medicine  [facility]   [phone]      ----------------------------    CCx:   Chief Complaint   Patient presents with    Follow Up     Chronic obstructive pulmonary disease, unspecified COPD type (H)    Daytime sleepiness    Personal history of tobacco  "use    Environmental and seasonal allergies          HPI:   She was last seen in clinic in 08/2023.  Since that time she reports her breathing has been good.  She does admit to some productive cough but admits she has not been using her nebulizers as she \"forgets about them \".  Exacerbations requiring prednisone or antibiotics.  Is still smoking. Has cut down to 1/2 ppd. She is still motivated to quite but would like to wait until she starts her ECT treatments for depression.   She was switched from Trelegy to Symbicort due to difficulty of use with dry powder inhalers.   Continues to smoke, 1/2 ppd. She continues to use Chantix and feels it helps her cravings.    Denies wheeze or chest tightness throughout the day, she does note occasional chest tightness when laying down at night but this has significantly improved since surgery.    Since her Nissen fundoplication surgery, her cough has improved.   Denies reflux symptoms but is having a hard time eating since her surgery. She is eating solid food but continues to have issues with gas, belching, and bloat. She was referred to GI by gen surgery following her Nissen fundoplication. All work up has been unremarkable and she continues to struggle.     She does note drainage down the back of her throat and sinus congestion, although this seems to have improved since her last visit. She was previously given Flonase which did not seem to help.  She has seen ENT for dysphagia, CT sinus was unremarkable. she was given Astelin nasal spray which did not help. She is being seen by Dr. Medina in allergy and they were discussing possible biologics to help control some of these symptoms, IgE and allergy panels were unremarkable.      Followed by [facility] in cardiology. CT heart cath was unremarkable.     Patient supplied answers from flow sheet for:  COPD Assessment Test (CAT)  2009 [employer]. All rights reserved.      10/6/2021    11:00 AM 6/17/2022     2:00 PM 1/31/2023     8:51 PM " 8/8/2023     9:00 AM   COPD assessment test (CAT)   Cough 4 3 3 2   Phlegm 4 4 2 3   Chest tightness 4 3 3 0   Walk up hill 4 5 4 4   Limited activities 3 3 3 3   Leaving my home 1 2 2 0   Sleep 5 3 2 3   Energy 5 3 3 5   Total Score 30 26 22 20      ROS:  10-point review performed and notable for  Dyspnea, fatigue, back pain. The remainder reviewed and negative.     Current Meds:  Current Outpatient Medications   Medication Sig Dispense Refill    albuterol (PROAIR HFA) 108 (90 Base) MCG/ACT inhaler Inhale 2 puffs into the lungs every 4 hours 18 g 11    aspirin-acetaminophen-caffeine (EXCEDRIN MIGRAINE) 250-250-65 MG tablet Take 1 tablet by mouth daily as needed for headaches MR x 1      atorvastatin (LIPITOR) 80 MG tablet TAKE 1 TABLET(80 MG) BY MOUTH AT BEDTIME 90 tablet 0    benzonatate (TESSALON) 100 MG capsule TAKE 1 CAPSULE(100 MG) BY MOUTH THREE TIMES DAILY AS NEEDED FOR COUGH 30 capsule 0    budesonide-formoterol (SYMBICORT) 160-4.5 MCG/ACT Inhaler Inhale 2 puffs into the lungs 2 times daily 10.2 g 11    buprenorphine (BUTRANS) 5 MCG/HR WK patch Place 1 patch onto the skin once a week      buPROPion (WELLBUTRIN) 100 MG tablet Take 100 mg by mouth 2 times daily      doxepin (SINEQUAN) 25 MG capsule Take 75 mg by mouth At Bedtime      eletriptan (RELPAX) 40 MG tablet Take 1 tablet (40 mg) by mouth at onset of headache for migraine 12 tablet 3    erenumab-aooe (AIMOVIG) 70 MG/ML injection ADMINISTER 1 ML(70 MG) UNDER THE SKIN EVERY MONTH NEEDS TO SCHEDULE AN APPT FOR FURTHER REFILLS 1 mL 11    esomeprazole (NEXIUM) 40 MG DR capsule Take 40 mg by mouth daily      ezetimibe (ZETIA) 10 MG tablet Take 1 tablet (10 mg) by mouth daily 90 tablet 3    FLUoxetine (PROZAC) 40 MG capsule Take 80 mg by mouth daily      gabapentin (NEURONTIN) 300 MG capsule Take 600 mg by mouth 2 times daily      HYDROcodone-acetaminophen (NORCO)  MG per tablet Take 1 tablet by mouth every 4 hours as needed      hydrOXYzine (ATARAX)  25 MG tablet TAKE 2 TABLETS BY MOUTH AT BEDTIME AND 1 TABLET BY MOUTH DURING THE DAY AS NEEDED      ipratropium - albuterol 0.5 mg/2.5 mg/3 mL (DUONEB) 0.5-2.5 (3) MG/3ML neb solution USE 1 VIAL VIA NEBULIZER EVERY 6 HOURS AS NEEDED 180 mL 4    lamoTRIgine (LAMICTAL) 200 MG tablet Take 1 tablet by mouth 2 times daily      levothyroxine (SYNTHROID/LEVOTHROID) 75 MCG tablet TAKE 1 TABLET(75 MCG) BY MOUTH DAILY (Patient taking differently: Take 75 mcg by mouth every evening) 90 tablet 2    montelukast (SINGULAIR) 10 MG tablet Take 1 tablet (10 mg) by mouth At Bedtime 90 tablet 4    prazosin (MINIPRESS) 2 MG capsule Take 2 capsules by mouth At Bedtime      prochlorperazine (COMPAZINE) 10 MG tablet Take 10 mg by mouth every 6 hours as needed for nausea When having migraine      senna-docusate (SENOKOT-S/PERICOLACE) 8.6-50 MG tablet Take 2 tablets by mouth At Bedtime      tiZANidine (ZANAFLEX) 4 MG tablet TAKE 1 TO 2 TABLETS BY MOUTH DURING THE DAY AND 2 TABLETS AT BEDTIME 120 tablet 1    varenicline (CHANTIX) 1 MG tablet TAKE 1 TABLET(1 MG) BY MOUTH TWICE DAILY 180 tablet 0    vitamin D2 (ERGOCALCIFEROL) 66798 units (1250 mcg) capsule TAKE 1 CAPSULE BY MOUTH 1 TIME A WEEK 13 capsule 1    naloxone (NARCAN) 4 MG/0.1ML nasal spray          Physical Exam:  /68 (BP Location: Right arm, Patient Position: Sitting, Cuff Size: Adult Regular)   Pulse 75   Wt 59 kg (130 lb)   LMP 06/01/1983   SpO2 97%   BMI 22.31 kg/m      Physical Exam  Constitutional:       General: She is not in acute distress.     Appearance: She is not ill-appearing or diaphoretic.   HENT:      Nose: Nose normal.   Cardiovascular:      Rate and Rhythm: Normal rate and regular rhythm.      Pulses: Normal pulses.      Heart sounds: Normal heart sounds.   Pulmonary:      Effort: Pulmonary effort is normal. No respiratory distress.      Breath sounds: No wheezing or rhonchi.   Musculoskeletal:      Right lower leg: No edema.      Left lower leg: No  edema.   Skin:     General: Skin is warm and dry.      Findings: No rash.   Neurological:      Mental Status: She is alert.   Psychiatric:         Behavior: Behavior normal.       Labs:  CBC RESULTS:   Recent Labs   Lab Test 02/25/22  0256   WBC 8.1   RBC 3.55*   HGB 9.4*   HCT 30.8*   MCV 87   MCH 26.5   MCHC 30.5*   RDW 15.3*        Sodium   Date Value Ref Range Status   08/16/2023 139 136 - 145 mmol/L Final     Potassium   Date Value Ref Range Status   08/16/2023 3.9 3.5 - 5.0 mmol/L Final     Chloride   Date Value Ref Range Status   08/16/2023 102 98 - 107 mmol/L Final     Carbon Dioxide (CO2)   Date Value Ref Range Status   08/16/2023 23 22 - 31 mmol/L Final     Anion Gap   Date Value Ref Range Status   08/16/2023 14 5 - 18 mmol/L Final     Glucose   Date Value Ref Range Status   08/16/2023 92 70 - 125 mg/dL Final     Urea Nitrogen   Date Value Ref Range Status   08/16/2023 17 8 - 22 mg/dL Final     Creatinine   Date Value Ref Range Status   08/16/2023 1.04 0.60 - 1.10 mg/dL Final     GFR Estimate   Date Value Ref Range Status   08/16/2023 60 (L) >60 mL/min/1.73m2 Final   04/13/2021 >60 >60 mL/min/1.73m2 Final     GFR, ESTIMATED POCT   Date Value Ref Range Status   09/08/2022 >60 >60 mL/min/1.73m2 Final     Calcium   Date Value Ref Range Status   08/16/2023 9.3 8.5 - 10.5 mg/dL Final         Imaging studies:    LOW DOSE LUNG CANCER SCREENING CT CHEST, DATE: 1/17/2024  INDICATION: Lung cancer screening. History of smoking. High risk patient.  COMPARISON: 01/01/2023 CTA chest.  FINDINGS:  NODULES: Few stable tiny pulmonary nodules. Examples: Stable 2 mm right apex nodule (series 5, image 71). Stable 3 mm left apex nodule (image 71).  LUNGS AND PLEURA: Mild to moderate upper lobe predominant emphysema. Mild retained airway secretions.  MEDIASTINUM: No adenopathy. Nonaneurysmal aorta.                                                       IMPRESSION:  1.  Negative for lung cancer screening purposes.    XR  CHEST 2 VIEWS, DATE/TIME: 12/6/2022 11:21 AM  INDICATION:  Preop general physical exam  COMPARISON: 07/29/2022                                                                IMPRESSION: Heart size is normal. No pleural effusion, pneumothorax, or abnormal area of consolidation. Biapical pleural thickening.    CT CHEST PULMONARY EMBOLISM W CONTRAST, DATE/TIME: 2/25/2022 3:24 AM  INDICATION: PE suspected, high prob shortness of breath and left chest pain  COMPARISON: 05/19/2021   FINDINGS:  ANGIOGRAM CHEST: Pulmonary arteries are normal caliber and negative for pulmonary emboli. Thoracic aorta is negative for dissection. No CT evidence of right heart strain.   LUNGS AND PLEURA: 2 mm nodule left apex series 7 image 56, 2 mm subpleural nodule right upper lobe image 62 and 2 mm right upper lobe nodule image 54 stable. Emphysema. Basilar atelectasis.  MEDIASTINUM/AXILLAE: Small hiatal hernia. Trace amount of pericardial fluid.  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  MUSCULOSKELETAL: Breast implants.                                                              IMPRESSION:  1.  No pulmonary emboli.  2.  Emphysema.  3.  Bibasilar atelectasis.  4.  Tiny pulmonary nodule stable.  5.  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    CT CHEST PULMONARY EMBOLISM W CONTRAST, DATE/TIME: 1/1/2023 11:49 AM  INDICATION: Recent fundoplication. Chest tightness.   COMPARISON: 02/25/2022  CT. Radiograph 12/06/2022.  FINDINGS:  ANGIOGRAM CHEST: Pulmonary arteries are normal caliber and negative for pulmonary emboli. Thoracic aorta is negative for dissection.  LUNGS AND PLEURA: Small pleural effusions and dependent atelectasis. Emphysema. Mild apical scarring. New groundglass and reticular interstitial opacities in the right midlung and lingula.  There is debris in the airways to the lower lobes.  MEDIASTINUM/AXILLAE: Small air locules in the  mediastinum.                                                              IMPRESSION:  1.  No PE.  2.  New groundglass and reticular interstitial opacities in the lungs could be atypical infection. Edema is less likely.   3.  Emphysema. Small pleural effusions with atelectasis. Debris in the airways.  4.  Small air locules in the mediastinum are likely normal postsurgical change.    Coronary CT angiogram 09/2022  1.  Normal coronary CT angiography, no detectable coronary atherosclerotic plaques. Co-dominant system.  2.  Normal visualized aortic segments.  3.  No thrombus in left atrial appendage.  4.  No pericardial effusion or calcification.    XR ESOPHAGRAM, DATE/TIME: 8/17/2022 8:10 AM  INDICATION:  Dysphagia, unspecified type  COMPARISON: None.  FINDINGS:  ESOPHAGUS: Normal primary peristalsis. There are distal esophageal tertiary contraction waves.  There is a prominent cricopharyngeus muscle, and a small anterior cervical esophageal well at the same level of the cricopharyngeus. This did not impede passage of the 13 mm barium tablet.  There is a small sliding hiatal hernia with gastroesophageal reflux.  The 13 mm barium tablet became stuck at the GE junction for at least 7 minutes. This did not produce symptoms.                                                               IMPRESSION:  1.  Prominent cricopharyngeus muscle, with small anterior cervical esophageal web. This might be the cause the sensation of food sticking.  2.  Prolonged transit barium tablet at the GE junction.  3.  Small hiatal hernia.  4.  Gastroesophageal reflux.    PFT's  FEV1 1.71L 67% predicted from 1.77L  FVC 2.59L, 80% predicted from 2.54L  FEV1/FVC 66  TLC 5.28L, 103% predicted  DLCO 65% predicted  Impression: moderate obstruction without reversibility. Air trapping. Mild diffusion capacity defect. Lung Cancer Screening Shared Decision Making Visit

## 2024-02-08 NOTE — PATIENT INSTRUCTIONS
It was a pleasure seeing you in clinic today. This is what we discussed:    Continue Symbicort twice daily as you have been.   Increase the nebulizer use to see if you cough improves. If it does not let me know.   Use your albuterol every 4 hours as needed for cough, shortness of breath, wheeze, or chest tightness.   Follow up 6 months.    If you have worsening symptoms, questions, or need to speak with the nurse please call [phone].

## 2024-02-12 ENCOUNTER — PATIENT OUTREACH (OUTPATIENT)
Dept: CARE COORDINATION | Facility: CLINIC | Age: 64
End: 2024-02-12
Payer: COMMERCIAL

## 2024-02-12 DIAGNOSIS — E78.2 MIXED HYPERLIPIDEMIA: ICD-10-CM

## 2024-02-12 DIAGNOSIS — E78.5 HYPERLIPEMIA: ICD-10-CM

## 2024-02-12 RX ORDER — EZETIMIBE 10 MG/1
10 TABLET ORAL DAILY
Qty: 90 TABLET | Refills: 0 | Status: SHIPPED | OUTPATIENT
Start: 2024-02-12 | End: 2024-02-13

## 2024-02-12 ASSESSMENT — ANXIETY QUESTIONNAIRES
5. BEING SO RESTLESS THAT IT IS HARD TO SIT STILL: SEVERAL DAYS
7. FEELING AFRAID AS IF SOMETHING AWFUL MIGHT HAPPEN: SEVERAL DAYS
6. BECOMING EASILY ANNOYED OR IRRITABLE: NEARLY EVERY DAY
IF YOU CHECKED OFF ANY PROBLEMS ON THIS QUESTIONNAIRE, HOW DIFFICULT HAVE THESE PROBLEMS MADE IT FOR YOU TO DO YOUR WORK, TAKE CARE OF THINGS AT HOME, OR GET ALONG WITH OTHER PEOPLE: EXTREMELY DIFFICULT
GAD7 TOTAL SCORE: 14
8. IF YOU CHECKED OFF ANY PROBLEMS, HOW DIFFICULT HAVE THESE MADE IT FOR YOU TO DO YOUR WORK, TAKE CARE OF THINGS AT HOME, OR GET ALONG WITH OTHER PEOPLE?: EXTREMELY DIFFICULT
3. WORRYING TOO MUCH ABOUT DIFFERENT THINGS: MORE THAN HALF THE DAYS
2. NOT BEING ABLE TO STOP OR CONTROL WORRYING: MORE THAN HALF THE DAYS
GAD7 TOTAL SCORE: 14
GAD7 TOTAL SCORE: 14
4. TROUBLE RELAXING: NEARLY EVERY DAY
1. FEELING NERVOUS, ANXIOUS, OR ON EDGE: MORE THAN HALF THE DAYS
7. FEELING AFRAID AS IF SOMETHING AWFUL MIGHT HAPPEN: SEVERAL DAYS

## 2024-02-12 ASSESSMENT — PATIENT HEALTH QUESTIONNAIRE - PHQ9
10. IF YOU CHECKED OFF ANY PROBLEMS, HOW DIFFICULT HAVE THESE PROBLEMS MADE IT FOR YOU TO DO YOUR WORK, TAKE CARE OF THINGS AT HOME, OR GET ALONG WITH OTHER PEOPLE: EXTREMELY DIFFICULT
SUM OF ALL RESPONSES TO PHQ QUESTIONS 1-9: 23
SUM OF ALL RESPONSES TO PHQ QUESTIONS 1-9: 23

## 2024-02-12 NOTE — TELEPHONE ENCOUNTER
Refill granted for Zetia, but further refills to PMD unless pt wants to follow up with cardiology. -[facility] [place]

## 2024-02-13 ENCOUNTER — TRANSFERRED RECORDS (OUTPATIENT)
Dept: HEALTH INFORMATION MANAGEMENT | Facility: CLINIC | Age: 64
End: 2024-02-13

## 2024-02-13 ENCOUNTER — OFFICE VISIT (OUTPATIENT)
Dept: FAMILY MEDICINE | Facility: CLINIC | Age: 64
End: 2024-02-13
Payer: COMMERCIAL

## 2024-02-13 VITALS
HEIGHT: 65 IN | RESPIRATION RATE: 16 BRPM | BODY MASS INDEX: 21.66 KG/M2 | SYSTOLIC BLOOD PRESSURE: 116 MMHG | HEART RATE: 73 BPM | WEIGHT: 130 LBS | TEMPERATURE: 98.1 F | OXYGEN SATURATION: 99 % | DIASTOLIC BLOOD PRESSURE: 72 MMHG

## 2024-02-13 DIAGNOSIS — F43.10 PTSD (POST-TRAUMATIC STRESS DISORDER): ICD-10-CM

## 2024-02-13 DIAGNOSIS — E03.9 ACQUIRED HYPOTHYROIDISM: Chronic | ICD-10-CM

## 2024-02-13 DIAGNOSIS — G95.9 CERVICAL MYELOPATHY (H): ICD-10-CM

## 2024-02-13 DIAGNOSIS — Z01.818 PREOP GENERAL PHYSICAL EXAM: Primary | ICD-10-CM

## 2024-02-13 DIAGNOSIS — F31.81 BIPOLAR 2 DISORDER (H): ICD-10-CM

## 2024-02-13 DIAGNOSIS — M51.379 DDD (DEGENERATIVE DISC DISEASE), LUMBOSACRAL: Chronic | ICD-10-CM

## 2024-02-13 DIAGNOSIS — F60.3 BORDERLINE PERSONALITY DISORDER (H): ICD-10-CM

## 2024-02-13 DIAGNOSIS — J43.1 PANLOBULAR EMPHYSEMA (H): Chronic | ICD-10-CM

## 2024-02-13 DIAGNOSIS — G89.4 CHRONIC PAIN SYNDROME: Chronic | ICD-10-CM

## 2024-02-13 DIAGNOSIS — N18.31 STAGE 3A CHRONIC KIDNEY DISEASE (H): ICD-10-CM

## 2024-02-13 DIAGNOSIS — E78.2 MIXED HYPERLIPIDEMIA: Chronic | ICD-10-CM

## 2024-02-13 PROBLEM — J45.901 MODERATE ASTHMA WITH EXACERBATION, UNSPECIFIED WHETHER PERSISTENT: Chronic | Status: RESOLVED | Noted: 2022-07-29 | Resolved: 2024-02-13

## 2024-02-13 LAB
ALBUMIN UR-MCNC: NEGATIVE MG/DL
APPEARANCE UR: CLEAR
ATRIAL RATE - MUSE: 58 BPM
BASOPHILS # BLD AUTO: 0 10E3/UL (ref 0–0.2)
BASOPHILS NFR BLD AUTO: 1 %
BILIRUB UR QL STRIP: ABNORMAL
COLOR UR AUTO: YELLOW
DIASTOLIC BLOOD PRESSURE - MUSE: NORMAL MMHG
EOSINOPHIL # BLD AUTO: 0.3 10E3/UL (ref 0–0.7)
EOSINOPHIL NFR BLD AUTO: 5 %
ERYTHROCYTE [DISTWIDTH] IN BLOOD BY AUTOMATED COUNT: 12.9 % (ref 10–15)
GLUCOSE UR STRIP-MCNC: NEGATIVE MG/DL
HCT VFR BLD AUTO: 41.1 % (ref 35–47)
HGB BLD-MCNC: 13.4 G/DL (ref 11.7–15.7)
HGB UR QL STRIP: NEGATIVE
IMM GRANULOCYTES # BLD: 0 10E3/UL
IMM GRANULOCYTES NFR BLD: 0 %
INTERPRETATION ECG - MUSE: NORMAL
KETONES UR STRIP-MCNC: ABNORMAL MG/DL
LEUKOCYTE ESTERASE UR QL STRIP: NEGATIVE
LYMPHOCYTES # BLD AUTO: 1.8 10E3/UL (ref 0.8–5.3)
LYMPHOCYTES NFR BLD AUTO: 25 %
MCH RBC QN AUTO: 29.2 PG (ref 26.5–33)
MCHC RBC AUTO-ENTMCNC: 32.6 G/DL (ref 31.5–36.5)
MCV RBC AUTO: 90 FL (ref 78–100)
MONOCYTES # BLD AUTO: 0.3 10E3/UL (ref 0–1.3)
MONOCYTES NFR BLD AUTO: 4 %
NEUTROPHILS # BLD AUTO: 4.7 10E3/UL (ref 1.6–8.3)
NEUTROPHILS NFR BLD AUTO: 66 %
NITRATE UR QL: NEGATIVE
P AXIS - MUSE: 63 DEGREES
PH UR STRIP: 5.5 [PH] (ref 5–8)
PLATELET # BLD AUTO: 422 10E3/UL (ref 150–450)
PR INTERVAL - MUSE: 160 MS
QRS DURATION - MUSE: 80 MS
QT - MUSE: 462 MS
QTC - MUSE: 453 MS
R AXIS - MUSE: 59 DEGREES
RBC # BLD AUTO: 4.59 10E6/UL (ref 3.8–5.2)
SP GR UR STRIP: >=1.03 (ref 1–1.03)
SYSTOLIC BLOOD PRESSURE - MUSE: NORMAL MMHG
T AXIS - MUSE: 76 DEGREES
UROBILINOGEN UR STRIP-ACNC: 0.2 E.U./DL
VENTRICULAR RATE- MUSE: 58 BPM
WBC # BLD AUTO: 7.2 10E3/UL (ref 4–11)

## 2024-02-13 PROCEDURE — 84443 ASSAY THYROID STIM HORMONE: CPT | Performed by: FAMILY MEDICINE

## 2024-02-13 PROCEDURE — 85025 COMPLETE CBC W/AUTO DIFF WBC: CPT | Performed by: FAMILY MEDICINE

## 2024-02-13 PROCEDURE — 80061 LIPID PANEL: CPT | Performed by: FAMILY MEDICINE

## 2024-02-13 PROCEDURE — 99214 OFFICE O/P EST MOD 30 MIN: CPT | Performed by: FAMILY MEDICINE

## 2024-02-13 PROCEDURE — 82043 UR ALBUMIN QUANTITATIVE: CPT | Performed by: FAMILY MEDICINE

## 2024-02-13 PROCEDURE — 93005 ELECTROCARDIOGRAM TRACING: CPT | Performed by: FAMILY MEDICINE

## 2024-02-13 PROCEDURE — 80053 COMPREHEN METABOLIC PANEL: CPT | Performed by: FAMILY MEDICINE

## 2024-02-13 PROCEDURE — 93010 ELECTROCARDIOGRAM REPORT: CPT | Performed by: INTERNAL MEDICINE

## 2024-02-13 PROCEDURE — 82570 ASSAY OF URINE CREATININE: CPT | Performed by: FAMILY MEDICINE

## 2024-02-13 PROCEDURE — 81003 URINALYSIS AUTO W/O SCOPE: CPT | Performed by: FAMILY MEDICINE

## 2024-02-13 PROCEDURE — 36415 COLL VENOUS BLD VENIPUNCTURE: CPT | Performed by: FAMILY MEDICINE

## 2024-02-13 RX ORDER — RESPIRATORY SYNCYTIAL VIRUS VACCINE 120MCG/0.5
0.5 KIT INTRAMUSCULAR ONCE
Qty: 1 EACH | Refills: 0 | Status: CANCELLED | OUTPATIENT
Start: 2024-02-13 | End: 2024-02-13

## 2024-02-13 ASSESSMENT — COLUMBIA-SUICIDE SEVERITY RATING SCALE - C-SSRS
3. IN THE PAST MONTH, HAVE YOU BEEN THINKING ABOUT HOW YOU MIGHT KILL YOURSELF?: YES
6. HAVE YOU EVER DONE ANYTHING, STARTED TO DO ANYTHING, OR PREPARED TO DO ANYTHING TO END YOUR LIFE?: YES, IN PAST MONTH
5. IN THE PAST MONTH, HAVE YOU STARTED TO WORK OUT OR WORKED OUT THE DETAILS OF HOW TO KILL YOURSELF? DO YOU INTEND TO CARRY OUT THIS PLAN?: NO
2. IN THE PAST MONTH, HAVE YOU ACTUALLY HAD ANY THOUGHTS OF KILLING YOURSELF?: YES
1. WITHIN THE PAST MONTH, HAVE YOU WISHED YOU WERE DEAD OR WISHED YOU COULD GO TO SLEEP AND NOT WAKE UP?: YES
5. IN THE PAST MONTH, HAVE YOU STARTED TO WORK OUT OR WORKED OUT THE DETAILS OF HOW TO KILL YOURSELF? DO YOU INTEND TO CARRY OUT THIS PLAN?: YES

## 2024-02-13 NOTE — PROGRESS NOTES
Preoperative Evaluation  [facility]  [address]  [place] [facility] [address]  Phone: [phone]  Fax: [phone]  Primary Provider: Joanne Weiner  Pre-op Performing Provider: JOANNE WEINER  Feb 13, 2024       Dayana is a 63 year old, presenting for the following:  Pre-Op Exam (ECT treatments 2/16/24 [employer],  )        2/13/2024    12:23 PM   Additional Questions   Roomed by dov powell cma     Surgical Information  Surgery/Procedure: ECT treatment  Surgery Location: [facility] [place]  Surgeon: ?  Surgery Date: 2/16/24  Time of Surgery: 11 AM   Where patient plans to recover: At home with family  Fax number for surgical facility: [phone]    Assessment & Plan     The proposed surgical procedure is considered LOW risk.    Preop general physical exam  Patient medically cleared for surgery.  - EKG 12-lead, tracing only  - CBC with platelets and differential  - Comprehensive metabolic panel (BMP + Alb, Alk Phos, ALT, AST, Total. Bili, TP)  - UA Macroscopic with reflex to Microscopic and Culture - Lab Collect    Bipolar 2 disorder (H)    Borderline personality disorder (H)    PTSD (post-traumatic stress disorder)    Chronic pain syndrome  Long time patient at the [facility].    Panlobular emphysema (H)  Continues to smoke 4 cigarettes daily.    Stage 3a chronic kidney disease (H)  stable  - Albumin Random Urine Quantitative with Creat Ratio    Acquired hypothyroidism  Controlled.  - TSH with free T4 reflex    Mixed hyperlipidemia  Controlled.  - Lipid panel reflex to direct LDL Non-fasting    DDD (degenerative disc disease), lumbosacral    Cervical myelopathy (H)    Depression Screening Follow Up        2/12/2024    10:50 PM   PHQ   PHQ-9 Total Score 23   Q9: Thoughts of better off dead/self-harm past 2 weeks Nearly every day   F/U: Thoughts of suicide or self-harm Yes   F/U: Self harm-plan No   F/U: Self-harm action No   F/U:  Safety concerns Yes         2/12/2024    10:50 PM   Last PHQ-9   1.  Little interest or pleasure in doing things 3   2.  Feeling down, depressed, or hopeless 3   3.  Trouble falling or staying asleep, or sleeping too much 3   4.  Feeling tired or having little energy 3   5.  Poor appetite or overeating 1   6.  Feeling bad about yourself 2   7.  Trouble concentrating 2   8.  Moving slowly or restless 3   Q9: Thoughts of better off dead/self-harm past 2 weeks 3   PHQ-9 Total Score 23   In the past two weeks have you had thoughts of suicide or self harm? Yes   Do you have concerns about your personal safety or the safety of others? Yes   In the past 2 weeks have you thought about a plan or had intention to harm yourself? No   In the past 2 weeks have you acted on these thoughts in any way? No             2/13/2024    12:50 PM   C-SSRS (Brief [place])   Within the last month, have you wished you were dead or wished you could go to sleep and not wake up? Yes   Within the last month, have you had any actual thoughts of killing yourself? Yes   Within the last month, have you been thinking about how you might do this? Yes   Within the last month, have you had these thoughts and had some intention of acting on them? No   Within the last month, have you started to work out or worked out the details of how to kill yourself with the intent to carry out this plan? Yes   Within the last month, have you ever done anything, started to do anything, or prepared to do anything to end your life? Yes, in past month         Follow Up   ECT treatment     - No identified additional risk factors other than previously addressed    Antiplatelet or Anticoagulation Medication Instructions   - Patient is on no antiplatelet or anticoagulation medications.    Additional Medication Instructions  Hold all meds morning of procedures . Take them when able to drink and eat after the treatment.    Recommendation  APPROVAL GIVEN to proceed with proposed  procedure, without further diagnostic evaluation.        Subjective     HPI related to upcoming procedure: Patient has a long history of uncontrolled depression despite multiple different treatment modalities.  Next option is ECT.  Patient has had history of this treatment long ago.        2/12/2024    10:52 PM   Preop Questions   1. Have you ever had a heart attack or stroke? No   2. Have you ever had surgery on your heart or blood vessels, such as a stent placement, a coronary artery bypass, or surgery on an artery in your head, neck, heart, or legs? No   3. Do you have chest pain with activity? Non cardiac pain   4. Do you have a history of  heart failure? No   5. Do you currently have a cold, bronchitis or symptoms of other infection? No   6. Do you have a cough, shortness of breath, or wheezing? No   7. Do you or anyone in your family have previous history of blood clots? No   8. Do you or does anyone in your family have a serious bleeding problem such as prolonged bleeding following surgeries or cuts? No   9. Have you ever had problems with anemia or been told to take iron pills? YES - long ago   10. Have you had any abnormal blood loss such as black, tarry or bloody stools, or abnormal vaginal bleeding? No   11. Have you ever had a blood transfusion? No   12. Are you willing to have a blood transfusion if it is medically needed before, during, or after your surgery? Yes   13. Have you or any of your relatives ever had problems with anesthesia? YES - nausea   14. Do you have sleep apnea, excessive snoring or daytime drowsiness? No   15. Do you have any artifical heart valves or other implanted medical devices like a pacemaker, defibrillator, or continuous glucose monitor? No   16. Do you have artificial joints? No   17. Are you allergic to latex? No       Health Care Directive  Patient does not have a Health Care Directive or Living Will: Discussed advance care planning with patient; however, patient declined  at this time.    Preoperative Review of    reviewed - controlled substances prescribed by other outside provider(s).      Patient Active Problem List    Diagnosis Date Noted    Cervical myelopathy (H) 02/13/2024     Priority: Medium    Panlobular emphysema (H) 09/11/2023     Priority: Medium    Atypical chest pain 08/16/2023     Priority: Medium    Epigastric pain 05/12/2023     Priority: Medium    Iron deficiency anemia 09/20/2022     Priority: Medium    Acute bronchitis, unspecified organism 07/29/2022     Priority: Medium    Abnormal reflex 09/08/2021     Priority: Medium    Asymptomatic postmenopausal status 08/31/2021     Priority: Medium     Formatting of this note might be different from the original.  Created by Conversion    Replacement Utility updated for latest IMO load      Menopausal disorder 08/31/2021     Priority: Medium     Formatting of this note might be different from the original.  Created by Conversion    Replacement Utility updated for latest IMO load      Migraine headache 08/31/2021     Priority: Medium     Formatting of this note might be different from the original.  Created by Conversion    Replacement Utility updated for latest IMO load      Chronic pain syndrome 08/31/2021     Priority: Medium    DDD (degenerative disc disease), lumbosacral 03/22/2021     Priority: Medium    Spinal stenosis of lumbar region with neurogenic claudication 03/22/2021     Priority: Medium    Pain in right leg 03/21/2021     Priority: Medium    Other specified disorders of bone density and structure, multiple sites 03/21/2021     Priority: Medium    Other abnormalities of gait and mobility 03/21/2021     Priority: Medium    Moderate persistent asthma, uncomplicated 03/21/2021     Priority: Medium    Low back pain 03/21/2021     Priority: Medium    Anemia 03/18/2021     Priority: Medium    Hypotension 03/18/2021     Priority: Medium    Fibromyalgia 03/17/2021     Priority: Medium     Formatting of this  note might be different from the original.  Created by Conversion    Formatting of this note might be different from the original.  Created by Conversion    Formatting of this note might be different from the original.  Formatting of this note might be different from the original.  Created by Conversion    Formatting of this note might be different from the original.  Created by Conversion  Formatting of this note might be different from the original.  Formatting of this note might be different from the original.  Created by Conversion    Formatting of this note might be different from the original.  Created by Conversion      Hyperlipidemia 03/17/2021     Priority: Medium     Formatting of this note might be different from the original.  Created by Conversion    Formatting of this note might be different from the original.  Formatting of this note might be different from the original.  Created by Conversion  Formatting of this note might be different from the original.  Formatting of this note might be different from the original.  Created by Conversion      [facility] 03/17/2021     Priority: Medium     Formatting of this note might be different from the original.  Created by Conversion    Replacement Utility updated for latest IMO load    Formatting of this note might be different from the original.  Formatting of this note might be different from the original.  Created by Conversion    Replacement Utility updated for latest IMO load  Formatting of this note might be different from the original.  Formatting of this note might be different from the original.  Created by Conversion    Replacement Utility updated for latest IMO load      Common migraine with intractable migraine 02/15/2021     Priority: Medium    Chronic kidney disease, stage 3 (H) 01/14/2021     Priority: Medium     Created by Conversion        Osteopenia of multiple sites 09/01/2020     Priority: Medium    Centrilobular emphysema (H)  02/26/2018     Priority: Medium     Formatting of this note might be different from the original.  Mild, seen in 2018 CT scan, DLCO 60% predicted    Formatting of this note might be different from the original.  Formatting of this note might be different from the original.  Mild, seen in 2018 CT scan, DLCO 60% predicted  Formatting of this note might be different from the original.  Formatting of this note might be different from the original.  Mild, seen in 2018 CT scan, DLCO 60% predicted      Hiatal hernia 02/16/2017     Priority: Medium    Hemorrhoids 11/22/2016     Priority: Medium    Lung nodule 08/04/2016     Priority: Medium     Formatting of this note might be different from the original.  8/4/2016:  Left upper lobe nodule of 6.5 mm, likely benign given slow growth per radiologist.  See CT  6/27/2011:  measured  approximately 5.5 mm in greatest dimension      Bipolar 2 disorder (H) 06/01/2015     Priority: Medium     Formatting of this note might be different from the original.  Created by Conversion    Replacement Utility updated for latest IMO load    Formatting of this note might be different from the original.  MED TRIALS:  Doxepin has been helpful for sleep & fibromyalgia.  Topamax- caused cognitive slowing & poor concentration. Depakote caused wt gain. Seroquel was sedating. Trazodone caused insomnia. Has tried mellaril, effexor, depakote, remeron, buspar, risperdal,zyprexa, celexa,luvox - unsure what happened with these.  Formatting of this note might be different from the original.  MED TRIALS:  Doxepin has been helpful for sleep & fibromyalgia.  Topamax- caused cognitive slowing & poor concentration. Depakote caused wt gain. Seroquel was sedating. Trazodone caused insomnia. Has tried mellaril, effexor, depakote, remeron, buspar, risperdal,zyprexa, celexa,luvox - unsure what happened with these.      Borderline personality disorder (H) 06/01/2015     Priority: Medium    PTSD (post-traumatic stress  disorder) 06/01/2015     Priority: Medium    GERD (gastroesophageal reflux disease) 10/16/2012     Priority: Medium     Formatting of this note might be different from the original.  Formatting of this note might be different from the original.  coegd 07/17/12, normal screening  Formatting of this note might be different from the original.  coegd 07/17/12, normal screening      Dysphagia 07/19/2012     Priority: Medium      Past Medical History:   Diagnosis Date    Abnormal reflex 9/8/2021    Abnormality of gait     Created by Conversion     Acute bronchitis, unspecified organism 7/29/2022    Anemia 3/18/2021    Anxiety     Asthma     Asymptomatic postmenopausal status     Created by Conversion  Replacement Utility updated for latest IMO load    Bipolar 2 disorder (H)     Borderline personality disorder (H) 6/1/2015    Centrilobular emphysema (H) 2/26/2018    Mild, seen in 2018 CT scan, DLCO 60% predicted  Formatting of this note might be different from the original. Formatting of this note might be different from the original. Mild, seen in 2018 CT scan, DLCO 60% predicted    Cervical spinal stenosis     s/p cervical fusion    Chronic kidney disease     Chronic kidney disease, stage 3 (H) 1/14/2021    Chronic pain syndrome     Cigarette nicotine dependence without complication 3/4/2020    Common migraine with intractable migraine 2/15/2021    COPD (chronic obstructive pulmonary disease) (H)     DDD (degenerative disc disease), lumbosacral 3/22/2021    Depression     Draining cutaneous sinus tract 12/1/2021    Added automatically from request for surgery [id number]    Dysphagia 7/19/2012    Encounter for other orthopedic aftercare 3/21/2021    Fibrocystic breast     Fibromyalgia     GERD (gastroesophageal reflux disease)     Hallux abductovalgus with bunions, unspecified laterality 12/14/2015    Hemorrhoids 11/22/2016    Herpes zoster     Created by Conversion  Replacement Utility updated for latest IMO load    Hiatal  hernia     History of electroconvulsive therapy     Hyperlipidemia 3/17/2021    Created by Conversion   Formatting of this note might be different from the original. Formatting of this note might be different from the original. Created by Conversion    Hypotension 3/18/2021    Hypothyroidism     hypothyroidism    IBS (irritable bowel syndrome)     Iron deficiency anemia 9/20/2022    Low back pain 3/21/2021    Lung nodule 8/4/2016 8/4/2016:  Left upper lobe nodule of 6.5 mm, likely benign given slow growth per radiologist.  See CT 6/27/2011:  measured  approximately 5.5 mm in greatest dimension     Menopausal and postmenopausal disorder     Created by Conversion  Replacement Utility updated for latest IMO load    Menopausal disorder 8/31/2021    Formatting of this note might be different from the original. Created by Conversion  Replacement Utility updated for latest IMO load    Migraine     Created by Conversion  Replacement Utility updated for latest IMO load    Migraine headache 8/31/2021    Formatting of this note might be different from the original. Created by Conversion  Replacement Utility updated for latest IMO load    Migraines     Moderate asthma with exacerbation, unspecified whether persistent 7/29/2022    Moderate persistent asthma, uncomplicated 3/21/2021    Osteoarthritis     Osteopenia of multiple sites 9/1/2020    Other abnormalities of gait and mobility 3/21/2021    Other chronic pain     Other specified disorders of bone density and structure, multiple sites 3/21/2021    Pain in right leg 3/21/2021    PONV (postoperative nausea and vomiting)     PTSD (post-traumatic stress disorder)     Shingles 2014    on back    Spinal stenosis of lumbar region with neurogenic claudication 3/22/2021    Tobacco use disorder     Created by Conversion      Past Surgical History:   Procedure Laterality Date    BIOPSY BREAST Left     Approx 5 bx    BUNIONECTOMY Right 12/15/2015    Procedure: MODIFIED JOELLE  BUNIONECTOMY RIGHT FOOT;  Surgeon: Jerome Calvin DPM;  Location: [facility];  Service:     CERVICAL FUSION      CERVICAL FUSION Bilateral 2/16/2015    Procedure: ANTERIOR CERVICAL DECOMPRESSION/FUSION C3-5 BILATERAL, ANTERIOR HARDWARE REMOVAL C5-7 BILATERAL ;  Surgeon: Sawyer Roland MD;  Location: [facility] OR;  Service:     HC NIPPLE EXPLORATION      Description: Breast Nipple Explor W/ Excision Solitary Lactiferous Duct;  Recorded: 04/10/2008;    HYSTERECTOMY      IR CERVICAL EPIDURAL STEROID INJECTION  9/17/2003    IR CERVICAL EPIDURAL STEROID INJECTION  12/11/2003    IR CERVICAL EPIDURAL STEROID INJECTION  1/16/2004    IRRIGATION AND DEBRIDEMENT DECUBITUS WOUND, COMBINED Left 12/13/2021    Procedure: Wound exploration and debridement of Left Lower Abdomin Chronic wound.;  Surgeon: Crispin Bunch MD;  [facility]    LAPAROSCOPIC NISSEN FUNDOPLICATION N/A 12/30/2022    Procedure: FUNDOPLICATION, partial ROBOT-ASSISTED, LAPAROSCOPIC, USING DA MARIBEL XI;  Surgeon: Davie Ocasio DO;  Location: [facility] OR    LUMBAR DISCECTOMY      X2    MAMMOPLASTY AUGMENTATION Bilateral      approx 2006?    PELVIC LAPAROSCOPY      multiple    SHOULDER OPEN ROTATOR CUFF REPAIR Left     X2    ZZC TOTAL ABDOM HYSTERECTOMY      Description: Total Abdominal Hysterectomy;  Recorded: 06/10/2013;     Current Outpatient Medications   Medication Sig Dispense Refill    albuterol (PROAIR HFA) 108 (90 Base) MCG/ACT inhaler Inhale 2 puffs into the lungs every 4 hours 18 g 11    aspirin-acetaminophen-caffeine (EXCEDRIN MIGRAINE) 250-250-65 MG tablet Take 1 tablet by mouth daily as needed for headaches MR x 1      atorvastatin (LIPITOR) 80 MG tablet TAKE 1 TABLET(80 MG) BY MOUTH AT BEDTIME 90 tablet 0    benzonatate (TESSALON) 100 MG capsule TAKE 1 CAPSULE(100 MG) BY MOUTH THREE TIMES DAILY AS NEEDED FOR COUGH 30 capsule 0    budesonide-formoterol (SYMBICORT) 160-4.5 MCG/ACT Inhaler  Inhale 2 puffs into the lungs 2 times daily 10.2 g 11    buprenorphine (BUTRANS) 5 MCG/HR WK patch Place 1 patch onto the skin once a week      buPROPion (WELLBUTRIN) 100 MG tablet Take 100 mg by mouth 2 times daily      doxepin (SINEQUAN) 25 MG capsule Take 75 mg by mouth At Bedtime      eletriptan (RELPAX) 40 MG tablet Take 1 tablet (40 mg) by mouth at onset of headache for migraine 12 tablet 3    erenumab-aooe (AIMOVIG) 70 MG/ML injection ADMINISTER 1 ML(70 MG) UNDER THE SKIN EVERY MONTH NEEDS TO SCHEDULE AN APPT FOR FURTHER REFILLS 1 mL 11    esomeprazole (NEXIUM) 40 MG DR capsule Take 40 mg by mouth daily      FLUoxetine (PROZAC) 40 MG capsule Take 80 mg by mouth daily      gabapentin (NEURONTIN) 300 MG capsule Take 600 mg by mouth 2 times daily      HYDROcodone-acetaminophen (NORCO)  MG per tablet Take 1 tablet by mouth every 4 hours as needed      hydrOXYzine (ATARAX) 25 MG tablet TAKE 2 TABLETS BY MOUTH AT BEDTIME AND 1 TABLET BY MOUTH DURING THE DAY AS NEEDED      ipratropium - albuterol 0.5 mg/2.5 mg/3 mL (DUONEB) 0.5-2.5 (3) MG/3ML neb solution Take 1 vial (3 mLs) by nebulization every 6 hours as needed for shortness of breath, wheezing or cough 180 mL 4    lamoTRIgine (LAMICTAL) 200 MG tablet Take 1 tablet by mouth 2 times daily      levothyroxine (SYNTHROID/LEVOTHROID) 75 MCG tablet TAKE 1 TABLET(75 MCG) BY MOUTH DAILY (Patient taking differently: Take 75 mcg by mouth every evening) 90 tablet 2    prazosin (MINIPRESS) 2 MG capsule Take 2 capsules by mouth At Bedtime      prochlorperazine (COMPAZINE) 10 MG tablet Take 10 mg by mouth every 6 hours as needed for nausea When having migraine      senna-docusate (SENOKOT-S/PERICOLACE) 8.6-50 MG tablet Take 2 tablets by mouth At Bedtime      tiZANidine (ZANAFLEX) 4 MG tablet TAKE 1 TO 2 TABLETS BY MOUTH DURING THE DAY AND 2 TABLETS AT BEDTIME 120 tablet 1    varenicline (CHANTIX) 1 MG tablet TAKE 1 TABLET(1 MG) BY MOUTH TWICE DAILY 180 tablet 0     vitamin D2 (ERGOCALCIFEROL) 89510 units (1250 mcg) capsule TAKE 1 CAPSULE BY MOUTH 1 TIME A WEEK 13 capsule 1    ezetimibe (ZETIA) 10 MG tablet Take 1 tablet (10 mg) by mouth daily 90 tablet 0    montelukast (SINGULAIR) 10 MG tablet Take 1 tablet (10 mg) by mouth At Bedtime 90 tablet 4    naloxone (NARCAN) 4 MG/0.1ML nasal spray          Allergies   Allergen Reactions    Codeine Anaphylaxis    Nefazodone Nausea and Vomiting    Oxycodone-Acetaminophen Unknown     hallucinations    Cetirizine Nausea and Vomiting    Trazodone Nausea and Vomiting    Oxycodone Hallucination and Other (See Comments)    Amoxicillin-Pot Clavulanate [Amoxicillin-Pot Clavulanate] Rash    Cephalexin Rash    Clavulanic Acid Rash    Cyclobenzaprine Rash    Eszopiclone Rash    Methocarbamol Rash    Penicillins Rash     Mild rash        Social History     Tobacco Use    Smoking status: Every Day     Packs/day: 0.50     Years: 45.00     Additional pack years: 0.00     Total pack years: 22.50     Types: Cigarettes    Smokeless tobacco: Never   Substance Use Topics    Alcohol use: Not Currently     Family History   Problem Relation Age of Onset    Lung Cancer Mother          at age 52    Alcoholism Father     Heart Disease Maternal Grandfather     Diabetes Paternal Grandmother     Coronary Artery Disease Paternal Grandmother     Heart Disease Paternal Grandfather     Diabetes Sister     Hypertension Sister     Crohn's Disease Sister     Coronary Artery Disease Paternal Uncle     Asthma Maternal Aunt      History   Drug Use No         Review of Systems  Review of Systems  CONSTITUTIONAL: NEGATIVE for fever, chills, change in weight  INTEGUMENTARY/SKIN: NEGATIVE for worrisome rashes, moles or lesions  EYES: NEGATIVE for vision changes or irritation  ENT/MOUTH: NEGATIVE for ear, mouth and throat problems  RESP: NEGATIVE for significant cough or SOB  BREAST: NEGATIVE for masses, tenderness or discharge  CV: POSITIVE for palpitations  GI: NEGATIVE for  "nausea, abdominal pain, heartburn, or change in bowel habits   female: frequency and incontinence-urge  MUSCULOSKELETAL:POSITIVE  for chronic pain particularly degenerative disc disease at multiple levels, history of many surgeries  NEURO: NEGATIVE for weakness, dizziness or paresthesias  ENDOCRINE: NEGATIVE for temperature intolerance, skin/hair changes  HEME: NEGATIVE for bleeding problems  PSYCHIATRIC: POSITIVE for uncontrolled depression and anxiety    Objective    /72   Pulse 73   Temp 98.1  F (36.7  C)   Resp 16   Ht 1.645 m (5' 4.75\")   Wt 59 kg (130 lb)   LMP 06/01/1983   SpO2 99%   BMI 21.80 kg/m     Estimated body mass index is 21.8 kg/m  as calculated from the following:    Height as of this encounter: 1.645 m (5' 4.75\").    Weight as of this encounter: 59 kg (130 lb).  Physical Exam  Physical Examination: General appearance - alert, well appearing, and in no distress and normal appearing weight  Mental status - alert, oriented to person, place, and time, normal mood, behavior, speech, dress, motor activity, and thought processes  Eyes - pupils equal and reactive, extraocular eye movements intact  Ears - bilateral TM's and external ear canals normal  Nose - normal and patent, no erythema, discharge or polyps  Mouth - mucous membranes moist, pharynx normal without lesions  Neck - supple, no significant adenopathy, carotids upstroke normal bilaterally, no bruits, thyroid exam: thyroid is normal in size without nodules or tenderness  Chest - clear to auscultation, no wheezes, rales or rhonchi, symmetric air entry  Heart - normal rate and regular rhythm, no murmurs noted  Abdomen - soft, nontender, nondistended, no masses or organomegaly  Back exam - limited range of motion, pain with motion noted during exam  Neurological - alert, oriented, normal speech, no focal findings or movement disorder noted, cranial nerves II through XII intact  Musculoskeletal - no joint tenderness, deformity or " swelling  Extremities - peripheral pulses normal, no pedal edema, no clubbing or cyanosis  Skin - normal coloration and turgor, no rashes, no suspicious skin lesions noted      Recent Labs   Lab Test 08/16/23  1838 05/12/23  1402   HGB 14.6 13.4    315    142   POTASSIUM 3.9 4.5   CR 1.04 1.11*        Diagnostics  Recent Results (from the past 168 hour(s))   EKG 12-lead, tracing only    Collection Time: 02/13/24  1:23 PM   Result Value Ref Range    Systolic Blood Pressure  mmHg    Diastolic Blood Pressure  mmHg    Ventricular Rate 58 BPM    Atrial Rate 58 BPM    AL Interval 160 ms    QRS Duration 80 ms     ms    QTc 453 ms    P Axis 63 degrees    R AXIS 59 degrees    T Axis 76 degrees    Interpretation ECG       Sinus bradycardia  Minimal voltage criteria for LVH, may be normal variant  Borderline ECG  When compared with ECG of 16-AUG-2023 18:01,  No significant change was found  Confirmed by CADY RAJPUT MD LOC: ([id number]) on 2/13/2024 3:30:50 PM     Comprehensive metabolic panel (BMP + Alb, Alk Phos, ALT, AST, Total. Bili, TP)    Collection Time: 02/13/24  1:44 PM   Result Value Ref Range    Sodium 140 135 - 145 mmol/L    Potassium 4.2 3.4 - 5.3 mmol/L    Carbon Dioxide (CO2) 29 22 - 29 mmol/L    Anion Gap 10 7 - 15 mmol/L    Urea Nitrogen 14.1 8.0 - 23.0 mg/dL    Creatinine 1.03 (H) 0.51 - 0.95 mg/dL    GFR Estimate 60 (L) >60 mL/min/1.73m2    Calcium 9.3 8.8 - 10.2 mg/dL    Chloride 101 98 - 107 mmol/L    Glucose 94 70 - 99 mg/dL    Alkaline Phosphatase 173 (H) 40 - 150 U/L    AST 32 0 - 45 U/L    ALT 17 0 - 50 U/L    Protein Total 7.1 6.4 - 8.3 g/dL    Albumin 4.1 3.5 - 5.2 g/dL    Bilirubin Total 0.4 <=1.2 mg/dL   Lipid panel reflex to direct LDL Non-fasting    Collection Time: 02/13/24  1:44 PM   Result Value Ref Range    Cholesterol 130 <200 mg/dL    Triglycerides 113 <150 mg/dL    Direct Measure HDL 59 >=50 mg/dL    LDL Cholesterol Calculated 48 <=100 mg/dL    Non HDL Cholesterol 71 <130  mg/dL    Patient Fasting > 8hrs? Yes    TSH with free T4 reflex    Collection Time: 02/13/24  1:44 PM   Result Value Ref Range    TSH 3.46 0.30 - 4.20 uIU/mL   CBC with platelets and differential    Collection Time: 02/13/24  1:44 PM   Result Value Ref Range    WBC Count 7.2 4.0 - 11.0 10e3/uL    RBC Count 4.59 3.80 - 5.20 10e6/uL    Hemoglobin 13.4 11.7 - 15.7 g/dL    Hematocrit 41.1 35.0 - 47.0 %    MCV 90 78 - 100 fL    MCH 29.2 26.5 - 33.0 pg    MCHC 32.6 31.5 - 36.5 g/dL    RDW 12.9 10.0 - 15.0 %    Platelet Count 422 150 - 450 10e3/uL    % Neutrophils 66 %    % Lymphocytes 25 %    % Monocytes 4 %    % Eosinophils 5 %    % Basophils 1 %    % Immature Granulocytes 0 %    Absolute Neutrophils 4.7 1.6 - 8.3 10e3/uL    Absolute Lymphocytes 1.8 0.8 - 5.3 10e3/uL    Absolute Monocytes 0.3 0.0 - 1.3 10e3/uL    Absolute Eosinophils 0.3 0.0 - 0.7 10e3/uL    Absolute Basophils 0.0 0.0 - 0.2 10e3/uL    Absolute Immature Granulocytes 0.0 <=0.4 10e3/uL   Albumin Random Urine Quantitative with Creat Ratio    Collection Time: 02/13/24  2:14 PM   Result Value Ref Range    Creatinine Urine mg/dL 283.0 mg/dL    Albumin Urine mg/L <12.0 mg/L    Albumin Urine mg/g Cr     UA Macroscopic with reflex to Microscopic and Culture - Lab Collect    Collection Time: 02/13/24  2:14 PM    Specimen: Urine, Midstream   Result Value Ref Range    Color Urine Yellow Colorless, Straw, Light Yellow, Yellow    Appearance Urine Clear Clear    Glucose Urine Negative Negative mg/dL    Bilirubin Urine Small (A) Negative    Ketones Urine Trace (A) Negative mg/dL    Specific Gravity Urine >=1.030 1.005 - 1.030    Blood Urine Negative Negative    pH Urine 5.5 5.0 - 8.0    Protein Albumin Urine Negative Negative mg/dL    Urobilinogen Urine 0.2 0.2, 1.0 E.U./dL    Nitrite Urine Negative Negative    Leukocyte Esterase Urine Negative Negative        Revised Cardiac Risk Index (RCRI)  The patient has the following serious cardiovascular risks for perioperative  complications:   - No serious cardiac risks = 0 points     RCRI Interpretation: 1 point: Class II (low risk - 0.9% complication rate)         Signed Electronically by: Joanne Weiner MD  Copy of this evaluation report is provided to requesting physician.       Answers submitted by the patient for this visit:  Patient Health Questionnaire (Submitted on 2/12/2024)  If you checked off any problems, how difficult have these problems made it for you to do your work, take care of things at home, or get along with other people?: Extremely difficult  PHQ9 TOTAL SCORE: 23  DAWSON-7 (Submitted on 2/12/2024)  DAWSON 7 TOTAL SCORE: 14

## 2024-02-13 NOTE — PATIENT INSTRUCTIONS
Preparing for Your Surgery  Getting started  A nurse will call you to review your health history and instructions. They will give you an arrival time based on your scheduled surgery time. Please be ready to share:  Your doctor's clinic name and phone number  Your medical, surgical, and anesthesia history  A list of allergies and sensitivities  A list of medicines, including herbal treatments and over-the-counter drugs  Whether the patient has a legal guardian (ask how to send us the papers in advance)  Please tell us if you're pregnant--or if there's any chance you might be pregnant. Some surgeries may injure a fetus (unborn baby), so they require a pregnancy test. Surgeries that are safe for a fetus don't always need a test, and you can choose whether to have one.   If you have a child who's having surgery, please ask for a copy of Preparing for Your Child's Surgery.    Preparing for surgery  Within 10 to 30 days of surgery: Have a pre-op exam (sometimes called an H&P, or History and Physical). This can be done at a clinic or pre-operative center.  If you're having a , you may not need this exam. Talk to your care team.  At your pre-op exam, talk to your care team about all medicines you take. If you need to stop any medicines before surgery, ask when to start taking them again.  We do this for your safety. Many medicines can make you bleed too much during surgery. Some change how well surgery (anesthesia) drugs work.  Call your insurance company to let them know you're having surgery. (If you don't have insurance, call [phone].)  Call your clinic if there's any change in your health. This includes signs of a cold or flu (sore throat, runny nose, cough, rash, fever). It also includes a scrape or scratch near the surgery site.  If you have questions on the day of surgery, call your hospital or surgery center.  Eating and drinking guidelines  For your safety: Unless your surgeon tells you otherwise,  follow the guidelines below.  Eat and drink as usual until 8 hours before you arrive for surgery. After that, no food or milk.  Drink clear liquids until 2 hours before you arrive. These are liquids you can see through, like water, Gatorade, and Propel Water. They also include plain black coffee and tea (no cream or milk), candy, and breath mints. You can spit out gum when you arrive.  If you drink alcohol: Stop drinking it the night before surgery.  If your care team tells you to take medicine on the morning of surgery, it's okay to take it with a sip of water.  Preventing infection  Shower or bathe the night before and morning of your surgery. Follow the instructions your clinic gave you. (If no instructions, use regular soap.)  Don't shave or clip hair near your surgery site. We'll remove the hair if needed.  Don't smoke or vape the morning of surgery. You may chew nicotine gum up to 2 hours before surgery. A nicotine patch is okay.  Note: Some surgeries require you to completely quit smoking and nicotine. Check with your surgeon.  Your care team will make every effort to keep you safe from infection. We will:  Clean our hands often with soap and water (or an alcohol-based hand rub).  Clean the skin at your surgery site with a special soap that kills germs.  Give you a special gown to keep you warm. (Cold raises the risk of infection.)  Wear special hair covers, masks, gowns and gloves during surgery.  Give antibiotic medicine, if prescribed. Not all surgeries need antibiotics.  What to bring on the day of surgery  Photo ID and insurance card  Copy of your health care directive, if you have one  Glasses and hearing aids (bring cases)  You can't wear contacts during surgery  Inhaler and eye drops, if you use them (tell us about these when you arrive)  CPAP machine or breathing device, if you use them  A few personal items, if spending the night  If you have . . .  A pacemaker, ICD (cardiac defibrillator) or other  implant: Bring the ID card.  An implanted stimulator: Bring the remote control.  A legal guardian: Bring a copy of the certified (court-stamped) guardianship papers.  Please remove any jewelry, including body piercings. Leave jewelry and other valuables at home.  If you're going home the day of surgery  You must have a responsible adult drive you home. They should stay with you overnight as well.  If you don't have someone to stay with you, and you aren't safe to go home alone, we may keep you overnight. Insurance often won't pay for this.  After surgery  If it's hard to control your pain or you need more pain medicine, please call your surgeon's office.  Questions?   If you have any questions for your care team, list them here: _________________________________________________________________________________________________________________________________________________________________________ ____________________________________ ____________________________________ ____________________________________  For informational purposes only. Not to replace the advice of your health care provider. Copyright   2003, 2019 [employer]. All rights reserved. Clinically reviewed by Keysha Palumbo MD. SMARTworks 520796 - REV 12/22.    How to Take Your Medication Before Surgery  Patient to hold medications on morning of treatment.  When drinking/eating resume them.

## 2024-02-14 LAB
ALBUMIN SERPL BCG-MCNC: 4.1 G/DL (ref 3.5–5.2)
ALP SERPL-CCNC: 173 U/L (ref 40–150)
ALT SERPL W P-5'-P-CCNC: 17 U/L (ref 0–50)
ANION GAP SERPL CALCULATED.3IONS-SCNC: 10 MMOL/L (ref 7–15)
AST SERPL W P-5'-P-CCNC: 32 U/L (ref 0–45)
BILIRUB SERPL-MCNC: 0.4 MG/DL
BUN SERPL-MCNC: 14.1 MG/DL (ref 8–23)
CALCIUM SERPL-MCNC: 9.3 MG/DL (ref 8.8–10.2)
CHLORIDE SERPL-SCNC: 101 MMOL/L (ref 98–107)
CHOLEST SERPL-MCNC: 130 MG/DL
CREAT SERPL-MCNC: 1.03 MG/DL (ref 0.51–0.95)
CREAT UR-MCNC: 283 MG/DL
DEPRECATED HCO3 PLAS-SCNC: 29 MMOL/L (ref 22–29)
EGFRCR SERPLBLD CKD-EPI 2021: 60 ML/MIN/1.73M2
FASTING STATUS PATIENT QL REPORTED: YES
GLUCOSE SERPL-MCNC: 94 MG/DL (ref 70–99)
HDLC SERPL-MCNC: 59 MG/DL
LDLC SERPL CALC-MCNC: 48 MG/DL
MICROALBUMIN UR-MCNC: <12 MG/L
MICROALBUMIN/CREAT UR: NORMAL MG/G{CREAT}
NONHDLC SERPL-MCNC: 71 MG/DL
POTASSIUM SERPL-SCNC: 4.2 MMOL/L (ref 3.4–5.3)
PROT SERPL-MCNC: 7.1 G/DL (ref 6.4–8.3)
SODIUM SERPL-SCNC: 140 MMOL/L (ref 135–145)
TRIGL SERPL-MCNC: 113 MG/DL
TSH SERPL DL<=0.005 MIU/L-ACNC: 3.46 UIU/ML (ref 0.3–4.2)

## 2024-02-16 DIAGNOSIS — J44.9 CHRONIC OBSTRUCTIVE PULMONARY DISEASE, UNSPECIFIED COPD TYPE (H): ICD-10-CM

## 2024-02-16 RX ORDER — BENZONATATE 100 MG/1
CAPSULE ORAL
Qty: 30 CAPSULE | Refills: 0 | Status: SHIPPED | OUTPATIENT
Start: 2024-02-16

## 2024-03-17 DIAGNOSIS — G43.019 COMMON MIGRAINE WITH INTRACTABLE MIGRAINE: ICD-10-CM

## 2024-03-18 RX ORDER — ERENUMAB-AOOE 70 MG/ML
INJECTION SUBCUTANEOUS
Qty: 3 ML | Refills: 0 | Status: SHIPPED | OUTPATIENT
Start: 2024-03-18 | End: 2024-08-30

## 2024-03-18 NOTE — TELEPHONE ENCOUNTER
Refill request for: erenumab-aooe (AIMOVIG) 70 MG/ML injection    Directions: ADMINISTER 1 ML(70 MG) UNDER THE SKIN EVERY MONTH     LOV: 01/30/24  NOV: 04/30/24    Pt of Dr. Schmitt. He is out of the office until 3/25/24. Can you refill in his absence please?    90 day supply with 0 refills Medication T'd for review and signature    Mary White LPN on 3/18/2024 at 10:08 AM

## 2024-03-19 ENCOUNTER — TRANSFERRED RECORDS (OUTPATIENT)
Dept: HEALTH INFORMATION MANAGEMENT | Facility: CLINIC | Age: 64
End: 2024-03-19
Payer: COMMERCIAL

## 2024-03-23 ENCOUNTER — TRANSFERRED RECORDS (OUTPATIENT)
Dept: HEALTH INFORMATION MANAGEMENT | Facility: CLINIC | Age: 64
End: 2024-03-23

## 2024-03-26 DIAGNOSIS — Z71.89 COUNSELING AND COORDINATION OF CARE: Primary | ICD-10-CM

## 2024-03-27 ENCOUNTER — PATIENT OUTREACH (OUTPATIENT)
Dept: CARE COORDINATION | Facility: CLINIC | Age: 64
End: 2024-03-27
Payer: COMMERCIAL

## 2024-03-27 DIAGNOSIS — F40.243 PHOBIA, FLYING: Primary | ICD-10-CM

## 2024-03-27 NOTE — LETTER
[facility] CARE COORDINATION  [address]  [place] [address]  [place] [address]    March 27, 2024    Dayana Samaniego  [address] [address] [place] [id number]      Dear Dayana,    I am a clinic community health worker who works with Joanne Weiner MD with the [facility]. I wanted to thank you for spending the time to talk with me.  Below is a description of clinic care coordination and how I can further assist you.       The clinic care coordination team is made up of a registered nurse, , financial resource worker and community health worker who understand the health care system. The goal of clinic care coordination is to help you manage your health and improve access to the health care system. Our team works alongside your provider to assist you in determining your health and social needs. We can help you obtain health care and community resources, providing you with necessary information and education. We can work with you through any barriers and develop a care plan that helps coordinate and strengthen the communication between you and your care team.  Our services are voluntary and are offered without charge to you personally.    Please feel free to contact me with any questions or concerns regarding care coordination and what we can offer.      We are focused on providing you with the highest-quality healthcare experience possible.    Sincerely,     Amanda FARRAR  Community Health Worker  [facility]  Clinic Care Coordination  [email or web address]  [email or web address]  Office: [phone]

## 2024-03-27 NOTE — PROGRESS NOTES
Clinic Care Coordination Contact  Community Health Worker Initial Outreach            Patient accepts CC: No, patient politely declined needing CC services at this time. Dayana state she is seeing a psychologist through [facility] and is attending ECT treatments. Patient will be sent Care Coordination introduction letter for future reference.   CHW also sent message to patient's request for refill on medication she was prescribed in the past when flying in an airplane. Dayana will be leaving [place] on 4/3 and be heading to [place] to attend her son's wedding.    Amanda FARRAR  Community Health Worker  [facility]  Clinic Care Coordination  [facility]  [email or web address]  [email or web address]  Office: [phone]

## 2024-03-28 RX ORDER — DIAZEPAM 2 MG
2 TABLET ORAL EVERY 6 HOURS PRN
Qty: 4 TABLET | Refills: 0 | Status: SHIPPED | OUTPATIENT
Start: 2024-03-28

## 2024-03-28 NOTE — TELEPHONE ENCOUNTER
Request for diazepam which has been used for flying in the past has been sent to patient's local pharmacy.

## 2024-04-10 ENCOUNTER — TRANSFERRED RECORDS (OUTPATIENT)
Dept: HEALTH INFORMATION MANAGEMENT | Facility: CLINIC | Age: 64
End: 2024-04-10
Payer: COMMERCIAL

## 2024-04-11 ENCOUNTER — OFFICE VISIT (OUTPATIENT)
Dept: FAMILY MEDICINE | Facility: CLINIC | Age: 64
End: 2024-04-11
Payer: COMMERCIAL

## 2024-04-11 VITALS
TEMPERATURE: 98.2 F | OXYGEN SATURATION: 97 % | SYSTOLIC BLOOD PRESSURE: 121 MMHG | BODY MASS INDEX: 21.49 KG/M2 | HEART RATE: 69 BPM | WEIGHT: 129 LBS | HEIGHT: 65 IN | RESPIRATION RATE: 16 BRPM | DIASTOLIC BLOOD PRESSURE: 68 MMHG

## 2024-04-11 DIAGNOSIS — M54.16 LUMBAR RADICULOPATHY: ICD-10-CM

## 2024-04-11 DIAGNOSIS — Z01.818 PREOP GENERAL PHYSICAL EXAM: Primary | ICD-10-CM

## 2024-04-11 DIAGNOSIS — E78.2 MIXED HYPERLIPIDEMIA: Chronic | ICD-10-CM

## 2024-04-11 DIAGNOSIS — G95.9 CERVICAL MYELOPATHY (H): ICD-10-CM

## 2024-04-11 DIAGNOSIS — E03.9 ACQUIRED HYPOTHYROIDISM: Chronic | ICD-10-CM

## 2024-04-11 DIAGNOSIS — G89.4 CHRONIC PAIN SYNDROME: Chronic | ICD-10-CM

## 2024-04-11 DIAGNOSIS — F31.81 BIPOLAR 2 DISORDER (H): Chronic | ICD-10-CM

## 2024-04-11 DIAGNOSIS — J43.1 PANLOBULAR EMPHYSEMA (H): Chronic | ICD-10-CM

## 2024-04-11 DIAGNOSIS — M51.379 DDD (DEGENERATIVE DISC DISEASE), LUMBOSACRAL: Chronic | ICD-10-CM

## 2024-04-11 LAB
ERYTHROCYTE [DISTWIDTH] IN BLOOD BY AUTOMATED COUNT: 13.5 % (ref 10–15)
HCT VFR BLD AUTO: 43.8 % (ref 35–47)
HGB BLD-MCNC: 14.1 G/DL (ref 11.7–15.7)
MCH RBC QN AUTO: 29.3 PG (ref 26.5–33)
MCHC RBC AUTO-ENTMCNC: 32.2 G/DL (ref 31.5–36.5)
MCV RBC AUTO: 91 FL (ref 78–100)
PLATELET # BLD AUTO: 248 10E3/UL (ref 150–450)
RBC # BLD AUTO: 4.81 10E6/UL (ref 3.8–5.2)
WBC # BLD AUTO: 6.9 10E3/UL (ref 4–11)

## 2024-04-11 PROCEDURE — 36415 COLL VENOUS BLD VENIPUNCTURE: CPT | Performed by: FAMILY MEDICINE

## 2024-04-11 PROCEDURE — 80048 BASIC METABOLIC PNL TOTAL CA: CPT | Performed by: FAMILY MEDICINE

## 2024-04-11 PROCEDURE — 85027 COMPLETE CBC AUTOMATED: CPT | Performed by: FAMILY MEDICINE

## 2024-04-11 PROCEDURE — 99214 OFFICE O/P EST MOD 30 MIN: CPT | Performed by: FAMILY MEDICINE

## 2024-04-11 RX ORDER — RESPIRATORY SYNCYTIAL VIRUS VACCINE 120MCG/0.5
0.5 KIT INTRAMUSCULAR ONCE
Qty: 1 EACH | Refills: 0 | Status: CANCELLED | OUTPATIENT
Start: 2024-04-11 | End: 2024-04-11

## 2024-04-11 ASSESSMENT — ANXIETY QUESTIONNAIRES
IF YOU CHECKED OFF ANY PROBLEMS ON THIS QUESTIONNAIRE, HOW DIFFICULT HAVE THESE PROBLEMS MADE IT FOR YOU TO DO YOUR WORK, TAKE CARE OF THINGS AT HOME, OR GET ALONG WITH OTHER PEOPLE: SOMEWHAT DIFFICULT
GAD7 TOTAL SCORE: 3
3. WORRYING TOO MUCH ABOUT DIFFERENT THINGS: NOT AT ALL
6. BECOMING EASILY ANNOYED OR IRRITABLE: SEVERAL DAYS
8. IF YOU CHECKED OFF ANY PROBLEMS, HOW DIFFICULT HAVE THESE MADE IT FOR YOU TO DO YOUR WORK, TAKE CARE OF THINGS AT HOME, OR GET ALONG WITH OTHER PEOPLE?: SOMEWHAT DIFFICULT
GAD7 TOTAL SCORE: 3
7. FEELING AFRAID AS IF SOMETHING AWFUL MIGHT HAPPEN: NOT AT ALL
7. FEELING AFRAID AS IF SOMETHING AWFUL MIGHT HAPPEN: NOT AT ALL
2. NOT BEING ABLE TO STOP OR CONTROL WORRYING: NOT AT ALL
GAD7 TOTAL SCORE: 3
5. BEING SO RESTLESS THAT IT IS HARD TO SIT STILL: SEVERAL DAYS
1. FEELING NERVOUS, ANXIOUS, OR ON EDGE: NOT AT ALL
4. TROUBLE RELAXING: SEVERAL DAYS

## 2024-04-11 ASSESSMENT — PATIENT HEALTH QUESTIONNAIRE - PHQ9
10. IF YOU CHECKED OFF ANY PROBLEMS, HOW DIFFICULT HAVE THESE PROBLEMS MADE IT FOR YOU TO DO YOUR WORK, TAKE CARE OF THINGS AT HOME, OR GET ALONG WITH OTHER PEOPLE: SOMEWHAT DIFFICULT
SUM OF ALL RESPONSES TO PHQ QUESTIONS 1-9: 12
SUM OF ALL RESPONSES TO PHQ QUESTIONS 1-9: 12

## 2024-04-11 ASSESSMENT — PAIN SCALES - GENERAL: PAINLEVEL: EXTREME PAIN (8)

## 2024-04-11 NOTE — PATIENT INSTRUCTIONS
Preparing for Your Surgery  Getting started  A nurse will call you to review your health history and instructions. They will give you an arrival time based on your scheduled surgery time. Please be ready to share:  Your doctor's clinic name and phone number  Your medical, surgical, and anesthesia history  A list of allergies and sensitivities  A list of medicines, including herbal treatments and over-the-counter drugs  Whether the patient has a legal guardian (ask how to send us the papers in advance)  Please tell us if you're pregnant--or if there's any chance you might be pregnant. Some surgeries may injure a fetus (unborn baby), so they require a pregnancy test. Surgeries that are safe for a fetus don't always need a test, and you can choose whether to have one.   If you have a child who's having surgery, please ask for a copy of Preparing for Your Child's Surgery.    Preparing for surgery  Within 10 to 30 days of surgery: Have a pre-op exam (sometimes called an H&P, or History and Physical). This can be done at a clinic or pre-operative center.  If you're having a , you may not need this exam. Talk to your care team.  At your pre-op exam, talk to your care team about all medicines you take. If you need to stop any medicines before surgery, ask when to start taking them again.  We do this for your safety. Many medicines can make you bleed too much during surgery. Some change how well surgery (anesthesia) drugs work.  Call your insurance company to let them know you're having surgery. (If you don't have insurance, call [phone].)  Call your clinic if there's any change in your health. This includes signs of a cold or flu (sore throat, runny nose, cough, rash, fever). It also includes a scrape or scratch near the surgery site.  If you have questions on the day of surgery, call your hospital or surgery center.  Eating and drinking guidelines  For your safety: Unless your surgeon tells you otherwise,  follow the guidelines below.  Eat and drink as usual until 8 hours before you arrive for surgery. After that, no food or milk.  Drink clear liquids until 2 hours before you arrive. These are liquids you can see through, like water, Gatorade, and Propel Water. They also include plain black coffee and tea (no cream or milk), candy, and breath mints. You can spit out gum when you arrive.  If you drink alcohol: Stop drinking it the night before surgery.  If your care team tells you to take medicine on the morning of surgery, it's okay to take it with a sip of water.  Preventing infection  Shower or bathe the night before and morning of your surgery. Follow the instructions your clinic gave you. (If no instructions, use regular soap.)  Don't shave or clip hair near your surgery site. We'll remove the hair if needed.  Don't smoke or vape the morning of surgery. You may chew nicotine gum up to 2 hours before surgery. A nicotine patch is okay.  Note: Some surgeries require you to completely quit smoking and nicotine. Check with your surgeon.  Your care team will make every effort to keep you safe from infection. We will:  Clean our hands often with soap and water (or an alcohol-based hand rub).  Clean the skin at your surgery site with a special soap that kills germs.  Give you a special gown to keep you warm. (Cold raises the risk of infection.)  Wear special hair covers, masks, gowns and gloves during surgery.  Give antibiotic medicine, if prescribed. Not all surgeries need antibiotics.  What to bring on the day of surgery  Photo ID and insurance card  Copy of your health care directive, if you have one  Glasses and hearing aids (bring cases)  You can't wear contacts during surgery  Inhaler and eye drops, if you use them (tell us about these when you arrive)  CPAP machine or breathing device, if you use them  A few personal items, if spending the night  If you have . . .  A pacemaker, ICD (cardiac defibrillator) or other  implant: Bring the ID card.  An implanted stimulator: Bring the remote control.  A legal guardian: Bring a copy of the certified (court-stamped) guardianship papers.  Please remove any jewelry, including body piercings. Leave jewelry and other valuables at home.  If you're going home the day of surgery  You must have a responsible adult drive you home. They should stay with you overnight as well.  If you don't have someone to stay with you, and you aren't safe to go home alone, we may keep you overnight. Insurance often won't pay for this.  After surgery  If it's hard to control your pain or you need more pain medicine, please call your surgeon's office.  Questions?   If you have any questions for your care team, list them here: _________________________________________________________________________________________________________________________________________________________________________ ____________________________________ ____________________________________ ____________________________________  For informational purposes only. Not to replace the advice of your health care provider. Copyright   2003, 2019 [employer]. All rights reserved. Clinically reviewed by Keysha Palumbo MD. SMARTworks [id number] - REV 12/22.    How to Take Your Medication Before Surgery  - HOLD (do not take) Aspirin or NSAIDs for 7 days  - HOLD (do not take) oral medications on morning of surgery and resume when able.  - STOP taking all vitamins and herbal supplements 14 days before surgery.

## 2024-04-11 NOTE — PROGRESS NOTES
Preoperative Evaluation  [facility]  [address], [facility] [address]  [place] [facility] [address]  Phone: [phone]  Fax: [phone]  Primary Provider: Joanne Weiner  Pre-op Performing Provider: JOANNE WEINER  Apr 11, 2024       Dayana is a 63 year old, presenting for the following:  Pre-Op Exam (Surgery [place] 4/17/24 Dr Sanon )        4/11/2024     3:21 PM   Additional Questions   Roomed by dov powell cma     Surgical Information  Surgery/Procedure: DECOMPRESSION- LATERAL RECESS AND FORMINAL LEVELS L3 to L4   Surgery Location: [facility]  Surgeon: Dr Patrice Sanon   Surgery Date: 4/7/24  Time of Surgery: 5:30 am   Where patient plans to recover: At home with family  Fax number for surgical facility: [phone]    Assessment & Plan     The proposed surgical procedure is considered LOW risk.    Preop general physical exam  Patient is cleared for surgery without further evaluation required.  Patient is to hold all aspirin and NSAIDs for 7 days prior to surgery.  Patient is to hold her oral medications on morning of surgery and resume when able.  - CBC with platelets  - Basic metabolic panel    Lumbar radiculopathy  With neurogenic claudication.  Needs decompression surgery.  History of spinal fusions.    DDD (degenerative disc disease), lumbosacral  History of multiple surgeries in the past including fusions.    Cervical myelopathy (H)  History of surgeries, history of cervical fusion.    Chronic pain syndrome  Seen at [facility] pain clinic on a regular basis.    Bipolar 2 disorder (H)  Patient had recent ECT treatment with improvement of depression and anxiety symptoms.    Panlobular emphysema (H)  Longtime smoker and continuing to smoke a few cigarettes a day.  She is precontemplative.    Acquired hypothyroidism  Lab done in February 2024 was good.    Mixed hyperlipidemia  Last cholesterol labs in February were good.    Chronic kidney disease  3a  Most recent GFR was 60 but has been as low as 50.  Will recheck.      Depression Screening Follow Up  PHQ-9:      4/11/2024     3:04 PM   Last PHQ-9   1.  Little interest or pleasure in doing things 1   2.  Feeling down, depressed, or hopeless 1   3.  Trouble falling or staying asleep, or sleeping too much 3   4.  Feeling tired or having little energy 3   5.  Poor appetite or overeating 0   6.  Feeling bad about yourself 1   7.  Trouble concentrating 1   8.  Moving slowly or restless 2   Q9: Thoughts of better off dead/self-harm past 2 weeks 0   PHQ-9 Total Score 12       GAD7:      4/11/2024     3:05 PM   DAWSON-7    1. Feeling nervous, anxious, or on edge 0   2. Not being able to stop or control worrying 0   3. Worrying too much about different things 0   4. Trouble relaxing 1   5. Being so restless that it is hard to sit still 1   6. Becoming easily annoyed or irritable 1   7. Feeling afraid, as if something awful might happen 0   DAWSON-7 Total Score 3   If you checked any problems, how difficult have they made it for you to do your work, take care of things at home, or get along with other people? Somewhat difficult         Follow Up Actions Taken  Referred patient back to current mental health provider.  Pt recently treated with ECT and with improved mood.            - No identified additional risk factors other than previously addressed    Antiplatelet or Anticoagulation Medication Instructions   - Patient is on no antiplatelet or anticoagulation medications.    Additional Medication Instructions  Patient is to hold morning oral medications on day of surgery and resume them when able.    Recommendation  APPROVAL GIVEN to proceed with proposed procedure, without further diagnostic evaluation.        Subjective       HPI related to upcoming procedure: Complicated patient with chronic pain syndrome, multiple previous surgeries, and with mental health diagnoses.  MRI shows lateral recess and foraminal stenosis at  levels L3 to L4.  Area needs decompression surgery.        4/9/2024     3:28 PM   Preop Questions   1. Have you ever had a heart attack or stroke? No   2. Have you ever had surgery on your heart or blood vessels, such as a stent placement, a coronary artery bypass, or surgery on an artery in your head, neck, heart, or legs? No   3. Do you have chest pain with activity? No   4. Do you have a history of  heart failure? No   5. Do you currently have a cold, bronchitis or symptoms of other infection? No   6. Do you have a cough, shortness of breath, or wheezing? No   7. Do you or anyone in your family have previous history of blood clots? No   8. Do you or does anyone in your family have a serious bleeding problem such as prolonged bleeding following surgeries or cuts? No   9. Have you ever had problems with anemia or been told to take iron pills? YES -long ago   10. Have you had any abnormal blood loss such as black, tarry or bloody stools, or abnormal vaginal bleeding? No   11. Have you ever had a blood transfusion? No   12. Are you willing to have a blood transfusion if it is medically needed before, during, or after your surgery? Yes   13. Have you or any of your relatives ever had problems with anesthesia? YES -nausea only   14. Do you have sleep apnea, excessive snoring or daytime drowsiness? No   15. Do you have any artifical heart valves or other implanted medical devices like a pacemaker, defibrillator, or continuous glucose monitor? No   16. Do you have artificial joints? No   17. Are you allergic to latex? No       Health Care Directive  Patient does not have a Health Care Directive or Living Will: Discussed advance care planning with patient; however, patient declined at this time.    Preoperative Review of    reviewed - controlled substances reflected in medication list.      Patient Active Problem List    Diagnosis Date Noted    Cervical myelopathy (H) 02/13/2024     Priority: Medium    Panlobular  emphysema (H) 09/11/2023     Priority: Medium    Atypical chest pain 08/16/2023     Priority: Medium    Epigastric pain 05/12/2023     Priority: Medium    Iron deficiency anemia 09/20/2022     Priority: Medium    Acute bronchitis, unspecified organism 07/29/2022     Priority: Medium    Abnormal reflex 09/08/2021     Priority: Medium    Asymptomatic postmenopausal status 08/31/2021     Priority: Medium     Formatting of this note might be different from the original.  Created by Conversion    Replacement Utility updated for latest IMO load      Menopausal disorder 08/31/2021     Priority: Medium     Formatting of this note might be different from the original.  Created by Conversion    Replacement Utility updated for latest IMO load      Migraine headache 08/31/2021     Priority: Medium     Formatting of this note might be different from the original.  Created by Conversion    Replacement Utility updated for latest IMO load      Chronic pain syndrome 08/31/2021     Priority: Medium    DDD (degenerative disc disease), lumbosacral 03/22/2021     Priority: Medium    Spinal stenosis of lumbar region with neurogenic claudication 03/22/2021     Priority: Medium    Pain in right leg 03/21/2021     Priority: Medium    Other specified disorders of bone density and structure, multiple sites 03/21/2021     Priority: Medium    Other abnormalities of gait and mobility 03/21/2021     Priority: Medium    Moderate persistent asthma, uncomplicated 03/21/2021     Priority: Medium    Low back pain 03/21/2021     Priority: Medium    Anemia 03/18/2021     Priority: Medium    Hypotension 03/18/2021     Priority: Medium    Fibromyalgia 03/17/2021     Priority: Medium     Formatting of this note might be different from the original.  Created by Conversion    Formatting of this note might be different from the original.  Created by Conversion    Formatting of this note might be different from the original.  Formatting of this note might be  different from the original.  Created by Conversion    Formatting of this note might be different from the original.  Created by Conversion  Formatting of this note might be different from the original.  Formatting of this note might be different from the original.  Created by Conversion    Formatting of this note might be different from the original.  Created by Conversion      Hyperlipidemia 03/17/2021     Priority: Medium     Formatting of this note might be different from the original.  Created by Conversion    Formatting of this note might be different from the original.  Formatting of this note might be different from the original.  Created by Conversion  Formatting of this note might be different from the original.  Formatting of this note might be different from the original.  Created by Conversion      Hypothyroidism 03/17/2021     Priority: Medium     Formatting of this note might be different from the original.  Created by Conversion    Replacement Utility updated for latest IMO load    Formatting of this note might be different from the original.  Formatting of this note might be different from the original.  Created by Conversion    Replacement Utility updated for latest IMO load  Formatting of this note might be different from the original.  Formatting of this note might be different from the original.  Created by Conversion    Replacement Utility updated for latest IMO load      Common migraine with intractable migraine 02/15/2021     Priority: Medium    Chronic kidney disease, stage 3 (H) 01/14/2021     Priority: Medium     Created by Conversion        Osteopenia of multiple sites 09/01/2020     Priority: Medium    Centrilobular emphysema (H) 02/26/2018     Priority: Medium     Formatting of this note might be different from the original.  Mild, seen in 2018 CT scan, DLCO 60% predicted    Formatting of this note might be different from the original.  Formatting of this note might be different from  the original.  Mild, seen in 2018 CT scan, DLCO 60% predicted  Formatting of this note might be different from the original.  Formatting of this note might be different from the original.  Mild, seen in 2018 CT scan, DLCO 60% predicted      Hiatal hernia 02/16/2017     Priority: Medium    Hemorrhoids 11/22/2016     Priority: Medium    Lung nodule 08/04/2016     Priority: Medium     Formatting of this note might be different from the original.  8/4/2016:  Left upper lobe nodule of 6.5 mm, likely benign given slow growth per radiologist.  See CT  6/27/2011:  measured  approximately 5.5 mm in greatest dimension      Bipolar 2 disorder (H) 06/01/2015     Priority: Medium     Formatting of this note might be different from the original.  Created by Conversion    Replacement Utility updated for latest IMO load    Formatting of this note might be different from the original.  MED TRIALS:  Doxepin has been helpful for sleep & fibromyalgia.  Topamax- caused cognitive slowing & poor concentration. Depakote caused wt gain. Seroquel was sedating. Trazodone caused insomnia. Has tried mellaril, effexor, depakote, remeron, buspar, risperdal,zyprexa, celexa,luvox - unsure what happened with these.  Formatting of this note might be different from the original.  MED TRIALS:  Doxepin has been helpful for sleep & fibromyalgia.  Topamax- caused cognitive slowing & poor concentration. Depakote caused wt gain. Seroquel was sedating. Trazodone caused insomnia. Has tried mellaril, effexor, depakote, remeron, buspar, risperdal,zyprexa, celexa,luvox - unsure what happened with these.      Borderline personality disorder (H) 06/01/2015     Priority: Medium    PTSD (post-traumatic stress disorder) 06/01/2015     Priority: Medium    GERD (gastroesophageal reflux disease) 10/16/2012     Priority: Medium     Formatting of this note might be different from the original.  Formatting of this note might be different from the original.  coegd 07/17/12,  normal screening  Formatting of this note might be different from the original.  coegd 07/17/12, normal screening      Dysphagia 07/19/2012     Priority: Medium      Past Medical History:   Diagnosis Date    Abnormal reflex 09/08/2021    Abnormality of gait     Created by Conversion     Acute bronchitis, unspecified organism 07/29/2022    Anemia 03/18/2021    Anxiety     Asthma     Asymptomatic postmenopausal status     Created by Conversion  Replacement Utility updated for latest IMO load    Bipolar 2 disorder (H)     Borderline personality disorder (H) 06/01/2015    Centrilobular emphysema (H) 02/26/2018    Mild, seen in 2018 CT scan, DLCO 60% predicted  Formatting of this note might be different from the original. Formatting of this note might be different from the original. Mild, seen in 2018 CT scan, DLCO 60% predicted    Cervical spinal stenosis     s/p cervical fusion    Chronic kidney disease     Chronic kidney disease, stage 3 (H) 01/14/2021    Chronic pain syndrome     Cigarette nicotine dependence without complication 03/04/2020    Common migraine with intractable migraine 02/15/2021    COPD (chronic obstructive pulmonary disease) (H)     DDD (degenerative disc disease), lumbosacral 03/22/2021    Depression     Draining cutaneous sinus tract 12/01/2021    Added automatically from request for surgery [id number]    Dysphagia 07/19/2012    Encounter for other orthopedic aftercare 03/21/2021    Fibrocystic breast     Fibromyalgia     GERD (gastroesophageal reflux disease)     Hallux abductovalgus with bunions, unspecified laterality 12/14/2015    Hemorrhoids 11/22/2016    Herpes zoster     Created by Conversion  Replacement Utility updated for latest IMO load    Hiatal hernia     History of electroconvulsive therapy     Hyperlipidemia 03/17/2021    Created by Conversion   Formatting of this note might be different from the original. Formatting of this note might be different from the original. Created by  Conversion    Hypotension 03/18/2021    Hypothyroidism     hypothyroidism    IBS (irritable bowel syndrome)     Iron deficiency anemia 09/20/2022    Low back pain 03/21/2021    Lung nodule 08/04/2016 8/4/2016:  Left upper lobe nodule of 6.5 mm, likely benign given slow growth per radiologist.  See CT 6/27/2011:  measured  approximately 5.5 mm in greatest dimension     Menopausal and postmenopausal disorder     Created by Conversion  Replacement Utility updated for latest IMO load    Menopausal disorder 08/31/2021    Formatting of this note might be different from the original. Created by Conversion  Replacement Utility updated for latest IMO load    Migraine     Created by Conversion  Replacement Utility updated for latest IMO load    Migraine headache 08/31/2021    Formatting of this note might be different from the original. Created by Conversion  Replacement Utility updated for latest IMO load    Migraines     Moderate asthma with exacerbation, unspecified whether persistent 07/29/2022    Moderate persistent asthma, uncomplicated 03/21/2021    Osteoarthritis     Osteopenia of multiple sites 09/01/2020    Other abnormalities of gait and mobility 03/21/2021    Other chronic pain     Other specified disorders of bone density and structure, multiple sites 03/21/2021    Pain in right leg 03/21/2021    PONV (postoperative nausea and vomiting)     PTSD (post-traumatic stress disorder)     Shingles 2014    on back    Spinal stenosis of lumbar region with neurogenic claudication 03/22/2021    Tobacco use disorder     Created by Conversion      Past Surgical History:   Procedure Laterality Date    BACK SURGERY  4 times on back 4 times on neck    Fusion    BIOPSY BREAST Left     Approx 5 bx    BUNIONECTOMY Right 12/15/2015    Procedure: MODIFIED LAI BUNIONECTOMY RIGHT FOOT;  Surgeon: Jerome Calvin DPM;  Location: [facility];  Service:     CERVICAL FUSION      CERVICAL FUSION Bilateral 02/16/2015     Procedure: ANTERIOR CERVICAL DECOMPRESSION/FUSION C3-5 BILATERAL, ANTERIOR HARDWARE REMOVAL C5-7 BILATERAL ;  Surgeon: Sawyer Roland MD;  Location: [facility] [place];  Service:     COLONOSCOPY      COSMETIC SURGERY      Breast implants, liposuction of stomach    EYE SURGERY      Eye lid lift    HC NIPPLE EXPLORATION      Description: Breast Nipple Explor W/ Excision Solitary Lactiferous Duct;  Recorded: 04/10/2008;    HEAD & NECK SURGERY      Neck fusion    HYSTERECTOMY      IR CERVICAL EPIDURAL STEROID INJECTION  09/17/2003    IR CERVICAL EPIDURAL STEROID INJECTION  12/11/2003    IR CERVICAL EPIDURAL STEROID INJECTION  01/16/2004    IRRIGATION AND DEBRIDEMENT DECUBITUS WOUND, COMBINED Left 12/13/2021    Procedure: Wound exploration and debridement of Left Lower Abdomin Chronic wound.;  Surgeon: Crispin Bunch MD;  Location: [facility] OR    LAPAROSCOPIC NISSEN FUNDOPLICATION N/A 12/30/2022    Procedure: FUNDOPLICATION, partial ROBOT-ASSISTED, LAPAROSCOPIC, USING DA MARIBEL XI;  Surgeon: Davie Ocasio DO;  Location: [facility] OR    LUMBAR DISCECTOMY      X2    MAMMOPLASTY AUGMENTATION Bilateral      approx 2006?    PELVIC LAPAROSCOPY      multiple    SHOULDER OPEN ROTATOR CUFF REPAIR Left     X2    ZZC TOTAL ABDOM HYSTERECTOMY      Description: Total Abdominal Hysterectomy;  Recorded: 06/10/2013;     Current Outpatient Medications   Medication Sig Dispense Refill    aspirin-acetaminophen-caffeine (EXCEDRIN MIGRAINE) 250-250-65 MG tablet Take 1 tablet by mouth daily as needed for headaches MR x 1      atorvastatin (LIPITOR) 80 MG tablet TAKE 1 TABLET(80 MG) BY MOUTH AT BEDTIME 90 tablet 0    benzonatate (TESSALON) 100 MG capsule TAKE 1 CAPSULE(100 MG) BY MOUTH THREE TIMES DAILY AS NEEDED FOR COUGH 30 capsule 0    budesonide-formoterol (SYMBICORT) 160-4.5 MCG/ACT Inhaler Inhale 2 puffs into the lungs 2 times daily 10.2 g 11    doxepin (SINEQUAN) 25 MG capsule Take 75 mg by mouth At  Bedtime      eletriptan (RELPAX) 40 MG tablet Take 1 tablet (40 mg) by mouth at onset of headache for migraine 12 tablet 3    erenumab-aooe (AIMOVIG) 70 MG/ML injection ADMINISTER 1 ML(70 MG) UNDER THE SKIN EVERY MONTH 3 mL 0    esomeprazole (NEXIUM) 40 MG DR capsule Take 40 mg by mouth daily      FLUoxetine (PROZAC) 40 MG capsule Take 80 mg by mouth daily      gabapentin (NEURONTIN) 300 MG capsule Take 600 mg by mouth 2 times daily      HYDROcodone-acetaminophen (NORCO)  MG per tablet Take 1 tablet by mouth every 4 hours as needed      hydrOXYzine (ATARAX) 25 MG tablet TAKE 2 TABLETS BY MOUTH AT BEDTIME AND 1 TABLET BY MOUTH DURING THE DAY AS NEEDED      ipratropium - albuterol 0.5 mg/2.5 mg/3 mL (DUONEB) 0.5-2.5 (3) MG/3ML neb solution Take 1 vial (3 mLs) by nebulization every 6 hours as needed for shortness of breath, wheezing or cough 180 mL 4    lamoTRIgine (LAMICTAL) 200 MG tablet Take 1 tablet by mouth 2 times daily      levothyroxine (SYNTHROID/LEVOTHROID) 75 MCG tablet TAKE 1 TABLET(75 MCG) BY MOUTH DAILY (Patient taking differently: Take 75 mcg by mouth every evening) 90 tablet 2    naloxone (NARCAN) 4 MG/0.1ML nasal spray Spray 1 spray (4 mg) into one nostril alternating nostrils once as needed for opioid reversal every 2-3 minutes until assistance arrives 0.2 mL 1    prazosin (MINIPRESS) 2 MG capsule Take 2 capsules by mouth At Bedtime      senna-docusate (SENOKOT-S/PERICOLACE) 8.6-50 MG tablet Take 2 tablets by mouth At Bedtime      tiZANidine (ZANAFLEX) 4 MG tablet TAKE 1 TO 2 TABLETS BY MOUTH DURING THE DAY AND 2 TABLETS AT BEDTIME 120 tablet 1    varenicline (CHANTIX) 1 MG tablet TAKE 1 TABLET(1 MG) BY MOUTH TWICE DAILY 180 tablet 0    vitamin D2 (ERGOCALCIFEROL) 77197 units (1250 mcg) capsule TAKE 1 CAPSULE BY MOUTH 1 TIME A WEEK 13 capsule 1    albuterol (PROAIR HFA) 108 (90 Base) MCG/ACT inhaler Inhale 2 puffs into the lungs every 4 hours (Patient not taking: Reported on 4/11/2024) 18 g 11     buPROPion (WELLBUTRIN) 100 MG tablet Take 100 mg by mouth 2 times daily (Patient not taking: Reported on 2024)      diazepam (VALIUM) 2 MG tablet Take 1 tablet (2 mg) by mouth every 6 hours as needed for anxiety (flying phobia) (Patient not taking: Reported on 2024) 4 tablet 0    prochlorperazine (COMPAZINE) 10 MG tablet Take 10 mg by mouth every 6 hours as needed for nausea When having migraine (Patient not taking: Reported on 2024)         Allergies   Allergen Reactions    Codeine Anaphylaxis    Nefazodone Nausea and Vomiting    Oxycodone-Acetaminophen Unknown     hallucinations    Cetirizine Nausea and Vomiting    Trazodone Nausea and Vomiting    Oxycodone Hallucination and Other (See Comments)    Amoxicillin-Pot Clavulanate [Amoxicillin-Pot Clavulanate] Rash    Cephalexin Rash    Clavulanic Acid Rash    Cyclobenzaprine Rash    Eszopiclone Rash    Methocarbamol Rash    Penicillins Rash     Mild rash        Social History     Tobacco Use    Smoking status: Light Smoker     Current packs/day: 0.00     Average packs/day: 0.5 packs/day for 45.0 years (22.5 ttl pk-yrs)     Types: Cigarettes     Last attempt to quit: 10/18/2021     Years since quittin.4    Smokeless tobacco: Never   Substance Use Topics    Alcohol use: Not Currently     Family History   Problem Relation Age of Onset    Lung Cancer Mother          at age 52    Other Cancer Mother         Lung cancer moved into bones    Alcoholism Father     Substance Abuse Father          of lived disease at 62    Heart Disease Maternal Grandfather     Diabetes Paternal Grandmother     Coronary Artery Disease Paternal Grandmother     Heart Disease Paternal Grandfather     Diabetes Sister     Hyperlipidemia Sister     Hypertension Sister     Crohn's Disease Sister     Coronary Artery Disease Paternal Uncle     Asthma Maternal Aunt     Diabetes Other         Diabetes    Hyperlipidemia Sister     Depression Nephew         Commited suicide at  "17    Substance Abuse Nephew     Asthma Nephew     Asthma Daughter         Severe     History   Drug Use No         Review of Systems    Review of Systems  CONSTITUTIONAL: NEGATIVE for fever, chills, change in weight  INTEGUMENTARY/SKIN: NEGATIVE for worrisome rashes, moles or lesions  EYES: NEGATIVE for vision changes or irritation  ENT/MOUTH: NEGATIVE for ear, mouth and throat problems  RESP: NEGATIVE for significant cough or SOB  BREAST: NEGATIVE for masses, tenderness or discharge  CV: NEGATIVE for chest pain, palpitations or peripheral edema  GI: NEGATIVE for nausea, abdominal pain, heartburn, or change in bowel habits  : NEGATIVE for frequency, dysuria, or hematuria  MUSCULOSKELETAL:POSITIVE  for chronic pain of spine including lumbar, neck with history of many surgeries, other myalgias and joint pain as well.  NEURO: NEGATIVE for weakness, dizziness or paresthesias  ENDOCRINE: NEGATIVE for temperature intolerance, skin/hair changes  HEME: NEGATIVE for bleeding problems  PSYCHIATRIC: NEGATIVE for changes in mood or affect    Objective    /68   Pulse 69   Temp 98.2  F (36.8  C)   Resp 16   Ht 1.645 m (5' 4.75\")   Wt 58.5 kg (129 lb)   LMP 06/01/1983   SpO2 97%   BMI 21.63 kg/m     Estimated body mass index is 21.63 kg/m  as calculated from the following:    Height as of this encounter: 1.645 m (5' 4.75\").    Weight as of this encounter: 58.5 kg (129 lb).  Physical Exam  General appearance - alert, well appearing, and in no distress and normal appearing weight  Mental status - normal mood, behavior, speech, dress, motor activity, and thought processes  Eyes - pupils equal and reactive, extraocular eye movements intact, small pupils  Ears - bilateral TM's and external ear canals normal  Nose - normal and patent, no erythema, discharge   Mouth - mucous membranes moist, pharynx normal without lesions  Neck - supple, no significant adenopathy, carotids upstroke normal bilaterally, no bruits, thyroid " exam: thyroid is normal in size without nodules or tenderness  Chest - clear to auscultation, no wheezes, rales or rhonchi, symmetric air entry, decreased breath sounds bilaterally  Heart - normal rate and regular rhythm, no murmurs noted  Abdomen - soft, nontender, nondistended, no masses or organomegaly  Breasts - not examined  Pelvic - examination not indicated  Back exam - limited range of motion, pain with motion noted during exam  Neurological - alert, oriented, normal speech, no focal findings or movement disorder noted, cranial nerves II through XII intact  Musculoskeletal - no joint tenderness, deformity or swelling  Extremities - peripheral pulses normal, no pedal edema, no clubbing or cyanosis  Skin - normal coloration and turgor, no rashes, no suspicious skin lesions noted      Recent Labs   Lab Test 02/13/24  1344 08/16/23  1838   HGB 13.4 14.6    284    139   POTASSIUM 4.2 3.9   CR 1.03* 1.04        Diagnostics  Recent Results (from the past 168 hour(s))   CBC with platelets    Collection Time: 04/11/24  4:13 PM   Result Value Ref Range    WBC Count 6.9 4.0 - 11.0 10e3/uL    RBC Count 4.81 3.80 - 5.20 10e6/uL    Hemoglobin 14.1 11.7 - 15.7 g/dL    Hematocrit 43.8 35.0 - 47.0 %    MCV 91 78 - 100 fL    MCH 29.3 26.5 - 33.0 pg    MCHC 32.2 31.5 - 36.5 g/dL    RDW 13.5 10.0 - 15.0 %    Platelet Count 248 150 - 450 10e3/uL   Basic metabolic panel    Collection Time: 04/11/24  4:13 PM   Result Value Ref Range    Sodium 137 135 - 145 mmol/L    Potassium 4.3 3.4 - 5.3 mmol/L    Chloride 100 98 - 107 mmol/L    Carbon Dioxide (CO2) 26 22 - 29 mmol/L    Anion Gap 11 7 - 15 mmol/L    Urea Nitrogen 22.7 8.0 - 23.0 mg/dL    Creatinine 1.22 (H) 0.51 - 0.95 mg/dL    GFR Estimate 49 (L) >60 mL/min/1.73m2    Calcium 9.6 8.8 - 10.2 mg/dL    Glucose 98 70 - 99 mg/dL      No EKG this visit, completed in the last 90 days.  2/13/2024    Revised Cardiac Risk Index (RCRI)  The patient has the following serious  cardiovascular risks for perioperative complications:   - No serious cardiac risks = 0 points     RCRI Interpretation: 0 points: Class I (very low risk - 0.4% complication rate)         Signed Electronically by: Joanne Weiner MD  Copy of this evaluation report is provided to requesting physician.         Answers submitted by the patient for this visit:  Patient Health Questionnaire (Submitted on 4/11/2024)  If you checked off any problems, how difficult have these problems made it for you to do your work, take care of things at home, or get along with other people?: Somewhat difficult  PHQ9 TOTAL SCORE: 12  DAWSON-7 (Submitted on 4/11/2024)  DAWSON 7 TOTAL SCORE: 3

## 2024-04-12 LAB
ANION GAP SERPL CALCULATED.3IONS-SCNC: 11 MMOL/L (ref 7–15)
BUN SERPL-MCNC: 22.7 MG/DL (ref 8–23)
CALCIUM SERPL-MCNC: 9.6 MG/DL (ref 8.8–10.2)
CHLORIDE SERPL-SCNC: 100 MMOL/L (ref 98–107)
CREAT SERPL-MCNC: 1.22 MG/DL (ref 0.51–0.95)
DEPRECATED HCO3 PLAS-SCNC: 26 MMOL/L (ref 22–29)
EGFRCR SERPLBLD CKD-EPI 2021: 49 ML/MIN/1.73M2
GLUCOSE SERPL-MCNC: 98 MG/DL (ref 70–99)
POTASSIUM SERPL-SCNC: 4.3 MMOL/L (ref 3.4–5.3)
SODIUM SERPL-SCNC: 137 MMOL/L (ref 135–145)

## 2024-04-16 ENCOUNTER — TRANSFERRED RECORDS (OUTPATIENT)
Dept: HEALTH INFORMATION MANAGEMENT | Facility: CLINIC | Age: 64
End: 2024-04-16
Payer: COMMERCIAL

## 2024-04-23 ENCOUNTER — TRANSFERRED RECORDS (OUTPATIENT)
Dept: HEALTH INFORMATION MANAGEMENT | Facility: CLINIC | Age: 64
End: 2024-04-23
Payer: COMMERCIAL

## 2024-04-30 ENCOUNTER — OFFICE VISIT (OUTPATIENT)
Dept: NEUROLOGY | Facility: CLINIC | Age: 64
End: 2024-04-30
Payer: COMMERCIAL

## 2024-04-30 VITALS
DIASTOLIC BLOOD PRESSURE: 54 MMHG | HEART RATE: 74 BPM | SYSTOLIC BLOOD PRESSURE: 99 MMHG | WEIGHT: 129 LBS | BODY MASS INDEX: 21.49 KG/M2 | HEIGHT: 65 IN

## 2024-04-30 DIAGNOSIS — G43.019 MIGRAINE WITHOUT AURA, INTRACTABLE, WITHOUT STATUS MIGRAINOSUS: Primary | ICD-10-CM

## 2024-04-30 PROCEDURE — 64615 CHEMODENERV MUSC MIGRAINE: CPT | Performed by: PSYCHIATRY & NEUROLOGY

## 2024-04-30 RX ORDER — EZETIMIBE 10 MG/1
10 TABLET ORAL AT BEDTIME
COMMUNITY
Start: 2023-10-30

## 2024-04-30 NOTE — LETTER
4/30/2024         RE: Dayana Samaniego  [id number] [facility] [id number]        Dear Colleague,    Thank you for referring your patient, Dayana Samaniego, to the [facility] [place]. Please see a copy of my visit note below.    Subjective    CC: Dayana Samaniego  is a 63 year old female who presents to clinic today for the following health issues:   Chief Complaint   Patient presents with     Botox    Subjective      In person evaluation    HPI  3/16/2020, in person evaluation  11/30/2021, in person evaluation  12/27/2023, in person visit  1/30/2024, Botox injection  4/30/2024, Botox injection    63-year-old followed neurologically for:  Intractable migraine headaches  History of 3 cervical fusions  Longstanding generalized hyperreflexia  History of carotid bruits  Fibromyalgia      Follow-up for intractable migraine headaches failed multiple medications as preventatives and abortive's  Has been treated with Aimovig  Follows at spine clinic and with other neurologist for her neck and low back difficulties status post surgery cervical and lumbar    Migraine headaches at least 15/month  4 headaches per week  Excedrin 4/week  Drink some Pepsi  Visual blurriness with a headache  Nausea but no vomiting  Relpax gives her nausea also although she does have Compazine through her primary to take with that but she waits until the headache is been there for a long time before using it    Previously used Botox but it lost its effectiveness  Off Botox injections for a while  Last use may be 2019.    Aimovig seems to be losing some of its benefit.    Patient has gone back on the Botox injections which were restarted 1/30/2024            Neurologic summary:    A.  Patient with intractable migraine headaches       Onset of headaches as far back as 1987       Associated with photophobia/phonophobia and visual changes       History of carsickness       Family history of migraines in her  daughter    Previous frequency of headaches are 2-3/week  Had used Botox injections up until March 2020  Switch to Aimovig end of March 2020      Patient uses the once a month injectable Aimovig  She gets at least 2 weeks of good response  Then the headaches seem to increase in frequency  During the good phase she has 1-2 headaches per week  Duration of the headache during the good phases all day  Will use an Excedrin Migraine maybe 4/week during that time warned her about medication rebound headache  Uses the Relpax to break the headache                                11/2021 12/2023 4/2024  Frequency           1-2/week               4/week        4+/ week    Duration              24 hours                 24 hours    24 hours    Excedrin              4 /week                  4/week    B.  Also has sharp shooting pain above the left eye lasting 10 minutes at a time jabbing in nature more neuralgic-like    C.  Patient does have bipolar disorder is on Lamictal    D.  Past history of cervical surgeries       Chronic hyperreflexia        Has been treated with Zanaflex as a muscle relaxant    E.  Has  COPD and is on inhalers       Not a good candidate for beta-blockers     F.  Since last seen had lumbar canal surgery 3/17/2021 by Dr. Sanon        Complex surgery see official reports        Anterior discectomy fusion L4-5 and L5-S1        PEEK cage L4-5, L5-S1        Posterior fusion L4-5, L5-S1        Insertion pedicle screws maria e fixation L4, 5, S1        Bilateral hemilaminectomies L4 see official report    Patient fell when she was in rehab and had a sacral fracture 3/22/2021  This slow down healing    She has been left with a lot of low back pain  Weakness more in the right leg than the left  She is always had very brisk reflexes with clonus before  As decreased reflexes at the L4 on the right leg    Is now ambulating without a cane or walker but has them at home    Patient states that there is a  concern that the grafts that they put in may not be healing as well and causing some of the pain but there is no new impingement  Did undergone an EMG 2021 at [facility]  At see neurology 2021 may be at [facility]  I can access some of the record but not all of it  She says that she has some residual nerve damage but no new impingement    I feel she should follow closely with her spine specialist and that has access to all her scans and data    Previously worked up by the spine specialist who did her surgery had other neurologist and EMGs performed 2021 which I do not have full access to  Follows at [facility] neurology for spine difficulty  (Chronic cervical pain/calf cramps.)  MRI C-spine 2023 postop changes C3-C7, disc osteophyte similar to past scans per their report.  Last seen 2023      Past medical history  Intractable migraine headaches  Chronic daily headaches  Status post cervical fusion x3  Longstanding hyperreflexia and difficulty with imbalance and falls  Carotid artery bruits  Fibromyalgia/chronic pain  COPD.  Dyspnea on exertion with pulmonary deconditioning with past history of pulmonary nodules.   Right atrial enlargement.  Fatigue with possible obstructive sleep apnea.  Bipolar disorder.  Hypothyroidism.   GERD.   Hyperlipidemia.  Chronic kidney disease.      Habits  History of smoking  Alcohol 1-2/month    Family history  Mother with bone tumor,  at age 52  Father with liver disease and alcohol use,  at age 62  Sister with diabetes high blood pressure and hypercholesterolemia  Maternal aunt diabetes  Nephew with depression and suicidal thoughts  Uncle with heart disease  Daughters with migraine        Past work-up review  EEG 2011 background rhythm 7-9 Hz slightly slow nonspecific consistent with toxic encephalopathy  MRI scan brain 2016  A.  Small T2 hyperintensity changes nonspecific  B.  No hydrocephalus mass or acute changes  TSH  0.32.   Vitamin D level in the past was low at 26.2.  MRI C-spine 2023 see official report  1.  Postoperative changes from C3 through C7.  2.  Degenerative change at C7-T1 see official report.  3.  Moderate spinal stenosis slight flattening anterior cord similar to prior exam C7-T1        Migraine treatment review    Some medications that have been used in the past as preventatives have included:  1. Topamax.  2. Verapamil.  3. Lyrica.  4. Amitriptyline.  5. Propranolol.    Medications tried in the past more as abortive agents:  a. Tizanidine.  b. Zomig.  c. Vicodin.  d. Relpax, which works sometimes, but not always.  Filled by primary MD  e. Toradol.    Currently, her medications are filled through her primary includin. Valium for anxiety.  2. Doxepin 25 mg three at bedtime.  3. Prozac 60 mg at bedtime.  4. Vistaril 25 mg two tablets p.o. q.6h. p.r.n. for migraine.  5. Avoids ibuprofen and aspirin due to her kidney disease, but recently had some Toradol.  6. Lamictal 200 mg at bedtime for bipolar.  7. Melatonin 5 mg for sleep.  8. Has had prednisone burst during headache flurries in the past.  9. Tizanidine 4 mg tablet, one during the day and two at night prescribed by primary.    Patient is a nonsmoker, does not drink alcohol.    Patient s past Botox injection schedules have included:  a. 2016, at the [facility], 25-30 units, did not tolerate, did not complete.  b. Second injection at the [facility] on 2017, 150 units given.    Through our clinic a 155 units using the headache template. Patient knows the risk and benefits of the medication:  a. 2017.  b. 2017.  c. 2018.  d. 2018, 200 units, given as she had a lot of neck spasm.  e. 2018, total of 200 units given as she has a lot of neck spasm.  f. 2019, 200 units given as she had a lot of neck spasm.  g. Halima 10, 2019 200 units headache template and some for neck  spasm  h. sept 13th, 2019 155 units  i Dec 16th, 2019 155 units    Botox injections restarted  Botox 155 units injected using headache template patient tolerates injections.  1.  1/30/2024, 155 units injected        Laboratory data review                               8/2023  NA/K                   139/3.9  BUN/Cr               17/1.04  GLU                     92  AST                     27  WBC/HGB           7.9/14.6  PLTs                     284,000  ESR/CRP             8/ <0.1              Review of Systems     Significant headaches as above  Sometimes gets a sharp shooting pain more so in the left eye than the right varies seems to be the beginning of a migraine    No visual changes   No dysarthria dysphasia or diplopia  No actual visual field cut  Does have visual blurriness with a headache  Gets nausea with the headache  Has spasms in the neck trapezius chronically    Does get photophobia when the headaches are worse    No chest pain no shortness of breath no nausea vomiting no diarrhea no fever chills no gait changes otherwise review systems are negative     Does have the pain in the back and weakness of the right leg greater than the left but can ambulate    General exam  Alert oriented x 3  Vital signs Per chart  Lungs clear  Heart rate regular  Abdomen soft  Symmetrical pulses  No edema the feet      Neurologic exam   Alert oriented x3  No aphasia  No neglect  Normal memory recall    Cranials 2 through 12 normal  Pupils are symmetrical and reactive  Optic fundi are good  Visual fields are intact  No ophthalmoplegia  No nystagmus  Face is symmetrical tongue twisters are good    Upper extremities  No drift no tremor normal finger-nose    Brisk reflexes in the lower extremities greater on the left than the right except decreased right knee reflex    Lower extremities  Pain versus weakness of the right iliopsoas but can bear weight okay and walk independently    Gait adequate        Assessment/Plan     1.   Chronic migraine without aura, intractable, without status migrainosus (G43.719)     2.  Started Aimovig March/April 2020       Aimovig 70mg/ml  Subcutaneous, Qmonth       Does seem to be helping the migraines    3.  Status post complex surgery on her lumbar spine March 17, 2021 with residual back pain and residual weakness of the right leg    4.  Sacral fracture 3/21/2021 while she was in rehab for her lumbar surgery    Previously worked up by the spine specialist who did her surgery had other neurologist and EMGs performed June 2021 which I do not have full access to  Follows at [facility] neurology for spine difficulty  (Chronic cervical pain/calf cramps.)  MRI C-spine 2/6/2023 postop changes C3-C7, disc osteophyte similar to past scans per their report.  Last seen 5/24/2023 by her spine neurologist.    No new recommendations in regards to her back or spine difficulty  Treatment of pain for her spine per other neurologist and spine specialist in regards to the back    Intractable migraine headache  Has failed multiple preventative therapies  Has been a while since she has been on the Botox  We will try to get Botox approved again and give this another try    Has been on Aimovig which may be losing its effectiveness  Sometimes rotating preventative medicines is helpful  She is trying to keep the Excedrin to 4/week to avoid medication rebound headache    Restarted Botox injection:  155 units per template patient tolerates the Botox injections  Has found that the Botox injections decrease the severity more so than the frequency  Patient tolerates the Botox injections  1/30/2024, 155 units  ,  Botox injections restarted  Botox 155 units injected using headache template patient tolerates injections.  1.  1/30/2024, 155 units injected  2.   4/30/2024, 155 units injected      She gets most of her meds either through the pain clinic and or primary    Patient on narcotics and gabapentin through the pain  clinic  Has been given a steroid burst and had steroid injections for her back    Patient is getting decrease frequency and severity from the headaches but near the end of the injection.  They started to increase again    Units Injected: 155  Units Discarded: 45  Lot Number and Expiration: [id number], expiration 06/2026  NDC number 8763-4972-35    DUNCAN SCHMITT      Again, thank you for allowing me to participate in the care of your patient.        Sincerely,        duncan Schmitt MD

## 2024-04-30 NOTE — PROGRESS NOTES
Subjective     CC: Dayana Samaniego  is a 63 year old female who presents to clinic today for the following health issues:   Chief Complaint   Patient presents with    Botox    Subjective       In person evaluation    HPI  3/16/2020, in person evaluation  11/30/2021, in person evaluation  12/27/2023, in person visit  1/30/2024, Botox injection  4/30/2024, Botox injection    63-year-old followed neurologically for:  Intractable migraine headaches  History of 3 cervical fusions  Longstanding generalized hyperreflexia  History of carotid bruits  Fibromyalgia      Follow-up for intractable migraine headaches failed multiple medications as preventatives and abortive's  Has been treated with Aimovig  Follows at spine clinic and with other neurologist for her neck and low back difficulties status post surgery cervical and lumbar    Migraine headaches at least 15/month  4 headaches per week  Excedrin 4/week  Drink some Pepsi  Visual blurriness with a headache  Nausea but no vomiting  Relpax gives her nausea also although she does have Compazine through her primary to take with that but she waits until the headache is been there for a long time before using it    Previously used Botox but it lost its effectiveness  Off Botox injections for a while  Last use may be 2019.    Aimovig seems to be losing some of its benefit.    Patient has gone back on the Botox injections which were restarted 1/30/2024            Neurologic summary:    A.  Patient with intractable migraine headaches       Onset of headaches as far back as 1987       Associated with photophobia/phonophobia and visual changes       History of carsickness       Family history of migraines in her daughter    Previous frequency of headaches are 2-3/week  Had used Botox injections up until March 2020  Switch to Aimovig end of March 2020      Patient uses the once a month injectable Aimovig  She gets at least 2 weeks of good response  Then the headaches seem to  increase in frequency  During the good phase she has 1-2 headaches per week  Duration of the headache during the good phases all day  Will use an Excedrin Migraine maybe 4/week during that time warned her about medication rebound headache  Uses the Relpax to break the headache                                11/2021 12/2023 4/2024  Frequency           1-2/week               4/week        4+/ week    Duration              24 hours                 24 hours    24 hours    Excedrin              4 /week                  4/week    B.  Also has sharp shooting pain above the left eye lasting 10 minutes at a time jabbing in nature more neuralgic-like    C.  Patient does have bipolar disorder is on Lamictal    D.  Past history of cervical surgeries       Chronic hyperreflexia        Has been treated with Zanaflex as a muscle relaxant    E.  Has  COPD and is on inhalers       Not a good candidate for beta-blockers     F.  Since last seen had lumbar canal surgery 3/17/2021 by Dr. Sanon        Complex surgery see official reports        Anterior discectomy fusion L4-5 and L5-S1        PEEK cage L4-5, L5-S1        Posterior fusion L4-5, L5-S1        Insertion pedicle screws maria e fixation L4, 5, S1        Bilateral hemilaminectomies L4 see official report    Patient fell when she was in rehab and had a sacral fracture 3/22/2021  This slow down healing    She has been left with a lot of low back pain  Weakness more in the right leg than the left  She is always had very brisk reflexes with clonus before  As decreased reflexes at the L4 on the right leg    Is now ambulating without a cane or walker but has them at home    Patient states that there is a concern that the grafts that they put in may not be healing as well and causing some of the pain but there is no new impingement  Did undergone an EMG 6/8/2021 at [facility]  At see neurology 9/23/2021 may be at [facility]  I can access some of the record but not all  of it  She says that she has some residual nerve damage but no new impingement    I feel she should follow closely with her spine specialist and that has access to all her scans and data    Previously worked up by the spine specialist who did her surgery had other neurologist and EMGs performed 2021 which I do not have full access to  Follows at [facility] of neurology for spine difficulty  (Chronic cervical pain/calf cramps.)  MRI C-spine 2023 postop changes C3-C7, disc osteophyte similar to past scans per their report.  Last seen 2023      Past medical history  Intractable migraine headaches  Chronic daily headaches  Status post cervical fusion x3  Longstanding hyperreflexia and difficulty with imbalance and falls  Carotid artery bruits  Fibromyalgia/chronic pain  COPD.  Dyspnea on exertion with pulmonary deconditioning with past history of pulmonary nodules.   Right atrial enlargement.  Fatigue with possible obstructive sleep apnea.  Bipolar disorder.  Hypothyroidism.   GERD.   Hyperlipidemia.  Chronic kidney disease.      Habits  History of smoking  Alcohol 1-2/month    Family history  Mother with bone tumor,  at age 52  Father with liver disease and alcohol use,  at age 62  Sister with diabetes high blood pressure and hypercholesterolemia  Maternal aunt diabetes  Nephew with depression and suicidal thoughts  Uncle with heart disease  Daughters with migraine        Past work-up review  EEG 2011 background rhythm 7-9 Hz slightly slow nonspecific consistent with toxic encephalopathy  MRI scan brain 2016  A.  Small T2 hyperintensity changes nonspecific  B.  No hydrocephalus mass or acute changes  TSH 0.32.   Vitamin D level in the past was low at 26.2.  MRI C-spine 2023 see official report  1.  Postoperative changes from C3 through C7.  2.  Degenerative change at C7-T1 see official report.  3.  Moderate spinal stenosis slight flattening anterior cord similar to  prior exam C7-T1        Migraine treatment review    Some medications that have been used in the past as preventatives have included:  1. Topamax.  2. Verapamil.  3. Lyrica.  4. Amitriptyline.  5. Propranolol.    Medications tried in the past more as abortive agents:  a. Tizanidine.  b. Zomig.  c. Vicodin.  d. Relpax, which works sometimes, but not always.  Filled by primary MD  e. Toradol.    Currently, her medications are filled through her primary includin. Valium for anxiety.  2. Doxepin 25 mg three at bedtime.  3. Prozac 60 mg at bedtime.  4. Vistaril 25 mg two tablets p.o. q.6h. p.r.n. for migraine.  5. Avoids ibuprofen and aspirin due to her kidney disease, but recently had some Toradol.  6. Lamictal 200 mg at bedtime for bipolar.  7. Melatonin 5 mg for sleep.  8. Has had prednisone burst during headache flurries in the past.  9. Tizanidine 4 mg tablet, one during the day and two at night prescribed by primary.    Patient is a nonsmoker, does not drink alcohol.    Patient s past Botox injection schedules have included:  a. 2016, at the [facility], 25-30 units, did not tolerate, did not complete.  b. Second injection at the [facility] on 2017, 150 units given.    Through our clinic a 155 units using the headache template. Patient knows the risk and benefits of the medication:  a. 2017.  b. 2017.  c. 2018.  d. 2018, 200 units, given as she had a lot of neck spasm.  e. 2018, total of 200 units given as she has a lot of neck spasm.  f. 2019, 200 units given as she had a lot of neck spasm.  g. Halima 10, 2019 200 units headache template and some for neck spasm  h. 2019 155 units  i Dec 16th, 2019 155 units    Botox injections restarted  Botox 155 units injected using headache template patient tolerates injections.  1.  2024, 155 units injected        Laboratory data review                                8/2023  NA/K                   139/3.9  BUN/Cr               17/1.04  GLU                     92  AST                     27  WBC/HGB           7.9/14.6  PLTs                     284,000  ESR/CRP             8/ <0.1              Review of Systems     Significant headaches as above  Sometimes gets a sharp shooting pain more so in the left eye than the right varies seems to be the beginning of a migraine    No visual changes   No dysarthria dysphasia or diplopia  No actual visual field cut  Does have visual blurriness with a headache  Gets nausea with the headache  Has spasms in the neck trapezius chronically    Does get photophobia when the headaches are worse    No chest pain no shortness of breath no nausea vomiting no diarrhea no fever chills no gait changes otherwise review systems are negative     Does have the pain in the back and weakness of the right leg greater than the left but can ambulate    General exam  Alert oriented x 3  Vital signs Per chart  Lungs clear  Heart rate regular  Abdomen soft  Symmetrical pulses  No edema the feet      Neurologic exam   Alert oriented x3  No aphasia  No neglect  Normal memory recall    Cranials 2 through 12 normal  Pupils are symmetrical and reactive  Optic fundi are good  Visual fields are intact  No ophthalmoplegia  No nystagmus  Face is symmetrical tongue twisters are good    Upper extremities  No drift no tremor normal finger-nose    Brisk reflexes in the lower extremities greater on the left than the right except decreased right knee reflex    Lower extremities  Pain versus weakness of the right iliopsoas but can bear weight okay and walk independently    Gait adequate        Assessment/Plan     1.  Chronic migraine without aura, intractable, without status migrainosus (G43.719)     2.  Started Aimovig March/April 2020       Aimovig 70mg/ml  Subcutaneous, Qmonth       Does seem to be helping the migraines    3.  Status post complex surgery on her lumbar spine March  17, 2021 with residual back pain and residual weakness of the right leg    4.  Sacral fracture 3/21/2021 while she was in rehab for her lumbar surgery    Previously worked up by the spine specialist who did her surgery had other neurologist and EMGs performed June 2021 which I do not have full access to  Follows at [facility] for spine difficulty  (Chronic cervical pain/calf cramps.)  MRI C-spine 2/6/2023 postop changes C3-C7, disc osteophyte similar to past scans per their report.  Last seen 5/24/2023 by her spine neurologist.    No new recommendations in regards to her back or spine difficulty  Treatment of pain for her spine per other neurologist and spine specialist in regards to the back    Intractable migraine headache  Has failed multiple preventative therapies  Has been a while since she has been on the Botox  We will try to get Botox approved again and give this another try    Has been on Aimovig which may be losing its effectiveness  Sometimes rotating preventative medicines is helpful  She is trying to keep the Excedrin to 4/week to avoid medication rebound headache    Restarted Botox injection:  155 units per template patient tolerates the Botox injections  Has found that the Botox injections decrease the severity more so than the frequency  Patient tolerates the Botox injections  1/30/2024, 155 units  ,  Botox injections restarted  Botox 155 units injected using headache template patient tolerates injections.  1.  1/30/2024, 155 units injected  2.   4/30/2024, 155 units injected      She gets most of her meds either through the pain clinic and or primary    Patient on narcotics and gabapentin through the pain clinic  Has been given a steroid burst and had steroid injections for her back    Patient is getting decrease frequency and severity from the headaches but near the end of the injection.  They started to increase again    Units Injected: 155  Units Discarded: 45  Lot Number and  Expiration: [id number], expiration 06/2026  NDC number 6492-9990-30    DUNCAN QUAN

## 2024-05-01 ENCOUNTER — TRANSFERRED RECORDS (OUTPATIENT)
Dept: HEALTH INFORMATION MANAGEMENT | Facility: CLINIC | Age: 64
End: 2024-05-01
Payer: COMMERCIAL

## 2024-05-20 DIAGNOSIS — J30.89 ENVIRONMENTAL AND SEASONAL ALLERGIES: ICD-10-CM

## 2024-05-20 DIAGNOSIS — Z72.0 TOBACCO ABUSE: ICD-10-CM

## 2024-05-20 RX ORDER — MONTELUKAST SODIUM 10 MG/1
1 TABLET ORAL AT BEDTIME
Qty: 90 TABLET | Refills: 4 | Status: SHIPPED | OUTPATIENT
Start: 2024-05-20

## 2024-05-20 RX ORDER — VARENICLINE TARTRATE 1 MG/1
1 TABLET, FILM COATED ORAL 2 TIMES DAILY
Qty: 180 TABLET | Refills: 0 | Status: SHIPPED | OUTPATIENT
Start: 2024-05-20

## 2024-05-21 ENCOUNTER — TRANSFERRED RECORDS (OUTPATIENT)
Dept: HEALTH INFORMATION MANAGEMENT | Facility: CLINIC | Age: 64
End: 2024-05-21
Payer: COMMERCIAL

## 2024-05-22 ENCOUNTER — TRANSFERRED RECORDS (OUTPATIENT)
Dept: HEALTH INFORMATION MANAGEMENT | Facility: CLINIC | Age: 64
End: 2024-05-22
Payer: COMMERCIAL

## 2024-05-30 ENCOUNTER — TRANSFERRED RECORDS (OUTPATIENT)
Dept: HEALTH INFORMATION MANAGEMENT | Facility: CLINIC | Age: 64
End: 2024-05-30
Payer: COMMERCIAL

## 2024-06-02 ENCOUNTER — HEALTH MAINTENANCE LETTER (OUTPATIENT)
Age: 64
End: 2024-06-02

## 2024-06-05 ENCOUNTER — E-VISIT (OUTPATIENT)
Dept: FAMILY MEDICINE | Facility: CLINIC | Age: 64
End: 2024-06-05
Payer: COMMERCIAL

## 2024-06-05 DIAGNOSIS — N30.01 ACUTE CYSTITIS WITH HEMATURIA: Primary | ICD-10-CM

## 2024-06-05 PROCEDURE — 99421 OL DIG E/M SVC 5-10 MIN: CPT | Performed by: FAMILY MEDICINE

## 2024-06-05 RX ORDER — NITROFURANTOIN 25; 75 MG/1; MG/1
100 CAPSULE ORAL 2 TIMES DAILY
Qty: 10 CAPSULE | Refills: 0 | Status: SHIPPED | OUTPATIENT
Start: 2024-06-05 | End: 2024-06-10

## 2024-06-06 NOTE — PATIENT INSTRUCTIONS
Dear Dayana Samaniego    After reviewing your responses, I've been able to diagnose you with a urinary tract infection, which is a common infection of the bladder with bacteria.  This is not a sexually transmitted infection, though urinating immediately after intercourse can help prevent infections.  Drinking lots of fluids is also helpful to clear your current infection and prevent the next one.      I have sent a prescription for antibiotics to your pharmacy to treat this infection.    It is important that you take all of your prescribed medication even if your symptoms are improving after a few doses.  Taking all of your medicine helps prevent the symptoms from returning.     If your symptoms worsen, you develop pain in your back or stomach, develop fevers, or are not improving in 5 days, please contact your primary care provider for an appointment or visit any of our convenient Walk-in or Urgent Care Centers to be seen, which can be found on our website here.    Thanks again for choosing us as your health care partner,    Joanne Weiner MD  Urinary Tract Infection (UTI) in Women: Care Instructions  Overview     A urinary tract infection (UTI) is an infection caused by bacteria. It can happen anywhere in the urinary tract. A UTI can happen in the:  Kidneys.  Ureters, the tubes that connect the kidneys to the bladder.  Bladder.  Urethra, where the urine comes out.  Most UTIs are bladder infections. They often cause pain or burning when you urinate.  Most UTIs can be cured with antibiotics. If you are prescribed antibiotics, be sure to complete your treatment so that the infection does not get worse.  Follow-up care is a key part of your treatment and safety. Be sure to make and go to all appointments, and call your doctor if you are having problems. It's also a good idea to know your test results and keep a list of the medicines you take.  How can you care for yourself at home?  Take your antibiotics as  "directed. Do not stop taking them just because you feel better. You need to take the full course of antibiotics.  Drink extra water and other fluids for the next day or two. This will help make the urine less concentrated and help wash out the bacteria that are causing the infection. (If you have kidney, heart, or liver disease and have to limit fluids, talk with your doctor before you increase the amount of fluids you drink.)  Avoid drinks that are carbonated or have caffeine. They can irritate the bladder.  Urinate often. Try to empty your bladder each time.  To relieve pain, take a hot bath or lay a heating pad set on low over your lower belly or genital area. Never go to sleep with a heating pad in place.  To prevent UTIs  Drink plenty of water each day. This helps you urinate often, which clears bacteria from your system. (If you have kidney, heart, or liver disease and have to limit fluids, talk with your doctor before you increase the amount of fluids you drink.)  Urinate when you need to.  If you are sexually active, urinate right after you have sex.  Change sanitary pads often.  Avoid douches, bubble baths, feminine hygiene sprays, and other feminine hygiene products that have deodorants.  After going to the bathroom, wipe from front to back.  When should you call for help?   Call your doctor now or seek immediate medical care if:    You have new or worse fever, chills, nausea, or vomiting.     You have new pain in your back just below your rib cage. This is called flank pain.     There is new blood or pus in your urine.     You have any problems with your antibiotic medicine.   Watch closely for changes in your health, and be sure to contact your doctor if:    You are not getting better after taking an antibiotic for 2 days.     Your symptoms go away but then come back.   Where can you learn more?  Go to https://www.healthwise.net/patiented  Enter K848 in the search box to learn more about \"Urinary Tract " "Infection (UTI) in Women: Care Instructions.\"  Current as of: November 15, 2023               Content Version: 14.0    7282-3508 [employer].   Care instructions adapted under license by your healthcare professional. If you have questions about a medical condition or this instruction, always ask your healthcare professional. [employer] disclaims any warranty or liability for your use of this information.      "

## 2024-06-20 ENCOUNTER — TRANSFERRED RECORDS (OUTPATIENT)
Dept: HEALTH INFORMATION MANAGEMENT | Facility: CLINIC | Age: 64
End: 2024-06-20
Payer: COMMERCIAL

## 2024-07-30 ENCOUNTER — TRANSFERRED RECORDS (OUTPATIENT)
Dept: HEALTH INFORMATION MANAGEMENT | Facility: CLINIC | Age: 64
End: 2024-07-30
Payer: COMMERCIAL

## 2024-08-06 ENCOUNTER — OFFICE VISIT (OUTPATIENT)
Dept: NEUROLOGY | Facility: CLINIC | Age: 64
End: 2024-08-06
Payer: COMMERCIAL

## 2024-08-06 VITALS
HEART RATE: 84 BPM | HEIGHT: 65 IN | WEIGHT: 129 LBS | SYSTOLIC BLOOD PRESSURE: 154 MMHG | BODY MASS INDEX: 21.49 KG/M2 | DIASTOLIC BLOOD PRESSURE: 70 MMHG

## 2024-08-06 DIAGNOSIS — G43.019 MIGRAINE WITHOUT AURA, INTRACTABLE, WITHOUT STATUS MIGRAINOSUS: Primary | ICD-10-CM

## 2024-08-06 PROCEDURE — 64615 CHEMODENERV MUSC MIGRAINE: CPT | Performed by: PSYCHIATRY & NEUROLOGY

## 2024-08-06 NOTE — NURSING NOTE
Chief Complaint   Patient presents with    Headache     Patient states weather is contributing to her having migraines daily.  Valentin Eaton on 8/6/2024 at 10:08 AM

## 2024-08-06 NOTE — PROGRESS NOTES
Subjective     CC: Dayana Samaniego  is a 63 year old female who presents to clinic today for the following health issues:   Chief Complaint   Patient presents with    Headache     Patient states weather is contributing to her having migraines daily.  Botox          In person evaluation    HPI  3/16/2020, in person evaluation  11/30/2021, in person evaluation  12/27/2023, in person visit  1/30/2024, Botox injection  4/30/2024, Botox injection  8/6/2024, Botox injection    63-year-old followed neurologically for:  Intractable migraine headaches  History of 3 cervical fusions  Longstanding generalized hyperreflexia  History of carotid bruits  Fibromyalgia      Follow-up for intractable migraine headaches failed multiple medications as preventatives and abortive's  Has been treated with Aimovig  Follows at spine clinic and with other neurologist for her neck and low back difficulties status post surgery cervical and lumbar    Migraine headaches at least 15/month  4 headaches per week  Excedrin 4/week  Drink some Pepsi  Visual blurriness with a headache  Nausea but no vomiting  Relpax gives her nausea also although she does have Compazine through her primary to take with that but she waits until the headache is been there for a long time before using it    Previously used Botox but it lost its effectiveness  Off Botox injections for a while  Last use may be 2019.    Aimovig seems to be losing some of its benefit.    Patient has gone back on the Botox injections which were restarted 1/30/2024            Neurologic summary:    A.  Patient with intractable migraine headaches       Onset of headaches as far back as 1987       Associated with photophobia/phonophobia and visual changes       History of carsickness       Family history of migraines in her daughter    Previous frequency of headaches are 2-3/week  Had used Botox injections up until March 2020  Switch to Aimovig end of March 2020      Patient uses the once a  month injectable Aimovig  She gets at least 2 weeks of good response  Then the headaches seem to increase in frequency  During the good phase she has 1-2 headaches per week  Duration of the headache during the good phases all day  Will use an Excedrin Migraine maybe 4/week during that time warned her about medication rebound headache  Uses the Relpax to break the headache                                11/2021 12/2023 4/2024 8/2024  Frequency           1-2/week               4/week        4+/ week        4 /week    Duration              24 hours                 24 hours    24 hours          24-hour    Excedrin              4 /week                  4/week                               4 /week    Headache frequency increased just prior to next Botox injection.      B.  Also has sharp shooting pain above the left eye lasting 10 minutes at a time jabbing in nature more neuralgic-like    C.  Patient does have bipolar disorder is on Lamictal    D.  Past history of cervical surgeries       Chronic hyperreflexia        Has been treated with Zanaflex as a muscle relaxant    E.  Has  COPD and is on inhalers       Not a good candidate for beta-blockers     F.  Since last seen had lumbar canal surgery 3/17/2021 by Dr. Sanon        Complex surgery see official reports        Anterior discectomy fusion L4-5 and L5-S1        PEEK cage L4-5, L5-S1        Posterior fusion L4-5, L5-S1        Insertion pedicle screws maria e fixation L4, 5, S1        Bilateral hemilaminectomies L4 see official report    Patient fell when she was in rehab and had a sacral fracture 3/22/2021  This slow down healing    She has been left with a lot of low back pain  Weakness more in the right leg than the left  She is always had very brisk reflexes with clonus before  As decreased reflexes at the L4 on the right leg    Is now ambulating without a cane or walker but has them at home    Patient states that there is a concern that  the grafts that they put in may not be healing as well and causing some of the pain but there is no new impingement  Did undergone an EMG 2021 at [facility]  At see neurology 2021 may be at [facility]  I can access some of the record but not all of it  She says that she has some residual nerve damage but no new impingement    I feel she should follow closely with her spine specialist and that has access to all her scans and data    Previously worked up by the spine specialist who did her surgery had other neurologist and EMGs performed 2021 which I do not have full access to  Follows at [facility] neurology for spine difficulty  (Chronic cervical pain/calf cramps.)  MRI C-spine 2023 postop changes C3-C7, disc osteophyte similar to past scans per their report.  Last seen 2023      Past medical history  Intractable migraine headaches  Chronic daily headaches  Status post cervical fusion x3  Longstanding hyperreflexia and difficulty with imbalance and falls  Carotid artery bruits  Fibromyalgia/chronic pain  COPD.  Dyspnea on exertion with pulmonary deconditioning with past history of pulmonary nodules.   Right atrial enlargement.  Fatigue with possible obstructive sleep apnea.  Bipolar disorder.  Hypothyroidism.   GERD.   Hyperlipidemia.  Chronic kidney disease.      Habits  History of smoking  Alcohol 1-2/month    Family history  Mother with bone tumor,  at age 52  Father with liver disease and alcohol use,  at age 62  Sister with diabetes high blood pressure and hypercholesterolemia  Maternal aunt diabetes  Nephew with depression and suicidal thoughts  Uncle with heart disease  Daughters with migraine        Past work-up review  EEG 2011 background rhythm 7-9 Hz slightly slow nonspecific consistent with toxic encephalopathy  MRI scan brain 2016  A.  Small T2 hyperintensity changes nonspecific  B.  No hydrocephalus mass or acute changes  TSH 0.32.   Vitamin D  level in the past was low at 26.2.  MRI C-spine 2023 see official report  1.  Postoperative changes from C3 through C7.  2.  Degenerative change at C7-T1 see official report.  3.  Moderate spinal stenosis slight flattening anterior cord similar to prior exam C7-T1        Migraine treatment review    Some medications that have been used in the past as preventatives have included:  1. Topamax.  2. Verapamil.  3. Lyrica.  4. Amitriptyline.  5. Propranolol.    Medications tried in the past more as abortive agents:  a. Tizanidine.  b. Zomig.  c. Vicodin.  d. Relpax, which works sometimes, but not always.  Filled by primary MD  e. Toradol.    Currently, her medications are filled through her primary includin. Valium for anxiety.  2. Doxepin 25 mg three at bedtime.  3. Prozac 60 mg at bedtime.  4. Vistaril 25 mg two tablets p.o. q.6h. p.r.n. for migraine.  5. Avoids ibuprofen and aspirin due to her kidney disease, but recently had some Toradol.  6. Lamictal 200 mg at bedtime for bipolar.  7. Melatonin 5 mg for sleep.  8. Has had prednisone burst during headache flurries in the past.  9. Tizanidine 4 mg tablet, one during the day and two at night prescribed by primary.    Patient is a nonsmoker, does not drink alcohol.    Patient s past Botox injection schedules have included:  a. 2016, at the [facility], 25-30 units, did not tolerate, did not complete.  b. Second injection at the [facility] on 2017, 150 units given.    Through our clinic a 155 units using the headache template. Patient knows the risk and benefits of the medication:  a. 2017.  b. 2017.  c. 2018.  d. 2018, 200 units, given as she had a lot of neck spasm.  e. 2018, total of 200 units given as she has a lot of neck spasm.  f. 2019, 200 units given as she had a lot of neck spasm.  g. Halima 10, 2019 200 units headache template and some for neck spasm  h. sept  13th, 2019 155 units  i Dec 16th, 2019 155 units    Botox injections restarted  Botox 155 units injected using headache template patient tolerates injections.  1.  1/30/2024, 155 units injected  2.  4/30/2024, 155 units injected  3.  8/6/2024, 155 units injected        Laboratory data review                               8/2023  NA/K                   139/3.9  BUN/Cr               17/1.04  GLU                     92  AST                     27  WBC/HGB           7.9/14.6  PLTs                     284,000  ESR/CRP             8/ <0.1              Review of Systems     Significant headaches as above  Sometimes gets a sharp shooting pain more so in the left eye than the right varies seems to be the beginning of a migraine    No visual changes   No dysarthria dysphasia or diplopia  No actual visual field cut  Does have visual blurriness with a headache  Gets nausea with the headache  Has spasms in the neck trapezius chronically    Does get photophobia when the headaches are worse    No chest pain no shortness of breath no nausea vomiting no diarrhea no fever chills no gait changes otherwise review systems are negative     Does have the pain in the back and weakness of the right leg greater than the left but can ambulate    General exam  Alert oriented x 3  Vital signs Per chart  Lungs clear  Heart rate regular  Abdomen soft  Symmetrical pulses  No edema the feet      Neurologic exam   Alert oriented x3  No aphasia  No neglect  Normal memory recall    Cranials 2 through 12 normal  Pupils are symmetrical and reactive  Optic fundi are good  Visual fields are intact  No ophthalmoplegia  No nystagmus  Face is symmetrical tongue twisters are good    Upper extremities  No drift no tremor normal finger-nose    Brisk reflexes in the lower extremities greater on the left than the right except decreased right knee reflex    Lower extremities  Pain versus weakness of the right iliopsoas but can bear weight okay and walk  independently    Gait adequate        Assessment/Plan     1.  Chronic migraine without aura, intractable, without status migrainosus (G43.719)     2.  Started Aimovig March/April 2020       Aimovig 70mg/ml  Subcutaneous, Qmonth       Does seem to be helping the migraines    3.  Status post complex surgery on her lumbar spine March 17, 2021 with residual back pain and residual weakness of the right leg    4.  Sacral fracture 3/21/2021 while she was in rehab for her lumbar surgery    Previously worked up by the spine specialist who did her surgery had other neurologist and EMGs performed June 2021 which I do not have full access to  Follows at [facility] neurology for spine difficulty  (Chronic cervical pain/calf cramps.)  MRI C-spine 2/6/2023 postop changes C3-C7, disc osteophyte similar to past scans per their report.  Last seen 5/24/2023 by her spine neurologist.    No new recommendations in regards to her back or spine difficulty  Treatment of pain for her spine per other neurologist and spine specialist in regards to the back    Intractable migraine headache  Has failed multiple preventative therapies  Has been a while since she has been on the Botox  We will try to get Botox approved again and give this another try    Has been on Aimovig which may be losing its effectiveness  Sometimes rotating preventative medicines is helpful  She is trying to keep the Excedrin to 4/week to avoid medication rebound headache    Restarted Botox injection:  155 units per template patient tolerates the Botox injections  Has found that the Botox injections decrease the severity more so than the frequency  Patient tolerates the Botox injections  1/30/2024, 155 units  ,  Botox injections restarted  Botox 155 units injected using headache template patient tolerates injections.  1.  1/30/2024, 155 units injected  2.   4/30/2024, 155 units injected  3.   8/6/2024,   155 units injected    She gets most of her meds either  through the pain clinic and or primary    Patient on narcotics and gabapentin through the pain clinic  Has been given a steroid burst and had steroid injections for her back    Patient gets a decrease in frequency and severity of headaches with the Botox injections.  And as she gets closer to the next injection time it started to crescendo upwards again.    No side effects from Botox injections    Units Injected: 155  Units Discarded: 45  Lot Number and Expiration:  C3, expiration   NDC number 7760-0119-59      DUNCAN QUAN

## 2024-08-06 NOTE — LETTER
8/6/2024      Dayana Samaniego  [id number] [address] [place] [id number]      Dear Colleague,    Thank you for referring your patient, Dayana Samaniego, to the [facility] [place]. Please see a copy of my visit note below.    Subjective    CC: Dayana Samaniego  is a 63 year old female who presents to clinic today for the following health issues:   Chief Complaint   Patient presents with     Headache     Patient states weather is contributing to her having migraines daily.  Botox          In person evaluation    HPI  3/16/2020, in person evaluation  11/30/2021, in person evaluation  12/27/2023, in person visit  1/30/2024, Botox injection  4/30/2024, Botox injection  8/6/2024, Botox injection    63-year-old followed neurologically for:  Intractable migraine headaches  History of 3 cervical fusions  Longstanding generalized hyperreflexia  History of carotid bruits  Fibromyalgia      Follow-up for intractable migraine headaches failed multiple medications as preventatives and abortive's  Has been treated with Aimovig  Follows at spine clinic and with other neurologist for her neck and low back difficulties status post surgery cervical and lumbar    Migraine headaches at least 15/month  4 headaches per week  Excedrin 4/week  Drink some Pepsi  Visual blurriness with a headache  Nausea but no vomiting  Relpax gives her nausea also although she does have Compazine through her primary to take with that but she waits until the headache is been there for a long time before using it    Previously used Botox but it lost its effectiveness  Off Botox injections for a while  Last use may be 2019.    Aimovig seems to be losing some of its benefit.    Patient has gone back on the Botox injections which were restarted 1/30/2024            Neurologic summary:    A.  Patient with intractable migraine headaches       Onset of headaches as far back as 1987       Associated with photophobia/phonophobia and visual  changes       History of carsickness       Family history of migraines in her daughter    Previous frequency of headaches are 2-3/week  Had used Botox injections up until March 2020  Switch to Aimovig end of March 2020      Patient uses the once a month injectable Aimovig  She gets at least 2 weeks of good response  Then the headaches seem to increase in frequency  During the good phase she has 1-2 headaches per week  Duration of the headache during the good phases all day  Will use an Excedrin Migraine maybe 4/week during that time warned her about medication rebound headache  Uses the Relpax to break the headache                                11/2021 12/2023 4/2024 8/2024  Frequency           1-2/week               4/week        4+/ week        4 /week    Duration              24 hours                 24 hours    24 hours          24-hour    Excedrin              4 /week                  4/week                               4 /week    Headache frequency increased just prior to next Botox injection.      B.  Also has sharp shooting pain above the left eye lasting 10 minutes at a time jabbing in nature more neuralgic-like    C.  Patient does have bipolar disorder is on Lamictal    D.  Past history of cervical surgeries       Chronic hyperreflexia        Has been treated with Zanaflex as a muscle relaxant    E.  Has  COPD and is on inhalers       Not a good candidate for beta-blockers     F.  Since last seen had lumbar canal surgery 3/17/2021 by Dr. Sanon        Complex surgery see official reports        Anterior discectomy fusion L4-5 and L5-S1        PEEK cage L4-5, L5-S1        Posterior fusion L4-5, L5-S1        Insertion pedicle screws maria e fixation L4, 5, S1        Bilateral hemilaminectomies L4 see official report    Patient fell when she was in rehab and had a sacral fracture 3/22/2021  This slow down healing    She has been left with a lot of low back pain  Weakness more in  the right leg than the left  She is always had very brisk reflexes with clonus before  As decreased reflexes at the L4 on the right leg    Is now ambulating without a cane or walker but has them at home    Patient states that there is a concern that the grafts that they put in may not be healing as well and causing some of the pain but there is no new impingement  Did undergone an EMG 2021 at [facility]  At see neurology 2021 may be at [facility]  I can access some of the record but not all of it  She says that she has some residual nerve damage but no new impingement    I feel she should follow closely with her spine specialist and that has access to all her scans and data    Previously worked up by the spine specialist who did her surgery had other neurologist and EMGs performed 2021 which I do not have full access to  Follows at [facility] of neurology for spine difficulty  (Chronic cervical pain/calf cramps.)  MRI C-spine 2023 postop changes C3-C7, disc osteophyte similar to past scans per their report.  Last seen 2023      Past medical history  Intractable migraine headaches  Chronic daily headaches  Status post cervical fusion x3  Longstanding hyperreflexia and difficulty with imbalance and falls  Carotid artery bruits  Fibromyalgia/chronic pain  COPD.  Dyspnea on exertion with pulmonary deconditioning with past history of pulmonary nodules.   Right atrial enlargement.  Fatigue with possible obstructive sleep apnea.  Bipolar disorder.  Hypothyroidism.   GERD.   Hyperlipidemia.  Chronic kidney disease.      Habits  History of smoking  Alcohol 1-2/month    Family history  Mother with bone tumor,  at age 52  Father with liver disease and alcohol use,  at age 62  Sister with diabetes high blood pressure and hypercholesterolemia  Maternal aunt diabetes  Nephew with depression and suicidal thoughts  Uncle with heart disease  Daughters with migraine        Past work-up  review  EEG 2011 background rhythm 7-9 Hz slightly slow nonspecific consistent with toxic encephalopathy  MRI scan brain 2016  A.  Small T2 hyperintensity changes nonspecific  B.  No hydrocephalus mass or acute changes  TSH 0.32.   Vitamin D level in the past was low at 26.2.  MRI C-spine 2023 see official report  1.  Postoperative changes from C3 through C7.  2.  Degenerative change at C7-T1 see official report.  3.  Moderate spinal stenosis slight flattening anterior cord similar to prior exam C7-T1        Migraine treatment review    Some medications that have been used in the past as preventatives have included:  1. Topamax.  2. Verapamil.  3. Lyrica.  4. Amitriptyline.  5. Propranolol.    Medications tried in the past more as abortive agents:  a. Tizanidine.  b. Zomig.  c. Vicodin.  d. Relpax, which works sometimes, but not always.  Filled by primary MD  e. Toradol.    Currently, her medications are filled through her primary includin. Valium for anxiety.  2. Doxepin 25 mg three at bedtime.  3. Prozac 60 mg at bedtime.  4. Vistaril 25 mg two tablets p.o. q.6h. p.r.n. for migraine.  5. Avoids ibuprofen and aspirin due to her kidney disease, but recently had some Toradol.  6. Lamictal 200 mg at bedtime for bipolar.  7. Melatonin 5 mg for sleep.  8. Has had prednisone burst during headache flurries in the past.  9. Tizanidine 4 mg tablet, one during the day and two at night prescribed by primary.    Patient is a nonsmoker, does not drink alcohol.    Patient s past Botox injection schedules have included:  a. 2016, at the [facility], 25-30 units, did not tolerate, did not complete.  b. Second injection at the [facility] on 2017, 150 units given.    Through our clinic a 155 units using the headache template. Patient knows the risk and benefits of the medication:  a. 2017.  b. 2017.  c. 2018.  d. 2018, 200 units, given as she  had a lot of neck spasm.  e. September 24, 2018, total of 200 units given as she has a lot of neck spasm.  f. March 08, 2019, 200 units given as she had a lot of neck spasm.  g. Halima 10, 2019 200 units headache template and some for neck spasm  h. sept 13th, 2019 155 units  i Dec 16th, 2019 155 units    Botox injections restarted  Botox 155 units injected using headache template patient tolerates injections.  1.  1/30/2024, 155 units injected  2.  4/30/2024, 155 units injected  3.  8/6/2024, 155 units injected        Laboratory data review                               8/2023  NA/K                   139/3.9  BUN/Cr               17/1.04  GLU                     92  AST                     27  WBC/HGB           7.9/14.6  PLTs                     284,000  ESR/CRP             8/ <0.1              Review of Systems     Significant headaches as above  Sometimes gets a sharp shooting pain more so in the left eye than the right varies seems to be the beginning of a migraine    No visual changes   No dysarthria dysphasia or diplopia  No actual visual field cut  Does have visual blurriness with a headache  Gets nausea with the headache  Has spasms in the neck trapezius chronically    Does get photophobia when the headaches are worse    No chest pain no shortness of breath no nausea vomiting no diarrhea no fever chills no gait changes otherwise review systems are negative     Does have the pain in the back and weakness of the right leg greater than the left but can ambulate    General exam  Alert oriented x 3  Vital signs Per chart  Lungs clear  Heart rate regular  Abdomen soft  Symmetrical pulses  No edema the feet      Neurologic exam   Alert oriented x3  No aphasia  No neglect  Normal memory recall    Cranials 2 through 12 normal  Pupils are symmetrical and reactive  Optic fundi are good  Visual fields are intact  No ophthalmoplegia  No nystagmus  Face is symmetrical tongue twisters are good    Upper extremities  No drift  no tremor normal finger-nose    Brisk reflexes in the lower extremities greater on the left than the right except decreased right knee reflex    Lower extremities  Pain versus weakness of the right iliopsoas but can bear weight okay and walk independently    Gait adequate        Assessment/Plan     1.  Chronic migraine without aura, intractable, without status migrainosus (G43.719)     2.  Started Aimovig March/April 2020       Aimovig 70mg/ml  Subcutaneous, Qmonth       Does seem to be helping the migraines    3.  Status post complex surgery on her lumbar spine March 17, 2021 with residual back pain and residual weakness of the right leg    4.  Sacral fracture 3/21/2021 while she was in rehab for her lumbar surgery    Previously worked up by the spine specialist who did her surgery had other neurologist and EMGs performed June 2021 which I do not have full access to  Follows at [facility] of neurology for spine difficulty  (Chronic cervical pain/calf cramps.)  MRI C-spine 2/6/2023 postop changes C3-C7, disc osteophyte similar to past scans per their report.  Last seen 5/24/2023 by her spine neurologist.    No new recommendations in regards to her back or spine difficulty  Treatment of pain for her spine per other neurologist and spine specialist in regards to the back    Intractable migraine headache  Has failed multiple preventative therapies  Has been a while since she has been on the Botox  We will try to get Botox approved again and give this another try    Has been on Aimovig which may be losing its effectiveness  Sometimes rotating preventative medicines is helpful  She is trying to keep the Excedrin to 4/week to avoid medication rebound headache    Restarted Botox injection:  155 units per template patient tolerates the Botox injections  Has found that the Botox injections decrease the severity more so than the frequency  Patient tolerates the Botox injections  1/30/2024, 155 units  ,  Botox  injections restarted  Botox 155 units injected using headache template patient tolerates injections.  1.  1/30/2024, 155 units injected  2.   4/30/2024, 155 units injected  3.   8/6/2024,   155 units injected    She gets most of her meds either through the pain clinic and or primary    Patient on narcotics and gabapentin through the pain clinic  Has been given a steroid burst and had steroid injections for her back    Patient gets a decrease in frequency and severity of headaches with the Botox injections.  And as she gets closer to the next injection time it started to crescendo upwards again.    No side effects from Botox injections    Units Injected: 155  Units Discarded: 45  Lot Number and Expiration:  C3, expiration   NDC number 1524-3546-70      DUNCAN SCHMITT      Again, thank you for allowing me to participate in the care of your patient.        Sincerely,        duncan Schmitt MD

## 2024-08-13 ENCOUNTER — OFFICE VISIT (OUTPATIENT)
Dept: PULMONOLOGY | Facility: CLINIC | Age: 64
End: 2024-08-13
Payer: COMMERCIAL

## 2024-08-13 VITALS
OXYGEN SATURATION: 99 % | HEART RATE: 71 BPM | BODY MASS INDEX: 21.87 KG/M2 | SYSTOLIC BLOOD PRESSURE: 150 MMHG | WEIGHT: 130.4 LBS | DIASTOLIC BLOOD PRESSURE: 81 MMHG

## 2024-08-13 DIAGNOSIS — R53.82 CHRONIC FATIGUE: ICD-10-CM

## 2024-08-13 DIAGNOSIS — J30.89 ENVIRONMENTAL AND SEASONAL ALLERGIES: ICD-10-CM

## 2024-08-13 DIAGNOSIS — Z87.891 PERSONAL HISTORY OF TOBACCO USE: ICD-10-CM

## 2024-08-13 DIAGNOSIS — J44.9 CHRONIC OBSTRUCTIVE PULMONARY DISEASE, UNSPECIFIED COPD TYPE (H): Primary | ICD-10-CM

## 2024-08-13 PROCEDURE — 99214 OFFICE O/P EST MOD 30 MIN: CPT | Mod: 25 | Performed by: NURSE PRACTITIONER

## 2024-08-13 PROCEDURE — G0296 VISIT TO DETERM LDCT ELIG: HCPCS | Performed by: NURSE PRACTITIONER

## 2024-08-13 RX ORDER — BUPROPION HYDROCHLORIDE 100 MG/1
TABLET ORAL
COMMUNITY
Start: 2024-05-30

## 2024-08-13 NOTE — PROGRESS NOTES
Pulmonary Clinic Follow-up Visit  August 13, 2024      Assessment:  63 year old F with history of tobacco abuse (started smoking again), asthma and emphysema as well as very small lung nodule. PFTs show mild obstruction, her FEV1 is 67% and her DLCO is 65%. She admits she has not been using her Symbicort, uses occasional nebulizer treatments.  Previous CT scan was negative for new pulmonary nodules but did show some airway thickening and mucous, possible chronic bronchitis.  Biggest complaint today is significant daytime fatigue.  She denies feeling short of breath but has very low energy.  Cardiology did a coronary angio that was unremarkable. ENT did not find significant sinus issues but it was found that she had a hiatal hernia that was repaired on 12/30/22. She continues to have GI upset and difficulty with intake following surgery. Complains on continued daytime fatigue with poor sleep quality.  Had repeat ECT treatments and follows closely with psychiatry for treatment of depression.  Continues to smoke daily.  She has been on Chantix for over 1 year.  I recommend we discontinue this and represcribed when she is feeling motivated and ready to quit.     Plan:   Stop Symbicort. She is willing to take a daily inhaler and would like to try 1 without a ICS.  Start Spiriva Respimat, 2 puffs daily.  Encouraged her to use this every day no matter what.  We discussed her lung function likely declining if she continues to smoke and is not on any inhaled medications.  Duonebs 2-4 times daily prn  Consider follow up with allergy given extensive allergic rhinitis if responsive to flonase. Last seen in 10/2022.   Due for next LDCT for lung cancer screening in 01/2025. Ordered today, see MDM below.   Referral for sleep medicine placed previous visit.  She has not set up sleep study yet.   Discussed smoking cessation for 3 minutes. She continues to want to quit but feels her stress level is making it difficult.  She will  check with her psychiatry provider regarding prolonged Chantix use.    Follow up in 1 year    Action Plan if she exacerbates:  - prednisone 40mg daily x 5 days  - azithromycin, z pack x 5 days    Ana Hager CNP  Pulmonary Medicine  [facility]   [phone]      ----------------------------    CCx:   Chief Complaint   Patient presents with    Follow Up     6 month follow up:  Chronic obstructive pulmonary disease, unspecified COPD type (H)  Dyspnea on exertion          HPI:   She was last seen in clinic in 02/2024. Since that time she reports her breathing has been good.  She does admit to some productive cough but admits she has not been using her Symbicort.  Will occasionally use a nebulizer treatment prior to bedtime and finds it helpful. No exacerbations requiring prednisone or antibiotics.  Still smoking 5-10 cigarettes per day.  Has been on Chantix for over a year.  She was switched from Trelegy to Symbicort due to difficulty of use with dry powder inhalers.     Since her Nissen fundoplication surgery, her cough has improved.   Denies reflux symptoms but is having a hard time eating since her surgery. She is eating solid food but continues to have issues with gas, belching, and bloat. She was referred to GI by gen surgery following her Nissen fundoplication. All work up has been unremarkable and she continues to struggle.     She does note drainage down the back of her throat and sinus congestion, although this seems to have improved since her last visit. She was previously given Flonase which did not seem to help.  She has seen ENT for dysphagia, CT sinus was unremarkable. she was given Astelin nasal spray which did not help. She is being seen by Dr. Medina in allergy and they were discussing possible biologics to help control some of these symptoms, IgE and allergy panels were unremarkable.      Followed by [facility] in cardiology. CT heart cath was unremarkable.     Patient supplied answers from flow  sheet for:  COPD Assessment Test (CAT)  2009 [facility]. All rights reserved.      10/6/2021    11:00 AM 6/17/2022     2:00 PM 1/31/2023     8:51 PM 8/8/2023     9:00 AM 8/12/2024     3:19 PM   COPD assessment test (CAT)   Cough 4 3 3 2 2   Phlegm 4 4 2 3 1   Chest tightness 4 3 3 0 2   Walk up hill 4 5 4 4 3   Limited activities 3 3 3 3 2   Leaving my home 1 2 2 0 2   Sleep 5 3 2 3 2   Energy 5 3 3 5 4   Total Score 30 26 22 20 18      ROS:  10-point review performed and notable for  Dyspnea, fatigue, back pain. The remainder reviewed and negative.     Current Meds:  Current Outpatient Medications   Medication Sig Dispense Refill    albuterol (PROAIR HFA) 108 (90 Base) MCG/ACT inhaler Inhale 2 puffs into the lungs every 4 hours 18 g 11    aspirin-acetaminophen-caffeine (EXCEDRIN MIGRAINE) 250-250-65 MG tablet Take 1 tablet by mouth daily as needed for headaches MR x 1      atorvastatin (LIPITOR) 80 MG tablet TAKE 1 TABLET(80 MG) BY MOUTH AT BEDTIME 90 tablet 0    benzonatate (TESSALON) 100 MG capsule TAKE 1 CAPSULE(100 MG) BY MOUTH THREE TIMES DAILY AS NEEDED FOR COUGH 30 capsule 0    budesonide-formoterol (SYMBICORT) 160-4.5 MCG/ACT Inhaler Inhale 2 puffs into the lungs 2 times daily 10.2 g 11    buPROPion (WELLBUTRIN) 100 MG tablet       diazepam (VALIUM) 2 MG tablet Take 1 tablet (2 mg) by mouth every 6 hours as needed for anxiety (flying phobia) 4 tablet 0    doxepin (SINEQUAN) 25 MG capsule Take 75 mg by mouth At Bedtime      eletriptan (RELPAX) 40 MG tablet Take 1 tablet (40 mg) by mouth at onset of headache for migraine 12 tablet 3    erenumab-aooe (AIMOVIG) 70 MG/ML injection ADMINISTER 1 ML(70 MG) UNDER THE SKIN EVERY MONTH 3 mL 0    esomeprazole (NEXIUM) 40 MG DR capsule Take 40 mg by mouth daily      ezetimibe (ZETIA) 10 MG tablet Take 10 mg by mouth at bedtime      FLUoxetine (PROZAC) 40 MG capsule Take 80 mg by mouth daily      gabapentin (NEURONTIN) 300 MG capsule Take 600 mg by mouth 2 times daily       HYDROcodone-acetaminophen (NORCO)  MG per tablet Take 1 tablet by mouth every 4 hours as needed      hydrOXYzine (ATARAX) 25 MG tablet TAKE 2 TABLETS BY MOUTH AT BEDTIME AND 1 TABLET BY MOUTH DURING THE DAY AS NEEDED      ipratropium - albuterol 0.5 mg/2.5 mg/3 mL (DUONEB) 0.5-2.5 (3) MG/3ML neb solution Take 1 vial (3 mLs) by nebulization every 6 hours as needed for shortness of breath, wheezing or cough 180 mL 4    lamoTRIgine (LAMICTAL) 200 MG tablet Take 1 tablet by mouth daily      levothyroxine (SYNTHROID/LEVOTHROID) 75 MCG tablet TAKE 1 TABLET(75 MCG) BY MOUTH DAILY (Patient taking differently: Take 75 mcg by mouth every evening) 90 tablet 2    montelukast (SINGULAIR) 10 MG tablet TAKE 1 TABLET(10 MG) BY MOUTH AT BEDTIME 90 tablet 4    naloxone (NARCAN) 4 MG/0.1ML nasal spray Spray 1 spray (4 mg) into one nostril alternating nostrils once as needed for opioid reversal every 2-3 minutes until assistance arrives 0.2 mL 1    prazosin (MINIPRESS) 2 MG capsule Take 2 capsules by mouth At Bedtime      prochlorperazine (COMPAZINE) 10 MG tablet Take 10 mg by mouth every 6 hours as needed for nausea When having migraine      senna-docusate (SENOKOT-S/PERICOLACE) 8.6-50 MG tablet Take 2 tablets by mouth At Bedtime      tiZANidine (ZANAFLEX) 4 MG tablet TAKE 1 TO 2 TABLETS BY MOUTH DURING THE DAY AND 2 TABLETS AT BEDTIME 120 tablet 1    varenicline (CHANTIX) 1 MG tablet TAKE 1 TABLET(1 MG) BY MOUTH TWICE DAILY 180 tablet 0    vitamin D2 (ERGOCALCIFEROL) 27869 units (1250 mcg) capsule TAKE 1 CAPSULE BY MOUTH 1 TIME A WEEK 13 capsule 1       Physical Exam:  BP (!) 150/81 (BP Location: Left arm, Patient Position: Sitting, Cuff Size: Adult Regular)   Pulse 71   Wt 59.1 kg (130 lb 6.4 oz)   LMP 06/01/1983   SpO2 99%   BMI 21.87 kg/m      Physical Exam  Constitutional:       General: She is not in acute distress.     Appearance: She is not ill-appearing.   HENT:      Nose: Nose normal.   Cardiovascular:      Rate  and Rhythm: Normal rate and regular rhythm.      Pulses: Normal pulses.      Heart sounds: Normal heart sounds.   Pulmonary:      Effort: Pulmonary effort is normal. No respiratory distress.      Breath sounds: No wheezing or rhonchi.   Musculoskeletal:      Right lower leg: No edema.      Left lower leg: No edema.   Skin:     General: Skin is warm and dry.      Findings: No rash.   Neurological:      Mental Status: She is alert.   Psychiatric:         Behavior: Behavior normal.       Labs:   Latest Reference Range & Units 04/11/24 16:13   WBC 4.0 - 11.0 10e3/uL 6.9   Hemoglobin 11.7 - 15.7 g/dL 14.1   Hematocrit 35.0 - 47.0 % 43.8   Platelet Count 150 - 450 10e3/uL 248   RBC Count 3.80 - 5.20 10e6/uL 4.81   MCV 78 - 100 fL 91   MCH 26.5 - 33.0 pg 29.3   MCHC 31.5 - 36.5 g/dL 32.2   RDW 10.0 - 15.0 % 13.5      Latest Reference Range & Units 11/21/22 09:40   Allergen Cat Dander <0.10 KU(A)/L <0.10   Allergen Cedar IgE <0.10 KU(A)/L <0.10   Allergen D farinae <0.10 KU(A)/L <0.10   Allergen, D Pteronyssinus <0.10 KU(A)/L <0.10   Allergen Dog Dander <0.10 KU(A)/L <0.10   Allergen Elm <0.10 KU(A)/L <0.10   Allergen Epicoccum purpurascens IgE <0.10 KU(A)/L <0.10   Allergen, Kochia/Firebush <0.10 KU(A)/L <0.10   Allergen Maple <0.10 KU(A)/L <0.10   Allergen Mugwort IgE <0.10 KU(A)/L <0.10   Allergen Oak(white) <0.10 KU(A)/L <0.10   Allergen P notatum <0.10 KU(A)/L <0.10   Allergen Red Spencer IgE <0.10 KU(A)/L <0.10   Allergen Sagebrush Wormwood IgE <0.10 KU(A)/L <0.10   Allergen Pedro <0.10 KU(A)/L <0.10   Allergen Tree White Spencer IgE <0.10 KU(A)/L <0.10   Allergen Campo Tree <0.10 KU(A)/L <0.10   Allergen Weed Nettle IgE <0.10 KU(A)/L <0.10   Allergen Hughes <0.10 KU(A)/L <0.10   Allergen Cocksfoot <0.10 KU(A)/L <0.10   Allergen Bulloch <0.10 KU(A)/L <0.10   Allergen, English Plantain <0.10 KU(A)/L <0.10   Allergen, Giant Ragweed <0.10 KU(A)/L <0.10   Allergen Horse Dander <0.10 KU(A)/L <0.10   Allergen  Fito Grass <0.10 KU(A)/L <0.10   Allergen, Lamb's Quarters <0.10 KU(A)/L <0.10   Allergen, Ragweed Short <0.10 KU(A)/L <0.10   Allergen Russian Thistle <0.10 KU(A)/L <0.10   Allergen Sheep Sorrel IgE <0.10 KU(A)/L <0.10   Allergen, Silver Birch <0.10 KU(A)/L <0.10   Allergen White Flynn <0.10 KU(A)/L <0.10   IGE 0 - 114 kU/L 63     Imaging studies:    LOW DOSE LUNG CANCER SCREENING CT CHEST, DATE: 1/17/2024  INDICATION: Lung cancer screening. History of smoking. High risk patient.  COMPARISON: 01/01/2023 CTA chest.  FINDINGS:  NODULES: Few stable tiny pulmonary nodules. Examples: Stable 2 mm right apex nodule (series 5, image 71). Stable 3 mm left apex nodule (image 71).  LUNGS AND PLEURA: Mild to moderate upper lobe predominant emphysema. Mild retained airway secretions.  MEDIASTINUM: No adenopathy. Nonaneurysmal aorta.                                                       IMPRESSION:  1.  Negative for lung cancer screening purposes.    XR CHEST 2 VIEWS, DATE/TIME: 12/6/2022 11:21 AM  INDICATION:  Preop general physical exam  COMPARISON: 07/29/2022                                                                IMPRESSION: Heart size is normal. No pleural effusion, pneumothorax, or abnormal area of consolidation. Biapical pleural thickening.    CT CHEST PULMONARY EMBOLISM W CONTRAST, DATE/TIME: 2/25/2022 3:24 AM  INDICATION: PE suspected, high prob shortness of breath and left chest pain  COMPARISON: 05/19/2021   FINDINGS:  ANGIOGRAM CHEST: Pulmonary arteries are normal caliber and negative for pulmonary emboli. Thoracic aorta is negative for dissection. No CT evidence of right heart strain.   LUNGS AND PLEURA: 2 mm nodule left apex series 7 image 56, 2 mm subpleural nodule right upper lobe image 62 and 2 mm right upper lobe nodule image 54 stable. Emphysema. Basilar atelectasis.  MEDIASTINUM/AXILLAE: Small hiatal hernia. Trace amount of pericardial fluid.  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  MUSCULOSKELETAL: Breast implants.                                                              IMPRESSION:  1.  No pulmonary emboli.  2.  Emphysema.  3.  Bibasilar atelectasis.  4.  Tiny pulmonary nodule stable.  5.  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    CT CHEST PULMONARY EMBOLISM W CONTRAST, DATE/TIME: 1/1/2023 11:49 AM  INDICATION: Recent fundoplication. Chest tightness.   COMPARISON: 02/25/2022  CT. Radiograph 12/06/2022.  FINDINGS:  ANGIOGRAM CHEST: Pulmonary arteries are normal caliber and negative for pulmonary emboli. Thoracic aorta is negative for dissection.  LUNGS AND PLEURA: Small pleural effusions and dependent atelectasis. Emphysema. Mild apical scarring. New groundglass and reticular interstitial opacities in the right midlung and lingula.  There is debris in the airways to the lower lobes.  MEDIASTINUM/AXILLAE: Small air locules in the mediastinum.                                                              IMPRESSION:  1.  No PE.  2.  New groundglass and reticular interstitial opacities in the lungs could be atypical infection. Edema is less likely.   3.  Emphysema. Small pleural effusions with atelectasis. Debris in the airways.  4.  Small air locules in the mediastinum are likely normal postsurgical change.    Coronary CT angiogram 09/2022  1.  Normal coronary CT angiography, no detectable coronary atherosclerotic plaques. Co-dominant system.  2.  Normal visualized aortic segments.  3.  No thrombus in left atrial appendage.  4.  No pericardial effusion or calcification.    XR ESOPHAGRAM, DATE/TIME: 8/17/2022 8:10 AM  INDICATION:  Dysphagia, unspecified type  COMPARISON: None.  FINDINGS:  ESOPHAGUS: Normal primary peristalsis. There are distal esophageal tertiary contraction waves.  There is a prominent cricopharyngeus muscle, and a small anterior cervical esophageal well at the same level of the  cricopharyngeus. This did not impede passage of the 13 mm barium tablet.  There is a small sliding hiatal hernia with gastroesophageal reflux.  The 13 mm barium tablet became stuck at the GE junction for at least 7 minutes. This did not produce symptoms.                                                               IMPRESSION:  1.  Prominent cricopharyngeus muscle, with small anterior cervical esophageal web. This might be the cause the sensation of food sticking.  2.  Prolonged transit barium tablet at the GE junction.  3.  Small hiatal hernia.  4.  Gastroesophageal reflux.    PFT's 2022:  FEV1 1.71L 67% predicted from 1.77L  FVC 2.59L, 80% predicted from 2.54L  FEV1/FVC 66  TLC 5.28L, 103% predicted  DLCO 65% predicted  Impression: moderate obstruction without reversibility. Air trapping. Mild diffusion capacity defect.         Lung Cancer Screening Shared Decision Making Visit     Dayana Samaniego, a 63 year old female, is eligible for lung cancer screening    History   Smoking Status    Light Smoker    Types: Cigarettes   Smokeless Tobacco    Never       I have discussed with patient the risks and benefits of screening for lung cancer with low-dose CT.     The risks include:    radiation exposure: one low dose chest CT has as much ionizing radiation as about 15 chest x-rays, or 6 months of background radiation living in [place]      false positives: most findings/nodules are NOT cancer, but some might still require additional diagnostic evaluation, including biopsy    over-diagnosis: some slow growing cancers that might never have been clinically significant will be detected and treated unnecessarily     The benefit of early detection of lung cancer is contingent upon adherence to annual screening or more frequent follow up if indicated.     Furthermore, to benefit from screening, Dayana must be willing and able to undergo diagnostic procedures, if indicated. Although no specific guide is available for  determining severity of comorbidities, it is reasonable to withhold screening in patients who have greater mortality risk from other diseases.     We did discuss that the best way to prevent lung cancer is to not smoke.    Some patients may value a numeric estimation of lung cancer risk when evaluating if lung cancer screening is right for them, here is one calculator:    ShouldIScreen

## 2024-08-13 NOTE — PATIENT INSTRUCTIONS
It was a pleasure seeing you in clinic today. This is what we discussed:    Check with [facility], [facility], or [facility] for sleep medicine. You will likely be seen sooner than here at [place].   STOP the Symbicort   START the Spiriva. Use this once daily no matter what.   For now continue the Chantix but check to see if they recommend stopping.   Use your albuterol every 4 hours as needed for cough, shortness of breath, wheeze, or chest tightness.   Follow up 1 year   If you have worsening symptoms, questions, or need to speak with the nurse please call [phone].     Lung Cancer Screening   Frequently Asked Questions  If you are at high-risk for lung cancer, getting screened with low-dose computed tomography (LDCT) every year can help save your life. This handout offers answers to some of the most common questions about lung cancer screening. If you have other questions, please call [phone] ([phone]).     What is it?  Lung cancer screening uses special X-ray technology to create an image of your lung tissue. The exam is quick and easy and takes less than 10 seconds. We don t give you any medicine or use any needles. You can eat before and after the exam. You don t need to change your clothes as long as the clothing on your chest doesn t contain metal. But, you do need to be able to hold your breath for at least 6 seconds during the exam.    What is the goal of lung cancer screening?  The goal of lung cancer screening is to save lives. Many times, lung cancer is not found until a person starts having physical symptoms. Lung cancer screening can help detect lung cancer in the earliest stages when it may be easier to treat.    Who should be screened for lung cancer?  We suggest lung cancer screening for anyone who is at high-risk for lung cancer. You are in the high-risk group if you:      are between the ages of 55 and 79, and    have smoked at least 1 pack of cigarettes a day for 20 or more  years, and    still smoke or have quit within the past 15 years.    However, if you have a new cough or shortness of breath, you should talk to your doctor before being screened.    Why does it matter if I have symptoms?  Certain symptoms can be a sign that you have a condition in your lungs that should be checked and treated by your doctor. These symptoms include fever, chest pain, a new or changing cough, shortness of breath that you have never felt before, coughing up blood or unexplained weight loss. Having any of these symptoms can greatly affect the results of lung cancer screening.       Should all smokers get an LDCT lung cancer screening exam?  It depends. Lung cancer screening is for a very specific group of men and women who have a history of heavy smoking over a long period of time (see  Who should be screened for lung cancer  above).  I am in the high-risk group, but have been diagnosed with cancer in the past. Is LDCT lung cancer screening right for me?  In some cases, you should not have LDCT lung screening, such as when your doctor is already following your cancer with CT scan studies. Your doctor will help you decide if LDCT lung screening is right for you.  Do I need to have a screening exam every year?  Yes. If you are in the high-risk group described earlier, you should get an LDCT lung cancer screening exam every year until you are 79, or are no longer willing or able to undergo screening and possible procedures to diagnose and treat lung cancer.  How effective is LDCT at preventing death from lung cancer?  Studies have shown that LDCT lung cancer screening can lower the risk of death from lung cancer by 20 percent in people who are at high-risk.  What are the risks?  There are some risks and limitations of LDCT lung cancer screening. We want to make sure you understand the risks and benefits, so please let us know if you have any questions. Your doctor may want to talk with you more about these  risks.    Radiation exposure: As with any exam that uses radiation, there is a very small increased risk of cancer. The amount of radiation in LDCT is small--about the same amount a person would get from a mammogram. Your doctor orders the exam when he or she feels the potential benefits outweigh the risks.    False negatives: No test is perfect, including LDCT. It is possible that you may have a medical condition, including lung cancer, that is not found during your exam. This is called a false negative result.    False positives and more testing: LDCT very often finds something in the lung that could be cancer, but in fact is not. This is called a false positive result. False positive tests often cause anxiety. To make sure these findings are not cancer, you may need to have more tests. These tests will be done only if you give us permission. Sometimes patients need a treatment that can have side effects, such as a biopsy. For more information on false positives, see  What can I expect from the results?     Findings not related to lung cancer: Your LDCT exam also takes pictures of areas of your body next to your lungs. In a very small number of cases, the CT scan will show an abnormal finding in one of these areas, such as your kidneys, adrenal glands, liver or thyroid. This finding may not be serious, but you may need more tests. Your doctor can help you decide what other tests you may need, if any.  What can I expect from the results?  About 1 out of 4 LDCT exams will find something that may need more tests. Most of the time, these findings are lung nodules. Lung nodules are very small collections of tissue in the lung. These nodules are very common, and the vast majority--more than 97 percent--are not cancer (benign). Most are normal lymph nodes or small areas of scarring from past infections.  But, if a small lung nodule is found to be cancer, the cancer can be cured more than 90 percent of the time. To know  if the nodule is cancer, we may need to get more images before your next yearly screening exam. If the nodule has suspicious features (for example, it is large, has an odd shape or grows over time), we will refer you to a specialist for further testing.  Will my doctor also get the results?  Yes. Your doctor will get a copy of your results.  Is it okay to keep smoking now that there s a cancer screening exam?  No. Tobacco is one of the strongest cancer-causing agents. It causes not only lung cancer, but other cancers and cardiovascular (heart) diseases as well. The damage caused by smoking builds over time. This means that the longer you smoke, the higher your risk of disease. While it is never too late to quit, the sooner you quit, the better.  Where can I find help to quit smoking?  The best way to prevent lung cancer is to stop smoking. If you have already quit smoking, congratulations and keep it up! For help on quitting smoking, please call [employer] at 6-358-QUITNOW (1-978.238.7704) or the American [employer] Society at 1-192.887.2084 to find local resources near you.  One-on-one health coaching:  If you d prefer to work individually with a health care provider on tobacco cessation, we offer:      Medication Therapy Management:  Our specially trained pharmacists work closely with you and your doctor to help you quit smoking.  Call [phone] or [phone] (toll free).

## 2024-08-20 ENCOUNTER — TRANSFERRED RECORDS (OUTPATIENT)
Dept: HEALTH INFORMATION MANAGEMENT | Facility: CLINIC | Age: 64
End: 2024-08-20
Payer: COMMERCIAL

## 2024-08-23 ENCOUNTER — TRANSFERRED RECORDS (OUTPATIENT)
Dept: HEALTH INFORMATION MANAGEMENT | Facility: CLINIC | Age: 64
End: 2024-08-23
Payer: COMMERCIAL

## 2024-08-26 ENCOUNTER — PATIENT OUTREACH (OUTPATIENT)
Dept: CARE COORDINATION | Facility: CLINIC | Age: 64
End: 2024-08-26
Payer: COMMERCIAL

## 2024-08-26 ENCOUNTER — TRANSFERRED RECORDS (OUTPATIENT)
Dept: HEALTH INFORMATION MANAGEMENT | Facility: CLINIC | Age: 64
End: 2024-08-26
Payer: COMMERCIAL

## 2024-08-27 DIAGNOSIS — E55.9 VITAMIN D DEFICIENCY: ICD-10-CM

## 2024-08-28 RX ORDER — ERGOCALCIFEROL 1.25 MG/1
CAPSULE, LIQUID FILLED ORAL
Qty: 13 CAPSULE | Refills: 0 | Status: SHIPPED | OUTPATIENT
Start: 2024-08-28

## 2024-08-30 ENCOUNTER — TELEPHONE (OUTPATIENT)
Dept: NEUROLOGY | Facility: CLINIC | Age: 64
End: 2024-08-30
Payer: COMMERCIAL

## 2024-08-30 DIAGNOSIS — G43.019 COMMON MIGRAINE WITH INTRACTABLE MIGRAINE: ICD-10-CM

## 2024-08-30 RX ORDER — ERENUMAB-AOOE 70 MG/ML
INJECTION SUBCUTANEOUS
Qty: 3 ML | Refills: 3 | Status: SHIPPED | OUTPATIENT
Start: 2024-08-30

## 2024-08-30 NOTE — TELEPHONE ENCOUNTER
Refill request for: erenumab-aooe (AIMOVIG) 70 MG/ML injection    Directions: ADMINISTER 1 ML(70 MG) UNDER THE SKIN EVERY MONTH     LOV: 8/6/24  NOV: 11/5/24    30 day supply with 3 refills Medication T'd for review and signature  Yas Palomo CMA on 8/30/2024 at 11:31 AM  [facility]

## 2024-08-30 NOTE — TELEPHONE ENCOUNTER
Prior Authorization Retail Medication Request    Medication/Dose: erenumab-aooe (AIMOVIG) 70 MG/ML injection  Diagnosis and ICD code (if different than what is on RX):    New/renewal/insurance change PA/secondary ins. PA:  Previously Tried and Failed:    Rationale:      Insurance   Primary:   Insurance ID:      Secondary (if applicable):  Insurance ID:      Pharmacy Information (if different than what is on RX)  Name:    Phone:    Fax:

## 2024-09-03 NOTE — TELEPHONE ENCOUNTER
Central Prior Authorization Team   Phone: [phone]    PA Initiation    Medication: erenumab-aooe (AIMOVIG) 70 MG/ML injection  Insurance Company: [employer] - Phone [phone] Fax [phone]  Pharmacy Filling the Rx: [facility] #[id number] [address] AT [facility] [address]  Filling Pharmacy Phone: [phone]  Filling Pharmacy Fax:    Start Date: 9/3/2024

## 2024-09-05 NOTE — TELEPHONE ENCOUNTER
Prior Authorization Approval    Authorization Effective Date: 9/4/2024  Authorization Expiration Date: 9/4/2025  Medication: erenumab-aooe (AIMOVIG) 70 MG/ML injection-PA APPROVED   Approved Dose/Quantity:   Reference #:     Insurance Company: [employer] - Phone [phone] Fax [phone]  Expected CoPay:       CoPay Card Available:      Foundation Assistance Needed:    Which Pharmacy is filling the prescription (Not needed for infusion/clinic administered): [id number] [address] AT [facility] [address]  Pharmacy Notified:  Yes- **Instructed pharmacy to notify patient when script is ready to /ship.**  Patient Notified:  Yes

## 2024-09-10 ENCOUNTER — TRANSFERRED RECORDS (OUTPATIENT)
Dept: HEALTH INFORMATION MANAGEMENT | Facility: CLINIC | Age: 64
End: 2024-09-10
Payer: COMMERCIAL

## 2024-09-20 ENCOUNTER — TRANSFERRED RECORDS (OUTPATIENT)
Dept: HEALTH INFORMATION MANAGEMENT | Facility: CLINIC | Age: 64
End: 2024-09-20
Payer: COMMERCIAL

## 2024-10-10 ENCOUNTER — TELEPHONE (OUTPATIENT)
Dept: CARDIOLOGY | Facility: CLINIC | Age: 64
End: 2024-10-10
Payer: COMMERCIAL

## 2024-10-10 NOTE — TELEPHONE ENCOUNTER
[facility] Call Center    Phone Message    May a detailed message be left on voicemail: yes     Reason for Call: Symptoms or Concerns     If patient has red-flag symptoms, warm transfer to triage line    Current symptom or concern: per pt via MyChart: Comments:  I am having severe pain in my jaw. Like electrical shocks on the right side going into jaw and up to the ear. Have seen a dentist and she said that it is not my teeth.  Please call to schedule an appointment!    Symptoms have been present for:  unknown day(s)    Has patient previously been seen for this? No      Action Taken: Other: cardio    Travel Screening: Not Applicable     Date of Service:

## 2024-10-11 NOTE — TELEPHONE ENCOUNTER
"Spoke with pt regarding her concerns. She reports that for the last two months she has been having shooting pains in her jaw that extend up to her ear. She says these pains can be brought on by eating, brushing her teeth, etc. She says that some days are better than others, but that the episodes can last for up to a minute and are excruciating. She reports that she saw a dentist who is unable to find a cause for this pain and doesn't think it's related anything dental. Pt reports also having a herniated disc in her neck, she says she spoke with her spinal team who report that they don't think these pains are related to her neck. In addition she reports that she has \"chest pain\" but that it's related to her hiatal hernia that she had repaired 2 years ago. She says that she will get pain after eating and will need to prop herself up in bed for two hours for the pain to dissipate. She reports that GI ordered a test for her but that she has not called them back to schedule it. She also feels that her BP has been \"higher\", running anywhere from 135/70 - 185/?.      She is scheduled to see her PCP on 10/14/24. Last saw [facility] (for the first time) 8/2022. MAT has no opening in his scheduled so pt to establish care with [facility] on 10/25/24. Pt agreeable with this plan. aj  "

## 2024-10-14 ENCOUNTER — OFFICE VISIT (OUTPATIENT)
Dept: FAMILY MEDICINE | Facility: CLINIC | Age: 64
End: 2024-10-14
Payer: COMMERCIAL

## 2024-10-14 VITALS
OXYGEN SATURATION: 98 % | HEART RATE: 81 BPM | SYSTOLIC BLOOD PRESSURE: 156 MMHG | RESPIRATION RATE: 16 BRPM | DIASTOLIC BLOOD PRESSURE: 74 MMHG | BODY MASS INDEX: 21.16 KG/M2 | WEIGHT: 127 LBS | HEIGHT: 65 IN | TEMPERATURE: 98 F

## 2024-10-14 DIAGNOSIS — Z23 IMMUNIZATION DUE: ICD-10-CM

## 2024-10-14 DIAGNOSIS — G50.0 TRIGEMINAL NEURALGIA: ICD-10-CM

## 2024-10-14 DIAGNOSIS — G89.4 CHRONIC PAIN SYNDROME: Chronic | ICD-10-CM

## 2024-10-14 DIAGNOSIS — R68.84 JAW PAIN: Primary | ICD-10-CM

## 2024-10-14 PROCEDURE — 99214 OFFICE O/P EST MOD 30 MIN: CPT | Performed by: FAMILY MEDICINE

## 2024-10-14 PROCEDURE — G2211 COMPLEX E/M VISIT ADD ON: HCPCS | Performed by: FAMILY MEDICINE

## 2024-10-14 NOTE — PROGRESS NOTES
Assessment & Plan     Jaw pain  There is a wide differential diagnosis for this but the way she describes it as having a very sharp and throbbing jolting pain that can last anywhere from 2 to 5 minutes and happens when she is chewing, brushing her teeth and turning her head sounds as though she is experiencing trigeminal neuralgia.    Trigeminal neuralgia  Patient is currently on some of the medications that have been used to treat this condition.  This is perhaps why it took so long for her to have it worsened bad enough to come in.  For the first few months it would only happen with brushing teeth.  She is scheduled to see her neurologist soon for Botox.  Botox has been used to treat this condition, but also gabapentin and Lamictal which she is on.  I am leery of starting carbamazepine with all her medications.  I suggested she talk to her pain specialist and neurologist, Dr. Schmitt.    Chronic pain syndrome  Patient already has treatment with anticonvulsants for her back pain and other multiple pains.  She is seen at the pain clinic.  She has a virtual appointment tomorrow.    Immunization due  Patient is due for COVID and flu vaccines but declines these at this time.      Nicotine/Tobacco Cessation  She reports that she has been smoking cigarettes. She has a 22.5 pack-year smoking history. She has been exposed to tobacco smoke. She has never used smokeless tobacco.  Nicotine/Tobacco Cessation Plan  Information offered: Patient not interested at this time      I spent a total of 32 minutes on the day of the visit.   Time spent by me doing chart review, history and exam, documentation and further activities per the note    FUTURE APPOINTMENTS:       - Follow-up visit in 6 months, sooner if needed.      Suzie Anne is a 63 year old, presenting for the following health issues:  Jaw Pain (Ongoing for a few months, comes and goes, electrical shock type pain on the right side of her jaw that is extremely  "painful.)        10/14/2024     1:45 PM   Additional Questions   Roomed by Yenni     History of Present Illness       Reason for visit:  Jaw pain  Symptom onset:  More than a month  Symptoms include:  Pain in jaw into ear.  Symptom intensity:  Severe  Symptom progression:  Worsening  Had these symptoms before:  No  What makes it worse:  Comes and goes.  What makes it better:  No   She is taking medications regularly.     Patient tells me that she has had right sided jaw pain radiating to the ear.  She saw the dentist because she thought perhaps there was something wrong with her tooth but he told her she was fine.  This all started few months back and at first it was very intermittent and happened only when she was brushing her teeth now as time goes on it comes on more frequently with chewing and sometimes even if she turns her head a certain way.  She describes the pain as very sharp lasting maybe 2 to 5 minutes and can be very jolting and throbbing in nature.  We discussed that this sounds like trigeminal neuralgia and we discussed different ways this is treated.  She has other providers that she should consult with about this as she has polypharmacy and I do not want to put her on more different meds unless necessary.  She could do something like add a third dose of the 600 mg gabapentin.  However this is given to her by the pain clinic.  She can ask them when she has her virtual visit tomorrow if they think that would be okay.  Also could start Carbamazepine or even try Botox which she gets with her neurologist as it is.  She is also on Lamictal which has been used.      Objective    BP (!) 156/74   Pulse 81   Temp 98  F (36.7  C)   Resp 16   Ht 1.645 m (5' 4.75\")   Wt 57.6 kg (127 lb)   LMP 06/01/1983   SpO2 98%   BMI 21.30 kg/m    Body mass index is 21.3 kg/m .  Physical Exam   GENERAL: healthy, alert, no distress, and fit  RESP: lungs clear to auscultation - no rales, rhonchi or wheezes  CV: regular " rates and rhythm and without murmur  ABDOMEN: soft, nontender, bowel sounds normal, and very increased bowel sounds          Signed Electronically by: Joanne Weiner MD

## 2024-10-15 ENCOUNTER — TRANSFERRED RECORDS (OUTPATIENT)
Dept: HEALTH INFORMATION MANAGEMENT | Facility: CLINIC | Age: 64
End: 2024-10-15
Payer: COMMERCIAL

## 2024-10-16 ENCOUNTER — TRANSFERRED RECORDS (OUTPATIENT)
Dept: HEALTH INFORMATION MANAGEMENT | Facility: CLINIC | Age: 64
End: 2024-10-16
Payer: COMMERCIAL

## 2024-10-24 ENCOUNTER — OFFICE VISIT (OUTPATIENT)
Dept: FAMILY MEDICINE | Facility: CLINIC | Age: 64
End: 2024-10-24
Payer: COMMERCIAL

## 2024-10-24 VITALS
BODY MASS INDEX: 21.56 KG/M2 | OXYGEN SATURATION: 100 % | DIASTOLIC BLOOD PRESSURE: 80 MMHG | RESPIRATION RATE: 12 BRPM | WEIGHT: 129.4 LBS | TEMPERATURE: 97.9 F | HEART RATE: 74 BPM | HEIGHT: 65 IN | SYSTOLIC BLOOD PRESSURE: 124 MMHG

## 2024-10-24 DIAGNOSIS — M48.8X6: ICD-10-CM

## 2024-10-24 DIAGNOSIS — G50.0 TRIGEMINAL NEURALGIA: ICD-10-CM

## 2024-10-24 DIAGNOSIS — G43.909 MIGRAINE WITHOUT STATUS MIGRAINOSUS, NOT INTRACTABLE, UNSPECIFIED MIGRAINE TYPE: ICD-10-CM

## 2024-10-24 DIAGNOSIS — M51.372 DEGENERATION OF INTERVERTEBRAL DISC OF LUMBOSACRAL REGION WITH DISCOGENIC BACK PAIN AND LOWER EXTREMITY PAIN: Chronic | ICD-10-CM

## 2024-10-24 DIAGNOSIS — R73.9 ELEVATED RANDOM BLOOD GLUCOSE LEVEL: ICD-10-CM

## 2024-10-24 DIAGNOSIS — N18.31 STAGE 3A CHRONIC KIDNEY DISEASE (H): Chronic | ICD-10-CM

## 2024-10-24 DIAGNOSIS — M85.89 OSTEOPENIA OF MULTIPLE SITES: Chronic | ICD-10-CM

## 2024-10-24 DIAGNOSIS — R06.00 DYSPNEA, UNSPECIFIED TYPE: ICD-10-CM

## 2024-10-24 DIAGNOSIS — J45.40 MODERATE PERSISTENT ASTHMA, UNCOMPLICATED: ICD-10-CM

## 2024-10-24 DIAGNOSIS — F31.81 BIPOLAR 2 DISORDER (H): Chronic | ICD-10-CM

## 2024-10-24 DIAGNOSIS — F40.243 PHOBIA, FLYING: ICD-10-CM

## 2024-10-24 DIAGNOSIS — J43.1 PANLOBULAR EMPHYSEMA (H): Chronic | ICD-10-CM

## 2024-10-24 DIAGNOSIS — E78.2 MIXED HYPERLIPIDEMIA: Chronic | ICD-10-CM

## 2024-10-24 DIAGNOSIS — Z01.818 PREOP GENERAL PHYSICAL EXAM: Primary | ICD-10-CM

## 2024-10-24 DIAGNOSIS — F17.210 CIGARETTE NICOTINE DEPENDENCE WITHOUT COMPLICATION: ICD-10-CM

## 2024-10-24 DIAGNOSIS — R30.0 DYSURIA: ICD-10-CM

## 2024-10-24 DIAGNOSIS — E03.9 ACQUIRED HYPOTHYROIDISM: Chronic | ICD-10-CM

## 2024-10-24 DIAGNOSIS — G95.9 CERVICAL MYELOPATHY (H): ICD-10-CM

## 2024-10-24 DIAGNOSIS — Z98.1 ARTHRODESIS PRESENT: ICD-10-CM

## 2024-10-24 DIAGNOSIS — G89.4 CHRONIC PAIN SYNDROME: Chronic | ICD-10-CM

## 2024-10-24 PROBLEM — F17.200 NICOTINE DEPENDENCE: Status: ACTIVE | Noted: 2024-10-24

## 2024-10-24 LAB
ALBUMIN SERPL BCG-MCNC: 4.3 G/DL (ref 3.5–5.2)
ALBUMIN UR-MCNC: NEGATIVE MG/DL
ALP SERPL-CCNC: 106 U/L (ref 40–150)
ALT SERPL W P-5'-P-CCNC: 24 U/L (ref 0–50)
ANION GAP SERPL CALCULATED.3IONS-SCNC: 10 MMOL/L (ref 7–15)
APPEARANCE UR: CLEAR
AST SERPL W P-5'-P-CCNC: 33 U/L (ref 0–45)
BILIRUB SERPL-MCNC: 0.2 MG/DL
BILIRUB UR QL STRIP: NEGATIVE
BUN SERPL-MCNC: 11.9 MG/DL (ref 8–23)
CALCIUM SERPL-MCNC: 9.4 MG/DL (ref 8.8–10.4)
CHLORIDE SERPL-SCNC: 103 MMOL/L (ref 98–107)
COLOR UR AUTO: YELLOW
CREAT SERPL-MCNC: 0.9 MG/DL (ref 0.51–0.95)
EGFRCR SERPLBLD CKD-EPI 2021: 71 ML/MIN/1.73M2
ERYTHROCYTE [DISTWIDTH] IN BLOOD BY AUTOMATED COUNT: 12.9 % (ref 10–15)
GLUCOSE SERPL-MCNC: 119 MG/DL (ref 70–99)
GLUCOSE UR STRIP-MCNC: NEGATIVE MG/DL
HCO3 SERPL-SCNC: 27 MMOL/L (ref 22–29)
HCT VFR BLD AUTO: 45.6 % (ref 35–47)
HGB BLD-MCNC: 14.7 G/DL (ref 11.7–15.7)
HGB UR QL STRIP: NEGATIVE
KETONES UR STRIP-MCNC: NEGATIVE MG/DL
LEUKOCYTE ESTERASE UR QL STRIP: NEGATIVE
MCH RBC QN AUTO: 30 PG (ref 26.5–33)
MCHC RBC AUTO-ENTMCNC: 32.2 G/DL (ref 31.5–36.5)
MCV RBC AUTO: 93 FL (ref 78–100)
NITRATE UR QL: NEGATIVE
PH UR STRIP: 5.5 [PH] (ref 5–8)
PLATELET # BLD AUTO: 261 10E3/UL (ref 150–450)
POTASSIUM SERPL-SCNC: 4.4 MMOL/L (ref 3.4–5.3)
PROT SERPL-MCNC: 7 G/DL (ref 6.4–8.3)
RBC # BLD AUTO: 4.9 10E6/UL (ref 3.8–5.2)
SODIUM SERPL-SCNC: 140 MMOL/L (ref 135–145)
SP GR UR STRIP: >=1.03 (ref 1–1.03)
UROBILINOGEN UR STRIP-ACNC: 0.2 E.U./DL
WBC # BLD AUTO: 6.4 10E3/UL (ref 4–11)

## 2024-10-24 PROCEDURE — 99214 OFFICE O/P EST MOD 30 MIN: CPT | Performed by: FAMILY MEDICINE

## 2024-10-24 PROCEDURE — 36415 COLL VENOUS BLD VENIPUNCTURE: CPT | Performed by: FAMILY MEDICINE

## 2024-10-24 PROCEDURE — 93005 ELECTROCARDIOGRAM TRACING: CPT | Performed by: FAMILY MEDICINE

## 2024-10-24 PROCEDURE — 81003 URINALYSIS AUTO W/O SCOPE: CPT | Performed by: FAMILY MEDICINE

## 2024-10-24 PROCEDURE — 83880 ASSAY OF NATRIURETIC PEPTIDE: CPT | Performed by: FAMILY MEDICINE

## 2024-10-24 PROCEDURE — 80053 COMPREHEN METABOLIC PANEL: CPT | Performed by: FAMILY MEDICINE

## 2024-10-24 PROCEDURE — 85027 COMPLETE CBC AUTOMATED: CPT | Performed by: FAMILY MEDICINE

## 2024-10-24 PROCEDURE — 83036 HEMOGLOBIN GLYCOSYLATED A1C: CPT | Performed by: FAMILY MEDICINE

## 2024-10-24 PROCEDURE — 93010 ELECTROCARDIOGRAM REPORT: CPT | Performed by: INTERNAL MEDICINE

## 2024-10-24 RX ORDER — PROCHLORPERAZINE MALEATE 10 MG
10 TABLET ORAL EVERY 6 HOURS PRN
Qty: 30 TABLET | Refills: 0 | Status: SHIPPED | OUTPATIENT
Start: 2024-10-24

## 2024-10-24 RX ORDER — DIAZEPAM 2 MG/1
2 TABLET ORAL EVERY 6 HOURS PRN
Qty: 4 TABLET | Refills: 0 | Status: SHIPPED | OUTPATIENT
Start: 2024-10-24

## 2024-10-24 RX ORDER — BENZONATATE 100 MG/1
100 CAPSULE ORAL 3 TIMES DAILY PRN
Qty: 30 CAPSULE | Refills: 1 | Status: SHIPPED | OUTPATIENT
Start: 2024-10-24

## 2024-10-24 ASSESSMENT — PAIN SCALES - GENERAL: PAINLEVEL_OUTOF10: NO PAIN (0)

## 2024-10-24 NOTE — PATIENT INSTRUCTIONS
How to Take Your Medication Before Surgery  Preoperative Medication Instructions   Antiplatelet or Anticoagulation Medication Instructions   - Patient is on no antiplatelet or anticoagulation medications.    Additional Medication Instructions  Take all scheduled medications on the day of surgery EXCEPT for modifications listed below:   - Herbal medications and vitamins: DO NOT TAKE 14 days prior to surgery.   - ibuprofen (Advil, Motrin): DO NOT TAKE 1 day before surgery.    - naproxen (Aleve, Naprosyn): DO NOT TAKE 4 days before surgery.    Refrain from aspirin containing migraine medicine for 7 days prior to surgery    Patient Education   Preparing for Your Surgery  For Adults  Getting started  In most cases, a nurse will call to review your health history and instructions. They will give you an arrival time based on your scheduled surgery time. Please be ready to share:  Your doctor's clinic name and phone number  Your medical, surgical, and anesthesia history  A list of allergies and sensitivities  A list of medicines, including herbal treatments and over-the-counter drugs  Whether the patient has a legal guardian (ask how to send us the papers in advance)  Note: You may not receive a call if you were seen at our PAC (Preoperative Assessment Center).  Please tell us if you're pregnant--or if there's any chance you might be pregnant. Some surgeries may injure a fetus (unborn baby), so they require a pregnancy test. Surgeries that are safe for a fetus don't always need a test, and you can choose whether to have one.   Preparing for surgery  Within 10 to 30 days of surgery: Have a pre-op exam (sometimes called an H&P, or History and Physical). This can be done at a clinic or pre-operative center.  If you're having a , you may not need this exam. Talk to your care team.  At your pre-op exam, talk to your care team about all medicines you take. (This includes CBD oil and any drugs, such as THC, marijuana, and  other forms of cannabis.) If you need to stop any medicine before surgery, ask when to start taking it again.  This is for your safety. Many medicines and drugs can make you bleed too much during surgery. Some change how well surgery (anesthesia) drugs work.  Call your insurance company to let them know you're having surgery. (If you don't have insurance, call [phone].)  Call your clinic if there's any change in your health. This includes a scrape or scratch near the surgery site, or any signs of a cold (sore throat, runny nose, cough, rash, fever).  Eating and drinking guidelines  For your safety: Unless your surgeon tells you otherwise, follow the guidelines below.  Eat and drink as normal until 8 hours before you arrive for surgery. After that, no food or milk. You can spit out gum when you arrive.  Drink clear liquids until 2 hours before you arrive. These are liquids you can see through, like water, Gatorade, and Propel Water. They also include plain black coffee and tea (no cream or milk).  No alcohol for 24 hours before you arrive. The night before surgery, stop any drinks that contain THC.  If your care team tells you to take medicine on the morning of surgery, it's okay to take it with a sip of water. No other medicines or drugs are allowed (including CBD oil)--follow your care team's instructions.  If you have questions the day of surgery, call your hospital or surgery center.   Preventing infection  Shower or bathe the night before and the morning of surgery. Follow the instructions your clinic gave you. (If no instructions, use regular soap.)  Don't shave or clip hair near your surgery site. We'll remove the hair if needed.  Don't smoke or vape the morning of surgery. No chewing tobacco for 6 hours before you arrive. A nicotine patch is okay. You may spit out nicotine gum when you arrive.  For some surgeries, the surgeon will tell you to fully quit smoking and nicotine.  We will make every effort to  keep you safe from infection. We will:  Clean our hands often with soap and water (or an alcohol-based hand rub).  Clean the skin at your surgery site with a special soap that kills germs.  Give you a special gown to keep you warm. (Cold raises the risk of infection.)  Wear hair covers, masks, gowns, and gloves during surgery.  Give antibiotic medicine, if prescribed. Not all surgeries need this medicine.  What to bring on the day of surgery  Photo ID and insurance card  Copy of your health care directive, if you have one  Glasses and hearing aids (bring cases)  You can't wear contacts during surgery  Inhaler and eye drops, if you use them (tell us about these when you arrive)  CPAP machine or breathing device, if you use them  A few personal items, if spending the night  If you have . . .  A pacemaker, ICD (cardiac defibrillator), or other implant: Bring the ID card.  An implanted stimulator: Bring the remote control.  A legal guardian: Bring a copy of the certified (court-stamped) guardianship papers.  Please remove any jewelry, including body piercings. Leave jewelry and other valuables at home.  If you're going home the day of surgery  You must have a responsible adult drive you home. They should stay with you overnight as well.  If you don't have someone to stay with you, and you aren't safe to go home alone, we may keep you overnight. Insurance often won't pay for this.  After surgery  If it's hard to control your pain or you need more pain medicine, please call your surgeon's office.  Questions?   If you have any questions for your care team, list them here:   ____________________________________________________________________________________________________________________________________________________________________________________________________________________________________________________________  For informational purposes only. Not to replace the advice of your health care provider. Copyright    2003, 2019 [facility]. All rights reserved. Clinically reviewed by Steffen Ochoa MD. [place] [id number] - REV 08/24.

## 2024-10-24 NOTE — PROGRESS NOTES
Preoperative Evaluation  [facility]  [address]  [place]  [facility] [address]  Phone: [phone]  Fax: [phone]  Primary Provider: Joanne Weiner MD  Pre-op Performing Provider: Joanne Weiner MD  Oct 24, 2024             10/23/2024   Surgical Information   What procedure is being done? I have two procedures coming up.   One is for an injection in my jaw.   The other is spinal fusion    Facility or Hospital where procedure/surgery will be performed: [facility].   Second (fusion) [facility].    Who is doing the procedure / surgery? Dr Quan / [facility].        Dr Pedro Russell / [facility]    Date of surgery / procedure: November 4th.                   November 15th    Time of surgery / procedure: 11:30 am                             7:00am    Where do you plan to recover after surgery? at home with family        Patient-reported     Fax number for surgical facility: [facility]: [phone],   [facility]: [phone]    Assessment & Plan     The proposed surgical procedure is considered very low risk for jaw injection and low risk for anterior cervical decompression with fusion from C7-T1.    Preop general physical exam  Patient is cleared for surgery without further evaluation required.  - EKG 12-lead, tracing only  - Comprehensive metabolic panel (BMP + Alb, Alk Phos, ALT, AST, Total. Bili, TP)  - CBC with platelets  - Comprehensive metabolic panel (BMP + Alb, Alk Phos, ALT, AST, Total. Bili, TP)  - CBC with platelets    Cervical myelopathy (H)  Requiring decompression via anterior approach at C7-T1 with fusion.    Arthrodesis present  Description per surgeon.    Trigeminal neuralgia  Right side of jaw which is worsening over time.  Scheduled to have injection of the jaw under sedation.    Chronic pain syndrome  Seen at [facility] pain clinic and treated with Norco.    Degeneration of intervertebral disc of lumbosacral region with discogenic back pain and lower extremity pain  Patient with a history of multiple back surgeries.    Other specified spondylopathies, lumbar region (H)    Dysuria  Patient complains of dysuria and would like her urine checked.  - UA Macroscopic with reflex to Microscopic and Culture - Lab Collect  - UA Macroscopic with reflex to Microscopic and Culture - Lab Collect    Moderate persistent asthma, uncomplicated  Likely worsened by the fact she is smoking.    Panlobular emphysema (H)  Seen on imaging.  Would like benzonatate refilled for her cough.  - benzonatate (TESSALON) 100 MG capsule  Dispense: 30 capsule; Refill: 1    Cigarette nicotine dependence without complication  Continuing to smoke a half a pack a day and is precontemplative at this time.    Bipolar 2 disorder (H)  Seen regularly by her psychiatrist and had recent ECT treatment with decent success.    Mixed hyperlipidemia  Last LDL was 48 on atorvastatin 80 mg.    Acquired hypothyroidism  Good TSH level in February, on 75 mcg of levothyroxine.    Stage 3a chronic kidney disease (H)  Most recent GFR was 49 in April, will recheck.    Osteopenia of multiple sites  Last bone density was done 3/9/2023.  Not actively treating.    Migraine without status migrainosus, not intractable, unspecified migraine type  Patient asked for refill of Compazine which she uses for nausea when she gets migraines.  On Relpax and Aimovig.  - prochlorperazine (COMPAZINE) 10 MG tablet  Dispense: 30 tablet; Refill: 0    Phobia, flying  Patient asks for refill of diazepam for upcoming vacation.  - diazepam (VALIUM) 2 MG tablet  Dispense: 4 tablet; Refill: 0      I will get back to her on lab results by [employer] and only call with grossly abnormal values.  Preop will be faxed to these 2 different facilities when lab results available and can be imported into note.        - No  identified additional risk factors other than previously addressed    Preoperative Medication Instructions  Antiplatelet or Anticoagulation Medication Instructions   - Patient is on no antiplatelet or anticoagulation medications.    Additional Medication Instructions  Take all scheduled medications on the day of surgery EXCEPT for modifications listed below:   - ibuprofen (Advil, Motrin): DO NOT TAKE 1 day before surgery.    - naproxen (Aleve, Naprosyn): DO NOT TAKE 4 days before surgery.    - Aspirin (in migraine medicine): Do not take 7 days before surgery.  Recommendation  Approval given to proceed with proposed procedure, without further diagnostic evaluation.    Suzie Anne is a 63 year old, presenting for the following:  Pre-Op Exam (Sedated jaw injection November 4th [facility]  AND  Spinal surgery fusion November 15th [facility] ) and UTI (Would like UA run. Pt states her urine is still really strong odor and is still having frequency. )          10/24/2024     9:57 AM   Additional Questions   Roomed by KENJI Olsen related to upcoming procedure: This is a medically complicated patient who has a long history of chronic pain syndrome and is chronically on narcotic.  She is seen at [facility] clinic.  She recently saw me on 10/14 complaining of a new jaw pain on the right side of her face which I thought perhaps was trigeminal neuralgia.  Her pain clinic wants to do a jaw injection under sedation which will require this preop but is very low risk.  She also has a very long history of degenerative disc disease and has cervical myelopathy and arthrodesis and is requiring anterior cervical decompression and fusion of C7-T1 with Dr. Roldan Russell at [facility].        10/23/2024   Pre-Op Questionnaire   Have you ever had a heart attack or stroke? No    Have you ever had surgery on your heart or blood  vessels, such as a stent placement, a coronary artery bypass, or surgery on an artery in your head, neck, heart, or legs? No    Do you have chest pain with activity? No   Do you have a history of heart failure? No    Do you currently have a cold, bronchitis or symptoms of other infection? No    Do you have a cough, shortness of breath, or wheezing? No    Do you or anyone in your family have previous history of blood clots? No    Do you or does anyone in your family have a serious bleeding problem such as prolonged bleeding following surgeries or cuts? No    Have you ever had problems with anemia or been told to take iron pills? (!) YES after a back surgery    Have you had any abnormal blood loss such as black, tarry or bloody stools, or abnormal vaginal bleeding? No    Have you ever had a blood transfusion? No    Are you willing to have a blood transfusion if it is medically needed before, during, or after your surgery? Yes    Have you or any of your relatives ever had problems with anesthesia? (!) YES self--nausea    Do you have sleep apnea, excessive snoring or daytime drowsiness? No    Do you have any artifical heart valves or other implanted medical devices like a pacemaker, defibrillator, or continuous glucose monitor? No    Do you have artificial joints? No    Are you allergic to latex? No        Patient-reported     Health Care Directive  Patient does not have a Health Care Directive: Discussed advance care planning with patient; however, patient declined at this time.    Preoperative Review of    reviewed - controlled substances reflected in medication list.      Patient Active Problem List    Diagnosis Date Noted    Nicotine dependence 10/24/2024     Priority: Medium    Jaw pain 10/14/2024     Priority: Medium    Trigeminal neuralgia 10/14/2024     Priority: Medium    Cervical myelopathy (H) 02/13/2024     Priority: Medium    Panlobular emphysema (H) 09/11/2023     Priority: Medium    Atypical chest  pain 08/16/2023     Priority: Medium    Epigastric pain 05/12/2023     Priority: Medium    Iron deficiency anemia 09/20/2022     Priority: Medium    Acute bronchitis, unspecified organism 07/29/2022     Priority: Medium    Abnormal reflex 09/08/2021     Priority: Medium    Asymptomatic postmenopausal status 08/31/2021     Priority: Medium     Formatting of this note might be different from the original.  Created by Conversion    Replacement Utility updated for latest IMO load      Menopausal disorder 08/31/2021     Priority: Medium     Formatting of this note might be different from the original.  Created by Conversion    Replacement Utility updated for latest IMO load      Migraine headache 08/31/2021     Priority: Medium     Formatting of this note might be different from the original.  Created by Conversion    Replacement Utility updated for latest IMO load      Chronic pain syndrome 08/31/2021     Priority: Medium    DDD (degenerative disc disease), lumbosacral 03/22/2021     Priority: Medium    Spinal stenosis of lumbar region with neurogenic claudication 03/22/2021     Priority: Medium    Pain in right leg 03/21/2021     Priority: Medium    Other specified disorders of bone density and structure, multiple sites 03/21/2021     Priority: Medium    Other abnormalities of gait and mobility 03/21/2021     Priority: Medium    Moderate persistent asthma, uncomplicated 03/21/2021     Priority: Medium    Low back pain 03/21/2021     Priority: Medium    Anemia 03/18/2021     Priority: Medium    Hypotension 03/18/2021     Priority: Medium    Fibromyalgia 03/17/2021     Priority: Medium     Formatting of this note might be different from the original.  Created by Conversion    Formatting of this note might be different from the original.  Created by Conversion    Formatting of this note might be different from the original.  Formatting of this note might be different from the original.  Created by Conversion    Formatting  of this note might be different from the original.  Created by Conversion  Formatting of this note might be different from the original.  Formatting of this note might be different from the original.  Created by Conversion    Formatting of this note might be different from the original.  Created by Conversion      Hyperlipidemia 03/17/2021     Priority: Medium     Formatting of this note might be different from the original.  Created by Conversion    Formatting of this note might be different from the original.  Formatting of this note might be different from the original.  Created by Conversion  Formatting of this note might be different from the original.  Formatting of this note might be different from the original.  Created by Conversion      [facility] 03/17/2021     Priority: Medium     Formatting of this note might be different from the original.  Created by Conversion    Replacement Utility updated for latest IMO load    Formatting of this note might be different from the original.  Formatting of this note might be different from the original.  Created by Conversion    Replacement Utility updated for latest IMO load  Formatting of this note might be different from the original.  Formatting of this note might be different from the original.  Created by Conversion    Replacement Utility updated for latest IMO load      Common migraine with intractable migraine 02/15/2021     Priority: Medium    Chronic kidney disease, stage 3 (H) 01/14/2021     Priority: Medium     Created by Conversion        Osteopenia of multiple sites 09/01/2020     Priority: Medium    Centrilobular emphysema (H) 02/26/2018     Priority: Medium     Formatting of this note might be different from the original.  Mild, seen in 2018 CT scan, DLCO 60% predicted    Formatting of this note might be different from the original.  Formatting of this note might be different from the original.  Mild, seen in 2018 CT scan, DLCO 60%  predicted  Formatting of this note might be different from the original.  Formatting of this note might be different from the original.  Mild, seen in 2018 CT scan, DLCO 60% predicted      Hiatal hernia 02/16/2017     Priority: Medium    Hemorrhoids 11/22/2016     Priority: Medium    Lung nodule 08/04/2016     Priority: Medium     Formatting of this note might be different from the original.  8/4/2016:  Left upper lobe nodule of 6.5 mm, likely benign given slow growth per radiologist.  See CT  6/27/2011:  measured  approximately 5.5 mm in greatest dimension      Bipolar 2 disorder (H) 06/01/2015     Priority: Medium     Formatting of this note might be different from the original.  Created by Conversion    Replacement Utility updated for latest IMO load    Formatting of this note might be different from the original.  MED TRIALS:  Doxepin has been helpful for sleep & fibromyalgia.  Topamax- caused cognitive slowing & poor concentration. Depakote caused wt gain. Seroquel was sedating. Trazodone caused insomnia. Has tried mellaril, effexor, depakote, remeron, buspar, risperdal,zyprexa, celexa,luvox - unsure what happened with these.  Formatting of this note might be different from the original.  MED TRIALS:  Doxepin has been helpful for sleep & fibromyalgia.  Topamax- caused cognitive slowing & poor concentration. Depakote caused wt gain. Seroquel was sedating. Trazodone caused insomnia. Has tried mellaril, effexor, depakote, remeron, buspar, risperdal,zyprexa, celexa,luvox - unsure what happened with these.      Borderline personality disorder (H) 06/01/2015     Priority: Medium    PTSD (post-traumatic stress disorder) 06/01/2015     Priority: Medium    GERD (gastroesophageal reflux disease) 10/16/2012     Priority: Medium     Formatting of this note might be different from the original.  Formatting of this note might be different from the original.  coegd 07/17/12, normal screening  Formatting of this note might be  different from the original.  coegd 07/17/12, normal screening      Dysphagia 07/19/2012     Priority: Medium      Past Medical History:   Diagnosis Date    Abnormal reflex 09/08/2021    Abnormality of gait     Created by Conversion     Acute bronchitis, unspecified organism 07/29/2022    Anemia 03/18/2021    Anxiety     Asthma     Asymptomatic postmenopausal status     Created by Conversion  Replacement Utility updated for latest IMO load    Bipolar 2 disorder (H)     Borderline personality disorder (H) 06/01/2015    Centrilobular emphysema (H) 02/26/2018    Mild, seen in 2018 CT scan, DLCO 60% predicted  Formatting of this note might be different from the original. Formatting of this note might be different from the original. Mild, seen in 2018 CT scan, DLCO 60% predicted    Cervical spinal stenosis     s/p cervical fusion    Chronic kidney disease     Chronic kidney disease, stage 3 (H) 01/14/2021    Chronic pain syndrome     Cigarette nicotine dependence without complication 03/04/2020    Common migraine with intractable migraine 02/15/2021    COPD (chronic obstructive pulmonary disease) (H)     DDD (degenerative disc disease), lumbosacral 03/22/2021    Depression     Draining cutaneous sinus tract 12/01/2021    Added automatically from request for surgery [id number]    Dysphagia 07/19/2012    Encounter for other orthopedic aftercare 03/21/2021    Fibrocystic breast     Fibromyalgia     GERD (gastroesophageal reflux disease)     Hallux abductovalgus with bunions, unspecified laterality 12/14/2015    Hemorrhoids 11/22/2016    Herpes zoster     Created by Conversion  Replacement Utility updated for latest IMO load    Hiatal hernia     History of electroconvulsive therapy     Hyperlipidemia 03/17/2021    Created by Conversion   Formatting of this note might be different from the original. Formatting of this note might be different from the original. Created by Conversion    Hypotension 03/18/2021    Hypothyroidism      hypothyroidism    IBS (irritable bowel syndrome)     Iron deficiency anemia 09/20/2022    Low back pain 03/21/2021    Lung nodule 08/04/2016 8/4/2016:  Left upper lobe nodule of 6.5 mm, likely benign given slow growth per radiologist.  See CT 6/27/2011:  measured  approximately 5.5 mm in greatest dimension     Menopausal and postmenopausal disorder     Created by Conversion  Replacement Utility updated for latest IMO load    Menopausal disorder 08/31/2021    Formatting of this note might be different from the original. Created by Conversion  Replacement Utility updated for latest IMO load    Migraine     Created by Conversion  Replacement Utility updated for latest IMO load    Migraine headache 08/31/2021    Formatting of this note might be different from the original. Created by Conversion  Replacement Utility updated for latest IMO load    Migraines     Moderate asthma with exacerbation, unspecified whether persistent 07/29/2022    Moderate persistent asthma, uncomplicated 03/21/2021    Osteoarthritis     Osteopenia of multiple sites 09/01/2020    Other abnormalities of gait and mobility 03/21/2021    Other chronic pain     Other specified disorders of bone density and structure, multiple sites 03/21/2021    Pain in right leg 03/21/2021    PONV (postoperative nausea and vomiting)     PTSD (post-traumatic stress disorder)     Shingles 2014    on back    Spinal stenosis of lumbar region with neurogenic claudication 03/22/2021    Tobacco use disorder     Created by Conversion      Past Surgical History:   Procedure Laterality Date    BACK SURGERY  4 times on back 4 times on neck    Fusion    BIOPSY BREAST Left     Approx 5 bx    BUNIONECTOMY Right 12/15/2015    Procedure: MODIFIED LAI BUNIONECTOMY RIGHT FOOT;  Surgeon: Jerome Calvin DPM;  Location: [facility];  Service:     CERVICAL FUSION      CERVICAL FUSION Bilateral 02/16/2015    Procedure: ANTERIOR CERVICAL DECOMPRESSION/FUSION C3-5 BILATERAL,  ANTERIOR HARDWARE REMOVAL C5-7 BILATERAL ;  Surgeon: Sawyer Roland MD;  Location: [facility];  Service:     COLONOSCOPY      COSMETIC SURGERY      Breast implants, liposuction of stomach    EYE SURGERY      Eye lid lift    HC NIPPLE EXPLORATION      Description: Breast Nipple Explor W/ Excision Solitary Lactiferous Duct;  Recorded: 04/10/2008;    HEAD & NECK SURGERY      Neck fusion    HYSTERECTOMY      IR CERVICAL EPIDURAL STEROID INJECTION  09/17/2003    IR CERVICAL EPIDURAL STEROID INJECTION  12/11/2003    IR CERVICAL EPIDURAL STEROID INJECTION  01/16/2004    IRRIGATION AND DEBRIDEMENT DECUBITUS WOUND, COMBINED Left 12/13/2021    Procedure: Wound exploration and debridement of Left Lower Abdomin Chronic wound.;  Surgeon: Crispin Bunch MD;  Location: [facility]    LAPAROSCOPIC NISSEN FUNDOPLICATION N/A 12/30/2022    Procedure: FUNDOPLICATION, partial ROBOT-ASSISTED, LAPAROSCOPIC, USING DA MARIBEL XI;  Surgeon: Davie Ocasio DO;  Location: [facility]    LUMBAR DISCECTOMY      X2    MAMMOPLASTY AUGMENTATION Bilateral      approx 2006?    PELVIC LAPAROSCOPY      multiple    SHOULDER OPEN ROTATOR CUFF REPAIR Left     X2    ZZC TOTAL ABDOM HYSTERECTOMY      Description: Total Abdominal Hysterectomy;  Recorded: 06/10/2013;     Current Outpatient Medications   Medication Sig Dispense Refill    albuterol (PROAIR HFA) 108 (90 Base) MCG/ACT inhaler Inhale 2 puffs into the lungs every 4 hours 18 g 11    aspirin-acetaminophen-caffeine (EXCEDRIN MIGRAINE) 250-250-65 MG tablet Take 1 tablet by mouth daily as needed for headaches MR x 1      atorvastatin (LIPITOR) 80 MG tablet TAKE 1 TABLET(80 MG) BY MOUTH AT BEDTIME 90 tablet 0    benzonatate (TESSALON) 100 MG capsule Take 1 capsule (100 mg) by mouth 3 times daily as needed for cough. 30 capsule 1    buPROPion (WELLBUTRIN) 100 MG tablet       diazepam (VALIUM) 2 MG tablet Take 1 tablet (2 mg) by mouth every 6 hours as needed for  anxiety (flying phobia). 4 tablet 0    doxepin (SINEQUAN) 25 MG capsule Take 50 mg by mouth at bedtime.      eletriptan (RELPAX) 40 MG tablet Take 1 tablet (40 mg) by mouth at onset of headache for migraine 12 tablet 3    erenumab-aooe (AIMOVIG) 70 MG/ML injection ADMINISTER 1 ML(70 MG) UNDER THE SKIN EVERY MONTH 3 mL 3    esomeprazole (NEXIUM) 40 MG DR capsule Take 40 mg by mouth daily      ezetimibe (ZETIA) 10 MG tablet Take 10 mg by mouth at bedtime      FLUoxetine (PROZAC) 40 MG capsule Take 80 mg by mouth daily      gabapentin (NEURONTIN) 300 MG capsule Take 600 mg by mouth 2 times daily      HYDROcodone-acetaminophen (NORCO)  MG per tablet Take 1 tablet by mouth every 4 hours as needed      hydrOXYzine (ATARAX) 25 MG tablet TAKE 2 TABLETS BY MOUTH AT BEDTIME AND 1 TABLET BY MOUTH DURING THE DAY AS NEEDED      ipratropium - albuterol 0.5 mg/2.5 mg/3 mL (DUONEB) 0.5-2.5 (3) MG/3ML neb solution Take 1 vial (3 mLs) by nebulization every 6 hours as needed for shortness of breath, wheezing or cough 180 mL 4    lamoTRIgine (LAMICTAL) 200 MG tablet Take 1 tablet by mouth daily      levothyroxine (SYNTHROID/LEVOTHROID) 75 MCG tablet TAKE 1 TABLET(75 MCG) BY MOUTH DAILY 90 tablet 2    montelukast (SINGULAIR) 10 MG tablet TAKE 1 TABLET(10 MG) BY MOUTH AT BEDTIME 90 tablet 4    naloxone (NARCAN) 4 MG/0.1ML nasal spray Spray 1 spray (4 mg) into one nostril alternating nostrils once as needed for opioid reversal every 2-3 minutes until assistance arrives 0.2 mL 1    prazosin (MINIPRESS) 2 MG capsule Take 2 capsules by mouth At Bedtime      prochlorperazine (COMPAZINE) 10 MG tablet Take 1 tablet (10 mg) by mouth every 6 hours as needed for nausea. When having migraine 30 tablet 0    senna-docusate (SENOKOT-S/PERICOLACE) 8.6-50 MG tablet Take 2 tablets by mouth At Bedtime      tiotropium (SPIRIVA RESPIMAT) 2.5 MCG/ACT inhaler Inhale 2 puffs into the lungs daily 4 g 11    tiZANidine (ZANAFLEX) 4 MG tablet TAKE 1 TO 2  TABLETS BY MOUTH DURING THE DAY AND 2 TABLETS AT BEDTIME 120 tablet 1    vitamin D2 (ERGOCALCIFEROL) 52769 units (1250 mcg) capsule TAKE 1 CAPSULE BY MOUTH 1 TIME A WEEK 13 capsule 0       Allergies   Allergen Reactions    Codeine Anaphylaxis    Nefazodone Nausea and Vomiting    Oxycodone-Acetaminophen Unknown     hallucinations    Cetirizine Nausea and Vomiting    Trazodone Nausea and Vomiting    Morphine Other (See Comments)    Oxycodone Hallucination and Other (See Comments)    Amoxicillin-Pot Clavulanate [Amoxicillin-Pot Clavulanate] Rash    Cephalexin Rash    Clavulanic Acid Rash    Cyclobenzaprine Rash    Eszopiclone Rash    Methocarbamol Rash    Penicillins Rash     Mild rash        Social History     Tobacco Use    Smoking status: Light Smoker     Current packs/day: 0.00     Average packs/day: 0.5 packs/day for 45.0 years (22.5 ttl pk-yrs)     Types: Cigarettes     Last attempt to quit: 10/18/2021     Years since quitting: 3.0     Passive exposure: Past    Smokeless tobacco: Never   Substance Use Topics    Alcohol use: Not Currently     Family History   Problem Relation Age of Onset    Lung Cancer Mother          at age 52    Other Cancer Mother         Lung cancer moved into bones    Alcoholism Father     Substance Abuse Father          of lived disease at 62    Heart Disease Maternal Grandfather     Diabetes Paternal Grandmother     Coronary Artery Disease Paternal Grandmother     Heart Disease Paternal Grandfather     Diabetes Sister     Hyperlipidemia Sister     Hypertension Sister     Crohn's Disease Sister     Coronary Artery Disease Paternal Uncle     Asthma Maternal Aunt     Diabetes Other         Diabetes    Hyperlipidemia Sister     Depression Nephew         Commited suicide at 17    Substance Abuse Nephew     Asthma Nephew     Asthma Daughter         Severe     History   Drug Use No       Objective    /80 (BP Location: Left arm, Patient Position: Sitting, Cuff Size: Adult Regular)    "Pulse 74   Temp 97.9  F (36.6  C) (Oral)   Resp 12   Ht 1.638 m (5' 4.5\")   Wt 58.7 kg (129 lb 6.4 oz)   LMP 06/01/1983   SpO2 100%   Breastfeeding No   BMI 21.87 kg/m     Estimated body mass index is 21.87 kg/m  as calculated from the following:    Height as of this encounter: 1.638 m (5' 4.5\").    Weight as of this encounter: 58.7 kg (129 lb 6.4 oz).  Physical Exam  General appearance - alert, well appearing, and in no distress and normal appearing weight  Mental status - normal mood, behavior, speech, dress, motor activity, and thought processes  Eyes - pupils equal and reactive, extraocular eye movements intact  Ears - bilateral TM's and external ear canals normal  Nose - normal and patent, no erythema, discharge or polyps  Mouth - mucous membranes moist, pharynx normal without lesions  Neck - supple, no significant adenopathy, carotids upstroke normal bilaterally, no bruits, thyroid exam: thyroid is normal in size without nodules or tenderness  Chest - clear to auscultation, no wheezes, rales or rhonchi, symmetric air entry, decreased air entry noted bilaterally  Heart - normal rate and regular rhythm, no murmurs noted  Abdomen - soft, nontender, nondistended, no masses or organomegaly  Breasts - not examined  Pelvic - examination not indicated  Back exam - limited range of motion, pain with motion noted during exam  Neurological - alert, oriented, normal speech, no focal findings or movement disorder noted, cranial nerves II through XII intact  Musculoskeletal - no joint tenderness, deformity or swelling  Extremities - peripheral pulses normal, no pedal edema, no clubbing or cyanosis  Skin - normal coloration and turgor, no rashes, no suspicious skin lesions noted      Recent Labs   Lab Test 04/11/24  1613 02/13/24  1344   HGB 14.1 13.4    422    140   POTASSIUM 4.3 4.2   CR 1.22* 1.03*        Diagnostics  Recent Results (from the past 24 hours)   EKG 12-lead, tracing only    Collection " Time: 10/24/24 12:22 PM   Result Value Ref Range    Systolic Blood Pressure  mmHg    Diastolic Blood Pressure  mmHg    Ventricular Rate 61 BPM    Atrial Rate 61 BPM    MN Interval 152 ms    QRS Duration 82 ms     ms    QTc 426 ms    P Axis 69 degrees    R AXIS 78 degrees    T Axis 74 degrees    Interpretation ECG       Sinus rhythm  Possible Left atrial enlargement  Borderline ECG  When compared with ECG of 13-Feb-2024 13:23,  No significant change was found     Comprehensive metabolic panel (BMP + Alb, Alk Phos, ALT, AST, Total. Bili, TP)    Collection Time: 10/24/24 12:32 PM   Result Value Ref Range    Sodium 140 135 - 145 mmol/L    Potassium 4.4 3.4 - 5.3 mmol/L    Carbon Dioxide (CO2) 27 22 - 29 mmol/L    Anion Gap 10 7 - 15 mmol/L    Urea Nitrogen 11.9 8.0 - 23.0 mg/dL    Creatinine 0.90 0.51 - 0.95 mg/dL    GFR Estimate 71 >60 mL/min/1.73m2    Calcium 9.4 8.8 - 10.4 mg/dL    Chloride 103 98 - 107 mmol/L    Glucose 119 (H) 70 - 99 mg/dL    Alkaline Phosphatase 106 40 - 150 U/L    AST 33 0 - 45 U/L    ALT 24 0 - 50 U/L    Protein Total 7.0 6.4 - 8.3 g/dL    Albumin 4.3 3.5 - 5.2 g/dL    Bilirubin Total 0.2 <=1.2 mg/dL   CBC with platelets    Collection Time: 10/24/24 12:32 PM   Result Value Ref Range    WBC Count 6.4 4.0 - 11.0 10e3/uL    RBC Count 4.90 3.80 - 5.20 10e6/uL    Hemoglobin 14.7 11.7 - 15.7 g/dL    Hematocrit 45.6 35.0 - 47.0 %    MCV 93 78 - 100 fL    MCH 30.0 26.5 - 33.0 pg    MCHC 32.2 31.5 - 36.5 g/dL    RDW 12.9 10.0 - 15.0 %    Platelet Count 261 150 - 450 10e3/uL   UA Macroscopic with reflex to Microscopic and Culture - Lab Collect    Collection Time: 10/24/24 12:40 PM    Specimen: Urine, Midstream   Result Value Ref Range    Color Urine Yellow Colorless, Straw, Light Yellow, Yellow    Appearance Urine Clear Clear    Glucose Urine Negative Negative mg/dL    Bilirubin Urine Negative Negative    Ketones Urine Negative Negative mg/dL    Specific Gravity Urine >=1.030 1.005 - 1.030    Blood  Urine Negative Negative    pH Urine 5.5 5.0 - 8.0    Protein Albumin Urine Negative Negative mg/dL    Urobilinogen Urine 0.2 0.2, 1.0 E.U./dL    Nitrite Urine Negative Negative    Leukocyte Esterase Urine Negative Negative      EKG: Last done 2/13/2024 showing sinus bradycardia and borderline LVH, unchanged from previous tracings    Revised Cardiac Risk Index (RCRI)  The patient has the following serious cardiovascular risks for perioperative complications:   - High risk surgery (>5% cardiac complication risk) = 1 point     RCRI Interpretation: 1 point: Class II (low risk - 0.9% complication rate)         Signed Electronically by: Joanne Weiner MD  A copy of this evaluation report is provided to the requesting physician.

## 2024-10-25 ENCOUNTER — OFFICE VISIT (OUTPATIENT)
Dept: CARDIOLOGY | Facility: CLINIC | Age: 64
End: 2024-10-25
Attending: INTERNAL MEDICINE
Payer: COMMERCIAL

## 2024-10-25 VITALS
BODY MASS INDEX: 21.66 KG/M2 | HEART RATE: 84 BPM | SYSTOLIC BLOOD PRESSURE: 138 MMHG | DIASTOLIC BLOOD PRESSURE: 62 MMHG | HEIGHT: 65 IN | WEIGHT: 130 LBS | RESPIRATION RATE: 14 BRPM

## 2024-10-25 DIAGNOSIS — R73.9 ELEVATED RANDOM BLOOD GLUCOSE LEVEL: ICD-10-CM

## 2024-10-25 DIAGNOSIS — R06.00 DYSPNEA, UNSPECIFIED TYPE: ICD-10-CM

## 2024-10-25 DIAGNOSIS — R06.02 SHORTNESS OF BREATH: Primary | ICD-10-CM

## 2024-10-25 DIAGNOSIS — E78.2 MIXED HYPERLIPIDEMIA: ICD-10-CM

## 2024-10-25 DIAGNOSIS — I73.9 PAD (PERIPHERAL ARTERY DISEASE) (H): ICD-10-CM

## 2024-10-25 DIAGNOSIS — R07.89 CHEST DISCOMFORT: ICD-10-CM

## 2024-10-25 LAB
ATRIAL RATE - MUSE: 61 BPM
DIASTOLIC BLOOD PRESSURE - MUSE: NORMAL MMHG
EST. AVERAGE GLUCOSE BLD GHB EST-MCNC: 128 MG/DL
HBA1C MFR BLD: 6.1 % (ref 0–5.6)
INTERPRETATION ECG - MUSE: NORMAL
NT-PROBNP SERPL-MCNC: 211 PG/ML (ref 0–900)
P AXIS - MUSE: 69 DEGREES
PR INTERVAL - MUSE: 152 MS
QRS DURATION - MUSE: 82 MS
QT - MUSE: 424 MS
QTC - MUSE: 426 MS
R AXIS - MUSE: 78 DEGREES
SYSTOLIC BLOOD PRESSURE - MUSE: NORMAL MMHG
T AXIS - MUSE: 74 DEGREES
VENTRICULAR RATE- MUSE: 61 BPM

## 2024-10-25 PROCEDURE — G2211 COMPLEX E/M VISIT ADD ON: HCPCS | Performed by: STUDENT IN AN ORGANIZED HEALTH CARE EDUCATION/TRAINING PROGRAM

## 2024-10-25 PROCEDURE — 99204 OFFICE O/P NEW MOD 45 MIN: CPT | Performed by: STUDENT IN AN ORGANIZED HEALTH CARE EDUCATION/TRAINING PROGRAM

## 2024-10-25 NOTE — PATIENT INSTRUCTIONS
Ultrasound of the heart and of the legs.     CT scan of the arteries of the heart    Check the blood pressure at home, our goal is less than 130/80 mmHg.

## 2024-10-25 NOTE — LETTER
10/25/2024    Joanne Weiner MD  [address] [facility]  [facility] [address]  [facility] [address]    RE: Dayana Samaniego       Dear Colleague,     I had the pleasure of seeing Dayana Samaniego in the [facility].  Cardiology Clinic    Dayana Samaniego is a 63 year old with a history of tobacco abuse, bipolar disorder, hyperlipidemia, hypothyroidism, CKD, chronic pain, and COPD who presents to establish care.    Over the last year body has been having worsening shortness of breath with exertion.  He states that particularly mopping her house causes shortness of breath and some chest discomfort as well.  The last for about 10 minutes.  This occurs daily with heavy exertion.  She also has pain in her calves and lower legs associated with exertion.  She continues to smoke about half pack per day and has tried everything including Chantix, other drugs, and acupuncture with variable success.    General: Well appearing, appears stated age.  CV: Normal rate and rhythm. No m/r/g. No JVD.   Pulm: Normal effort. Normal breath sounds.  Lower extremities: No lower extremity edema.    Labs:   Reviewed in epic.  Hemoglobin 14.7  Platelets 261  Creatinine 0.9  LDL 48    Testing:  Nuclear stress 9/2023: No ischemia, EF greater than 70%  Coronary CTA 9/2022: Normal coronary arteries  EKG 2/2024: Normal sinus rhythm, LVH, no significant ST or T wave abnormalities (my interpretation)    Assessment and Plan:    1.  Shortness of breath, chest discomfort.  Her chest discomfort certainly could be an anginal equivalent, although she has a negative stress test about a year ago and a CTA without any epicardial coronary disease in 2022.  Will get a repeat TTE and coronary CTA to evaluate given her issues with any procedures.  Labs today.    2.  Claudication.  She has had multiple back surgeries and this certainly could be neurogenic claudication but given her smoking history, we will get ABIs to evaluate.    The longitudinal  plan of care for the diagnosis(es)/condition(s) as documented were addressed during this visit. Due to the added complexity in care, I will continue to support Dayana in the subsequent management and with ongoing continuity of care.    Return in about 3 months (around 1/25/2025).    Thang Christensen MD  Interventional Cardiology      Thank you for allowing me to participate in the care of your patient.      Sincerely,     Thang Christensen MD     [facility]  cc:   Dylan Amezcua, DO  [address] [place] [address]

## 2024-10-25 NOTE — PROGRESS NOTES
Cardiology Clinic    Dayana Samaniego is a 63 year old with a history of tobacco abuse, bipolar disorder, hyperlipidemia, hypothyroidism, CKD, chronic pain, and COPD who presents to [facility] care.    Over the last year body has been having worsening shortness of breath with exertion.  He states that particularly mopping her house causes shortness of breath and some chest discomfort as well.  The last for about 10 minutes.  This occurs daily with heavy exertion.  She also has pain in her calves and lower legs associated with exertion.  She continues to smoke about half pack per day and has tried everything including Chantix, other drugs, and acupuncture with variable success.    General: Well appearing, appears stated age.  CV: Normal rate and rhythm. No m/r/g. No JVD.   Pulm: Normal effort. Normal breath sounds.  Lower extremities: No lower extremity edema.    Labs:   Reviewed in epic.  Hemoglobin 14.7  Platelets 261  Creatinine 0.9  LDL 48    Testing:  Nuclear stress 9/2023: No ischemia, EF greater than 70%  Coronary CTA 9/2022: Normal coronary arteries  EKG 2/2024: Normal sinus rhythm, LVH, no significant ST or T wave abnormalities (my interpretation)    Assessment and Plan:    1.  Shortness of breath, chest discomfort.  Her chest discomfort certainly could be an anginal equivalent, although she has a negative stress test about a year ago and a CTA without any epicardial coronary disease in 2022.  Will get a repeat TTE and coronary CTA to evaluate given her issues with any procedures.  Labs today.    2.  Claudication.  She has had multiple back surgeries and this certainly could be neurogenic claudication but given her smoking history, we will get ABIs to evaluate.    The longitudinal plan of care for the diagnosis(es)/condition(s) as documented were addressed during this visit. Due to the added complexity in care, I will continue to support Dayana in the subsequent management and with ongoing continuity of  care.    Return in about 3 months (around 1/25/2025).    Thang Christensen MD  Interventional Cardiology

## 2024-11-05 ENCOUNTER — OFFICE VISIT (OUTPATIENT)
Dept: NEUROLOGY | Facility: CLINIC | Age: 64
End: 2024-11-05
Payer: COMMERCIAL

## 2024-11-05 VITALS
SYSTOLIC BLOOD PRESSURE: 155 MMHG | HEART RATE: 76 BPM | DIASTOLIC BLOOD PRESSURE: 83 MMHG | WEIGHT: 130 LBS | HEIGHT: 65 IN | BODY MASS INDEX: 21.66 KG/M2

## 2024-11-05 DIAGNOSIS — G43.019 MIGRAINE WITHOUT AURA, INTRACTABLE, WITHOUT STATUS MIGRAINOSUS: ICD-10-CM

## 2024-11-05 DIAGNOSIS — G43.019 COMMON MIGRAINE WITH INTRACTABLE MIGRAINE: Primary | ICD-10-CM

## 2024-11-05 DIAGNOSIS — G43.909 MIGRAINE WITHOUT STATUS MIGRAINOSUS, NOT INTRACTABLE, UNSPECIFIED MIGRAINE TYPE: ICD-10-CM

## 2024-11-05 PROCEDURE — 64615 CHEMODENERV MUSC MIGRAINE: CPT | Performed by: PSYCHIATRY & NEUROLOGY

## 2024-11-05 RX ORDER — ELETRIPTAN HYDROBROMIDE 40 MG/1
40 TABLET, FILM COATED ORAL
Qty: 12 TABLET | Refills: 3 | Status: SHIPPED | OUTPATIENT
Start: 2024-11-05

## 2024-11-05 NOTE — NURSING NOTE
Chief Complaint   Patient presents with    Botox     Needs refill for eletriptan     Mary White LPN on 11/5/2024 at 10:17 AM

## 2024-11-05 NOTE — LETTER
11/5/2024      Dayana Samaniego  [address] [place] [id number]      Dear Colleague,    Thank you for referring your patient, Dayana Samaniego, to the [facility] [place]. Please see a copy of my visit note below.    Subjective    CC: Dayana Samaniego  is a 63 year old female who presents to clinic today for the following health issues:   Chief Complaint   Patient presents with     Botox     Needs refill for eletriptan        In person evaluation    HPI  3/16/2020, in person evaluation  11/30/2021, in person evaluation  12/27/2023, in person visit  1/30/2024, Botox injection  4/30/2024, Botox injection  8/6/2024, Botox injection  11/5/2024, Botox injection    63-year-old followed neurologically for:  Intractable migraine headaches  History of 3 cervical fusions  Longstanding generalized hyperreflexia  History of carotid bruits  Fibromyalgia      Follow-up for intractable migraine headaches failed multiple medications as preventatives and abortive's  Has been treated with Aimovig  Follows at spine clinic and with other neurologist for her neck and low back difficulties status post surgery cervical and lumbar    Migraine headaches at least 15/month  4 headaches per week  Excedrin 4/week  Drink some Pepsi  Visual blurriness with a headache  Nausea but no vomiting  Relpax gives her nausea also although she does have Compazine through her primary to take with that but she waits until the headache is been there for a long time before using it    Previously used Botox but it lost its effectiveness  Off Botox injections for a while  Last use may be 2019.    Aimovig seems to be losing some of its benefit.    Patient has gone back on the Botox injections which were restarted 1/30/2024            Neurologic summary:    A.  Patient with intractable migraine headaches       Onset of headaches as far back as 1987       Associated with photophobia/phonophobia and visual changes       History of  [email or web address]       Family history of migraines in her daughter    Previous frequency of headaches are 2-3/week  Had used Botox injections up until March 2020  Switch to Aimovig end of March 2020      Patient uses the once a month injectable Aimovig  She gets at least 2 weeks of good response  Then the headaches seem to increase in frequency  During the good phase she has 1-2 headaches per week  Duration of the headache during the good phases all day  Will use an Excedrin Migraine maybe 4/week during that time warned her about medication rebound headache  Uses the Relpax to break the headache                                11/2021 12/2023 4/2024 8/2024  Frequency           1-2/week               4/week        4+/ week        4 /week    Duration              24 hours                 24 hours    24 hours          24-hour    Excedrin              4 /week                  4/week                               4 /week    Headache frequency increased just prior to next Botox injection.      B.  Also has sharp shooting pain above the left eye lasting 10 minutes at a time jabbing in nature more neuralgic-like    C.  Patient does have bipolar disorder is on Lamictal    D.  Past history of cervical surgeries       Chronic hyperreflexia        Has been treated with Zanaflex as a muscle relaxant    E.  Has  COPD and is on inhalers       Not a good candidate for beta-blockers     F.  Since last seen had lumbar canal surgery 3/17/2021 by Dr. Sanon        Complex surgery see official reports        Anterior discectomy fusion L4-5 and L5-S1        PEEK cage L4-5, L5-S1        Posterior fusion L4-5, L5-S1        Insertion pedicle screws maria e fixation L4, 5, S1        Bilateral hemilaminectomies L4 see official report    Patient fell when she was in rehab and had a sacral fracture 3/22/2021  This slow down healing    She has been left with a lot of low back pain  Weakness more in the right leg than the  left  She is always had very brisk reflexes with clonus before  As decreased reflexes at the L4 on the right leg    Is now ambulating without a cane or walker but has them at home    Patient states that there is a concern that the grafts that they put in may not be healing as well and causing some of the pain but there is no new impingement  Did undergone an EMG 2021 at [facility]  At [facility] neurology 2021 may be at [facility]  I can access some of the record but not all of it  She says that she has some residual nerve damage but no new impingement    I feel she should follow closely with her spine specialist and that has access to all her scans and data    Previously worked up by the spine specialist who did her surgery had other neurologist and EMGs performed 2021 which I do not have full access to  Follows at [facility] of neurology for spine difficulty  (Chronic cervical pain/calf cramps.)  MRI C-spine 2023 postop changes C3-C7, disc osteophyte similar to past scans per their report.  Last seen 2023      Past medical history  Intractable migraine headaches  Chronic daily headaches  Status post cervical fusion x3  Longstanding hyperreflexia and difficulty with imbalance and falls  Carotid artery bruits  Fibromyalgia/chronic pain  COPD.  Dyspnea on exertion with pulmonary deconditioning with past history of pulmonary nodules.   Right atrial enlargement.  Fatigue with possible obstructive sleep apnea.  Bipolar disorder.  Hypothyroidism.   GERD.   Hyperlipidemia.  Chronic kidney disease.      Habits  History of smoking  Alcohol 1-2/month    Family history  Mother with bone tumor,  at age 52  Father with liver disease and alcohol use,  at age 62  Sister with diabetes high blood pressure and hypercholesterolemia  Maternal aunt diabetes  Nephew with depression and suicidal thoughts  Uncle with heart disease  Daughters with migraine        Past work-up review  EEG 2011  background rhythm 7-9 Hz slightly slow nonspecific consistent with toxic encephalopathy  MRI scan brain 2016  A.  Small T2 hyperintensity changes nonspecific  B.  No hydrocephalus mass or acute changes  TSH 0.32.   Vitamin D level in the past was low at 26.2.  MRI C-spine 2023 see official report  1.  Postoperative changes from C3 through C7.  2.  Degenerative change at C7-T1 see official report.  3.  Moderate spinal stenosis slight flattening anterior cord similar to prior exam C7-T1        Migraine treatment review    Some medications that have been used in the past as preventatives have included:  1. Topamax.  2. Verapamil.  3. Lyrica.  4. Amitriptyline.  5. Propranolol.    Medications tried in the past more as abortive agents:  a. Tizanidine.  b. Zomig.  c. Vicodin.  d. Relpax, which works sometimes, but not always.  Filled by primary MD  e. Toradol.    Currently, her medications are filled through her primary includin. Valium for anxiety.  2. Doxepin 25 mg three at bedtime.  3. Prozac 60 mg at bedtime.  4. Vistaril 25 mg two tablets p.o. q.6h. p.r.n. for migraine.  5. Avoids ibuprofen and aspirin due to her kidney disease, but recently had some Toradol.  6. Lamictal 200 mg at bedtime for bipolar.  7. Melatonin 5 mg for sleep.  8. Has had prednisone burst during headache flurries in the past.  9. Tizanidine 4 mg tablet, one during the day and two at night prescribed by primary.    Patient is a nonsmoker, does not drink alcohol.    Patient s past Botox injection schedules have included:  a. 2016, at the [facility], 25-30 units, did not tolerate, did not complete.  b. Second injection at the [facility] on 2017, 150 units given.    Through our clinic a 155 units using the headache template. Patient knows the risk and benefits of the medication:  a. 2017.  b. 2017.  c. 2018.  d. 2018, 200 units, given as she had a lot of neck  spasm.  e. September 24, 2018, total of 200 units given as she has a lot of neck spasm.  f. March 08, 2019, 200 units given as she had a lot of neck spasm.  g. Halima 10, 2019 200 units headache template and some for neck spasm  h. sept 13th, 2019 155 units  i Dec 16th, 2019 155 units    Botox injections restarted  Botox 155 units injected using headache template patient tolerates injections.  1.  1/30/2024, 155 units injected  2.  4/30/2024, 155 units injected  3.  8/6/2024, 155 units injected  4.  11//2024, 155 units injected      Laboratory data review                               8/2023  NA/K                   139/3.9  BUN/Cr               17/1.04  GLU                     92  AST                     27  WBC/HGB           7.9/14.6  PLTs                     284,000  ESR/CRP             8/ <0.1              Review of Systems     Significant headaches as above  Sometimes gets a sharp shooting pain more so in the left eye than the right varies seems to be the beginning of a migraine    No visual changes   No dysarthria dysphasia or diplopia  No actual visual field cut  Does have visual blurriness with a headache  Gets nausea with the headache  Has spasms in the neck trapezius chronically    Does get photophobia when the headaches are worse    No chest pain no shortness of breath no nausea vomiting no diarrhea no fever chills no gait changes otherwise review systems are negative     Does have the pain in the back and weakness of the right leg greater than the left but can ambulate    General exam  Alert oriented x 3  Vital signs Per chart  Lungs clear  Heart rate regular  Abdomen soft  Symmetrical pulses  No edema the feet      Neurologic exam   Alert oriented x3  No aphasia  No neglect  Normal memory recall    Cranials 2 through 12 normal  Pupils are symmetrical and reactive  Optic fundi are good  Visual fields are intact  No ophthalmoplegia  No nystagmus  Face is symmetrical tongue twisters are good    Upper  extremities  No drift no tremor normal finger-nose    Brisk reflexes in the lower extremities greater on the left than the right except decreased right knee reflex    Lower extremities  Pain versus weakness of the right iliopsoas but can bear weight okay and walk independently    Gait adequate        Assessment/Plan     1.  Chronic migraine without aura, intractable, without status migrainosus (G43.719)     2.  Started Aimovig March/April 2020       Aimovig 70mg/ml  Subcutaneous, Qmonth       Does seem to be helping the migraines    3.  Status post complex surgery on her lumbar spine March 17, 2021 with residual back pain and residual weakness of the right leg    4.  Sacral fracture 3/21/2021 while she was in rehab for her lumbar surgery    Previously worked up by the spine specialist who did her surgery had other neurologist and EMGs performed June 2021 which I do not have full access to  Follows at [facility] of neurology for spine difficulty  (Chronic cervical pain/calf cramps.)  MRI C-spine 2/6/2023 postop changes C3-C7, disc osteophyte similar to past scans per their report.  Last seen 5/24/2023 by her spine neurologist.    No new recommendations in regards to her back or spine difficulty  Treatment of pain for her spine per other neurologist and spine specialist in regards to the back    Intractable migraine headache  Has failed multiple preventative therapies  Has been a while since she has been on the Botox  We will try to get Botox approved again and give this another try    Has been on Aimovig which may be losing its effectiveness  Sometimes rotating preventative medicines is helpful  She is trying to keep the Excedrin to 4/week to avoid medication rebound headache    Restarted Botox injection:  155 units per template patient tolerates the Botox injections  Has found that the Botox injections decrease the severity more so than the frequency  Patient tolerates the Botox injections  1/30/2024, 155  units  ,  Botox injections restarted  Botox 155 units injected using headache template patient tolerates injections.  1.  1/30/2024, 155 units injected  2.   4/30/2024, 155 units injected  3.   8/6/2024,   155 units injected  4.  11/4/2024, iron 55 units injected    She gets most of her meds either through the pain clinic and or primary    Patient on narcotics and gabapentin through the pain clinic  Has been given a steroid burst and had steroid injections for her back    Patient gets a decrease in frequency and severity of headaches with the Botox injections.  And as she gets closer to the next injection time it started to crescendo upwards again.    No side effects from Botox injections    Recently had some treatment at the pain center for right trigeminal neuralgia with an injection.  Is going to be having some neck procedure in the near future  I did refill the Relpax      Units Injected: 155  Units Discarded: 45  Lot Number and Expiration: [id number], expiration 03/2027  NDC number 1760-4664-83    DUNCAN SCHMITT      Again, thank you for allowing me to participate in the care of your patient.        Sincerely,        duncan Schmitt MD

## 2024-11-05 NOTE — PROGRESS NOTES
Subjective     CC: Dayana Samaniego  is a 63 year old female who presents to clinic today for the following health issues:   Chief Complaint   Patient presents with    Botox     Needs refill for eletriptan        In person evaluation    HPI  3/16/2020, in person evaluation  11/30/2021, in person evaluation  12/27/2023, in person visit  1/30/2024, Botox injection  4/30/2024, Botox injection  8/6/2024, Botox injection  11/5/2024, Botox injection    63-year-old followed neurologically for:  Intractable migraine headaches  History of 3 cervical fusions  Longstanding generalized hyperreflexia  History of carotid bruits  Fibromyalgia      Follow-up for intractable migraine headaches failed multiple medications as preventatives and abortive's  Has been treated with Aimovig  Follows at spine clinic and with other neurologist for her neck and low back difficulties status post surgery cervical and lumbar    Migraine headaches at least 15/month  4 headaches per week  Excedrin 4/week  Drink some Pepsi  Visual blurriness with a headache  Nausea but no vomiting  Relpax gives her nausea also although she does have Compazine through her primary to take with that but she waits until the headache is been there for a long time before using it    Previously used Botox but it lost its effectiveness  Off Botox injections for a while  Last use may be 2019.    Aimovig seems to be losing some of its benefit.    Patient has gone back on the Botox injections which were restarted 1/30/2024            Neurologic summary:    A.  Patient with intractable migraine headaches       Onset of headaches as far back as 1987       Associated with photophobia/phonophobia and visual changes       History of carsickness       Family history of migraines in her daughter    Previous frequency of headaches are 2-3/week  Had used Botox injections up until March 2020  Switch to Aimovig end of March 2020      Patient uses the once a month injectable Aimovig  She  gets at least 2 weeks of good response  Then the headaches seem to increase in frequency  During the good phase she has 1-2 headaches per week  Duration of the headache during the good phases all day  Will use an Excedrin Migraine maybe 4/week during that time warned her about medication rebound headache  Uses the Relpax to break the headache                                11/2021 12/2023 4/2024 8/2024  Frequency           1-2/week               4/week        4+/ week        4 /week    Duration              24 hours                 24 hours    24 hours          24-hour    Excedrin              4 /week                  4/week                               4 /week    Headache frequency increased just prior to next Botox injection.      B.  Also has sharp shooting pain above the left eye lasting 10 minutes at a time jabbing in nature more neuralgic-like    C.  Patient does have bipolar disorder is on Lamictal    D.  Past history of cervical surgeries       Chronic hyperreflexia        Has been treated with Zanaflex as a muscle relaxant    E.  Has  COPD and is on inhalers       Not a good candidate for beta-blockers     F.  Since last seen had lumbar canal surgery 3/17/2021 by Dr. Sanon        Complex surgery see official reports        Anterior discectomy fusion L4-5 and L5-S1        PEEK cage L4-5, L5-S1        Posterior fusion L4-5, L5-S1        Insertion pedicle screws maria e fixation L4, 5, S1        Bilateral hemilaminectomies L4 see official report    Patient fell when she was in rehab and had a sacral fracture 3/22/2021  This slow down healing    She has been left with a lot of low back pain  Weakness more in the right leg than the left  She is always had very brisk reflexes with clonus before  As decreased reflexes at the L4 on the right leg    Is now ambulating without a cane or walker but has them at home    Patient states that there is a concern that the grafts that they put in  may not be healing as well and causing some of the pain but there is no new impingement  Did undergone an EMG 2021 at [facility]  At see neurology 2021 may be at [facility]  I can access some of the record but not all of it  She says that she has some residual nerve damage but no new impingement    I feel she should follow closely with her spine specialist and that has access to all her scans and data    Previously worked up by the spine specialist who did her surgery had other neurologist and EMGs performed 2021 which I do not have full access to  Follows at [facility] neurology for spine difficulty  (Chronic cervical pain/calf cramps.)  MRI C-spine 2023 postop changes C3-C7, disc osteophyte similar to past scans per their report.  Last seen 2023      Past medical history  Intractable migraine headaches  Chronic daily headaches  Status post cervical fusion x3  Longstanding hyperreflexia and difficulty with imbalance and falls  Carotid artery bruits  Fibromyalgia/chronic pain  COPD.  Dyspnea on exertion with pulmonary deconditioning with past history of pulmonary nodules.   Right atrial enlargement.  Fatigue with possible obstructive sleep apnea.  Bipolar disorder.  Hypothyroidism.   GERD.   Hyperlipidemia.  Chronic kidney disease.      Habits  History of smoking  Alcohol 1-2/month    Family history  Mother with bone tumor,  at age 52  Father with liver disease and alcohol use,  at age 62  Sister with diabetes high blood pressure and hypercholesterolemia  Maternal aunt diabetes  Nephew with depression and suicidal thoughts  Uncle with heart disease  Daughters with migraine        Past work-up review  EEG 2011 background rhythm 7-9 Hz slightly slow nonspecific consistent with toxic encephalopathy  MRI scan brain 2016  A.  Small T2 hyperintensity changes nonspecific  B.  No hydrocephalus mass or acute changes  TSH 0.32.   Vitamin D level in the past was low at  26.2.  MRI C-spine 2023 see official report  1.  Postoperative changes from C3 through C7.  2.  Degenerative change at C7-T1 see official report.  3.  Moderate spinal stenosis slight flattening anterior cord similar to prior exam C7-T1        Migraine treatment review    Some medications that have been used in the past as preventatives have included:  1. Topamax.  2. Verapamil.  3. Lyrica.  4. Amitriptyline.  5. Propranolol.    Medications tried in the past more as abortive agents:  a. Tizanidine.  b. Zomig.  c. Vicodin.  d. Relpax, which works sometimes, but not always.  Filled by primary MD  e. Toradol.    Currently, her medications are filled through her primary includin. Valium for anxiety.  2. Doxepin 25 mg three at bedtime.  3. Prozac 60 mg at bedtime.  4. Vistaril 25 mg two tablets p.o. q.6h. p.r.n. for migraine.  5. Avoids ibuprofen and aspirin due to her kidney disease, but recently had some Toradol.  6. Lamictal 200 mg at bedtime for bipolar.  7. Melatonin 5 mg for sleep.  8. Has had prednisone burst during headache flurries in the past.  9. Tizanidine 4 mg tablet, one during the day and two at night prescribed by primary.    Patient is a nonsmoker, does not drink alcohol.    Patient s past Botox injection schedules have included:  a. 2016, at the [facility], 25-30 units, did not tolerate, did not complete.  b. Second injection at the [facility] on 2017, 150 units given.    Through our clinic a 155 units using the headache template. Patient knows the risk and benefits of the medication:  a. 2017.  b. 2017.  c. 2018.  d. 2018, 200 units, given as she had a lot of neck spasm.  e. 2018, total of 200 units given as she has a lot of neck spasm.  f. 2019, 200 units given as she had a lot of neck spasm.  g. Halima 10, 2019 200 units headache template and some for neck spasm  h. 2019 155 units  i Dec 16th,  2019 155 units    Botox injections restarted  Botox 155 units injected using headache template patient tolerates injections.  1.  1/30/2024, 155 units injected  2.  4/30/2024, 155 units injected  3.  8/6/2024, 155 units injected  4.  11//2024, 155 units injected      Laboratory data review                               8/2023  NA/K                   139/3.9  BUN/Cr               17/1.04  GLU                     92  AST                     27  WBC/HGB           7.9/14.6  PLTs                     284,000  ESR/CRP             8/ <0.1              Review of Systems     Significant headaches as above  Sometimes gets a sharp shooting pain more so in the left eye than the right varies seems to be the beginning of a migraine    No visual changes   No dysarthria dysphasia or diplopia  No actual visual field cut  Does have visual blurriness with a headache  Gets nausea with the headache  Has spasms in the neck trapezius chronically    Does get photophobia when the headaches are worse    No chest pain no shortness of breath no nausea vomiting no diarrhea no fever chills no gait changes otherwise review systems are negative     Does have the pain in the back and weakness of the right leg greater than the left but can ambulate    General exam  Alert oriented x 3  Vital signs Per chart  Lungs clear  Heart rate regular  Abdomen soft  Symmetrical pulses  No edema the feet      Neurologic exam   Alert oriented x3  No aphasia  No neglect  Normal memory recall    Cranials 2 through 12 normal  Pupils are symmetrical and reactive  Optic fundi are good  Visual fields are intact  No ophthalmoplegia  No nystagmus  Face is symmetrical tongue twisters are good    Upper extremities  No drift no tremor normal finger-nose    Brisk reflexes in the lower extremities greater on the left than the right except decreased right knee reflex    Lower extremities  Pain versus weakness of the right iliopsoas but can bear weight okay and walk  independently    Gait adequate        Assessment/Plan     1.  Chronic migraine without aura, intractable, without status migrainosus (G43.719)     2.  Started Aimovig March/April 2020       Aimovig 70mg/ml  Subcutaneous, Qmonth       Does seem to be helping the migraines    3.  Status post complex surgery on her lumbar spine March 17, 2021 with residual back pain and residual weakness of the right leg    4.  Sacral fracture 3/21/2021 while she was in rehab for her lumbar surgery    Previously worked up by the spine specialist who did her surgery had other neurologist and EMGs performed June 2021 which I do not have full access to  Follows at [facility] neurology for spine difficulty  (Chronic cervical pain/calf cramps.)  MRI C-spine 2/6/2023 postop changes C3-C7, disc osteophyte similar to past scans per their report.  Last seen 5/24/2023 by her spine neurologist.    No new recommendations in regards to her back or spine difficulty  Treatment of pain for her spine per other neurologist and spine specialist in regards to the back    Intractable migraine headache  Has failed multiple preventative therapies  Has been a while since she has been on the Botox  We will try to get Botox approved again and give this another try    Has been on Aimovig which may be losing its effectiveness  Sometimes rotating preventative medicines is helpful  She is trying to keep the Excedrin to 4/week to avoid medication rebound headache    Restarted Botox injection:  155 units per template patient tolerates the Botox injections  Has found that the Botox injections decrease the severity more so than the frequency  Patient tolerates the Botox injections  1/30/2024, 155 units  ,  Botox injections restarted  Botox 155 units injected using headache template patient tolerates injections.  1.  1/30/2024, 155 units injected  2.   4/30/2024, 155 units injected  3.   8/6/2024,   155 units injected  4.  11/4/2024, iron 55 units  injected    She gets most of her meds either through the pain clinic and or primary    Patient on narcotics and gabapentin through the pain clinic  Has been given a steroid burst and had steroid injections for her back    Patient gets a decrease in frequency and severity of headaches with the Botox injections.  And as she gets closer to the next injection time it started to crescendo upwards again.    No side effects from Botox injections    Recently had some treatment at the pain center for right trigeminal neuralgia with an injection.  Is going to be having some neck procedure in the near future  I did refill the Relpax      Units Injected: 155  Units Discarded: 45  Lot Number and Expiration: [id number], expiration 03/2027  NDC number 8825-7475-25    DUNCAN QUAN

## 2024-11-06 DIAGNOSIS — G43.909 MIGRAINE WITHOUT STATUS MIGRAINOSUS, NOT INTRACTABLE, UNSPECIFIED MIGRAINE TYPE: ICD-10-CM

## 2024-11-06 RX ORDER — PROCHLORPERAZINE MALEATE 10 MG
TABLET ORAL
Qty: 30 TABLET | Refills: 0 | OUTPATIENT
Start: 2024-11-06

## 2024-11-08 ENCOUNTER — HOSPITAL ENCOUNTER (OUTPATIENT)
Dept: CARDIOLOGY | Facility: CLINIC | Age: 64
Discharge: HOME | End: 2024-11-08
Attending: STUDENT IN AN ORGANIZED HEALTH CARE EDUCATION/TRAINING PROGRAM | Admitting: STUDENT IN AN ORGANIZED HEALTH CARE EDUCATION/TRAINING PROGRAM
Payer: COMMERCIAL

## 2024-11-08 DIAGNOSIS — E78.2 MIXED HYPERLIPIDEMIA: ICD-10-CM

## 2024-11-08 DIAGNOSIS — R07.89 CHEST DISCOMFORT: ICD-10-CM

## 2024-11-08 LAB — LVEF ECHO: NORMAL

## 2024-11-08 PROCEDURE — 93306 TTE W/DOPPLER COMPLETE: CPT

## 2024-11-08 PROCEDURE — 93306 TTE W/DOPPLER COMPLETE: CPT | Mod: 26 | Performed by: INTERNAL MEDICINE

## 2024-12-04 RX ORDER — METOPROLOL TARTRATE 1 MG/ML
5-20 INJECTION, SOLUTION INTRAVENOUS
Status: DISCONTINUED | OUTPATIENT
Start: 2024-12-04 | End: 2024-12-18 | Stop reason: HOSPADM

## 2024-12-04 RX ORDER — NITROGLYCERIN 0.4 MG/1
0.4 TABLET SUBLINGUAL
Status: DISCONTINUED | OUTPATIENT
Start: 2024-12-04 | End: 2024-12-18 | Stop reason: HOSPADM

## 2024-12-04 RX ORDER — DILTIAZEM HYDROCHLORIDE 5 MG/ML
10-15 INJECTION INTRAVENOUS
Status: DISCONTINUED | OUTPATIENT
Start: 2024-12-04 | End: 2024-12-18 | Stop reason: HOSPADM

## 2024-12-17 ENCOUNTER — HOSPITAL ENCOUNTER (OUTPATIENT)
Dept: CT IMAGING | Facility: CLINIC | Age: 64
Discharge: HOME | End: 2024-12-17
Attending: STUDENT IN AN ORGANIZED HEALTH CARE EDUCATION/TRAINING PROGRAM
Payer: COMMERCIAL

## 2024-12-17 VITALS — DIASTOLIC BLOOD PRESSURE: 68 MMHG | HEART RATE: 68 BPM | SYSTOLIC BLOOD PRESSURE: 129 MMHG

## 2024-12-17 DIAGNOSIS — E78.2 MIXED HYPERLIPIDEMIA: ICD-10-CM

## 2024-12-17 DIAGNOSIS — R07.89 CHEST DISCOMFORT: ICD-10-CM

## 2024-12-17 LAB
BSA FOR ECHO PROCEDURE: 0 M2
CREAT BLD-MCNC: 1.2 MG/DL (ref 0.6–1.1)
CV CALCIUM SCORE AGATSTON LM: 0
CV CALCIUM SCORING AGATSON LAD: 0
CV CALCIUM SCORING AGATSTON CX: 0
CV CALCIUM SCORING AGATSTON RCA: 0
CV CALCIUM SCORING AGATSTON TOTAL: 0
EGFRCR SERPLBLD CKD-EPI 2021: 50 ML/MIN/1.73M2

## 2024-12-17 PROCEDURE — 75574 CT ANGIO HRT W/3D IMAGE: CPT | Mod: 26 | Performed by: INTERNAL MEDICINE

## 2024-12-17 PROCEDURE — 250N000013 HC RX MED GY IP 250 OP 250 PS 637: Performed by: STUDENT IN AN ORGANIZED HEALTH CARE EDUCATION/TRAINING PROGRAM

## 2024-12-17 PROCEDURE — 250N000009 HC RX 250: Performed by: STUDENT IN AN ORGANIZED HEALTH CARE EDUCATION/TRAINING PROGRAM

## 2024-12-17 PROCEDURE — 75574 CT ANGIO HRT W/3D IMAGE: CPT

## 2024-12-17 PROCEDURE — 82565 ASSAY OF CREATININE: CPT

## 2024-12-17 PROCEDURE — 250N000011 HC RX IP 250 OP 636: Performed by: STUDENT IN AN ORGANIZED HEALTH CARE EDUCATION/TRAINING PROGRAM

## 2024-12-17 RX ORDER — IOPAMIDOL 755 MG/ML
100 INJECTION, SOLUTION INTRAVASCULAR ONCE
Status: COMPLETED | OUTPATIENT
Start: 2024-12-17 | End: 2024-12-17

## 2024-12-17 RX ADMIN — NITROGLYCERIN 0.4 MG: 0.4 TABLET SUBLINGUAL at 10:42

## 2024-12-17 RX ADMIN — IOPAMIDOL 100 ML: 755 INJECTION, SOLUTION INTRAVENOUS at 10:46

## 2024-12-17 RX ADMIN — METOPROLOL TARTRATE 5 MG: 5 INJECTION INTRAVENOUS at 10:39

## 2024-12-17 RX ADMIN — METOPROLOL TARTRATE 5 MG: 5 INJECTION INTRAVENOUS at 10:45

## 2024-12-18 ENCOUNTER — TRANSFERRED RECORDS (OUTPATIENT)
Dept: HEALTH INFORMATION MANAGEMENT | Facility: CLINIC | Age: 64
End: 2024-12-18
Payer: COMMERCIAL

## 2025-01-05 SDOH — HEALTH STABILITY: PHYSICAL HEALTH: ON AVERAGE, HOW MANY MINUTES DO YOU ENGAGE IN EXERCISE AT THIS LEVEL?: 0 MIN

## 2025-01-05 SDOH — HEALTH STABILITY: PHYSICAL HEALTH: ON AVERAGE, HOW MANY DAYS PER WEEK DO YOU ENGAGE IN MODERATE TO STRENUOUS EXERCISE (LIKE A BRISK WALK)?: 0 DAYS

## 2025-01-05 ASSESSMENT — PATIENT HEALTH QUESTIONNAIRE - PHQ9
10. IF YOU CHECKED OFF ANY PROBLEMS, HOW DIFFICULT HAVE THESE PROBLEMS MADE IT FOR YOU TO DO YOUR WORK, TAKE CARE OF THINGS AT HOME, OR GET ALONG WITH OTHER PEOPLE: SOMEWHAT DIFFICULT
SUM OF ALL RESPONSES TO PHQ QUESTIONS 1-9: 14
SUM OF ALL RESPONSES TO PHQ QUESTIONS 1-9: 14

## 2025-01-05 ASSESSMENT — SOCIAL DETERMINANTS OF HEALTH (SDOH): HOW OFTEN DO YOU GET TOGETHER WITH FRIENDS OR RELATIVES?: ONCE A WEEK

## 2025-01-06 ENCOUNTER — OFFICE VISIT (OUTPATIENT)
Dept: FAMILY MEDICINE | Facility: CLINIC | Age: 65
End: 2025-01-06
Payer: COMMERCIAL

## 2025-01-06 VITALS
SYSTOLIC BLOOD PRESSURE: 159 MMHG | TEMPERATURE: 98.2 F | BODY MASS INDEX: 22.64 KG/M2 | HEIGHT: 64 IN | DIASTOLIC BLOOD PRESSURE: 84 MMHG | WEIGHT: 132.6 LBS | OXYGEN SATURATION: 97 % | HEART RATE: 78 BPM | RESPIRATION RATE: 12 BRPM

## 2025-01-06 DIAGNOSIS — G89.4 CHRONIC PAIN SYNDROME: Chronic | ICD-10-CM

## 2025-01-06 DIAGNOSIS — J43.1 PANLOBULAR EMPHYSEMA (H): Chronic | ICD-10-CM

## 2025-01-06 DIAGNOSIS — Z00.00 WELCOME TO MEDICARE PREVENTIVE VISIT: Primary | ICD-10-CM

## 2025-01-06 DIAGNOSIS — F17.210 CIGARETTE NICOTINE DEPENDENCE WITHOUT COMPLICATION: ICD-10-CM

## 2025-01-06 DIAGNOSIS — E55.9 VITAMIN D DEFICIENCY: ICD-10-CM

## 2025-01-06 DIAGNOSIS — N18.31 STAGE 3A CHRONIC KIDNEY DISEASE (H): Chronic | ICD-10-CM

## 2025-01-06 DIAGNOSIS — E78.2 MIXED HYPERLIPIDEMIA: Chronic | ICD-10-CM

## 2025-01-06 DIAGNOSIS — Z12.31 ENCOUNTER FOR SCREENING MAMMOGRAM FOR BREAST CANCER: ICD-10-CM

## 2025-01-06 DIAGNOSIS — G43.019 COMMON MIGRAINE WITH INTRACTABLE MIGRAINE: ICD-10-CM

## 2025-01-06 DIAGNOSIS — E03.9 ACQUIRED HYPOTHYROIDISM: Chronic | ICD-10-CM

## 2025-01-06 DIAGNOSIS — M51.372 DEGENERATION OF INTERVERTEBRAL DISC OF LUMBOSACRAL REGION WITH DISCOGENIC BACK PAIN AND LOWER EXTREMITY PAIN: Chronic | ICD-10-CM

## 2025-01-06 DIAGNOSIS — M85.89 OSTEOPENIA OF MULTIPLE SITES: Chronic | ICD-10-CM

## 2025-01-06 DIAGNOSIS — K21.00 GASTROESOPHAGEAL REFLUX DISEASE WITH ESOPHAGITIS WITHOUT HEMORRHAGE: ICD-10-CM

## 2025-01-06 DIAGNOSIS — F31.81 BIPOLAR 2 DISORDER (H): Chronic | ICD-10-CM

## 2025-01-06 DIAGNOSIS — J45.40 MODERATE PERSISTENT ASTHMA, UNCOMPLICATED: Chronic | ICD-10-CM

## 2025-01-06 LAB
CREAT UR-MCNC: 343 MG/DL
MICROALBUMIN UR-MCNC: <12 MG/L
MICROALBUMIN/CREAT UR: NORMAL MG/G{CREAT}

## 2025-01-06 PROCEDURE — 82043 UR ALBUMIN QUANTITATIVE: CPT | Performed by: FAMILY MEDICINE

## 2025-01-06 PROCEDURE — 82570 ASSAY OF URINE CREATININE: CPT | Performed by: FAMILY MEDICINE

## 2025-01-06 PROCEDURE — G0438 PPPS, INITIAL VISIT: HCPCS | Performed by: FAMILY MEDICINE

## 2025-01-06 PROCEDURE — G2211 COMPLEX E/M VISIT ADD ON: HCPCS | Performed by: FAMILY MEDICINE

## 2025-01-06 PROCEDURE — 82306 VITAMIN D 25 HYDROXY: CPT | Performed by: FAMILY MEDICINE

## 2025-01-06 PROCEDURE — 80061 LIPID PANEL: CPT | Performed by: FAMILY MEDICINE

## 2025-01-06 PROCEDURE — 99214 OFFICE O/P EST MOD 30 MIN: CPT | Mod: 25 | Performed by: FAMILY MEDICINE

## 2025-01-06 PROCEDURE — 84443 ASSAY THYROID STIM HORMONE: CPT | Performed by: FAMILY MEDICINE

## 2025-01-06 PROCEDURE — 36415 COLL VENOUS BLD VENIPUNCTURE: CPT | Performed by: FAMILY MEDICINE

## 2025-01-06 RX ORDER — ERGOCALCIFEROL 1.25 MG/1
50000 CAPSULE, LIQUID FILLED ORAL WEEKLY
Qty: 13 CAPSULE | Refills: 0 | Status: SHIPPED | OUTPATIENT
Start: 2025-01-06

## 2025-01-06 NOTE — PROGRESS NOTES
Preventive Care Visit  [facility]  Joanne Weiner MD, Family Medicine  Jan 6, 2025      Assessment & Plan     Welcome to Medicare preventive visit  Cologuard was done 4/21/2023 good for 3 years.  Mammogram done 3/9/2023 and we talk about making sure it is a 3D mammogram.  Bone density was done 3/9/2023.  Lung cancer screening was last done 1/17/2024 and is scheduled for 1/21/2025.  Patient is eligible for RSV, COVID, flu and Shingrix vaccines.  - PRIMARY CARE FOLLOW-UP SCHEDULING    Chronic pain syndrome  She has been a longtime chronic pain person.  She goes to [facility] pain clinic on a regular basis.  She sees a therapist there.  She is on 10 mg hydrocodone acetaminophen on a regular basis.    Degeneration of intervertebral disc of lumbosacral region with discogenic back pain and lower extremity pain  Patient had a recent neck surgery which went well she tells me.  She wants to have surgery now on her low back.    Common migraine with intractable migraine  Also has trigeminal neuralgia.  Is treated with migraine medicine Relpax and Aimovig injections.    Moderate persistent asthma, uncomplicated  I think this could be down regulated if she quit smoking.    Panlobular emphysema (H)  Seen on CT.  Longtime smoker.    Cigarette nicotine dependence without complication  Patient has quit smoking for shorter periods of time and as long as I believe 2 years.    Bipolar 2 disorder (H)  Has been under the care of a psychiatrist and therapist since she has been my patient and before.  On 80 mg of Prozac, Neurontin and hydroxyzine.  PHQ-9 score is 14.  She says this is bad because it is winter when it is always bad for her.    Elevated blood pressure without hypertension diagnosis  Fluctuates, likely due to to pain and treatment thereof.    Mixed hyperlipidemia  On Lipitor 80 mg and Zetia.  Will check labs.  - Lipid panel reflex to direct LDL Non-fasting  - Lipid panel reflex to direct LDL  Non-fasting    Acquired hypothyroidism  Currently on levothyroxine 75 mcg.  Will check labs so we can refill meds.  - TSH with free T4 reflex  - TSH with free T4 reflex    Stage 3a chronic kidney disease (H)  Most recent GFR was 71 and was normal but prior to that was 49 when I last checked it in April.  Previous to that it was also low but not that low.  Will check labs.  - Albumin Random Urine Quantitative with Creat Ratio  - Albumin Random Urine Quantitative with Creat Ratio    Gastroesophageal reflux disease with esophagitis without hemorrhage  History of a hiatal hernia.  Having chest pain secondary to this.  Is scheduled for a swallow study on 1/8/2025.  Has had EGD in the past but could not go through with her most recent attempt.  Had tried to double her Nexium but that did not help anything.    Osteopenia of multiple sites  Possibly secondary to PPI use, at least in part.  Last bone density was March 2023.  Not currently actively treating.    Vitamin D deficiency  Asking for refill of high-dose vitamin D.  - vitamin D2 (ERGOCALCIFEROL) 07023 units (1250 mcg) capsule  Dispense: 13 capsule; Refill: 0    Encounter for screening mammogram for breast cancer  Patient is wanting a 3D mammogram.  I explained I think she had 1 last year.  Will order it today.  - MA Screen Bilateral w/Carlos Alberto      I will get back to her on lab results by MyChart or mail and only call with grossly abnormal values.  I will refill meds as needed.    Patient has been advised of split billing requirements and indicates understanding: Yes    The longitudinal plan of care for the diagnosis(es)/condition(s) as documented were addressed during this visit. Due to the added complexity in care, I will continue to support Dayana in the subsequent management and with ongoing continuity of care.      Depression Screening Follow Up        1/5/2025    12:59 PM   PHQ   PHQ-9 Total Score 14    Q9: Thoughts of better off dead/self-harm past 2 weeks Not at  all       Patient-reported         1/5/2025    12:59 PM   Last PHQ-9   1.  Little interest or pleasure in doing things 2   2.  Feeling down, depressed, or hopeless 1   3.  Trouble falling or staying asleep, or sleeping too much 3   4.  Feeling tired or having little energy 3   5.  Poor appetite or overeating 0   6.  Feeling bad about yourself 1   7.  Trouble concentrating 1   8.  Moving slowly or restless 3   Q9: Thoughts of better off dead/self-harm past 2 weeks 0   PHQ-9 Total Score 14        Patient-reported         Follow Up Actions Taken  Patient counseled, no additional follow up at this time.  Referred patient back to current mental health provider.     Counseling  Appropriate preventive services were addressed with this patient via screening, questionnaire, or discussion as appropriate for fall prevention, nutrition, physical activity, Tobacco-use cessation, social engagement, weight loss and cognition.  Checklist reviewing preventive services available has been given to the patient.  Reviewed patient's diet, addressing concerns and/or questions.   She is at risk for psychosocial distress and has been provided with information to reduce risk.   Discussed possible causes of fatigue. Updated plan of care.  Patient reported difficulty with activities of daily living were addressed today.The patient was provided with written information regarding signs of hearing loss.   Information on urinary incontinence and treatment options given to patient.   The patient's PHQ-9 score is consistent with moderate depression. She was provided with information regarding depression.   I have reviewed Opioid Use Disorder and Substance Use Disorder risk factors and made any needed referrals.       FUTURE APPOINTMENTS:       - Follow-up visit in 6 months time for med check      Subjective   Dayana is a 64 year old, presenting for the following:  Medicare Visit        1/6/2025     2:47 PM   Additional Questions   Roomed by blake  d cma         1/6/2025     3:16 PM 1/6/2025     3:14 PM 1/6/2025     3:10 PM   Vision Screening Results   Does the patient have corrective lenses (glasses/contacts)? Yes Yes Yes   Patient wears corrective lenses (select all that apply) Wears regularly;Worn during vision screen Wears regularly;Worn during vision screen Wears regularly   Vision [employer]     RIGHT EYE 10/40 (20/80) 10/40 (20/80) 10/40 (20/80)   LEFT EYE 10/20 (20/40) 10/20 (20/40) 10/20 (20/40)   Is there a two line difference? YES YES YES   Vision Screen Results REFER REFER REFER        HPI  This is a patient I have known a very long time.  She has chronic pain and recently had surgery on her cervical spine.  She said that worked well for her actually.  Now she complains of her low back pain which has been ongoing for years.  She also deals with migraines and fibromyalgia.  She is a smoker, has emphysema and some asthma.  She is seen regularly by psychiatrist and therapist for her bipolar disorder but also chronic pain.  We discussed making sure she is having a 3D mammogram ordered for her.  She says she has a good friend whose dealing with bilateral breast cancer currently.  She also has a lot of other friends and family with cancer.  She has a lung cancer screening coming up later this month.    Health Care Directive  Patient does not have a Health Care Directive: Discussed advance care planning with patient; however, patient declined at this time.      1/5/2025   General Health   How would you rate your overall physical health? (!) FAIR   Feel stress (tense, anxious, or unable to sleep) Rather much   (!) STRESS CONCERN      1/5/2025   Nutrition   Diet: Regular (no restrictions)         1/5/2025   Exercise   Days per week of moderate/strenous exercise 0 days   Average minutes spent exercising at this level 0 min   (!) EXERCISE CONCERN      1/5/2025   Social Factors   Frequency of gathering with friends or relatives Once a week   Worry food  won't last until get money to buy more No   Food not last or not have enough money for food? No   Do you have housing? (Housing is defined as stable permanent housing and does not include staying ouside in a car, in a tent, in an abandoned building, in an overnight shelter, or couch-surfing.) Yes   Are you worried about losing your housing? No   Lack of transportation? No   Unable to get utilities (heat,electricity)? No         1/6/2025   Fall Risk   Gait Speed Test (Document in seconds) 9    9   Gait Speed Test Interpretation Greater than 5.01 seconds - ABNORMAL    Greater than 5.01 seconds - ABNORMAL       Multiple values from one day are sorted in reverse-chronological order          1/5/2025   Activities of Daily Living- Home Safety   Needs help with the following daily activites Housework    Laundry   Safety concerns in the home None of the above       Multiple values from one day are sorted in reverse-chronological order         1/5/2025   Dental   Dentist two times every year? Yes         1/5/2025   Hearing Screening   Hearing concerns? (!) IT'S HARD TO FOLLOW A CONVERSATION IN A NOISY RESTAURANT OR CROWDED ROOM.    (!) TROUBLE UNDERSTANDING SOFT OR WHISPERED SPEECH.       Multiple values from one day are sorted in reverse-chronological order         1/5/2025   Driving Risk Screening   Patient/family members have concerns about driving No         1/5/2025   General Alertness/Fatigue Screening   Have you been more tired than usual lately? (!) YES         1/5/2025   Urinary Incontinence Screening   Bothered by leaking urine in past 6 months Yes         1/5/2025   TB Screening   Were you born outside of the [place]? No       Today's PHQ-9 Score:       1/5/2025    12:59 PM   PHQ-9 SCORE   PHQ-9 Total Score MyChart 14 (Moderate depression)   PHQ-9 Total Score 14        Patient-reported         1/5/2025   Substance Use   Alcohol more than 3/day or more than 7/wk Not Applicable   Do you have a current opioid  prescription? (!) YES   How severe/bad is pain from 1 to 10? 7/10   Do you use any other substances recreationally? No          No data to display              Low Risk (0-3)  Moderate Risk (4-7)  High Risk (>8)  Social History     Tobacco Use    Smoking status: Light Smoker     Current packs/day: 0.00     Average packs/day: 0.5 packs/day for 45.0 years (22.5 ttl pk-yrs)     Types: Cigarettes     Last attempt to quit: 10/18/2021     Years since quitting: 3.2     Passive exposure: Current    Smokeless tobacco: Never   Vaping Use    Vaping status: Never Used   Substance Use Topics    Alcohol use: Not Currently    Drug use: No           3/9/2023   LAST FHS-7 RESULTS   1st degree relative breast or ovarian cancer No   Any relative bilateral breast cancer No   Any male have breast cancer No   Any ONE woman have BOTH breast AND ovarian cancer No   2 or more relatives with breast AND/OR ovarian cancer No   2 or more relatives with breast AND/OR bowel cancer No        Mammogram Screening - Mammogram every 1-2 years updated in Health Maintenance based on mutual decision making      History of abnormal Pap smear: Status post hysterectomy with removal of cervix and no history of CIN2 or greater or cervical cancer. Health Maintenance and Surgical History updated.        Latest Ref Rng & Units 9/18/2018     2:54 PM   PAP / HPV   PAP  Negative for squamous intraepithelial lesion or malignancy  Electronically signed by Mary Alice Jaquez CT (ASCP) on 9/27/2018 at 11:25 AM      HPV 16 DNA NEG Negative    HPV 18 DNA NEG Negative    Other HR HPV NEG Negative      ASCVD Risk   The 10-year ASCVD risk score (Byron WELDON, et al., 2019) is: 11.8%    Values used to calculate the score:      Age: 64 years      Sex: Female      Is Non- : No      Diabetic: No      Tobacco smoker: Yes      Systolic Blood Pressure: 159 mmHg      Is BP treated: No      HDL Cholesterol: 59 mg/dL      Total Cholesterol: 130  mg/dL          Reviewed and updated as needed this visit by Provider                    Past Medical History:   Diagnosis Date    Abnormal reflex 09/08/2021    Abnormality of gait     Created by Conversion     Acute bronchitis, unspecified organism 07/29/2022    Anemia 03/18/2021    Anxiety     Asthma     Asymptomatic postmenopausal status     Created by Conversion  Replacement Utility updated for latest IMO load    Bipolar 2 disorder (H)     Borderline personality disorder (H) 06/01/2015    Centrilobular emphysema (H) 02/26/2018    Mild, seen in 2018 CT scan, DLCO 60% predicted  Formatting of this note might be different from the original. Formatting of this note might be different from the original. Mild, seen in 2018 CT scan, DLCO 60% predicted    Cervical spinal stenosis     s/p cervical fusion    Chronic kidney disease     Chronic kidney disease, stage 3 (H) 01/14/2021    Chronic pain syndrome     Cigarette nicotine dependence without complication 03/04/2020    Common migraine with intractable migraine 02/15/2021    COPD (chronic obstructive pulmonary disease) (H)     DDD (degenerative disc disease), lumbosacral 03/22/2021    Depression     Draining cutaneous sinus tract 12/01/2021    Added automatically from request for surgery [id number]    Dysphagia 07/19/2012    Encounter for other orthopedic aftercare 03/21/2021    Fibrocystic breast     Fibromyalgia     GERD (gastroesophageal reflux disease)     Hallux abductovalgus with bunions, unspecified laterality 12/14/2015    Hemorrhoids 11/22/2016    Herpes zoster     Created by Conversion  Replacement Utility updated for latest IMO load    Hiatal hernia     History of electroconvulsive therapy     Hyperlipidemia 03/17/2021    Created by Conversion   Formatting of this note might be different from the original. Formatting of this note might be different from the original. Created by Conversion    Hypotension 03/18/2021    Hypothyroidism     hypothyroidism    IBS  (irritable bowel syndrome)     Iron deficiency anemia 09/20/2022    Low back pain 03/21/2021    Lung nodule 08/04/2016 8/4/2016:  Left upper lobe nodule of 6.5 mm, likely benign given slow growth per radiologist.  See CT 6/27/2011:  measured  approximately 5.5 mm in greatest dimension     Menopausal and postmenopausal disorder     Created by Conversion  Replacement Utility updated for latest IMO load    Menopausal disorder 08/31/2021    Formatting of this note might be different from the original. Created by Conversion  Replacement Utility updated for latest IMO load    Migraine     Created by Conversion  Replacement Utility updated for latest IMO load    Migraine headache 08/31/2021    Formatting of this note might be different from the original. Created by Conversion  Replacement Utility updated for latest IMO load    Migraines     Moderate asthma with exacerbation, unspecified whether persistent 07/29/2022    Moderate persistent asthma, uncomplicated 03/21/2021    Osteoarthritis     Osteopenia of multiple sites 09/01/2020    Other abnormalities of gait and mobility 03/21/2021    Other chronic pain     Other specified disorders of bone density and structure, multiple sites 03/21/2021    Pain in right leg 03/21/2021    PONV (postoperative nausea and vomiting)     PTSD (post-traumatic stress disorder)     Shingles 2014    on back    Spinal stenosis of lumbar region with neurogenic claudication 03/22/2021    Tobacco use disorder     Created by Conversion      Past Surgical History:   Procedure Laterality Date    BACK SURGERY  4 times on back 4 times on neck    Fusion    BIOPSY BREAST Left     Approx 5 bx    BUNIONECTOMY Right 12/15/2015    Procedure: MODIFIED LAI BUNIONECTOMY RIGHT FOOT;  Surgeon: Jerome Calvin DPM;  Location: [place] [facility];  Service:     CERVICAL FUSION      CERVICAL FUSION Bilateral 02/16/2015    Procedure: ANTERIOR CERVICAL DECOMPRESSION/FUSION C3-5 BILATERAL, ANTERIOR HARDWARE  REMOVAL C5-7 BILATERAL ;  Surgeon: Sawyer Roalnd MD;  Location: [facility];  Service:     COLONOSCOPY      COSMETIC SURGERY      Breast implants, liposuction of stomach    EYE SURGERY      Eye lid lift    HC NIPPLE EXPLORATION      Description: Breast Nipple Explor W/ Excision Solitary Lactiferous Duct;  Recorded: 04/10/2008;    HEAD & NECK SURGERY      Neck fusion    HYSTERECTOMY      IR CERVICAL EPIDURAL STEROID INJECTION  09/17/2003    IR CERVICAL EPIDURAL STEROID INJECTION  12/11/2003    IR CERVICAL EPIDURAL STEROID INJECTION  01/16/2004    IRRIGATION AND DEBRIDEMENT DECUBITUS WOUND, COMBINED Left 12/13/2021    Procedure: Wound exploration and debridement of Left Lower Abdomin Chronic wound.;  Surgeon: Crispin Bunch MD;  Location: [facility]    LAPAROSCOPIC NISSEN FUNDOPLICATION N/A 12/30/2022    Procedure: FUNDOPLICATION, partial ROBOT-ASSISTED, LAPAROSCOPIC, USING DA MARIBEL XI;  Surgeon: Davie Ocasio DO;  Location: [facility]    LUMBAR DISCECTOMY      X2    MAMMOPLASTY AUGMENTATION Bilateral      approx 2006?    PELVIC LAPAROSCOPY      multiple    SHOULDER OPEN ROTATOR CUFF REPAIR Left     X2    ZZC TOTAL ABDOM HYSTERECTOMY      Description: Total Abdominal Hysterectomy;  Recorded: 06/10/2013;     Labs reviewed in EPIC  BP Readings from Last 3 Encounters:   01/06/25 (!) 159/84   12/17/24 129/68   11/22/24 138/76    Wt Readings from Last 3 Encounters:   01/06/25 60.1 kg (132 lb 9.6 oz)   11/22/24 58.9 kg (129 lb 12.8 oz)   11/05/24 59 kg (130 lb)                  Current providers sharing in care for this patient include:  Patient Care Team:  Joanne Weiner MD as PCP - General (Family Medicine)  Joanne Weiner MD as Assigned PCP  Alfredo Peterson MD as MD (Otolaryngology)  Angelia Bradford MD as Referring Physician (Critical Care)  Dylan Amezcua DO as MD (Cardiovascular Disease)  Jaylon Schmitt MD as Assigned Neuroscience  "Provider  Ana Hager APRN CNP as Assigned Pulmonology Provider  Ana Hager APRN CNP as Pulmonologist (Pulmonary Disease)  Thang Christensen MD as Assigned Heart and Vascular Provider    The following health maintenance items are reviewed in Epic and correct as of today:  Health Maintenance   Topic Date Due    COPD ACTION PLAN  Never done    ZOSTER IMMUNIZATION (1 of 2) Never done    RSV VACCINE (1 - Risk 60-74 years 1-dose series) Never done    INFLUENZA VACCINE (1) 09/01/2024    COVID-19 Vaccine (5 - 2024-25 season) 09/01/2024    LUNG CANCER SCREENING  01/17/2025    LIPID  02/13/2025    MICROALBUMIN  02/13/2025    TSH W/FREE T4 REFLEX  02/13/2025    MAMMO SCREENING  03/09/2025    NICOTINE/TOBACCO CESSATION COUNSELING Q 1 YR  10/14/2025    BMP  10/24/2025    HEMOGLOBIN  10/24/2025    MEDICARE ANNUAL WELLNESS VISIT  01/06/2026    ANNUAL REVIEW OF HM ORDERS  01/06/2026    FALL RISK ASSESSMENT  01/06/2026    COLORECTAL CANCER SCREENING  11/22/2026    GLUCOSE  10/24/2027    ADVANCE CARE PLANNING  01/06/2030    DTAP/TDAP/TD IMMUNIZATION (4 - Td or Tdap) 07/31/2033    DEXA  03/15/2038    SPIROMETRY  Completed    HEPATITIS C SCREENING  Completed    HIV SCREENING  Completed    PHQ-2 (once per calendar year)  Completed    Pneumococcal Vaccine: 50+ Years  Completed    URINALYSIS  Completed    HPV IMMUNIZATION  Aged Out    MENINGITIS IMMUNIZATION  Aged Out    RSV MONOCLONAL ANTIBODY  Aged Out    PAP  Discontinued        Objective    Exam  BP (!) 159/84 (BP Location: Right arm, Patient Position: Sitting)   Pulse 78   Temp 98.2  F (36.8  C) (Oral)   Resp 12   Ht 1.632 m (5' 4.25\")   Wt 60.1 kg (132 lb 9.6 oz)   LMP 06/01/1983   SpO2 97%   BMI 22.58 kg/m     Estimated body mass index is 22.58 kg/m  as calculated from the following:    Height as of this encounter: 1.632 m (5' 4.25\").    Weight as of this encounter: 60.1 kg (132 lb 9.6 oz).    Physical Exam  General appearance - alert, well appearing, and in no " distress and normal appearing weight  Mental status - normal mood, behavior, speech, dress, motor activity, and thought processes  Eyes - pupils equal and reactive, extraocular eye movements intact  Ears - bilateral TM's and external ear canals normal  Nose - normal and patent, no erythema, discharge or polyps  Mouth - mucous membranes moist, pharynx normal without lesions  Neck - supple, no significant adenopathy, carotids upstroke normal bilaterally, no bruits, thyroid exam: thyroid is normal in size without nodules or tenderness  Chest - clear to auscultation, no wheezes, rales or rhonchi, symmetric air entry, decreased air entry noted   Heart - normal rate and regular rhythm  Abdomen - soft, nontender, nondistended, no masses or organomegaly  Breasts - not examined  Pelvic - examination not indicated  Back exam - antalgic gait, limited range of motion, pain with motion noted during exam, using a cane  Neurological - alert, oriented, normal speech, no focal findings or movement disorder noted, cranial nerves II through XII intact, DTR's normal and symmetric  Musculoskeletal - no joint tenderness, deformity or swelling  Extremities - peripheral pulses normal, no pedal edema, no clubbing or cyanosis  Skin - normal coloration and turgor, no rashes, no suspicious skin lesions noted           1/6/2025   Mini Cog   Clock Draw Score 2 Normal   3 Item Recall 3 objects recalled   Mini Cog Total Score 5             1/6/2025   Vision Screen   Patient wears corrective lenses (select all that apply) Wears regularly;Worn during vision screen    Wears regularly;Worn during vision screen    Wears regularly   Vision Screen Results REFER    REFER    REFER       Multiple values from one day are sorted in reverse-chronological order       Signed Electronically by: Joanne Weiner MD    Answers submitted by the patient for this visit:  Patient Health Questionnaire (Submitted on 1/5/2025)  If you checked off any problems, how  difficult have these problems made it for you to do your work, take care of things at home, or get along with other people?: Somewhat difficult  PHQ9 TOTAL SCORE: 14

## 2025-01-06 NOTE — PATIENT INSTRUCTIONS
Patient Education   Preventive Care Advice   This is general advice given by our system to help you stay healthy. However, your care team may have specific advice just for you. Please talk to your care team about your preventive care needs.  Nutrition  Eat 5 or more servings of fruits and vegetables each day.  Try wheat bread, brown rice and whole grain pasta (instead of white bread, rice, and pasta).  Get enough calcium and vitamin D. Check the label on foods and aim for 100% of the RDA (recommended daily allowance).  Lifestyle  Exercise at least 150 minutes each week  (30 minutes a day, 5 days a week).  Do muscle strengthening activities 2 days a week. These help control your weight and prevent disease.  No smoking.  Wear sunscreen to prevent skin cancer.  Have a dental exam and cleaning every 6 months.  Yearly exams  See your health care team every year to talk about:  Any changes in your health.  Any medicines your care team has prescribed.  Preventive care, family planning, and ways to prevent chronic diseases.  Shots (vaccines)   HPV shots (up to age 26), if you've never had them before.  Hepatitis B shots (up to age 59), if you've never had them before.  COVID-19 shot: Get this shot when it's due.  Flu shot: Get a flu shot every year.  Tetanus shot: Get a tetanus shot every 10 years.  Pneumococcal, hepatitis A, and RSV shots: Ask your care team if you need these based on your risk.  Shingles shot (for age 50 and up)  General health tests  Diabetes screening:  Starting at age 35, Get screened for diabetes at least every 3 years.  If you are younger than age 35, ask your care team if you should be screened for diabetes.  Cholesterol test: At age 39, start having a cholesterol test every 5 years, or more often if advised.  Bone density scan (DEXA): At age 50, ask your care team if you should have this scan for osteoporosis (brittle bones).  Hepatitis C: Get tested at least once in your life.  STIs (sexually  transmitted infections)  Before age 24: Ask your care team if you should be screened for STIs.  After age 24: Get screened for STIs if you're at risk. You are at risk for STIs (including HIV) if:  You are sexually active with more than one person.  You don't use condoms every time.  You or a partner was diagnosed with a sexually transmitted infection.  If you are at risk for HIV, ask about PrEP medicine to prevent HIV.  Get tested for HIV at least once in your life, whether you are at risk for HIV or not.  Cancer screening tests  Cervical cancer screening: If you have a cervix, begin getting regular cervical cancer screening tests starting at age 21.  Breast cancer scan (mammogram): If you've ever had breasts, begin having regular mammograms starting at age 40. This is a scan to check for breast cancer.  Colon cancer screening: It is important to start screening for colon cancer at age 45.  Have a colonoscopy test every 10 years (or more often if you're at risk) Or, ask your provider about stool tests like a FIT test every year or Cologuard test every 3 years.  To learn more about your testing options, visit:   .  For help making a decision, visit:   https://bit.ly/cu31574.  Prostate cancer screening test: If you have a prostate, ask your care team if a prostate cancer screening test (PSA) at age 55 is right for you.  Lung cancer screening: If you are a current or former smoker ages 50 to 80, ask your care team if ongoing lung cancer screenings are right for you.  For informational purposes only. Not to replace the advice of your health care provider. Copyright   2023 [facility]. All rights reserved. Clinically reviewed by the [facility] Transitions Program. Decisive BI 628232 - REV 01/24.  Learning About Activities of Daily Living  What are activities of daily living?     Activities of daily living (ADLs) are the basic self-care tasks you do every day. These include eating, bathing, dressing,  and moving around.  As you age, and if you have health problems, you may find that it's harder to do some of these tasks. If so, your doctor can suggest ideas that may help.  To measure what kind of help you may need, your doctor will ask how well you are able to do ADLs. Let your doctor know if there are any tasks that you are having trouble doing. This is an important first step to getting help. And when you have the help you need, you can stay as independent as possible.  How will a doctor assess your ADLs?  Asking about ADLs is part of a routine health checkup your doctor will likely do as you age. Your health check might be done in a doctor's office, in your home, or at a hospital. The goal is to find out if you are having any problems that could make it hard to care for yourself or that make it unsafe for you to be on your own.  To measure your ADLs, your doctor will ask how hard it is for you to do routine tasks. Your doctor may also want to know if you have changed the way you do a task because of a health problem. Your doctor may watch how you:  Walk back and forth.  Keep your balance while you stand or walk.  Move from sitting to standing or from a bed to a chair.  Button or unbutton a shirt or sweater.  Remove and put on your shoes.  It's common to feel a little worried or anxious if you find you can't do all the things you used to be able to do. Talking with your doctor about ADLs is a way to make sure you're as safe as possible and able to care for yourself as well as you can. You may want to bring a caregiver, friend, or family member to your checkup. They can help you talk to your doctor.  Follow-up care is a key part of your treatment and safety. Be sure to make and go to all appointments, and call your doctor if you are having problems. It's also a good idea to know your test results and keep a list of the medicines you take.  Current as of: October 24, 2023  Content Version: 14.3    2024 [facility]  [employer].   Care instructions adapted under license by your healthcare professional. If you have questions about a medical condition or this instruction, always ask your healthcare professional. [facility] [employer] disclaims any warranty or liability for your use of this information.    Preventing Falls: Care Instructions  Injuries and health problems such as trouble walking or poor eyesight can increase your risk of falling. So can some medicines. But there are things you can do to help prevent falls. You can exercise to get stronger. You can also arrange your home to make it safer.    Talk to your doctor about the medicines you take. Ask if any of them increase the risk of falls and whether they can be changed or stopped.   Try to exercise regularly. It can help improve your strength and balance. This can help lower your risk of falling.         Practice fall safety and prevention.   Wear low-heeled shoes that fit well and give your feet good support. Talk to your doctor if you have foot problems that make this hard.  Carry a cellphone or wear a medical alert device that you can use to call for help.  Use stepladders instead of chairs to reach high objects. Don't climb if you're at risk for falls. Ask for help, if needed.  Wear the correct eyeglasses, if you need them.        Make your home safer.   Remove rugs, cords, clutter, and furniture from walkways.  Keep your house well lit. Use night-lights in hallways and bathrooms.  Install and use sturdy handrails on stairways.  Wear nonskid footwear, even inside. Don't walk barefoot or in socks without shoes.        Be safe outside.   Use handrails, curb cuts, and ramps whenever possible.  Keep your hands free by using a shoulder bag or backpack.  Try to walk in well-lit areas. Watch out for uneven ground, changes in pavement, and debris.  Be careful in the winter. Walk on the grass or gravel when sidewalks are slippery. Use de-icer on steps and walkways.  "Add non-slip devices to shoes.    Put grab bars and nonskid mats in your shower or tub and near the toilet. Try to use a shower chair or bath bench when bathing.   Get into a tub or shower by putting in your weaker leg first. Get out with your strong side first. Have a phone or medical alert device in the bathroom with you.   Where can you learn more?  Go to https://www.[employer]/patiented  Enter G117 in the search box to learn more about \"Preventing Falls: Care Instructions.\"  Current as of: July 31, 2024  Content Version: 14.3    2024 [employer].   Care instructions adapted under license by your healthcare professional. If you have questions about a medical condition or this instruction, always ask your healthcare professional. [employer] disclaims any warranty or liability for your use of this information.    Hearing Loss: Care Instructions  Overview     Hearing loss is a sudden or slow decrease in how well you hear. It can range from slight to profound. Permanent hearing loss can occur with aging. It also can happen when you are exposed long-term to loud noise. Examples include listening to loud music, riding motorcycles, or being around other loud machines.  Hearing loss can affect your work and home life. It can make you feel lonely or depressed. You may feel that you have lost your independence. But hearing aids and other devices can help you hear better and feel connected to others.  Follow-up care is a key part of your treatment and safety. Be sure to make and go to all appointments, and call your doctor if you are having problems. It's also a good idea to know your test results and keep a list of the medicines you take.  How can you care for yourself at home?  Avoid loud noises whenever possible. This helps keep your hearing from getting worse.  Always wear hearing protection around loud noises.  Wear a hearing aid as directed.  A professional can help you pick a hearing aid " "that will work best for you.  You can also get hearing aids over the counter for mild to moderate hearing loss.  Have hearing tests as your doctor suggests. They can show whether your hearing has changed. Your hearing aid may need to be adjusted.  Use other devices as needed. These may include:  Telephone amplifiers and hearing aids that can connect to a television, stereo, radio, or microphone.  Devices that use lights or vibrations. These alert you to the doorbell, a ringing telephone, or a baby monitor.  Television closed-captioning. This shows the words at the bottom of the screen. Most new TVs can do this.  TTY (text telephone). This lets you type messages back and forth on the telephone instead of talking or listening. These devices are also called TDD. When messages are typed on the keyboard, they are sent over the phone line to a receiving TTY. The message is shown on a monitor.  Use text messaging, social media, and email if it is hard for you to communicate by telephone.  Try to learn a listening technique called speechreading. It is not lipreading. You pay attention to people's gestures, expressions, posture, and tone of voice. These clues can help you understand what a person is saying. Face the person you are talking to, and have them face you. Make sure the lighting is good. You need to see the other person's face clearly.  Think about counseling if you need help to adjust to your hearing loss.  When should you call for help?  Watch closely for changes in your health, and be sure to contact your doctor if:    You think your hearing is getting worse.     You have new symptoms, such as dizziness or nausea.   Where can you learn more?  Go to https://www.[employer].net/patiented  Enter R798 in the search box to learn more about \"Hearing Loss: Care Instructions.\"  Current as of: September 27, 2023  Content Version: 14.3    2024 [employer].   Care instructions adapted under license by your " healthcare professional. If you have questions about a medical condition or this instruction, always ask your healthcare professional. [employer] disclaims any warranty or liability for your use of this information.    Learning About Stress  What is stress?     Stress is your body's response to a hard situation. Your body can have a physical, emotional, or mental response. Stress is a fact of life for most people, and it affects everyone differently. What causes stress for you may not be stressful for someone else.  A lot of things can cause stress. You may feel stress when you go on a job interview, take a test, or run a race. This kind of short-term stress is normal and even useful. It can help you if you need to work hard or react quickly. For example, stress can help you finish an important job on time.  Long-term stress is caused by ongoing stressful situations or events. Examples of long-term stress include long-term health problems, ongoing problems at work, or conflicts in your family. Long-term stress can harm your health.  How does stress affect your health?  When you are stressed, your body responds as though you are in danger. It makes hormones that speed up your heart, make you breathe faster, and give you a burst of energy. This is called the fight-or-flight stress response. If the stress is over quickly, your body goes back to normal and no harm is done.  But if stress happens too often or lasts too long, it can have bad effects. Long-term stress can make you more likely to get sick, and it can make symptoms of some diseases worse. If you tense up when you are stressed, you may develop neck, shoulder, or low back pain. Stress is linked to high blood pressure and heart disease.  Stress also harms your emotional health. It can make you franz, tense, or depressed. Your relationships may suffer, and you may not do well at work or school.  What can you do to manage stress?  You can try these  things to help manage stress:   Do something active. Exercise or activity can help reduce stress. Walking is a great way to get started. Even everyday activities such as housecleaning or yard work can help.  Try yoga or mercedes chi. These techniques combine exercise and meditation. You may need some training at first to learn them.  Do something you enjoy. For example, listen to music or go to a movie. Practice your hobby or do volunteer work.  Meditate. This can help you relax, because you are not worrying about what happened before or what may happen in the future.  Do guided imagery. Imagine yourself in any setting that helps you feel calm. You can use online videos, books, or a teacher to guide you.  Do breathing exercises. For example:  From a standing position, bend forward from the waist with your knees slightly bent. Let your arms dangle close to the floor.  Breathe in slowly and deeply as you return to a standing position. Roll up slowly and lift your head last.  Hold your breath for just a few seconds in the standing position.  Breathe out slowly and bend forward from the waist.  Let your feelings out. Talk, laugh, cry, and express anger when you need to. Talking with supportive friends or family, a counselor, or a lori leader about your feelings is a healthy way to relieve stress. Avoid discussing your feelings with people who make you feel worse.  Write. It may help to write about things that are bothering you. This helps you find out how much stress you feel and what is causing it. When you know this, you can find better ways to cope.  What can you do to prevent stress?  You might try some of these things to help prevent stress:  Manage your time. This helps you find time to do the things you want and need to do.  Get enough sleep. Your body recovers from the stresses of the day while you are sleeping.  Get support. Your family, friends, and community can make a difference in how you experience  "stress.  Limit your news feed. Avoid or limit time on social media or news that may make you feel stressed.  Do something active. Exercise or activity can help reduce stress. Walking is a great way to get started.  Where can you learn more?  Go to https://www.[employer].net/patiented  Enter N032 in the search box to learn more about \"Learning About Stress.\"  Current as of: October 24, 2023  Content Version: 14.3    2024 [employer].   Care instructions adapted under license by your healthcare professional. If you have questions about a medical condition or this instruction, always ask your healthcare professional. [employer] disclaims any warranty or liability for your use of this information.    Learning About Sleeping Well  What does sleeping well mean?     Sleeping well means getting enough sleep to feel good and stay healthy. How much sleep is enough varies among people.  The number of hours you sleep and how you feel when you wake up are both important. If you do not feel refreshed, you probably need more sleep. Another sign of not getting enough sleep is feeling tired during the day.  Experts recommend that adults get at least 7 or more hours of sleep per day. Children and older adults need more sleep.  Why is getting enough sleep important?  Getting enough quality sleep is a basic part of good health. When your sleep suffers, your physical health, mood, and your thoughts can suffer too. You may find yourself feeling more grumpy or stressed. Not getting enough sleep also can lead to serious problems, including injury, accidents, anxiety, and depression.  What might cause poor sleeping?  Many things can cause sleep problems, including:  Changes to your sleep schedule.  Stress. Stress can be caused by fear about a single event, such as giving a speech. Or you may have ongoing stress, such as worry about work or school.  Depression, anxiety, and other mental or emotional " "conditions.  Changes in your sleep habits or surroundings. This includes changes that happen where you sleep, such as noise, light, or sleeping in a different bed. It also includes changes in your sleep pattern, such as having jet lag or working a late shift.  Health problems, such as pain, breathing problems, and restless legs syndrome.  Lack of regular exercise.  Using alcohol, nicotine, or caffeine before bed.  How can you help yourself?  Here are some tips that may help you sleep more soundly and wake up feeling more refreshed.  Your sleeping area   Use your bedroom only for sleeping and sex. A bit of light reading may help you fall asleep. But if it doesn't, do your reading elsewhere in the house. Try not to use your TV, computer, smartphone, or tablet while you are in bed.  Be sure your bed is big enough to stretch out comfortably, especially if you have a sleep partner.  Keep your bedroom quiet, dark, and cool. Use curtains, blinds, or a sleep mask to block out light. To block out noise, use earplugs, soothing music, or a \"white noise\" machine.  Your evening and bedtime routine   Create a relaxing bedtime routine. You might want to take a warm shower or bath, or listen to soothing music.  Go to bed at the same time every night. And get up at the same time every morning, even if you feel tired.  What to avoid   Limit caffeine (coffee, tea, caffeinated sodas) during the day, and don't have any for at least 6 hours before bedtime.  Avoid drinking alcohol before bedtime. Alcohol can cause you to wake up more often during the night.  Try not to smoke or use tobacco, especially in the evening. Nicotine can keep you awake.  Limit naps during the day, especially close to bedtime.  Avoid lying in bed awake for too long. If you can't fall asleep or if you wake up in the middle of the night and can't get back to sleep within about 20 minutes, get out of bed and go to another room until you feel sleepy.  Avoid taking " "medicine right before bed that may keep you awake or make you feel hyper or energized. Your doctor can tell you if your medicine may do this and if you can take it earlier in the day.  If you can't sleep   Imagine yourself in a peaceful, pleasant scene. Focus on the details and feelings of being in a place that is relaxing.  Get up and do a quiet or boring activity until you feel sleepy.  Avoid drinking any liquids before going to bed to help prevent waking up often to use the bathroom.  Where can you learn more?  Go to https://www.[employer].net/patiented  Enter J942 in the search box to learn more about \"Learning About Sleeping Well.\"  Current as of: July 31, 2024  Content Version: 14.3    2024 [employer].   Care instructions adapted under license by your healthcare professional. If you have questions about a medical condition or this instruction, always ask your healthcare professional. [employer] disclaims any warranty or liability for your use of this information.    Bladder Training: Care Instructions  Your Care Instructions     Bladder training is used to treat urge incontinence and stress incontinence. Urge incontinence means that the need to urinate comes on so fast that you can't get to a toilet in time. Stress incontinence means that you leak urine because of pressure on your bladder. For example, it may happen when you laugh, cough, or lift something heavy.  Bladder training can increase how long you can wait before you have to urinate. It can also help your bladder hold more urine. And it can give you better control over the urge to urinate.  It is important to remember that bladder training takes a few weeks to a few months to make a difference. You may not see results right away, but don't give up.  Follow-up care is a key part of your treatment and safety. Be sure to make and go to all appointments, and call your doctor if you are having problems. It's also a good idea to know " your test results and keep a list of the medicines you take.  How can you care for yourself at home?  Work with your doctor to come up with a bladder training program that is right for you. You may use one or more of the following methods.  Delayed urination  In the beginning, try to keep from urinating for 5 minutes after you first feel the need to go.  While you wait, take deep, slow breaths to relax. Kegel exercises can also help you delay the need to go to the bathroom.  After some practice, when you can easily wait 5 minutes to urinate, try to wait 10 minutes before you urinate.  Slowly increase the waiting period until you are able to control when you have to urinate.  Scheduled urination  Empty your bladder when you first wake up in the morning.  Schedule times throughout the day when you will urinate.  Start by going to the bathroom every hour, even if you don't need to go.  Slowly increase the time between trips to the bathroom.  When you have found a schedule that works well for you, keep doing it.  If you wake up during the night and have to urinate, do it. Apply your schedule to waking hours only.  Kegel exercises  These tighten and strengthen pelvic muscles, which can help you control the flow of urine. (If doing these exercises causes pain, stop doing them and talk with your doctor.) To do Kegel exercises:  Squeeze your muscles as if you were trying not to pass gas. Or squeeze your muscles as if you were stopping the flow of urine. Your belly, legs, and buttocks shouldn't move.  Hold the squeeze for 3 seconds, then relax for 5 to 10 seconds.  Start with 3 seconds, then add 1 second each week until you are able to squeeze for 10 seconds.  Repeat the exercise 10 times a session. Do 3 to 8 sessions a day.  When should you call for help?  Watch closely for changes in your health, and be sure to contact your doctor if:    Your incontinence is getting worse.     You do not get better as expected.   Where can  "you learn more?  Go to https://www.[employer].net/patiented  Enter V684 in the search box to learn more about \"Bladder Training: Care Instructions.\"  Current as of: April 30, 2024  Content Version: 14.3    2024 [employer].   Care instructions adapted under license by your healthcare professional. If you have questions about a medical condition or this instruction, always ask your healthcare professional. [employer] disclaims any warranty or liability for your use of this information.    Learning About Depression Screening  What is depression screening?  Depression screening is a way to see if you have depression symptoms. It may be done by a doctor or counselor. It's often part of a routine checkup. That's because your mental health is just as important as your physical health.  Depression is a mental health condition that affects how you feel, think, and act. You may:  Have less energy.  Lose interest in your daily activities.  Feel sad and grouchy for a long time.  Depression is very common. It affects people of all ages.  Many things can lead to depression. Some people become depressed after they have a stroke or find out they have a major illness like cancer or heart disease. The death of a loved one or a breakup may lead to depression. It can run in families. Most experts believe that a combination of inherited genes and stressful life events can cause it.  What happens during screening?  You may be asked to fill out a form about your depression symptoms. You and the doctor will discuss your answers. The doctor may ask you more questions to learn more about how you think, act, and feel.  What happens after screening?  If you have symptoms of depression, your doctor will talk to you about your options.  Doctors usually treat depression with medicines or counseling. Often, combining the two works best. Many people don't get help because they think that they'll get over the depression on " "their own. But people with depression may not get better unless they get treatment.  The cause of depression is not well understood. There may be many factors involved. But if you have depression, it's not your fault.  A serious symptom of depression is thinking about death or suicide. If you or someone you care about talks about this or about feeling hopeless, get help right away.  It's important to know that depression can be treated. Medicine, counseling, and self-care may help.  Where can you learn more?  Go to https://www.[employer].net/patiented  Enter T185 in the search box to learn more about \"Learning About Depression Screening.\"  Current as of: July 31, 2024  Content Version: 14.3    2024 [email or web address].   Care instructions adapted under license by your healthcare professional. If you have questions about a medical condition or this instruction, always ask your healthcare professional. [email or web address] disclaims any warranty or liability for your use of this information.    Chronic Pain: Care Instructions  Your Care Instructions     Chronic pain is pain that lasts a long time (months or even years) and may or may not have a clear cause. It is different from acute pain, which usually does have a clear cause--like an injury or illness--and gets better over time. Chronic pain:  Lasts over time but may vary from day to day.  Does not go away despite efforts to end it.  May disrupt your sleep and lead to fatigue.  May cause depression or anxiety.  May make your muscles tense, causing more pain.  Can disrupt your work, hobbies, home life, and relationships with friends and family.  Chronic pain is a very real condition. It is not just in your head. Treatment can help and usually includes several methods used together, such as medicines, physical therapy, exercise, and other treatments. Learning how to relax and changing negative thought patterns can also help you cope.  Chronic pain is complex. " Taking an active role in your treatment will help you better manage your pain. Tell your doctor if you have trouble dealing with your pain. You may have to try several things before you find what works best for you.  Follow-up care is a key part of your treatment and safety. Be sure to make and go to all appointments, and call your doctor if you are having problems. It's also a good idea to know your test results and keep a list of the medicines you take.  How can you care for yourself at home?  Pace yourself. Break up large jobs into smaller tasks. Save harder tasks for days when you have less pain, or go back and forth between hard tasks and easier ones. Take rest breaks.  Relax, and reduce stress. Relaxation techniques such as deep breathing or meditation can help.  Keep moving. Gentle, daily exercise can help reduce pain over the long run. Try low- or no-impact exercises such as walking, swimming, and stationary biking. Do stretches to stay flexible.  Try heat, cold packs, and massage.  Get enough sleep. Chronic pain can make you tired and drain your energy. Talk with your doctor if you have trouble sleeping because of pain.  Think positive. Your thoughts can affect your pain level. Do things that you enjoy to distract yourself when you have pain instead of focusing on the pain. See a movie, read a book, listen to music, or spend time with a friend.  If you think you are depressed, talk to your doctor about treatment.  Keep a daily pain diary. Record how your moods, thoughts, sleep patterns, activities, and medicine affect your pain. You may find that your pain is worse during or after certain activities or when you are feeling a certain emotion. Having a record of your pain can help you and your doctor find the best ways to treat your pain.  Take pain medicines exactly as directed.  If the doctor gave you a prescription medicine for pain, take it as prescribed.  If you are not taking a prescription pain  "medicine, ask your doctor if you can take an over-the-counter medicine.  Reducing constipation caused by pain medicine  Talk to your doctor about a laxative. If a laxative doesn't work, your doctor may suggest a prescription medicine.  Include fruits, vegetables, beans, and whole grains in your diet each day. These foods are high in fiber.  If your doctor recommends it, get more exercise. Walking is a good choice. Bit by bit, increase the amount you walk every day. Try for at least 30 minutes on most days of the week.  Schedule time each day for a bowel movement. A daily routine may help. Take your time and do not strain when having a bowel movement.  When should you call for help?   Call your doctor now or seek immediate medical care if:    Your pain gets worse or is out of control.     You feel down or blue, or you do not enjoy things like you once did. You may be depressed, which is common in people with chronic pain. Depression can be treated.     You have vomiting or cramps for more than 2 hours.   Watch closely for changes in your health, and be sure to contact your doctor if:    You cannot sleep because of pain.     You are very worried or anxious about your pain.     You have trouble taking your pain medicine.     You have any concerns about your pain medicine.     You have trouble with bowel movements, such as:  No bowel movement in 3 days.  Blood in the anal area, in your stool, or on the toilet paper.  Diarrhea for more than 24 hours.   Where can you learn more?  Go to https://www.[employer].net/patiented  Enter N004 in the search box to learn more about \"Chronic Pain: Care Instructions.\"  Current as of: July 31, 2024  Content Version: 14.3    2024 [employer].   Care instructions adapted under license by your healthcare professional. If you have questions about a medical condition or this instruction, always ask your healthcare professional. [employer] disclaims any warranty or " liability for your use of this information.

## 2025-01-07 ENCOUNTER — PATIENT OUTREACH (OUTPATIENT)
Dept: CARE COORDINATION | Facility: CLINIC | Age: 65
End: 2025-01-07
Payer: COMMERCIAL

## 2025-01-07 ENCOUNTER — TRANSFERRED RECORDS (OUTPATIENT)
Dept: HEALTH INFORMATION MANAGEMENT | Facility: CLINIC | Age: 65
End: 2025-01-07
Payer: COMMERCIAL

## 2025-01-07 LAB
CHOLEST SERPL-MCNC: 288 MG/DL
FASTING STATUS PATIENT QL REPORTED: NO
HDLC SERPL-MCNC: 79 MG/DL
LDLC SERPL CALC-MCNC: 162 MG/DL
NONHDLC SERPL-MCNC: 209 MG/DL
TRIGL SERPL-MCNC: 234 MG/DL
TSH SERPL DL<=0.005 MIU/L-ACNC: 2.47 UIU/ML (ref 0.3–4.2)
VIT D+METAB SERPL-MCNC: 73 NG/ML (ref 20–50)

## 2025-01-08 ENCOUNTER — HOSPITAL ENCOUNTER (OUTPATIENT)
Dept: RADIOLOGY | Facility: CLINIC | Age: 65
Discharge: HOME | End: 2025-01-08
Attending: INTERNAL MEDICINE
Payer: COMMERCIAL

## 2025-01-08 ENCOUNTER — MYC REFILL (OUTPATIENT)
Dept: FAMILY MEDICINE | Facility: CLINIC | Age: 65
End: 2025-01-08

## 2025-01-08 ENCOUNTER — MYC REFILL (OUTPATIENT)
Dept: CARDIOLOGY | Facility: CLINIC | Age: 65
End: 2025-01-08

## 2025-01-08 DIAGNOSIS — G43.909 MIGRAINE WITHOUT STATUS MIGRAINOSUS, NOT INTRACTABLE, UNSPECIFIED MIGRAINE TYPE: ICD-10-CM

## 2025-01-08 DIAGNOSIS — E78.5 HYPERLIPEMIA: ICD-10-CM

## 2025-01-08 DIAGNOSIS — E78.2 MIXED HYPERLIPIDEMIA: Primary | Chronic | ICD-10-CM

## 2025-01-08 DIAGNOSIS — K59.09 CHRONIC CONSTIPATION: ICD-10-CM

## 2025-01-08 DIAGNOSIS — J43.1 PANLOBULAR EMPHYSEMA (H): Chronic | ICD-10-CM

## 2025-01-08 DIAGNOSIS — R07.89 ATYPICAL CHEST PAIN: ICD-10-CM

## 2025-01-08 DIAGNOSIS — R10.13 DYSPEPSIA: ICD-10-CM

## 2025-01-08 DIAGNOSIS — Z98.890 HISTORY OF NISSEN FUNDOPLICATION: ICD-10-CM

## 2025-01-08 PROCEDURE — 74220 X-RAY XM ESOPHAGUS 1CNTRST: CPT

## 2025-01-08 RX ORDER — BENZONATATE 100 MG/1
100 CAPSULE ORAL 3 TIMES DAILY PRN
Qty: 30 CAPSULE | Refills: 1 | Status: SHIPPED | OUTPATIENT
Start: 2025-01-08

## 2025-01-08 RX ORDER — EZETIMIBE 10 MG/1
10 TABLET ORAL AT BEDTIME
Qty: 90 TABLET | Refills: 3 | Status: SHIPPED | OUTPATIENT
Start: 2025-01-08

## 2025-01-08 RX ORDER — PROCHLORPERAZINE MALEATE 10 MG
10 TABLET ORAL EVERY 6 HOURS PRN
Qty: 30 TABLET | Refills: 0 | Status: SHIPPED | OUTPATIENT
Start: 2025-01-08

## 2025-01-09 RX ORDER — ATORVASTATIN CALCIUM 80 MG/1
80 TABLET, FILM COATED ORAL AT BEDTIME
Qty: 90 TABLET | Refills: 0 | Status: SHIPPED | OUTPATIENT
Start: 2025-01-09

## 2025-01-16 ENCOUNTER — TRANSFERRED RECORDS (OUTPATIENT)
Dept: HEALTH INFORMATION MANAGEMENT | Facility: CLINIC | Age: 65
End: 2025-01-16
Payer: COMMERCIAL

## 2025-01-20 ENCOUNTER — TELEPHONE (OUTPATIENT)
Dept: PHARMACY | Facility: OTHER | Age: 65
End: 2025-01-20
Payer: COMMERCIAL

## 2025-01-20 NOTE — TELEPHONE ENCOUNTER
MT Recruitment: [facility] insurance     Referral outreach attempt #2 on January 20, 2025      Outcome: left voicemail- Call back number [phone].    Shanda Santana CPhT  [facility]

## 2025-01-21 ENCOUNTER — HOSPITAL ENCOUNTER (OUTPATIENT)
Dept: ULTRASOUND IMAGING | Facility: HOSPITAL | Age: 65
Discharge: HOME | End: 2025-01-21
Attending: STUDENT IN AN ORGANIZED HEALTH CARE EDUCATION/TRAINING PROGRAM
Payer: COMMERCIAL

## 2025-01-21 ENCOUNTER — HOSPITAL ENCOUNTER (OUTPATIENT)
Dept: CT IMAGING | Facility: CLINIC | Age: 65
Discharge: HOME | End: 2025-01-21
Attending: NURSE PRACTITIONER
Payer: COMMERCIAL

## 2025-01-21 DIAGNOSIS — Z87.891 PERSONAL HISTORY OF TOBACCO USE: ICD-10-CM

## 2025-01-21 DIAGNOSIS — I73.9 PAD (PERIPHERAL ARTERY DISEASE): ICD-10-CM

## 2025-01-21 DIAGNOSIS — E78.2 MIXED HYPERLIPIDEMIA: ICD-10-CM

## 2025-01-21 PROCEDURE — 71271 CT THORAX LUNG CANCER SCR C-: CPT

## 2025-01-21 PROCEDURE — 93925 LOWER EXTREMITY STUDY: CPT

## 2025-01-21 PROCEDURE — 93922 UPR/L XTREMITY ART 2 LEVELS: CPT

## 2025-01-30 ENCOUNTER — ANCILLARY PROCEDURE (OUTPATIENT)
Dept: GENERAL RADIOLOGY | Facility: CLINIC | Age: 65
End: 2025-01-30
Attending: FAMILY MEDICINE
Payer: COMMERCIAL

## 2025-01-30 ENCOUNTER — OFFICE VISIT (OUTPATIENT)
Dept: FAMILY MEDICINE | Facility: CLINIC | Age: 65
End: 2025-01-30
Payer: COMMERCIAL

## 2025-01-30 VITALS
RESPIRATION RATE: 16 BRPM | SYSTOLIC BLOOD PRESSURE: 153 MMHG | DIASTOLIC BLOOD PRESSURE: 73 MMHG | TEMPERATURE: 98.5 F | WEIGHT: 134.3 LBS | OXYGEN SATURATION: 96 % | HEIGHT: 64 IN | BODY MASS INDEX: 22.93 KG/M2 | HEART RATE: 80 BPM

## 2025-01-30 DIAGNOSIS — J44.1 COPD EXACERBATION (H): Primary | ICD-10-CM

## 2025-01-30 DIAGNOSIS — J44.1 COPD EXACERBATION (H): ICD-10-CM

## 2025-01-30 RX ORDER — AZITHROMYCIN 250 MG/1
TABLET, FILM COATED ORAL
Qty: 6 TABLET | Refills: 1 | Status: SHIPPED | OUTPATIENT
Start: 2025-01-30 | End: 2025-02-04

## 2025-01-30 RX ORDER — BUDESONIDE AND FORMOTEROL FUMARATE DIHYDRATE 160; 4.5 UG/1; UG/1
2 AEROSOL RESPIRATORY (INHALATION)
COMMUNITY

## 2025-01-30 RX ORDER — PREDNISONE 20 MG/1
40 TABLET ORAL DAILY
Qty: 10 TABLET | Refills: 1 | Status: SHIPPED | OUTPATIENT
Start: 2025-01-30

## 2025-01-30 ASSESSMENT — ENCOUNTER SYMPTOMS
COUGH: 1
SHORTNESS OF BREATH: 1

## 2025-01-30 NOTE — PROGRESS NOTES
Assessment & Plan     COPD exacerbation (H)  Clinical course is consistent with COPD exacerbation.  We will give a prescription for prednisone and Z-Ulises.  Will also check a chest x-ray to evaluate for pneumonia.  - XR Chest 2 Views; Future  - predniSONE (DELTASONE) 20 MG tablet; Take 2 tablets (40 mg) by mouth daily.  - azithromycin (ZITHROMAX) 250 MG tablet; Take 2 tablets (500 mg) by mouth daily for 1 day, THEN 1 tablet (250 mg) daily for 4 days.      The longitudinal plan of care for the diagnosis(es)/condition(s) as documented were addressed during this visit. Due to the added complexity in care, I will continue to support Dayana in the subsequent management and with ongoing continuity of care.          Subjective   Dayana is a 64 year old, presenting for the following health issues:  Cough (Patient has been having a cough for two weeks. ) and Shortness of Breath (Patient is having shortness of breath that seems to be getting worse that has been going on for two weeks. )        1/30/2025     2:50 PM   Additional Questions   Roomed by josesito duncan cma   Accompanied by self     Cough  Associated symptoms include shortness of breath.   Shortness of Breath  Associated symptoms include coughing.   History of Present Illness       Reason for visit:  Cough/ pneumonia?  Symptom onset:  1-2 weeks ago  Symptoms include:  Cough trouble breathing  Symptom intensity:  Severe  Symptom progression:  Worsening  Had these symptoms before:  Yes  What makes it worse:  No  What makes it better:  No   She is taking medications regularly.         Acute Illness  Acute illness concerns:   Onset/Duration: 2 weeks   Symptoms:  Fever: No  Chills/Sweats: YES  Headache (location?): YES  Sinus Pressure: No  Conjunctivitis:  YES  Ear Pain: no  Rhinorrhea: YES  Congestion: YES  Sore Throat: No  Cough: YES  Wheeze: Yes   Decreased Appetite: No  Nausea: No  Vomiting: No  Diarrhea: No  Dysuria/Freq.: No  Dysuria or Hematuria:  "No  Fatigue/Achiness: Yes   Sick/Strep Exposure: YES-  had a cough for 4 days.   Therapies tried and outcome: tessalon, cough drops       She complains of productive cough with green sputum starting 10 days ago.  Over the past 3 days she has needed to sleep upright in bed because of increased shortness of breath.  Overall, her shortness of breath has increased over the last 10 days.  She is feeling chest tightness, similar to previous pneumonias that she has been diagnosed with.  Patient has been treated with prednisone for COPD exacerbations multiple times in the past.    Review of Systems  No fever      Objective    BP (!) 157/75 (BP Location: Left arm, Patient Position: Sitting, Cuff Size: Adult Regular)   Pulse 80   Temp 98.5  F (36.9  C) (Temporal)   Resp 16   Ht 1.637 m (5' 4.45\")   Wt 60.9 kg (134 lb 4.8 oz)   LMP 06/01/1983   SpO2 96%   BMI 22.73 kg/m    Body mass index is 22.73 kg/m .  Physical Exam   GENERAL: alert and no distress  HENT: ear canals and TM's normal, nose and mouth without ulcers or lesions  RESP: decreased breath sounds throughout  CV: regular rate and rhythm, normal S1 S2, no S3 or S4, no murmur, click or rub, no peripheral edema   PSYCH: mentation appears normal, affect normal/bright            Signed Electronically by: Mihai Carter MD    "

## 2025-02-04 DIAGNOSIS — G43.019 MIGRAINE WITHOUT AURA, INTRACTABLE, WITHOUT STATUS MIGRAINOSUS: Primary | ICD-10-CM

## 2025-02-05 ENCOUNTER — HOSPITAL ENCOUNTER (OUTPATIENT)
Dept: MAMMOGRAPHY | Facility: CLINIC | Age: 65
Discharge: HOME | End: 2025-02-05
Attending: FAMILY MEDICINE
Payer: MEDICARE

## 2025-02-05 ENCOUNTER — OFFICE VISIT (OUTPATIENT)
Dept: NEUROLOGY | Facility: CLINIC | Age: 65
End: 2025-02-05
Payer: MEDICARE

## 2025-02-05 VITALS
HEIGHT: 65 IN | WEIGHT: 134 LBS | SYSTOLIC BLOOD PRESSURE: 165 MMHG | HEART RATE: 74 BPM | BODY MASS INDEX: 22.33 KG/M2 | DIASTOLIC BLOOD PRESSURE: 70 MMHG

## 2025-02-05 DIAGNOSIS — N64.4 BREAST PAIN, LEFT: ICD-10-CM

## 2025-02-05 DIAGNOSIS — G43.019 MIGRAINE WITHOUT AURA, INTRACTABLE, WITHOUT STATUS MIGRAINOSUS: Primary | ICD-10-CM

## 2025-02-05 PROCEDURE — 77066 DX MAMMO INCL CAD BI: CPT

## 2025-02-05 PROCEDURE — 64615 CHEMODENERV MUSC MIGRAINE: CPT | Performed by: PSYCHIATRY & NEUROLOGY

## 2025-02-05 PROCEDURE — 76642 ULTRASOUND BREAST LIMITED: CPT | Mod: LT

## 2025-02-05 PROCEDURE — G0279 TOMOSYNTHESIS, MAMMO: HCPCS

## 2025-02-05 NOTE — PROGRESS NOTES
"Pharmacy Kinetics 69 y.o. female on vancomycin day # 5 2018    Currently on Vancomycin 1400 mg IV q24h (0100)     Indication for Treatment: S epidermidis bacteremia / infected port     Pertinent history per medical record: Admitted on 2018 for port infection. Patient with implanted port for weekly Prolastin infusions for her alpha-1 antitrypsin deficiency. She is transferred from OSH where she presented with one day h/o SOB. She was admitted and treated for COPD/PNA. Her blood cx subsequently grew S epidermidis which was thought to be due to her port. Removal was felt to be too complicated and patient was transferred to [facility] for vascular consult.     Other antibiotics: none     Allergies: Patient has no known allergies.      List concerns for renal function: age, malnutrition/low albumin, fluid overload/lasix therapy     Pertinent cultures to date:   9/15/18: Blood, peripheral x2 = NGTD  9/15/18: subclavian cath tip = NGTD     Cultures from OSH:  18 Line: S epidermidis   18 blood-peripheral x 2:  S epidermidis x 1 of 2    Recent Labs      18   0525  18   0540  18   0828   WBC  22.6*  20.8*  19.5*   NEUTSPOLYS  90.50*  91.00*  88.40*     Recent Labs      18   0525  18   2333  18   0540  18   0828   BUN  15  21  19  15   CREATININE  0.35*  0.60  0.41*  0.42*   ALBUMIN  3.1*   --    --    --      Recent Labs      18   2333   [facility]  10.6     Intake/Output Summary (Last 24 hours) at 18 1410  Last data filed at 18 1100   Gross per 24 hour   Intake              870 ml   Output             2650 ml   Net            -1780 ml      Blood pressure 119/56, pulse 64, temperature 37.2 °C (98.9 °F), resp. rate (!) 40, height 1.6 m (5' 2.99\"), weight 74 kg (163 lb 2.3 oz), SpO2 95 %, not currently breastfeeding. Temp (24hrs), Av °C (98.6 °F), Min:36.8 °C (98.2 °F), Max:37.4 °C (99.4 °F)      A/P   1. Vancomycin dose change: continue current " Subjective     CC: Dayana Samaniego  is a 64 year old female who presents to clinic today for the following health issues:   Chief Complaint   Patient presents with    Botox      In person evaluation    HPI  3/16/2020, in person evaluation  11/30/2021, in person evaluation  12/27/2023, in person visit  1/30/2024, Botox injection  4/30/2024, Botox injection  8/6/2024, Botox injection  11/5/2024, Botox injection  2/5/2025, Botox injection    64-year-old followed neurologically for:  Intractable migraine headaches  History of 3 cervical fusions  Longstanding generalized hyperreflexia  History of carotid bruits  Fibromyalgia      Follow-up for intractable migraine headaches failed multiple medications as preventatives and abortive's  Has been treated with Aimovig  Follows at spine clinic and with other neurologist for her neck and low back difficulties status post surgery cervical and lumbar    Migraine headaches at least 15/month  4 headaches per week  Excedrin 4/week  Drink some Pepsi  Visual blurriness with a headache  Nausea but no vomiting  Relpax gives her nausea also although she does have Compazine through her primary to take with that but she waits until the headache is been there for a long time before using it    Previously used Botox but it lost its effectiveness  Off Botox injections for a while  Last use may be 2019.    Aimovig seems to be losing some of its benefit.    Patient has gone back on the Botox injections which were restarted 1/30/2024            Neurologic summary:    A.  Patient with intractable migraine headaches       Onset of headaches as far back as 1987       Associated with photophobia/phonophobia and visual changes       History of carsickness       Family history of migraines in her daughter    Previous frequency of headaches are 2-3/week  Had used Botox injections up until March 2020  Switch to Aimovig end of March 2020      Patient uses the once a month injectable Aimovig  She gets  at least 2 weeks of good response  Then the headaches seem to increase in frequency  During the good phase she has 1-2 headaches per week  Duration of the headache during the good phases all day  Will use an Excedrin Migraine maybe 4/week during that time warned her about medication rebound headache  Uses the Relpax to break the headache                                11/2021 12/2023 4/2024 8/2024 2/2025  Frequency           1-2/week               4/week        4+/ week        4 /week      3/week    Duration              24 hours                 24 hours    24 hours          24-hour      24-hour    Excedrin              4 /week                  4/week                               4 /week    Headache frequency increased just prior to next Botox injection.      B.  Also has sharp shooting pain above the left eye lasting 10 minutes at a time jabbing in nature more neuralgic-like    C.  Patient does have bipolar disorder is on Lamictal    D.  Past history of cervical surgeries       Chronic hyperreflexia        Has been treated with Zanaflex as a muscle relaxant    E.  Has  COPD and is on inhalers       Not a good candidate for beta-blockers     F.  Since last seen had lumbar canal surgery 3/17/2021 by Dr. Sanon        Complex surgery see official reports        Anterior discectomy fusion L4-5 and L5-S1        PEEK cage L4-5, L5-S1        Posterior fusion L4-5, L5-S1        Insertion pedicle screws maria e fixation L4, 5, S1        Bilateral hemilaminectomies L4 see official report    Patient fell when she was in rehab and had a sacral fracture 3/22/2021  This slow down healing    She has been left with a lot of low back pain  Weakness more in the right leg than the left  She is always had very brisk reflexes with clonus before  As decreased reflexes at the L4 on the right leg    Is now ambulating without a cane or walker but has them at home    Patient states that there is a concern  regimen  2. Next vancomycin level: 3 days  3. Goal trough: 12-16 mcg/mL  4. Assessment: The patient's clinical status and renal function remain stable.  Echo has yet to be done.  5. Plan:  Continue current regimen with a recommended follow up level in 3 days.    Lora Monique, Pharm.D., BCPS         that the grafts that they put in may not be healing as well and causing some of the pain but there is no new impingement  Did undergone an EMG 2021 at [facility]  At see neurology 2021 may be at [facility]  I can access some of the record but not all of it  She says that she has some residual nerve damage but no new impingement    I feel she should follow closely with her spine specialist and that has access to all her scans and data    Previously worked up by the spine specialist who did her surgery had other neurologist and EMGs performed 2021 which I do not have full access to  Follows at [facility] neurology for spine difficulty  (Chronic cervical pain/calf cramps.)  MRI C-spine 2023 postop changes C3-C7, disc osteophyte similar to past scans per their report.  Last seen 2023      Past medical history  Intractable migraine headaches  Chronic daily headaches  Status post cervical fusion x3  Longstanding hyperreflexia and difficulty with imbalance and falls  Carotid artery bruits  Fibromyalgia/chronic pain  COPD.  Dyspnea on exertion with pulmonary deconditioning with past history of pulmonary nodules.   Right atrial enlargement.  Fatigue with possible obstructive sleep apnea.  Bipolar disorder.  Hypothyroidism.   GERD.   Hyperlipidemia.  Chronic kidney disease.      Habits  History of smoking  Alcohol 1-2/month    Family history  Mother with bone tumor,  at age 52  Father with liver disease and alcohol use,  at age 62  Sister with diabetes high blood pressure and hypercholesterolemia  Maternal aunt diabetes  Nephew with depression and suicidal thoughts  Uncle with heart disease  Daughters with migraine        Past work-up review  EEG 2011 background rhythm 7-9 Hz slightly slow nonspecific consistent with toxic encephalopathy  MRI scan brain 2016  A.  Small T2 hyperintensity changes nonspecific  B.  No hydrocephalus mass or acute changes  TSH 0.32.    Vitamin D level in the past was low at 26.2.  MRI C-spine 2023 see official report  1.  Postoperative changes from C3 through C7.  2.  Degenerative change at C7-T1 see official report.  3.  Moderate spinal stenosis slight flattening anterior cord similar to prior exam C7-T1        Migraine treatment review    Some medications that have been used in the past as preventatives have included:  1. Topamax.  2. Verapamil.  3. Lyrica.  4. Amitriptyline.  5. Propranolol.    Medications tried in the past more as abortive agents:  a. Tizanidine.  b. Zomig.  c. Vicodin.  d. Relpax, which works sometimes, but not always.  Filled by primary MD  e. Toradol.    Currently, her medications are filled through her primary includin. Valium for anxiety.  2. Doxepin 25 mg three at bedtime.  3. Prozac 60 mg at bedtime.  4. Vistaril 25 mg two tablets p.o. q.6h. p.r.n. for migraine.  5. Avoids ibuprofen and aspirin due to her kidney disease, but recently had some Toradol.  6. Lamictal 200 mg at bedtime for bipolar.  7. Melatonin 5 mg for sleep.  8. Has had prednisone burst during headache flurries in the past.  9. Tizanidine 4 mg tablet, one during the day and two at night prescribed by primary.    Patient is a nonsmoker, does not drink alcohol.    Patient s past Botox injection schedules have included:  a. 2016, at the [facility], 25-30 units, did not tolerate, did not complete.  b. Second injection at the [facility] on 2017, 150 units given.    Through our clinic a 155 units using the headache template. Patient knows the risk and benefits of the medication:  a. 2017.  b. 2017.  c. 2018.  d. 2018, 200 units, given as she had a lot of neck spasm.  e. 2018, total of 200 units given as she has a lot of neck spasm.  f. 2019, 200 units given as she had a lot of neck spasm.  g. Halima 10, 2019 200 units headache template and some for neck spasm  h.  sept 13th, 2019 155 units  i Dec 16th, 2019 155 units    Botox injections restarted  Botox 155 units injected using headache template patient tolerates injections.  1.  1/30/2024, 155 units injected  2.  4/30/2024, 155 units injected  3.  8/6/2024, 155 units injected  4.  11//2024, 155 units injected      Laboratory data review                               8/2023  NA/K                   139/3.9  BUN/Cr               17/1.04  GLU                     92  AST                     27  WBC/HGB           7.9/14.6  PLTs                     284,000  ESR/CRP             8/ <0.1              Review of Systems     Significant headaches as above  Sometimes gets a sharp shooting pain more so in the left eye than the right varies seems to be the beginning of a migraine    No visual changes   No dysarthria dysphasia or diplopia  No actual visual field cut  Does have visual blurriness with a headache  Gets nausea with the headache  Has spasms in the neck trapezius chronically    Does get photophobia when the headaches are worse    No chest pain no shortness of breath no nausea vomiting no diarrhea no fever chills no gait changes otherwise review systems are negative     Does have the pain in the back and weakness of the right leg greater than the left but can ambulate    General exam  Alert oriented x 3  Vital signs Per chart  Lungs clear  Heart rate regular  Abdomen soft  Symmetrical pulses  No edema the feet      Neurologic exam   Alert oriented x3  No aphasia  No neglect  Normal memory recall    Cranials 2 through 12 normal  Pupils are symmetrical and reactive  Optic fundi are good  Visual fields are intact  No ophthalmoplegia  No nystagmus  Face is symmetrical tongue twisters are good    Upper extremities  No drift no tremor normal finger-nose    Brisk reflexes in the lower extremities greater on the left than the right except decreased right knee reflex    Lower extremities  Pain versus weakness of the right iliopsoas but can  bear weight okay and walk independently    Gait adequate        Assessment/Plan     1.  Chronic migraine without aura, intractable, without status migrainosus (G43.719)     2.  Started Aimovig March/April 2020       Aimovig 70mg/ml  Subcutaneous, Qmonth       Does seem to be helping the migraines    3.  Status post complex surgery on her lumbar spine March 17, 2021 with residual back pain and residual weakness of the right leg    4.  Sacral fracture 3/21/2021 while she was in rehab for her lumbar surgery    Previously worked up by the spine specialist who did her surgery had other neurologist and EMGs performed June 2021 which I do not have full access to  Follows at [facility] neurology for spine difficulty  (Chronic cervical pain/calf cramps.)  MRI C-spine 2/6/2023 postop changes C3-C7, disc osteophyte similar to past scans per their report.  Last seen 5/24/2023 by her spine neurologist.    No new recommendations in regards to her back or spine difficulty  Treatment of pain for her spine per other neurologist and spine specialist in regards to the back    Intractable migraine headache  Has failed multiple preventative therapies  Has been a while since she has been on the Botox  We will try to get Botox approved again and give this another try    Has been on Aimovig which may be losing its effectiveness  Sometimes rotating preventative medicines is helpful  She is trying to keep the Excedrin to 4/week to avoid medication rebound headache    Restarted Botox injection:  155 units per template patient tolerates the Botox injections  Has found that the Botox injections decrease the severity more so than the frequency  Patient tolerates the Botox injections  1/30/2024, 155 units  ,  Botox injections restarted  Botox 155 units injected using headache template patient tolerates injections.  1.  1/30/2024, 155 units injected  2.   4/30/2024, 155 units injected  3.   8/6/2024,   155 units injected  4.  11/4/2024,    155 units injected  5.   2/5/2025,    155 units injected    She gets most of her meds either through the pain clinic and or primary    Patient on narcotics and gabapentin through the pain clinic  Has been given a steroid burst and had steroid injections for her back    Patient gets a decrease in frequency and severity of headaches with the Botox injections.  And as she gets closer to the next injection time it started to crescendo upwards again.    No side effects from Botox injections    Recently had some treatment at the pain center for right trigeminal neuralgia with an injection.  Patient had cervical neck surgery November 2024  Has a lumbar back surgery planned for March 2025    Also on Relpax to help break the headaches    Botox does decrease the intensity and severity  She wishes to continue with the injections as she does find that they help some        Units Injected: 155  Units Discarded: 45  Lot Number and Expiration: [id number], expiration 05/2027  NDC number 5908-6939-32    DUNCAN QUAN

## 2025-02-05 NOTE — LETTER
2/5/2025      Dayana Samaniego  [address] [address] [place] [id number]      Dear Colleague,    Thank you for referring your patient, Dayana Samaniego, to the [facility] [place]. Please see a copy of my visit note below.    Subjective    CC: Dayana Samaniego  is a 64 year old female who presents to clinic today for the following health issues:   Chief Complaint   Patient presents with     Botox      In person evaluation    HPI  3/16/2020, in person evaluation  11/30/2021, in person evaluation  12/27/2023, in person visit  1/30/2024, Botox injection  4/30/2024, Botox injection  8/6/2024, Botox injection  11/5/2024, Botox injection  2/5/2025, Botox injection    64-year-old followed neurologically for:  Intractable migraine headaches  History of 3 cervical fusions  Longstanding generalized hyperreflexia  History of carotid bruits  Fibromyalgia      Follow-up for intractable migraine headaches failed multiple medications as preventatives and abortive's  Has been treated with Aimovig  Follows at spine clinic and with other neurologist for her neck and low back difficulties status post surgery cervical and lumbar    Migraine headaches at least 15/month  4 headaches per week  Excedrin 4/week  Drink some Pepsi  Visual blurriness with a headache  Nausea but no vomiting  Relpax gives her nausea also although she does have Compazine through her primary to take with that but she waits until the headache is been there for a long time before using it    Previously used Botox but it lost its effectiveness  Off Botox injections for a while  Last use may be 2019.    Aimovig seems to be losing some of its benefit.    Patient has gone back on the Botox injections which were restarted 1/30/2024            Neurologic summary:    A.  Patient with intractable migraine headaches       Onset of headaches as far back as 1987       Associated with photophobia/phonophobia and visual changes       History of  [email or web address]       Family history of migraines in her daughter    Previous frequency of headaches are 2-3/week  Had used Botox injections up until March 2020  Switch to Aimovig end of March 2020      Patient uses the once a month injectable Aimovig  She gets at least 2 weeks of good response  Then the headaches seem to increase in frequency  During the good phase she has 1-2 headaches per week  Duration of the headache during the good phases all day  Will use an Excedrin Migraine maybe 4/week during that time warned her about medication rebound headache  Uses the Relpax to break the headache                                11/2021 12/2023 4/2024 8/2024 2/2025  Frequency           1-2/week               4/week        4+/ week        4 /week      3/week    Duration              24 hours                 24 hours    24 hours          24-hour      24-hour    Excedrin              4 /week                  4/week                               4 /week    Headache frequency increased just prior to next Botox injection.      B.  Also has sharp shooting pain above the left eye lasting 10 minutes at a time jabbing in nature more neuralgic-like    C.  Patient does have bipolar disorder is on Lamictal    D.  Past history of cervical surgeries       Chronic hyperreflexia        Has been treated with Zanaflex as a muscle relaxant    E.  Has  COPD and is on inhalers       Not a good candidate for beta-blockers     F.  Since last seen had lumbar canal surgery 3/17/2021 by Dr. Sanon        Complex surgery see official reports        Anterior discectomy fusion L4-5 and L5-S1        PEEK cage L4-5, L5-S1        Posterior fusion L4-5, L5-S1        Insertion pedicle screws maria e fixation L4, 5, S1        Bilateral hemilaminectomies L4 see official report    Patient fell when she was in rehab and had a sacral fracture 3/22/2021  This slow down healing    She has been left with a lot of low back  pain  Weakness more in the right leg than the left  She is always had very brisk reflexes with clonus before  As decreased reflexes at the L4 on the right leg    Is now ambulating without a cane or walker but has them at home    Patient states that there is a concern that the grafts that they put in may not be healing as well and causing some of the pain but there is no new impingement  Did undergone an EMG 2021 at [facility]  At [facility] neurology 2021 may be at [facility]  I can access some of the record but not all of it  She says that she has some residual nerve damage but no new impingement    I feel she should follow closely with her spine specialist and that has access to all her scans and data    Previously worked up by the spine specialist who did her surgery had other neurologist and EMGs performed 2021 which I do not have full access to  Follows at [facility] for spine difficulty  (Chronic cervical pain/calf cramps.)  MRI C-spine 2023 postop changes C3-C7, disc osteophyte similar to past scans per their report.  Last seen 2023      Past medical history  Intractable migraine headaches  Chronic daily headaches  Status post cervical fusion x3  Longstanding hyperreflexia and difficulty with imbalance and falls  Carotid artery bruits  Fibromyalgia/chronic pain  COPD.  Dyspnea on exertion with pulmonary deconditioning with past history of pulmonary nodules.   Right atrial enlargement.  Fatigue with possible obstructive sleep apnea.  Bipolar disorder.  Hypothyroidism.   GERD.   Hyperlipidemia.  Chronic kidney disease.      Habits  History of smoking  Alcohol 1-2/month    Family history  Mother with bone tumor,  at age 52  Father with liver disease and alcohol use,  at age 62  Sister with diabetes high blood pressure and hypercholesterolemia  Maternal aunt diabetes  Nephew with depression and suicidal thoughts  Uncle with heart disease  Daughters with  migraine        Past work-up review  EEG 2011 background rhythm 7-9 Hz slightly slow nonspecific consistent with toxic encephalopathy  MRI scan brain 2016  A.  Small T2 hyperintensity changes nonspecific  B.  No hydrocephalus mass or acute changes  TSH 0.32.   Vitamin D level in the past was low at 26.2.  MRI C-spine 2023 see official report  1.  Postoperative changes from C3 through C7.  2.  Degenerative change at C7-T1 see official report.  3.  Moderate spinal stenosis slight flattening anterior cord similar to prior exam C7-T1        Migraine treatment review    Some medications that have been used in the past as preventatives have included:  1. Topamax.  2. Verapamil.  3. Lyrica.  4. Amitriptyline.  5. Propranolol.    Medications tried in the past more as abortive agents:  a. Tizanidine.  b. Zomig.  c. Vicodin.  d. Relpax, which works sometimes, but not always.  Filled by primary MD  e. Toradol.    Currently, her medications are filled through her primary includin. Valium for anxiety.  2. Doxepin 25 mg three at bedtime.  3. Prozac 60 mg at bedtime.  4. Vistaril 25 mg two tablets p.o. q.6h. p.r.n. for migraine.  5. Avoids ibuprofen and aspirin due to her kidney disease, but recently had some Toradol.  6. Lamictal 200 mg at bedtime for bipolar.  7. Melatonin 5 mg for sleep.  8. Has had prednisone burst during headache flurries in the past.  9. Tizanidine 4 mg tablet, one during the day and two at night prescribed by primary.    Patient is a nonsmoker, does not drink alcohol.    Patient s past Botox injection schedules have included:  a. 2016, at the [facility], 25-30 units, did not tolerate, did not complete.  b. Second injection at the [facility] on 2017, 150 units given.    Through our clinic a 155 units using the headache template. Patient knows the risk and benefits of the medication:  a. 2017.  b. 2017.  c. 2018.  d.   2018, 200 units, given as she had a lot of neck spasm.  e. September 24, 2018, total of 200 units given as she has a lot of neck spasm.  f. March 08, 2019, 200 units given as she had a lot of neck spasm.  g. Halima 10, 2019 200 units headache template and some for neck spasm  h. sept 13th, 2019 155 units  i Dec 16th, 2019 155 units    Botox injections restarted  Botox 155 units injected using headache template patient tolerates injections.  1.  1/30/2024, 155 units injected  2.  4/30/2024, 155 units injected  3.  8/6/2024, 155 units injected  4.  11//2024, 155 units injected      Laboratory data review                               8/2023  NA/K                   139/3.9  BUN/Cr               17/1.04  GLU                     92  AST                     27  WBC/HGB           7.9/14.6  PLTs                     284,000  ESR/CRP             8/ <0.1              Review of Systems     Significant headaches as above  Sometimes gets a sharp shooting pain more so in the left eye than the right varies seems to be the beginning of a migraine    No visual changes   No dysarthria dysphasia or diplopia  No actual visual field cut  Does have visual blurriness with a headache  Gets nausea with the headache  Has spasms in the neck trapezius chronically    Does get photophobia when the headaches are worse    No chest pain no shortness of breath no nausea vomiting no diarrhea no fever chills no gait changes otherwise review systems are negative     Does have the pain in the back and weakness of the right leg greater than the left but can ambulate    General exam  Alert oriented x 3  Vital signs Per chart  Lungs clear  Heart rate regular  Abdomen soft  Symmetrical pulses  No edema the feet      Neurologic exam   Alert oriented x3  No aphasia  No neglect  Normal memory recall    Cranials 2 through 12 normal  Pupils are symmetrical and reactive  Optic fundi are good  Visual fields are intact  No ophthalmoplegia  No nystagmus  Face is  symmetrical tongue twisters are good    Upper extremities  No drift no tremor normal finger-nose    Brisk reflexes in the lower extremities greater on the left than the right except decreased right knee reflex    Lower extremities  Pain versus weakness of the right iliopsoas but can bear weight okay and walk independently    Gait adequate        Assessment/Plan     1.  Chronic migraine without aura, intractable, without status migrainosus (G43.719)     2.  Started Aimovig March/April 2020       Aimovig 70mg/ml  Subcutaneous, Qmonth       Does seem to be helping the migraines    3.  Status post complex surgery on her lumbar spine March 17, 2021 with residual back pain and residual weakness of the right leg    4.  Sacral fracture 3/21/2021 while she was in rehab for her lumbar surgery    Previously worked up by the spine specialist who did her surgery had other neurologist and EMGs performed June 2021 which I do not have full access to  Follows at [facility] of neurology for spine difficulty  (Chronic cervical pain/calf cramps.)  MRI C-spine 2/6/2023 postop changes C3-C7, disc osteophyte similar to past scans per their report.  Last seen 5/24/2023 by her spine neurologist.    No new recommendations in regards to her back or spine difficulty  Treatment of pain for her spine per other neurologist and spine specialist in regards to the back    Intractable migraine headache  Has failed multiple preventative therapies  Has been a while since she has been on the Botox  We will try to get Botox approved again and give this another try    Has been on Aimovig which may be losing its effectiveness  Sometimes rotating preventative medicines is helpful  She is trying to keep the Excedrin to 4/week to avoid medication rebound headache    Restarted Botox injection:  155 units per template patient tolerates the Botox injections  Has found that the Botox injections decrease the severity more so than the frequency  Patient  tolerates the Botox injections  1/30/2024, 155 units  ,  Botox injections restarted  Botox 155 units injected using headache template patient tolerates injections.  1.  1/30/2024, 155 units injected  2.   4/30/2024, 155 units injected  3.   8/6/2024,   155 units injected  4.  11/4/2024,   155 units injected  5.   2/5/2025,    155 units injected    She gets most of her meds either through the pain clinic and or primary    Patient on narcotics and gabapentin through the pain clinic  Has been given a steroid burst and had steroid injections for her back    Patient gets a decrease in frequency and severity of headaches with the Botox injections.  And as she gets closer to the next injection time it started to crescendo upwards again.    No side effects from Botox injections    Recently had some treatment at the pain center for right trigeminal neuralgia with an injection.  Patient had cervical neck surgery November 2024  Has a lumbar back surgery planned for March 2025    Also on Relpax to help break the headaches    Botox does decrease the intensity and severity  She wishes to continue with the injections as she does find that they help some        Units Injected: 155  Units Discarded: 45  Lot Number and Expiration: [id number], expiration 05/2027  NDC number 7265-2214-40    DUNCAN SCHMITT      Again, thank you for allowing me to participate in the care of your patient.        Sincerely,        duncan Schmitt MD    Electronically signed

## 2025-02-12 ENCOUNTER — TRANSFERRED RECORDS (OUTPATIENT)
Dept: HEALTH INFORMATION MANAGEMENT | Facility: CLINIC | Age: 65
End: 2025-02-12
Payer: MEDICARE

## 2025-03-03 ENCOUNTER — OFFICE VISIT (OUTPATIENT)
Dept: CARDIOLOGY | Facility: CLINIC | Age: 65
End: 2025-03-03
Attending: STUDENT IN AN ORGANIZED HEALTH CARE EDUCATION/TRAINING PROGRAM
Payer: MEDICARE

## 2025-03-03 ENCOUNTER — OFFICE VISIT (OUTPATIENT)
Dept: FAMILY MEDICINE | Facility: CLINIC | Age: 65
End: 2025-03-03
Payer: MEDICARE

## 2025-03-03 VITALS
HEART RATE: 95 BPM | BODY MASS INDEX: 21.99 KG/M2 | RESPIRATION RATE: 16 BRPM | HEIGHT: 65 IN | SYSTOLIC BLOOD PRESSURE: 149 MMHG | DIASTOLIC BLOOD PRESSURE: 66 MMHG | WEIGHT: 132 LBS | TEMPERATURE: 98.3 F | OXYGEN SATURATION: 97 %

## 2025-03-03 VITALS
DIASTOLIC BLOOD PRESSURE: 74 MMHG | SYSTOLIC BLOOD PRESSURE: 150 MMHG | HEART RATE: 74 BPM | BODY MASS INDEX: 21.99 KG/M2 | HEIGHT: 65 IN | WEIGHT: 132 LBS | RESPIRATION RATE: 16 BRPM

## 2025-03-03 DIAGNOSIS — F17.210 CIGARETTE NICOTINE DEPENDENCE WITHOUT COMPLICATION: ICD-10-CM

## 2025-03-03 DIAGNOSIS — M85.89 OSTEOPENIA OF MULTIPLE SITES: Chronic | ICD-10-CM

## 2025-03-03 DIAGNOSIS — J43.1 PANLOBULAR EMPHYSEMA (H): Chronic | ICD-10-CM

## 2025-03-03 DIAGNOSIS — I10 ESSENTIAL HYPERTENSION: Primary | ICD-10-CM

## 2025-03-03 DIAGNOSIS — F31.81 BIPOLAR 2 DISORDER (H): Chronic | ICD-10-CM

## 2025-03-03 DIAGNOSIS — J45.40 MODERATE PERSISTENT ASTHMA, UNCOMPLICATED: Chronic | ICD-10-CM

## 2025-03-03 DIAGNOSIS — G43.909 MIGRAINE WITHOUT STATUS MIGRAINOSUS, NOT INTRACTABLE, UNSPECIFIED MIGRAINE TYPE: ICD-10-CM

## 2025-03-03 DIAGNOSIS — R07.89 CHEST DISCOMFORT: ICD-10-CM

## 2025-03-03 DIAGNOSIS — N18.31 STAGE 3A CHRONIC KIDNEY DISEASE (H): Chronic | ICD-10-CM

## 2025-03-03 DIAGNOSIS — F60.3 BORDERLINE PERSONALITY DISORDER (H): ICD-10-CM

## 2025-03-03 DIAGNOSIS — E03.9 ACQUIRED HYPOTHYROIDISM: Chronic | ICD-10-CM

## 2025-03-03 DIAGNOSIS — I73.9 PAD (PERIPHERAL ARTERY DISEASE): ICD-10-CM

## 2025-03-03 DIAGNOSIS — E78.2 MIXED HYPERLIPIDEMIA: Chronic | ICD-10-CM

## 2025-03-03 DIAGNOSIS — M53.9 MULTILEVEL DEGENERATIVE DISC DISEASE: ICD-10-CM

## 2025-03-03 DIAGNOSIS — G95.9 CERVICAL MYELOPATHY (H): ICD-10-CM

## 2025-03-03 DIAGNOSIS — M48.8X6: ICD-10-CM

## 2025-03-03 DIAGNOSIS — M51.372 DEGENERATION OF INTERVERTEBRAL DISC OF LUMBOSACRAL REGION WITH DISCOGENIC BACK PAIN AND LOWER EXTREMITY PAIN: Chronic | ICD-10-CM

## 2025-03-03 DIAGNOSIS — Z01.818 PREOP GENERAL PHYSICAL EXAM: Primary | ICD-10-CM

## 2025-03-03 DIAGNOSIS — G89.4 CHRONIC PAIN SYNDROME: Chronic | ICD-10-CM

## 2025-03-03 DIAGNOSIS — M48.062 SPINAL STENOSIS OF LUMBAR REGION WITH NEUROGENIC CLAUDICATION: ICD-10-CM

## 2025-03-03 PROBLEM — F17.200 NICOTINE DEPENDENCE: Chronic | Status: ACTIVE | Noted: 2024-10-24

## 2025-03-03 PROBLEM — J43.2 CENTRILOBULAR EMPHYSEMA (H): Chronic | Status: RESOLVED | Noted: 2018-02-26 | Resolved: 2025-03-03

## 2025-03-03 LAB
BASOPHILS # BLD AUTO: 0.1 10E3/UL (ref 0–0.2)
BASOPHILS NFR BLD AUTO: 1 %
EOSINOPHIL # BLD AUTO: 0.4 10E3/UL (ref 0–0.7)
EOSINOPHIL NFR BLD AUTO: 5 %
ERYTHROCYTE [DISTWIDTH] IN BLOOD BY AUTOMATED COUNT: 14 % (ref 10–15)
HCT VFR BLD AUTO: 44.2 % (ref 35–47)
HGB BLD-MCNC: 13.9 G/DL (ref 11.7–15.7)
IMM GRANULOCYTES # BLD: 0 10E3/UL
IMM GRANULOCYTES NFR BLD: 0 %
LYMPHOCYTES # BLD AUTO: 1.8 10E3/UL (ref 0.8–5.3)
LYMPHOCYTES NFR BLD AUTO: 23 %
MCH RBC QN AUTO: 28.4 PG (ref 26.5–33)
MCHC RBC AUTO-ENTMCNC: 31.4 G/DL (ref 31.5–36.5)
MCV RBC AUTO: 90 FL (ref 78–100)
MONOCYTES # BLD AUTO: 0.4 10E3/UL (ref 0–1.3)
MONOCYTES NFR BLD AUTO: 5 %
NEUTROPHILS # BLD AUTO: 5.2 10E3/UL (ref 1.6–8.3)
NEUTROPHILS NFR BLD AUTO: 66 %
PLATELET # BLD AUTO: 294 10E3/UL (ref 150–450)
RBC # BLD AUTO: 4.89 10E6/UL (ref 3.8–5.2)
WBC # BLD AUTO: 7.9 10E3/UL (ref 4–11)

## 2025-03-03 PROCEDURE — G2211 COMPLEX E/M VISIT ADD ON: HCPCS | Performed by: FAMILY MEDICINE

## 2025-03-03 PROCEDURE — 80053 COMPREHEN METABOLIC PANEL: CPT | Performed by: FAMILY MEDICINE

## 2025-03-03 PROCEDURE — 3077F SYST BP >= 140 MM HG: CPT | Performed by: STUDENT IN AN ORGANIZED HEALTH CARE EDUCATION/TRAINING PROGRAM

## 2025-03-03 PROCEDURE — 36415 COLL VENOUS BLD VENIPUNCTURE: CPT | Performed by: FAMILY MEDICINE

## 2025-03-03 PROCEDURE — 3078F DIAST BP <80 MM HG: CPT | Performed by: FAMILY MEDICINE

## 2025-03-03 PROCEDURE — 3077F SYST BP >= 140 MM HG: CPT | Performed by: FAMILY MEDICINE

## 2025-03-03 PROCEDURE — 85025 COMPLETE CBC W/AUTO DIFF WBC: CPT | Performed by: FAMILY MEDICINE

## 2025-03-03 PROCEDURE — 80061 LIPID PANEL: CPT | Performed by: FAMILY MEDICINE

## 2025-03-03 PROCEDURE — 3078F DIAST BP <80 MM HG: CPT | Performed by: STUDENT IN AN ORGANIZED HEALTH CARE EDUCATION/TRAINING PROGRAM

## 2025-03-03 PROCEDURE — 99214 OFFICE O/P EST MOD 30 MIN: CPT | Performed by: STUDENT IN AN ORGANIZED HEALTH CARE EDUCATION/TRAINING PROGRAM

## 2025-03-03 PROCEDURE — 99214 OFFICE O/P EST MOD 30 MIN: CPT | Performed by: FAMILY MEDICINE

## 2025-03-03 RX ORDER — ESCITALOPRAM OXALATE 5 MG/1
TABLET ORAL
COMMUNITY
Start: 2025-02-19

## 2025-03-03 RX ORDER — LAMOTRIGINE 25 MG/1
TABLET ORAL
COMMUNITY
Start: 2025-02-21

## 2025-03-03 RX ORDER — HYDROCHLOROTHIAZIDE 25 MG/1
25 TABLET ORAL DAILY
Qty: 90 TABLET | Refills: 3 | Status: SHIPPED | OUTPATIENT
Start: 2025-03-03

## 2025-03-03 RX ORDER — PREDNISONE 10 MG/1
10 TABLET ORAL DAILY
Qty: 14 TABLET | Refills: 0 | Status: SHIPPED | OUTPATIENT
Start: 2025-03-03

## 2025-03-03 NOTE — PROGRESS NOTES
Cardiology Clinic    Dayana Samaniego is a 63 year old with a history of tobacco abuse, bipolar disorder, hyperlipidemia, hypothyroidism, CKD, chronic pain, and COPD who presents to \[facility]\"" care.    Since her last visit, she has done well.  She denies any chest pain, palpitations, syncope, or presyncope.  She has struggled with cough for the last few weeks for which she has been treated with steroids.  She thinks these are helping.    General: Well appearing, appears stated age.  CV: Normal rate and rhythm. No m/r/g. No JVD.   Pulm: Normal effort. Normal breath sounds.  Lower extremities: No lower extremity edema.    Labs:   Reviewed in epic.  Hemoglobin 14.7  Platelets 261  Creatinine 0.9  LDL 48    Testing:  Nuclear stress 9/2023: No ischemia, EF greater than 70%  Coronary CTA 9/2022: Normal coronary arteries  EKG 2/2024: Normal sinus rhythm, LVH, no significant ST or T wave abnormalities (my interpretation)  Coronary CTA 12/17/2024: Nonobstructive coronary artery disease  ABIs: Normal    Assessment and Plan:    1.  Shortness of breath, chest discomfort.  CTA without any significant coronary disease.  No further testing.  Her symptoms have resolved.    2.  Claudication.  ABIs are normal.  Planning for back surgery for neurogenic claudication here shortly.    3.  Hypertension.  Blood pressure above goal today.  Add hydrochlorothiazide 25 mg daily.    The longitudinal plan of care for the diagnosis(es)/condition(s) as documented were addressed during this visit. Due to the added complexity in care, I will continue to support Dayana in the subsequent management and with ongoing continuity of care.    Return if symptoms worsen or fail to improve.    Thang Christensen MD  Interventional Cardiology

## 2025-03-03 NOTE — PATIENT INSTRUCTIONS
How to Take Your Medication Before Surgery  Preoperative Medication Instructions   Antiplatelet or Anticoagulation Medication Instructions   - We reviewed the medication list and the patient is not on an antiplatelet or anticoagulation medications.    Additional Medication Instructions   - Herbal medications and vitamins: DO NOT TAKE 14 days prior to surgery.  Hold oral meds on morning of surgery and reinstate when taking fluids again.       Patient Education   Preparing for Your Surgery  For Adults  Getting started  In most cases, a nurse will call to review your health history and instructions. They will give you an arrival time based on your scheduled surgery time. Please be ready to share:  Your doctor's clinic name and phone number  Your medical, surgical, and anesthesia history  A list of allergies and sensitivities  A list of medicines, including herbal treatments and over-the-counter drugs  Whether the patient has a legal guardian (ask how to send us the papers in advance)  Note: You may not receive a call if you were seen at our PAC (Preoperative Assessment Center).  Please tell us if you're pregnant--or if there's any chance you might be pregnant. Some surgeries may injure a fetus (unborn baby), so they require a pregnancy test. Surgeries that are safe for a fetus don't always need a test, and you can choose whether to have one.   Preparing for surgery  Within 10 to 30 days of surgery: Have a pre-op exam (sometimes called an H&P, or History and Physical). This can be done at a clinic or pre-operative center.  If you're having a , you may not need this exam. Talk to your care team.  At your pre-op exam, talk to your care team about all medicines you take. (This includes CBD oil and any drugs, such as THC, marijuana, and other forms of cannabis.) If you need to stop any medicine before surgery, ask when to start taking it again.  This is for your safety. Many medicines and drugs can make you bleed  too much during surgery. Some change how well surgery (anesthesia) drugs work.  Call your insurance company to let them know you're having surgery. (If you don't have insurance, call [phone].)  Call your clinic if there's any change in your health. This includes a scrape or scratch near the surgery site, or any signs of a cold (sore throat, runny nose, cough, rash, fever).  Eating and drinking guidelines  For your safety: Unless your surgeon tells you otherwise, follow the guidelines below.  Eat and drink as normal until 8 hours before you arrive for surgery. After that, no food or milk. You can spit out gum when you arrive.  Drink clear liquids until 2 hours before you arrive. These are liquids you can see through, like water, Gatorade, and Propel Water. They also include plain black coffee and tea (no cream or milk).  No alcohol for 24 hours before you arrive. The night before surgery, stop any drinks that contain THC.  If your care team tells you to take medicine on the morning of surgery, it's okay to take it with a sip of water. No other medicines or drugs are allowed (including CBD oil)--follow your care team's instructions.  If you have questions the day of surgery, call your hospital or surgery center.   Preventing infection  Shower or bathe the night before and the morning of surgery. Follow the instructions your clinic gave you. (If no instructions, use regular soap.)  Don't shave or clip hair near your surgery site. We'll remove the hair if needed.  Don't smoke or vape the morning of surgery. No chewing tobacco for 6 hours before you arrive. A nicotine patch is okay. You may spit out nicotine gum when you arrive.  For some surgeries, the surgeon will tell you to fully quit smoking and nicotine.  We will make every effort to keep you safe from infection. We will:  Clean our hands often with soap and water (or an alcohol-based hand rub).  Clean the skin at your surgery site with a special soap that  kills germs.  Give you a special gown to keep you warm. (Cold raises the risk of infection.)  Wear hair covers, masks, gowns, and gloves during surgery.  Give antibiotic medicine, if prescribed. Not all surgeries need this medicine.  What to bring on the day of surgery  Photo ID and insurance card  Copy of your health care directive, if you have one  Glasses and hearing aids (bring cases)  You can't wear contacts during surgery  Inhaler and eye drops, if you use them (tell us about these when you arrive)  CPAP machine or breathing device, if you use them  A few personal items, if spending the night  If you have . . .  A pacemaker, ICD (cardiac defibrillator), or other implant: Bring the ID card.  An implanted stimulator: Bring the remote control.  A legal guardian: Bring a copy of the certified (court-stamped) guardianship papers.  Please remove any jewelry, including body piercings. Leave jewelry and other valuables at home.  If you're going home the day of surgery  You must have a responsible adult drive you home. They should stay with you overnight as well.  If you don't have someone to stay with you, and you aren't safe to go home alone, we may keep you overnight. Insurance often won't pay for this.  After surgery  If it's hard to control your pain or you need more pain medicine, please call your surgeon's office.  Questions?   If you have any questions for your care team, list them here:   ____________________________________________________________________________________________________________________________________________________________________________________________________________________________________________________________  For informational purposes only. Not to replace the advice of your health care provider. Copyright   2003, 2019 [facility]. All rights reserved. Clinically reviewed by Steffen Ochoa MD. [employer] [id number] - REV 08/24.

## 2025-03-03 NOTE — LETTER
3/3/2025    Joanne Weiner MD  [address]  [facility] [address]  [facility] [address]    RE: Dayana Braggmoise       Dear Colleague,     I had the pleasure of seeing Dayana Samaniego in the [facility].  Cardiology Clinic    Dayana Samaniego is a 63 year old with a history of tobacco abuse, bipolar disorder, hyperlipidemia, hypothyroidism, CKD, chronic pain, and COPD who presents to [facility] care.    Since her last visit, she has done well.  She denies any chest pain, palpitations, syncope, or presyncope.  She has struggled with cough for the last few weeks for which she has been treated with steroids.  She thinks these are helping.    General: Well appearing, appears stated age.  CV: Normal rate and rhythm. No m/r/g. No JVD.   Pulm: Normal effort. Normal breath sounds.  Lower extremities: No lower extremity edema.    Labs:   Reviewed in epic.  Hemoglobin 14.7  Platelets 261  Creatinine 0.9  LDL 48    Testing:  Nuclear stress 9/2023: No ischemia, EF greater than 70%  Coronary CTA 9/2022: Normal coronary arteries  EKG 2/2024: Normal sinus rhythm, LVH, no significant ST or T wave abnormalities (my interpretation)  Coronary CTA 12/17/2024: Nonobstructive coronary artery disease  ABIs: Normal    Assessment and Plan:    1.  Shortness of breath, chest discomfort.  CTA without any significant coronary disease.  No further testing.  Her symptoms have resolved.    2.  Claudication.  ABIs are normal.  Planning for back surgery for neurogenic claudication here shortly.    3.  Hypertension.  Blood pressure above goal today.  Add hydrochlorothiazide 25 mg daily.    The longitudinal plan of care for the diagnosis(es)/condition(s) as documented were addressed during this visit. Due to the added complexity in care, I will continue to support Dayana in the subsequent management and with ongoing continuity of care.    Return if symptoms worsen or fail to improve.    Thang Christensen MD  Interventional  Cardiology      Thank you for allowing me to participate in the care of your patient.      Sincerely,     Thang Christensen MD     [facility]  cc:   Thang Christensen MD  [address] [place] [id number]

## 2025-03-03 NOTE — PROGRESS NOTES
Preoperative Evaluation  [facility]  [address], [address] [facility] [address]  [place]  [facility] [address]  Phone: [phone]  Fax: [phone]  Primary Provider: Joanne Weiner MD  Pre-op Performing Provider: Joanne Weiner MD  Mar 3, 2025           3/1/2025   Surgical Information   What procedure is being done? Spinal Fusion   Facility or Hospital where procedure/surgery will be performed: [facility]   Who is doing the procedure / surgery? Dr Sanon   Date of surgery / procedure: 03/12/2025   Time of surgery / procedure: ?   Where do you plan to recover after surgery? at home with family     Fax number for surgical facility: [email or web address] [phone]     Assessment & Plan     The proposed surgical procedure is considered INTERMEDIATE risk.    Preop general physical exam  Patient is cleared for surgery without further evaluation required.  - CBC with platelets  - Comprehensive metabolic panel (BMP + Alb, Alk Phos, ALT, AST, Total. Bili, TP)  - Walker Order for DME - ONLY FOR DME  - CBC with platelets and differential  - Comprehensive metabolic panel (BMP + Alb, Alk Phos, ALT, AST, Total. Bili, TP)  - CBC with platelets and differential    Spinal stenosis of lumbar region with neurogenic claudication  Patient asks me for a wheeled walker which I have ordered for her.  She can go to the [employer] supply [employer].  - Walker Order for DME - ONLY FOR DME    Other specified spondylopathies, lumbar region (H)  Needs fusion of L3-4 with anterior and posterior approaches.    Degeneration of intervertebral disc of lumbosacral region with discogenic back pain and lower extremity pain  Has symptoms of sciatica.    Cervical myelopathy (H)  Also has cervical spine problems.    Multilevel degenerative disc disease    Chronic pain syndrome  Seen at [facility] pain clinic.    Migraine without status migrainosus, not intractable, unspecified migraine type  Getting Botox  injections and injectable, Relpax from her neurologist.    Cigarette nicotine dependence without complication  Continues to smoke despite her active asthma and COPD.    Panlobular emphysema (H)  This has been seen on CT lung cancer screening.  Has Spiriva prescribed that she is not taking.    Moderate persistent asthma, uncomplicated  Patient is regularly on budesonide formoterol fumarate 160/4.5 mcg 2 puffs daily.  She is currently doing 2 puffs twice daily.  Will give her prednisone taper.  - predniSONE (DELTASONE) 10 MG tablet  Dispense: 14 tablet; Refill: 0    Bipolar 2 disorder (H)  Seen at an outside psychologist and psychiatrist.  Currently on lamotrigine 200 mg and no longer on fluoxetine.  Now on escitalopram 5 mg.    Borderline personality disorder (H)    Mixed hyperlipidemia  On a atorvastatin 80 mg and Zetia.  Will check labs.  - Lipid panel reflex to direct LDL Non-fasting  - Lipid panel reflex to direct LDL Non-fasting    Stage 3a chronic kidney disease (H)  Patient has had mild chronic kidney disease.  Will recheck.    Acquired hypothyroidism  Patient is currently on levothyroxine 75 mcg.  Last check was good.    Osteopenia of multiple sites  Most recent DEXA scan was in March 2023.  Probably time to have another 1.    I will get back to the patient on lab results by MyChart or mail and only call with grossly abnormal values.     The longitudinal plan of care for the diagnosis(es)/condition(s) as documented were addressed during this visit. Due to the added complexity in care, I will continue to support Dayana in the subsequent management and with ongoing continuity of care.      Risks and Recommendations  The patient has the following additional risks and recommendations for perioperative complications:   - Recurrent use of steroids  Pulmonary:    - Incentive spirometry post-op   - Active nicotine user, advised smoking cessation   - Recently treated asthma exacerbation with prednisone  tapers    Preoperative Medication Instructions  Antiplatelet or Anticoagulation Medication Instructions   - aspirin: Hold Excedrin Migraine 5 days in advance of surgery    Additional Medication Instructions   - Herbal medications and vitamins: DO NOT TAKE 14 days prior to surgery.   - Statins (atorvastatin, simvastatin, pravastatin) : Continue taking on the day of surgery.    - SSRIs, SNRIs, TCAs, Antipsychotics: Continue without modification.    - Steroid: Take usual taper dose on day of surgery   -Hold other medications on morning of surgery and resume them when taking food and drink.  Recommendation  Approval given to proceed with proposed procedure, without further diagnostic evaluation.        Suzie Anne is a 64 year old, presenting for the following:  Pre-Op Exam (Surgery 3/12/25 [facility], Spinal fusion Dr Sanon )          3/3/2025    10:18 AM   Additional Questions   Roomed by dov powell cma     HPI: This is a patient with a long history of multilevel degenerative to disc disease.  She has had history of multiple surgeries in the past.  She has a long history of chronic pain and is seen at the [facility] pain clinic.    She is currently having trouble with her lumbar spine and has seen Dr. Sanon for this.  He is going to do a anterior posterior spine interbody fusion at the L3-4 and foraminotomy.  She currently is in a longstanding COPD and asthma exacerbation.  She continues to smoke.  She has taken 2 rounds of Z-Ulises and prednisone burst which were given to her by her pulmonologist.  I will give her a taper of prednisone.  I asked her to stop smoking until her surgery is done.        3/1/2025   Pre-Op Questionnaire   Have you ever had a heart attack or stroke? No   Have you ever had surgery on your heart or blood vessels, such as a stent placement, a coronary artery bypass, or surgery on an artery in your head, neck, heart, or legs? No   Do you have chest pain with activity? No   Do you have a  history of heart failure? No   Do you currently have a cold, bronchitis or symptoms of other infection? (!) UNKNOWN COPD exac(4 wks)   Do you have a cough, shortness of breath, or wheezing? (!) YES all three (see above)   Do you or anyone in your family have previous history of blood clots? No   Do you or does anyone in your family have a serious bleeding problem such as prolonged bleeding following surgeries or cuts? No   Have you ever had problems with anemia or been told to take iron pills? (!) YES long ago   Have you had any abnormal blood loss such as black, tarry or bloody stools, or abnormal vaginal bleeding? No   Have you ever had a blood transfusion? No   Are you willing to have a blood transfusion if it is medically needed before, during, or after your surgery? Yes   Have you or any of your relatives ever had problems with anesthesia? (!) YES nausea/vomit   Do you have sleep apnea, excessive snoring or daytime drowsiness? No   Do you have any artifical heart valves or other implanted medical devices like a pacemaker, defibrillator, or continuous glucose monitor? No   Do you have artificial joints? No   Are you allergic to latex? No     Health Care Directive  Patient does not have a Health Care Directive: Discussed advance care planning with patient; however, patient declined at this time.    Preoperative Review of    reviewed - controlled substances prescribed by other outside provider(s).    Patient Active Problem List    Diagnosis Date Noted    Multilevel degenerative disc disease 03/03/2025     Priority: Medium    Other specified spondylopathies, lumbar region (H) 03/03/2025     Priority: Medium    Nicotine dependence 10/24/2024     Priority: Medium    Jaw pain 10/14/2024     Priority: Medium    Trigeminal neuralgia 10/14/2024     Priority: Medium    Cervical myelopathy (H) 02/13/2024     Priority: Medium    Panlobular emphysema (H) 09/11/2023     Priority: Medium    Atypical chest pain 08/16/2023      Priority: Medium    Epigastric pain 05/12/2023     Priority: Medium    Iron deficiency anemia 09/20/2022     Priority: Medium    Acute bronchitis, unspecified organism 07/29/2022     Priority: Medium    Abnormal reflex 09/08/2021     Priority: Medium    Asymptomatic postmenopausal status 08/31/2021     Priority: Medium     Formatting of this note might be different from the original.  Created by Conversion    Replacement Utility updated for latest IMO load      Menopausal disorder 08/31/2021     Priority: Medium     Formatting of this note might be different from the original.  Created by Conversion    Replacement Utility updated for latest IMO load      Migraine headache 08/31/2021     Priority: Medium     Formatting of this note might be different from the original.  Created by Conversion    Replacement Utility updated for latest IMO load      Chronic pain syndrome 08/31/2021     Priority: Medium    DDD (degenerative disc disease), lumbosacral 03/22/2021     Priority: Medium    Spinal stenosis of lumbar region with neurogenic claudication 03/22/2021     Priority: Medium    Pain in right leg 03/21/2021     Priority: Medium    Other specified disorders of bone density and structure, multiple sites 03/21/2021     Priority: Medium    Other abnormalities of gait and mobility 03/21/2021     Priority: Medium    Moderate persistent asthma, uncomplicated 03/21/2021     Priority: Medium    Low back pain 03/21/2021     Priority: Medium    Anemia 03/18/2021     Priority: Medium    Hypotension 03/18/2021     Priority: Medium    Fibromyalgia 03/17/2021     Priority: Medium     Formatting of this note might be different from the original.  Created by Conversion    Formatting of this note might be different from the original.  Created by Conversion    Formatting of this note might be different from the original.  Formatting of this note might be different from the original.  Created by Conversion    Formatting of this note  might be different from the original.  Created by Conversion  Formatting of this note might be different from the original.  Formatting of this note might be different from the original.  Created by Conversion    Formatting of this note might be different from the original.  Created by Conversion      Hyperlipidemia 03/17/2021     Priority: Medium     Formatting of this note might be different from the original.  Created by Conversion    Formatting of this note might be different from the original.  Formatting of this note might be different from the original.  Created by Conversion  Formatting of this note might be different from the original.  Formatting of this note might be different from the original.  Created by Conversion      [facility] 03/17/2021     Priority: Medium     Formatting of this note might be different from the original.  Created by Conversion    Replacement Utility updated for latest IMO load    Formatting of this note might be different from the original.  Formatting of this note might be different from the original.  Created by Conversion    Replacement Utility updated for latest IMO load  Formatting of this note might be different from the original.  Formatting of this note might be different from the original.  Created by Conversion    Replacement Utility updated for latest IMO load      Common migraine with intractable migraine 02/15/2021     Priority: Medium    Chronic kidney disease, stage 3 (H) 01/14/2021     Priority: Medium     Created by Conversion        Osteopenia of multiple sites 09/01/2020     Priority: Medium    Hiatal hernia 02/16/2017     Priority: Medium    Hemorrhoids 11/22/2016     Priority: Medium    Lung nodule 08/04/2016     Priority: Medium     Formatting of this note might be different from the original.  8/4/2016:  Left upper lobe nodule of 6.5 mm, likely benign given slow growth per radiologist.  See CT  6/27/2011:  measured  approximately 5.5 mm in greatest  dimension      Bipolar 2 disorder (H) 06/01/2015     Priority: Medium     Formatting of this note might be different from the original.  Created by Conversion    Replacement Utility updated for latest IMO load    Formatting of this note might be different from the original.  MED TRIALS:  Doxepin has been helpful for sleep & fibromyalgia.  Topamax- caused cognitive slowing & poor concentration. Depakote caused wt gain. Seroquel was sedating. Trazodone caused insomnia. Has tried mellaril, effexor, depakote, remeron, buspar, risperdal,zyprexa, celexa,luvox - unsure what happened with these.  Formatting of this note might be different from the original.  MED TRIALS:  Doxepin has been helpful for sleep & fibromyalgia.  Topamax- caused cognitive slowing & poor concentration. Depakote caused wt gain. Seroquel was sedating. Trazodone caused insomnia. Has tried mellaril, effexor, depakote, remeron, buspar, risperdal,zyprexa, celexa,luvox - unsure what happened with these.      Borderline personality disorder (H) 06/01/2015     Priority: Medium    PTSD (post-traumatic stress disorder) 06/01/2015     Priority: Medium    GERD (gastroesophageal reflux disease) 10/16/2012     Priority: Medium     Formatting of this note might be different from the original.  Formatting of this note might be different from the original.  coegd 07/17/12, normal screening  Formatting of this note might be different from the original.  coegd 07/17/12, normal screening      Dysphagia 07/19/2012     Priority: Medium      Past Medical History:   Diagnosis Date    Abnormal reflex 09/08/2021    Abnormality of gait     Created by Conversion     Acute bronchitis, unspecified organism 07/29/2022    Anemia 03/18/2021    Anxiety     Asthma     Asymptomatic postmenopausal status     Created by Conversion  Replacement Utility updated for latest IMO load    Bipolar 2 disorder (H)     Borderline personality disorder (H) 06/01/2015    Centrilobular emphysema (H)  02/26/2018    Mild, seen in 2018 CT scan, DLCO 60% predicted  Formatting of this note might be different from the original. Formatting of this note might be different from the original. Mild, seen in 2018 CT scan, DLCO 60% predicted    Cervical spinal stenosis     s/p cervical fusion    Chronic kidney disease     Chronic kidney disease, stage 3 (H) 01/14/2021    Chronic pain syndrome     Cigarette nicotine dependence without complication 03/04/2020    Common migraine with intractable migraine 02/15/2021    COPD (chronic obstructive pulmonary disease) (H)     DDD (degenerative disc disease), lumbosacral 03/22/2021    Depression     Depressive disorder     Draining cutaneous sinus tract 12/01/2021    Added automatically from request for surgery [id number]    Dysphagia 07/19/2012    Encounter for other orthopedic aftercare 03/21/2021    Fibrocystic breast     Fibromyalgia     GERD (gastroesophageal reflux disease)     Hallux abductovalgus with bunions, unspecified laterality 12/14/2015    Hemorrhoids 11/22/2016    Herpes zoster     Created by Conversion  Replacement Utility updated for latest IMO load    Hiatal hernia     History of electroconvulsive therapy     Hyperlipidemia 03/17/2021    Created by Conversion   Formatting of this note might be different from the original. Formatting of this note might be different from the original. Created by Conversion    Hypotension 03/18/2021    Hypothyroidism     hypothyroidism    IBS (irritable bowel syndrome)     Iron deficiency anemia 09/20/2022    Low back pain 03/21/2021    Lung nodule 08/04/2016 8/4/2016:  Left upper lobe nodule of 6.5 mm, likely benign given slow growth per radiologist.  See CT 6/27/2011:  measured  approximately 5.5 mm in greatest dimension     Menopausal and postmenopausal disorder     Created by Conversion  Replacement Utility updated for latest IMO load    Menopausal disorder 08/31/2021    Formatting of this note might be different from the original.  Created by Conversion  Replacement Utility updated for latest IMO load    Migraine     Created by Conversion  Replacement Utility updated for latest IMO load    Migraine headache 08/31/2021    Formatting of this note might be different from the original. Created by Conversion  Replacement Utility updated for latest IMO load    Migraines     Moderate asthma with exacerbation, unspecified whether persistent 07/29/2022    Moderate persistent asthma, uncomplicated 03/21/2021    Osteoarthritis     Osteopenia of multiple sites 09/01/2020    Other abnormalities of gait and mobility 03/21/2021    Other chronic pain     Other specified disorders of bone density and structure, multiple sites 03/21/2021    Pain in right leg 03/21/2021    PONV (postoperative nausea and vomiting)     PTSD (post-traumatic stress disorder)     Shingles 2014    on back    Spinal stenosis of lumbar region with neurogenic claudication 03/22/2021    Tobacco use disorder     Created by Conversion      Past Surgical History:   Procedure Laterality Date    BACK SURGERY  4 times on back 4 times on neck    Fusion    BIOPSY BREAST Left     Approx 5 bx    BUNIONECTOMY Right 12/15/2015    Procedure: MODIFIED LAI BUNIONECTOMY RIGHT FOOT;  Surgeon: Jerome Calvin DPM;  Location: [facility] OR;  Service:     CERVICAL FUSION      CERVICAL FUSION Bilateral 02/16/2015    Procedure: ANTERIOR CERVICAL DECOMPRESSION/FUSION C3-5 BILATERAL, ANTERIOR HARDWARE REMOVAL C5-7 BILATERAL ;  Surgeon: Sawyer Roland MD;  Location: [facility] OR;  Service:     COLONOSCOPY      COSMETIC SURGERY      Breast implants, liposuction of stomach    EYE SURGERY      Eye lid lift    HC NIPPLE EXPLORATION      Description: Breast Nipple Explor W/ Excision Solitary Lactiferous Duct;  Recorded: 04/10/2008;    HEAD & NECK SURGERY      Neck fusion    HERNIA REPAIR      HYSTERECTOMY      IR CERVICAL EPIDURAL STEROID INJECTION  09/17/2003    IR CERVICAL EPIDURAL STEROID  INJECTION  12/11/2003    IR CERVICAL EPIDURAL STEROID INJECTION  01/16/2004    IRRIGATION AND DEBRIDEMENT DECUBITUS WOUND, COMBINED Left 12/13/2021    Procedure: Wound exploration and debridement of Left Lower Abdomin Chronic wound.;  Surgeon: Crispin Bunch MD;  Location: [facility] OR    LAPAROSCOPIC NISSEN FUNDOPLICATION N/A 12/30/2022    Procedure: FUNDOPLICATION, partial ROBOT-ASSISTED, LAPAROSCOPIC, USING DA MARIBEL XI;  Surgeon: Davie Ocasio DO;  Location: [facility] OR    LUMBAR DISCECTOMY      X2    MAMMOPLASTY AUGMENTATION Bilateral      approx 2006?    ORTHOPEDIC SURGERY      PELVIC LAPAROSCOPY      multiple    SHOULDER OPEN ROTATOR CUFF REPAIR Left     X2    ZZC TOTAL ABDOM HYSTERECTOMY      Description: Total Abdominal Hysterectomy;  Recorded: 06/10/2013;     Current Outpatient Medications   Medication Sig Dispense Refill    albuterol (PROAIR HFA) 108 (90 Base) MCG/ACT inhaler Inhale 2 puffs into the lungs every 4 hours 18 g 11    aspirin-acetaminophen-caffeine (EXCEDRIN MIGRAINE) 250-250-65 MG tablet Take 1 tablet by mouth daily as needed for headaches MR x 1      atorvastatin (LIPITOR) 80 MG tablet Take 1 tablet (80 mg) by mouth at bedtime. 90 tablet 0    benzonatate (TESSALON) 100 MG capsule Take 1 capsule (100 mg) by mouth 3 times daily as needed for cough. 30 capsule 1    budesonide-formoterol (SYMBICORT) 160-4.5 MCG/ACT Inhaler Inhale 2 puffs into the lungs.      diazepam (VALIUM) 2 MG tablet Take 1 tablet (2 mg) by mouth every 6 hours as needed for anxiety (flying phobia). 4 tablet 0    doxepin (SINEQUAN) 25 MG capsule Take 75 mg by mouth at bedtime.      eletriptan (RELPAX) 40 MG tablet Take 1 tablet (40 mg) by mouth at onset of headache for migraine. 12 tablet 3    erenumab-aooe (AIMOVIG) 70 MG/ML injection ADMINISTER 1 ML(70 MG) UNDER THE SKIN EVERY MONTH 3 mL 3    escitalopram (LEXAPRO) 5 MG tablet Take 2 Tablets (10 mg) by mouth once daily x 7 days, THEN increase to 3  tablets (15mg) once daily. You will start this medication a week after decreasing Fluoxetine      esomeprazole (NEXIUM) 40 MG DR capsule Take 40 mg by mouth daily. 40 mg am , 40 mg PM      ezetimibe (ZETIA) 10 MG tablet Take 1 tablet (10 mg) by mouth at bedtime. 90 tablet 3    gabapentin (NEURONTIN) 300 MG capsule Take 900 mg by mouth 2 times daily.      HYDROcodone-acetaminophen (NORCO)  MG per tablet Take 1 tablet by mouth every 4 hours as needed      hydrOXYzine (ATARAX) 25 MG tablet TAKE 2 TABLETS BY MOUTH AT BEDTIME AND 1 TABLET BY MOUTH DURING THE DAY AS NEEDED      ipratropium - albuterol 0.5 mg/2.5 mg/3 mL (DUONEB) 0.5-2.5 (3) MG/3ML neb solution Take 1 vial (3 mLs) by nebulization every 6 hours as needed for shortness of breath, wheezing or cough 180 mL 4    lamoTRIgine (LAMICTAL) 200 MG tablet Take 1 tablet by mouth daily      lamoTRIgine (LAMICTAL) 25 MG tablet TAKE 2 TABLETS BY MOUTH AT BEDTIME ALONG WITH 200 MG TABLET      levothyroxine (SYNTHROID/LEVOTHROID) 75 MCG tablet TAKE 1 TABLET(75 MCG) BY MOUTH DAILY 90 tablet 2    montelukast (SINGULAIR) 10 MG tablet TAKE 1 TABLET(10 MG) BY MOUTH AT BEDTIME 90 tablet 4    prazosin (MINIPRESS) 2 MG capsule Take 2 capsules by mouth At Bedtime      predniSONE (DELTASONE) 10 MG tablet Take 1 tablet (10 mg) by mouth daily. 14 tablet 0    prochlorperazine (COMPAZINE) 10 MG tablet Take 1 tablet (10 mg) by mouth every 6 hours as needed for nausea. When having migraine 30 tablet 0    senna-docusate (SENOKOT-S/PERICOLACE) 8.6-50 MG tablet Take 2 tablets by mouth At Bedtime      tiotropium (SPIRIVA RESPIMAT) 2.5 MCG/ACT inhaler Inhale 2 puffs into the lungs daily 4 g 11    tiZANidine (ZANAFLEX) 4 MG tablet TAKE 1 TO 2 TABLETS BY MOUTH DURING THE DAY AND 2 TABLETS AT BEDTIME 120 tablet 1    vitamin D2 (ERGOCALCIFEROL) 86130 units (1250 mcg) capsule Take 1 capsule (50,000 Units) by mouth once a week. 13 capsule 0    FLUoxetine (PROZAC) 40 MG capsule Take 80 mg by  mouth daily (Patient not taking: Reported on 3/3/2025)      naloxone (NARCAN) 4 MG/0.1ML nasal spray Spray 1 spray (4 mg) into one nostril alternating nostrils once as needed for opioid reversal every 2-3 minutes until assistance arrives (Patient not taking: Reported on 3/3/2025) 0.2 mL 1    predniSONE (DELTASONE) 20 MG tablet Take 2 tablets (40 mg) by mouth daily. (Patient not taking: Reported on 3/3/2025) 10 tablet 1       Allergies   Allergen Reactions    Codeine Anaphylaxis    Nefazodone Nausea and Vomiting    Oxycodone-Acetaminophen Unknown     hallucinations    Cetirizine Nausea and Vomiting    Trazodone Nausea and Vomiting    Hydromorphone Other (See Comments)     Accidental overdose at home-took amount prescribed and it was too much    Morphine Other (See Comments)    Oxycodone Hallucination and Other (See Comments)    Amoxicillin-Pot Clavulanate [Amoxicillin-Pot Clavulanate] Rash    Cephalexin Rash    Clavulanic Acid Rash    Cyclobenzaprine Rash    Eszopiclone Rash    Methocarbamol Rash    Penicillins Rash     Mild rash        Social History     Tobacco Use    Smoking status: Every Day     Current packs/day: 0.00     Average packs/day: 0.5 packs/day for 45.0 years (22.5 ttl pk-yrs)     Types: Cigarettes     Last attempt to quit: 10/18/2021     Years since quitting: 3.3     Passive exposure: Current    Smokeless tobacco: Never   Substance Use Topics    Alcohol use: Not Currently     Family History   Problem Relation Age of Onset    Lung Cancer Mother          at age 52    Other Cancer Mother         Lung cancer moved into bones    Alcoholism Father     Substance Abuse Father          of lived disease at 62    Heart Disease Maternal Grandfather     Diabetes Paternal Grandmother     Coronary Artery Disease Paternal Grandmother     Heart Disease Paternal Grandfather     Diabetes Sister     Hyperlipidemia Sister     Hypertension Sister     Crohn's Disease Sister     Coronary Artery Disease Paternal Uncle  "    Asthma Maternal Aunt     Diabetes Other         Diabetes    Hyperlipidemia Sister     Depression Nephew         Commited suicide at 17    Substance Abuse Nephew     Asthma Nephew     Asthma Daughter         Severe     History   Drug Use No           Objective    BP (!) 149/66   Pulse 95   Temp 98.3  F (36.8  C)   Resp 16   Ht 1.638 m (5' 4.5\")   Wt 59.9 kg (132 lb)   LMP 06/01/1983   SpO2 97%   BMI 22.31 kg/m     Estimated body mass index is 22.31 kg/m  as calculated from the following:    Height as of this encounter: 1.638 m (5' 4.5\").    Weight as of this encounter: 59.9 kg (132 lb).  Physical Exam  General appearance - alert, well appearing, and in no distress and normal appearing weight  Mental status - normal mood, behavior, speech, dress, motor activity, and thought processes  Eyes - pupils equal and reactive, extraocular eye movements intact  Ears - bilateral TM's and external ear canals normal  Nose - clear rhinorrhea   Mouth -  covered by mask  Neck - supple, no significant adenopathy, carotids upstroke normal bilaterally, no bruits, thyroid exam: thyroid is normal in size without nodules or tenderness  Chest - wheezing noted bilaterally, intermittent productive cough  Heart - normal rate and regular rhythm, no murmurs noted  Abdomen - soft, nontender, nondistended, no masses or organomegaly  Breasts - not examined  Pelvic - examination not indicated  Back exam - full range of motion, no tenderness, palpable spasm or pain on motion  Neurological - alert, oriented, normal speech, no focal findings or movement disorder noted, cranial nerves II through XII intact  Musculoskeletal - no joint tenderness, deformity or swelling, movement inhibited due to pain in spine  Extremities - peripheral pulses normal, no pedal edema, no clubbing or cyanosis  Skin - normal coloration and turgor, no rashes, no suspicious skin lesions noted      Recent Labs   Lab Test 12/17/24  1032 10/24/24  1232 04/11/24  1613 "   HGB  --  14.7 14.1   PLT  --  261 248   NA  --  140 137   POTASSIUM  --  4.4 4.3   CR 1.2* 0.90 1.22*   A1C  --  6.1*  --         Diagnostics  Recent Results (from the past 48 hours)   Lipid panel reflex to direct LDL Non-fasting    Collection Time: 03/03/25 11:10 AM   Result Value Ref Range    Cholesterol 169 <200 mg/dL    Triglycerides 113 <150 mg/dL    Direct Measure HDL 75 >=50 mg/dL    LDL Cholesterol Calculated 71 <100 mg/dL    Non HDL Cholesterol 94 <130 mg/dL    Patient Fasting > 8hrs? Yes    Comprehensive metabolic panel (BMP + Alb, Alk Phos, ALT, AST, Total. Bili, TP)    Collection Time: 03/03/25 11:10 AM   Result Value Ref Range    Sodium 144 135 - 145 mmol/L    Potassium 4.3 3.4 - 5.3 mmol/L    Carbon Dioxide (CO2) 26 22 - 29 mmol/L    Anion Gap 12 7 - 15 mmol/L    Urea Nitrogen 14.1 8.0 - 23.0 mg/dL    Creatinine 1.02 (H) 0.51 - 0.95 mg/dL    GFR Estimate 61 >60 mL/min/1.73m2    Calcium 9.5 8.8 - 10.4 mg/dL    Chloride 106 98 - 107 mmol/L    Glucose 102 (H) 70 - 99 mg/dL    Alkaline Phosphatase 150 40 - 150 U/L    AST 27 0 - 45 U/L    ALT 14 0 - 50 U/L    Protein Total 6.8 6.4 - 8.3 g/dL    Albumin 4.0 3.5 - 5.2 g/dL    Bilirubin Total 0.3 <=1.2 mg/dL    Patient Fasting > 8hrs? Yes    CBC with platelets and differential    Collection Time: 03/03/25 11:10 AM   Result Value Ref Range    WBC Count 7.9 4.0 - 11.0 10e3/uL    RBC Count 4.89 3.80 - 5.20 10e6/uL    Hemoglobin 13.9 11.7 - 15.7 g/dL    Hematocrit 44.2 35.0 - 47.0 %    MCV 90 78 - 100 fL    MCH 28.4 26.5 - 33.0 pg    MCHC 31.4 (L) 31.5 - 36.5 g/dL    RDW 14.0 10.0 - 15.0 %    Platelet Count 294 150 - 450 10e3/uL    % Neutrophils 66 %    % Lymphocytes 23 %    % Monocytes 5 %    % Eosinophils 5 %    % Basophils 1 %    % Immature Granulocytes 0 %    Absolute Neutrophils 5.2 1.6 - 8.3 10e3/uL    Absolute Lymphocytes 1.8 0.8 - 5.3 10e3/uL    Absolute Monocytes 0.4 0.0 - 1.3 10e3/uL    Absolute Eosinophils 0.4 0.0 - 0.7 10e3/uL    Absolute Basophils  0.1 0.0 - 0.2 10e3/uL    Absolute Immature Granulocytes 0.0 <=0.4 10e3/uL          Revised Cardiac Risk Index (RCRI)  The patient has the following serious cardiovascular risks for perioperative complications:   - High risk surgery (>5% cardiac complication risk) = 1 point     RCRI Interpretation: 1 point: Class II (low risk - 0.9% complication rate)         Signed Electronically by: Joanne Weiner MD  A copy of this evaluation report is provided to the requesting physician.

## 2025-03-04 LAB
ALBUMIN SERPL BCG-MCNC: 4 G/DL (ref 3.5–5.2)
ALP SERPL-CCNC: 150 U/L (ref 40–150)
ALT SERPL W P-5'-P-CCNC: 14 U/L (ref 0–50)
ANION GAP SERPL CALCULATED.3IONS-SCNC: 12 MMOL/L (ref 7–15)
AST SERPL W P-5'-P-CCNC: 27 U/L (ref 0–45)
BILIRUB SERPL-MCNC: 0.3 MG/DL
BUN SERPL-MCNC: 14.1 MG/DL (ref 8–23)
CALCIUM SERPL-MCNC: 9.5 MG/DL (ref 8.8–10.4)
CHLORIDE SERPL-SCNC: 106 MMOL/L (ref 98–107)
CHOLEST SERPL-MCNC: 169 MG/DL
CREAT SERPL-MCNC: 1.02 MG/DL (ref 0.51–0.95)
EGFRCR SERPLBLD CKD-EPI 2021: 61 ML/MIN/1.73M2
FASTING STATUS PATIENT QL REPORTED: YES
FASTING STATUS PATIENT QL REPORTED: YES
GLUCOSE SERPL-MCNC: 102 MG/DL (ref 70–99)
HCO3 SERPL-SCNC: 26 MMOL/L (ref 22–29)
HDLC SERPL-MCNC: 75 MG/DL
LDLC SERPL CALC-MCNC: 71 MG/DL
NONHDLC SERPL-MCNC: 94 MG/DL
POTASSIUM SERPL-SCNC: 4.3 MMOL/L (ref 3.4–5.3)
PROT SERPL-MCNC: 6.8 G/DL (ref 6.4–8.3)
SODIUM SERPL-SCNC: 144 MMOL/L (ref 135–145)
TRIGL SERPL-MCNC: 113 MG/DL

## 2025-03-21 NOTE — TELEPHONE ENCOUNTER
RN cannot approve Refill Request    RN can NOT refill this medication med is not covered by policy/route to provider. Last office visit: 4/13/2018 Joanne Weiner MD Last Physical: 9/18/2018 Last MTM visit: Visit date not found Last visit same specialty: 3/1/2019 William Hines CNP.  Next visit within 3 mo: Visit date not found  Next physical within 3 mo: Visit date not found      Marie Murillo, [facility] Triage/Med Refill 4/13/2019    Requested Prescriptions   Pending Prescriptions Disp Refills     tiZANidine (ZANAFLEX) 4 MG tablet [Pharmacy Med Name: TIZANIDINE 4MG TABLETS] 120 tablet 0     Sig: TAKE 1 TO 2 TABLETS BY MOUTH DURING THE DAY AND 2 TABLETS AT BEDTIME AS NEEDED       There is no refill protocol information for this order           
No.

## 2025-03-26 ENCOUNTER — TELEPHONE (OUTPATIENT)
Dept: FAMILY MEDICINE | Facility: CLINIC | Age: 65
End: 2025-03-26
Payer: MEDICARE

## 2025-03-27 ENCOUNTER — MEDICAL CORRESPONDENCE (OUTPATIENT)
Dept: HEALTH INFORMATION MANAGEMENT | Facility: CLINIC | Age: 65
End: 2025-03-27
Payer: MEDICARE

## 2025-03-27 NOTE — TELEPHONE ENCOUNTER
Forms/Letter Request    Type of form/letter: OTHER: OT, order#s [id number], [id number]       Do we have the form/letter: Yes: placed in Dr Weiner inbox    Who is the form from? Home care    Where did/will the form come from? form was faxed in    When is form/letter needed by: when signed    How would you like the form/letter returned: Fax : [phone]    
Signed/faxed  
Attending Attestation (For Attendings USE Only)...

## 2025-03-31 ENCOUNTER — MEDICAL CORRESPONDENCE (OUTPATIENT)
Dept: HEALTH INFORMATION MANAGEMENT | Facility: CLINIC | Age: 65
End: 2025-03-31
Payer: MEDICARE

## 2025-03-31 ENCOUNTER — TELEPHONE (OUTPATIENT)
Dept: FAMILY MEDICINE | Facility: CLINIC | Age: 65
End: 2025-03-31
Payer: MEDICARE

## 2025-03-31 DIAGNOSIS — J43.1 PANLOBULAR EMPHYSEMA (H): Chronic | ICD-10-CM

## 2025-03-31 RX ORDER — BENZONATATE 100 MG/1
CAPSULE ORAL
Qty: 30 CAPSULE | Refills: 0 | Status: SHIPPED | OUTPATIENT
Start: 2025-03-31

## 2025-03-31 NOTE — TELEPHONE ENCOUNTER
Forms/Letter Request    Type of form/letter: OTHER: PT orders #[id number]       Do we have the form/letter: Yes: placed in Dr Weiner inbox    Who is the form from? Home care    Where did/will the form come from? form was faxed in    When is form/letter needed by: when signed    How would you like the form/letter returned: Fax : [phone]

## 2025-04-01 ENCOUNTER — MEDICAL CORRESPONDENCE (OUTPATIENT)
Dept: HEALTH INFORMATION MANAGEMENT | Facility: CLINIC | Age: 65
End: 2025-04-01

## 2025-04-01 ENCOUNTER — OFFICE VISIT (OUTPATIENT)
Dept: FAMILY MEDICINE | Facility: CLINIC | Age: 65
End: 2025-04-01
Payer: MEDICARE

## 2025-04-01 VITALS
TEMPERATURE: 98.3 F | WEIGHT: 128 LBS | HEART RATE: 73 BPM | BODY MASS INDEX: 21.33 KG/M2 | HEIGHT: 65 IN | RESPIRATION RATE: 16 BRPM | OXYGEN SATURATION: 97 % | SYSTOLIC BLOOD PRESSURE: 136 MMHG | DIASTOLIC BLOOD PRESSURE: 70 MMHG

## 2025-04-01 DIAGNOSIS — D62 ANEMIA DUE TO BLOOD LOSS, ACUTE: ICD-10-CM

## 2025-04-01 DIAGNOSIS — N18.31 STAGE 3A CHRONIC KIDNEY DISEASE (H): Chronic | ICD-10-CM

## 2025-04-01 DIAGNOSIS — G89.4 CHRONIC PAIN SYNDROME: Chronic | ICD-10-CM

## 2025-04-01 DIAGNOSIS — J43.1 PANLOBULAR EMPHYSEMA (H): Chronic | ICD-10-CM

## 2025-04-01 DIAGNOSIS — F17.210 CIGARETTE NICOTINE DEPENDENCE WITHOUT COMPLICATION: Chronic | ICD-10-CM

## 2025-04-01 DIAGNOSIS — Z98.1 STATUS POST LUMBAR SPINAL FUSION: Primary | ICD-10-CM

## 2025-04-01 LAB
ERYTHROCYTE [DISTWIDTH] IN BLOOD BY AUTOMATED COUNT: 14.3 % (ref 10–15)
HCT VFR BLD AUTO: 39.8 % (ref 35–47)
HGB BLD-MCNC: 12.5 G/DL (ref 11.7–15.7)
MCH RBC QN AUTO: 27.8 PG (ref 26.5–33)
MCHC RBC AUTO-ENTMCNC: 31.4 G/DL (ref 31.5–36.5)
MCV RBC AUTO: 88 FL (ref 78–100)
PLATELET # BLD AUTO: 498 10E3/UL (ref 150–450)
RBC # BLD AUTO: 4.5 10E6/UL (ref 3.8–5.2)
WBC # BLD AUTO: 6.7 10E3/UL (ref 4–11)

## 2025-04-01 PROCEDURE — G2211 COMPLEX E/M VISIT ADD ON: HCPCS | Performed by: FAMILY MEDICINE

## 2025-04-01 PROCEDURE — 3078F DIAST BP <80 MM HG: CPT | Performed by: FAMILY MEDICINE

## 2025-04-01 PROCEDURE — 36415 COLL VENOUS BLD VENIPUNCTURE: CPT | Performed by: FAMILY MEDICINE

## 2025-04-01 PROCEDURE — 1111F DSCHRG MED/CURRENT MED MERGE: CPT | Performed by: FAMILY MEDICINE

## 2025-04-01 PROCEDURE — 3075F SYST BP GE 130 - 139MM HG: CPT | Performed by: FAMILY MEDICINE

## 2025-04-01 PROCEDURE — 99214 OFFICE O/P EST MOD 30 MIN: CPT | Performed by: FAMILY MEDICINE

## 2025-04-01 PROCEDURE — 80053 COMPREHEN METABOLIC PANEL: CPT | Performed by: FAMILY MEDICINE

## 2025-04-01 PROCEDURE — 1125F AMNT PAIN NOTED PAIN PRSNT: CPT | Performed by: FAMILY MEDICINE

## 2025-04-01 PROCEDURE — 85027 COMPLETE CBC AUTOMATED: CPT | Performed by: FAMILY MEDICINE

## 2025-04-01 RX ORDER — VARENICLINE TARTRATE 1 MG/1
1 TABLET, FILM COATED ORAL 2 TIMES DAILY
Qty: 180 TABLET | Refills: 1 | Status: SHIPPED | OUTPATIENT
Start: 2025-04-01

## 2025-04-01 ASSESSMENT — PAIN SCALES - GENERAL: PAINLEVEL_OUTOF10: SEVERE PAIN (8)

## 2025-04-01 NOTE — PROGRESS NOTES
"  {PROVIDER CHARTING PREFERENCE:833259}    Suzie Anne is a 64 year old, presenting for the following health issues:  RECHECK (Follow up Post op back surgery 3/12/25 )      4/1/2025    12:28 PM   Additional Questions   Roomed by dov powell cma     HPI      {MA/LPN/RN Pre-Provider Visit Orders- hCG/UA/Strep (Optional):032817}  {SUPERLIST (Optional):991616}  {additonal problems for provider to add (Optional):735303}    {ROS Picklists (Optional):778127}      Objective    /70 (BP Location: Left arm, Patient Position: Sitting, Cuff Size: Adult Regular)   Pulse 73   Temp 98.3  F (36.8  C)   Resp 16   Ht 1.638 m (5' 4.5\")   Wt 58.1 kg (128 lb)   LMP 06/01/1983   SpO2 97%   BMI 21.63 kg/m    Body mass index is 21.63 kg/m .  Physical Exam   {Exam List (Optional):269434}    {Diagnostic Test Results (Optional):707370}        Signed Electronically by: Joanne Weiner MD  {Email feedback regarding this note to [email or web address]   :778747}  " "at [facility].  She has a long history of chronic pain especially of the neck and low back.  She has had other spinal surgeries.  She had some postop anemia with discharge hemoglobin at 10.3 which was down from previous 13.9.  She also has had some mild chronic kidney disease that I would like to follow.  She has had more added pain but hopefully this is temporary and will eventually have improved back pain.  When I saw her for her preop she had elevated blood pressure at 150/74 but today it looks better and may be due to improved pain.  She is a smoker and asks me to refill her Chantix as it has been helpful to keep her away from smoking.  She tells me she has follow-up with the surgeon on 4/10/2025.  She is currently using a back support.        Objective    /70 (BP Location: Left arm, Patient Position: Sitting, Cuff Size: Adult Regular)   Pulse 73   Temp 98.3  F (36.8  C)   Resp 16   Ht 1.638 m (5' 4.5\")   Wt 58.1 kg (128 lb)   LMP 06/01/1983   SpO2 97%   BMI 21.63 kg/m    Body mass index is 21.63 kg/m .  Physical Exam   GENERAL: alert, no distress, and normal weight, using a cane for ambulation and wearing a back brace  EYES: pupils-near pinpoint  RESP: lungs clear to auscultation - no rales, rhonchi or wheezes  CV: regular rates and rhythm and without murmur  ABDOMEN: soft, nontender  MS: Moving gingerly and using a cane  PSYCH: mentation appears normal, affect normal/bright    Results for orders placed or performed in visit on 04/01/25   CBC with platelets     Status: Abnormal   Result Value Ref Range    WBC Count 6.7 4.0 - 11.0 10e3/uL    RBC Count 4.50 3.80 - 5.20 10e6/uL    Hemoglobin 12.5 11.7 - 15.7 g/dL    Hematocrit 39.8 35.0 - 47.0 %    MCV 88 78 - 100 fL    MCH 27.8 26.5 - 33.0 pg    MCHC 31.4 (L) 31.5 - 36.5 g/dL    RDW 14.3 10.0 - 15.0 %    Platelet Count 498 (H) 150 - 450 10e3/uL   Comprehensive metabolic panel (BMP + Alb, Alk Phos, ALT, AST, Total. Bili, TP)     Status: Abnormal "   Result Value Ref Range    Sodium 143 135 - 145 mmol/L    Potassium 5.8 (H) 3.4 - 5.3 mmol/L    Carbon Dioxide (CO2) 28 22 - 29 mmol/L    Anion Gap 11 7 - 15 mmol/L    Urea Nitrogen 20.1 8.0 - 23.0 mg/dL    Creatinine 0.95 0.51 - 0.95 mg/dL    GFR Estimate 66 >60 mL/min/1.73m2    Calcium 9.8 8.8 - 10.4 mg/dL    Chloride 104 98 - 107 mmol/L    Glucose 138 (H) 70 - 99 mg/dL    Alkaline Phosphatase 194 (H) 40 - 150 U/L    AST 26 0 - 45 U/L    ALT 11 0 - 50 U/L    Protein Total 7.0 6.4 - 8.3 g/dL    Albumin 4.0 3.5 - 5.2 g/dL    Bilirubin Total 0.4 <=1.2 mg/dL           Signed Electronically by: Joanne Weiner MD

## 2025-04-02 LAB
ALBUMIN SERPL BCG-MCNC: 4 G/DL (ref 3.5–5.2)
ALP SERPL-CCNC: 194 U/L (ref 40–150)
ALT SERPL W P-5'-P-CCNC: 11 U/L (ref 0–50)
ANION GAP SERPL CALCULATED.3IONS-SCNC: 11 MMOL/L (ref 7–15)
AST SERPL W P-5'-P-CCNC: 26 U/L (ref 0–45)
BILIRUB SERPL-MCNC: 0.4 MG/DL
BUN SERPL-MCNC: 20.1 MG/DL (ref 8–23)
CALCIUM SERPL-MCNC: 9.8 MG/DL (ref 8.8–10.4)
CHLORIDE SERPL-SCNC: 104 MMOL/L (ref 98–107)
CREAT SERPL-MCNC: 0.95 MG/DL (ref 0.51–0.95)
EGFRCR SERPLBLD CKD-EPI 2021: 66 ML/MIN/1.73M2
GLUCOSE SERPL-MCNC: 138 MG/DL (ref 70–99)
HCO3 SERPL-SCNC: 28 MMOL/L (ref 22–29)
POTASSIUM SERPL-SCNC: 5.8 MMOL/L (ref 3.4–5.3)
PROT SERPL-MCNC: 7 G/DL (ref 6.4–8.3)
SODIUM SERPL-SCNC: 143 MMOL/L (ref 135–145)

## 2025-04-08 DIAGNOSIS — E78.5 HYPERLIPEMIA: ICD-10-CM

## 2025-04-08 DIAGNOSIS — I10 ESSENTIAL HYPERTENSION: Primary | ICD-10-CM

## 2025-04-08 RX ORDER — ATORVASTATIN CALCIUM 80 MG/1
80 TABLET, FILM COATED ORAL AT BEDTIME
Qty: 90 TABLET | Refills: 2 | Status: SHIPPED | OUTPATIENT
Start: 2025-04-08

## 2025-04-11 ENCOUNTER — TELEPHONE (OUTPATIENT)
Dept: FAMILY MEDICINE | Facility: CLINIC | Age: 65
End: 2025-04-11
Payer: MEDICARE

## 2025-04-11 NOTE — TELEPHONE ENCOUNTER
Forms/Letter Request    Type of form/letter: OTHER: SN discharge, order#[id number]       Do we have the form/letter: Yes: placed in Dr high inbox    Who is the form from? Home care    Where did/will the form come from? form was faxed in    When is form/letter needed by: when signed    How would you like the form/letter returned: Fax : [phone]

## 2025-04-14 ENCOUNTER — MEDICAL CORRESPONDENCE (OUTPATIENT)
Dept: HEALTH INFORMATION MANAGEMENT | Facility: CLINIC | Age: 65
End: 2025-04-14
Payer: MEDICARE

## 2025-04-14 ENCOUNTER — TELEPHONE (OUTPATIENT)
Dept: FAMILY MEDICINE | Facility: CLINIC | Age: 65
End: 2025-04-14
Payer: MEDICARE

## 2025-04-14 NOTE — TELEPHONE ENCOUNTER
Forms/Letter Request     Type of form/letter: Home Health Certification        Do we have the form/letter: Yes: in Dr. Benitez's inbox     Who is the form from? Home care     Where did/will the form come from? form was faxed in     When is form/letter needed by: when done     How would you like the form/letter returned: Fax : [phone]     Order details/form description: added visit set: 1 every week for 1 week from 4/13/25 to 4/19/25. Finalize OT care, ADLs, no there ex     Order number (if applicable): [id number]

## 2025-04-15 DIAGNOSIS — E55.9 VITAMIN D DEFICIENCY: ICD-10-CM

## 2025-04-16 RX ORDER — ERGOCALCIFEROL 1.25 MG/1
CAPSULE, LIQUID FILLED ORAL
Qty: 6 CAPSULE | Refills: 0 | Status: SHIPPED | OUTPATIENT
Start: 2025-04-16

## 2025-04-17 ENCOUNTER — TRANSFERRED RECORDS (OUTPATIENT)
Dept: HEALTH INFORMATION MANAGEMENT | Facility: CLINIC | Age: 65
End: 2025-04-17
Payer: MEDICARE

## 2025-04-29 ENCOUNTER — MYC MEDICAL ADVICE (OUTPATIENT)
Dept: FAMILY MEDICINE | Facility: CLINIC | Age: 65
End: 2025-04-29

## 2025-04-29 DIAGNOSIS — G50.0 TRIGEMINAL NEURALGIA: Primary | ICD-10-CM

## 2025-04-29 NOTE — TELEPHONE ENCOUNTER
S-(situation): Incoming call from patient. She has gotten injections for trigeminal neuralgia before by a provider at the [facility]. She required sedation for the procedure and would have it done at [facility].     She is wondering if Dr. Weiner is able to order the injections, or if a referral needs to be placed for her to see a provider within [facility].    B-(background): She thinks her last injection was fall of 2024 but isn't sure on the exact date.     A-(assessment): Pain is slowly returning. She reports after the injection her pain is completely gone.    R-(recommendations): Routing to provider for review. Pt prefers a call back to her mobile number with provider recommendations. OK to leave a detailed message.

## 2025-04-29 NOTE — TELEPHONE ENCOUNTER
Roma Young RN called [facility] on 4/29 and left a message to return the call to the clinic.  If patient calls back, please route to [facility].    TC please route to RN.    Triage jaw pain - trigeminal neuralgia and istreated with Relpax and Aimovig injections per appointment on 1/6/25.       MELINA Albert RN  [facility]

## 2025-04-30 ENCOUNTER — TELEPHONE (OUTPATIENT)
Dept: FAMILY MEDICINE | Facility: CLINIC | Age: 65
End: 2025-04-30
Payer: MEDICARE

## 2025-04-30 NOTE — TELEPHONE ENCOUNTER
Forms/Letter Request    Type of form/letter: OTHER: PT, order#[id number]       Do we have the form/letter: Yes: placed in Dr Weiner' inbox    Who is the form from? Home care    Where did/will the form come from? form was faxed in    When is form/letter needed by: when signed    How would you like the form/letter returned: Fax : [phone]

## 2025-05-05 ENCOUNTER — OFFICE VISIT (OUTPATIENT)
Dept: NEUROLOGY | Facility: CLINIC | Age: 65
End: 2025-05-05
Payer: MEDICARE

## 2025-05-05 VITALS
BODY MASS INDEX: 21.33 KG/M2 | SYSTOLIC BLOOD PRESSURE: 153 MMHG | HEART RATE: 78 BPM | WEIGHT: 128 LBS | DIASTOLIC BLOOD PRESSURE: 77 MMHG | HEIGHT: 65 IN

## 2025-05-05 DIAGNOSIS — G43.019 MIGRAINE WITHOUT AURA, INTRACTABLE, WITHOUT STATUS MIGRAINOSUS: Primary | ICD-10-CM

## 2025-05-05 PROCEDURE — 64615 CHEMODENERV MUSC MIGRAINE: CPT | Performed by: PSYCHIATRY & NEUROLOGY

## 2025-05-05 PROCEDURE — 3078F DIAST BP <80 MM HG: CPT | Performed by: PSYCHIATRY & NEUROLOGY

## 2025-05-05 PROCEDURE — 3077F SYST BP >= 140 MM HG: CPT | Performed by: PSYCHIATRY & NEUROLOGY

## 2025-05-05 RX ORDER — PREDNISONE 5 MG/1
TABLET ORAL
COMMUNITY
Start: 2025-04-17

## 2025-05-05 NOTE — PROGRESS NOTES
Subjective     CC: Dayana Samaniego  is a 64 year old female who presents to clinic today for the following health issues:   Chief Complaint   Patient presents with    Botox          In person evaluation    HPI  3/16/2020, in person evaluation  11/30/2021, in person evaluation  12/27/2023, in person visit  1/30/2024, Botox injection  4/30/2024, Botox injection  8/6/2024, Botox injection  11/5/2024, Botox injection  2/5/2025, Botox injection  5/5//2025, Botox injection      64-year-old followed neurologically for:  Intractable migraine headaches  History of 3 cervical fusions  Longstanding generalized hyperreflexia  History of carotid bruits  Fibromyalgia      Follow-up for intractable migraine headaches failed multiple medications as preventatives and abortive's  Has been treated with Aimovig  Follows at spine clinic and with other neurologist for her neck and low back difficulties status post surgery cervical and lumbar    Migraine headaches at least 15/month  4 headaches per week  Excedrin 4/week  Drink some Pepsi  Visual blurriness with a headache  Nausea but no vomiting  Relpax gives her nausea also although she does have Compazine through her primary to take with that but she waits until the headache is been there for a long time before using it    Previously used Botox but it lost its effectiveness  Off Botox injections for a while  Last use may be 2019.    Aimovig seems to be losing some of its benefit.    Patient has gone back on the Botox injections which were restarted 1/30/2024            Neurologic summary:    A.  Patient with intractable migraine headaches       Onset of headaches as far back as 1987       Associated with photophobia/phonophobia and visual changes       History of carsickness       Family history of migraines in her daughter    Previous frequency of headaches are 2-3/week  Had used Botox injections up until March 2020  Switch to Aimovig end of March 2020      Patient uses the once a  month injectable Aimovig  She gets at least 2 weeks of good response  Then the headaches seem to increase in frequency  During the good phase she has 1-2 headaches per week  Duration of the headache during the good phases all day  Will use an Excedrin Migraine maybe 4/week during that time warned her about medication rebound headache  Uses the Relpax to break the headache                                11/2021 12/2023 4/2024 8/2024 2/2025          Frequency           1-2/week               4/week        4+/ week        4 /week      3/week    Duration              24 hours                 24 hours    24 hours          24-hour      24-hour    Excedrin              4 /week                  4/week                               4 /week    Headache frequency increased just prior to next Botox injection.      B.  Also has sharp shooting pain above the left eye lasting 10 minutes at a time jabbing in nature more neuralgic-like    C.  Patient does have bipolar disorder is on Lamictal    D.  Past history of cervical surgeries       Chronic hyperreflexia        Has been treated with Zanaflex as a muscle relaxant    E.  Has  COPD and is on inhalers       Not a good candidate for beta-blockers     F.  Since last seen had lumbar canal surgery 3/17/2021 by Dr. Sanon        Complex surgery see official reports        Anterior discectomy fusion L4-5 and L5-S1        PEEK cage L4-5, L5-S1        Posterior fusion L4-5, L5-S1        Insertion pedicle screws maria e fixation L4, 5, S1        Bilateral hemilaminectomies L4 see official report    Patient fell when she was in rehab and had a sacral fracture 3/22/2021  This slow down healing    She has been left with a lot of low back pain  Weakness more in the right leg than the left  She is always had very brisk reflexes with clonus before  As decreased reflexes at the L4 on the right leg    Is now ambulating without a cane or walker but has them at  home    Patient states that there is a concern that the grafts that they put in may not be healing as well and causing some of the pain but there is no new impingement  Did undergone an EMG 2021 at [facility]  At see neurology 2021 may be at [facility]  I can access some of the record but not all of it  She says that she has some residual nerve damage but no new impingement    I feel she should follow closely with her spine specialist and that has access to all her scans and data    Previously worked up by the spine specialist who did her surgery had other neurologist and EMGs performed 2021 which I do not have full access to  Follows at [facility] neurology for spine difficulty  (Chronic cervical pain/calf cramps.)  MRI C-spine 2023 postop changes C3-C7, disc osteophyte similar to past scans per their report.  Last seen 2023      Past medical history  Intractable migraine headaches  Chronic daily headaches  Status post cervical fusion x3  Longstanding hyperreflexia and difficulty with imbalance and falls  Carotid artery bruits  Fibromyalgia/chronic pain  COPD.  Dyspnea on exertion with pulmonary deconditioning with past history of pulmonary nodules.   Right atrial enlargement.  Fatigue with possible obstructive sleep apnea.  Bipolar disorder.  Hypothyroidism.   GERD.   Hyperlipidemia.  Chronic kidney disease.      Habits  History of smoking  Alcohol 1-2/month    Family history  Mother with bone tumor,  at age 52  Father with liver disease and alcohol use,  at age 62  Sister with diabetes high blood pressure and hypercholesterolemia  Maternal aunt diabetes  Nephew with depression and suicidal thoughts  Uncle with heart disease  Daughters with migraine        Past work-up review  EEG 2011 background rhythm 7-9 Hz slightly slow nonspecific consistent with toxic encephalopathy  MRI scan brain 2016  A.  Small T2 hyperintensity changes nonspecific  B.  No  hydrocephalus mass or acute changes  TSH 0.32.   Vitamin D level in the past was low at 26.2.  MRI C-spine 2023 see official report  1.  Postoperative changes from C3 through C7.  2.  Degenerative change at C7-T1 see official report.  3.  Moderate spinal stenosis slight flattening anterior cord similar to prior exam C7-T1        Migraine treatment review    Some medications that have been used in the past as preventatives have included:  1. Topamax.  2. Verapamil.  3. Lyrica.  4. Amitriptyline.  5. Propranolol.    Medications tried in the past more as abortive agents:  a. Tizanidine.  b. Zomig.  c. Vicodin.  d. Relpax, which works sometimes, but not always.  Filled by primary MD  e. Toradol.    Currently, her medications are filled through her primary includin. Valium for anxiety.  2. Doxepin 25 mg three at bedtime.  3. Prozac 60 mg at bedtime.  4. Vistaril 25 mg two tablets p.o. q.6h. p.r.n. for migraine.  5. Avoids ibuprofen and aspirin due to her kidney disease, but recently had some Toradol.  6. Lamictal 200 mg at bedtime for bipolar.  7. Melatonin 5 mg for sleep.  8. Has had prednisone burst during headache flurries in the past.  9. Tizanidine 4 mg tablet, one during the day and two at night prescribed by primary.    Patient is a nonsmoker, does not drink alcohol.    Patient s past Botox injection schedules have included:  a. 2016, at the [facility], 25-30 units, did not tolerate, did not complete.  b. Second injection at the [facility] on 2017, 150 units given.    Through our clinic a 155 units using the headache template. Patient knows the risk and benefits of the medication:  a. 2017.  b. 2017.  c. 2018.  d. 2018, 200 units, given as she had a lot of neck spasm.  e. 2018, total of 200 units given as she has a lot of neck spasm.  f. 2019, 200 units given as she had a lot of neck spasm.  g. Halima 10, 2019 200  units headache template and some for neck spasm  h. sept 13th, 2019 155 units  i Dec 16th, 2019 155 units    Botox injections restarted  Botox 155 units injected using headache template patient tolerates injections.  1.  1/30/2024, 155 units injected  2.  4/30/2024, 155 units injected  3.  8/6/2024, 155 units injected  4.  11//2024, 155 units injected  5.  2/5/2025, 155 units injected  6.  5/5/2025, 155 units injected    Laboratory data review                               8/2023 4/2025  NA/K                   139/3.9           143/5.8  BUN/Cr               17/1.04           20.1/0.95  GLU                     92                  138  AST                     27                   26  WBC/HGB           7.9/14.6          6.7/12.5  PLTs                     284,000         498,000  ESR/CRP             8/ <0.1              Review of Systems     Significant headaches as above  Sometimes gets a sharp shooting pain more so in the left eye than the right varies seems to be the beginning of a migraine    No visual changes   No dysarthria dysphasia or diplopia  No actual visual field cut  Does have visual blurriness with a headache  Gets nausea with the headache  Has spasms in the neck trapezius chronically    Does get photophobia when the headaches are worse    No chest pain no shortness of breath no nausea vomiting no diarrhea no fever chills no gait changes otherwise review systems are negative     Does have the pain in the back and weakness of the right leg greater than the left but can ambulate    General exam  Vital signs Per chart  Alert oriented x 3  Lungs clear  Heart rate regular  Abdomen soft  Symmetrical pulses  No edema the feet      Neurologic exam   Alert oriented x3  No aphasia  No neglect  Normal memory recall    Cranials 2 through 12 normal  Pupils are symmetrical and reactive  Optic fundi are good  Visual fields are intact  No ophthalmoplegia  No nystagmus  Face is symmetrical tongue twisters are  good    Upper extremities  No drift no tremor normal finger-nose    Brisk reflexes in the lower extremities greater on the left than the right except decreased right knee reflex    Lower extremities  Pain versus weakness of the right iliopsoas but can bear weight okay and walk independently    Gait adequate        Assessment/Plan     1.  Chronic migraine without aura, intractable, without status migrainosus (G43.719)     2.  Started Aimovig March/April 2020       Aimovig 70mg/ml  Subcutaneous, Qmonth       Does seem to be helping the migraines    3.  Status post complex surgery on her lumbar spine March 17, 2021 with residual back pain and residual weakness of the right leg    4.  Sacral fracture 3/21/2021 while she was in rehab for her lumbar surgery    Previously worked up by the spine specialist who did her surgery had other neurologist and EMGs performed June 2021 which I do not have full access to  Follows at [facility] of neurology for spine difficulty  (Chronic cervical pain/calf cramps.)  MRI C-spine 2/6/2023 postop changes C3-C7, disc osteophyte similar to past scans per their report.  Last seen 5/24/2023 by her spine neurologist.    No new recommendations in regards to her back or spine difficulty  Treatment of pain for her spine per other neurologist and spine specialist in regards to the back    Intractable migraine headache  Has failed multiple preventative therapies  Restarted Botox injections January 2024      Has been on Aimovig which may be losing its effectiveness  Sometimes rotating preventative medicines is helpful  She is trying to keep the Excedrin to 4/week to avoid medication rebound headache    Restarted Botox injection:  155 units per template patient tolerates the Botox injections  Has found that the Botox injections decrease the severity more so than the frequency  Patient tolerates the Botox injections  1/30/2024, 155 units  ,  Botox injections restarted  Botox 155 units injected  using headache template patient tolerates injections.  1.  1/30/2024, 155 units injected  2.   4/30/2024, 155 units injected  3.   8/6/2024,   155 units injected  4.  11/4/2024,   155 units injected  5.   2/5/2025,    155 units injected  6.   5/5/2025,    155 units injected    She gets most of her meds either through the pain clinic and or primary    Patient on narcotics and gabapentin through the pain clinic  Has been given a steroid burst and had steroid injections for her back    Patient gets a decrease in frequency and severity of headaches with the Botox injections.  And as she gets closer to the next injection time it started to crescendo upwards again.    No side effects from Botox injections    Recently had some treatment at the pain center for right trigeminal neuralgia with an injection.  Patient had cervical neck surgery November 2024  Has a lumbar back surgery  March 2025    Also on Relpax to help break the headaches    Botox does decrease the intensity and severity  She wishes to continue with the injections as she does find that they help some      Units Injected: 155  Units Discarded: 45  Lot Number and Expiration: [id number], expiration 05/2027  NDC number 7160-5163-42    DUNCAN QUAN

## 2025-05-05 NOTE — LETTER
5/5/2025      Dayana Samaniego  [address] [address] [place] [id number]      Dear Colleague,    Thank you for referring your patient, Dayana Samaniego, to the [facility] [place]. Please see a copy of my visit note below.    Subjective    CC: Dayana Samaniego  is a 64 year old female who presents to clinic today for the following health issues:   Chief Complaint   Patient presents with     Botox          In person evaluation    HPI  3/16/2020, in person evaluation  11/30/2021, in person evaluation  12/27/2023, in person visit  1/30/2024, Botox injection  4/30/2024, Botox injection  8/6/2024, Botox injection  11/5/2024, Botox injection  2/5/2025, Botox injection  5/5//2025, Botox injection      64-year-old followed neurologically for:  Intractable migraine headaches  History of 3 cervical fusions  Longstanding generalized hyperreflexia  History of carotid bruits  Fibromyalgia      Follow-up for intractable migraine headaches failed multiple medications as preventatives and abortive's  Has been treated with Aimovig  Follows at spine clinic and with other neurologist for her neck and low back difficulties status post surgery cervical and lumbar    Migraine headaches at least 15/month  4 headaches per week  Excedrin 4/week  Drink some Pepsi  Visual blurriness with a headache  Nausea but no vomiting  Relpax gives her nausea also although she does have Compazine through her primary to take with that but she waits until the headache is been there for a long time before using it    Previously used Botox but it lost its effectiveness  Off Botox injections for a while  Last use may be 2019.    Aimovig seems to be losing some of its benefit.    Patient has gone back on the Botox injections which were restarted 1/30/2024            Neurologic summary:    A.  Patient with intractable migraine headaches       Onset of headaches as far back as 1987       Associated with photophobia/phonophobia and visual  changes       History of carsickness       Family history of migraines in her daughter    Previous frequency of headaches are 2-3/week  Had used Botox injections up until March 2020  Switch to Aimovig end of March 2020      Patient uses the once a month injectable Aimovig  She gets at least 2 weeks of good response  Then the headaches seem to increase in frequency  During the good phase she has 1-2 headaches per week  Duration of the headache during the good phases all day  Will use an Excedrin Migraine maybe 4/week during that time warned her about medication rebound headache  Uses the Relpax to break the headache                                11/2021 12/2023 4/2024 8/2024 2/2025          Frequency           1-2/week               4/week        4+/ week        4 /week      3/week    Duration              24 hours                 24 hours    24 hours          24-hour      24-hour    Excedrin              4 /week                  4/week                               4 /week    Headache frequency increased just prior to next Botox injection.      B.  Also has sharp shooting pain above the left eye lasting 10 minutes at a time jabbing in nature more neuralgic-like    C.  Patient does have bipolar disorder is on Lamictal    D.  Past history of cervical surgeries       Chronic hyperreflexia        Has been treated with Zanaflex as a muscle relaxant    E.  Has  COPD and is on inhalers       Not a good candidate for beta-blockers     F.  Since last seen had lumbar canal surgery 3/17/2021 by Dr. Sanon        Complex surgery see official reports        Anterior discectomy fusion L4-5 and L5-S1        PEEK cage L4-5, L5-S1        Posterior fusion L4-5, L5-S1        Insertion pedicle screws maria e fixation L4, 5, S1        Bilateral hemilaminectomies L4 see official report    Patient fell when she was in rehab and had a sacral fracture 3/22/2021  This slow down healing    She has been left  with a lot of low back pain  Weakness more in the right leg than the left  She is always had very brisk reflexes with clonus before  As decreased reflexes at the L4 on the right leg    Is now ambulating without a cane or walker but has them at home    Patient states that there is a concern that the grafts that they put in may not be healing as well and causing some of the pain but there is no new impingement  Did undergone an EMG 2021 at [facility]  At [facility] 2021 may be at [facility]  I can access some of the record but not all of it  She says that she has some residual nerve damage but no new impingement    I feel she should follow closely with her spine specialist and that has access to all her scans and data    Previously worked up by the spine specialist who did her surgery had other neurologist and EMGs performed 2021 which I do not have full access to  Follows at [facility] for spine difficulty  (Chronic cervical pain/calf cramps.)  MRI C-spine 2023 postop changes C3-C7, disc osteophyte similar to past scans per their report.  Last seen 2023      Past medical history  Intractable migraine headaches  Chronic daily headaches  Status post cervical fusion x3  Longstanding hyperreflexia and difficulty with imbalance and falls  Carotid artery bruits  Fibromyalgia/chronic pain  COPD.  Dyspnea on exertion with pulmonary deconditioning with past history of pulmonary nodules.   Right atrial enlargement.  Fatigue with possible obstructive sleep apnea.  Bipolar disorder.  Hypothyroidism.   GERD.   Hyperlipidemia.  Chronic kidney disease.      Habits  History of smoking  Alcohol 1-2/month    Family history  Mother with bone tumor,  at age 52  Father with liver disease and alcohol use,  at age 62  Sister with diabetes high blood pressure and hypercholesterolemia  Maternal aunt diabetes  Nephew with depression and suicidal thoughts  Uncle with heart  disease  Daughters with migraine        Past work-up review  EEG 2011 background rhythm 7-9 Hz slightly slow nonspecific consistent with toxic encephalopathy  MRI scan brain 2016  A.  Small T2 hyperintensity changes nonspecific  B.  No hydrocephalus mass or acute changes  TSH 0.32.   Vitamin D level in the past was low at 26.2.  MRI C-spine 2023 see official report  1.  Postoperative changes from C3 through C7.  2.  Degenerative change at C7-T1 see official report.  3.  Moderate spinal stenosis slight flattening anterior cord similar to prior exam C7-T1        Migraine treatment review    Some medications that have been used in the past as preventatives have included:  1. Topamax.  2. Verapamil.  3. Lyrica.  4. Amitriptyline.  5. Propranolol.    Medications tried in the past more as abortive agents:  a. Tizanidine.  b. Zomig.  c. Vicodin.  d. Relpax, which works sometimes, but not always.  Filled by primary MD  e. Toradol.    Currently, her medications are filled through her primary includin. Valium for anxiety.  2. Doxepin 25 mg three at bedtime.  3. Prozac 60 mg at bedtime.  4. Vistaril 25 mg two tablets p.o. q.6h. p.r.n. for migraine.  5. Avoids ibuprofen and aspirin due to her kidney disease, but recently had some Toradol.  6. Lamictal 200 mg at bedtime for bipolar.  7. Melatonin 5 mg for sleep.  8. Has had prednisone burst during headache flurries in the past.  9. Tizanidine 4 mg tablet, one during the day and two at night prescribed by primary.    Patient is a nonsmoker, does not drink alcohol.    Patient s past Botox injection schedules have included:  a. 2016, at the [facility], 25-30 units, did not tolerate, did not complete.  b. Second injection at the [facility] on 2017, 150 units given.    Through our clinic a 155 units using the headache template. Patient knows the risk and benefits of the medication:  a. 2017.  b. 2017.  c. March  20, 2018.  d. June 22, 2018, 200 units, given as she had a lot of neck spasm.  e. September 24, 2018, total of 200 units given as she has a lot of neck spasm.  f. March 08, 2019, 200 units given as she had a lot of neck spasm.  g. Halima 10, 2019 200 units headache template and some for neck spasm  h. sept 13th, 2019 155 units  i Dec 16th, 2019 155 units    Botox injections restarted  Botox 155 units injected using headache template patient tolerates injections.  1.  1/30/2024, 155 units injected  2.  4/30/2024, 155 units injected  3.  8/6/2024, 155 units injected  4.  11//2024, 155 units injected  5.  2/5/2025, 155 units injected  6.  5/5/2025, 155 units injected    Laboratory data review                               8/2023 4/2025  NA/K                   139/3.9           143/5.8  BUN/Cr               17/1.04           20.1/0.95  GLU                     92                  138  AST                     27                   26  WBC/HGB           7.9/14.6          6.7/12.5  PLTs                     284,000         498,000  ESR/CRP             8/ <0.1              Review of Systems     Significant headaches as above  Sometimes gets a sharp shooting pain more so in the left eye than the right varies seems to be the beginning of a migraine    No visual changes   No dysarthria dysphasia or diplopia  No actual visual field cut  Does have visual blurriness with a headache  Gets nausea with the headache  Has spasms in the neck trapezius chronically    Does get photophobia when the headaches are worse    No chest pain no shortness of breath no nausea vomiting no diarrhea no fever chills no gait changes otherwise review systems are negative     Does have the pain in the back and weakness of the right leg greater than the left but can ambulate    General exam  Vital signs Per chart  Alert oriented x 3  Lungs clear  Heart rate regular  Abdomen soft  Symmetrical pulses  No edema the feet      Neurologic exam   Alert  oriented x3  No aphasia  No neglect  Normal memory recall    Cranials 2 through 12 normal  Pupils are symmetrical and reactive  Optic fundi are good  Visual fields are intact  No ophthalmoplegia  No nystagmus  Face is symmetrical tongue twisters are good    Upper extremities  No drift no tremor normal finger-nose    Brisk reflexes in the lower extremities greater on the left than the right except decreased right knee reflex    Lower extremities  Pain versus weakness of the right iliopsoas but can bear weight okay and walk independently    Gait adequate        Assessment/Plan     1.  Chronic migraine without aura, intractable, without status migrainosus (G43.719)     2.  Started Aimovig March/April 2020       Aimovig 70mg/ml  Subcutaneous, Qmonth       Does seem to be helping the migraines    3.  Status post complex surgery on her lumbar spine March 17, 2021 with residual back pain and residual weakness of the right leg    4.  Sacral fracture 3/21/2021 while she was in rehab for her lumbar surgery    Previously worked up by the spine specialist who did her surgery had other neurologist and EMGs performed June 2021 which I do not have full access to  Follows at [facility] of neurology for spine difficulty  (Chronic cervical pain/calf cramps.)  MRI C-spine 2/6/2023 postop changes C3-C7, disc osteophyte similar to past scans per their report.  Last seen 5/24/2023 by her spine neurologist.    No new recommendations in regards to her back or spine difficulty  Treatment of pain for her spine per other neurologist and spine specialist in regards to the back    Intractable migraine headache  Has failed multiple preventative therapies  Restarted Botox injections January 2024      Has been on Aimovig which may be losing its effectiveness  Sometimes rotating preventative medicines is helpful  She is trying to keep the Excedrin to 4/week to avoid medication rebound headache    Restarted Botox injection:  155 units per  template patient tolerates the Botox injections  Has found that the Botox injections decrease the severity more so than the frequency  Patient tolerates the Botox injections  1/30/2024, 155 units  ,  Botox injections restarted  Botox 155 units injected using headache template patient tolerates injections.  1.  1/30/2024, 155 units injected  2.   4/30/2024, 155 units injected  3.   8/6/2024,   155 units injected  4.  11/4/2024,   155 units injected  5.   2/5/2025,    155 units injected  6.   5/5/2025,    155 units injected    She gets most of her meds either through the pain clinic and or primary    Patient on narcotics and gabapentin through the pain clinic  Has been given a steroid burst and had steroid injections for her back    Patient gets a decrease in frequency and severity of headaches with the Botox injections.  And as she gets closer to the next injection time it started to crescendo upwards again.    No side effects from Botox injections    Recently had some treatment at the pain center for right trigeminal neuralgia with an injection.  Patient had cervical neck surgery November 2024  Has a lumbar back surgery  March 2025    Also on Relpax to help break the headaches    Botox does decrease the intensity and severity  She wishes to continue with the injections as she does find that they help some      Units Injected: 155  Units Discarded: 45  Lot Number and Expiration: [id number], expiration 05/2027  NDC number 3207-1143-97    DUNCAN SCHMITT      Again, thank you for allowing me to participate in the care of your patient.        Sincerely,        duncan Schmitt MD    Electronically signed

## 2025-05-28 ENCOUNTER — TRANSFERRED RECORDS (OUTPATIENT)
Dept: HEALTH INFORMATION MANAGEMENT | Facility: CLINIC | Age: 65
End: 2025-05-28
Payer: MEDICARE

## 2025-06-02 ENCOUNTER — TRANSFERRED RECORDS (OUTPATIENT)
Dept: ADMINISTRATIVE | Facility: CLINIC | Age: 65
End: 2025-06-02
Payer: MEDICARE

## 2025-06-02 NOTE — PROCEDURES
[facility]   [facility] [facility] [address]  [place] [address]    Patient Name: Dayana Samaniego Gender: Female  Exam Date: 2025 Visit Number: [id number]  Age: 64 Years 5 Months : 1960  Attending MD: Jasmyne Nicolas MD Medical Record #: [id number]  ______________________________________________________________________________________________  Procedure:    Upper GI Endoscopy   Indications:    Abdominal Pain   Dysphagia   Heartburn  Provider:        Jasmyne Nicolas MD   Referring MD: Referral Self   Primary MD:      Joanne Weiner MD  Medications:  Admitting Medication:   0.9% Normal Saline at TKO   Ondansetron Hydrochloride (Zofran) given  4mg  by IV   Intra Procedure Medications:   Patient received monitored anesthesia care.     Complications: No immediate complications  ______________________________________________________________________________________________  Procedure:   An examination of the heart and lungs was performed within acceptable limits.  The patient was therefore deemed a reasonable candidate for endoscopy and monitored anesthesia care.  The risks, benefits and plan were discussed to the patient and/or patient representative and all questions answered. After obtaining informed consent, the patient received monitored anesthesia care and I passed the scope.   Throughout the procedure the patient's blood pressure, pulse and oxygen saturations were monitored.  The scope was introduced through the mouth and advanced to the stomach.         Findings:   Esophagus:  *Esophagus Comments:  No obvious obstructing lesions seen in the esophagus.  Stomach:  *Stomach Comments:  There was retained solid food residue in the stomach.  Procedure terminated due to increased risk of aspiration.    Impression:   Retained food in stomach      Plan:  Recommendation Comments:  Repeat EGD after clear liquid diet the entire day prior to next EGD.      Orders    Diagnostics:  Procedure Comments  Timeframe Assessment   EGD Clear liquid diet the entire day prior to EGD.  With esophageal, gastric, duodenal biopsies First Available R13.19       Electronically Signed                                                             Jasmyne Nicolas MD   06/02/2025 2:30 PM     Medications:  Medication Dose Sig Description PRN Status PRN Reason Comments   Aimovig Autoinjector 70 mg/mL subcutaneous auto-injector 70 mg/mL inject (70MG)  by subcutaneous route  every month in the abdomen, thigh, or outer area of upper arm N  taking as directed   albuterol sulfate HFA 90 mcg/actuation aerosol inhaler 90 mcg inhale 2 puff by inhalation route  every 4 - 6 hours as needed N     atorvastatin 80 mg tablet 80 mg take 1 tablet by oral route  every day N     benzonatate 100 mg capsule 100 mg take 1 Tablet by Oral route 3 times every day as needed Y  taking as directed   doxepin 25 mg capsule 25 mg take 3 capsules by oral route everyday N     eletriptan 40 mg tablet 40 mg take 1 tablet by oral route at onset of migraine as needed Y     escitalopram 5 mg tablet 5 mg take 3 tablet by oral route  every day N     Excedrin Migraine 250 mg-250 mg-65 mg tablet 250 mg-250 mg-65 mg take 2 Tablet by Oral route  every day as needed for migraines N     ezetimibe 10 mg tablet 10 mg take 1 tablet by oral route  every day N  taking as directed   gabapentin 300 mg capsule 300 mg take 3 capsule by oral route twice a day N  taking as directed   hydrocodone 10 mg-acetaminophen 325 mg tablet 10 mg-325 mg take 1 tablet by oral route  every 4 - 6 hours as needed for pain N  taking as directed   hydroxyzine HCl 25 mg tablet 25 mg take 2 tablet by oral route  every bedtime and one daily as needed N  taking as directed   ipratropium 0.5 mg-albuterol 3 mg (2.5 mg base)/3 mL nebulization soln 0.5 mg-3 mg (2.5 mg base)/3 mL inhale 3 milliliter by nebulization route 4 times every day N  taking as directed   lamotrigine 200 mg tablet 200 mg take 250 milligram by  oral route  every day N  taking as directed   levothyroxine 75 mcg tablet 75 mcg take 1 tablet by oral route  every day N     montelukast 10 mg tablet 10 mg take 1 tablet by oral route  every day in the evening N  taking as directed   Nexium 20 mg capsule,delayed release 20 mg take 2 capsule by oral route 2 times every day at least 1 hour before a meal swallowing whole. Do not crush or chew granules. N     prazosin 2 mg capsule 2 mg take 2 capsule by oral route  every day N  taking as directed   ProAir HFA 90 mcg/actuation aerosol inhaler 90 mcg inhale 2 puff by inhalation route 2 times every day as needed N  taking as directed   prochlorperazine maleate 10 mg tablet 10 mg take 1 tablet by oral route 2 times every day N     prochlorperazine maleate 10 mg tablet 10 mg take 1 tablet by oral route as needed for nausea N     Relpax 40 mg tablet 40 mg take 1 tablet by oral route ; if headache returns, may repeat dose after 2 hours. No more than twodoses within a 24-hour period.. N  taking as directed   senna 8.6 mg capsule 8.6 mg take 2 Tablet by oral route  every day at bedtime N  taking as directed   Spiriva Respimat 2.5 mcg/actuation solution for inhalation 2.5 mcg/actuation inhale 2 puff by inhalation route  every day N     Symbicort 160 mcg-4.5 mcg/actuation HFA aerosol inhaler 160 mcg-4.5 mcg/actuation inhale 2 puff by inhalation route 2 times every day in the morning and evening N  taking as directed   tizanidine 4 mg capsule 4 mg take 2 tablet by oral route every night and 1 tablet during the day as needed N  taking as directed   Vitamin D2 1,250 mcg (50,000 unit) capsule 1,250 mcg (50,000 unit) take 1 caplet by oral route 1 time every week (on Sunday night) N       Allergies:  Medication Name Ingredient Reaction Comment    CODEINE Anaphylactic shock     CEPHALEXIN Rash     OXYCODONE Nausea     PENICILLIN Rash     ESZOPICLONE Rash     NEFAZODONE Nausea     TRAZODONE Nausea     CYCLOBENZAPRINE Rash     AMOXICILLIN  Rash (unknown)     METHOCARBAMOL Rash    Zyrtec CETIRIZINE HCL Nausea    Morphine MORPHINE Hallucinations    Perocet OXYCODONE HCL Hallucinating    Keflex CEPHALEXIN MONOHYDRATE Hives    Perocet ACETAMINOPHEN Hives    Perocet OXYCODONE HCL Hallucinating    Keflex CEPHALEXIN MONOHYDRATE Hives    Augmentin POTASSIUM CLAVULANATE Rash    Keflex CEPHALEXIN MONOHYDRATE Hives    Perocet OXYCODONE HCL Hallucinations    Augmentin AMOXICILLIN TRIHYDRATE Hives    Keflex CEPHALEXIN MONOHYDRATE Rash      Vital Signs:  Date Time Systolic Diastolic Height Weight BMI   06/02/2025 1:56  68 64 in 127.49 21.20     Smoking Status:    Use Status Type Smoking Status Usage Per Day Years Used Total Pack Years   yes  Current every day smoker      yes  Current every day smoker      Race:  White  Ethnicity:  Not  or   Preferred Language:  English      cc: Joanne Weiner MD        [facility] [phone]

## 2025-06-17 ENCOUNTER — TRANSFERRED RECORDS (OUTPATIENT)
Dept: GASTROENTEROLOGY | Facility: CLINIC | Age: 65
End: 2025-06-17
Payer: MEDICARE

## 2025-06-17 DIAGNOSIS — J30.89 ENVIRONMENTAL AND SEASONAL ALLERGIES: ICD-10-CM

## 2025-06-17 DIAGNOSIS — J43.1 PANLOBULAR EMPHYSEMA (H): Chronic | ICD-10-CM

## 2025-06-17 DIAGNOSIS — J44.9 CHRONIC OBSTRUCTIVE PULMONARY DISEASE, UNSPECIFIED COPD TYPE (H): ICD-10-CM

## 2025-06-17 RX ORDER — BENZONATATE 100 MG/1
CAPSULE ORAL
Qty: 30 CAPSULE | Refills: 0 | Status: SHIPPED | OUTPATIENT
Start: 2025-06-17

## 2025-06-17 RX ORDER — ALBUTEROL SULFATE 90 UG/1
2 INHALANT RESPIRATORY (INHALATION) EVERY 4 HOURS
Qty: 18 G | Refills: 0 | Status: SHIPPED | OUTPATIENT
Start: 2025-06-17

## 2025-06-17 RX ORDER — MONTELUKAST SODIUM 10 MG/1
1 TABLET ORAL AT BEDTIME
Qty: 90 TABLET | Refills: 0 | Status: SHIPPED | OUTPATIENT
Start: 2025-06-17

## 2025-06-19 NOTE — PROCEDURES
[facility]   [address] [place] [address], [address], [place] [address]     Patient Name: Dayana Samaniego  Gender:  Female  Exam Date: 06/17/2025 Visit Number:  [id number]  Age: 64 Years YOB: 1960  Attending MD: Ally Alvarez MD Medical Record#:  [id number]  -----------------------------------------------------------------------------------------------------------------------------   Procedure:    Upper GI Endoscopy   Indications:    abdominal pain, dysphagia, GERD, early satiety  Provider:        Ally Alvarez MD   Referring MD: Referral Self   Primary MD:      Joanne Weiner MD  Medications:   Admitting Medication:   0.9% Normal Saline at [facility]   Intra Procedure Medications:   Patient received monitored anesthesia care.     Complications: No immediate complications  ___________________________________________________________________________________________  Procedure:   An examination of the heart and lungs was performed within acceptable limits.  . The patient was therefore deemed a reasonable candidate for sedation.   The risks and benefits were explained to the patient, who appeared to understand. After obtaining informed consent, the scope was passed under direct vision. Throughout the procedure the patient's blood pressure, pulse and oxygen saturations were monitored.  The scope was introduced through the mouth and advanced to the second portion of duodenum.         Findings:   Esophagus:  The z-line is 45 centimeters from the incisors.   *Esophagus Comments:  No focal stricture was seen but the distal esophagus was tortuous.  A TTS balloon dilator was passed to measure the lumen size which was estimated at ~12 mm and was diated up to 14 mm.  Transient ringed appearance was noted to suggest possible eosinophilic esophagitis but this was likely due to peristalsis.  Distal and mid esophagus biopsies were taken to assess for possible eosinophilic esophagitis.  Stomach:    H.  Pylori biopsies taken.     Previous surgical procedure:Fundoplication     Post Surgical Stomach.  Previous Fundoplication. Location - cardia. Description - wrap partially circumferential..    Stomach Polyp(s). Location: body. Quantity: single. Size: 5 mm. Shape: sessile. Maneuver: biopsy. Instrument used: cold biopsy forceps. Removal: partial. Retrieval: complete. Bleeding: none.   *Stomach Comments:  A moderate amount of liquid and some solid food was present in the stomach which was cleared with suction and lavage.      The pylorus appeared somewhat narrowed with mild resistance with scope advancement with scant bleeding after but then remained patent after so dilation was not performed.  Duodenum:    Normal duodenum.    Celiac Sprue biopsies taken.  Celiac Sprue biopsies taken.  Impression:   Esophageal dysphagia  Presbyesophagus  MD Impression Comments:  Follow up in the esophagus clinic as scheduled.   Pathology Results:  A: DUODENUM, BIOPSY:           1. Normal duodenal mucosa           2. Negative for celiac disease and other enteropathy      B: STOMACH, BIOPSY:           1. Normal gastric antral and body mucosae, and one fragment of xanthoma           2. Negative for Helicobacter, atrophy and dysplasia      C: ESOPHAGUS, DISTAL, BIOPSY:           1. Lymphocytic esophagitis (see comment)           2. Negative for eosinophilic esophagitis           3. PAS stain: Pending           4. Negative for columnar mucosa      D: ESOPHAGUS, MID, BIOPSY:           1. Lymphocytic esophagitis (see comment)           2. Negative for eosinophilic esophagitis           3. PAS stain: Negative for fungus           4. Negative for columnar mucosa      COMMENTS  B. Gastric xanthomas are benign polyps caused by foamy histiocytes expanding the lamina propria.  This finding is thought to be associated with a localized response to tissue injury and is not associated with a syndromic state. No additional clinical follow-up is  indicated.      C,D. The lymphocytic esophagitis pattern of inflammation is etiologically non-specific and may have no clear clinical associations. Known associated conditions include lichen planus, motility disorders, autoimmune diseases, medication reaction, Crohn's disease, celiac disease, and Helicobacter gastritis.     More specific changes of esophageal lichen planus (dyskeratotic cells in particular) are not present here. Of note, esophageal lichen planus occurs almost exclusively in patients with known oral involvement by lichen planus.    In this case, the lymphocytosis is prominent in the distal and moderate in the mid esophagus. The previous biopsy (July, 2023) was reviewed and does not show significant lymphocytosis.      MICROSCOPIC  A: Performed   B: Performed   C: Performed   D: Performed   SPECIAL STAINING/DEEPER  C: PAS/alcian blue for fungus   D: PAS/alcian blue for fungus     Electronically signed by: John Gomez MD    Interpreted at [facility], [address] [place] [id number]  Additional Comments:  Please follow up in the esophagus clinic for further evaluation of lymphocytic esophagitis. This is a non-specific change that can be seen in patients with motility disorders which would be the most likely cause based on our discussion of your symptoms but please follow up for the formal consult.      _Electronically signed by:___________________  Ally Alvarez MD                 06/17/2025    cc: Joanne Weiner MD  cc: Joanne Weiner MD

## 2025-06-24 ENCOUNTER — TRANSFERRED RECORDS (OUTPATIENT)
Dept: HEALTH INFORMATION MANAGEMENT | Facility: CLINIC | Age: 65
End: 2025-06-24
Payer: MEDICARE

## 2025-07-01 ENCOUNTER — TRANSFERRED RECORDS (OUTPATIENT)
Dept: HEALTH INFORMATION MANAGEMENT | Facility: CLINIC | Age: 65
End: 2025-07-01

## 2025-07-01 ENCOUNTER — OFFICE VISIT (OUTPATIENT)
Dept: FAMILY MEDICINE | Facility: CLINIC | Age: 65
End: 2025-07-01
Payer: MEDICARE

## 2025-07-01 VITALS
OXYGEN SATURATION: 99 % | DIASTOLIC BLOOD PRESSURE: 69 MMHG | HEIGHT: 65 IN | RESPIRATION RATE: 16 BRPM | WEIGHT: 128 LBS | TEMPERATURE: 98 F | HEART RATE: 77 BPM | SYSTOLIC BLOOD PRESSURE: 117 MMHG | BODY MASS INDEX: 21.33 KG/M2

## 2025-07-01 DIAGNOSIS — F17.210 CIGARETTE NICOTINE DEPENDENCE WITHOUT COMPLICATION: Chronic | ICD-10-CM

## 2025-07-01 DIAGNOSIS — G43.909 MIGRAINE WITHOUT STATUS MIGRAINOSUS, NOT INTRACTABLE, UNSPECIFIED MIGRAINE TYPE: ICD-10-CM

## 2025-07-01 DIAGNOSIS — F31.81 BIPOLAR 2 DISORDER (H): Chronic | ICD-10-CM

## 2025-07-01 DIAGNOSIS — N18.31 STAGE 3A CHRONIC KIDNEY DISEASE (H): Chronic | ICD-10-CM

## 2025-07-01 DIAGNOSIS — E78.2 MIXED HYPERLIPIDEMIA: Chronic | ICD-10-CM

## 2025-07-01 DIAGNOSIS — G89.4 CHRONIC PAIN SYNDROME: Chronic | ICD-10-CM

## 2025-07-01 DIAGNOSIS — Z01.818 PREOP GENERAL PHYSICAL EXAM: Primary | ICD-10-CM

## 2025-07-01 DIAGNOSIS — J45.40 MODERATE PERSISTENT ASTHMA, UNCOMPLICATED: Chronic | ICD-10-CM

## 2025-07-01 DIAGNOSIS — H25.9 AGE-RELATED CATARACT OF BOTH EYES, UNSPECIFIED AGE-RELATED CATARACT TYPE: ICD-10-CM

## 2025-07-01 DIAGNOSIS — E03.9 ACQUIRED HYPOTHYROIDISM: Chronic | ICD-10-CM

## 2025-07-01 DIAGNOSIS — Z98.1 STATUS POST LUMBAR SPINAL FUSION: ICD-10-CM

## 2025-07-01 DIAGNOSIS — M85.89 OSTEOPENIA OF MULTIPLE SITES: Chronic | ICD-10-CM

## 2025-07-01 DIAGNOSIS — F11.20 OPIOID DEPENDENCE, UNCOMPLICATED (H): ICD-10-CM

## 2025-07-01 DIAGNOSIS — M53.3 SACROILIAC JOINT PAIN: ICD-10-CM

## 2025-07-01 DIAGNOSIS — J43.1 PANLOBULAR EMPHYSEMA (H): Chronic | ICD-10-CM

## 2025-07-01 DIAGNOSIS — M53.9 MULTILEVEL DEGENERATIVE DISC DISEASE: ICD-10-CM

## 2025-07-01 PROCEDURE — 3078F DIAST BP <80 MM HG: CPT | Performed by: FAMILY MEDICINE

## 2025-07-01 PROCEDURE — 99215 OFFICE O/P EST HI 40 MIN: CPT | Performed by: FAMILY MEDICINE

## 2025-07-01 PROCEDURE — G2211 COMPLEX E/M VISIT ADD ON: HCPCS | Performed by: FAMILY MEDICINE

## 2025-07-01 PROCEDURE — 1125F AMNT PAIN NOTED PAIN PRSNT: CPT | Performed by: FAMILY MEDICINE

## 2025-07-01 PROCEDURE — 3074F SYST BP LT 130 MM HG: CPT | Performed by: FAMILY MEDICINE

## 2025-07-01 RX ORDER — METOCLOPRAMIDE 5 MG/1
TABLET ORAL
COMMUNITY
Start: 2025-06-24

## 2025-07-01 RX ORDER — PREDNISOLONE ACETATE 10 MG/ML
SUSPENSION/ DROPS OPHTHALMIC
COMMUNITY
Start: 2025-05-08

## 2025-07-01 RX ORDER — MOXIFLOXACIN 5 MG/ML
SOLUTION/ DROPS OPHTHALMIC
COMMUNITY
Start: 2025-05-08

## 2025-07-01 RX ORDER — KETOROLAC TROMETHAMINE 5 MG/ML
SOLUTION OPHTHALMIC
COMMUNITY
Start: 2025-05-08

## 2025-07-01 ASSESSMENT — PAIN SCALES - GENERAL: PAINLEVEL_OUTOF10: MODERATE PAIN (6)

## 2025-07-01 NOTE — PATIENT INSTRUCTIONS
How to Take Your Medication Before Surgery  Preoperative Medication Instructions   Antiplatelet or Anticoagulation Medication Instructions   - aspirin: Bleeding risk is low for this procedure and daily aspirin may be continued without modification.     Additional Medication Instructions  Take all scheduled medications on the day of surgery with sips of water.       Patient Education   Preparing for Your Surgery  For Adults  Getting started  In most cases, a nurse will call to review your health history and instructions. They will give you an arrival time based on your scheduled surgery time. Please be ready to share:  Your doctor's clinic name and phone number  Your medical, surgical, and anesthesia history  A list of allergies and sensitivities  A list of medicines, including herbal treatments and over-the-counter drugs  Whether the patient has a legal guardian (ask how to send us the papers in advance)  Note: You may not receive a call if you were seen at our PAC (Preoperative Assessment Center).  Please tell us if you're pregnant--or if there's any chance you might be pregnant. Some surgeries may injure a fetus (unborn baby), so they require a pregnancy test. Surgeries that are safe for a fetus don't always need a test, and you can choose whether to have one.   Preparing for surgery  Within 10 to 30 days of surgery: Have a pre-op exam (sometimes called an H&P, or History and Physical). This can be done at a clinic or pre-operative center.  If you're having a , you may not need this exam. Talk to your care team.  At your pre-op exam, talk to your care team about all medicines you take. (This includes CBD oil and any drugs, such as THC, marijuana, and other forms of cannabis.) If you need to stop any medicine before surgery, ask when to start taking it again.  This is for your safety. Many medicines and drugs can make you bleed too much during surgery. Some change how well surgery (anesthesia) drugs  work.  Call your insurance company to let them know you're having surgery. (If you don't have insurance, call [phone].)  Call your clinic if there's any change in your health. This includes a scrape or scratch near the surgery site, or any signs of a cold (sore throat, runny nose, cough, rash, fever).  Eating and drinking guidelines  For your safety: Unless your surgeon tells you otherwise, follow the guidelines below.  Eat and drink as normal until 8 hours before you arrive for surgery. After that, no food or milk. You can spit out gum when you arrive.  Drink clear liquids until 2 hours before you arrive. These are liquids you can see through, like water, Gatorade, and Propel Water. They also include plain black coffee and tea (no cream or milk).  No alcohol for 24 hours before you arrive. The night before surgery, stop any drinks that contain THC.  If your care team tells you to take medicine on the morning of surgery, it's okay to take it with a sip of water. No other medicines or drugs are allowed (including CBD oil)--follow your care team's instructions.  If you have questions the day of surgery, call your hospital or surgery center.   Preventing infection  Shower or bathe the night before and the morning of surgery. Follow the instructions your clinic gave you. (If no instructions, use regular soap.)  Don't shave or clip hair near your surgery site. We'll remove the hair if needed.  Don't smoke or vape the morning of surgery. No chewing tobacco for 6 hours before you arrive. A nicotine patch is okay. You may spit out nicotine gum when you arrive.  For some surgeries, the surgeon will tell you to fully quit smoking and nicotine.  We will make every effort to keep you safe from infection. We will:  Clean our hands often with soap and water (or an alcohol-based hand rub).  Clean the skin at your surgery site with a special soap that kills germs.  Give you a special gown to keep you warm. (Cold raises the  risk of infection.)  Wear hair covers, masks, gowns, and gloves during surgery.  Give antibiotic medicine, if prescribed. Not all surgeries need this medicine.  What to bring on the day of surgery  Photo ID and insurance card  Copy of your health care directive, if you have one  Glasses and hearing aids (bring cases)  You can't wear contacts during surgery  Inhaler and eye drops, if you use them (tell us about these when you arrive)  CPAP machine or breathing device, if you use them  A few personal items, if spending the night  If you have . . .  A pacemaker, ICD (cardiac defibrillator), or other implant: Bring the ID card.  An implanted stimulator: Bring the remote control.  A legal guardian: Bring a copy of the certified (court-stamped) guardianship papers.  Please remove any jewelry, including body piercings. Leave jewelry and other valuables at home.  If you're going home the day of surgery  You must have a support person drive you home. They should stay with you overnight, and they may need to help with your self-care.  If you don't have a support person, please tells us as soon as possible. We can help.  After surgery  If it's hard to control your pain or you need more pain medicine, please call your surgeon's office.  Questions?   If you have any questions for your care team, list them here:   ____________________________________________________________________________________________________________________________________________________________________________________________________________________________________________________________  For informational purposes only. Not to replace the advice of your health care provider. Copyright   2003, 2019 [employer] [facility]. All rights reserved. Clinically reviewed by Steffen Ochoa MD. [employer] [id number] - REV 02/25.

## 2025-07-01 NOTE — PROGRESS NOTES
Preoperative Evaluation  [facility]  [address]  [place]  [facility] [address]  Phone: [phone]  Fax: [phone]  Primary Provider: Joanne Weiner MD  Pre-op Performing Provider: Joanne Weiner MD  Jul 1, 2025 7/1/2025   Surgical Information   What procedure is being done? Cataract surgery     Cataract surgery   Facility or Hospital where procedure/surgery will be performed: [facility]    Who is doing the procedure / surgery? Dr Braden Casillas    Date of surgery / procedure: 07/14/2025    Time of surgery / procedure: Catarac    Where do you plan to recover after surgery? at home with family        Proxy-reported    Multiple values from one day are sorted in reverse-chronological order     Fax number for surgical facility: [phone]    Assessment & Plan     The proposed surgical procedure is considered LOW risk.    Preop general physical exam  Patient is cleared for surgery without further evaluation required.    Age-related cataract of both eyes, unspecified age-related cataract type  Will have left eye done on July 14 and right eye done on August 11.    Chronic pain syndrome  Longstanding, seen at [facility] pain clinic.    Opioid dependence, uncomplicated (H)  Monitored by [facility] pain clinic.  Takes hydrocodone acetaminophen 10/325 up to 6 tablets daily.    Sacroiliac joint pain  Recently seen at [facility].    Multilevel degenerative disc disease  History of multiple surgeries.    Status post lumbar spinal fusion  Most recent surgery done in March with Dr. Sanon, L3-4 fusion with anterior and posterior approaches.    Migraine without status migrainosus, not intractable, unspecified migraine type  Seen by neurology and using Relpax and Aimovig.    Cigarette nicotine dependence without complication  Continuing to smoke half pack a day.    Panlobular emphysema (H)  On Spiriva, Symbicort and  DuoNeb or albuterol as needed.    Moderate persistent asthma, uncomplicated  Hard to discern whether this is asthma versus chronic bronchitis.    Bipolar 2 disorder (H)  Seen and treated with Lamictal and escitalopram.    Mixed hyperlipidemia  On a atorvastatin 80 mg and Zetia 10 mg with last LDL of 71.  Controlled.    Stage 3a chronic kidney disease (H)  Most recent GFR was actually normal.  Likely previously affected by NSAID use.    Acquired hypothyroidism  On levothyroxine 75 mcg with normal labs.    Osteopenia of multiple sites  Last DEXA scan of March 2023.  Not currently actively treating.    No labs needed.     The longitudinal plan of care for the diagnosis(es)/condition(s) as documented were addressed during this visit. Due to the added complexity in care, I will continue to support Dayana in the subsequent management and with ongoing continuity of care.       - No identified additional risk factors other than previously addressed    Preoperative Medication Instructions  Antiplatelet or Anticoagulation Medication Instructions   - Bleeding risk is low for this procedure (e.g. dental, skin, cataract).    Additional Medication Instructions  Take all scheduled medications on the day of surgery with sips of H2O    Recommendation  Approval given to proceed with proposed procedure, without further diagnostic evaluation.    Follow-up   For second cataract surgery later this summer.        Suzie Anne is a 64 year old, presenting for the following:  Pre-Op Exam (7/14/25 Cataract surgery left eye )          7/1/2025     8:23 AM   Additional Questions   Roomed by dov powell cma     HPI: Patient with history of decreased vision secondary to cataracts.  Needs surgery.  Scheduled for July 14 and August 11 with [facility], Dr. Braden Casillas.          7/1/2025   Pre-Op Questionnaire   Have you ever had a heart attack or stroke? No    Have you ever had surgery on your heart or blood vessels, such as a  stent placement, a coronary artery bypass, or surgery on an artery in your head, neck, heart, or legs? No    Do you have chest pain with activity? No    Do you have a history of heart failure? No    Do you currently have a cold, bronchitis or symptoms of other infection? No    Do you have a cough, shortness of breath, or wheezing? (!) YES COPD/asthma    Do you or anyone in your family have previous history of blood clots? No    Do you or does anyone in your family have a serious bleeding problem such as prolonged bleeding following surgeries or cuts? No    Have you ever had problems with anemia or been told to take iron pills? No    Have you had any abnormal blood loss such as black, tarry or bloody stools, or abnormal vaginal bleeding? No    Have you ever had a blood transfusion? No    Are you willing to have a blood transfusion if it is medically needed before, during, or after your surgery? Yes    Have you or any of your relatives ever had problems with anesthesia? (!) YES n/v    Do you have sleep apnea, excessive snoring or daytime drowsiness? No    Do you have any artifical heart valves or other implanted medical devices like a pacemaker, defibrillator, or continuous glucose monitor? No    Do you have artificial joints? No    Are you allergic to latex? No        Proxy-reported     Advance Care Planning    Discussed advance care planning with patient; however, patient declined at this time.    Preoperative Review of    reviewed - controlled substances reflected in medication list.      Patient Active Problem List    Diagnosis Date Noted    Opioid dependence, uncomplicated (H) 07/01/2025     Priority: Medium    Multilevel degenerative disc disease 03/03/2025     Priority: Medium    Other specified spondylopathies, lumbar region (H) 03/03/2025     Priority: Medium    Nicotine dependence 10/24/2024     Priority: Medium    Jaw pain 10/14/2024     Priority: Medium    Trigeminal neuralgia 10/14/2024      Priority: Medium    Cervical myelopathy (H) 02/13/2024     Priority: Medium    Panlobular emphysema (H) 09/11/2023     Priority: Medium    Atypical chest pain 08/16/2023     Priority: Medium    Epigastric pain 05/12/2023     Priority: Medium    Iron deficiency anemia 09/20/2022     Priority: Medium    Acute bronchitis, unspecified organism 07/29/2022     Priority: Medium    Abnormal reflex 09/08/2021     Priority: Medium    Asymptomatic postmenopausal status 08/31/2021     Priority: Medium     Formatting of this note might be different from the original.  Created by Conversion    Replacement Utility updated for latest IMO load      Menopausal disorder 08/31/2021     Priority: Medium     Formatting of this note might be different from the original.  Created by Conversion    Replacement Utility updated for latest IMO load      Migraine headache 08/31/2021     Priority: Medium     Formatting of this note might be different from the original.  Created by Conversion    Replacement Utility updated for latest IMO load      Chronic pain syndrome 08/31/2021     Priority: Medium    DDD (degenerative disc disease), lumbosacral 03/22/2021     Priority: Medium    Spinal stenosis of lumbar region with neurogenic claudication 03/22/2021     Priority: Medium    Pain in right leg 03/21/2021     Priority: Medium    Other specified disorders of bone density and structure, multiple sites 03/21/2021     Priority: Medium    Other abnormalities of gait and mobility 03/21/2021     Priority: Medium    Moderate persistent asthma, uncomplicated 03/21/2021     Priority: Medium    Low back pain 03/21/2021     Priority: Medium    Anemia 03/18/2021     Priority: Medium    Hypotension 03/18/2021     Priority: Medium    Fibromyalgia 03/17/2021     Priority: Medium     Formatting of this note might be different from the original.  Created by Conversion    Formatting of this note might be different from the original.  Created by  Conversion    Formatting of this note might be different from the original.  Formatting of this note might be different from the original.  Created by Conversion    Formatting of this note might be different from the original.  Created by Conversion  Formatting of this note might be different from the original.  Formatting of this note might be different from the original.  Created by Conversion    Formatting of this note might be different from the original.  Created by Conversion      Hyperlipidemia 03/17/2021     Priority: Medium     Formatting of this note might be different from the original.  Created by Conversion    Formatting of this note might be different from the original.  Formatting of this note might be different from the original.  Created by Conversion  Formatting of this note might be different from the original.  Formatting of this note might be different from the original.  Created by Conversion      Hypothyroidism 03/17/2021     Priority: Medium     Formatting of this note might be different from the original.  Created by Conversion    Replacement Utility updated for latest IMO load    Formatting of this note might be different from the original.  Formatting of this note might be different from the original.  Created by Conversion    Replacement Utility updated for latest IMO load  Formatting of this note might be different from the original.  Formatting of this note might be different from the original.  Created by Conversion    Replacement Utility updated for latest IMO load      Common migraine with intractable migraine 02/15/2021     Priority: Medium    Chronic kidney disease, stage 3 (H) 01/14/2021     Priority: Medium     Created by Conversion        Osteopenia of multiple sites 09/01/2020     Priority: Medium    Hiatal hernia 02/16/2017     Priority: Medium    Hemorrhoids 11/22/2016     Priority: Medium    Lung nodule 08/04/2016     Priority: Medium     Formatting of this note might be  different from the original.  8/4/2016:  Left upper lobe nodule of 6.5 mm, likely benign given slow growth per radiologist.  See CT  6/27/2011:  measured  approximately 5.5 mm in greatest dimension      Bipolar 2 disorder (H) 06/01/2015     Priority: Medium     Formatting of this note might be different from the original.  Created by Conversion    Replacement Utility updated for latest IMO load    Formatting of this note might be different from the original.  MED TRIALS:  Doxepin has been helpful for sleep & fibromyalgia.  Topamax- caused cognitive slowing & poor concentration. Depakote caused wt gain. Seroquel was sedating. Trazodone caused insomnia. Has tried mellaril, effexor, depakote, remeron, buspar, risperdal,zyprexa, celexa,luvox - unsure what happened with these.  Formatting of this note might be different from the original.  MED TRIALS:  Doxepin has been helpful for sleep & fibromyalgia.  Topamax- caused cognitive slowing & poor concentration. Depakote caused wt gain. Seroquel was sedating. Trazodone caused insomnia. Has tried mellaril, effexor, depakote, remeron, buspar, risperdal,zyprexa, celexa,luvox - unsure what happened with these.      Borderline personality disorder (H) 06/01/2015     Priority: Medium    PTSD (post-traumatic stress disorder) 06/01/2015     Priority: Medium    GERD (gastroesophageal reflux disease) 10/16/2012     Priority: Medium     Formatting of this note might be different from the original.  Formatting of this note might be different from the original.  coegd 07/17/12, normal screening  Formatting of this note might be different from the original.  coegd 07/17/12, normal screening      Dysphagia 07/19/2012     Priority: Medium      Past Medical History:   Diagnosis Date    Abnormal reflex 09/08/2021    Abnormality of gait     Created by Conversion     Acute bronchitis, unspecified organism 07/29/2022    Anemia 03/18/2021    Anxiety     Asthma     Asymptomatic postmenopausal  status     Created by Conversion  Replacement Utility updated for latest IMO load    Bipolar 2 disorder (H)     Borderline personality disorder (H) 06/01/2015    Centrilobular emphysema (H) 02/26/2018    Mild, seen in 2018 CT scan, DLCO 60% predicted  Formatting of this note might be different from the original. Formatting of this note might be different from the original. Mild, seen in 2018 CT scan, DLCO 60% predicted    Cervical spinal stenosis     s/p cervical fusion    Chronic kidney disease     Chronic kidney disease, stage 3 (H) 01/14/2021    Chronic pain syndrome     Cigarette nicotine dependence without complication 03/04/2020    Common migraine with intractable migraine 02/15/2021    COPD (chronic obstructive pulmonary disease) (H)     DDD (degenerative disc disease), lumbosacral 03/22/2021    Depression     Depressive disorder     Draining cutaneous sinus tract 12/01/2021    Added automatically from request for surgery [id number]    Dysphagia 07/19/2012    Encounter for other orthopedic aftercare 03/21/2021    Fibrocystic breast     Fibromyalgia     GERD (gastroesophageal reflux disease)     Hallux abductovalgus with bunions, unspecified laterality 12/14/2015    Hemorrhoids 11/22/2016    Herpes zoster     Created by Conversion  Replacement Utility updated for latest IMO load    Hiatal hernia     History of electroconvulsive therapy     Hyperlipidemia 03/17/2021    Created by Conversion   Formatting of this note might be different from the original. Formatting of this note might be different from the original. Created by Conversion    Hypotension 03/18/2021    Hypothyroidism     hypothyroidism    IBS (irritable bowel syndrome)     Iron deficiency anemia 09/20/2022    Low back pain 03/21/2021    Lung nodule 08/04/2016 8/4/2016:  Left upper lobe nodule of 6.5 mm, likely benign given slow growth per radiologist.  See CT 6/27/2011:  measured  approximately 5.5 mm in greatest dimension     Menopausal and  postmenopausal disorder     Created by Conversion  Replacement Utility updated for latest IMO load    Menopausal disorder 08/31/2021    Formatting of this note might be different from the original. Created by Conversion  Replacement Utility updated for latest IMO load    Migraine     Created by Conversion  Replacement Utility updated for latest IMO load    Migraine headache 08/31/2021    Formatting of this note might be different from the original. Created by Conversion  Replacement Utility updated for latest IMO load    Migraines     Moderate asthma with exacerbation, unspecified whether persistent 07/29/2022    Moderate persistent asthma, uncomplicated 03/21/2021    Osteoarthritis     Osteopenia of multiple sites 09/01/2020    Other abnormalities of gait and mobility 03/21/2021    Other chronic pain     Other specified disorders of bone density and structure, multiple sites 03/21/2021    Pain in right leg 03/21/2021    PONV (postoperative nausea and vomiting)     PTSD (post-traumatic stress disorder)     Shingles 2014    on back    Spinal stenosis of lumbar region with neurogenic claudication 03/22/2021    Tobacco use disorder     Created by Conversion      Past Surgical History:   Procedure Laterality Date    BACK SURGERY  4 times on back 4 times on neck    Fusion    BIOPSY BREAST Left     Approx 5 bx    BUNIONECTOMY Right 12/15/2015    Procedure: MODIFIED LAI BUNIONECTOMY RIGHT FOOT;  Surgeon: Jerome Calvin DPM;  Location: [facility];  Service:     CERVICAL FUSION      CERVICAL FUSION Bilateral 02/16/2015    Procedure: ANTERIOR CERVICAL DECOMPRESSION/FUSION C3-5 BILATERAL, ANTERIOR HARDWARE REMOVAL C5-7 BILATERAL ;  Surgeon: Sawyer Roland MD;  Location: [facility];  Service:     COLONOSCOPY      COSMETIC SURGERY      Breast implants, liposuction of stomach    EYE SURGERY      Eye lid lift    HC NIPPLE EXPLORATION      Description: Breast Nipple Explor W/ Excision Solitary Lactiferous  Duct;  Recorded: 04/10/2008;    HEAD & NECK SURGERY      Neck fusion    HERNIA REPAIR      HYSTERECTOMY      IR CERVICAL EPIDURAL STEROID INJECTION  09/17/2003    IR CERVICAL EPIDURAL STEROID INJECTION  12/11/2003    IR CERVICAL EPIDURAL STEROID INJECTION  01/16/2004    IRRIGATION AND DEBRIDEMENT DECUBITUS WOUND, COMBINED Left 12/13/2021    Procedure: Wound exploration and debridement of Left Lower Abdomin Chronic wound.;  Surgeon: Crispin Bunch MD;  Location: [facility]    LAPAROSCOPIC NISSEN FUNDOPLICATION N/A 12/30/2022    Procedure: FUNDOPLICATION, partial ROBOT-ASSISTED, LAPAROSCOPIC, USING DA MARIBEL XI;  Surgeon: Davie Ocasio DO;  Location: [facility]    LUMBAR DISCECTOMY      X2    MAMMOPLASTY AUGMENTATION Bilateral      approx 2006?    ORTHOPEDIC SURGERY      PELVIC LAPAROSCOPY      multiple    SHOULDER OPEN ROTATOR CUFF REPAIR Left     X2    ZZC TOTAL ABDOM HYSTERECTOMY      Description: Total Abdominal Hysterectomy;  Recorded: 06/10/2013;     Current Outpatient Medications   Medication Sig Dispense Refill    albuterol (PROAIR HFA/PROVENTIL HFA/VENTOLIN HFA) 108 (90 Base) MCG/ACT inhaler INHALE 2 PUFFS INTO THE LUNGS EVERY 4 HOURS 18 g 0    aspirin-acetaminophen-caffeine (EXCEDRIN MIGRAINE) 250-250-65 MG tablet Take 1 tablet by mouth daily as needed for headaches. MR x 1      atorvastatin (LIPITOR) 80 MG tablet Take 1 tablet (80 mg) by mouth at bedtime. 90 tablet 2    benzonatate (TESSALON) 100 MG capsule TAKE 1 CAPSULE(100 MG) BY MOUTH THREE TIMES DAILY AS NEEDED FOR COUGH 30 capsule 0    budesonide-formoterol (SYMBICORT) 160-4.5 MCG/ACT Inhaler Inhale 2 puffs into the lungs.      diazepam (VALIUM) 2 MG tablet Take 1 tablet (2 mg) by mouth every 6 hours as needed for anxiety (flying phobia). 4 tablet 0    doxepin (SINEQUAN) 25 MG capsule Take 75 mg by mouth at bedtime.      eletriptan (RELPAX) 40 MG tablet Take 1 tablet (40 mg) by mouth at onset of headache for migraine. 12  tablet 3    erenumab-aooe (AIMOVIG) 70 MG/ML injection ADMINISTER 1 ML(70 MG) UNDER THE SKIN EVERY MONTH 3 mL 3    escitalopram (LEXAPRO) 5 MG tablet Take 2 Tablets (10 mg) by mouth once daily x 7 days, THEN increase to 3 tablets (15mg) once daily. You will start this medication a week after decreasing Fluoxetine      esomeprazole (NEXIUM) 40 MG DR capsule Take 40 mg by mouth daily. 40 mg am , 40 mg PM      ezetimibe (ZETIA) 10 MG tablet Take 1 tablet (10 mg) by mouth at bedtime. 90 tablet 3    gabapentin (NEURONTIN) 300 MG capsule Take 900 mg by mouth 2 times daily.      HYDROcodone-acetaminophen (NORCO)  MG per tablet Take 1 tablet by mouth every 4 hours as needed      hydrOXYzine (ATARAX) 25 MG tablet TAKE 2 TABLETS BY MOUTH AT BEDTIME AND 1 TABLET BY MOUTH DURING THE DAY AS NEEDED      ipratropium - albuterol 0.5 mg/2.5 mg/3 mL (DUONEB) 0.5-2.5 (3) MG/3ML neb solution Take 1 vial (3 mLs) by nebulization every 6 hours as needed for shortness of breath, wheezing or cough 180 mL 4    ketorolac (ACULAR) 0.5 % ophthalmic solution INSTILL 1 DROP IN RIGHT EYE FOUR TIMES DAILY BEGINNING 1 DAY BEFORE SURGERY. CONTINUE UNTIL THE BOTTLE GONE. DO NOT EXCEED 4 WEEKS      lamoTRIgine (LAMICTAL) 200 MG tablet Take 1 tablet by mouth daily      lamoTRIgine (LAMICTAL) 25 MG tablet TAKE 2 TABLETS BY MOUTH AT BEDTIME ALONG WITH 200 MG TABLET      levothyroxine (SYNTHROID/LEVOTHROID) 75 MCG tablet TAKE 1 TABLET(75 MCG) BY MOUTH DAILY 90 tablet 2    montelukast (SINGULAIR) 10 MG tablet TAKE 1 TABLET(10 MG) BY MOUTH AT BEDTIME 90 tablet 0    moxifloxacin (VIGAMOX) 0.5 % ophthalmic solution INSTILL 1 DROP IN RIGHT EYE FOUR TIMES DAILY BEGINNING 1 DAY BEFORE SURGERY. CONTINUE FOR 1 WEEK THEN STOP      naloxone (NARCAN) 4 MG/0.1ML nasal spray Spray 1 spray (4 mg) into one nostril alternating nostrils once as needed for opioid reversal every 2-3 minutes until assistance arrives 0.2 mL 1    prazosin (MINIPRESS) 2 MG capsule Take 2  capsules by mouth At Bedtime      prednisoLONE acetate (PRED FORTE) 1 % ophthalmic suspension SHAKE LIQUID AND INSTILL 1 DROP IN LEFT EYE FOUR TIMES DAILY BEGINNING 1 DAY BEFORE SURGERY. CONTINUE UNTIL THE BOTTLE GONE. DO NOT EXCEED 4 WEEKS      prochlorperazine (COMPAZINE) 10 MG tablet Take 1 tablet (10 mg) by mouth every 6 hours as needed for nausea. When having migraine 30 tablet 0    senna-docusate (SENOKOT-S/PERICOLACE) 8.6-50 MG tablet Take 2 tablets by mouth At Bedtime      tiotropium (SPIRIVA RESPIMAT) 2.5 MCG/ACT inhaler Inhale 2 puffs into the lungs daily 4 g 11    tiZANidine (ZANAFLEX) 4 MG tablet TAKE 1 TO 2 TABLETS BY MOUTH DURING THE DAY AND 2 TABLETS AT BEDTIME 120 tablet 1    varenicline (CHANTIX) 1 MG tablet Take 1 tablet (1 mg) by mouth 2 times daily. 180 tablet 1    vitamin D2 (ERGOCALCIFEROL) 21085 units (1250 mcg) capsule Take 1 cap every TWO weeks. 6 capsule 0    metoclopramide (REGLAN) 5 MG tablet  (Patient not taking: Reported on 7/1/2025)      predniSONE (DELTASONE) 5 MG tablet On a taper (Patient not taking: Reported on 7/1/2025)         Allergies   Allergen Reactions    Codeine Anaphylaxis    Nefazodone Nausea and Vomiting and Nausea    Oxycodone-Acetaminophen Unknown     hallucinations    Cetirizine Nausea and Vomiting and Nausea    Trazodone Nausea and Vomiting and Nausea    Acetaminophen Hives    Hydromorphone Other (See Comments)     Accidental overdose at home-took amount prescribed and it was too much    Morphine Other (See Comments)    Oxycodone Hallucination and Other (See Comments)    Amoxicillin-Pot Clavulanate [Amoxicillin-Pot Clavulanate] Rash    Cephalexin Nausea, Rash and Hives    Clavulanic Acid Rash    Cyclobenzaprine Rash    Eszopiclone Rash    Methocarbamol Rash    Penicillin G Rash    Penicillins Rash     Mild rash        Social History     Tobacco Use    Smoking status: Every Day     Current packs/day: 0.50     Average packs/day: 0.5 packs/day for 45.0 years (22.5 ttl  "pk-yrs)     Types: Cigarettes     Passive exposure: Current    Smokeless tobacco: Never    Tobacco comments:     24 Currently at 4 cigarettes daily   Substance Use Topics    Alcohol use: Not Currently     Family History   Problem Relation Age of Onset    Lung Cancer Mother          at age 52    Other Cancer Mother         Lung cancer moved into bones    Alcoholism Father     Substance Abuse Father          of lived disease at 62    Heart Disease Maternal Grandfather     Diabetes Paternal Grandmother     Coronary Artery Disease Paternal Grandmother     Heart Disease Paternal Grandfather     Diabetes Sister     Hyperlipidemia Sister     Hypertension Sister     Crohn's Disease Sister     Coronary Artery Disease Paternal Uncle     Asthma Maternal Aunt     Diabetes Other         Diabetes    Hyperlipidemia Sister     Depression Nephew         Commited suicide at 17    Substance Abuse Nephew     Asthma Nephew     Asthma Daughter         Severe     History   Drug Use No         Objective    /69 (BP Location: Left arm, Patient Position: Sitting, Cuff Size: Adult Regular)   Pulse 77   Temp 98  F (36.7  C)   Resp 16   Ht 1.638 m (5' 4.5\")   Wt 58.1 kg (128 lb)   LMP 1983   SpO2 99%   BMI 21.63 kg/m     Estimated body mass index is 21.63 kg/m  as calculated from the following:    Height as of this encounter: 1.638 m (5' 4.5\").    Weight as of this encounter: 58.1 kg (128 lb).  Physical Exam  General appearance - alert, well appearing, and in no distress and normal appearing weight  Mental status - normal mood, behavior, speech, dress, motor activity, and thought processes  Eyes - pupils equal and reactive, extraocular eye movements intact  Ears - bilateral TM's and external ear canals normal  Nose - normal and patent, no erythema, discharge   Mouth - mucous membranes moist, pharynx normal without lesions  Neck - supple, no significant adenopathy, carotids upstroke normal bilaterally, no bruits, " thyroid exam: thyroid is normal in size without nodules or tenderness  Chest - clear to auscultation, no wheezes, rales or rhonchi, symmetric air entry  Heart - normal rate and regular rhythm, no murmurs noted  Abdomen - soft, nontender, nondistended, no masses or organomegaly  Back exam - limited range of motion, pain with motion noted during exam, well-healed lumbar incision  Neurological - alert, oriented, normal speech, no focal findings or movement disorder noted, cranial nerves II through XII intact  Musculoskeletal - no joint tenderness, deformity or swelling  Extremities - peripheral pulses normal, no pedal edema, no clubbing or cyanosis  Skin - normal coloration and turgor, no rashes, no suspicious skin lesions noted      Recent Labs   Lab Test 04/01/25  1300 03/03/25  1110 12/17/24  1032 10/24/24  1232   HGB 12.5 13.9  --  14.7   * 294  --  261    144  --  140   POTASSIUM 5.8* 4.3  --  4.4   CR 0.95 1.02*   < > 0.90   A1C  --   --   --  6.1*    < > = values in this interval not displayed.        Diagnostics  No labs were ordered during this visit.   No EKG required for low risk surgery (cataract, skin procedure, breast biopsy, etc).    Revised Cardiac Risk Index (RCRI)  The patient has the following serious cardiovascular risks for perioperative complications:   - No serious cardiac risks = 0 points     RCRI Interpretation: 0 points: Class I (very low risk - 0.4% complication rate)         Signed Electronically by: Joanne Weiner MD  A copy of this evaluation report is provided to the requesting physician.

## 2025-07-16 DIAGNOSIS — E55.9 VITAMIN D DEFICIENCY: ICD-10-CM

## 2025-07-17 RX ORDER — ERGOCALCIFEROL 1.25 MG/1
CAPSULE, LIQUID FILLED ORAL
Qty: 4 CAPSULE | Refills: 1 | Status: SHIPPED | OUTPATIENT
Start: 2025-07-17

## 2025-08-11 ENCOUNTER — OFFICE VISIT (OUTPATIENT)
Dept: NEUROLOGY | Facility: CLINIC | Age: 65
End: 2025-08-11
Payer: MEDICARE

## 2025-08-11 VITALS
WEIGHT: 128 LBS | BODY MASS INDEX: 21.33 KG/M2 | HEIGHT: 65 IN | SYSTOLIC BLOOD PRESSURE: 161 MMHG | HEART RATE: 61 BPM | DIASTOLIC BLOOD PRESSURE: 78 MMHG

## 2025-08-11 DIAGNOSIS — G43.019 MIGRAINE WITHOUT AURA, INTRACTABLE, WITHOUT STATUS MIGRAINOSUS: Primary | ICD-10-CM

## 2025-08-11 PROCEDURE — 3078F DIAST BP <80 MM HG: CPT | Performed by: PSYCHIATRY & NEUROLOGY

## 2025-08-11 PROCEDURE — 3077F SYST BP >= 140 MM HG: CPT | Performed by: PSYCHIATRY & NEUROLOGY

## 2025-08-11 PROCEDURE — 64615 CHEMODENERV MUSC MIGRAINE: CPT | Performed by: PSYCHIATRY & NEUROLOGY

## 2025-08-13 ENCOUNTER — TELEPHONE (OUTPATIENT)
Dept: NEUROLOGY | Facility: CLINIC | Age: 65
End: 2025-08-13
Payer: MEDICARE

## 2025-08-13 DIAGNOSIS — G43.019 MIGRAINE WITHOUT AURA, INTRACTABLE, WITHOUT STATUS MIGRAINOSUS: Primary | ICD-10-CM

## 2025-08-13 RX ORDER — RIZATRIPTAN BENZOATE 10 MG/1
10 TABLET ORAL
Qty: 9 TABLET | Refills: 11 | Status: SHIPPED | OUTPATIENT
Start: 2025-08-13

## 2025-08-14 ENCOUNTER — HOSPITAL ENCOUNTER (OUTPATIENT)
Dept: MAMMOGRAPHY | Facility: CLINIC | Age: 65
End: 2025-08-14
Attending: FAMILY MEDICINE
Payer: MEDICARE

## 2025-08-14 DIAGNOSIS — R92.8 BI-RADS CATEGORY 3 MAMMOGRAM RESULT: ICD-10-CM

## 2025-08-14 PROCEDURE — 77065 DX MAMMO INCL CAD UNI: CPT | Mod: LT

## 2025-08-20 DIAGNOSIS — J43.1 PANLOBULAR EMPHYSEMA (H): Chronic | ICD-10-CM

## 2025-08-20 RX ORDER — BENZONATATE 100 MG/1
CAPSULE ORAL
Qty: 30 CAPSULE | Refills: 0 | Status: SHIPPED | OUTPATIENT
Start: 2025-08-20

## 2025-09-03 ENCOUNTER — NURSE TRIAGE (OUTPATIENT)
Dept: FAMILY MEDICINE | Facility: CLINIC | Age: 65
End: 2025-09-03
Payer: MEDICARE

## 2025-09-04 ENCOUNTER — OFFICE VISIT (OUTPATIENT)
Dept: FAMILY MEDICINE | Facility: CLINIC | Age: 65
End: 2025-09-04
Payer: MEDICARE

## 2025-09-04 VITALS
HEIGHT: 64 IN | SYSTOLIC BLOOD PRESSURE: 146 MMHG | TEMPERATURE: 98.3 F | WEIGHT: 128.9 LBS | HEART RATE: 70 BPM | DIASTOLIC BLOOD PRESSURE: 79 MMHG | BODY MASS INDEX: 22.01 KG/M2 | OXYGEN SATURATION: 95 % | RESPIRATION RATE: 14 BRPM

## 2025-09-04 DIAGNOSIS — F17.210 CIGARETTE NICOTINE DEPENDENCE WITHOUT COMPLICATION: Chronic | ICD-10-CM

## 2025-09-04 DIAGNOSIS — G89.4 CHRONIC PAIN SYNDROME: Chronic | ICD-10-CM

## 2025-09-04 DIAGNOSIS — G43.E09 CHRONIC MIGRAINE WITH AURA WITHOUT STATUS MIGRAINOSUS, NOT INTRACTABLE: Primary | ICD-10-CM

## 2025-09-04 DIAGNOSIS — F11.20 OPIOID DEPENDENCE, UNCOMPLICATED (H): ICD-10-CM

## 2025-09-04 RX ORDER — VARENICLINE TARTRATE 1 MG/1
1 TABLET, FILM COATED ORAL 2 TIMES DAILY
Qty: 180 TABLET | Refills: 1 | Status: SHIPPED | OUTPATIENT
Start: 2025-09-04

## 2025-09-04 RX ORDER — KETOROLAC TROMETHAMINE 30 MG/ML
30 INJECTION, SOLUTION INTRAMUSCULAR; INTRAVENOUS ONCE
Status: COMPLETED | OUTPATIENT
Start: 2025-09-04 | End: 2025-09-04

## 2025-09-04 RX ADMIN — KETOROLAC TROMETHAMINE 30 MG: 30 INJECTION, SOLUTION INTRAMUSCULAR; INTRAVENOUS at 12:36

## 2025-09-04 ASSESSMENT — ANXIETY QUESTIONNAIRES: GAD7 TOTAL SCORE: INCOMPLETE

## 2025-09-04 ASSESSMENT — PAIN SCALES - GENERAL: PAINLEVEL_OUTOF10: SEVERE PAIN (8)

## (undated) DEVICE — SUTURE MONOCRYL+ 4-0 PS-2 27IN MCP426H

## (undated) DEVICE — BANDAGE ELASTOPLAST 1IN ADH S&N 2593

## (undated) DEVICE — GLOVE BIOGEL PI ORTHOPRO SZ 7.5 47675

## (undated) DEVICE — DRSG TEGADERM 2 3/8X2 3/4" 1624W

## (undated) DEVICE — DRSG STERI STRIP 1/2X4" R1547

## (undated) DEVICE — CUSTOM PACK LAP CHOLE SBA5BLCHEA

## (undated) DEVICE — SOL WATER IRRIG 1000ML BOTTLE 2F7114

## (undated) DEVICE — SU VICRYL+ 0 27 UR6 VLT VCP603H

## (undated) DEVICE — DAVINCI SEAL CANNULA AND STPL 12MM 470380

## (undated) DEVICE — BLADE KNIFE SURG 11 371111

## (undated) DEVICE — PREP CHLORAPREP 26ML TINTED HI-LITE ORANGE 930815

## (undated) DEVICE — NDL SPINAL 20GA 3.5"

## (undated) DEVICE — DRAPE U SPLIT 74X120" 29440

## (undated) DEVICE — DAVINCI XI DRAPE ARM 470015

## (undated) DEVICE — TUBE PENROSE 3/8 X 12 STRL LTX 0912020

## (undated) DEVICE — MARKER SURG SKIN STRL 77734

## (undated) DEVICE — DECANTER BAG 2002S

## (undated) DEVICE — NDL INSUFFLATION 13GA 120MM C2201

## (undated) DEVICE — SUTURE VICRYL+ 2-0 27IN SH UND VCP417H

## (undated) DEVICE — DAVINCI XI OBTURATOR BLADELESS 8MM 470359

## (undated) DEVICE — DECANTER VIAL 2006S

## (undated) DEVICE — SU ETHIBOND 0 CT-2 30" X412H

## (undated) DEVICE — SYR 50ML SLIP TIP W/O NDL 309654

## (undated) DEVICE — SOL NACL 0.9% 100ML BAG 2B1302

## (undated) DEVICE — LUBRICANT INST ELECTROLUBE EL101

## (undated) DEVICE — PLATE GROUNDING ADULT W/CORD 9165L

## (undated) DEVICE — SUTURE PASSOR W/GUIDE RSG-14F-4-WG

## (undated) DEVICE — PAD POS XL 1X20X40IN PINK PIGAZZI

## (undated) DEVICE — DAVINCI XI REDUCER 8-12MM 470381

## (undated) DEVICE — TUBING SMOKE EVAC PNEUMOCLEAR HIGH FLOW 0620050250

## (undated) DEVICE — DAVINCI XI SEAL UNIVERSAL 5-8MM 470361

## (undated) DEVICE — DAVINCI XI DRAPE COLUMN 470341

## (undated) DEVICE — SUCTION MANIFOLD NEPTUNE 2 SYS 1 PORT 702-025-000

## (undated) DEVICE — DRAPE SHEET REV FOLD 3/4 9349

## (undated) DEVICE — DAVINCI XI HANDPIECE ESU VESSEL SEALER 8MM EXT 480422

## (undated) DEVICE — DEVICE CLOSURE V-LOC 0 GS-21 6IN VLOCN0306

## (undated) RX ORDER — PROPOFOL 10 MG/ML
INJECTION, EMULSION INTRAVENOUS
Status: DISPENSED
Start: 2022-12-30

## (undated) RX ORDER — FENTANYL CITRATE 50 UG/ML
INJECTION, SOLUTION INTRAMUSCULAR; INTRAVENOUS
Status: DISPENSED
Start: 2022-12-30

## (undated) RX ORDER — DEXAMETHASONE SODIUM PHOSPHATE 4 MG/ML
INJECTION, SOLUTION INTRA-ARTICULAR; INTRALESIONAL; INTRAMUSCULAR; INTRAVENOUS; SOFT TISSUE
Status: DISPENSED
Start: 2022-12-30

## (undated) RX ORDER — LIDOCAINE HYDROCHLORIDE 10 MG/ML
INJECTION, SOLUTION EPIDURAL; INFILTRATION; INTRACAUDAL; PERINEURAL
Status: DISPENSED
Start: 2022-12-30

## (undated) RX ORDER — EPHEDRINE SULFATE 50 MG/ML
INJECTION, SOLUTION INTRAMUSCULAR; INTRAVENOUS; SUBCUTANEOUS
Status: DISPENSED
Start: 2022-12-30

## (undated) RX ORDER — ONDANSETRON 2 MG/ML
INJECTION INTRAMUSCULAR; INTRAVENOUS
Status: DISPENSED
Start: 2022-12-30